# Patient Record
Sex: FEMALE | Race: WHITE | Employment: OTHER | URBAN - METROPOLITAN AREA
[De-identification: names, ages, dates, MRNs, and addresses within clinical notes are randomized per-mention and may not be internally consistent; named-entity substitution may affect disease eponyms.]

---

## 2017-01-05 ENCOUNTER — OFFICE VISIT (OUTPATIENT)
Dept: PULMONOLOGY | Age: 52
End: 2017-01-05

## 2017-01-05 ENCOUNTER — OFFICE VISIT (OUTPATIENT)
Dept: SLEEP MEDICINE | Age: 52
End: 2017-01-05

## 2017-01-05 ENCOUNTER — HOSPITAL ENCOUNTER (OUTPATIENT)
Dept: OTHER | Age: 52
Discharge: HOME | End: 2017-01-05
Attending: INTERNAL MEDICINE | Admitting: INTERNAL MEDICINE

## 2017-01-05 VITALS
DIASTOLIC BLOOD PRESSURE: 78 MMHG | RESPIRATION RATE: 16 BRPM | TEMPERATURE: 98 F | HEIGHT: 62 IN | WEIGHT: 220 LBS | BODY MASS INDEX: 40.48 KG/M2 | SYSTOLIC BLOOD PRESSURE: 134 MMHG | OXYGEN SATURATION: 92 % | HEART RATE: 76 BPM

## 2017-01-05 VITALS
DIASTOLIC BLOOD PRESSURE: 70 MMHG | RESPIRATION RATE: 12 BRPM | BODY MASS INDEX: 40.59 KG/M2 | WEIGHT: 220.6 LBS | OXYGEN SATURATION: 98 % | HEIGHT: 62 IN | SYSTOLIC BLOOD PRESSURE: 126 MMHG | HEART RATE: 67 BPM | TEMPERATURE: 98.2 F

## 2017-01-05 DIAGNOSIS — R09.82 POST-NASAL DRIP: ICD-10-CM

## 2017-01-05 DIAGNOSIS — J45.30 MILD PERSISTENT ASTHMA WITHOUT COMPLICATION: ICD-10-CM

## 2017-01-05 DIAGNOSIS — G47.61 PLMD (PERIODIC LIMB MOVEMENT DISORDER): ICD-10-CM

## 2017-01-05 DIAGNOSIS — G25.81 RESTLESS LEG SYNDROME: ICD-10-CM

## 2017-01-05 DIAGNOSIS — J45.30 MILD PERSISTENT ASTHMA WITHOUT COMPLICATION: Primary | ICD-10-CM

## 2017-01-05 DIAGNOSIS — G47.33 OSA (OBSTRUCTIVE SLEEP APNEA): Primary | ICD-10-CM

## 2017-01-05 LAB
BASOPHILS ABSOLUTE: 0.1 K/UL (ref 0–0.2)
BASOPHILS RELATIVE PERCENT: 1 %
EOSINOPHILS ABSOLUTE: 0.1 K/UL (ref 0–0.6)
EOSINOPHILS RELATIVE PERCENT: 1.1 %
HCT VFR BLD CALC: 43.3 % (ref 36–48)
HEMOGLOBIN: 14 G/DL (ref 12–16)
LYMPHOCYTES ABSOLUTE: 3.9 K/UL (ref 1–5.1)
LYMPHOCYTES RELATIVE PERCENT: 33.9 %
MCH RBC QN AUTO: 28.4 PG (ref 26–34)
MCHC RBC AUTO-ENTMCNC: 32.3 G/DL (ref 31–36)
MCV RBC AUTO: 87.9 FL (ref 80–100)
MONOCYTES ABSOLUTE: 0.6 K/UL (ref 0–1.3)
MONOCYTES RELATIVE PERCENT: 5.6 %
NEUTROPHILS ABSOLUTE: 6.7 K/UL (ref 1.7–7.7)
NEUTROPHILS RELATIVE PERCENT: 58.4 %
PDW BLD-RTO: 14.7 % (ref 12.4–15.4)
PLATELET # BLD: 250 K/UL (ref 135–450)
PMV BLD AUTO: 9.4 FL (ref 5–10.5)
RBC # BLD: 4.93 M/UL (ref 4–5.2)
WBC # BLD: 11.5 K/UL (ref 4–11)

## 2017-01-05 PROCEDURE — 99204 OFFICE O/P NEW MOD 45 MIN: CPT | Performed by: PSYCHIATRY & NEUROLOGY

## 2017-01-05 PROCEDURE — 99205 OFFICE O/P NEW HI 60 MIN: CPT | Performed by: INTERNAL MEDICINE

## 2017-01-05 RX ORDER — FLUTICASONE FUROATE AND VILANTEROL 200; 25 UG/1; UG/1
200 POWDER RESPIRATORY (INHALATION) DAILY
Qty: 1 EACH | Refills: 3 | Status: SHIPPED | OUTPATIENT
Start: 2017-01-05 | End: 2017-01-09 | Stop reason: CLARIF

## 2017-01-05 RX ORDER — FLUTICASONE FUROATE AND VILANTEROL 100; 25 UG/1; UG/1
1 POWDER RESPIRATORY (INHALATION) DAILY
Qty: 1 EACH | Refills: 0 | COMMUNITY
Start: 2017-01-05 | End: 2017-04-10

## 2017-01-05 ASSESSMENT — SLEEP AND FATIGUE QUESTIONNAIRES
HOW LIKELY ARE YOU TO NOD OFF OR FALL ASLEEP WHILE WATCHING TV: 3
HOW LIKELY ARE YOU TO NOD OFF OR FALL ASLEEP WHEN YOU ARE A PASSENGER IN A CAR FOR AN HOUR WITHOUT A BREAK: 0
HOW LIKELY ARE YOU TO NOD OFF OR FALL ASLEEP WHILE SITTING INACTIVE IN A PUBLIC PLACE: 3
NECK CIRCUMFERENCE (INCHES): 16.5
ESS TOTAL SCORE: 18
HOW LIKELY ARE YOU TO NOD OFF OR FALL ASLEEP WHILE SITTING AND TALKING TO SOMEONE: 3
HOW LIKELY ARE YOU TO NOD OFF OR FALL ASLEEP WHILE LYING DOWN TO REST IN THE AFTERNOON WHEN CIRCUMSTANCES PERMIT: 3
HOW LIKELY ARE YOU TO NOD OFF OR FALL ASLEEP WHILE SITTING AND READING: 3
HOW LIKELY ARE YOU TO NOD OFF OR FALL ASLEEP WHILE SITTING QUIETLY AFTER LUNCH WITHOUT ALCOHOL: 3
HOW LIKELY ARE YOU TO NOD OFF OR FALL ASLEEP IN A CAR, WHILE STOPPED FOR A FEW MINUTES IN TRAFFIC: 0

## 2017-01-05 ASSESSMENT — ENCOUNTER SYMPTOMS
CONSTIPATION: 0
NAUSEA: 0
SHORTNESS OF BREATH: 1
SORE THROAT: 1
WHEEZING: 1
CHOKING: 1
COUGH: 1
APNEA: 1

## 2017-01-07 LAB
2000687N OAK TREE IGE: <0.1 KU/L
ALLERGEN ASPERGILLUS ALTERNATA IGE: <0.1 KU/L
ALLERGEN ASPERGILLUS FUMIGATUS IGE: <0.1 KU/L
ALLERGEN BERMUDA GRASS IGE: <0.1 KU/L
ALLERGEN BIRCH IGE: <0.1 KU/L
ALLERGEN CAT DANDER IGE: <0.1 KU/L
ALLERGEN COMMON SHORT RAGWEED IGE: <0.1 KU/L
ALLERGEN COTTONWOOD: <0.1 KU/L
ALLERGEN COW MILK IGE: <0.1 KU/L
ALLERGEN DOG DANDER IGE: <0.1 KU/L
ALLERGEN ELM IGE: <0.1 KU/L
ALLERGEN FUNGI/MOLD M.RACEMOSUS IGE: <0.1 KU/L
ALLERGEN GERMAN COCKROACH IGE: <0.1 KU/L
ALLERGEN HORMODENDRUM HORDEI IGE: <0.1 KU/L
ALLERGEN MAPLE/BOX ELDER IGE: <0.1 KU/L
ALLERGEN MITE DUST FARINAE IGE: <0.1 KU/L
ALLERGEN MITE DUST PTERONYSSINUS IGE: <0.1 KU/L
ALLERGEN MOUNTAIN CEDAR: <0.1 KU/L
ALLERGEN MOUSE EPITHELIA IGE: <0.1 KU/L
ALLERGEN PEANUT (F13) IGE: <0.1 KU/L
ALLERGEN PECAN TREE IGE: <0.1 KU/L
ALLERGEN PENICILLIUM NOTATUM: <0.1 KU/L
ALLERGEN ROUGH PIGWEED (W14) IGE: <0.1 KU/L
ALLERGEN RUSSIAN THISTLE IGE: <0.1 KU/L
ALLERGEN SEE NOTE: NORMAL
ALLERGEN SHEEP SORREL (W18) IGE: <0.1 KU/L
ALLERGEN TIMOTHY GRASS: <0.1 KU/L
ALLERGEN TREE SYCAMORE: <0.1 KU/L
ALLERGEN WALNUT TREE IGE: <0.1 KU/L
ALLERGEN WHITE MULBERRY TREE, IGE: <0.1 KU/L
ALLERGEN, TREE, WHITE ASH IGE: <0.1 KU/L
IGE: 50 KU/L

## 2017-01-09 ENCOUNTER — TELEPHONE (OUTPATIENT)
Dept: PULMONOLOGY | Age: 52
End: 2017-01-09

## 2017-01-09 ENCOUNTER — TELEPHONE (OUTPATIENT)
Dept: ENT CLINIC | Age: 52
End: 2017-01-09

## 2017-01-09 DIAGNOSIS — J45.20 MILD INTERMITTENT ASTHMA WITHOUT COMPLICATION: Primary | ICD-10-CM

## 2017-01-09 RX ORDER — BUDESONIDE AND FORMOTEROL FUMARATE DIHYDRATE 80; 4.5 UG/1; UG/1
2 AEROSOL RESPIRATORY (INHALATION) 2 TIMES DAILY
Qty: 1 INHALER | Refills: 3 | Status: SHIPPED | OUTPATIENT
Start: 2017-01-09 | End: 2017-04-10

## 2017-01-11 ENCOUNTER — TELEPHONE (OUTPATIENT)
Dept: ENDOCRINOLOGY | Age: 52
End: 2017-01-11

## 2017-01-11 DIAGNOSIS — Z79.4 TYPE 2 DIABETES MELLITUS WITHOUT COMPLICATION, WITH LONG-TERM CURRENT USE OF INSULIN (HCC): ICD-10-CM

## 2017-01-11 DIAGNOSIS — E11.9 TYPE 2 DIABETES MELLITUS WITHOUT COMPLICATION, WITH LONG-TERM CURRENT USE OF INSULIN (HCC): ICD-10-CM

## 2017-01-18 ENCOUNTER — TELEPHONE (OUTPATIENT)
Dept: ENDOCRINOLOGY | Age: 52
End: 2017-01-18

## 2017-01-18 RX ORDER — BLOOD-GLUCOSE METER
EACH MISCELLANEOUS
Qty: 1 DEVICE | Refills: 0 | Status: SHIPPED | OUTPATIENT
Start: 2017-01-18 | End: 2017-05-02 | Stop reason: ALTCHOICE

## 2017-03-07 ENCOUNTER — TELEPHONE (OUTPATIENT)
Dept: PULMONOLOGY | Age: 52
End: 2017-03-07

## 2017-03-10 ENCOUNTER — TELEPHONE (OUTPATIENT)
Dept: PULMONOLOGY | Age: 52
End: 2017-03-10

## 2017-03-21 ENCOUNTER — TELEPHONE (OUTPATIENT)
Dept: ENDOCRINOLOGY | Age: 52
End: 2017-03-21

## 2017-03-21 DIAGNOSIS — E11.9 TYPE 2 DIABETES MELLITUS WITHOUT COMPLICATION, WITH LONG-TERM CURRENT USE OF INSULIN (HCC): ICD-10-CM

## 2017-03-21 DIAGNOSIS — Z79.4 TYPE 2 DIABETES MELLITUS WITHOUT COMPLICATION, WITH LONG-TERM CURRENT USE OF INSULIN (HCC): ICD-10-CM

## 2017-03-21 RX ORDER — PEN NEEDLE, DIABETIC 32GX 5/32"
NEEDLE, DISPOSABLE MISCELLANEOUS
Qty: 100 EACH | Refills: 0 | Status: SHIPPED | OUTPATIENT
Start: 2017-03-21 | End: 2017-07-28 | Stop reason: SDUPTHER

## 2017-04-10 ENCOUNTER — TELEPHONE (OUTPATIENT)
Dept: CARDIOLOGY CLINIC | Age: 52
End: 2017-04-10

## 2017-04-11 ENCOUNTER — TELEPHONE (OUTPATIENT)
Dept: ENDOCRINOLOGY | Age: 52
End: 2017-04-11

## 2017-04-11 ENCOUNTER — HOSPITAL ENCOUNTER (OUTPATIENT)
Dept: ENDOSCOPY | Age: 52
Discharge: HOME | End: 2017-04-11
Attending: INTERNAL MEDICINE | Admitting: INTERNAL MEDICINE

## 2017-04-11 VITALS
WEIGHT: 227.5 LBS | TEMPERATURE: 97.6 F | DIASTOLIC BLOOD PRESSURE: 81 MMHG | BODY MASS INDEX: 41.86 KG/M2 | HEART RATE: 84 BPM | OXYGEN SATURATION: 100 % | RESPIRATION RATE: 12 BRPM | SYSTOLIC BLOOD PRESSURE: 146 MMHG | HEIGHT: 62 IN

## 2017-04-11 DIAGNOSIS — Z79.4 TYPE 2 DIABETES MELLITUS WITHOUT COMPLICATION, WITH LONG-TERM CURRENT USE OF INSULIN (HCC): ICD-10-CM

## 2017-04-11 DIAGNOSIS — E11.9 TYPE 2 DIABETES MELLITUS WITHOUT COMPLICATION, WITH LONG-TERM CURRENT USE OF INSULIN (HCC): ICD-10-CM

## 2017-04-11 LAB
GLUCOSE BLD-MCNC: 196 MG/DL (ref 70–99)
GLUCOSE BLD-MCNC: 225 MG/DL (ref 70–99)
PERFORMED ON: ABNORMAL
PERFORMED ON: ABNORMAL

## 2017-04-11 RX ORDER — SODIUM CHLORIDE 0.9 % (FLUSH) 0.9 %
10 SYRINGE (ML) INJECTION EVERY 12 HOURS SCHEDULED
Status: DISCONTINUED | OUTPATIENT
Start: 2017-04-11 | End: 2017-04-12 | Stop reason: HOSPADM

## 2017-04-11 RX ORDER — SODIUM CHLORIDE 0.9 % (FLUSH) 0.9 %
10 SYRINGE (ML) INJECTION PRN
Status: DISCONTINUED | OUTPATIENT
Start: 2017-04-11 | End: 2017-04-12 | Stop reason: HOSPADM

## 2017-04-11 RX ORDER — SODIUM CHLORIDE 9 MG/ML
INJECTION, SOLUTION INTRAVENOUS CONTINUOUS
Status: DISCONTINUED | OUTPATIENT
Start: 2017-04-11 | End: 2017-04-12 | Stop reason: HOSPADM

## 2017-04-11 RX ORDER — SCOLOPAMINE TRANSDERMAL SYSTEM 1 MG/1
1 PATCH, EXTENDED RELEASE TRANSDERMAL
Status: DISCONTINUED | OUTPATIENT
Start: 2017-04-11 | End: 2017-04-12 | Stop reason: HOSPADM

## 2017-04-11 RX ADMIN — SODIUM CHLORIDE: 9 INJECTION, SOLUTION INTRAVENOUS at 08:25

## 2017-04-11 ASSESSMENT — PAIN - FUNCTIONAL ASSESSMENT: PAIN_FUNCTIONAL_ASSESSMENT: 0-10

## 2017-04-11 ASSESSMENT — PAIN DESCRIPTION - DESCRIPTORS: DESCRIPTORS: ACHING;DISCOMFORT;SORE

## 2017-04-18 ENCOUNTER — HOSPITAL ENCOUNTER (OUTPATIENT)
Dept: OTHER | Age: 52
Discharge: HOME | End: 2017-04-20
Attending: PSYCHIATRY & NEUROLOGY | Admitting: PSYCHIATRY & NEUROLOGY

## 2017-04-18 DIAGNOSIS — G47.33 OSA (OBSTRUCTIVE SLEEP APNEA): ICD-10-CM

## 2017-04-20 ENCOUNTER — TELEPHONE (OUTPATIENT)
Dept: PRIMARY CARE CLINIC | Age: 52
End: 2017-04-20

## 2017-04-21 ENCOUNTER — TELEPHONE (OUTPATIENT)
Dept: PULMONOLOGY | Age: 52
End: 2017-04-21

## 2017-05-02 ENCOUNTER — OFFICE VISIT (OUTPATIENT)
Dept: PRIMARY CARE CLINIC | Age: 52
End: 2017-05-02

## 2017-05-02 VITALS
RESPIRATION RATE: 17 BRPM | HEART RATE: 72 BPM | DIASTOLIC BLOOD PRESSURE: 72 MMHG | TEMPERATURE: 97.7 F | OXYGEN SATURATION: 98 % | WEIGHT: 226 LBS | SYSTOLIC BLOOD PRESSURE: 132 MMHG | BODY MASS INDEX: 41.34 KG/M2

## 2017-05-02 DIAGNOSIS — M79.7 FIBROMYALGIA: ICD-10-CM

## 2017-05-02 DIAGNOSIS — Z79.4 TYPE 2 DIABETES MELLITUS WITHOUT COMPLICATION, WITH LONG-TERM CURRENT USE OF INSULIN (HCC): ICD-10-CM

## 2017-05-02 DIAGNOSIS — K21.9 GASTROESOPHAGEAL REFLUX DISEASE WITHOUT ESOPHAGITIS: ICD-10-CM

## 2017-05-02 DIAGNOSIS — Z79.4 TYPE 2 DIABETES MELLITUS WITHOUT COMPLICATION, WITH LONG-TERM CURRENT USE OF INSULIN (HCC): Primary | ICD-10-CM

## 2017-05-02 DIAGNOSIS — N39.46 MIXED INCONTINENCE: ICD-10-CM

## 2017-05-02 DIAGNOSIS — M54.9 CHRONIC BACK PAIN, UNSPECIFIED BACK LOCATION, UNSPECIFIED BACK PAIN LATERALITY: ICD-10-CM

## 2017-05-02 DIAGNOSIS — R42 VERTIGO: ICD-10-CM

## 2017-05-02 DIAGNOSIS — E78.00 PURE HYPERCHOLESTEROLEMIA: ICD-10-CM

## 2017-05-02 DIAGNOSIS — B37.31 VAGINA, CANDIDIASIS: ICD-10-CM

## 2017-05-02 DIAGNOSIS — E55.9 VITAMIN D DEFICIENCY: ICD-10-CM

## 2017-05-02 DIAGNOSIS — J30.2 OTHER SEASONAL ALLERGIC RHINITIS: ICD-10-CM

## 2017-05-02 DIAGNOSIS — K58.9 IRRITABLE BOWEL SYNDROME WITHOUT DIARRHEA: ICD-10-CM

## 2017-05-02 DIAGNOSIS — G47.33 OBSTRUCTIVE SLEEP APNEA SYNDROME: ICD-10-CM

## 2017-05-02 DIAGNOSIS — K31.84 GASTROPARESIS: ICD-10-CM

## 2017-05-02 DIAGNOSIS — J45.20 MILD INTERMITTENT ASTHMA WITHOUT COMPLICATION: ICD-10-CM

## 2017-05-02 DIAGNOSIS — E11.9 TYPE 2 DIABETES MELLITUS WITHOUT COMPLICATION, WITH LONG-TERM CURRENT USE OF INSULIN (HCC): ICD-10-CM

## 2017-05-02 DIAGNOSIS — E11.9 TYPE 2 DIABETES MELLITUS WITHOUT COMPLICATION, WITH LONG-TERM CURRENT USE OF INSULIN (HCC): Primary | ICD-10-CM

## 2017-05-02 DIAGNOSIS — G89.29 CHRONIC BACK PAIN, UNSPECIFIED BACK LOCATION, UNSPECIFIED BACK PAIN LATERALITY: ICD-10-CM

## 2017-05-02 DIAGNOSIS — M06.9 RHEUMATOID ARTHRITIS INVOLVING MULTIPLE SITES, UNSPECIFIED RHEUMATOID FACTOR PRESENCE: ICD-10-CM

## 2017-05-02 LAB
A/G RATIO: 1.6 (ref 1.1–2.2)
ALBUMIN SERPL-MCNC: 4.5 G/DL (ref 3.4–5)
ALP BLD-CCNC: 19 U/L (ref 40–129)
ALT SERPL-CCNC: 36 U/L (ref 10–40)
ANION GAP SERPL CALCULATED.3IONS-SCNC: 15 MMOL/L (ref 3–16)
AST SERPL-CCNC: 28 U/L (ref 15–37)
BILIRUB SERPL-MCNC: 0.5 MG/DL (ref 0–1)
BUN BLDV-MCNC: 12 MG/DL (ref 7–20)
CALCIUM SERPL-MCNC: 10.4 MG/DL (ref 8.3–10.6)
CHLORIDE BLD-SCNC: 96 MMOL/L (ref 99–110)
CHOLESTEROL, TOTAL: 277 MG/DL (ref 0–199)
CO2: 26 MMOL/L (ref 21–32)
CREAT SERPL-MCNC: 0.6 MG/DL (ref 0.6–1.1)
GFR AFRICAN AMERICAN: >60
GFR NON-AFRICAN AMERICAN: >60
GLOBULIN: 2.9 G/DL
GLUCOSE BLD-MCNC: 168 MG/DL (ref 70–99)
HDLC SERPL-MCNC: 37 MG/DL (ref 40–60)
LDL CHOLESTEROL CALCULATED: 199 MG/DL
POTASSIUM SERPL-SCNC: 4.8 MMOL/L (ref 3.5–5.1)
SODIUM BLD-SCNC: 137 MMOL/L (ref 136–145)
TOTAL PROTEIN: 7.4 G/DL (ref 6.4–8.2)
TRIGL SERPL-MCNC: 205 MG/DL (ref 0–150)
VLDLC SERPL CALC-MCNC: 41 MG/DL

## 2017-05-02 PROCEDURE — 99214 OFFICE O/P EST MOD 30 MIN: CPT | Performed by: FAMILY MEDICINE

## 2017-05-02 RX ORDER — NYSTATIN 100000 U/G
CREAM TOPICAL
Qty: 30 G | Refills: 0 | Status: SHIPPED | OUTPATIENT
Start: 2017-05-02 | End: 2017-08-04 | Stop reason: SDUPTHER

## 2017-05-02 RX ORDER — GUAIFENESIN AND PSEUDOEPHEDRINE HCL 1200; 120 MG/1; MG/1
TABLET, EXTENDED RELEASE ORAL
Qty: 28 TABLET | Refills: 2 | Status: SHIPPED | OUTPATIENT
Start: 2017-05-02 | End: 2017-08-04 | Stop reason: SDUPTHER

## 2017-05-02 RX ORDER — IBUPROFEN 800 MG/1
TABLET ORAL
Qty: 120 TABLET | Refills: 1 | Status: SHIPPED | OUTPATIENT
Start: 2017-05-02 | End: 2017-07-18 | Stop reason: SDUPTHER

## 2017-05-02 RX ORDER — LANCETS
EACH MISCELLANEOUS
Qty: 100 EACH | Refills: 3 | Status: SHIPPED | OUTPATIENT
Start: 2017-05-02 | End: 2017-08-04 | Stop reason: SDUPTHER

## 2017-05-02 RX ORDER — CYCLOBENZAPRINE HCL 10 MG
5 TABLET ORAL DAILY PRN
Qty: 30 TABLET | Refills: 2 | Status: SHIPPED | OUTPATIENT
Start: 2017-05-02 | End: 2017-08-04 | Stop reason: SDUPTHER

## 2017-05-02 RX ORDER — SIMETHICONE 125 MG
250 TABLET,CHEWABLE ORAL EVERY 6 HOURS PRN
Qty: 320 TABLET | Refills: 2 | Status: SHIPPED | OUTPATIENT
Start: 2017-05-02 | End: 2017-08-04 | Stop reason: SDUPTHER

## 2017-05-02 RX ORDER — INHALER, ASSIST DEVICES
1 SPACER (EA) MISCELLANEOUS 4 TIMES DAILY PRN
Qty: 1 EACH | Refills: 0 | Status: SHIPPED | OUTPATIENT
Start: 2017-05-02 | End: 2017-08-04 | Stop reason: ALTCHOICE

## 2017-05-02 RX ORDER — RANITIDINE 300 MG/1
300 CAPSULE ORAL EVERY EVENING
Qty: 30 CAPSULE | Refills: 2 | Status: SHIPPED | OUTPATIENT
Start: 2017-05-02 | End: 2017-08-04 | Stop reason: SDUPTHER

## 2017-05-02 RX ORDER — ALBUTEROL SULFATE 90 UG/1
AEROSOL, METERED RESPIRATORY (INHALATION)
Qty: 1 INHALER | Refills: 2 | Status: SHIPPED | OUTPATIENT
Start: 2017-05-02 | End: 2017-08-04 | Stop reason: SDUPTHER

## 2017-05-02 RX ORDER — LORATADINE 10 MG/1
TABLET ORAL
Qty: 30 TABLET | Refills: 2 | Status: SHIPPED | OUTPATIENT
Start: 2017-05-02 | End: 2017-08-04 | Stop reason: SDUPTHER

## 2017-05-02 RX ORDER — ATORVASTATIN CALCIUM 10 MG/1
10 TABLET, FILM COATED ORAL DAILY
Qty: 30 TABLET | Refills: 2 | Status: SHIPPED | OUTPATIENT
Start: 2017-05-02 | End: 2017-08-04 | Stop reason: SDUPTHER

## 2017-05-02 ASSESSMENT — PATIENT HEALTH QUESTIONNAIRE - PHQ9
1. LITTLE INTEREST OR PLEASURE IN DOING THINGS: 0
SUM OF ALL RESPONSES TO PHQ QUESTIONS 1-9: 0
SUM OF ALL RESPONSES TO PHQ9 QUESTIONS 1 & 2: 0
2. FEELING DOWN, DEPRESSED OR HOPELESS: 0

## 2017-05-03 LAB
ESTIMATED AVERAGE GLUCOSE: 248.9 MG/DL
HBA1C MFR BLD: 10.3 %
VITAMIN D 25-HYDROXY: 20.3 NG/ML

## 2017-06-06 ENCOUNTER — TELEPHONE (OUTPATIENT)
Dept: PULMONOLOGY | Age: 52
End: 2017-06-06

## 2017-06-06 DIAGNOSIS — E11.9 TYPE 2 DIABETES MELLITUS WITHOUT COMPLICATION, WITH LONG-TERM CURRENT USE OF INSULIN (HCC): ICD-10-CM

## 2017-06-06 DIAGNOSIS — Z79.4 TYPE 2 DIABETES MELLITUS WITHOUT COMPLICATION, WITH LONG-TERM CURRENT USE OF INSULIN (HCC): ICD-10-CM

## 2017-06-07 RX ORDER — INSULIN GLARGINE 100 [IU]/ML
INJECTION, SOLUTION SUBCUTANEOUS
Qty: 5 PEN | Refills: 0 | Status: SHIPPED | OUTPATIENT
Start: 2017-06-07 | End: 2017-06-13 | Stop reason: SDUPTHER

## 2017-06-09 ENCOUNTER — TELEPHONE (OUTPATIENT)
Dept: PULMONOLOGY | Age: 52
End: 2017-06-09

## 2017-06-13 ENCOUNTER — OFFICE VISIT (OUTPATIENT)
Dept: SLEEP MEDICINE | Age: 52
End: 2017-06-13

## 2017-06-13 ENCOUNTER — TELEPHONE (OUTPATIENT)
Dept: ENDOCRINOLOGY | Age: 52
End: 2017-06-13

## 2017-06-13 VITALS
RESPIRATION RATE: 16 BRPM | TEMPERATURE: 98.7 F | OXYGEN SATURATION: 96 % | DIASTOLIC BLOOD PRESSURE: 78 MMHG | WEIGHT: 227 LBS | SYSTOLIC BLOOD PRESSURE: 128 MMHG | BODY MASS INDEX: 41.77 KG/M2 | HEART RATE: 74 BPM | HEIGHT: 62 IN

## 2017-06-13 DIAGNOSIS — G47.33 OSA ON CPAP: Primary | ICD-10-CM

## 2017-06-13 DIAGNOSIS — E11.9 TYPE 2 DIABETES MELLITUS WITHOUT COMPLICATION, WITH LONG-TERM CURRENT USE OF INSULIN (HCC): ICD-10-CM

## 2017-06-13 DIAGNOSIS — Z99.89 OSA ON CPAP: Primary | ICD-10-CM

## 2017-06-13 DIAGNOSIS — Z79.4 TYPE 2 DIABETES MELLITUS WITHOUT COMPLICATION, WITH LONG-TERM CURRENT USE OF INSULIN (HCC): ICD-10-CM

## 2017-06-13 PROCEDURE — 99213 OFFICE O/P EST LOW 20 MIN: CPT | Performed by: PSYCHIATRY & NEUROLOGY

## 2017-06-13 RX ORDER — AZITHROMYCIN 250 MG/1
250 TABLET, FILM COATED ORAL DAILY
COMMUNITY
End: 2017-07-18 | Stop reason: ALTCHOICE

## 2017-06-13 ASSESSMENT — SLEEP AND FATIGUE QUESTIONNAIRES
HOW LIKELY ARE YOU TO NOD OFF OR FALL ASLEEP WHILE LYING DOWN TO REST IN THE AFTERNOON WHEN CIRCUMSTANCES PERMIT: 2
HOW LIKELY ARE YOU TO NOD OFF OR FALL ASLEEP IN A CAR, WHILE STOPPED FOR A FEW MINUTES IN TRAFFIC: 2
HOW LIKELY ARE YOU TO NOD OFF OR FALL ASLEEP WHEN YOU ARE A PASSENGER IN A CAR FOR AN HOUR WITHOUT A BREAK: 2
HOW LIKELY ARE YOU TO NOD OFF OR FALL ASLEEP WHILE SITTING INACTIVE IN A PUBLIC PLACE: 2
HOW LIKELY ARE YOU TO NOD OFF OR FALL ASLEEP WHILE WATCHING TV: 2
HOW LIKELY ARE YOU TO NOD OFF OR FALL ASLEEP WHILE SITTING AND READING: 2
HOW LIKELY ARE YOU TO NOD OFF OR FALL ASLEEP WHILE SITTING QUIETLY AFTER LUNCH WITHOUT ALCOHOL: 2
ESS TOTAL SCORE: 16
NECK CIRCUMFERENCE (INCHES): 17
HOW LIKELY ARE YOU TO NOD OFF OR FALL ASLEEP WHILE SITTING AND TALKING TO SOMEONE: 2

## 2017-07-03 RX ORDER — GLUCOSAM/CHON-MSM1/C/MANG/BOSW 500-416.6
TABLET ORAL
Qty: 100 EACH | Refills: 3 | Status: SHIPPED | OUTPATIENT
Start: 2017-07-03 | End: 2018-10-01 | Stop reason: SDUPTHER

## 2017-07-11 RX ORDER — LIDOCAINE 50 MG/G
1 PATCH TOPICAL DAILY
Qty: 30 PATCH | Refills: 0 | Status: SHIPPED | OUTPATIENT
Start: 2017-07-11 | End: 2017-07-25 | Stop reason: ALTCHOICE

## 2017-07-17 DIAGNOSIS — M06.9 RHEUMATOID ARTHRITIS INVOLVING MULTIPLE SITES, UNSPECIFIED RHEUMATOID FACTOR PRESENCE: ICD-10-CM

## 2017-07-17 DIAGNOSIS — M79.7 FIBROMYALGIA: ICD-10-CM

## 2017-07-18 ENCOUNTER — OFFICE VISIT (OUTPATIENT)
Dept: ENDOCRINOLOGY | Age: 52
End: 2017-07-18

## 2017-07-18 VITALS
HEIGHT: 62 IN | DIASTOLIC BLOOD PRESSURE: 76 MMHG | BODY MASS INDEX: 40.78 KG/M2 | SYSTOLIC BLOOD PRESSURE: 131 MMHG | OXYGEN SATURATION: 96 % | RESPIRATION RATE: 16 BRPM | WEIGHT: 221.6 LBS | HEART RATE: 83 BPM

## 2017-07-18 DIAGNOSIS — Z79.4 TYPE 2 DIABETES MELLITUS WITHOUT COMPLICATION, WITH LONG-TERM CURRENT USE OF INSULIN (HCC): Primary | ICD-10-CM

## 2017-07-18 DIAGNOSIS — M79.7 FIBROMYALGIA: ICD-10-CM

## 2017-07-18 DIAGNOSIS — E04.2 MULTIPLE THYROID NODULES: ICD-10-CM

## 2017-07-18 DIAGNOSIS — M06.9 RHEUMATOID ARTHRITIS INVOLVING MULTIPLE SITES, UNSPECIFIED RHEUMATOID FACTOR PRESENCE: ICD-10-CM

## 2017-07-18 DIAGNOSIS — E11.9 TYPE 2 DIABETES MELLITUS WITHOUT COMPLICATION, WITH LONG-TERM CURRENT USE OF INSULIN (HCC): Primary | ICD-10-CM

## 2017-07-18 LAB — HBA1C MFR BLD: 8.8 %

## 2017-07-18 PROCEDURE — 99214 OFFICE O/P EST MOD 30 MIN: CPT | Performed by: INTERNAL MEDICINE

## 2017-07-18 PROCEDURE — 83036 HEMOGLOBIN GLYCOSYLATED A1C: CPT | Performed by: INTERNAL MEDICINE

## 2017-07-18 RX ORDER — IBUPROFEN 800 MG/1
TABLET ORAL
Qty: 120 TABLET | Refills: 0 | Status: SHIPPED | OUTPATIENT
Start: 2017-07-18 | End: 2017-08-04 | Stop reason: SDUPTHER

## 2017-07-18 RX ORDER — IBUPROFEN 800 MG/1
TABLET ORAL
Qty: 120 TABLET | Refills: 1 | OUTPATIENT
Start: 2017-07-18

## 2017-07-25 ENCOUNTER — OFFICE VISIT (OUTPATIENT)
Dept: SLEEP MEDICINE | Age: 52
End: 2017-07-25

## 2017-07-25 VITALS
DIASTOLIC BLOOD PRESSURE: 68 MMHG | HEART RATE: 67 BPM | SYSTOLIC BLOOD PRESSURE: 126 MMHG | WEIGHT: 225.4 LBS | BODY MASS INDEX: 41.48 KG/M2 | OXYGEN SATURATION: 96 % | RESPIRATION RATE: 16 BRPM | HEIGHT: 62 IN

## 2017-07-25 DIAGNOSIS — Z99.89 OSA ON CPAP: Primary | ICD-10-CM

## 2017-07-25 DIAGNOSIS — G47.33 OSA ON CPAP: Primary | ICD-10-CM

## 2017-07-25 DIAGNOSIS — G47.33 OSA (OBSTRUCTIVE SLEEP APNEA): ICD-10-CM

## 2017-07-25 LAB
CREATININE URINE: 185.7 MG/DL (ref 28–259)
MICROALBUMIN UR-MCNC: 2.5 MG/DL
MICROALBUMIN/CREAT UR-RTO: 13.5 MG/G (ref 0–30)

## 2017-07-25 PROCEDURE — 99213 OFFICE O/P EST LOW 20 MIN: CPT | Performed by: PSYCHIATRY & NEUROLOGY

## 2017-07-25 ASSESSMENT — SLEEP AND FATIGUE QUESTIONNAIRES
HOW LIKELY ARE YOU TO NOD OFF OR FALL ASLEEP WHILE SITTING QUIETLY AFTER LUNCH WITHOUT ALCOHOL: 2
HOW LIKELY ARE YOU TO NOD OFF OR FALL ASLEEP WHILE LYING DOWN TO REST IN THE AFTERNOON WHEN CIRCUMSTANCES PERMIT: 3
HOW LIKELY ARE YOU TO NOD OFF OR FALL ASLEEP WHILE SITTING INACTIVE IN A PUBLIC PLACE: 3
HOW LIKELY ARE YOU TO NOD OFF OR FALL ASLEEP IN A CAR, WHILE STOPPED FOR A FEW MINUTES IN TRAFFIC: 1
ESS TOTAL SCORE: 19
HOW LIKELY ARE YOU TO NOD OFF OR FALL ASLEEP WHILE SITTING AND TALKING TO SOMEONE: 2
HOW LIKELY ARE YOU TO NOD OFF OR FALL ASLEEP WHILE WATCHING TV: 3
HOW LIKELY ARE YOU TO NOD OFF OR FALL ASLEEP WHILE SITTING AND READING: 3
HOW LIKELY ARE YOU TO NOD OFF OR FALL ASLEEP WHEN YOU ARE A PASSENGER IN A CAR FOR AN HOUR WITHOUT A BREAK: 2

## 2017-07-27 ENCOUNTER — HOSPITAL ENCOUNTER (OUTPATIENT)
Dept: GENERAL RADIOLOGY | Age: 52
Discharge: HOME | End: 2017-07-27
Attending: INTERNAL MEDICINE | Admitting: INTERNAL MEDICINE

## 2017-07-27 DIAGNOSIS — E04.2 MULTIPLE THYROID NODULES: ICD-10-CM

## 2017-07-27 DIAGNOSIS — E04.2 NONTOXIC MULTINODULAR GOITER: ICD-10-CM

## 2017-07-28 DIAGNOSIS — Z79.4 TYPE 2 DIABETES MELLITUS WITHOUT COMPLICATION, WITH LONG-TERM CURRENT USE OF INSULIN (HCC): ICD-10-CM

## 2017-07-28 DIAGNOSIS — E11.9 TYPE 2 DIABETES MELLITUS WITHOUT COMPLICATION, WITH LONG-TERM CURRENT USE OF INSULIN (HCC): ICD-10-CM

## 2017-07-28 RX ORDER — PEN NEEDLE, DIABETIC 32GX 5/32"
NEEDLE, DISPOSABLE MISCELLANEOUS
Qty: 100 EACH | Refills: 3 | Status: SHIPPED | OUTPATIENT
Start: 2017-07-28 | End: 2017-08-01 | Stop reason: SDUPTHER

## 2017-08-01 ENCOUNTER — TELEPHONE (OUTPATIENT)
Dept: ENDOCRINOLOGY | Age: 52
End: 2017-08-01

## 2017-08-01 DIAGNOSIS — E11.9 TYPE 2 DIABETES MELLITUS WITHOUT COMPLICATION, WITH LONG-TERM CURRENT USE OF INSULIN (HCC): ICD-10-CM

## 2017-08-01 DIAGNOSIS — Z79.4 TYPE 2 DIABETES MELLITUS WITHOUT COMPLICATION, WITH LONG-TERM CURRENT USE OF INSULIN (HCC): ICD-10-CM

## 2017-08-03 ENCOUNTER — TELEPHONE (OUTPATIENT)
Dept: ENDOCRINOLOGY | Age: 52
End: 2017-08-03

## 2017-08-04 ENCOUNTER — OFFICE VISIT (OUTPATIENT)
Dept: PRIMARY CARE CLINIC | Age: 52
End: 2017-08-04

## 2017-08-04 VITALS
HEART RATE: 64 BPM | SYSTOLIC BLOOD PRESSURE: 143 MMHG | WEIGHT: 227 LBS | TEMPERATURE: 97.8 F | OXYGEN SATURATION: 98 % | BODY MASS INDEX: 41.52 KG/M2 | DIASTOLIC BLOOD PRESSURE: 79 MMHG

## 2017-08-04 DIAGNOSIS — J45.20 MILD INTERMITTENT ASTHMA WITHOUT COMPLICATION: Primary | ICD-10-CM

## 2017-08-04 DIAGNOSIS — N39.46 MIXED STRESS AND URGE URINARY INCONTINENCE: ICD-10-CM

## 2017-08-04 DIAGNOSIS — E55.9 VITAMIN D DEFICIENCY: ICD-10-CM

## 2017-08-04 DIAGNOSIS — G47.33 OBSTRUCTIVE SLEEP APNEA SYNDROME: ICD-10-CM

## 2017-08-04 DIAGNOSIS — R42 VERTIGO: ICD-10-CM

## 2017-08-04 DIAGNOSIS — E04.1 THYROID CYST: ICD-10-CM

## 2017-08-04 DIAGNOSIS — Z79.4 TYPE 2 DIABETES MELLITUS WITHOUT COMPLICATION, WITH LONG-TERM CURRENT USE OF INSULIN (HCC): ICD-10-CM

## 2017-08-04 DIAGNOSIS — G89.29 CHRONIC BACK PAIN, UNSPECIFIED BACK LOCATION, UNSPECIFIED BACK PAIN LATERALITY: ICD-10-CM

## 2017-08-04 DIAGNOSIS — E78.00 PURE HYPERCHOLESTEROLEMIA: ICD-10-CM

## 2017-08-04 DIAGNOSIS — M50.90 CERVICAL DISC DISEASE: ICD-10-CM

## 2017-08-04 DIAGNOSIS — B37.31 VAGINA, CANDIDIASIS: ICD-10-CM

## 2017-08-04 DIAGNOSIS — M79.7 FIBROMYALGIA: ICD-10-CM

## 2017-08-04 DIAGNOSIS — M06.9 RHEUMATOID ARTHRITIS INVOLVING MULTIPLE SITES, UNSPECIFIED RHEUMATOID FACTOR PRESENCE: ICD-10-CM

## 2017-08-04 DIAGNOSIS — B37.2 INTERTRIGINOUS CANDIDIASIS: ICD-10-CM

## 2017-08-04 DIAGNOSIS — E78.5 OTHER AND UNSPECIFIED HYPERLIPIDEMIA: ICD-10-CM

## 2017-08-04 DIAGNOSIS — E11.9 TYPE 2 DIABETES MELLITUS WITHOUT COMPLICATION, WITH LONG-TERM CURRENT USE OF INSULIN (HCC): ICD-10-CM

## 2017-08-04 DIAGNOSIS — K31.84 GASTROPARESIS: ICD-10-CM

## 2017-08-04 DIAGNOSIS — M54.9 CHRONIC BACK PAIN, UNSPECIFIED BACK LOCATION, UNSPECIFIED BACK PAIN LATERALITY: ICD-10-CM

## 2017-08-04 DIAGNOSIS — J30.89 NON-SEASONAL ALLERGIC RHINITIS, UNSPECIFIED ALLERGIC RHINITIS TRIGGER: ICD-10-CM

## 2017-08-04 DIAGNOSIS — K58.9 IRRITABLE BOWEL SYNDROME WITHOUT DIARRHEA: ICD-10-CM

## 2017-08-04 DIAGNOSIS — K21.9 GASTROESOPHAGEAL REFLUX DISEASE WITHOUT ESOPHAGITIS: ICD-10-CM

## 2017-08-04 LAB
A/G RATIO: 1.7 (ref 1.1–2.2)
ALBUMIN SERPL-MCNC: 4.8 G/DL (ref 3.4–5)
ALP BLD-CCNC: 16 U/L (ref 40–129)
ALT SERPL-CCNC: 33 U/L (ref 10–40)
ANION GAP SERPL CALCULATED.3IONS-SCNC: 14 MMOL/L (ref 3–16)
AST SERPL-CCNC: 25 U/L (ref 15–37)
BILIRUB SERPL-MCNC: 0.4 MG/DL (ref 0–1)
BUN BLDV-MCNC: 13 MG/DL (ref 7–20)
CALCIUM SERPL-MCNC: 10.7 MG/DL (ref 8.3–10.6)
CHLORIDE BLD-SCNC: 97 MMOL/L (ref 99–110)
CHOLESTEROL, TOTAL: 271 MG/DL (ref 0–199)
CO2: 28 MMOL/L (ref 21–32)
CREAT SERPL-MCNC: 0.6 MG/DL (ref 0.6–1.1)
GFR AFRICAN AMERICAN: >60
GFR NON-AFRICAN AMERICAN: >60
GLOBULIN: 2.8 G/DL
GLUCOSE BLD-MCNC: 116 MG/DL (ref 70–99)
HDLC SERPL-MCNC: 42 MG/DL (ref 40–60)
LDL CHOLESTEROL CALCULATED: 195 MG/DL
POTASSIUM SERPL-SCNC: 5.3 MMOL/L (ref 3.5–5.1)
SODIUM BLD-SCNC: 139 MMOL/L (ref 136–145)
TOTAL PROTEIN: 7.6 G/DL (ref 6.4–8.2)
TRIGL SERPL-MCNC: 171 MG/DL (ref 0–150)
TSH REFLEX: 1.62 UIU/ML (ref 0.27–4.2)
VLDLC SERPL CALC-MCNC: 34 MG/DL

## 2017-08-04 PROCEDURE — 99214 OFFICE O/P EST MOD 30 MIN: CPT | Performed by: FAMILY MEDICINE

## 2017-08-04 RX ORDER — IBUPROFEN 800 MG/1
TABLET ORAL
Qty: 120 TABLET | Refills: 0 | Status: CANCELLED | OUTPATIENT
Start: 2017-08-04

## 2017-08-04 RX ORDER — LORATADINE 10 MG/1
TABLET ORAL
Qty: 30 TABLET | Refills: 2 | Status: CANCELLED | OUTPATIENT
Start: 2017-08-04

## 2017-08-04 RX ORDER — FLUCONAZOLE 150 MG/1
150 TABLET ORAL ONCE
Qty: 1 TABLET | Refills: 0 | Status: SHIPPED | OUTPATIENT
Start: 2017-08-04 | End: 2018-01-22 | Stop reason: SDUPTHER

## 2017-08-04 RX ORDER — GUAIFENESIN AND PSEUDOEPHEDRINE HCL 1200; 120 MG/1; MG/1
TABLET, EXTENDED RELEASE ORAL
Qty: 28 TABLET | Refills: 2 | Status: SHIPPED | OUTPATIENT
Start: 2017-08-04 | End: 2018-02-27 | Stop reason: SDUPTHER

## 2017-08-04 RX ORDER — IBUPROFEN 800 MG/1
TABLET ORAL
Qty: 120 TABLET | Refills: 0 | Status: SHIPPED | OUTPATIENT
Start: 2017-08-04 | End: 2017-12-20 | Stop reason: SDUPTHER

## 2017-08-04 RX ORDER — LORATADINE 10 MG/1
TABLET ORAL
Qty: 30 TABLET | Refills: 2 | Status: SHIPPED | OUTPATIENT
Start: 2017-08-04 | End: 2018-02-27 | Stop reason: SDUPTHER

## 2017-08-04 RX ORDER — SIMETHICONE 125 MG
250 TABLET,CHEWABLE ORAL EVERY 6 HOURS PRN
Qty: 320 TABLET | Refills: 2 | Status: SHIPPED | OUTPATIENT
Start: 2017-08-04 | End: 2018-02-27 | Stop reason: SDUPTHER

## 2017-08-04 RX ORDER — CYCLOBENZAPRINE HCL 10 MG
5 TABLET ORAL DAILY PRN
Qty: 30 TABLET | Refills: 2 | Status: SHIPPED | OUTPATIENT
Start: 2017-08-04 | End: 2018-02-27 | Stop reason: SDUPTHER

## 2017-08-04 RX ORDER — ALBUTEROL SULFATE 90 UG/1
AEROSOL, METERED RESPIRATORY (INHALATION)
Qty: 1 INHALER | Refills: 2 | Status: SHIPPED | OUTPATIENT
Start: 2017-08-04 | End: 2018-02-27 | Stop reason: SDUPTHER

## 2017-08-04 RX ORDER — RANITIDINE 300 MG/1
300 CAPSULE ORAL EVERY EVENING
Qty: 30 CAPSULE | Refills: 2 | Status: SHIPPED | OUTPATIENT
Start: 2017-08-04 | End: 2020-05-28

## 2017-08-04 RX ORDER — FLUCONAZOLE 150 MG/1
TABLET ORAL
COMMUNITY
Start: 2017-05-19 | End: 2017-08-04 | Stop reason: SDUPTHER

## 2017-08-04 RX ORDER — NYSTATIN 100000 U/G
CREAM TOPICAL
Qty: 30 G | Refills: 0 | Status: SHIPPED | OUTPATIENT
Start: 2017-08-04 | End: 2017-10-31 | Stop reason: SDUPTHER

## 2017-08-04 RX ORDER — ATORVASTATIN CALCIUM 10 MG/1
10 TABLET, FILM COATED ORAL DAILY
Qty: 30 TABLET | Refills: 2 | Status: CANCELLED | OUTPATIENT
Start: 2017-08-04

## 2017-08-04 RX ORDER — ATORVASTATIN CALCIUM 10 MG/1
10 TABLET, FILM COATED ORAL DAILY
Qty: 30 TABLET | Refills: 2 | Status: SHIPPED | OUTPATIENT
Start: 2017-08-04 | End: 2018-02-27 | Stop reason: SDUPTHER

## 2017-08-04 RX ORDER — GUAIFENESIN AND PSEUDOEPHEDRINE HCL 1200; 120 MG/1; MG/1
TABLET, EXTENDED RELEASE ORAL
Qty: 28 TABLET | Refills: 2 | Status: CANCELLED | OUTPATIENT
Start: 2017-08-04

## 2017-08-04 RX ORDER — NYSTATIN 100000 U/G
CREAM TOPICAL
Qty: 30 G | Refills: 0 | Status: CANCELLED | OUTPATIENT
Start: 2017-08-04

## 2017-08-04 RX ORDER — ALBUTEROL SULFATE 90 UG/1
AEROSOL, METERED RESPIRATORY (INHALATION)
Qty: 1 INHALER | Refills: 2 | Status: CANCELLED | OUTPATIENT
Start: 2017-08-04

## 2017-08-07 ENCOUNTER — TELEPHONE (OUTPATIENT)
Dept: PRIMARY CARE CLINIC | Age: 52
End: 2017-08-07

## 2017-08-09 ENCOUNTER — TELEPHONE (OUTPATIENT)
Dept: PRIMARY CARE CLINIC | Age: 52
End: 2017-08-09

## 2017-08-10 DIAGNOSIS — M54.2 NECK PAIN: Primary | ICD-10-CM

## 2017-08-16 ENCOUNTER — TELEPHONE (OUTPATIENT)
Dept: PRIMARY CARE CLINIC | Age: 52
End: 2017-08-16

## 2017-08-20 ENCOUNTER — HOSPITAL ENCOUNTER (OUTPATIENT)
Dept: OTHER | Age: 52
Discharge: HOME | End: 2017-08-21
Attending: PSYCHIATRY & NEUROLOGY | Admitting: PSYCHIATRY & NEUROLOGY

## 2017-08-20 DIAGNOSIS — G47.33 OSA (OBSTRUCTIVE SLEEP APNEA): ICD-10-CM

## 2017-08-21 ENCOUNTER — TELEPHONE (OUTPATIENT)
Dept: PRIMARY CARE CLINIC | Age: 52
End: 2017-08-21

## 2017-08-22 ENCOUNTER — TELEPHONE (OUTPATIENT)
Dept: PULMONOLOGY | Age: 52
End: 2017-08-22

## 2017-08-24 ENCOUNTER — TELEPHONE (OUTPATIENT)
Dept: PRIMARY CARE CLINIC | Age: 52
End: 2017-08-24

## 2017-08-24 NOTE — TELEPHONE ENCOUNTER
Patient returning call to [facility] patient says referring doctor Dr. Jose Blair will not  schedule appt. Until MRI of the entire spine has been completed. Please advise patient states she also has spinal stenosis. Patient can be reached at the number [phone]. Thanks

## 2017-08-29 ENCOUNTER — TELEPHONE (OUTPATIENT)
Dept: PRIMARY CARE CLINIC | Age: 52
End: 2017-08-29

## 2017-08-29 ENCOUNTER — HOSPITAL ENCOUNTER (OUTPATIENT)
Dept: WOMENS IMAGING | Age: 52
Discharge: HOME | End: 2017-08-29
Attending: OBSTETRICS & GYNECOLOGY | Admitting: OBSTETRICS & GYNECOLOGY

## 2017-08-29 DIAGNOSIS — Z12.31 VISIT FOR SCREENING MAMMOGRAM: ICD-10-CM

## 2017-09-21 ENCOUNTER — TELEPHONE (OUTPATIENT)
Dept: PULMONOLOGY | Age: 52
End: 2017-09-21

## 2017-10-17 ENCOUNTER — TELEPHONE (OUTPATIENT)
Dept: ENDOCRINOLOGY | Age: 52
End: 2017-10-17

## 2017-10-17 NOTE — TELEPHONE ENCOUNTER
Called patient and advised to have [phone] fax us the paper and I will have the doctor sign it and will fax it back ASAP

## 2017-10-20 ENCOUNTER — TELEPHONE (OUTPATIENT)
Dept: ENDOCRINOLOGY | Age: 52
End: 2017-10-20

## 2017-10-20 NOTE — TELEPHONE ENCOUNTER
Called patient back, she said that damion told her they never got the fax, I advised that I sent in on Wednesday but I will send in again

## 2017-10-24 ENCOUNTER — HOSPITAL ENCOUNTER (OUTPATIENT)
Dept: ENDOSCOPY | Age: 52
Discharge: HOME | End: 2017-10-24
Attending: INTERNAL MEDICINE | Admitting: INTERNAL MEDICINE

## 2017-10-24 VITALS
RESPIRATION RATE: 21 BRPM | SYSTOLIC BLOOD PRESSURE: 124 MMHG | HEART RATE: 76 BPM | HEIGHT: 62 IN | WEIGHT: 224 LBS | DIASTOLIC BLOOD PRESSURE: 68 MMHG | TEMPERATURE: 97.5 F | OXYGEN SATURATION: 98 % | BODY MASS INDEX: 41.22 KG/M2

## 2017-10-24 LAB
BACTERIA: ABNORMAL /HPF
BILIRUBIN URINE: NEGATIVE
BLOOD, URINE: NEGATIVE
CLARITY: ABNORMAL
COLOR: YELLOW
COMMENT UA: ABNORMAL
EPITHELIAL CELLS, UA: 0 /HPF (ref 0–5)
GLUCOSE BLD-MCNC: 171 MG/DL (ref 70–99)
GLUCOSE BLD-MCNC: 191 MG/DL (ref 70–99)
GLUCOSE URINE: NEGATIVE MG/DL
HCG(URINE) PREGNANCY TEST: NEGATIVE
HYALINE CASTS: 14 /LPF (ref 0–8)
KETONES, URINE: NEGATIVE MG/DL
LEUKOCYTE ESTERASE, URINE: ABNORMAL
MICROSCOPIC EXAMINATION: YES
NITRITE, URINE: NEGATIVE
PERFORMED ON: ABNORMAL
PERFORMED ON: ABNORMAL
PH UA: 5.5
POTASSIUM SERPL-SCNC: 5.4 MMOL/L (ref 3.5–5.1)
PROTEIN UA: NEGATIVE MG/DL
SPECIFIC GRAVITY UA: 1.02
URINE TYPE: ABNORMAL
UROBILINOGEN, URINE: 0.2 E.U./DL
WBC UA: 97 /HPF (ref 0–5)

## 2017-10-24 RX ORDER — SODIUM CHLORIDE 0.9 % (FLUSH) 0.9 %
10 SYRINGE (ML) INJECTION PRN
Status: DISCONTINUED | OUTPATIENT
Start: 2017-10-24 | End: 2017-10-25 | Stop reason: HOSPADM

## 2017-10-24 RX ORDER — LIDOCAINE HYDROCHLORIDE 10 MG/ML
1 INJECTION, SOLUTION EPIDURAL; INFILTRATION; INTRACAUDAL; PERINEURAL
Status: COMPLETED | OUTPATIENT
Start: 2017-10-24 | End: 2017-10-24

## 2017-10-24 RX ORDER — SODIUM CHLORIDE 9 MG/ML
INJECTION, SOLUTION INTRAVENOUS CONTINUOUS
Status: DISCONTINUED | OUTPATIENT
Start: 2017-10-24 | End: 2017-10-25 | Stop reason: HOSPADM

## 2017-10-24 RX ORDER — NITROFURANTOIN 25; 75 MG/1; MG/1
100 CAPSULE ORAL 2 TIMES DAILY
Qty: 14 CAPSULE | Refills: 0 | Status: SHIPPED | OUTPATIENT
Start: 2017-10-24 | End: 2017-10-31

## 2017-10-24 RX ORDER — ONDANSETRON 2 MG/ML
4 INJECTION INTRAMUSCULAR; INTRAVENOUS PRN
Status: DISCONTINUED | OUTPATIENT
Start: 2017-10-24 | End: 2017-10-25 | Stop reason: HOSPADM

## 2017-10-24 RX ORDER — SODIUM CHLORIDE 0.9 % (FLUSH) 0.9 %
10 SYRINGE (ML) INJECTION EVERY 12 HOURS SCHEDULED
Status: DISCONTINUED | OUTPATIENT
Start: 2017-10-24 | End: 2017-10-25 | Stop reason: HOSPADM

## 2017-10-24 RX ORDER — SCOLOPAMINE TRANSDERMAL SYSTEM 1 MG/1
1 PATCH, EXTENDED RELEASE TRANSDERMAL
Status: DISCONTINUED | OUTPATIENT
Start: 2017-10-24 | End: 2017-10-25 | Stop reason: HOSPADM

## 2017-10-24 RX ORDER — LIDOCAINE HYDROCHLORIDE 10 MG/ML
1 INJECTION, SOLUTION EPIDURAL; INFILTRATION; INTRACAUDAL; PERINEURAL
Status: ACTIVE | OUTPATIENT
Start: 2017-10-24 | End: 2017-10-24

## 2017-10-24 RX ADMIN — LIDOCAINE HYDROCHLORIDE 0.2 ML: 10 INJECTION, SOLUTION EPIDURAL; INFILTRATION; INTRACAUDAL; PERINEURAL at 08:58

## 2017-10-24 RX ADMIN — SODIUM CHLORIDE: 9 INJECTION, SOLUTION INTRAVENOUS at 08:55

## 2017-10-24 NOTE — PROGRESS NOTES
[facility] Progress Note    Urinalysis obtained for dysuria and cloudy urine. UA:  1+ bacteria, 97 WBC's, 0 epi's. Rx for Macrobid 100 mg BID x 7 days given. Recommended followup with her primary MD, Dr. Anneliese Walters for follow up.     Elsa Arenas MD  [address]  10/24/2017

## 2017-10-24 NOTE — PROGRESS NOTES
Awake & alert. Respirations symmetrical. Abdomen soft. Assisted to bathroom to void. Patient discharged per wheelchair to the care of responsible party. No additional questions voiced related to discharge information. Patient discharged with all personal items.

## 2017-10-24 NOTE — PROGRESS NOTES
ALL DISCHARGE INFORMATION EXPLAINED TO PT AND RESPONSIBLE PARTY TO INCLUDE PAIN MANAGEMENT, DIET, ACTIVITY, WOUND CARE ET UNDERSTANDING VOICED. PT HAS CLEAR UNDERSTANDING OF THEIR HOME MEDS. PT HAS BEEN ASSESSED TO BE AT POTENTIAL RISK FOR FALLS RELATED TO RECEIVING ANESTHESIA/MEDICATIONS IN SURGERY. PT AND FAMILY GIVEN INFORMATION ON ASSISTED AMBULATION POST OP.  PAPERS SIGNED AND COPIES GIVEN

## 2017-10-24 NOTE — OP NOTE
total dose of 100 units botulinin toxin. The stomach was then decompressed, and the scope was completely withdrawn. The patient tolerated the procedure well and was taken to the post anesthesia care unit in good condition. Impression:    1) Gastric erosions - cold biopsied     2) Successful Botox injection of the pylorus performed as above. 3) Empiric balloon dilation of upper and lower esophagus as above      Recommendations:  1) Continue present meds. 2) Avoid NSAID's if possible. 3) Follow up in office in 6 months.     Gaviota Vegas MD  [address]  10/24/2017

## 2017-10-24 NOTE — PROGRESS NOTES
Adm to pacu from endo per cart awakening. Respirations symmetrical. Abdomen soft with active bowel sounds. Denies pain.

## 2017-10-24 NOTE — ANESTHESIA PRE-OP
[facility]  Pre-Anesthesia Evaluation/Consultation       Name:  Luciana Torrez                                         Age:  47 y.o.   MRN:    [id number]           Procedure (Scheduled): EGD ESOPHAGOGASTRODUODENOSCOPY DILATATION   Surgeon:     Dr. Martha Morelos MD     Allergies   Allergen Reactions    Penicillins Hives and Shortness Of Breath    Shellfish-Derived Products Swelling     Throat and tongue swells    Aspartame And Phenylalanine      Severe headaches    Bactrim Hives    Biaxin [Clarithromycin] Hives    Cephalexin Other (See Comments)     blisters    Hydrocodone-Acetaminophen Other (See Comments)     HALLUCINATIONS    Lansoprazole Hives    Nexium [Esomeprazole Magnesium Trihydrate] Hives    Other Other (See Comments)     TEGADERM-BLISTERS    Prilosec [Omeprazole] Hives    Monosodium Glutamate Nausea And Vomiting    Zmax [Azithromycin Dihydrate] Nausea And Vomiting     NOT Z PACK its the Powder you had to mix and drink     Patient Active Problem List   Diagnosis    Other and unspecified hyperlipidemia    DM2 (diabetes mellitus, type 2) ([facility])    GERD (gastroesophageal reflux disease)    Fibromyalgia    Urine incontinence    Asthma    Chronic back pain    Rheumatoid arthritis (HCC)    Gastroparesis    Thyroid cyst    Vitamin D deficiency    Chest pain    Multiple thyroid nodules    IBS (irritable bowel syndrome)    Disequilibrium syndrome    Subjective tinnitus of both ears    Bilateral high frequency sensorineural hearing loss    Pure hypercholesterolemia    Obstructive sleep apnea syndrome     Past Medical History:   Diagnosis Date    Ankle fracture, left     Ankle fracture, right     Arthritis     Asthma     Bipolar depression ([facility])     CAN'T AFFORD MEDS    Cervical disc herniation     Diabetes mellitus ([facility])     diet control    Fatty liver disease, non-alcoholic     Fibromyalgia     Gastroparesis     Dr Manasa Vazquez GERD (gastroesophageal injection    UPPER GASTROINTESTINAL ENDOSCOPY  04/11/2017    with dilatation and botox injection     Social History   Substance Use Topics    Smoking status: Never Smoker    Smokeless tobacco: Never Used    Alcohol use No       Prior to Admission medications    Medication Sig Start Date End Date Taking? Authorizing Provider   atorvastatin (LIPITOR) 10 MG tablet Take 1 tablet by mouth daily Does not take every day 8/4/17  Yes Arla Mcardle, MD   albuterol sulfate HFA (PROAIR HFA) 108 (90 Base) MCG/ACT inhaler INHALE 2 PUFFS EVERY 6 HOURS AS NEEDED FOR WHEEZING 8/4/17  Yes Arla Mcardle, MD   simethicone (MYLICON) 725 MG chewable tablet Take 2 tablets by mouth every 6 hours as needed for Flatulence 2 tablets with each meal 8/4/17  Yes Arla Mcardle, MD   Pseudoephedrine-guaiFENesin (MUCINEX D) 120-1200 MG TB12 Take one tablet by mouth every 12 hours as needed for nasal congestion.  8/4/17  Yes Arla Mcardle, MD   ranitidine (ZANTAC) 300 MG capsule Take 1 capsule by mouth every evening 8/4/17  Yes Arla Mcardle, MD   vitamin D (MAXIMUM D3) 62295 units CAPS capsule TAKE 1 CAPSULE BY MOUTH ONCE A WEEK 8/4/17  Yes Arla Mcardle, MD   loratadine (CLARITIN) 10 MG tablet TAKE 1 TABLET BY MOUTH DAILY 8/4/17  Yes Arla Mcardle, MD   ibuprofen (ADVIL;MOTRIN) 800 MG tablet TAKE 1 TABLET BY MOUTH EVERY 6 HOURS AS NEEDED FOR PAIN 8/4/17  Yes Arla Mcardle, MD   insulin glargine (BASAGLAR KWIKPEN) 100 UNIT/ML injection pen 44 units daily  Patient taking differently: Inject 42 Units into the skin daily 42 units daily 7/24/17  Yes Stephanie Browning MD   cyclobenzaprine (FLEXERIL) 10 MG tablet Take 0.5 tablets by mouth daily as needed for Muscle spasms 8/4/17   Arla Mcardle, MD   nystatin (MYCOSTATIN) 037736 UNIT/GM cream APPLY EXTERNALLY TO THE AFFECTED AREA TWICE DAILY 8/4/17   Arla Mcardle, MD   Insulin Pen Needle (UNIFINE PENTIPS) 32G X 4 MM MISC ONE - DAILY 8/1/17   Stephanie Browning strip 1-2 time a day As needed. 100 each 3    Glucose Blood (BLOOD GLUCOSE TEST STRIPS) STRP Test blood sugar once a day 100 strip 5     Current Facility-Administered Medications   Medication Dose Route Frequency Provider Last Rate Last Dose    sodium chloride flush 0.9 % injection 10 mL  10 mL Intravenous 2 times per day Bonnie Schaefer MD        sodium chloride flush 0.9 % injection 10 mL  10 mL Intravenous PRN Bonnie Schaefer MD        lidocaine PF 1 % injection 1 mL  1 mL Intradermal Once PRN Bonnie Schaefer MD        0.9 % sodium chloride infusion   Intravenous Continuous Bonnie Schaefer MD        onabotulinumtoxin A (BOTOX) injection 100 Units  100 Units Intramuscular Once Berlin Chan MD           Vital Signs  (Current) There were no vitals filed for this visit.   (for past 48 hrs)  No Data Recorded  (last three values)   BP Readings from Last 3 Encounters:   08/04/17 (!) 143/79   07/25/17 126/68   07/18/17 131/76       CBC  Lab Results   Component Value Date    WBC 11.5 01/05/2017    RBC 4.93 01/05/2017    HGB 14.0 01/05/2017    HCT 43.3 01/05/2017    MCV 87.9 01/05/2017    RDW 14.7 01/05/2017     01/05/2017       CMP    Lab Results   Component Value Date     08/04/2017    K 5.3 08/04/2017    CL 97 08/04/2017    CO2 28 08/04/2017    BUN 13 08/04/2017    CREATININE 0.6 08/04/2017    GFRAA >60 08/04/2017    GFRAA >60 06/04/2013    AGRATIO 1.7 08/04/2017    LABGLOM >60 08/04/2017    GLUCOSE 116 08/04/2017    PROT 7.6 08/04/2017    PROT 7.7 11/14/2012    CALCIUM 10.7 08/04/2017    BILITOT 0.4 08/04/2017    ALKPHOS 16 08/04/2017    AST 25 08/04/2017    ALT 33 08/04/2017       BMP    Lab Results   Component Value Date     08/04/2017    K 5.3 08/04/2017    CL 97 08/04/2017    CO2 28 08/04/2017    BUN 13 08/04/2017    CREATININE 0.6 08/04/2017    CALCIUM 10.7 08/04/2017    GFRAA >60 08/04/2017    GFRAA >60 06/04/2013    LABGLOM >60 08/04/2017    GLUCOSE 116 to procedure.  )  Induction: intravenous. Anesthetic plan and risks discussed with patient. Plan discussed with CRNA. DOS STAFF ADDENDUM:    Pt seen and examined, chart reviewed (including anesthesia, drug and allergy history). No interval changes to history and physical examination. Anesthetic plan, risks, benefits, alternatives, and personnel involved discussed with patient. Patient verbalized an understanding and agrees to proceed.       Abby Morgan DO  October 24, 2017  8:38 AM

## 2017-10-24 NOTE — H&P
no S3 or S4, and no murmur noted    Lungs:  No increased work of breathing, good air exchange, clear to auscultation bilaterally, no crackles or wheezing    Abdomen:  Normal bowel sounds, soft, non-distended, non-tender, no masses palpated, no hepatosplenomegally      ASA Grade:  ASA 3 - Patient with moderate systemic disease with functional limitations    Mallampati Class:  Class I: Soft palate, uvula, fauces, pillars visible  __________  Class II: Soft palate, uvula, fauces visible  __________   Class III: Soft palate, base of uvula visible  __________  Class IV: Hard palate only visible   ____X_____      ASSESSMENT AND PLAN:    1. Patient is a 47 y.o. female here for EGD with anesthesia  2. Procedure options, risks and benefits reviewed with patient. Patient expresses understanding.      Rao Read MD  [address]  10/24/2017

## 2017-10-25 NOTE — ANESTHESIA POST-OP
Anesthesia Post-op Note    Patient: Leonardo Baptiste  MRN: [id number]  YOB: 1965  Date of evaluation: 10/25/2017  Time:  1:41 PM     Procedure(s) Performed:     Last Vitals: /68   Pulse 76   Temp 97.5 °F (36.4 °C) (Temporal)   Resp 21   Ht 5' 2\" (1.575 m)   Wt 224 lb (101.6 kg)   SpO2 98%   BMI 40.97 kg/m²     Harman Phase I: Harman Score: 10    Harman Phase II: Harman Score: 10    Anesthesia Post Evaluation    Final anesthesia type: MAC  Patient location during evaluation: PACU  Patient participation: complete - patient participated  Level of consciousness: awake and alert  Pain score: 0  Airway patency: patent  Nausea & Vomiting: no nausea and no vomiting  Complications: no  Cardiovascular status: blood pressure returned to baseline  Respiratory status: acceptable  Hydration status: euvolemic        Ambar Kaufman DO  1:41 PM

## 2017-10-31 DIAGNOSIS — B37.2 INTERTRIGINOUS CANDIDIASIS: ICD-10-CM

## 2017-11-03 RX ORDER — NYSTATIN 100000 U/G
CREAM TOPICAL
Qty: 1 TUBE | Refills: 0 | Status: SHIPPED | OUTPATIENT
Start: 2017-11-03 | End: 2018-02-27 | Stop reason: SDUPTHER

## 2017-11-14 ENCOUNTER — OFFICE VISIT (OUTPATIENT)
Dept: ENDOCRINOLOGY | Age: 52
End: 2017-11-14

## 2017-11-14 VITALS
HEART RATE: 78 BPM | OXYGEN SATURATION: 97 % | SYSTOLIC BLOOD PRESSURE: 104 MMHG | WEIGHT: 226.4 LBS | DIASTOLIC BLOOD PRESSURE: 78 MMHG | BODY MASS INDEX: 41.66 KG/M2 | HEIGHT: 62 IN

## 2017-11-14 DIAGNOSIS — E11.69 TYPE 2 DIABETES MELLITUS WITH OTHER SPECIFIED COMPLICATION, UNSPECIFIED LONG TERM INSULIN USE STATUS: Primary | ICD-10-CM

## 2017-11-14 LAB — HBA1C MFR BLD: 9.2 %

## 2017-11-14 PROCEDURE — 1036F TOBACCO NON-USER: CPT | Performed by: INTERNAL MEDICINE

## 2017-11-14 PROCEDURE — 3017F COLORECTAL CA SCREEN DOC REV: CPT | Performed by: INTERNAL MEDICINE

## 2017-11-14 PROCEDURE — 96160 PT-FOCUSED HLTH RISK ASSMT: CPT | Performed by: INTERNAL MEDICINE

## 2017-11-14 PROCEDURE — G8417 CALC BMI ABV UP PARAM F/U: HCPCS | Performed by: INTERNAL MEDICINE

## 2017-11-14 PROCEDURE — 99214 OFFICE O/P EST MOD 30 MIN: CPT | Performed by: INTERNAL MEDICINE

## 2017-11-14 PROCEDURE — G8484 FLU IMMUNIZE NO ADMIN: HCPCS | Performed by: INTERNAL MEDICINE

## 2017-11-14 PROCEDURE — 83036 HEMOGLOBIN GLYCOSYLATED A1C: CPT | Performed by: INTERNAL MEDICINE

## 2017-11-14 PROCEDURE — 3014F SCREEN MAMMO DOC REV: CPT | Performed by: INTERNAL MEDICINE

## 2017-11-14 PROCEDURE — 3046F HEMOGLOBIN A1C LEVEL >9.0%: CPT | Performed by: INTERNAL MEDICINE

## 2017-11-14 PROCEDURE — G8427 DOCREV CUR MEDS BY ELIG CLIN: HCPCS | Performed by: INTERNAL MEDICINE

## 2017-11-14 ASSESSMENT — PATIENT HEALTH QUESTIONNAIRE - PHQ9
1. LITTLE INTEREST OR PLEASURE IN DOING THINGS: 3
7. TROUBLE CONCENTRATING ON THINGS, SUCH AS READING THE NEWSPAPER OR WATCHING TELEVISION: 3
10. IF YOU CHECKED OFF ANY PROBLEMS, HOW DIFFICULT HAVE THESE PROBLEMS MADE IT FOR YOU TO DO YOUR WORK, TAKE CARE OF THINGS AT HOME, OR GET ALONG WITH OTHER PEOPLE: 3
5. POOR APPETITE OR OVEREATING: 3
SUM OF ALL RESPONSES TO PHQ QUESTIONS 1-9: 24
8. MOVING OR SPEAKING SO SLOWLY THAT OTHER PEOPLE COULD HAVE NOTICED. OR THE OPPOSITE, BEING SO FIGETY OR RESTLESS THAT YOU HAVE BEEN MOVING AROUND A LOT MORE THAN USUAL: 3
9. THOUGHTS THAT YOU WOULD BE BETTER OFF DEAD, OR OF HURTING YOURSELF: 0
6. FEELING BAD ABOUT YOURSELF - OR THAT YOU ARE A FAILURE OR HAVE LET YOURSELF OR YOUR FAMILY DOWN: 3
2. FEELING DOWN, DEPRESSED OR HOPELESS: 3
3. TROUBLE FALLING OR STAYING ASLEEP: 3
SUM OF ALL RESPONSES TO PHQ9 QUESTIONS 1 & 2: 6
4. FEELING TIRED OR HAVING LITTLE ENERGY: 3

## 2017-11-14 NOTE — PROGRESS NOTES
Seen as f/u patient for diabetes,    Diagnosed with Type 2 diabetes mellitus   Known diabetic complications: none     Current diabetic medications   Metformin ER 500mg - not taking reports lows with it- off of it   Basaglar  46 units    Intolerant to plain metformin    Last A1c 9.2%<-----8.8%<----- 10.3 on 7/17<------10.8 on 12/16<----- 9.1 on 8/16<-----11.4 on 6/16<-----8.8 on 4.15<---8.9 <---10.1    Prior visit with dietician: Yes   Current diet: on average, 1 meals per day + Snacks   Reports h/o gastroparesis  Current exercise: walking   Current monitoring regimen: home blood tests -1  times daily does in forearms    Report h/o pancreatitis, stent in pancreatic duct  Also h/o yeast infection    Has brought blood glucose log/meter: no  Home blood sugar records:100-200  Any episodes of hypoglycemia? none    No Hx of CAD , PVD, CVA    Hyperlipidemia:  on 3/16   12/16 Vit D 17.4    Ck nl LFT nl  Start lipitor 10mg  Vit D3 10,000 IU weekly-did not tolerate D2  5/17  Vit D 20   Taking lipitor every 2-3 days  8/17     Last eye exam 11/17  Last foot exam: 7/17  Last microalbumin to creatinine ratio:7/17  ACE-I or ARB: None    TSH 1.14 on 4/15  7/14 USG:  IMPRESSION: Multiple small hypoechoic nodules, likely benign. These are nonspecific and measures 6 to 7 mm each. No FH of thyroid cancer or disease    8/16 USG stable    9/11  FINDINGS- The thyroid contains multiple bilateral thyroid   nodules. Three of the thyroid nodules measure 7 mm. The other   nodules are less than 7 mm. Nodules are primarily hypoechoic with   some heterogeneity. Both thyroid lobes show otherwise normal   echogenicity, morphology, and normal color Doppler flow. The right   thyroid lobe is 4.8 x 1.6 x 1.9 cm. The left thyroid lobe is 3.6 x   0.8 x 2.1 cm. The thyroid isthmus is normal at 3 mm in thickness.       IMPRESSION- Multiple bilateral small thyroid nodules, minimally   changed since 2007       Past taking differently: Inject 42 Units into the skin daily 42 units daily) 5 Pen 3    TRUEPLUS LANCETS 28G MISC Testing 3 to 4 times daily. 100 each 3    glucose blood VI test strips (ASCENSIA AUTODISC VI;ONE TOUCH ULTRA TEST VI) strip 1-2 time a day As needed. 100 each 3    Glucose Blood (BLOOD GLUCOSE TEST STRIPS) STRP Test blood sugar once a day 100 strip 5     No current facility-administered medications for this visit. Review of Systems  Scanned       Objective:        /78 (Site: Left Arm, Position: Sitting, Cuff Size: Large Adult)   Pulse 78   Ht 5' 2\" (1.575 m)   Wt 226 lb 6.4 oz (102.7 kg)   SpO2 97%   BMI 41.41 kg/m²   Wt Readings from Last 3 Encounters:   11/14/17 226 lb 6.4 oz (102.7 kg)   10/24/17 224 lb (101.6 kg)   08/04/17 227 lb (103 kg)     Constitutional: Well-developed, alert, appears stated age, cooperative, in no acute distress  H/E/N/M/T:atraumatic, normocephalic, external ears, nose  Eyes: Arcus Senilis is not present, extraocular muscles are intact  Neck: supple, trachea midline, acanthosis nigricance is not present. Skin: Xanthoma/Xanthelasmas are  not present  Psychiatric: Judgement and Insight:  judgement and insight appear normal  Neuro: Normal without focal findings, speech is spontaneous    Foot exam: No ulcer, 9/10 monofilament, DP palpable    Lab Reviewed   No components found for: CHLPL  Lab Results   Component Value Date    TRIG 171 (H) 08/04/2017    TRIG 205 (H) 05/02/2017    TRIG 231 (H) 12/27/2016     Lab Results   Component Value Date    HDL 42 08/04/2017    HDL 37 (L) 05/02/2017    HDL 39 (L) 12/27/2016     Lab Results   Component Value Date    LDLCALC 195 (H) 08/04/2017    LDLCALC 199 (H) 05/02/2017    LDLCALC 195 (H) 12/27/2016     Lab Results   Component Value Date    LABVLDL 34 08/04/2017    LABVLDL 41 05/02/2017    LABVLDL 46 12/27/2016     Lab Results   Component Value Date    LABA1C 8.8 07/18/2017       Assessment:     Robel Durbin is a 47 y.o.

## 2017-12-20 ENCOUNTER — TELEPHONE (OUTPATIENT)
Dept: PRIMARY CARE CLINIC | Age: 52
End: 2017-12-20

## 2017-12-20 DIAGNOSIS — M06.9 RHEUMATOID ARTHRITIS INVOLVING MULTIPLE SITES, UNSPECIFIED RHEUMATOID FACTOR PRESENCE: ICD-10-CM

## 2017-12-20 DIAGNOSIS — M79.7 FIBROMYALGIA: ICD-10-CM

## 2017-12-20 RX ORDER — IBUPROFEN 800 MG/1
TABLET ORAL
Qty: 120 TABLET | Refills: 0 | Status: SHIPPED | OUTPATIENT
Start: 2017-12-20 | End: 2018-07-24 | Stop reason: SDUPTHER

## 2017-12-20 NOTE — TELEPHONE ENCOUNTER
Will prescribe 30 day supply of ibuprofen. The medication has been sent to Ms. Laci Nieto' local pharmacy.

## 2017-12-20 NOTE — TELEPHONE ENCOUNTER
Patient says has made multiple calls requesting refills on the medication ibuprofen 800 mg please advise

## 2018-01-11 RX ORDER — INSULIN GLARGINE 100 [IU]/ML
INJECTION, SOLUTION SUBCUTANEOUS
Qty: 5 PEN | Refills: 3 | Status: SHIPPED | OUTPATIENT
Start: 2018-01-11 | End: 2018-06-26 | Stop reason: SDUPTHER

## 2018-01-22 DIAGNOSIS — B37.31 VAGINA, CANDIDIASIS: ICD-10-CM

## 2018-01-23 RX ORDER — FLUCONAZOLE 150 MG/1
TABLET ORAL
Qty: 1 TABLET | Refills: 0 | Status: SHIPPED | OUTPATIENT
Start: 2018-01-23 | End: 2018-02-27 | Stop reason: SDUPTHER

## 2018-02-27 ENCOUNTER — OFFICE VISIT (OUTPATIENT)
Dept: PRIMARY CARE CLINIC | Age: 53
End: 2018-02-27

## 2018-02-27 ENCOUNTER — TELEPHONE (OUTPATIENT)
Dept: PRIMARY CARE CLINIC | Age: 53
End: 2018-02-27

## 2018-02-27 VITALS
BODY MASS INDEX: 39.75 KG/M2 | HEART RATE: 78 BPM | HEIGHT: 62 IN | OXYGEN SATURATION: 100 % | SYSTOLIC BLOOD PRESSURE: 130 MMHG | DIASTOLIC BLOOD PRESSURE: 80 MMHG | RESPIRATION RATE: 15 BRPM | TEMPERATURE: 97.4 F | WEIGHT: 216 LBS

## 2018-02-27 DIAGNOSIS — K21.9 GASTROESOPHAGEAL REFLUX DISEASE WITHOUT ESOPHAGITIS: ICD-10-CM

## 2018-02-27 DIAGNOSIS — E78.00 PURE HYPERCHOLESTEROLEMIA: ICD-10-CM

## 2018-02-27 DIAGNOSIS — G89.29 CHRONIC BACK PAIN, UNSPECIFIED BACK LOCATION, UNSPECIFIED BACK PAIN LATERALITY: ICD-10-CM

## 2018-02-27 DIAGNOSIS — M54.9 CHRONIC BACK PAIN, UNSPECIFIED BACK LOCATION, UNSPECIFIED BACK PAIN LATERALITY: ICD-10-CM

## 2018-02-27 DIAGNOSIS — K58.9 IRRITABLE BOWEL SYNDROME WITHOUT DIARRHEA: ICD-10-CM

## 2018-02-27 DIAGNOSIS — R30.0 DYSURIA: ICD-10-CM

## 2018-02-27 DIAGNOSIS — M79.7 FIBROMYALGIA: ICD-10-CM

## 2018-02-27 DIAGNOSIS — G47.33 OBSTRUCTIVE SLEEP APNEA SYNDROME: ICD-10-CM

## 2018-02-27 DIAGNOSIS — N39.46 MIXED STRESS AND URGE URINARY INCONTINENCE: ICD-10-CM

## 2018-02-27 DIAGNOSIS — N30.00 ACUTE CYSTITIS WITHOUT HEMATURIA: ICD-10-CM

## 2018-02-27 DIAGNOSIS — B37.2 INTERTRIGINOUS CANDIDIASIS: ICD-10-CM

## 2018-02-27 DIAGNOSIS — E11.9 TYPE 2 DIABETES MELLITUS WITHOUT COMPLICATION, WITH LONG-TERM CURRENT USE OF INSULIN (HCC): Primary | ICD-10-CM

## 2018-02-27 DIAGNOSIS — Z79.4 TYPE 2 DIABETES MELLITUS WITHOUT COMPLICATION, WITH LONG-TERM CURRENT USE OF INSULIN (HCC): Primary | ICD-10-CM

## 2018-02-27 DIAGNOSIS — R42 VERTIGO: ICD-10-CM

## 2018-02-27 DIAGNOSIS — K31.84 GASTROPARESIS: ICD-10-CM

## 2018-02-27 DIAGNOSIS — J45.20 MILD INTERMITTENT ASTHMA WITHOUT COMPLICATION: ICD-10-CM

## 2018-02-27 DIAGNOSIS — B37.31 VAGINA, CANDIDIASIS: ICD-10-CM

## 2018-02-27 DIAGNOSIS — E55.9 VITAMIN D DEFICIENCY: ICD-10-CM

## 2018-02-27 DIAGNOSIS — J30.9 CHRONIC ALLERGIC RHINITIS, UNSPECIFIED SEASONALITY, UNSPECIFIED TRIGGER: ICD-10-CM

## 2018-02-27 DIAGNOSIS — M06.9 RHEUMATOID ARTHRITIS INVOLVING MULTIPLE SITES, UNSPECIFIED RHEUMATOID FACTOR PRESENCE: ICD-10-CM

## 2018-02-27 LAB
BILIRUBIN, POC: NORMAL
BLOOD URINE, POC: NORMAL
CLARITY, POC: NORMAL
COLOR, POC: NORMAL
GLUCOSE URINE, POC: NORMAL
HBA1C MFR BLD: 8.5 %
KETONES, POC: 15
LEUKOCYTE EST, POC: NORMAL
NITRITE, POC: NORMAL
PH, POC: 6
PROTEIN, POC: 100
SPECIFIC GRAVITY, POC: 1.03
UROBILINOGEN, POC: 0.2

## 2018-02-27 PROCEDURE — 3045F PR MOST RECENT HEMOGLOBIN A1C LEVEL 7.0-9.0%: CPT | Performed by: FAMILY MEDICINE

## 2018-02-27 PROCEDURE — G8417 CALC BMI ABV UP PARAM F/U: HCPCS | Performed by: FAMILY MEDICINE

## 2018-02-27 PROCEDURE — 99215 OFFICE O/P EST HI 40 MIN: CPT | Performed by: FAMILY MEDICINE

## 2018-02-27 PROCEDURE — 81002 URINALYSIS NONAUTO W/O SCOPE: CPT | Performed by: FAMILY MEDICINE

## 2018-02-27 PROCEDURE — G8484 FLU IMMUNIZE NO ADMIN: HCPCS | Performed by: FAMILY MEDICINE

## 2018-02-27 PROCEDURE — 3014F SCREEN MAMMO DOC REV: CPT | Performed by: FAMILY MEDICINE

## 2018-02-27 PROCEDURE — 83036 HEMOGLOBIN GLYCOSYLATED A1C: CPT | Performed by: FAMILY MEDICINE

## 2018-02-27 PROCEDURE — 1036F TOBACCO NON-USER: CPT | Performed by: FAMILY MEDICINE

## 2018-02-27 PROCEDURE — 3017F COLORECTAL CA SCREEN DOC REV: CPT | Performed by: FAMILY MEDICINE

## 2018-02-27 PROCEDURE — G8427 DOCREV CUR MEDS BY ELIG CLIN: HCPCS | Performed by: FAMILY MEDICINE

## 2018-02-27 RX ORDER — LORATADINE 10 MG/1
TABLET ORAL
Qty: 30 TABLET | Refills: 2 | Status: SHIPPED | OUTPATIENT
Start: 2018-02-27 | End: 2018-07-25 | Stop reason: SDUPTHER

## 2018-02-27 RX ORDER — GUAIFENESIN AND PSEUDOEPHEDRINE HCL 1200; 120 MG/1; MG/1
TABLET, EXTENDED RELEASE ORAL
Qty: 28 TABLET | Refills: 2 | Status: SHIPPED | OUTPATIENT
Start: 2018-02-27 | End: 2018-11-16 | Stop reason: SDUPTHER

## 2018-02-27 RX ORDER — CIPROFLOXACIN 250 MG/1
250 TABLET, FILM COATED ORAL 2 TIMES DAILY
Qty: 14 TABLET | Refills: 0 | Status: SHIPPED | OUTPATIENT
Start: 2018-02-27 | End: 2018-03-06

## 2018-02-27 RX ORDER — ATORVASTATIN CALCIUM 10 MG/1
10 TABLET, FILM COATED ORAL DAILY
Qty: 30 TABLET | Refills: 2 | Status: SHIPPED | OUTPATIENT
Start: 2018-02-27 | End: 2018-11-16 | Stop reason: SDUPTHER

## 2018-02-27 RX ORDER — CYCLOBENZAPRINE HCL 10 MG
5 TABLET ORAL DAILY PRN
Qty: 30 TABLET | Refills: 2 | Status: SHIPPED | OUTPATIENT
Start: 2018-02-27 | End: 2018-11-16 | Stop reason: SDUPTHER

## 2018-02-27 RX ORDER — ALBUTEROL SULFATE 90 UG/1
AEROSOL, METERED RESPIRATORY (INHALATION)
Qty: 1 INHALER | Refills: 2 | Status: SHIPPED | OUTPATIENT
Start: 2018-02-27 | End: 2018-11-16 | Stop reason: SDUPTHER

## 2018-02-27 RX ORDER — FLUCONAZOLE 150 MG/1
TABLET ORAL
Qty: 1 TABLET | Refills: 0 | Status: SHIPPED | OUTPATIENT
Start: 2018-02-27 | End: 2018-11-16 | Stop reason: ALTCHOICE

## 2018-02-27 RX ORDER — TRAMADOL HYDROCHLORIDE 50 MG/1
50 TABLET ORAL EVERY 6 HOURS PRN
Qty: 60 TABLET | Refills: 5 | Status: SHIPPED | OUTPATIENT
Start: 2018-02-27 | End: 2018-03-12 | Stop reason: SDUPTHER

## 2018-02-27 RX ORDER — NYSTATIN 100000 U/G
CREAM TOPICAL
Qty: 1 TUBE | Refills: 2 | Status: SHIPPED | OUTPATIENT
Start: 2018-02-27 | End: 2018-11-16 | Stop reason: SDUPTHER

## 2018-02-27 RX ORDER — KETOTIFEN FUMARATE 0.35 MG/ML
SOLUTION/ DROPS OPHTHALMIC
COMMUNITY
Start: 2018-02-22 | End: 2018-11-16 | Stop reason: SDUPTHER

## 2018-02-27 RX ORDER — RABEPRAZOLE SODIUM 20 MG/1
TABLET, DELAYED RELEASE ORAL
COMMUNITY
Start: 2018-02-22 | End: 2018-11-16 | Stop reason: SDUPTHER

## 2018-02-27 RX ORDER — SIMETHICONE 125 MG
250 TABLET,CHEWABLE ORAL EVERY 6 HOURS PRN
Qty: 320 TABLET | Refills: 2 | Status: SHIPPED | OUTPATIENT
Start: 2018-02-27 | End: 2018-11-16 | Stop reason: SDUPTHER

## 2018-02-27 NOTE — PATIENT INSTRUCTIONS
from front to back. When should you call for help? Call your doctor now or seek immediate medical care if:  ? · Symptoms such as fever, chills, nausea, or vomiting get worse or appear for the first time. ? · You have new pain in your back just below your rib cage. This is called flank pain. ? · There is new blood or pus in your urine. ? · You have any problems with your antibiotic medicine. ? Watch closely for changes in your health, and be sure to contact your doctor if:  ? · You are not getting better after taking an antibiotic for 2 days. ? · Your symptoms go away but then come back. Where can you learn more? Go to [email or web address] org and sign in to your [employer] account. Enter R951 in the [employer] box to learn more about \"Urinary Tract Infection in Women: Care Instructions. \"     If you do not have an account, please click on the \"Sign Up Now\" link. Current as of: May 12, 2017  Content Version: 11.5  © 1094-5045 Healthwise, Incorporated. Care instructions adapted under license by [facility] [place] ([facility]). If you have questions about a medical condition or this instruction, always ask your healthcare professional. [employer] any warranty or liability for your use of this information.

## 2018-02-27 NOTE — PROGRESS NOTES
Subjective:      Patient ID: Cyril Barriga is a 46 y.o. female. Diabetes     Urinary Tract Infection        Patient is here for follow up of chronic health problems. Feels generally well. Having dysuria, frequency and urgency. Single, does not work, no children. On disability 'a couple years'. Walking more now, but it does hurt. Does some chair exercise. Diet-says she does not get hungry, eats 2-3 meals daily but healthy. This is due to gastroparesis. She has worked with a friend who is a dietcian. She does feel depressed often, but good days or bad days. Past med no help. Feels tired all the time. Has problems with gastroparesis/dumping syndrome. Gets Botox injections every 6-8 months. This causes problems with diabetic meds. Getting EGD and colonoscopy next week as she is having stomach problems. Sees GI Dr Samm Jacinto. Has lost weight. Pt seeing Endocrinologist, Dr Rickey Leon. She is now on Basaglar which is working well, and is slowly titrating. This is going well. Started a low dose of Lipitor and is doing OK with taking this but not every day. In the past she had muscle cramps and elevated liver enzymes with it. Treatment Adherence:   Medication compliance:  compliant all of the time  Diet compliance:  compliant most of the time  Weight trend: stable  Current exercise: some, walks  Barriers: impairment:  physical: multiple problems. Diabetes Mellitus Type 2: Current symptoms/problems include none. Home blood sugar records: no record, says she tests twice or more daily, says am is low to mid 100's. She is titrating Basablar up per Dr Nahed Dawn orders. Any episodes of hypoglycemia? occasionally  Eye exam current (within one year): yes  Tobacco history: She  reports that she has never smoked. She has never used smokeless tobacco.   Daily Aspirin? No: on Ibuprofen    Hyperlipidemia:  Taking Lipitor every other day due to side effects of leg cramps.     Asthma:

## 2018-02-28 ENCOUNTER — TELEPHONE (OUTPATIENT)
Dept: PRIMARY CARE CLINIC | Age: 53
End: 2018-02-28

## 2018-03-01 LAB — URINE CULTURE, ROUTINE: NORMAL

## 2018-03-06 ENCOUNTER — HOSPITAL ENCOUNTER (OUTPATIENT)
Dept: ENDOSCOPY | Age: 53
Discharge: HOME | End: 2018-03-06
Attending: INTERNAL MEDICINE | Admitting: INTERNAL MEDICINE

## 2018-03-06 VITALS
RESPIRATION RATE: 11 BRPM | TEMPERATURE: 98.1 F | HEART RATE: 71 BPM | WEIGHT: 216 LBS | DIASTOLIC BLOOD PRESSURE: 67 MMHG | BODY MASS INDEX: 39.75 KG/M2 | OXYGEN SATURATION: 100 % | SYSTOLIC BLOOD PRESSURE: 124 MMHG | HEIGHT: 62 IN

## 2018-03-06 LAB
GLUCOSE BLD-MCNC: 150 MG/DL (ref 70–99)
GLUCOSE BLD-MCNC: 185 MG/DL (ref 70–99)
HCG(URINE) PREGNANCY TEST: NEGATIVE
PERFORMED ON: ABNORMAL
PERFORMED ON: ABNORMAL
PERFORMED ON: NORMAL
POC POTASSIUM: 4.5 MMOL/L (ref 3.5–5.1)
POC SAMPLE TYPE: NORMAL

## 2018-03-06 RX ORDER — SODIUM CHLORIDE 0.9 % (FLUSH) 0.9 %
10 SYRINGE (ML) INJECTION PRN
Status: DISCONTINUED | OUTPATIENT
Start: 2018-03-06 | End: 2018-03-07 | Stop reason: HOSPADM

## 2018-03-06 RX ORDER — ONDANSETRON 2 MG/ML
4 INJECTION INTRAMUSCULAR; INTRAVENOUS
Status: ACTIVE | OUTPATIENT
Start: 2018-03-06 | End: 2018-03-06

## 2018-03-06 RX ORDER — SODIUM CHLORIDE 0.9 % (FLUSH) 0.9 %
10 SYRINGE (ML) INJECTION EVERY 12 HOURS SCHEDULED
Status: DISCONTINUED | OUTPATIENT
Start: 2018-03-06 | End: 2018-03-07 | Stop reason: HOSPADM

## 2018-03-06 RX ORDER — LIDOCAINE HYDROCHLORIDE 10 MG/ML
1 INJECTION, SOLUTION EPIDURAL; INFILTRATION; INTRACAUDAL; PERINEURAL
Status: ACTIVE | OUTPATIENT
Start: 2018-03-06 | End: 2018-03-06

## 2018-03-06 RX ORDER — SODIUM CHLORIDE 9 MG/ML
INJECTION, SOLUTION INTRAVENOUS CONTINUOUS
Status: DISCONTINUED | OUTPATIENT
Start: 2018-03-06 | End: 2018-03-07 | Stop reason: HOSPADM

## 2018-03-06 RX ORDER — SCOLOPAMINE TRANSDERMAL SYSTEM 1 MG/1
1 PATCH, EXTENDED RELEASE TRANSDERMAL ONCE
Status: DISCONTINUED | OUTPATIENT
Start: 2018-03-06 | End: 2018-03-07 | Stop reason: HOSPADM

## 2018-03-06 RX ORDER — MEPERIDINE HYDROCHLORIDE 25 MG/ML
12.5 INJECTION INTRAMUSCULAR; INTRAVENOUS; SUBCUTANEOUS EVERY 5 MIN PRN
Status: DISCONTINUED | OUTPATIENT
Start: 2018-03-06 | End: 2018-03-07 | Stop reason: HOSPADM

## 2018-03-06 RX ADMIN — SODIUM CHLORIDE: 9 INJECTION, SOLUTION INTRAVENOUS at 07:20

## 2018-03-06 ASSESSMENT — PAIN - FUNCTIONAL ASSESSMENT: PAIN_FUNCTIONAL_ASSESSMENT: 0-10

## 2018-03-06 ASSESSMENT — PAIN DESCRIPTION - DESCRIPTORS: DESCRIPTORS: CONSTANT

## 2018-03-06 NOTE — OP NOTE
[facility]  Endoscopy Note    Patient: Dennis Zavala  : 1965  Acct#: [de-identified]    Procedure: Esophagogastroduodenoscopy with Botulinum toxin injection of the pylorus and esophageal balloon dilation    Date:  3/6/2018    Surgeon:  Mar Luna MD    Referring Physician:   Millie Gregorio    Preoperative Diagnosis:   1) Gastroparesis  2) Dysphagia    Postoperative Diagnosis:   Same    Anesthesia:   TIVA/MAC per anesthesia     Indications: This is a 46y.o. year old female who presents today with nausea, vomiting and LUQ abdominal pain. She has responded to Botox injection of the pylorus in the past.  She also c/o dysphagia. Description of Procedure:  Informed consent was obtained from the patient after explanation of indications, benefits and possible risks and complications of the procedure. The patient was then taken to the endoscopy suite, placed in the left lateral decubitus position and the above IV sedation was administrered. The Olympus videoendoscope was placed in the patient's mouth and under direct visualization passed into the esophagus. Visualization of the esophagus demonstrated normal mucosa. There was no evidence of Christopher's esophagus. The lower esophagus/GE junction and upper esophagus were empirically dilated with a 20 mm balloon dilator. The scope was then advanced into the stomach. Visualization of the gastric body and antrum demonstrated normal mucosa. A retroflexed exam of the gastric cardia and fundus demonstrated normal mucosa. The pylorus was patent and the scope was advanced into the duodenum. Visualization of the duodenal bulb demonstrated normal mucosa. The second portion of the duodenum demonstrated normal mucosa. The scope was then withdrawn back into the stomach. Botulin toxin injection of the pylorus was performed using a standard sclerotherapy needle.   Four intramuscular injections were made around the circumference of the

## 2018-03-07 RX ORDER — SODIUM CHLORIDE 9 MG/ML
INJECTION, SOLUTION INTRAVENOUS CONTINUOUS
Status: ACTIVE | OUTPATIENT
Start: 2018-03-07

## 2018-03-08 ENCOUNTER — TELEPHONE (OUTPATIENT)
Dept: PRIMARY CARE CLINIC | Age: 53
End: 2018-03-08

## 2018-03-12 DIAGNOSIS — M54.50 CHRONIC BILATERAL LOW BACK PAIN WITHOUT SCIATICA: Primary | ICD-10-CM

## 2018-03-12 DIAGNOSIS — G89.29 CHRONIC BILATERAL LOW BACK PAIN WITHOUT SCIATICA: Primary | ICD-10-CM

## 2018-03-12 RX ORDER — TRAMADOL HYDROCHLORIDE 50 MG/1
50 TABLET ORAL EVERY 6 HOURS PRN
Qty: 60 TABLET | Refills: 5 | Status: SHIPPED | OUTPATIENT
Start: 2018-03-12 | End: 2018-06-10

## 2018-03-13 ENCOUNTER — HOSPITAL ENCOUNTER (OUTPATIENT)
Dept: CT IMAGING | Age: 53
Discharge: HOME | End: 2018-03-13
Attending: INTERNAL MEDICINE | Admitting: INTERNAL MEDICINE

## 2018-03-13 DIAGNOSIS — R10.9 ABDOMINAL PAIN, UNSPECIFIED ABDOMINAL LOCATION: ICD-10-CM

## 2018-03-13 DIAGNOSIS — R10.9 ABDOMINAL PAIN: ICD-10-CM

## 2018-03-13 LAB
BUN BLDV-MCNC: 13 MG/DL (ref 7–20)
CREAT SERPL-MCNC: 0.6 MG/DL (ref 0.6–1.1)
GFR AFRICAN AMERICAN: >60
GFR NON-AFRICAN AMERICAN: >60

## 2018-03-16 ENCOUNTER — TELEPHONE (OUTPATIENT)
Dept: PRIMARY CARE CLINIC | Age: 53
End: 2018-03-16

## 2018-03-19 DIAGNOSIS — N20.0 NEPHROLITHIASIS: Primary | ICD-10-CM

## 2018-03-20 ENCOUNTER — TELEPHONE (OUTPATIENT)
Dept: PRIMARY CARE CLINIC | Age: 53
End: 2018-03-20

## 2018-03-21 ENCOUNTER — TELEPHONE (OUTPATIENT)
Dept: PRIMARY CARE CLINIC | Age: 53
End: 2018-03-21

## 2018-03-21 NOTE — TELEPHONE ENCOUNTER
Patient says no one at the urology group will accept her insurance patient was asked to call insurance to ask what urologist will be covered.

## 2018-03-23 ENCOUNTER — TELEPHONE (OUTPATIENT)
Dept: CARDIOLOGY CLINIC | Age: 53
End: 2018-03-23

## 2018-03-23 NOTE — TELEPHONE ENCOUNTER
She had a CT of her abdomen at [facility] orderd by Dr Juan Miguel Vickers. They told her there were changes in her aorta . Would DCE look at the test and let her know what he thinks?

## 2018-03-23 NOTE — TELEPHONE ENCOUNTER
Reviewed results with DCE, he said nothing to do with the results from CT scan with her Aorta. Relayed information to patient and she expressed understanding. Patient had no other questions.

## 2018-03-28 ENCOUNTER — TELEPHONE (OUTPATIENT)
Dept: ENDOCRINOLOGY | Age: 53
End: 2018-03-28

## 2018-06-11 ENCOUNTER — TELEPHONE (OUTPATIENT)
Dept: ENDOCRINOLOGY | Age: 53
End: 2018-06-11

## 2018-06-11 RX ORDER — GLUCOSAMINE HCL/CHONDROITIN SU 500-400 MG
CAPSULE ORAL
Qty: 100 STRIP | Refills: 5 | Status: SHIPPED | OUTPATIENT
Start: 2018-06-11 | End: 2019-02-26 | Stop reason: SDUPTHER

## 2018-06-26 DIAGNOSIS — E11.9 TYPE 2 DIABETES MELLITUS WITHOUT COMPLICATION, WITH LONG-TERM CURRENT USE OF INSULIN (HCC): ICD-10-CM

## 2018-06-26 DIAGNOSIS — Z79.4 TYPE 2 DIABETES MELLITUS WITHOUT COMPLICATION, WITH LONG-TERM CURRENT USE OF INSULIN (HCC): ICD-10-CM

## 2018-06-26 RX ORDER — INSULIN GLARGINE 100 [IU]/ML
INJECTION, SOLUTION SUBCUTANEOUS
Qty: 5 PEN | Refills: 3 | Status: SHIPPED | OUTPATIENT
Start: 2018-06-26 | End: 2018-11-11 | Stop reason: SDUPTHER

## 2018-06-26 RX ORDER — PEN NEEDLE, DIABETIC 30 GX3/16"
NEEDLE, DISPOSABLE MISCELLANEOUS
Qty: 30 EACH | Refills: 3 | Status: SHIPPED | OUTPATIENT
Start: 2018-06-26 | End: 2019-08-04 | Stop reason: SDUPTHER

## 2018-07-24 ENCOUNTER — OFFICE VISIT (OUTPATIENT)
Dept: ENDOCRINOLOGY | Age: 53
End: 2018-07-24

## 2018-07-24 VITALS
HEART RATE: 77 BPM | HEIGHT: 62 IN | DIASTOLIC BLOOD PRESSURE: 62 MMHG | RESPIRATION RATE: 16 BRPM | WEIGHT: 209 LBS | BODY MASS INDEX: 38.46 KG/M2 | SYSTOLIC BLOOD PRESSURE: 130 MMHG | OXYGEN SATURATION: 99 %

## 2018-07-24 DIAGNOSIS — M06.9 RHEUMATOID ARTHRITIS INVOLVING MULTIPLE SITES, UNSPECIFIED RHEUMATOID FACTOR PRESENCE: ICD-10-CM

## 2018-07-24 DIAGNOSIS — E04.2 MULTIPLE THYROID NODULES: ICD-10-CM

## 2018-07-24 DIAGNOSIS — Z79.4 TYPE 2 DIABETES MELLITUS WITHOUT COMPLICATION, WITH LONG-TERM CURRENT USE OF INSULIN (HCC): Primary | ICD-10-CM

## 2018-07-24 DIAGNOSIS — E78.00 PURE HYPERCHOLESTEROLEMIA: ICD-10-CM

## 2018-07-24 DIAGNOSIS — M79.7 FIBROMYALGIA: ICD-10-CM

## 2018-07-24 DIAGNOSIS — E11.9 TYPE 2 DIABETES MELLITUS WITHOUT COMPLICATION, WITH LONG-TERM CURRENT USE OF INSULIN (HCC): Primary | ICD-10-CM

## 2018-07-24 DIAGNOSIS — E55.9 VITAMIN D DEFICIENCY: ICD-10-CM

## 2018-07-24 LAB — HBA1C MFR BLD: 7.8 %

## 2018-07-24 PROCEDURE — G8417 CALC BMI ABV UP PARAM F/U: HCPCS | Performed by: INTERNAL MEDICINE

## 2018-07-24 PROCEDURE — G8427 DOCREV CUR MEDS BY ELIG CLIN: HCPCS | Performed by: INTERNAL MEDICINE

## 2018-07-24 PROCEDURE — 3017F COLORECTAL CA SCREEN DOC REV: CPT | Performed by: INTERNAL MEDICINE

## 2018-07-24 PROCEDURE — 2022F DILAT RTA XM EVC RTNOPTHY: CPT | Performed by: INTERNAL MEDICINE

## 2018-07-24 PROCEDURE — 3045F PR MOST RECENT HEMOGLOBIN A1C LEVEL 7.0-9.0%: CPT | Performed by: INTERNAL MEDICINE

## 2018-07-24 PROCEDURE — 1036F TOBACCO NON-USER: CPT | Performed by: INTERNAL MEDICINE

## 2018-07-24 PROCEDURE — 99214 OFFICE O/P EST MOD 30 MIN: CPT | Performed by: INTERNAL MEDICINE

## 2018-07-24 PROCEDURE — 83036 HEMOGLOBIN GLYCOSYLATED A1C: CPT | Performed by: INTERNAL MEDICINE

## 2018-07-24 RX ORDER — IBUPROFEN 800 MG/1
TABLET ORAL
Qty: 30 TABLET | Refills: 0 | Status: SHIPPED | OUTPATIENT
Start: 2018-07-24 | End: 2018-07-25 | Stop reason: SDUPTHER

## 2018-07-24 NOTE — PROGRESS NOTES
Multiple bilateral small thyroid nodules, minimally   changed since 2007       Past Medical History:   Diagnosis Date    Ankle fracture, left     Ankle fracture, right     Arthritis     Asthma     Bipolar depression (HCC)     CAN'T AFFORD MEDS    Bladder problem     Cervical disc herniation     Diabetes mellitus ([address]     diet control    Fatty liver disease, non-alcoholic     Fibromyalgia     Gastroparesis     Dr Josue Iqbal GERD (gastroesophageal reflux disease)     gastroporesis    Glaucoma     Dr Horacio Garcia Hyperlipidemia     elevated LFT on meds    IBS (irritable bowel syndrome)     Lumbar herniated disc     Nausea & vomiting     Partial Achilles tendon tear     Pneumonia     PONV (postoperative nausea and vomiting)     RA (rheumatoid arthritis) ([address]     Rapid or irregular heartbeat     Sleep apnea     does not use cpap    Spinal stenosis     Stress incontinence     Thyroid disease     growths     Past Surgical History:   Procedure Laterality Date    CHOLECYSTECTOMY      COLONOSCOPY      COLONOSCOPY  03/06/2018    with polypectomies    DILATION AND CURETTAGE OF UTERUS      ENDOMETRIAL ABLATION      ENDOSCOPY, COLON, DIAGNOSTIC      ERCP  5/25/2010    with stent    ERCP  1-31-14    ERCP WITH SPHINTER OF ODDI MANOMETERY    ESOPHAGOSCOPY  10/5/10    botox injection    EYE SURGERY      LASER FOR GLAUCOMA    LAPAROSCOPY      TONSILLECTOMY      UPPER GASTROINTESTINAL ENDOSCOPY      UPPER GASTROINTESTINAL ENDOSCOPY  04/12/2011    DILATATION, 58 FR ARTHUR, 100 UNITS BOTOX    UPPER GASTROINTESTINAL ENDOSCOPY  08/30/2011    58 FR DILATATION, 100 UNITS  BOTOX INJECTION    UPPER GASTROINTESTINAL ENDOSCOPY  3-9-12    with dilatation and submucosal injection: Botox    UPPER GASTROINTESTINAL ENDOSCOPY      UPPER GASTROINTESTINAL ENDOSCOPY  11/20/12     Esophagogastroduodenoscopy with Botulinum toxin injection of the pylorus and Arthur esophageal dilation    UPPER GASTROINTESTINAL ENDOSCOPY  6/4/13    WITH DIILITATION AND BOTOX INJECTION    UPPER GASTROINTESTINAL ENDOSCOPY  5/20/2014    100 unit Botox injection, [address] esophageal dilatation    UPPER GASTROINTESTINAL ENDOSCOPY  12/12/14    with esophageal dilatation, and botox injection    UPPER GASTROINTESTINAL ENDOSCOPY  09/09/2015    With Dilitation and Botox injection    UPPER GASTROINTESTINAL ENDOSCOPY  5/10/2016    distal esophagus biopsy, stomach biopsy, botox injection    UPPER GASTROINTESTINAL ENDOSCOPY  04/11/2017    with dilatation and botox injection    UPPER GASTROINTESTINAL ENDOSCOPY  10/24/2017    DILATATION, BIOPSY, BOTOX    UPPER GASTROINTESTINAL ENDOSCOPY  03/06/2018    WITH BOTOX AND BALLOON DILATATION     Current Outpatient Prescriptions   Medication Sig Dispense Refill    BASAGLAR KWIKPEN 100 UNIT/ML injection pen INJECT UNDER THE SKIN 44 UNITS DAILY (Patient taking differently: INJECT UNDER THE SKIN 49 UNITS DAILY) 5 pen 3    Insulin Pen Needle (PEN NEEDLES) 32G X 4 MM MISC USE TO INJECT INSULIN PEN ONCE DAILY 30 each 3    Glucose Blood (BLOOD GLUCOSE TEST STRIPS) STRP Test blood sugar once a day 100 strip 5    Blood Glucose Monitoring Suppl (TRUE METRIX METER) w/Device KIT As needed 1 kit 0    ketotifen (ZADITOR) 0.025 % ophthalmic solution       RABEprazole (ACIPHEX) 20 MG tablet       loratadine (CLARITIN) 10 MG tablet TAKE 1 TABLET BY MOUTH DAILY 30 tablet 2    vitamin D (MAXIMUM D3) 99060 units CAPS capsule TAKE 1 CAPSULE BY MOUTH ONCE A WEEK 5 capsule 5    Pseudoephedrine-guaiFENesin (MUCINEX D) 120-1200 MG TB12 Take one tablet by mouth every 12 hours as needed for nasal congestion.  28 tablet 2    simethicone (MYLICON) 861 MG chewable tablet Take 2 tablets by mouth every 6 hours as needed for Flatulence 2 tablets with each meal 320 tablet 2    albuterol sulfate HFA (PROAIR HFA) 108 (90 Base) MCG/ACT inhaler INHALE 2 PUFFS EVERY 6 HOURS AS NEEDED FOR WHEEZING 1 Inhaler 2    cyclobenzaprine (FLEXERIL) 10 MG tablet Take 0.5 tablets by mouth daily as needed for Muscle spasms 30 tablet 2    atorvastatin (LIPITOR) 10 MG tablet Take 1 tablet by mouth daily Does not take every day 30 tablet 2    nystatin (MYCOSTATIN) 742117 UNIT/GM cream APPLY EXTERNALLY TO THE AFFECTED AREAS TWO TIMES A DAY 1 Tube 2    ibuprofen (ADVIL;MOTRIN) 800 MG tablet TAKE 1 TABLET BY MOUTH EVERY 6 HOURS AS NEEDED FOR PAIN 120 tablet 0    ranitidine (ZANTAC) 300 MG capsule Take 1 capsule by mouth every evening 30 capsule 2    TRUEPLUS LANCETS 28G MISC Testing 3 to 4 times daily. 100 each 3    glucose blood VI test strips (ASCENSIA AUTODISC VI;ONE TOUCH ULTRA TEST VI) strip 1-2 time a day As needed. 100 each 3    fluconazole (DIFLUCAN) 150 MG tablet TAKE ONE TABLET BY MOUTH IN A SINGLE DOSE 1 tablet 0     No current facility-administered medications for this visit. Facility-Administered Medications Ordered in Other Visits   Medication Dose Route Frequency Provider Last Rate Last Dose    0.9 % sodium chloride infusion   Intravenous Continuous Avi Teran MD             Review of Systems  Scanned, reviewed  7lb loss     Objective:        /62 (Site: Left Arm, Position: Sitting, Cuff Size: Large Adult)   Pulse 77   Resp 16   Ht 5' 2\" (1.575 m)   Wt 209 lb (94.8 kg)   LMP  (LMP Unknown)   SpO2 99%   Breastfeeding?  No   BMI 38.23 kg/m²   Wt Readings from Last 3 Encounters:   07/24/18 209 lb (94.8 kg)   03/06/18 216 lb (98 kg)   02/27/18 216 lb (98 kg)     Constitutional: Well-developed, alert, appears stated age, cooperative, in no acute distress  H/E/N/M/T:atraumatic, normocephalic, external ears, nose  Eyes: extraocular muscles are intact  Neck: supple  Skin: Xanthoma/Xanthelasmas are  not present  Psychiatric: Judgement and Insight:  judgement and insight appear normal  Neuro: Normal without focal findings, speech is spontaneous    Foot exam: No ulcer, 8/10 monofilament, DP palpable    Lab Reviewed   No components found for: CHLPL  Lab Results   Component Value Date    TRIG 171 (H) 08/04/2017    TRIG 205 (H) 05/02/2017    TRIG 231 (H) 12/27/2016     Lab Results   Component Value Date    HDL 42 08/04/2017    HDL 37 (L) 05/02/2017    HDL 39 (L) 12/27/2016     Lab Results   Component Value Date    LDLCALC 195 (H) 08/04/2017    LDLCALC 199 (H) 05/02/2017    LDLCALC 195 (H) 12/27/2016     Lab Results   Component Value Date    LABVLDL 34 08/04/2017    LABVLDL 41 05/02/2017    LABVLDL 46 12/27/2016     Lab Results   Component Value Date    LABA1C 8.5 02/27/2018       Assessment:     Laurie Lugo is a 46 y.o. female with :    1.T2DM: Longstanding, fairly controlled, reports low blood glucose with Metformin, so she stopped. Did not bring log, . Avoid DPP IV and GLP agonist given pancreatitis history. Avoid SGLT-2 Inhibitor given h/o yeast infection. Sulfonylurea may cause hypoglycemia. Needs basal insulin, discussed with her ,  started lower dose. She wanted amaryl , advised not a good option given A1c and lows. A1c high  She has relative hypoglycemia, so will improve  Gradually. Advised prandial, not willing to take. A1c improved, adjust basal  Microalbumin is high but ratio is normal.   2.Elevated BP: BP at goal  3. HLD:Familila hyperlipidemia,  Reports statins cause elevated LFT, per patient hepatologist advised not to take. Replaced Vitamin, so can tolerate lipitor. [employer] will not be covered, check levels  4. Obesity  5. Thyroid Nodule: Sub Centimeter nodule, monitor with USG, stable on 8/16, no increase in size  6. Fatty liver  7. Vitamin D deficiency: On 10,000IU weekly per PCP  8. Needs ibuprofen for joint pain, will give 10 day supply until sees PCP    Plan:      Increase lantus 50 units daily, education given, may make BID if on higher dose   Self titrate, increase by two unit every 3 days if BG > 140   Advise to check blood sugar 2 times a day. She does check in the forearm, advise finger sticks, keep log   Call if post

## 2018-10-01 RX ORDER — GLUCOSAM/CHON-MSM1/C/MANG/BOSW 500-416.6
TABLET ORAL
Qty: 120 EACH | Refills: 5 | Status: SHIPPED | OUTPATIENT
Start: 2018-10-01 | End: 2019-02-26 | Stop reason: SDUPTHER

## 2018-11-01 DIAGNOSIS — M79.7 FIBROMYALGIA: ICD-10-CM

## 2018-11-01 DIAGNOSIS — M06.9 RHEUMATOID ARTHRITIS INVOLVING MULTIPLE SITES, UNSPECIFIED RHEUMATOID FACTOR PRESENCE: ICD-10-CM

## 2018-11-01 RX ORDER — IBUPROFEN 800 MG/1
TABLET ORAL
Qty: 120 TABLET | Refills: 0 | Status: SHIPPED | OUTPATIENT
Start: 2018-11-01 | End: 2018-12-31 | Stop reason: SDUPTHER

## 2018-11-01 NOTE — TELEPHONE ENCOUNTER
Requested Prescriptions     Pending Prescriptions Disp Refills     MG tablet [Pharmacy Med Name: IBUPROFEN 800 MG TABLET] 120 tablet 0     Sig: TAKE ONE TABLET BY MOUTH EVERY 6 HOURS AS NEEDED FOR PAIN   .     LOV     2/27/2018     FOV      None

## 2018-11-12 RX ORDER — INSULIN GLARGINE 100 [IU]/ML
INJECTION, SOLUTION SUBCUTANEOUS
Qty: 5 PEN | Refills: 0 | Status: ON HOLD | OUTPATIENT
Start: 2018-11-12 | End: 2019-03-12 | Stop reason: SDUPTHER

## 2018-11-13 ENCOUNTER — TELEPHONE (OUTPATIENT)
Dept: PRIMARY CARE CLINIC | Age: 53
End: 2018-11-13

## 2018-11-16 ENCOUNTER — OFFICE VISIT (OUTPATIENT)
Dept: PRIMARY CARE CLINIC | Age: 53
End: 2018-11-16
Payer: COMMERCIAL

## 2018-11-16 VITALS
BODY MASS INDEX: 37.76 KG/M2 | HEIGHT: 62 IN | TEMPERATURE: 98.1 F | SYSTOLIC BLOOD PRESSURE: 116 MMHG | WEIGHT: 205.2 LBS | DIASTOLIC BLOOD PRESSURE: 65 MMHG | HEART RATE: 72 BPM | OXYGEN SATURATION: 98 % | RESPIRATION RATE: 14 BRPM

## 2018-11-16 DIAGNOSIS — M79.7 FIBROMYALGIA: ICD-10-CM

## 2018-11-16 DIAGNOSIS — M54.9 CHRONIC BACK PAIN, UNSPECIFIED BACK LOCATION, UNSPECIFIED BACK PAIN LATERALITY: ICD-10-CM

## 2018-11-16 DIAGNOSIS — K58.9 IRRITABLE BOWEL SYNDROME WITHOUT DIARRHEA: ICD-10-CM

## 2018-11-16 DIAGNOSIS — N39.46 MIXED STRESS AND URGE URINARY INCONTINENCE: ICD-10-CM

## 2018-11-16 DIAGNOSIS — K21.9 GASTROESOPHAGEAL REFLUX DISEASE WITHOUT ESOPHAGITIS: ICD-10-CM

## 2018-11-16 DIAGNOSIS — Z13.31 POSITIVE DEPRESSION SCREENING: ICD-10-CM

## 2018-11-16 DIAGNOSIS — F33.1 MODERATE EPISODE OF RECURRENT MAJOR DEPRESSIVE DISORDER (HCC): Primary | ICD-10-CM

## 2018-11-16 DIAGNOSIS — J45.20 MILD INTERMITTENT ASTHMA WITHOUT COMPLICATION: ICD-10-CM

## 2018-11-16 DIAGNOSIS — E11.9 TYPE 2 DIABETES MELLITUS WITHOUT COMPLICATION, WITH LONG-TERM CURRENT USE OF INSULIN (HCC): ICD-10-CM

## 2018-11-16 DIAGNOSIS — Z79.4 TYPE 2 DIABETES MELLITUS WITHOUT COMPLICATION, WITH LONG-TERM CURRENT USE OF INSULIN (HCC): ICD-10-CM

## 2018-11-16 DIAGNOSIS — K31.84 GASTROPARESIS: ICD-10-CM

## 2018-11-16 DIAGNOSIS — E78.00 PURE HYPERCHOLESTEROLEMIA: ICD-10-CM

## 2018-11-16 DIAGNOSIS — G47.33 OBSTRUCTIVE SLEEP APNEA SYNDROME: ICD-10-CM

## 2018-11-16 DIAGNOSIS — R42 VERTIGO: ICD-10-CM

## 2018-11-16 DIAGNOSIS — B37.2 INTERTRIGINOUS CANDIDIASIS: ICD-10-CM

## 2018-11-16 DIAGNOSIS — T78.40XD ALLERGIC DISORDER, SUBSEQUENT ENCOUNTER: ICD-10-CM

## 2018-11-16 DIAGNOSIS — B37.31 VAGINA, CANDIDIASIS: ICD-10-CM

## 2018-11-16 DIAGNOSIS — G89.29 CHRONIC BACK PAIN, UNSPECIFIED BACK LOCATION, UNSPECIFIED BACK PAIN LATERALITY: ICD-10-CM

## 2018-11-16 DIAGNOSIS — E55.9 VITAMIN D DEFICIENCY: ICD-10-CM

## 2018-11-16 PROCEDURE — 1036F TOBACCO NON-USER: CPT | Performed by: FAMILY MEDICINE

## 2018-11-16 PROCEDURE — 2022F DILAT RTA XM EVC RTNOPTHY: CPT | Performed by: FAMILY MEDICINE

## 2018-11-16 PROCEDURE — G8431 POS CLIN DEPRES SCRN F/U DOC: HCPCS | Performed by: FAMILY MEDICINE

## 2018-11-16 PROCEDURE — 99214 OFFICE O/P EST MOD 30 MIN: CPT | Performed by: FAMILY MEDICINE

## 2018-11-16 PROCEDURE — G8417 CALC BMI ABV UP PARAM F/U: HCPCS | Performed by: FAMILY MEDICINE

## 2018-11-16 PROCEDURE — 96160 PT-FOCUSED HLTH RISK ASSMT: CPT | Performed by: FAMILY MEDICINE

## 2018-11-16 PROCEDURE — G8427 DOCREV CUR MEDS BY ELIG CLIN: HCPCS | Performed by: FAMILY MEDICINE

## 2018-11-16 PROCEDURE — 3017F COLORECTAL CA SCREEN DOC REV: CPT | Performed by: FAMILY MEDICINE

## 2018-11-16 PROCEDURE — G8484 FLU IMMUNIZE NO ADMIN: HCPCS | Performed by: FAMILY MEDICINE

## 2018-11-16 RX ORDER — CYCLOBENZAPRINE HCL 10 MG
5 TABLET ORAL DAILY PRN
Qty: 30 TABLET | Refills: 2 | Status: SHIPPED | OUTPATIENT
Start: 2018-11-16 | End: 2020-02-20 | Stop reason: SDUPTHER

## 2018-11-16 RX ORDER — GUAIFENESIN AND PSEUDOEPHEDRINE HCL 1200; 120 MG/1; MG/1
TABLET, EXTENDED RELEASE ORAL
Qty: 28 TABLET | Refills: 2 | Status: SHIPPED | OUTPATIENT
Start: 2018-11-16 | End: 2019-06-06 | Stop reason: SDUPTHER

## 2018-11-16 RX ORDER — NYSTATIN 100000 U/G
CREAM TOPICAL
Qty: 1 TUBE | Refills: 2 | Status: SHIPPED | OUTPATIENT
Start: 2018-11-16 | End: 2019-06-06 | Stop reason: SDUPTHER

## 2018-11-16 RX ORDER — KETOTIFEN FUMARATE 0.35 MG/ML
2 SOLUTION/ DROPS OPHTHALMIC 2 TIMES DAILY PRN
Qty: 1 BOTTLE | Refills: 2 | Status: SHIPPED | OUTPATIENT
Start: 2018-11-16 | End: 2019-07-26 | Stop reason: SDUPTHER

## 2018-11-16 RX ORDER — LORATADINE 10 MG/1
TABLET ORAL
Qty: 30 TABLET | Refills: 5 | Status: SHIPPED | OUTPATIENT
Start: 2018-11-16 | End: 2019-06-06 | Stop reason: SDUPTHER

## 2018-11-16 RX ORDER — ATORVASTATIN CALCIUM 10 MG/1
10 TABLET, FILM COATED ORAL DAILY
Qty: 30 TABLET | Refills: 2 | Status: SHIPPED | OUTPATIENT
Start: 2018-11-16 | End: 2019-01-29 | Stop reason: SDUPTHER

## 2018-11-16 RX ORDER — SIMETHICONE 125 MG
250 TABLET,CHEWABLE ORAL EVERY 6 HOURS PRN
Qty: 320 TABLET | Refills: 5 | Status: SHIPPED | OUTPATIENT
Start: 2018-11-16 | End: 2019-02-26 | Stop reason: SDUPTHER

## 2018-11-16 RX ORDER — ALBUTEROL SULFATE 90 UG/1
AEROSOL, METERED RESPIRATORY (INHALATION)
Qty: 1 INHALER | Refills: 2 | Status: SHIPPED | OUTPATIENT
Start: 2018-11-16 | End: 2019-06-06 | Stop reason: SDUPTHER

## 2018-11-16 RX ORDER — ESCITALOPRAM OXALATE 20 MG/1
20 TABLET ORAL DAILY
Qty: 90 TABLET | Refills: 1 | Status: SHIPPED | OUTPATIENT
Start: 2018-11-16 | End: 2019-02-26 | Stop reason: ALTCHOICE

## 2018-11-16 RX ORDER — FLUCONAZOLE 150 MG/1
TABLET ORAL
Qty: 1 TABLET | Refills: 0 | Status: SHIPPED | OUTPATIENT
Start: 2018-11-16 | End: 2019-06-06 | Stop reason: SDUPTHER

## 2018-11-16 RX ORDER — RABEPRAZOLE SODIUM 20 MG/1
20 TABLET, DELAYED RELEASE ORAL DAILY
Qty: 30 TABLET | Refills: 5 | Status: SHIPPED | OUTPATIENT
Start: 2018-11-16 | End: 2020-07-02

## 2018-11-16 ASSESSMENT — PATIENT HEALTH QUESTIONNAIRE - PHQ9
SUM OF ALL RESPONSES TO PHQ9 QUESTIONS 1 & 2: 6
SUM OF ALL RESPONSES TO PHQ QUESTIONS 1-9: 21
8. MOVING OR SPEAKING SO SLOWLY THAT OTHER PEOPLE COULD HAVE NOTICED. OR THE OPPOSITE, BEING SO FIGETY OR RESTLESS THAT YOU HAVE BEEN MOVING AROUND A LOT MORE THAN USUAL: 1
2. FEELING DOWN, DEPRESSED OR HOPELESS: 3
10. IF YOU CHECKED OFF ANY PROBLEMS, HOW DIFFICULT HAVE THESE PROBLEMS MADE IT FOR YOU TO DO YOUR WORK, TAKE CARE OF THINGS AT HOME, OR GET ALONG WITH OTHER PEOPLE: 3
4. FEELING TIRED OR HAVING LITTLE ENERGY: 3
3. TROUBLE FALLING OR STAYING ASLEEP: 3
9. THOUGHTS THAT YOU WOULD BE BETTER OFF DEAD, OR OF HURTING YOURSELF: 0
5. POOR APPETITE OR OVEREATING: 3
SUM OF ALL RESPONSES TO PHQ QUESTIONS 1-9: 21
6. FEELING BAD ABOUT YOURSELF - OR THAT YOU ARE A FAILURE OR HAVE LET YOURSELF OR YOUR FAMILY DOWN: 3
1. LITTLE INTEREST OR PLEASURE IN DOING THINGS: 3
7. TROUBLE CONCENTRATING ON THINGS, SUCH AS READING THE NEWSPAPER OR WATCHING TELEVISION: 2

## 2018-11-19 ENCOUNTER — TELEPHONE (OUTPATIENT)
Dept: PRIMARY CARE CLINIC | Age: 53
End: 2018-11-19

## 2018-11-20 ENCOUNTER — TELEPHONE (OUTPATIENT)
Dept: FAMILY MEDICINE CLINIC | Age: 53
End: 2018-11-20

## 2018-11-20 NOTE — TELEPHONE ENCOUNTER
Submitted PA for [employer] D Max Strength 120-1200MG OR TB12 Key: [id number] - Rx #: [id number], Via CM.  STATUS: PENDING

## 2018-11-21 NOTE — TELEPHONE ENCOUNTER
Received PA for Pseudoephedrine-guaiFENesin [facility] D) 120-1200 MG TB12 (Key: [id number]) - [id number]. Medication is APPROVED. Please advise patient. Thank you.

## 2018-12-12 DIAGNOSIS — K58.9 IRRITABLE BOWEL SYNDROME WITHOUT DIARRHEA: ICD-10-CM

## 2018-12-13 RX ORDER — SIMETHICONE 125 MG
TABLET,CHEWABLE ORAL
Qty: 192 TABLET | Refills: 1 | Status: SHIPPED | OUTPATIENT
Start: 2018-12-13

## 2018-12-31 DIAGNOSIS — M79.7 FIBROMYALGIA: ICD-10-CM

## 2018-12-31 DIAGNOSIS — M06.9 RHEUMATOID ARTHRITIS INVOLVING MULTIPLE SITES, UNSPECIFIED RHEUMATOID FACTOR PRESENCE: ICD-10-CM

## 2018-12-31 RX ORDER — IBUPROFEN 800 MG/1
TABLET ORAL
Qty: 120 TABLET | Refills: 0 | Status: SHIPPED | OUTPATIENT
Start: 2018-12-31 | End: 2019-03-10 | Stop reason: SDUPTHER

## 2018-12-31 NOTE — TELEPHONE ENCOUNTER
Requested Prescriptions     Pending Prescriptions Disp Refills     MG tablet [Pharmacy Med Name: IBUPROFEN 800 MG TABLET] 120 tablet 0     Sig: TAKE ONE TABLET BY MOUTH EVERY 6 HOURS AS NEEDED FOR PAIN

## 2019-01-23 ENCOUNTER — HOSPITAL ENCOUNTER (OUTPATIENT)
Age: 54
Discharge: HOME | End: 2019-01-23
Payer: COMMERCIAL

## 2019-01-23 ENCOUNTER — OFFICE VISIT (OUTPATIENT)
Dept: CARDIOLOGY CLINIC | Age: 54
End: 2019-01-23
Payer: COMMERCIAL

## 2019-01-23 VITALS
HEART RATE: 73 BPM | DIASTOLIC BLOOD PRESSURE: 80 MMHG | WEIGHT: 205.12 LBS | BODY MASS INDEX: 37.75 KG/M2 | HEIGHT: 62 IN | SYSTOLIC BLOOD PRESSURE: 130 MMHG

## 2019-01-23 DIAGNOSIS — R07.9 CHEST PAIN, UNSPECIFIED TYPE: Primary | ICD-10-CM

## 2019-01-23 DIAGNOSIS — E78.00 HYPERCHOLESTEREMIA: ICD-10-CM

## 2019-01-23 LAB
A/G RATIO: 1.3 (ref 1.1–2.2)
ALBUMIN SERPL-MCNC: 4.5 G/DL (ref 3.4–5)
ALP BLD-CCNC: 17 U/L (ref 40–129)
ALT SERPL-CCNC: 29 U/L (ref 10–40)
ANION GAP SERPL CALCULATED.3IONS-SCNC: 15 MMOL/L (ref 3–16)
AST SERPL-CCNC: 28 U/L (ref 15–37)
BASOPHILS ABSOLUTE: 0.1 K/UL (ref 0–0.2)
BASOPHILS RELATIVE PERCENT: 1.1 %
BILIRUB SERPL-MCNC: 0.7 MG/DL (ref 0–1)
BUN BLDV-MCNC: 19 MG/DL (ref 7–20)
CALCIUM SERPL-MCNC: 10.5 MG/DL (ref 8.3–10.6)
CHLORIDE BLD-SCNC: 99 MMOL/L (ref 99–110)
CHOLESTEROL, TOTAL: 236 MG/DL (ref 0–199)
CO2: 28 MMOL/L (ref 21–32)
CREAT SERPL-MCNC: 0.7 MG/DL (ref 0.6–1.1)
EOSINOPHILS ABSOLUTE: 0.1 K/UL (ref 0–0.6)
EOSINOPHILS RELATIVE PERCENT: 0.6 %
GFR AFRICAN AMERICAN: >60
GFR NON-AFRICAN AMERICAN: >60
GLOBULIN: 3.6 G/DL
GLUCOSE BLD-MCNC: 141 MG/DL (ref 70–99)
HCT VFR BLD CALC: 42.7 % (ref 36–48)
HDLC SERPL-MCNC: 45 MG/DL (ref 40–60)
HEMOGLOBIN: 13.8 G/DL (ref 12–16)
LDL CHOLESTEROL CALCULATED: 173 MG/DL
LYMPHOCYTES ABSOLUTE: 3 K/UL (ref 1–5.1)
LYMPHOCYTES RELATIVE PERCENT: 27.1 %
MAGNESIUM: 2 MG/DL (ref 1.8–2.4)
MCH RBC QN AUTO: 28.7 PG (ref 26–34)
MCHC RBC AUTO-ENTMCNC: 32.4 G/DL (ref 31–36)
MCV RBC AUTO: 88.7 FL (ref 80–100)
MONOCYTES ABSOLUTE: 0.7 K/UL (ref 0–1.3)
MONOCYTES RELATIVE PERCENT: 6.6 %
NEUTROPHILS ABSOLUTE: 7.2 K/UL (ref 1.7–7.7)
NEUTROPHILS RELATIVE PERCENT: 64.6 %
PDW BLD-RTO: 14.8 % (ref 12.4–15.4)
PLATELET # BLD: 267 K/UL (ref 135–450)
PMV BLD AUTO: 9.9 FL (ref 5–10.5)
POTASSIUM SERPL-SCNC: 4.5 MMOL/L (ref 3.5–5.1)
RBC # BLD: 4.81 M/UL (ref 4–5.2)
SODIUM BLD-SCNC: 142 MMOL/L (ref 136–145)
TOTAL PROTEIN: 8.1 G/DL (ref 6.4–8.2)
TRIGL SERPL-MCNC: 92 MG/DL (ref 0–150)
VITAMIN D 25-HYDROXY: 25.9 NG/ML
VLDLC SERPL CALC-MCNC: 18 MG/DL
WBC # BLD: 11.2 K/UL (ref 4–11)

## 2019-01-23 PROCEDURE — 80061 LIPID PANEL: CPT

## 2019-01-23 PROCEDURE — 80053 COMPREHEN METABOLIC PANEL: CPT

## 2019-01-23 PROCEDURE — G8484 FLU IMMUNIZE NO ADMIN: HCPCS | Performed by: INTERNAL MEDICINE

## 2019-01-23 PROCEDURE — 82306 VITAMIN D 25 HYDROXY: CPT

## 2019-01-23 PROCEDURE — G8417 CALC BMI ABV UP PARAM F/U: HCPCS | Performed by: INTERNAL MEDICINE

## 2019-01-23 PROCEDURE — 36415 COLL VENOUS BLD VENIPUNCTURE: CPT

## 2019-01-23 PROCEDURE — 3017F COLORECTAL CA SCREEN DOC REV: CPT | Performed by: INTERNAL MEDICINE

## 2019-01-23 PROCEDURE — 1036F TOBACCO NON-USER: CPT | Performed by: INTERNAL MEDICINE

## 2019-01-23 PROCEDURE — 83735 ASSAY OF MAGNESIUM: CPT

## 2019-01-23 PROCEDURE — 93000 ELECTROCARDIOGRAM COMPLETE: CPT | Performed by: INTERNAL MEDICINE

## 2019-01-23 PROCEDURE — G8427 DOCREV CUR MEDS BY ELIG CLIN: HCPCS | Performed by: INTERNAL MEDICINE

## 2019-01-23 PROCEDURE — 99214 OFFICE O/P EST MOD 30 MIN: CPT | Performed by: INTERNAL MEDICINE

## 2019-01-23 PROCEDURE — 85025 COMPLETE CBC W/AUTO DIFF WBC: CPT

## 2019-01-25 ENCOUNTER — TELEPHONE (OUTPATIENT)
Dept: PRIMARY CARE CLINIC | Age: 54
End: 2019-01-25

## 2019-01-29 DIAGNOSIS — N20.0 NEPHROLITHIASIS: ICD-10-CM

## 2019-01-29 DIAGNOSIS — E55.9 VITAMIN D DEFICIENCY: Primary | ICD-10-CM

## 2019-01-29 DIAGNOSIS — E78.00 PURE HYPERCHOLESTEROLEMIA: ICD-10-CM

## 2019-01-29 RX ORDER — MELATONIN
1000 DAILY
Qty: 90 TABLET | Refills: 1 | Status: SHIPPED | OUTPATIENT
Start: 2019-01-29 | End: 2019-07-11

## 2019-01-29 RX ORDER — ATORVASTATIN CALCIUM 20 MG/1
20 TABLET, FILM COATED ORAL DAILY
Qty: 30 TABLET | Refills: 3 | Status: SHIPPED | OUTPATIENT
Start: 2019-01-29 | End: 2019-03-19 | Stop reason: DRUGHIGH

## 2019-01-30 ENCOUNTER — TELEPHONE (OUTPATIENT)
Dept: PRIMARY CARE CLINIC | Age: 54
End: 2019-01-30

## 2019-02-15 ENCOUNTER — TELEPHONE (OUTPATIENT)
Dept: CARDIOLOGY CLINIC | Age: 54
End: 2019-02-15

## 2019-02-15 DIAGNOSIS — R07.9 CHEST PAIN, UNSPECIFIED TYPE: Primary | ICD-10-CM

## 2019-02-22 ENCOUNTER — TELEPHONE (OUTPATIENT)
Dept: CARDIOLOGY CLINIC | Age: 54
End: 2019-02-22

## 2019-02-26 ENCOUNTER — OFFICE VISIT (OUTPATIENT)
Dept: ENDOCRINOLOGY | Age: 54
End: 2019-02-26
Payer: COMMERCIAL

## 2019-02-26 VITALS
BODY MASS INDEX: 38.02 KG/M2 | OXYGEN SATURATION: 99 % | HEIGHT: 62 IN | WEIGHT: 206.6 LBS | RESPIRATION RATE: 16 BRPM | SYSTOLIC BLOOD PRESSURE: 134 MMHG | HEART RATE: 64 BPM | DIASTOLIC BLOOD PRESSURE: 65 MMHG

## 2019-02-26 DIAGNOSIS — E78.00 HYPERCHOLESTEREMIA: ICD-10-CM

## 2019-02-26 DIAGNOSIS — Z79.4 TYPE 2 DIABETES MELLITUS WITHOUT COMPLICATION, WITH LONG-TERM CURRENT USE OF INSULIN (HCC): Primary | ICD-10-CM

## 2019-02-26 DIAGNOSIS — E11.9 TYPE 2 DIABETES MELLITUS WITHOUT COMPLICATION, WITH LONG-TERM CURRENT USE OF INSULIN (HCC): Primary | ICD-10-CM

## 2019-02-26 LAB — HBA1C MFR BLD: 7.8 %

## 2019-02-26 PROCEDURE — G8417 CALC BMI ABV UP PARAM F/U: HCPCS | Performed by: INTERNAL MEDICINE

## 2019-02-26 PROCEDURE — G8427 DOCREV CUR MEDS BY ELIG CLIN: HCPCS | Performed by: INTERNAL MEDICINE

## 2019-02-26 PROCEDURE — 83036 HEMOGLOBIN GLYCOSYLATED A1C: CPT | Performed by: INTERNAL MEDICINE

## 2019-02-26 PROCEDURE — 3017F COLORECTAL CA SCREEN DOC REV: CPT | Performed by: INTERNAL MEDICINE

## 2019-02-26 PROCEDURE — 99214 OFFICE O/P EST MOD 30 MIN: CPT | Performed by: INTERNAL MEDICINE

## 2019-02-26 PROCEDURE — G8484 FLU IMMUNIZE NO ADMIN: HCPCS | Performed by: INTERNAL MEDICINE

## 2019-02-26 PROCEDURE — 2022F DILAT RTA XM EVC RTNOPTHY: CPT | Performed by: INTERNAL MEDICINE

## 2019-02-26 PROCEDURE — 1036F TOBACCO NON-USER: CPT | Performed by: INTERNAL MEDICINE

## 2019-02-26 PROCEDURE — 3045F PR MOST RECENT HEMOGLOBIN A1C LEVEL 7.0-9.0%: CPT | Performed by: INTERNAL MEDICINE

## 2019-02-26 RX ORDER — GLUCOSAMINE HCL/CHONDROITIN SU 500-400 MG
CAPSULE ORAL
Qty: 100 STRIP | Refills: 5 | Status: SHIPPED | OUTPATIENT
Start: 2019-02-26 | End: 2019-05-28 | Stop reason: SDUPTHER

## 2019-02-26 RX ORDER — GLUCOSAM/CHON-MSM1/C/MANG/BOSW 500-416.6
TABLET ORAL
Qty: 120 EACH | Refills: 5 | Status: SHIPPED | OUTPATIENT
Start: 2019-02-26 | End: 2019-10-01 | Stop reason: SDUPTHER

## 2019-03-04 DIAGNOSIS — N39.46 MIXED STRESS AND URGE URINARY INCONTINENCE: Primary | ICD-10-CM

## 2019-03-08 ENCOUNTER — TELEPHONE (OUTPATIENT)
Dept: PRIMARY CARE CLINIC | Age: 54
End: 2019-03-08

## 2019-03-08 ENCOUNTER — TELEPHONE (OUTPATIENT)
Dept: CARDIOLOGY CLINIC | Age: 54
End: 2019-03-08

## 2019-03-10 DIAGNOSIS — M79.7 FIBROMYALGIA: ICD-10-CM

## 2019-03-10 DIAGNOSIS — M06.9 RHEUMATOID ARTHRITIS INVOLVING MULTIPLE SITES, UNSPECIFIED RHEUMATOID FACTOR PRESENCE: ICD-10-CM

## 2019-03-11 ENCOUNTER — HOSPITAL ENCOUNTER (INPATIENT)
Age: 54
LOS: 1 days | Discharge: HOME | DRG: 191 | End: 2019-03-12
Attending: EMERGENCY MEDICINE | Admitting: INTERNAL MEDICINE
Payer: COMMERCIAL

## 2019-03-11 ENCOUNTER — HOSPITAL ENCOUNTER (OUTPATIENT)
Dept: NON INVASIVE DIAGNOSTICS | Age: 54
Discharge: HOME | DRG: 191 | End: 2019-03-11
Payer: COMMERCIAL

## 2019-03-11 ENCOUNTER — APPOINTMENT (OUTPATIENT)
Dept: GENERAL RADIOLOGY | Age: 54
DRG: 191 | End: 2019-03-11
Payer: COMMERCIAL

## 2019-03-11 DIAGNOSIS — R07.9 CHEST PAIN, UNSPECIFIED TYPE: ICD-10-CM

## 2019-03-11 DIAGNOSIS — I20.0 UNSTABLE ANGINA (HCC): Primary | ICD-10-CM

## 2019-03-11 PROBLEM — R94.31 ABNORMAL EKG: Status: ACTIVE | Noted: 2019-03-11

## 2019-03-11 PROBLEM — E66.9 OBESITY (BMI 30-39.9): Status: ACTIVE | Noted: 2019-03-11

## 2019-03-11 LAB
A/G RATIO: 1.3 (ref 1.1–2.2)
ALBUMIN SERPL-MCNC: 4.2 G/DL (ref 3.4–5)
ALP BLD-CCNC: 19 U/L (ref 40–129)
ALT SERPL-CCNC: 26 U/L (ref 10–40)
ANION GAP SERPL CALCULATED.3IONS-SCNC: 12 MMOL/L (ref 3–16)
AST SERPL-CCNC: 24 U/L (ref 15–37)
BASOPHILS ABSOLUTE: 0.1 K/UL (ref 0–0.2)
BASOPHILS RELATIVE PERCENT: 0.6 %
BILIRUB SERPL-MCNC: 0.3 MG/DL (ref 0–1)
BUN BLDV-MCNC: 17 MG/DL (ref 7–20)
CALCIUM SERPL-MCNC: 9.7 MG/DL (ref 8.3–10.6)
CHLORIDE BLD-SCNC: 102 MMOL/L (ref 99–110)
CO2: 27 MMOL/L (ref 21–32)
CREAT SERPL-MCNC: 0.6 MG/DL (ref 0.6–1.1)
EOSINOPHILS ABSOLUTE: 0.2 K/UL (ref 0–0.6)
EOSINOPHILS RELATIVE PERCENT: 1.7 %
GFR AFRICAN AMERICAN: >60
GFR NON-AFRICAN AMERICAN: >60
GLOBULIN: 3.2 G/DL
GLUCOSE BLD-MCNC: 196 MG/DL (ref 70–99)
GLUCOSE BLD-MCNC: 256 MG/DL (ref 70–99)
HCT VFR BLD CALC: 39.9 % (ref 36–48)
HEMOGLOBIN: 13 G/DL (ref 12–16)
INR BLD: 1.03 (ref 0.86–1.14)
LEFT VENTRICULAR EJECTION FRACTION HIGH VALUE: 55 %
LEFT VENTRICULAR EJECTION FRACTION MODE: NORMAL
LYMPHOCYTES ABSOLUTE: 2.8 K/UL (ref 1–5.1)
LYMPHOCYTES RELATIVE PERCENT: 28.8 %
MAGNESIUM: 2 MG/DL (ref 1.8–2.4)
MCH RBC QN AUTO: 28.6 PG (ref 26–34)
MCHC RBC AUTO-ENTMCNC: 32.5 G/DL (ref 31–36)
MCV RBC AUTO: 87.9 FL (ref 80–100)
MONOCYTES ABSOLUTE: 0.6 K/UL (ref 0–1.3)
MONOCYTES RELATIVE PERCENT: 6.5 %
NEUTROPHILS ABSOLUTE: 6.1 K/UL (ref 1.7–7.7)
NEUTROPHILS RELATIVE PERCENT: 62.4 %
PDW BLD-RTO: 14.6 % (ref 12.4–15.4)
PERFORMED ON: ABNORMAL
PLATELET # BLD: 186 K/UL (ref 135–450)
PMV BLD AUTO: 9.3 FL (ref 5–10.5)
POTASSIUM SERPL-SCNC: 3.8 MMOL/L (ref 3.5–5.1)
PRO-BNP: 196 PG/ML (ref 0–124)
PROTHROMBIN TIME: 11.7 SEC (ref 9.8–13)
RBC # BLD: 4.54 M/UL (ref 4–5.2)
SODIUM BLD-SCNC: 141 MMOL/L (ref 136–145)
TOTAL PROTEIN: 7.4 G/DL (ref 6.4–8.2)
TROPONIN: <0.01 NG/ML
WBC # BLD: 9.8 K/UL (ref 4–11)

## 2019-03-11 PROCEDURE — 2580000003 HC RX 258: Performed by: INTERNAL MEDICINE

## 2019-03-11 PROCEDURE — 71046 X-RAY EXAM CHEST 2 VIEWS: CPT

## 2019-03-11 PROCEDURE — 2500000003 HC RX 250 WO HCPCS

## 2019-03-11 PROCEDURE — 93010 ELECTROCARDIOGRAM REPORT: CPT | Performed by: INTERNAL MEDICINE

## 2019-03-11 PROCEDURE — 99285 EMERGENCY DEPT VISIT HI MDM: CPT

## 2019-03-11 PROCEDURE — 83735 ASSAY OF MAGNESIUM: CPT

## 2019-03-11 PROCEDURE — 84484 ASSAY OF TROPONIN QUANT: CPT

## 2019-03-11 PROCEDURE — 6370000000 HC RX 637 (ALT 250 FOR IP): Performed by: INTERNAL MEDICINE

## 2019-03-11 PROCEDURE — 83036 HEMOGLOBIN GLYCOSYLATED A1C: CPT

## 2019-03-11 PROCEDURE — 85610 PROTHROMBIN TIME: CPT

## 2019-03-11 PROCEDURE — 83880 ASSAY OF NATRIURETIC PEPTIDE: CPT

## 2019-03-11 PROCEDURE — G0378 HOSPITAL OBSERVATION PER HR: HCPCS

## 2019-03-11 PROCEDURE — 6370000000 HC RX 637 (ALT 250 FOR IP): Performed by: EMERGENCY MEDICINE

## 2019-03-11 PROCEDURE — 85025 COMPLETE CBC W/AUTO DIFF WBC: CPT

## 2019-03-11 PROCEDURE — 80053 COMPREHEN METABOLIC PANEL: CPT

## 2019-03-11 PROCEDURE — 93005 ELECTROCARDIOGRAM TRACING: CPT | Performed by: EMERGENCY MEDICINE

## 2019-03-11 PROCEDURE — 99223 1ST HOSP IP/OBS HIGH 75: CPT | Performed by: INTERNAL MEDICINE

## 2019-03-11 PROCEDURE — 6360000002 HC RX W HCPCS

## 2019-03-11 PROCEDURE — 1200000000 HC SEMI PRIVATE

## 2019-03-11 RX ORDER — SODIUM CHLORIDE 9 MG/ML
INJECTION, SOLUTION INTRAVENOUS CONTINUOUS
Status: DISCONTINUED | OUTPATIENT
Start: 2019-03-12 | End: 2019-03-12 | Stop reason: HOSPADM

## 2019-03-11 RX ORDER — DEXTROSE MONOHYDRATE 25 G/50ML
12.5 INJECTION, SOLUTION INTRAVENOUS PRN
Status: DISCONTINUED | OUTPATIENT
Start: 2019-03-11 | End: 2019-03-12 | Stop reason: HOSPADM

## 2019-03-11 RX ORDER — DEXTROSE MONOHYDRATE 50 MG/ML
100 INJECTION, SOLUTION INTRAVENOUS PRN
Status: DISCONTINUED | OUTPATIENT
Start: 2019-03-11 | End: 2019-03-12 | Stop reason: HOSPADM

## 2019-03-11 RX ORDER — CYCLOBENZAPRINE HCL 10 MG
5 TABLET ORAL DAILY PRN
Status: DISCONTINUED | OUTPATIENT
Start: 2019-03-11 | End: 2019-03-12 | Stop reason: HOSPADM

## 2019-03-11 RX ORDER — IBUPROFEN 400 MG/1
800 TABLET ORAL EVERY 8 HOURS PRN
Status: DISCONTINUED | OUTPATIENT
Start: 2019-03-11 | End: 2019-03-12 | Stop reason: HOSPADM

## 2019-03-11 RX ORDER — FAMOTIDINE 20 MG/1
40 TABLET, FILM COATED ORAL EVERY EVENING
Status: DISCONTINUED | OUTPATIENT
Start: 2019-03-11 | End: 2019-03-12 | Stop reason: HOSPADM

## 2019-03-11 RX ORDER — SODIUM CHLORIDE 0.9 % (FLUSH) 0.9 %
10 SYRINGE (ML) INJECTION EVERY 12 HOURS SCHEDULED
Status: DISCONTINUED | OUTPATIENT
Start: 2019-03-11 | End: 2019-03-12 | Stop reason: HOSPADM

## 2019-03-11 RX ORDER — CETIRIZINE HYDROCHLORIDE 10 MG/1
10 TABLET ORAL DAILY
Status: DISCONTINUED | OUTPATIENT
Start: 2019-03-11 | End: 2019-03-12 | Stop reason: HOSPADM

## 2019-03-11 RX ORDER — IBUPROFEN 800 MG/1
TABLET ORAL
Qty: 120 TABLET | Refills: 0 | Status: SHIPPED | OUTPATIENT
Start: 2019-03-11 | End: 2019-06-06 | Stop reason: SDUPTHER

## 2019-03-11 RX ORDER — ATORVASTATIN CALCIUM 20 MG/1
20 TABLET, FILM COATED ORAL DAILY
Status: DISCONTINUED | OUTPATIENT
Start: 2019-03-11 | End: 2019-03-12 | Stop reason: HOSPADM

## 2019-03-11 RX ORDER — ONDANSETRON 2 MG/ML
4 INJECTION INTRAMUSCULAR; INTRAVENOUS EVERY 6 HOURS PRN
Status: DISCONTINUED | OUTPATIENT
Start: 2019-03-11 | End: 2019-03-12 | Stop reason: HOSPADM

## 2019-03-11 RX ORDER — NICOTINE POLACRILEX 4 MG
15 LOZENGE BUCCAL PRN
Status: DISCONTINUED | OUTPATIENT
Start: 2019-03-11 | End: 2019-03-12 | Stop reason: HOSPADM

## 2019-03-11 RX ORDER — SODIUM CHLORIDE 0.9 % (FLUSH) 0.9 %
10 SYRINGE (ML) INJECTION PRN
Status: DISCONTINUED | OUTPATIENT
Start: 2019-03-11 | End: 2019-03-12 | Stop reason: HOSPADM

## 2019-03-11 RX ORDER — ALBUTEROL SULFATE 90 UG/1
2 AEROSOL, METERED RESPIRATORY (INHALATION) EVERY 6 HOURS PRN
Status: DISCONTINUED | OUTPATIENT
Start: 2019-03-11 | End: 2019-03-12 | Stop reason: HOSPADM

## 2019-03-11 RX ORDER — ASPIRIN 81 MG/1
324 TABLET, CHEWABLE ORAL ONCE
Status: COMPLETED | OUTPATIENT
Start: 2019-03-11 | End: 2019-03-11

## 2019-03-11 RX ORDER — ASPIRIN 81 MG/1
81 TABLET, CHEWABLE ORAL DAILY
Status: DISCONTINUED | OUTPATIENT
Start: 2019-03-12 | End: 2019-03-12 | Stop reason: HOSPADM

## 2019-03-11 RX ORDER — ACETAMINOPHEN 325 MG/1
650 TABLET ORAL EVERY 4 HOURS PRN
Status: DISCONTINUED | OUTPATIENT
Start: 2019-03-11 | End: 2019-03-12 | Stop reason: HOSPADM

## 2019-03-11 RX ORDER — SIMETHICONE 80 MG
120 TABLET,CHEWABLE ORAL 4 TIMES DAILY PRN
Status: DISCONTINUED | OUTPATIENT
Start: 2019-03-11 | End: 2019-03-12 | Stop reason: HOSPADM

## 2019-03-11 RX ORDER — CLOPIDOGREL 300 MG/1
600 TABLET, FILM COATED ORAL ONCE
Status: COMPLETED | OUTPATIENT
Start: 2019-03-11 | End: 2019-03-11

## 2019-03-11 RX ADMIN — VITAMIN D, TAB 1000IU (100/BT) 1000 UNITS: 25 TAB at 22:31

## 2019-03-11 RX ADMIN — ASPIRIN 81 MG 324 MG: 81 TABLET ORAL at 17:21

## 2019-03-11 RX ADMIN — Medication 10 ML: at 22:31

## 2019-03-11 RX ADMIN — INSULIN LISPRO 2 UNITS: 100 INJECTION, SOLUTION INTRAVENOUS; SUBCUTANEOUS at 22:26

## 2019-03-11 RX ADMIN — CLOPIDOGREL BISULFATE 600 MG: 300 TABLET, FILM COATED ORAL at 17:21

## 2019-03-11 ASSESSMENT — PAIN SCALES - GENERAL
PAINLEVEL_OUTOF10: 0
PAINLEVEL_OUTOF10: 2

## 2019-03-11 ASSESSMENT — PAIN DESCRIPTION - LOCATION: LOCATION: CHEST

## 2019-03-11 ASSESSMENT — PAIN DESCRIPTION - PAIN TYPE: TYPE: ACUTE PAIN

## 2019-03-11 NOTE — CARE COORDINATION
Discharge Planning Assessment  SW discharge planner met with patient and family to discuss reason for admission, current living situation, and potential needs at the time of discharge. Pt in ED d/t unstable angina    Demographics/Insurance verified Yes    Current type of dwelling:  House    Living arrangements:  Alone    Level of function/Support:  Pt reports is independent with ADLs. Has supportive family    PCP:  Dr. Drew De Santiago    Last Visit to PCP:  11/2018    DME:  Annice Mohs - doesn't use at this time. Active with any community resources/agencies/skilled home care:  No.  No non-skilled needs reported. Medication compliance issues:  No    Financial issues that could impact healthcare:  No    Transportation at the time of discharge:  Pt drives but family can help too. Tentative discharge plan:  Home most likely. No needs identified at this time.     Electronically signed by CLAUDIA Ventura, LSW on 3/11/2019 at 6:49 PM

## 2019-03-11 NOTE — ED PROVIDER NOTES
[facility] Emergency Department    CHIEF COMPLAINT  Chief Complaint   Patient presents with    Chest Pain     ekg changes noted before stress test. sent by cardiology     HISTORY OF PRESENT ILLNESS  Kaylee Muniz is a 53 y.o. female  who presents to the ED complaining of chest tightness and discomfort. She was actually getting an outpatient stress test earlier this morning but prior to the actual procedure itself she had an abnormal EKG different than previous and therefore was sent by cardiology to the ED for admission and testing. She never obtained the stress test for this reason. She does have some mild chest tightness currently. She denies it being a true \"pain\". She feels minimally short of breath but denies any shortness of breath actively when she is at rest.  She also denies any leg swelling abdominal pain vomiting or diarrhea recently. She is on chronic NSAIDs and has a history including multiple cardiac comorbidities and fibromyalgia, irritable bowel, hyperlipidemia obesity reflux and gastroparesis. She thought it was her stomach for a long time. She has no personal cardiac history. Last heart cath a few years ago was essentially normal.    No other complaints, modifying factors or associated symptoms. I have reviewed the following from the nursing documentation.     Past Medical History:   Diagnosis Date    Ankle fracture, left     Ankle fracture, right     Arthritis     Asthma     Bipolar depression (HCC)     CAN'T AFFORD MEDS    Bladder problem     Cervical disc herniation     Diabetes mellitus (HCC)     diet control    Fatty liver disease, non-alcoholic     Fibromyalgia     Gastroparesis     Dr Evon Gonzalez GERD (gastroesophageal reflux disease)     gastroporesis    Glaucoma     Dr Haresh Tompkins Hyperlipidemia     elevated LFT on meds    IBS (irritable bowel syndrome)     Lumbar herniated disc     Nausea & vomiting     Nephrolithiasis 1/29/2019    Partial Achilles tendon tear     Pneumonia     PONV (postoperative nausea and vomiting)     RA (rheumatoid arthritis) (HCC)     Rapid or irregular heartbeat     Sleep apnea     does not use cpap    Spinal stenosis     Stress incontinence     Thyroid disease     growths     Past Surgical History:   Procedure Laterality Date    CHOLECYSTECTOMY      COLONOSCOPY      COLONOSCOPY  03/06/2018    with polypectomies    DILATION AND CURETTAGE OF UTERUS      ENDOMETRIAL ABLATION      ENDOSCOPY, COLON, DIAGNOSTIC      ERCP  5/25/2010    with stent    ERCP  1-31-14    ERCP WITH SPHINTER OF ODDI MANOMETERY    ESOPHAGOSCOPY  10/5/10    botox injection    EYE SURGERY      LASER FOR GLAUCOMA    LAPAROSCOPY      TONSILLECTOMY      UPPER GASTROINTESTINAL ENDOSCOPY      UPPER GASTROINTESTINAL ENDOSCOPY  04/12/2011    DILATATION, 58 FR ARTHUR, 100 UNITS BOTOX    UPPER GASTROINTESTINAL ENDOSCOPY  08/30/2011    58 FR DILATATION, 100 UNITS  BOTOX INJECTION    UPPER GASTROINTESTINAL ENDOSCOPY  3-9-12    with dilatation and submucosal injection: Botox    UPPER GASTROINTESTINAL ENDOSCOPY      UPPER GASTROINTESTINAL ENDOSCOPY  11/20/12     Esophagogastroduodenoscopy with Botulinum toxin injection of the pylorus and Arthur esophageal dilation    UPPER GASTROINTESTINAL ENDOSCOPY  6/4/13    WITH DIILITATION AND BOTOX INJECTION    UPPER GASTROINTESTINAL ENDOSCOPY  5/20/2014    100 unit Botox injection, 56 Fr.  Arthur esophageal dilatation    UPPER GASTROINTESTINAL ENDOSCOPY  12/12/14    with esophageal dilatation, and botox injection    UPPER GASTROINTESTINAL ENDOSCOPY  09/09/2015    With Dilitation and Botox injection    UPPER GASTROINTESTINAL ENDOSCOPY  5/10/2016    distal esophagus biopsy, stomach biopsy, botox injection    UPPER GASTROINTESTINAL ENDOSCOPY  04/11/2017    with dilatation and botox injection    UPPER GASTROINTESTINAL ENDOSCOPY  10/24/2017    DILATATION, BIOPSY, BOTOX    UPPER GASTROINTESTINAL Facility-Administered Medications   Medication Dose Route Frequency Provider Last Rate Last Dose    aspirin chewable tablet 324 mg  324 mg Oral Once Iline Levels, MD        clopidogrel (PLAVIX) tablet 600 mg  600 mg Oral Once Iline Levels, MD         Current Outpatient Medications   Medication Sig Dispense Refill    Incontinence Supply Disposable (TRE CLASSIC BRIEFS/LARGE) MISC 1 each by Does not apply route 3 times daily 5 each 5    Incontinence Supplies MISC 1 Package by Does not apply route 2 times daily 5 each 5    Disposable Gloves (VINYL GLOVES LARGE) MISC 1 Package by Does not apply route 4 times daily 5 each 5    TRUEPLUS LANCETS 28G MISC USE ONE LANCET TO TEST THREE TO FOUR TIMES A  each 5    blood glucose monitor strips Test blood sugar once a day 100 strip 5    atorvastatin (LIPITOR) 20 MG tablet Take 1 tablet by mouth daily Does not take every day 30 tablet 3    Cholecalciferol (VITAMIN D3) 1000 units TABS Take 1 tablet by mouth daily 90 tablet 1     MG tablet TAKE ONE TABLET BY MOUTH EVERY 6 HOURS AS NEEDED FOR PAIN 120 tablet 0    GAS-X EXTRA STRENGTH 125 MG chewable tablet CHEW TWO TABLETS BY MOUTH THREE TIMES A DAY WITH EACH MEAL AS NEEDED FOR FLATULENCE 192 tablet 1    nystatin (MYCOSTATIN) 121995 UNIT/GM cream APPLY EXTERNALLY TO THE AFFECTED AREAS TWO TIMES A DAY 1 Tube 2    cyclobenzaprine (FLEXERIL) 10 MG tablet Take 0.5 tablets by mouth daily as needed for Muscle spasms 30 tablet 2    albuterol sulfate HFA (PROAIR HFA) 108 (90 Base) MCG/ACT inhaler INHALE 2 PUFFS EVERY 6 HOURS AS NEEDED FOR WHEEZING 1 Inhaler 2    Pseudoephedrine-guaiFENesin (MUCINEX D) 120-1200 MG TB12 Take one tablet by mouth every 12 hours as needed for nasal congestion.  28 tablet 2    RABEprazole (ACIPHEX) 20 MG tablet Take 1 tablet by mouth daily 30 tablet 5    ketotifen (ZADITOR) 0.025 % ophthalmic solution Place 2 drops into both eyes 2 times daily as needed (allergy) 1 Bottle 2    loratadine (CLARITIN) 10 MG tablet TAKE ONE TABLET BY MOUTH DAILY 30 tablet 5    fluconazole (DIFLUCAN) 150 MG tablet TAKE ONE TABLET BY MOUTH IN A SINGLE DOSE 1 tablet 0    BASAGLAR KWIKPEN 100 UNIT/ML injection pen INJECT 44 UNITS UNDER THE SKIN DAILY (Patient taking differently: 40 units PRN) 5 pen 0    Insulin Pen Needle (PEN NEEDLES) 32G X 4 MM MISC USE TO INJECT INSULIN PEN ONCE DAILY 30 each 3    Blood Glucose Monitoring Suppl (TRUE METRIX METER) w/Device KIT As needed 1 kit 0    ranitidine (ZANTAC) 300 MG capsule Take 1 capsule by mouth every evening 30 capsule 2    glucose blood VI test strips (ASCENSIA AUTODISC VI;ONE TOUCH ULTRA TEST VI) strip 1-2 time a day As needed. 100 each 3     Facility-Administered Medications Ordered in Other Encounters   Medication Dose Route Frequency Provider Last Rate Last Dose    0.9 % sodium chloride infusion   Intravenous Continuous Eugenia Ledbetter MD         Allergies   Allergen Reactions    Penicillins Hives and Shortness Of Breath    Shellfish-Derived Products Swelling     Throat and tongue swells, allergy to all seafood    Aspartame And Phenylalanine      Severe headaches    Bactrim Hives    Biaxin [Clarithromycin] Hives    Cephalexin Other (See Comments)     blisters    Hydrocodone-Acetaminophen Other (See Comments)     HALLUCINATIONS    Lansoprazole Hives    Nexium [Esomeprazole Magnesium Trihydrate] Hives    Other Other (See Comments)     TEGADERM-BLISTERS    Prilosec [Omeprazole] Hives    Blueberry [Vaccinium Angustifolium] Nausea And Vomiting     Projectile vomiting    Monosodium Glutamate Nausea And Vomiting    Zmax [Azithromycin Dihydrate] Nausea And Vomiting     NOT Z PACK its the Powder you had to mix and drink       REVIEW OF SYSTEMS  10 systems reviewed, pertinent positives per HPI otherwise noted to be negative.     PHYSICAL EXAM  BP (!) 170/92   Pulse 77   Temp 98.8 °F (37.1 °C) (Oral)   Resp 16   Ht 5' 2\" (1.575 m)   Wt CREATININE 0.6 0.6 - 1.1 mg/dL    GFR Non-African American >60 >60    GFR African American >60 >60    Calcium 9.7 8.3 - 10.6 mg/dL    Total Protein 7.4 6.4 - 8.2 g/dL    Alb 4.2 3.4 - 5.0 g/dL    Albumin/Globulin Ratio 1.3 1.1 - 2.2    Total Bilirubin 0.3 0.0 - 1.0 mg/dL    Alkaline Phosphatase 19 (L) 40 - 129 U/L    ALT 26 10 - 40 U/L    AST 24 15 - 37 U/L    Globulin 3.2 g/dL   Protime-INR   Result Value Ref Range    Protime 11.7 9.8 - 13.0 sec    INR 1.03 0.86 - 1.14   Magnesium   Result Value Ref Range    Magnesium 2.00 1.80 - 2.40 mg/dL   Troponin   Result Value Ref Range    Troponin <0.01 <0.01 ng/mL   Brain Natriuretic Peptide   Result Value Ref Range    Pro- (H) 0 - 124 pg/mL   EKG 12 Lead   Result Value Ref Range    Ventricular Rate 74 BPM    Atrial Rate 74 BPM    P-R Interval 150 ms    QRS Duration 90 ms    Q-T Interval 388 ms    QTc Calculation (Bazett) 430 ms    P Axis 44 degrees    R Axis 14 degrees    T Axis -46 degrees    Diagnosis       Normal sinus rhythmST & T wave abnormality, consider inferior ischemiaAbnormal ECG     The 12 lead EKG was interpreted by me as follows:  Rate: normal with a rate of 74  Rhythm: sinus  Axis: normal  Intervals: normal [place], narrow QRS, normal QTc  ST segments: ST depressions inferiorly  T waves: inverted inferiorly  Non-specific T wave changes: present  Prior EKG comparison: EKG dated 1/23/19 is significantly different due to inferior changes      RADIOLOGY    Xr Chest Standard (2 Vw)    Result Date: 3/11/2019  EXAMINATION: TWO VIEWS OF THE CHEST 3/11/2019 3:54 pm COMPARISON: 01/05/2017 HISTORY: ORDERING SYSTEM PROVIDED HISTORY: chest pain TECHNOLOGIST PROVIDED HISTORY: Reason for exam:->chest pain Ordering Physician Provided Reason for Exam: Chest pain. Acuity: Acute Type of Exam: Initial FINDINGS: The lungs are without acute focal process. There is no effusion or pneumothorax. The cardiomediastinal silhouette is stable. The osseous structures are stable.      No treating this patient was at least 40 minutes. This excludes time spent doing separately billable procedures. This includes time at the bedside, data interpretation, medication management, obtaining critical history from collateral sources if the patient is unable to provide it directly, and physician consultation. Specifics of interventions taken and potentially life-threatening diagnostic considerations are listed above in the medical decision making. DISCLAIMER: This chart was created using Dragon dictation software. Efforts were made by me to ensure accuracy, however some errors may be present due to limitations of this technology and occasionally words are not transcribed correctly.         Elder Ibarra MD  03/11/19 0000

## 2019-03-11 NOTE — H&P
history that includes Cholecystectomy; Dilation and curettage of uterus; Tonsillectomy; laparoscopy; Upper gastrointestinal endoscopy; Endometrial ablation; ERCP (5/25/2010); Esophagoscopy (10/5/10); Upper gastrointestinal endoscopy (04/12/2011); eye surgery; Upper gastrointestinal endoscopy (08/30/2011); Upper gastrointestinal endoscopy (3-9-12); Upper gastrointestinal endoscopy; Upper gastrointestinal endoscopy (11/20/12); Upper gastrointestinal endoscopy (6/4/13); ERCP (1-31-14); Upper gastrointestinal endoscopy (5/20/2014); Upper gastrointestinal endoscopy (12/12/14); Upper gastrointestinal endoscopy (09/09/2015); Endoscopy, colon, diagnostic; Upper gastrointestinal endoscopy (5/10/2016); Upper gastrointestinal endoscopy (04/11/2017); Upper gastrointestinal endoscopy (10/24/2017); Upper gastrointestinal endoscopy (03/06/2018); Colonoscopy; and Colonoscopy (03/06/2018).      Medications:  Current Facility-Administered Medications on File Prior to Encounter   Medication Dose Route Frequency Provider Last Rate Last Dose    0.9 % sodium chloride infusion   Intravenous Continuous Ankit Burt MD         Current Outpatient Medications on File Prior to Encounter   Medication Sig Dispense Refill     MG tablet TAKE ONE TABLET BY MOUTH EVERY 6 HOURS AS NEEDED FOR PAIN 120 tablet 0    Incontinence Supply Disposable (TRE CLASSIC BRIEFS/LARGE) MISC 1 each by Does not apply route 3 times daily 5 each 5    Incontinence Supplies MISC 1 Package by Does not apply route 2 times daily 5 each 5    Disposable Gloves (VINYL GLOVES LARGE) MISC 1 Package by Does not apply route 4 times daily 5 each 5    TRUEPLUS LANCETS 28G MISC USE ONE LANCET TO TEST THREE TO FOUR TIMES A  each 5    blood glucose monitor strips Test blood sugar once a day 100 strip 5    atorvastatin (LIPITOR) 20 MG tablet Take 1 tablet by mouth daily Does not take every day 30 tablet 3    Cholecalciferol (VITAMIN D3) 1000 units TABS Take 1 tablet by mouth daily 90 tablet 1    GAS-X EXTRA STRENGTH 125 MG chewable tablet CHEW TWO TABLETS BY MOUTH THREE TIMES A DAY WITH EACH MEAL AS NEEDED FOR FLATULENCE 192 tablet 1    nystatin (MYCOSTATIN) 740203 UNIT/GM cream APPLY EXTERNALLY TO THE AFFECTED AREAS TWO TIMES A DAY 1 Tube 2    cyclobenzaprine (FLEXERIL) 10 MG tablet Take 0.5 tablets by mouth daily as needed for Muscle spasms 30 tablet 2    albuterol sulfate HFA (PROAIR HFA) 108 (90 Base) MCG/ACT inhaler INHALE 2 PUFFS EVERY 6 HOURS AS NEEDED FOR WHEEZING 1 Inhaler 2    Pseudoephedrine-guaiFENesin (MUCINEX D) 120-1200 MG TB12 Take one tablet by mouth every 12 hours as needed for nasal congestion. 28 tablet 2    RABEprazole (ACIPHEX) 20 MG tablet Take 1 tablet by mouth daily 30 tablet 5    ketotifen (ZADITOR) 0.025 % ophthalmic solution Place 2 drops into both eyes 2 times daily as needed (allergy) 1 Bottle 2    loratadine (CLARITIN) 10 MG tablet TAKE ONE TABLET BY MOUTH DAILY 30 tablet 5    fluconazole (DIFLUCAN) 150 MG tablet TAKE ONE TABLET BY MOUTH IN A SINGLE DOSE 1 tablet 0    BASAGLAR KWIKPEN 100 UNIT/ML injection pen INJECT 44 UNITS UNDER THE SKIN DAILY (Patient taking differently: 40 units PRN) 5 pen 0    Insulin Pen Needle (PEN NEEDLES) 32G X 4 MM MISC USE TO INJECT INSULIN PEN ONCE DAILY 30 each 3    Blood Glucose Monitoring Suppl (TRUE METRIX METER) w/Device KIT As needed 1 kit 0    ranitidine (ZANTAC) 300 MG capsule Take 1 capsule by mouth every evening 30 capsule 2    glucose blood VI test strips (ASCENSIA AUTODISC VI;ONE TOUCH ULTRA TEST VI) strip 1-2 time a day As needed. 100 each 3       Allergies:   Allergies   Allergen Reactions    Penicillins Hives and Shortness Of Breath    Shellfish-Derived Products Swelling     Throat and tongue swells, allergy to all seafood    Aspartame And Phenylalanine      Severe headaches    Bactrim Hives    Biaxin [Clarithromycin] Hives    Cephalexin Other (See Comments)     blisters    Hydrocodone-Acetaminophen Other (See Comments)     HALLUCINATIONS    Lansoprazole Hives    Nexium [Esomeprazole Magnesium Trihydrate] Hives    Other Other (See Comments)     TEGADERM-BLISTERS    Prilosec [Omeprazole] Hives    Blueberry [Vaccinium Angustifolium] Nausea And Vomiting     Projectile vomiting    Monosodium Glutamate Nausea And Vomiting    Zmax [Azithromycin Dihydrate] Nausea And Vomiting     NOT Z PACK its the Powder you had to mix and drink        Social History:  Patient Lives at home   reports that she has never smoked. She has never used smokeless tobacco. She reports that she does not drink alcohol or use drugs. Family History:  family history includes Diabetes in her brother and mother; Heart Disease in her brother, father, and mother; High Blood Pressure in her brother; High Cholesterol in her brother and brother; Stroke in her mother. Physical Exam:  BP (!) 122/98   Pulse 83   Temp 98.8 °F (37.1 °C) (Oral)   Resp 12   Ht 5' 2\" (1.575 m)   Wt 205 lb (93 kg)   LMP  (LMP Unknown)   SpO2 99%   BMI 37.49 kg/m²     General appearance:  Appears comfortable. Well nourished, obese, NAD  Eyes: Sclera clear, pupils equal  ENT: Moist mucus membranes, no thrush. Trachea midline. Cardiovascular: Regular rhythm, normal S1, S2. No murmur, gallop, rub. No edema in lower extremities  Respiratory: Clear to auscultation bilaterally, no wheeze, good inspiratory effort  Gastrointestinal: Abdomen soft, obese, non-tender, not distended, normal bowel sounds  Musculoskeletal: No cyanosis in digits, neck supple  Neurology: Grossly intact. Alert and oriented in time, place and person. No speech or motor deficits  Psychiatry: Appropriate affect.  Not agitated  Skin: Warm, dry, normal turgor, no rash  Brisk capillary refill, peripheral pulses palpable   Labs:  CBC:   Lab Results   Component Value Date    WBC 9.8 03/11/2019    RBC 4.54 03/11/2019    HGB 13.0 03/11/2019    HCT 39.9 03/11/2019    MCV 87.9

## 2019-03-11 NOTE — PROGRESS NOTES
Advanced Care Planning Note. Purpose of Encounter: Advanced care planning in light of unstable angina  Parties In Attendance: Patient, brother  Decisional Capacity: Yes  Subjective: Patient with CP  Objective: Cr 0.6  Goals of Care Determination: Patient wants full support (CPR, vent, surgery, HD, trach, PEG)  Plan:  C tomorrow. Cardio consult  Code Status: Full code   Time spent on Advanced care Plannin minutes  Advanced Care Planning Documents: Completed advanced directives on chart, brother is the POA.     Tim Medrano MD  3/11/2019 6:29 PM

## 2019-03-11 NOTE — CONSULTS
[address]  Inpatient Consultation / H+P      REASON FOR CONSULT/CHIEF COMPLAINT/HPI     TYPE Interventional Cardiology / Structural Heart Disease   RFC/CC cp   HPI Walter Westbrook is a 53 y. o.presents with chest pain. CP substernal, pressure, left sided, nonradiating, exertional, occasionally with rest.  Plan for stress today however patient concerned with 6/10 cp and EKG with new inferolateral ST depression and TWI. Admitted from stress lab with unstable angina. HISTORY/ALLERGIES/ROS       MedHx:  has a past medical history of Ankle fracture, left, Ankle fracture, right, Arthritis, Asthma, Bipolar depression ([address] Bladder problem, Cervical disc herniation, Diabetes mellitus ([address] Fatty liver disease, non-alcoholic, Fibromyalgia, Gastroparesis, GERD (gastroesophageal reflux disease), Glaucoma, Hyperlipidemia, IBS (irritable bowel syndrome), Lumbar herniated disc, Nausea & vomiting, Nephrolithiasis, Partial Achilles tendon tear, Pneumonia, PONV (postoperative nausea and vomiting), RA (rheumatoid arthritis) ([address] Rapid or irregular heartbeat, Sleep apnea, Spinal stenosis, Stress incontinence, and Thyroid disease. SurgHx:  has a past surgical history that includes Cholecystectomy; Dilation and curettage of uterus; Tonsillectomy; laparoscopy; Upper gastrointestinal endoscopy; Endometrial ablation; ERCP (5/25/2010); Esophagoscopy (10/5/10); Upper gastrointestinal endoscopy (04/12/2011); eye surgery; Upper gastrointestinal endoscopy (08/30/2011); Upper gastrointestinal endoscopy (3-9-12); Upper gastrointestinal endoscopy; Upper gastrointestinal endoscopy (11/20/12); Upper gastrointestinal endoscopy (6/4/13); ERCP (1-31-14); Upper gastrointestinal endoscopy (5/20/2014); Upper gastrointestinal endoscopy (12/12/14); Upper gastrointestinal endoscopy (09/09/2015); Endoscopy, colon, diagnostic; Upper gastrointestinal endoscopy (5/10/2016); Upper gastrointestinal endoscopy (04/11/2017);  Upper gastrointestinal endoscopy (10/24/2017); Upper gastrointestinal endoscopy (03/06/2018); Colonoscopy; and Colonoscopy (03/06/2018). SocHx:  reports that she has never smoked. She has never used smokeless tobacco. She reports that she does not drink alcohol or use drugs. FamHx: No evidence for sudden cardiac death or premature CAD. Allergies: Penicillins; Shellfish-derived products; Aspartame and phenylalanine; Bactrim; Biaxin [clarithromycin]; Cephalexin; Hydrocodone-acetaminophen; Lansoprazole; Nexium [esomeprazole magnesium trihydrate]; Other; Prilosec [omeprazole]; Blueberry [vaccinium angustifolium]; Monosodium glutamate; and Zmax [azithromycin dihydrate]   ROS:   [x]Full ROS obtained and negative except as mentioned in HPI    MEDICATIONS      Prior to Admission medications    Medication Sig Start Date End Date Taking?  Authorizing Provider   Incontinence Supply Disposable (TRE CLASSIC BRIEFS/LARGE) MISC 1 each by Does not apply route 3 times daily 3/4/19   Jayshree Can MD   Incontinence Supplies MISC 1 Package by Does not apply route 2 times daily 3/4/19   Jayshree Can MD   Disposable Gloves (VINYL GLOVES LARGE) MISC 1 Package by Does not apply route 4 times daily 3/4/19   Jayshree Can MD   TRUEPLUS LANCETS 28G MISC USE ONE LANCET TO TEST THREE TO FOUR TIMES A DAY 2/26/19   Suzen Krabbe, MD   blood glucose monitor strips Test blood sugar once a day 2/26/19   Suzen Krabbe, MD   atorvastatin (LIPITOR) 20 MG tablet Take 1 tablet by mouth daily Does not take every day 1/29/19   Lora Patton MD   Cholecalciferol (VITAMIN D3) 1000 units TABS Take 1 tablet by mouth daily 1/29/19   Lora Patton MD    MG tablet TAKE ONE TABLET BY MOUTH EVERY 6 HOURS AS NEEDED FOR PAIN 12/31/18   Jayshree Can MD   GAS-X EXTRA STRENGTH 125 MG chewable tablet CHEW TWO TABLETS BY MOUTH THREE TIMES A DAY WITH EACH MEAL AS NEEDED FOR FLATULENCE 12/13/18   Jayshree Can MD nystatin (MYCOSTATIN) 689879 UNIT/GM cream APPLY EXTERNALLY TO THE AFFECTED AREAS TWO TIMES A DAY 11/16/18   Cosmo Avilez MD   cyclobenzaprine (FLEXERIL) 10 MG tablet Take 0.5 tablets by mouth daily as needed for Muscle spasms 11/16/18   Cosmo Avilez MD   albuterol sulfate HFA (PROAIR HFA) 108 (90 Base) MCG/ACT inhaler INHALE 2 PUFFS EVERY 6 HOURS AS NEEDED FOR WHEEZING 11/16/18   Cosmo Avilez MD   Pseudoephedrine-guaiFENesin [facility] D) 120-1200 MG TB12 Take one tablet by mouth every 12 hours as needed for nasal congestion. 11/16/18   Cosmo Avilez MD   RABEprazole (ACIPHEX) 20 MG tablet Take 1 tablet by mouth daily 11/16/18   Cosmo Avilez MD   ketotifen (ZADITOR) 0.025 % ophthalmic solution Place 2 drops into both eyes 2 times daily as needed (allergy) 11/16/18   Cosmo Avilez MD   loratadine (CLARITIN) 10 MG tablet TAKE ONE TABLET BY MOUTH DAILY 11/16/18   Cosmo Avilez MD   fluconazole (DIFLUCAN) 150 MG tablet TAKE ONE TABLET BY MOUTH IN A SINGLE DOSE 11/16/18   Cosmo Avilez MD   BASAGLAR KWIKPEN 100 UNIT/ML injection pen INJECT 44 UNITS UNDER THE SKIN DAILY  Patient taking differently: 40 units PRN 11/12/18   Jannette Woodward MD   Insulin Pen Needle (PEN NEEDLES) 32G X 4 MM MISC USE TO INJECT INSULIN PEN ONCE DAILY 6/26/18   Jannette Woodward MD   Blood Glucose Monitoring Suppl (TRUE METRIX METER) w/Device KIT As needed 6/11/18   Jannette Woodward MD   ranitidine (ZANTAC) 300 MG capsule Take 1 capsule by mouth every evening 8/4/17   Cosmo Avilez MD   glucose blood VI test strips (ASCENSIA AUTODISC VI;ONE TOUCH ULTRA TEST VI) strip 1-2 time a day As needed.  5/2/17   Cosmo Avilez MD       PHYSICAL EXAM        Vitals:    03/11/19 1642   BP: (!) 170/92   Pulse: 77   Resp: 16   Temp:    SpO2: 99%    Weight: 205 lb (93 kg)     Gen Alert, coop, no distress Heart  RRR, no MRG, nl apical impulse   Head NC, AT, no abnorm Abd  Soft, NT, +BS, no mass, no OM Eyes PERRLA, conj/corn clear Ext  Ext nl, AT, no C/C/E   Nose Nares nl, no drain, NT Pulse 2+ and symmetric   Throat Lips, mucosa, tongue nl Skin Color/text/turg nl, no rash/lesions   Neck S/S, TM, NT, no bruit/JVD Psych Nl mood and affect   Lung CTA-B, unlabored, no DTP Lymph   No cervical or axillary LA   Ch wall NT, no deform Neuro  Nl gross M/S exam     LABS     CBC:   Lab Results   Component Value Date    WBC 9.8 03/11/2019    RBC 4.54 03/11/2019    HGB 13.0 03/11/2019    HCT 39.9 03/11/2019    MCV 87.9 03/11/2019    RDW 14.6 03/11/2019     03/11/2019     CMP:  Lab Results   Component Value Date     03/11/2019    K 3.8 03/11/2019     03/11/2019    CO2 27 03/11/2019    BUN 17 03/11/2019    CREATININE 0.6 03/11/2019    GFRAA >60 03/11/2019    GFRAA >60 06/04/2013    AGRATIO 1.3 03/11/2019    LABGLOM >60 03/11/2019    GLUCOSE 196 03/11/2019    PROT 7.4 03/11/2019    PROT 7.7 11/14/2012    CALCIUM 9.7 03/11/2019    BILITOT 0.3 03/11/2019    ALKPHOS 19 03/11/2019    AST 24 03/11/2019    ALT 26 03/11/2019     PT/INR:  No results found for: PTINR  Lab Results   Component Value Date    CKTOTAL 129 12/27/2016    TROPONINI <0.01 03/11/2019     ASSESSMENT AND PLAN      ~Chest pain    Date EF Results   Sx     Cp and sob   LHC 5/15 55% Normal cors (Rei)   MPI 4/14 58% Normal perfusion   TTE 3/19 55%    Plan     Unstable angina, new inferolateral EKG changes  [facility] tomorrow, discussed R/B/O   ~Hyperchol  Goal LDL []  <100 [x]  <70       Counseling [x]  Diet [x]  Weight [x]  Exercise   Plan     Continue current doses of meds listed above  8/17 HDL:42, LDL:195

## 2019-03-12 VITALS
HEART RATE: 85 BPM | RESPIRATION RATE: 16 BRPM | WEIGHT: 208 LBS | HEIGHT: 62 IN | BODY MASS INDEX: 38.28 KG/M2 | TEMPERATURE: 98.2 F | SYSTOLIC BLOOD PRESSURE: 112 MMHG | DIASTOLIC BLOOD PRESSURE: 68 MMHG | OXYGEN SATURATION: 96 %

## 2019-03-12 LAB
ANION GAP SERPL CALCULATED.3IONS-SCNC: 10 MMOL/L (ref 3–16)
BASOPHILS ABSOLUTE: 0.1 K/UL (ref 0–0.2)
BASOPHILS RELATIVE PERCENT: 0.8 %
BUN BLDV-MCNC: 12 MG/DL (ref 7–20)
CALCIUM SERPL-MCNC: 9.6 MG/DL (ref 8.3–10.6)
CHLORIDE BLD-SCNC: 102 MMOL/L (ref 99–110)
CO2: 29 MMOL/L (ref 21–32)
CREAT SERPL-MCNC: 0.6 MG/DL (ref 0.6–1.1)
EKG ATRIAL RATE: 74 BPM
EKG DIAGNOSIS: NORMAL
EKG P AXIS: 44 DEGREES
EKG P-R INTERVAL: 150 MS
EKG Q-T INTERVAL: 388 MS
EKG QRS DURATION: 90 MS
EKG QTC CALCULATION (BAZETT): 430 MS
EKG R AXIS: 14 DEGREES
EKG T AXIS: -46 DEGREES
EKG VENTRICULAR RATE: 74 BPM
EOSINOPHILS ABSOLUTE: 0.2 K/UL (ref 0–0.6)
EOSINOPHILS RELATIVE PERCENT: 2.4 %
ESTIMATED AVERAGE GLUCOSE: 188.6 MG/DL
GFR AFRICAN AMERICAN: >60
GFR NON-AFRICAN AMERICAN: >60
GLUCOSE BLD-MCNC: 203 MG/DL (ref 70–99)
GLUCOSE BLD-MCNC: 221 MG/DL (ref 70–99)
GLUCOSE BLD-MCNC: 363 MG/DL (ref 70–99)
GLUCOSE BLD-MCNC: 487 MG/DL (ref 70–99)
HBA1C MFR BLD: 8.2 %
HCT VFR BLD CALC: 38.6 % (ref 36–48)
HEMOGLOBIN: 12.7 G/DL (ref 12–16)
LEFT VENTRICULAR EJECTION FRACTION MODE: NORMAL
LV EF: 55 %
LYMPHOCYTES ABSOLUTE: 2.7 K/UL (ref 1–5.1)
LYMPHOCYTES RELATIVE PERCENT: 37.2 %
MCH RBC QN AUTO: 28.4 PG (ref 26–34)
MCHC RBC AUTO-ENTMCNC: 32.8 G/DL (ref 31–36)
MCV RBC AUTO: 86.4 FL (ref 80–100)
MONOCYTES ABSOLUTE: 0.7 K/UL (ref 0–1.3)
MONOCYTES RELATIVE PERCENT: 9.1 %
NEUTROPHILS ABSOLUTE: 3.7 K/UL (ref 1.7–7.7)
NEUTROPHILS RELATIVE PERCENT: 50.5 %
PDW BLD-RTO: 14.4 % (ref 12.4–15.4)
PERFORMED ON: ABNORMAL
PLATELET # BLD: 190 K/UL (ref 135–450)
PMV BLD AUTO: 9.1 FL (ref 5–10.5)
POTASSIUM REFLEX MAGNESIUM: 4.6 MMOL/L (ref 3.5–5.1)
RBC # BLD: 4.47 M/UL (ref 4–5.2)
SODIUM BLD-SCNC: 141 MMOL/L (ref 136–145)
WBC # BLD: 7.3 K/UL (ref 4–11)

## 2019-03-12 PROCEDURE — B2111ZZ FLUOROSCOPY OF MULTIPLE CORONARY ARTERIES USING LOW OSMOLAR CONTRAST: ICD-10-PCS | Performed by: ANESTHESIOLOGY

## 2019-03-12 PROCEDURE — 6370000000 HC RX 637 (ALT 250 FOR IP): Performed by: INTERNAL MEDICINE

## 2019-03-12 PROCEDURE — 36415 COLL VENOUS BLD VENIPUNCTURE: CPT

## 2019-03-12 PROCEDURE — G0378 HOSPITAL OBSERVATION PER HR: HCPCS

## 2019-03-12 PROCEDURE — 93458 L HRT ARTERY/VENTRICLE ANGIO: CPT

## 2019-03-12 PROCEDURE — 99152 MOD SED SAME PHYS/QHP 5/>YRS: CPT

## 2019-03-12 PROCEDURE — 93458 L HRT ARTERY/VENTRICLE ANGIO: CPT | Performed by: INTERNAL MEDICINE

## 2019-03-12 PROCEDURE — C1894 INTRO/SHEATH, NON-LASER: HCPCS

## 2019-03-12 PROCEDURE — C1769 GUIDE WIRE: HCPCS

## 2019-03-12 PROCEDURE — 99153 MOD SED SAME PHYS/QHP EA: CPT

## 2019-03-12 PROCEDURE — B2151ZZ FLUOROSCOPY OF LEFT HEART USING LOW OSMOLAR CONTRAST: ICD-10-PCS | Performed by: ANESTHESIOLOGY

## 2019-03-12 PROCEDURE — 2709999900 HC NON-CHARGEABLE SUPPLY

## 2019-03-12 PROCEDURE — 6360000004 HC RX CONTRAST MEDICATION: Performed by: INTERNAL MEDICINE

## 2019-03-12 PROCEDURE — 85025 COMPLETE CBC W/AUTO DIFF WBC: CPT

## 2019-03-12 PROCEDURE — 2580000003 HC RX 258: Performed by: INTERNAL MEDICINE

## 2019-03-12 PROCEDURE — 4A023N7 MEASUREMENT OF CARDIAC SAMPLING AND PRESSURE, LEFT HEART, PERCUTANEOUS APPROACH: ICD-10-PCS | Performed by: ANESTHESIOLOGY

## 2019-03-12 PROCEDURE — 99024 POSTOP FOLLOW-UP VISIT: CPT | Performed by: INTERNAL MEDICINE

## 2019-03-12 PROCEDURE — 80048 BASIC METABOLIC PNL TOTAL CA: CPT

## 2019-03-12 RX ORDER — ASPIRIN 81 MG/1
81 TABLET, CHEWABLE ORAL DAILY
Qty: 30 TABLET | Refills: 3 | Status: SHIPPED | OUTPATIENT
Start: 2019-03-12 | End: 2019-09-17 | Stop reason: SDUPTHER

## 2019-03-12 RX ADMIN — IOPAMIDOL 40 ML: 755 INJECTION, SOLUTION INTRAVENOUS at 08:00

## 2019-03-12 RX ADMIN — ACETAMINOPHEN 650 MG: 325 TABLET, FILM COATED ORAL at 06:56

## 2019-03-12 RX ADMIN — ACETAMINOPHEN 650 MG: 325 TABLET, FILM COATED ORAL at 01:10

## 2019-03-12 RX ADMIN — ASPIRIN 81 MG 81 MG: 81 TABLET ORAL at 09:33

## 2019-03-12 RX ADMIN — INSULIN LISPRO 10 UNITS: 100 INJECTION, SOLUTION INTRAVENOUS; SUBCUTANEOUS at 14:52

## 2019-03-12 RX ADMIN — SODIUM CHLORIDE: 9 INJECTION, SOLUTION INTRAVENOUS at 06:21

## 2019-03-12 RX ADMIN — INSULIN LISPRO 2 UNITS: 100 INJECTION, SOLUTION INTRAVENOUS; SUBCUTANEOUS at 09:35

## 2019-03-12 ASSESSMENT — PAIN SCALES - GENERAL
PAINLEVEL_OUTOF10: 5
PAINLEVEL_OUTOF10: 0
PAINLEVEL_OUTOF10: 0
PAINLEVEL_OUTOF10: 8
PAINLEVEL_OUTOF10: 0

## 2019-03-12 ASSESSMENT — PAIN DESCRIPTION - PAIN TYPE: TYPE: CHRONIC PAIN

## 2019-03-12 ASSESSMENT — PAIN DESCRIPTION - LOCATION: LOCATION: BACK

## 2019-03-12 ASSESSMENT — PAIN DESCRIPTION - ONSET: ONSET: ON-GOING

## 2019-03-12 ASSESSMENT — PAIN DESCRIPTION - DESCRIPTORS: DESCRIPTORS: SORE

## 2019-03-12 ASSESSMENT — PAIN DESCRIPTION - FREQUENCY: FREQUENCY: INTERMITTENT

## 2019-03-12 ASSESSMENT — PAIN DESCRIPTION - PROGRESSION: CLINICAL_PROGRESSION: NOT CHANGED

## 2019-03-12 NOTE — PROGRESS NOTES
[address]  Daily Progress Note    Admit Date:  3/11/2019      CC:  CP/unstable angina  Subj:  Today, doing well. No cp. Trop negative.     Obj:   BP (!) 141/57   Pulse 73   Temp 98.1 °F (36.7 °C) (Oral)   Resp 18   Ht 5' 2\" (1.575 m)   Wt 208 lb (94.3 kg)   LMP  (LMP Unknown)   SpO2 98%   BMI 38.04 kg/m²   No intake or output data in the 24 hours ending 03/12/19 0701  Gen Alert, coop, no distress Heart  RRR, no MRG, nl apical impulse   Head NC, AT, no abnorm Abd  Soft, NT, +BS, no mass, no OM   Eyes PERRLA, conj/corn clear Ext  Ext nl, AT, no C/C/E   Nose Nares nl, no drain, NT Pulse 2+ and symmetric   Throat Lips, mucosa, tongue nl Skin Color/text/turg nl, no rash/lesions   Neck S/S, TM, NT, no bruit/JVD Psych Nl mood and affect   Lung CTA-B, unlabored, no DTP Lymph   No cervical or axillary LA   Ch wall NT, no deform Neuro  Nl gross M/S exam     Medications:    atorvastatin  20 mg Oral Daily    vitamin D  1,000 Units Oral Daily    cetirizine  10 mg Oral Daily    famotidine  40 mg Oral QPM    sodium chloride flush  10 mL Intravenous 2 times per day    enoxaparin  40 mg Subcutaneous Daily    insulin lispro  0-6 Units Subcutaneous TID WC    insulin lispro  0-3 Units Subcutaneous Nightly    aspirin  81 mg Oral Daily     Lab Data: Reviewed    Plan:  ~Chest pain    Date EF Results   Sx     Cp and sob   LHC 5/15 55% Normal cors Supriya Ferns)   MPI 4/14 58% Normal perfusion   TTE 3/19 55%     Plan     Unstable angina, new inferolateral EKG changes  LHC   ~Hyperchol  Goal LDL []  <100 [x]  <70       Counseling [x]  Diet [x]  Weight [x]  Exercise   Plan     Continue current doses of meds listed above  8/17 HDL:42, LDL:195

## 2019-03-12 NOTE — DISCHARGE SUMMARY
Units into the skin nightly  Qty: 5 pen, Refills: 0    Comments: . Maximus Woods PATIENT NEEDS TO MAKE FOLLOW UP FOR REFILLS           Current Discharge Medication List      CONTINUE these medications which have NOT CHANGED    Details    MG tablet TAKE ONE TABLET BY MOUTH EVERY 6 HOURS AS NEEDED FOR PAIN  Qty: 120 tablet, Refills: 0    Associated Diagnoses: Fibromyalgia; Rheumatoid arthritis involving multiple sites, unspecified rheumatoid factor presence (HCC)      TRUEPLUS LANCETS 28G MISC USE ONE LANCET TO TEST THREE TO FOUR TIMES A DAY  Qty: 120 each, Refills: 5      !! blood glucose monitor strips Test blood sugar once a day  Qty: 100 strip, Refills: 5      Cholecalciferol (VITAMIN D3) 1000 units TABS Take 1 tablet by mouth daily  Qty: 90 tablet, Refills: 1    Associated Diagnoses: Vitamin D deficiency      GAS-X EXTRA STRENGTH 125 MG chewable tablet CHEW TWO TABLETS BY MOUTH THREE TIMES A DAY WITH EACH MEAL AS NEEDED FOR FLATULENCE  Qty: 192 tablet, Refills: 1    Associated Diagnoses: Irritable bowel syndrome without diarrhea      albuterol sulfate HFA (PROAIR HFA) 108 (90 Base) MCG/ACT inhaler INHALE 2 PUFFS EVERY 6 HOURS AS NEEDED FOR WHEEZING  Qty: 1 Inhaler, Refills: 2    Associated Diagnoses: Mild intermittent asthma without complication      ketotifen (ZADITOR) 0.025 % ophthalmic solution Place 2 drops into both eyes 2 times daily as needed (allergy)  Qty: 1 Bottle, Refills: 2    Associated Diagnoses: Gastroesophageal reflux disease without esophagitis      loratadine (CLARITIN) 10 MG tablet TAKE ONE TABLET BY MOUTH DAILY  Qty: 30 tablet, Refills: 5    Associated Diagnoses:  Allergic disorder, subsequent encounter      Insulin Pen Needle (PEN NEEDLES) 32G X 4 MM MISC USE TO INJECT INSULIN PEN ONCE DAILY  Qty: 30 each, Refills: 3    Associated Diagnoses: Type 2 diabetes mellitus without complication, with long-term current use of insulin ([facility])      Blood Glucose Monitoring Suppl (TRUE METRIX METER) w/Device KIT As needed  Qty: 1 kit, Refills: 0      ranitidine (ZANTAC) 300 MG capsule Take 1 capsule by mouth every evening  Qty: 30 capsule, Refills: 2    Associated Diagnoses: Gastroparesis      !! glucose blood VI test strips (ASCENSIA AUTODISC VI;ONE TOUCH ULTRA TEST VI) strip 1-2 time a day As needed. Qty: 100 each, Refills: 3    Associated Diagnoses: Type 2 diabetes mellitus without complication, with long-term current use of insulin (HCC)      Incontinence Supply Disposable (TRE CLASSIC BRIEFS/LARGE) MISC 1 each by Does not apply route 3 times daily  Qty: 5 each, Refills: 5    Comments: Patient needs a 2x  Associated Diagnoses: Mixed stress and urge urinary incontinence      Incontinence Supplies MISC 1 Package by Does not apply route 2 times daily  Qty: 5 each, Refills: 5    Comments: Patient needs Bed pads (chuxs)  Associated Diagnoses: Mixed stress and urge urinary incontinence      Disposable Gloves (VINYL GLOVES LARGE) MISC 1 Package by Does not apply route 4 times daily  Qty: 5 each, Refills: 5      atorvastatin (LIPITOR) 20 MG tablet Take 1 tablet by mouth daily Does not take every day  Qty: 30 tablet, Refills: 3    Associated Diagnoses: Pure hypercholesterolemia      nystatin (MYCOSTATIN) 705712 UNIT/GM cream APPLY EXTERNALLY TO THE AFFECTED AREAS TWO TIMES A DAY  Qty: 1 Tube, Refills: 2    Associated Diagnoses: Intertriginous candidiasis      cyclobenzaprine (FLEXERIL) 10 MG tablet Take 0.5 tablets by mouth daily as needed for Muscle spasms  Qty: 30 tablet, Refills: 2    Associated Diagnoses: Chronic back pain, unspecified back location, unspecified back pain laterality      Pseudoephedrine-guaiFENesin (MUCINEX D) 120-1200 MG TB12 Take one tablet by mouth every 12 hours as needed for nasal congestion.   Qty: 28 tablet, Refills: 2    Associated Diagnoses: Vertigo      RABEprazole (ACIPHEX) 20 MG tablet Take 1 tablet by mouth daily  Qty: 30 tablet, Refills: 5      fluconazole (DIFLUCAN) 150 MG tablet TAKE ONE TABLET BY MOUTH IN A SINGLE DOSE  Qty: 1 tablet, Refills: 0    Associated Diagnoses: Vagina, candidiasis       ! ! - Potential duplicate medications found. Please discuss with provider. Current Discharge Medication List          Discharge Exam:    /68   Pulse 85   Temp 98.2 °F (36.8 °C) (Temporal)   Resp 16   Ht 5' 2\" (1.575 m)   Wt 208 lb (94.3 kg)   LMP  (LMP Unknown)   SpO2 96%   BMI 38.04 kg/m²   General appearance:  Appears comfortable. Well nourished, obese, NAD, sitting in bed  Eyes: Sclera clear, pupils equal  ENT: Moist mucus membranes, no thrush. Trachea midline. Cardiovascular: Regular rhythm, normal S1, S2. No murmur, gallop, rub. No edema in lower extremities  Respiratory: Clear to auscultation bilaterally, no wheeze, good inspiratory effort  Gastrointestinal: Abdomen soft, obese, non-tender, not distended, normal bowel sounds  Musculoskeletal: No cyanosis in digits, neck supple. R radial pressure dressing on. Neurology: Grossly intact. Alert and oriented in time, place and person. No speech or motor deficits  Psychiatry: Appropriate affect. Not agitated  Skin: Warm, dry, normal turgor, no rash  Brisk capillary refill, peripheral pulses palpable         Labs:  For convenience and continuity at follow-up the following most recent labs are provided:    Lab Results   Component Value Date    WBC 7.3 03/12/2019    HGB 12.7 03/12/2019    HCT 38.6 03/12/2019    MCV 86.4 03/12/2019     03/12/2019     03/12/2019    K 4.6 03/12/2019     03/12/2019    CO2 29 03/12/2019    BUN 12 03/12/2019    CREATININE 0.6 03/12/2019    CALCIUM 9.6 03/12/2019    ALKPHOS 19 03/11/2019    ALT 26 03/11/2019    AST 24 03/11/2019    BILITOT 0.3 03/11/2019    BILIDIR <0.2 12/27/2016    LABALBU 4.2 03/11/2019    LDLCALC 173 01/23/2019    TRIG 92 01/23/2019     Lab Results   Component Value Date    INR 1.03 03/11/2019    INR 0.96 05/17/2010       Radiology:  Xr Chest Standard (2 Vw)    Result Date: 3/11/2019  EXAMINATION: TWO VIEWS OF THE CHEST 3/11/2019 3:54 pm COMPARISON: 01/05/2017 HISTORY: ORDERING SYSTEM PROVIDED HISTORY: chest pain TECHNOLOGIST PROVIDED HISTORY: Reason for exam:->chest pain Ordering Physician Provided Reason for Exam: Chest pain. Acuity: Acute Type of Exam: Initial FINDINGS: The lungs are without acute focal process. There is no effusion or pneumothorax. The cardiomediastinal silhouette is stable. The osseous structures are stable. No acute process. The patient was seen and examined on day of discharge and this discharge summary is in conjunction with any daily progress note from day of discharge. Time Spent on discharge is 45 minutes  in the examination, evaluation, counseling and review of medications and discharge plan. Note that more than 30 minutes was spent in preparing discharge papers, discussing discharge with patient, medication review, etc.       Signed:    Clinton Lester MD   3/12/2019      Thank you Zahcariah Leiva MD for the opportunity to be involved in this patient's care.  If you have any questions or concerns please feel free to contact me at [address] [place] [address]

## 2019-03-12 NOTE — PROGRESS NOTES
Pt awake in chair. Discharge instructions, post cath care,  medications and follow up appointments reviewed with pt. IV removed intact with no complications and dry dressing applied. All pt belongings gathered and put into bags. Denies any other needs. Family at bedside  to drive pt home.

## 2019-03-12 NOTE — CARE COORDINATION
Patient discharging home today  All discharge needs met per case management    Go Iqbal MSW, [address] [place]

## 2019-03-12 NOTE — PRE SEDATION
Brief Pre-Op Note/Sedation Assessment      Adrian Blackwell  1965  [id number]  [id number]  7:43 AM    Planned Procedure: Cardiac Catheterization Procedure    Post Procedure Plan: Return to same level of care    Consent: I have discussed with the patient and/or the patient representative the indication, alternatives, and the possible risks and/or complications of the planned procedure and the anesthesia methods. The patient and/or patient representative appear to understand and agree to proceed. Chief Complaint: Chest Pain/Pressure  Anginal Equivalent  Dyspnea on Exertion      Indications for Cath Procedure:  New Onset Angina <= 2 months, Worsening Angina and Suspected CAD  Anginal Classification within 2 weeks:  CCS III - Symptoms with everyday living activities, i.e., moderate limitation  NYHA Heart Failure Class within 2 weeks: No symptoms    Anti- Anginal Meds within 2 weeks:   Yes: Statin (Any)    Stress or Imaging Studies Performed:  None    Vital Signs:  BP (!) 141/57   Pulse 73   Temp 98.1 °F (36.7 °C) (Oral)   Resp 18   Ht 5' 2\" (1.575 m)   Wt 208 lb (94.3 kg)   LMP  (LMP Unknown)   SpO2 98%   BMI 38.04 kg/m²     Allergies:   Allergies   Allergen Reactions    Penicillins Hives and Shortness Of Breath    Shellfish-Derived Products Swelling     Throat and tongue swells, allergy to all seafood    Aspartame And Phenylalanine      Severe headaches    Bactrim Hives    Biaxin [Clarithromycin] Hives    Cephalexin Other (See Comments)     blisters    Hydrocodone-Acetaminophen Other (See Comments)     HALLUCINATIONS    Lansoprazole Hives    Nexium [Esomeprazole Magnesium Trihydrate] Hives    Other Other (See Comments)     TEGADERM-BLISTERS    Prilosec [Omeprazole] Hives    Blueberry [Vaccinium Angustifolium] Nausea And Vomiting     Projectile vomiting    Monosodium Glutamate Nausea And Vomiting    Zmax [Azithromycin Dihydrate] Nausea And Vomiting     NOT Z PACK its the Powder you had to mix and drink       Past Medical History:  Past Medical History:   Diagnosis Date    Ankle fracture, left     Ankle fracture, right     Arthritis     Asthma     Bipolar depression (HCC)     CAN'T AFFORD MEDS    Bladder problem     Cervical disc herniation     Diabetes mellitus (HCC)     diet control    Fatty liver disease, non-alcoholic     Fibromyalgia     Gastroparesis     Dr Jayme Negrete GERD (gastroesophageal reflux disease)     gastroporesis    Glaucoma     Dr Lina Davis Hyperlipidemia     elevated LFT on meds    IBS (irritable bowel syndrome)     Lumbar herniated disc     Nausea & vomiting     Nephrolithiasis 1/29/2019    Obesity (BMI 30-39.9) 3/11/2019    Partial Achilles tendon tear     Pneumonia     PONV (postoperative nausea and vomiting)     RA (rheumatoid arthritis) ([address]     Rapid or irregular heartbeat     Sleep apnea     does not use cpap    Spinal stenosis     Stress incontinence     Thyroid disease     growths         Surgical History:  Past Surgical History:   Procedure Laterality Date    CHOLECYSTECTOMY      COLONOSCOPY      COLONOSCOPY  03/06/2018    with polypectomies    DILATION AND CURETTAGE OF UTERUS      ENDOMETRIAL ABLATION      ENDOSCOPY, COLON, DIAGNOSTIC      ERCP  5/25/2010    with stent    ERCP  1-31-14    ERCP WITH SPHINTER OF ODDI MANOMETERY    ESOPHAGOSCOPY  10/5/10    botox injection    EYE SURGERY      LASER FOR GLAUCOMA    LAPAROSCOPY      TONSILLECTOMY      UPPER GASTROINTESTINAL ENDOSCOPY      UPPER GASTROINTESTINAL ENDOSCOPY  04/12/2011    DILATATION, 58 FR ARTHUR, 100 UNITS BOTOX    UPPER GASTROINTESTINAL ENDOSCOPY  08/30/2011    58 FR DILATATION, 100 UNITS  BOTOX INJECTION    UPPER GASTROINTESTINAL ENDOSCOPY  3-9-12    with dilatation and submucosal injection: Botox    UPPER GASTROINTESTINAL ENDOSCOPY      UPPER GASTROINTESTINAL ENDOSCOPY  11/20/12     Esophagogastroduodenoscopy with Botulinum toxin injection of the pylorus and Velazquez esophageal dilation    UPPER GASTROINTESTINAL ENDOSCOPY  6/4/13    WITH DIILITATION AND BOTOX INJECTION    UPPER GASTROINTESTINAL ENDOSCOPY  5/20/2014    100 unit Botox injection, 56 Fr.  Velazquez esophageal dilatation    UPPER GASTROINTESTINAL ENDOSCOPY  12/12/14    with esophageal dilatation, and botox injection    UPPER GASTROINTESTINAL ENDOSCOPY  09/09/2015    With Dilitation and Botox injection    UPPER GASTROINTESTINAL ENDOSCOPY  5/10/2016    distal esophagus biopsy, stomach biopsy, botox injection    UPPER GASTROINTESTINAL ENDOSCOPY  04/11/2017    with dilatation and botox injection    UPPER GASTROINTESTINAL ENDOSCOPY  10/24/2017    DILATATION, BIOPSY, BOTOX    UPPER GASTROINTESTINAL ENDOSCOPY  03/06/2018    WITH BOTOX AND BALLOON DILATATION         Medications:  Current Facility-Administered Medications   Medication Dose Route Frequency Provider Last Rate Last Dose    albuterol sulfate  (90 Base) MCG/ACT inhaler 2 puff  2 puff Inhalation Q6H PRN Latosha Lerner MD        atorvastatin (LIPITOR) tablet 20 mg  20 mg Oral Daily Latosha Lerner MD        vitamin D (CHOLECALCIFEROL) tablet 1,000 Units  1,000 Units Oral Daily Latosha Lerner MD   1,000 Units at 03/11/19 2231    cyclobenzaprine (FLEXERIL) tablet 5 mg  5 mg Oral Daily PRN Latosha Lerner MD        simethicone (MYLICON) chewable tablet 120 mg  120 mg Oral 4x Daily PRN Latosha Lerner MD        ibuprofen (ADVIL;MOTRIN) tablet 800 mg  800 mg Oral Q8H PRN Latosha Lerner MD        cetirizine (ZYRTEC) tablet 10 mg  10 mg Oral Daily Latosha Lerner MD        famotidine (PEPCID) tablet 40 mg  40 mg Oral QPM Latosha Lerner MD        sodium chloride flush 0.9 % injection 10 mL  10 mL Intravenous 2 times per day Latosha Lerner MD   10 mL at 03/11/19 2231    sodium chloride flush 0.9 % injection 10 mL  10 mL Intravenous PRN Latosha Lerner MD        magnesium hydroxide (MILK OF MAGNESIA) 400 MG/5ML suspension 30 mL  30 mL Oral Daily PRN Latosha Lerner MD  ondansetron (ZOFRAN) injection 4 mg  4 mg Intravenous Q6H PRN Milana Babinski, MD        enoxaparin (LOVENOX) injection 40 mg  40 mg Subcutaneous Daily Milana Babinski, MD        0.9 % sodium chloride infusion   Intravenous Continuous Milana Babinski, MD 50 mL/hr at 03/12/19 8247      acetaminophen (TYLENOL) tablet 650 mg  650 mg Oral Q4H PRN Milana Babinski, MD   650 mg at 03/12/19 0656    glucose (GLUTOSE) 40 % oral gel 15 g  15 g Oral PRN Milana Babinski, MD        dextrose 50 % solution 12.5 g  12.5 g Intravenous PRN Milana Babinski, MD        glucagon (rDNA) injection 1 mg  1 mg Intramuscular PRN Milana Babinski, MD        dextrose 5 % solution  100 mL/hr Intravenous PRN Milana Babinski, MD        insulin lispro (HUMALOG) injection pen 0-6 Units  0-6 Units Subcutaneous TID WC Milana Babinski, MD        insulin lispro (HUMALOG) injection pen 0-3 Units  0-3 Units Subcutaneous Nightly Milana Babinski, MD   2 Units at 03/11/19 2226    aspirin chewable tablet 81 mg  81 mg Oral Daily Milana Babinski, MD        lip balm petroleum free (PHYTOPLEX) stick   Topical PRN Milana Babinski, MD         Facility-Administered Medications Ordered in Other Encounters   Medication Dose Route Frequency Provider Last Rate Last Dose    0.9 % sodium chloride infusion   Intravenous Continuous Johanna Coughlin MD               Pre-Sedation:    Pre-Sedation Documentation and Exam:  I have assessed the patient and agree with the H&P present on the chart. Prior History of Anesthesia Complications:   none    Modified Mallampati:  II (soft palate, uvula, fauces visible)    ASA Classification:  Class 2 - A normal healthy patient with mild systemic disease and Class 2 -- A normal healthy patient with mild systemic disease    Harman Scale:   Activity:  2 - Able to move 4 extremities voluntarily on command  Respiration:  2 - Able to breathe deeply and cough freely  Circulation:  2 - BP+/- 20mmHg of normal  Consciousness:  2 - Fully awake  Oxygen Saturation (color):  2 - Able to maintain oxygen saturation >92% on room air    Sedation/Anesthesia Plan:  Guard the patient's safety and welfare. Minimize physical discomfort and pain. Minimize negative psychological responses to treatment by providing sedation and analgesia and maximize the potential amnesia. Patient to meet pre-procedure discharge plan.     Medication Planned:  midazolam intravenously and fentanyl intravenously    Patient is an appropriate candidate for plan of sedation: yes      Electronically signed by Bharti Salazar MD on 3/12/2019 at 7:43 AM

## 2019-03-13 ENCOUNTER — TELEPHONE (OUTPATIENT)
Dept: PRIMARY CARE CLINIC | Age: 54
End: 2019-03-13

## 2019-03-14 ENCOUNTER — TELEPHONE (OUTPATIENT)
Dept: PRIMARY CARE CLINIC | Age: 54
End: 2019-03-14

## 2019-03-15 ENCOUNTER — TELEPHONE (OUTPATIENT)
Dept: PRIMARY CARE CLINIC | Age: 54
End: 2019-03-15

## 2019-03-19 ENCOUNTER — OFFICE VISIT (OUTPATIENT)
Dept: CARDIOLOGY CLINIC | Age: 54
End: 2019-03-19
Payer: COMMERCIAL

## 2019-03-19 VITALS
HEART RATE: 72 BPM | BODY MASS INDEX: 38.46 KG/M2 | SYSTOLIC BLOOD PRESSURE: 112 MMHG | DIASTOLIC BLOOD PRESSURE: 62 MMHG | HEIGHT: 62 IN | OXYGEN SATURATION: 99 % | WEIGHT: 209 LBS

## 2019-03-19 DIAGNOSIS — E78.00 HYPERCHOLESTEREMIA: Primary | ICD-10-CM

## 2019-03-19 DIAGNOSIS — I25.83 CORONARY ARTERY DISEASE DUE TO LIPID RICH PLAQUE: ICD-10-CM

## 2019-03-19 DIAGNOSIS — I25.10 CORONARY ARTERY DISEASE DUE TO LIPID RICH PLAQUE: ICD-10-CM

## 2019-03-19 PROCEDURE — 3017F COLORECTAL CA SCREEN DOC REV: CPT | Performed by: NURSE PRACTITIONER

## 2019-03-19 PROCEDURE — 1036F TOBACCO NON-USER: CPT | Performed by: NURSE PRACTITIONER

## 2019-03-19 PROCEDURE — G8417 CALC BMI ABV UP PARAM F/U: HCPCS | Performed by: NURSE PRACTITIONER

## 2019-03-19 PROCEDURE — G8427 DOCREV CUR MEDS BY ELIG CLIN: HCPCS | Performed by: NURSE PRACTITIONER

## 2019-03-19 PROCEDURE — 93000 ELECTROCARDIOGRAM COMPLETE: CPT | Performed by: NURSE PRACTITIONER

## 2019-03-19 PROCEDURE — 99214 OFFICE O/P EST MOD 30 MIN: CPT | Performed by: NURSE PRACTITIONER

## 2019-03-19 PROCEDURE — 1111F DSCHRG MED/CURRENT MED MERGE: CPT | Performed by: NURSE PRACTITIONER

## 2019-03-19 PROCEDURE — G8598 ASA/ANTIPLAT THER USED: HCPCS | Performed by: NURSE PRACTITIONER

## 2019-03-19 PROCEDURE — G8484 FLU IMMUNIZE NO ADMIN: HCPCS | Performed by: NURSE PRACTITIONER

## 2019-03-19 RX ORDER — ATORVASTATIN CALCIUM 10 MG/1
10 TABLET, FILM COATED ORAL DAILY
COMMUNITY
End: 2019-06-06 | Stop reason: SDUPTHER

## 2019-03-19 RX ORDER — VALACYCLOVIR HYDROCHLORIDE 1 G/1
1000 TABLET, FILM COATED ORAL 3 TIMES DAILY
Status: ON HOLD | COMMUNITY
End: 2019-05-07

## 2019-03-22 ENCOUNTER — TELEPHONE (OUTPATIENT)
Dept: PRIMARY CARE CLINIC | Age: 54
End: 2019-03-22

## 2019-03-25 RX ORDER — INSULIN GLARGINE 100 [IU]/ML
INJECTION, SOLUTION SUBCUTANEOUS
Qty: 5 PEN | Refills: 3 | Status: SHIPPED | OUTPATIENT
Start: 2019-03-25 | End: 2019-05-02 | Stop reason: DRUGHIGH

## 2019-04-10 ENCOUNTER — TELEPHONE (OUTPATIENT)
Dept: PRIMARY CARE CLINIC | Age: 54
End: 2019-04-10

## 2019-05-01 NOTE — PROGRESS NOTES
Patient not reached. Preop instructions left on voice mail.  Number____[phone]___________    -Date__5/7/19_____time__1000_____arrival__0830  [facility]__________  -Nothing to eat or drink after midnight  -Responsible adult 25 or older to stay on site while you are here and drive you home and stay with you after  -Follow any instructions your doctors office has given you  -Bring a complete list of all your medications and supplements  -If you normally take the following medications in the morning please do so with a small    sip of water-heart,blood pressure,seizure,breathing or thyroid-avoid water pilll Do not take blood pressure medications ending in \"marcus\" or \"pril\" the AM of surgery or the julia prior  -You may use your inhalers  -Take half of your normal dose of any long acting insulins the night before-do not take    any diabetic medications in the morning  -Follow your doctors instructions regarding blood thinners  -Any questions call your surgeons office  Anesthesia attempts to review all Endo charts prior to surgery and will place any PAT orders,Surgery patients will have orders placed based on history in chart which may not be complete  ENDOSCOPY PATIENTS ONLY-FOLLOW YOUR DOCTORS BOWEL PREP INSTRUCTIONS,THIS MAY INCLUDE TAKING A SECOND PORTION OF YOUR PREP AFTER MIDNIGHT

## 2019-05-02 ENCOUNTER — ANESTHESIA EVENT (OUTPATIENT)
Dept: ENDOSCOPY | Age: 54
End: 2019-05-02
Payer: COMMERCIAL

## 2019-05-07 ENCOUNTER — ANESTHESIA (OUTPATIENT)
Dept: ENDOSCOPY | Age: 54
End: 2019-05-07
Payer: COMMERCIAL

## 2019-05-07 ENCOUNTER — HOSPITAL ENCOUNTER (OUTPATIENT)
Age: 54
Setting detail: OUTPATIENT SURGERY
Discharge: HOME | End: 2019-05-07
Attending: INTERNAL MEDICINE | Admitting: INTERNAL MEDICINE
Payer: COMMERCIAL

## 2019-05-07 VITALS — OXYGEN SATURATION: 99 % | SYSTOLIC BLOOD PRESSURE: 177 MMHG | DIASTOLIC BLOOD PRESSURE: 96 MMHG

## 2019-05-07 VITALS
HEIGHT: 62 IN | DIASTOLIC BLOOD PRESSURE: 70 MMHG | TEMPERATURE: 98.7 F | BODY MASS INDEX: 37.92 KG/M2 | OXYGEN SATURATION: 100 % | RESPIRATION RATE: 16 BRPM | WEIGHT: 206.06 LBS | HEART RATE: 64 BPM | SYSTOLIC BLOOD PRESSURE: 124 MMHG

## 2019-05-07 LAB
ALBUMIN SERPL-MCNC: 4.4 G/DL (ref 3.4–5)
ALP BLD-CCNC: 19 U/L (ref 40–129)
ALT SERPL-CCNC: 33 U/L (ref 10–40)
ANION GAP SERPL CALCULATED.3IONS-SCNC: 14 MMOL/L (ref 3–16)
AST SERPL-CCNC: 28 U/L (ref 15–37)
BILIRUB SERPL-MCNC: 0.3 MG/DL (ref 0–1)
BILIRUBIN DIRECT: <0.2 MG/DL (ref 0–0.3)
BILIRUBIN, INDIRECT: ABNORMAL MG/DL (ref 0–1)
BUN BLDV-MCNC: 13 MG/DL (ref 7–20)
CALCIUM SERPL-MCNC: 10.4 MG/DL (ref 8.3–10.6)
CHLORIDE BLD-SCNC: 99 MMOL/L (ref 99–110)
CO2: 26 MMOL/L (ref 21–32)
CREAT SERPL-MCNC: 0.7 MG/DL (ref 0.6–1.1)
GFR AFRICAN AMERICAN: >60
GFR NON-AFRICAN AMERICAN: >60
GLUCOSE BLD-MCNC: 154 MG/DL (ref 70–99)
GLUCOSE BLD-MCNC: 161 MG/DL (ref 70–99)
GLUCOSE BLD-MCNC: 177 MG/DL (ref 70–99)
HCT VFR BLD CALC: 46.4 % (ref 36–48)
HEMOGLOBIN: 15 G/DL (ref 12–16)
MAGNESIUM: 2.1 MG/DL (ref 1.8–2.4)
MCH RBC QN AUTO: 28.6 PG (ref 26–34)
MCHC RBC AUTO-ENTMCNC: 32.4 G/DL (ref 31–36)
MCV RBC AUTO: 88.3 FL (ref 80–100)
PDW BLD-RTO: 15.4 % (ref 12.4–15.4)
PERFORMED ON: ABNORMAL
PERFORMED ON: ABNORMAL
PLATELET # BLD: 205 K/UL (ref 135–450)
PMV BLD AUTO: 10.4 FL (ref 5–10.5)
POTASSIUM SERPL-SCNC: 4.5 MMOL/L (ref 3.5–5.1)
RBC # BLD: 5.25 M/UL (ref 4–5.2)
SODIUM BLD-SCNC: 139 MMOL/L (ref 136–145)
TOTAL PROTEIN: 7.6 G/DL (ref 6.4–8.2)
VITAMIN D 25-HYDROXY: 21.4 NG/ML
WBC # BLD: 10.3 K/UL (ref 4–11)

## 2019-05-07 PROCEDURE — 2580000003 HC RX 258: Performed by: FAMILY MEDICINE

## 2019-05-07 PROCEDURE — 2709999900 HC NON-CHARGEABLE SUPPLY: Performed by: INTERNAL MEDICINE

## 2019-05-07 PROCEDURE — 80076 HEPATIC FUNCTION PANEL: CPT

## 2019-05-07 PROCEDURE — 85027 COMPLETE CBC AUTOMATED: CPT

## 2019-05-07 PROCEDURE — 7100000010 HC PHASE II RECOVERY - FIRST 15 MIN: Performed by: INTERNAL MEDICINE

## 2019-05-07 PROCEDURE — 6360000002 HC RX W HCPCS: Performed by: NURSE ANESTHETIST, CERTIFIED REGISTERED

## 2019-05-07 PROCEDURE — 80048 BASIC METABOLIC PNL TOTAL CA: CPT

## 2019-05-07 PROCEDURE — 3700000001 HC ADD 15 MINUTES (ANESTHESIA): Performed by: INTERNAL MEDICINE

## 2019-05-07 PROCEDURE — 6360000002 HC RX W HCPCS: Performed by: INTERNAL MEDICINE

## 2019-05-07 PROCEDURE — 82306 VITAMIN D 25 HYDROXY: CPT

## 2019-05-07 PROCEDURE — 7100000000 HC PACU RECOVERY - FIRST 15 MIN: Performed by: INTERNAL MEDICINE

## 2019-05-07 PROCEDURE — 83735 ASSAY OF MAGNESIUM: CPT

## 2019-05-07 PROCEDURE — C1726 CATH, BAL DIL, NON-VASCULAR: HCPCS | Performed by: INTERNAL MEDICINE

## 2019-05-07 PROCEDURE — 2500000003 HC RX 250 WO HCPCS: Performed by: NURSE ANESTHETIST, CERTIFIED REGISTERED

## 2019-05-07 PROCEDURE — 3609012500 HC EGD DILATION BALLOON: Performed by: INTERNAL MEDICINE

## 2019-05-07 PROCEDURE — 3700000000 HC ANESTHESIA ATTENDED CARE: Performed by: INTERNAL MEDICINE

## 2019-05-07 PROCEDURE — 7100000001 HC PACU RECOVERY - ADDTL 15 MIN: Performed by: INTERNAL MEDICINE

## 2019-05-07 PROCEDURE — 3609013800 HC EGD SUBMUCOSAL/BOTOX INJECTION: Performed by: INTERNAL MEDICINE

## 2019-05-07 PROCEDURE — 6370000000 HC RX 637 (ALT 250 FOR IP): Performed by: NURSE ANESTHETIST, CERTIFIED REGISTERED

## 2019-05-07 PROCEDURE — 7100000011 HC PHASE II RECOVERY - ADDTL 15 MIN: Performed by: INTERNAL MEDICINE

## 2019-05-07 PROCEDURE — 36415 COLL VENOUS BLD VENIPUNCTURE: CPT

## 2019-05-07 RX ORDER — LABETALOL HYDROCHLORIDE 5 MG/ML
5 INJECTION, SOLUTION INTRAVENOUS EVERY 10 MIN PRN
Status: DISCONTINUED | OUTPATIENT
Start: 2019-05-07 | End: 2019-05-07 | Stop reason: HOSPADM

## 2019-05-07 RX ORDER — SCOLOPAMINE TRANSDERMAL SYSTEM 1 MG/1
PATCH, EXTENDED RELEASE TRANSDERMAL PRN
Status: DISCONTINUED | OUTPATIENT
Start: 2019-05-07 | End: 2019-05-07 | Stop reason: SDUPTHER

## 2019-05-07 RX ORDER — LIDOCAINE HYDROCHLORIDE 20 MG/ML
INJECTION, SOLUTION INFILTRATION; PERINEURAL PRN
Status: DISCONTINUED | OUTPATIENT
Start: 2019-05-07 | End: 2019-05-07 | Stop reason: SDUPTHER

## 2019-05-07 RX ORDER — SODIUM CHLORIDE 0.9 % (FLUSH) 0.9 %
10 SYRINGE (ML) INJECTION PRN
Status: DISCONTINUED | OUTPATIENT
Start: 2019-05-07 | End: 2019-05-07 | Stop reason: HOSPADM

## 2019-05-07 RX ORDER — PROPOFOL 10 MG/ML
INJECTION, EMULSION INTRAVENOUS PRN
Status: DISCONTINUED | OUTPATIENT
Start: 2019-05-07 | End: 2019-05-07 | Stop reason: SDUPTHER

## 2019-05-07 RX ORDER — SODIUM CHLORIDE 9 MG/ML
INJECTION, SOLUTION INTRAVENOUS CONTINUOUS
Status: DISCONTINUED | OUTPATIENT
Start: 2019-05-07 | End: 2019-05-07 | Stop reason: HOSPADM

## 2019-05-07 RX ORDER — ONDANSETRON 2 MG/ML
4 INJECTION INTRAMUSCULAR; INTRAVENOUS
Status: DISCONTINUED | OUTPATIENT
Start: 2019-05-07 | End: 2019-05-07 | Stop reason: HOSPADM

## 2019-05-07 RX ORDER — SODIUM CHLORIDE 0.9 % (FLUSH) 0.9 %
10 SYRINGE (ML) INJECTION EVERY 12 HOURS SCHEDULED
Status: DISCONTINUED | OUTPATIENT
Start: 2019-05-07 | End: 2019-05-07 | Stop reason: HOSPADM

## 2019-05-07 RX ORDER — ONDANSETRON 2 MG/ML
INJECTION INTRAMUSCULAR; INTRAVENOUS PRN
Status: DISCONTINUED | OUTPATIENT
Start: 2019-05-07 | End: 2019-05-07 | Stop reason: SDUPTHER

## 2019-05-07 RX ORDER — PROMETHAZINE HYDROCHLORIDE 25 MG/ML
6.25 INJECTION, SOLUTION INTRAMUSCULAR; INTRAVENOUS
Status: DISCONTINUED | OUTPATIENT
Start: 2019-05-07 | End: 2019-05-07 | Stop reason: HOSPADM

## 2019-05-07 RX ADMIN — LIDOCAINE HYDROCHLORIDE 100 MG: 20 INJECTION, SOLUTION INFILTRATION; PERINEURAL at 10:06

## 2019-05-07 RX ADMIN — PROPOFOL 50 MG: 10 INJECTION, EMULSION INTRAVENOUS at 10:06

## 2019-05-07 RX ADMIN — ONDANSETRON 4 MG: 2 INJECTION INTRAMUSCULAR; INTRAVENOUS at 10:17

## 2019-05-07 RX ADMIN — PROPOFOL 50 MG: 10 INJECTION, EMULSION INTRAVENOUS at 10:10

## 2019-05-07 RX ADMIN — SCOPALAMINE 1 PATCH: 1 PATCH, EXTENDED RELEASE TRANSDERMAL at 10:01

## 2019-05-07 RX ADMIN — PROPOFOL 50 MG: 10 INJECTION, EMULSION INTRAVENOUS at 10:14

## 2019-05-07 RX ADMIN — SODIUM CHLORIDE: 9 INJECTION, SOLUTION INTRAVENOUS at 09:46

## 2019-05-07 RX ADMIN — SODIUM CHLORIDE: 9 INJECTION, SOLUTION INTRAVENOUS at 10:02

## 2019-05-07 RX ADMIN — PROPOFOL 50 MG: 10 INJECTION, EMULSION INTRAVENOUS at 10:08

## 2019-05-07 ASSESSMENT — PAIN - FUNCTIONAL ASSESSMENT: PAIN_FUNCTIONAL_ASSESSMENT: 0-10

## 2019-05-07 NOTE — PROGRESS NOTES
Teaching/ education completed for home care including pain management,activity,safety precautions and infection control. Patient and friend verbalized understanding.

## 2019-05-07 NOTE — ANESTHESIA PRE PROCEDURE
[facility] Department of Anesthesiology  Pre-Anesthesia Evaluation/Consultation       Name:  Nani Caicedo  : 1965  Age:  53 y. o. MRN:  [id number]  Date: 2019           Surgeon: Surgeon(s):  Tye Hopper MD    Procedure: Procedure(s):  EGD WITH BOTOX     Allergies   Allergen Reactions    Penicillins Hives and Shortness Of Breath    Shellfish-Derived Products Swelling     Throat and tongue swells, allergy to all seafood    Aspartame And Phenylalanine      Severe headaches    Bactrim Hives    Biaxin [Clarithromycin] Hives    Cephalexin Other (See Comments)     blisters    Hydrocodone-Acetaminophen Other (See Comments)     HALLUCINATIONS    Lansoprazole Hives    Nexium [Esomeprazole Magnesium Trihydrate] Hives    Other Other (See Comments)     TEGADERM-BLISTERS    Prilosec [Omeprazole] Hives    Blueberry [Vaccinium Angustifolium] Nausea And Vomiting     Projectile vomiting    Monosodium Glutamate Nausea And Vomiting    Zmax [Azithromycin Dihydrate] Nausea And Vomiting     NOT Z PACK its the Powder you had to mix and drink     Patient Active Problem List   Diagnosis    DM2 (diabetes mellitus, type 2) ([address]    GERD (gastroesophageal reflux disease)    Fibromyalgia    Urine incontinence    Asthma    Chronic back pain    Rheumatoid arthritis (HCC)    Gastroparesis    Thyroid cyst    Vitamin D deficiency    Multiple thyroid nodules    IBS (irritable bowel syndrome)    Disequilibrium syndrome    Subjective tinnitus of both ears    Bilateral high frequency sensorineural hearing loss    Hypercholesteremia    Obstructive sleep apnea syndrome    Moderate episode of recurrent major depressive disorder (HCC)    Chest pain    Nephrolithiasis    Unstable angina (HCC)    Abnormal EKG    Obesity (BMI 30-39. 9)    Coronary artery disease due to lipid rich plaque     Past Medical History:   Diagnosis Date    Ankle fracture, left     Ankle fracture, right     Arthritis     Asthma     Bipolar depression (HCC)     CAN'T AFFORD MEDS    Bladder problem     Cervical disc herniation     Diabetes mellitus ([address]     diet control    Fatty liver disease, non-alcoholic     Fibromyalgia     Gastroparesis     Dr Tammy Doe GERD (gastroesophageal reflux disease)     gastroporesis    Glaucoma     Dr Odalys Badillo Hyperlipidemia     elevated LFT on meds    IBS (irritable bowel syndrome)     Lumbar herniated disc     Nausea & vomiting     Nephrolithiasis 1/29/2019    Obesity (BMI 30-39.9) 3/11/2019    Partial Achilles tendon tear     Pneumonia     PONV (postoperative nausea and vomiting)     RA (rheumatoid arthritis) ([address]     Rapid or irregular heartbeat     Sleep apnea     does not use cpap    Spinal stenosis     Stress incontinence     Thyroid disease     growths     Past Surgical History:   Procedure Laterality Date    CHOLECYSTECTOMY      COLONOSCOPY      COLONOSCOPY  03/06/2018    with polypectomies    DILATION AND CURETTAGE OF UTERUS      ENDOMETRIAL ABLATION      ENDOSCOPY, COLON, DIAGNOSTIC      ERCP  5/25/2010    with stent    ERCP  1-31-14    ERCP WITH SPHINTER OF ODDI MANOMETERY    ESOPHAGOSCOPY  10/5/10    botox injection    EYE SURGERY      LASER FOR GLAUCOMA    LAPAROSCOPY      TONSILLECTOMY      UPPER GASTROINTESTINAL ENDOSCOPY      UPPER GASTROINTESTINAL ENDOSCOPY  04/12/2011    DILATATION, 58 FR VELAZQUEZ, 100 UNITS BOTOX    UPPER GASTROINTESTINAL ENDOSCOPY  08/30/2011    58 FR DILATATION, 100 UNITS  BOTOX INJECTION    UPPER GASTROINTESTINAL ENDOSCOPY  3-9-12    with dilatation and submucosal injection: Botox    UPPER GASTROINTESTINAL ENDOSCOPY      UPPER GASTROINTESTINAL ENDOSCOPY  11/20/12     Esophagogastroduodenoscopy with Botulinum toxin injection of the pylorus and Velazquez esophageal dilation    UPPER GASTROINTESTINAL ENDOSCOPY  6/4/13    WITH DIILITATION AND BOTOX INJECTION    UPPER GASTROINTESTINAL ENDOSCOPY  5/20/2014    100 unit Botox injection, 56 Fr.  Velazquez esophageal dilatation    UPPER GASTROINTESTINAL ENDOSCOPY  12/12/14    with esophageal dilatation, and botox injection    UPPER GASTROINTESTINAL ENDOSCOPY  09/09/2015    With Dilitation and Botox injection    UPPER GASTROINTESTINAL ENDOSCOPY  5/10/2016    distal esophagus biopsy, stomach biopsy, botox injection    UPPER GASTROINTESTINAL ENDOSCOPY  04/11/2017    with dilatation and botox injection    UPPER GASTROINTESTINAL ENDOSCOPY  10/24/2017    DILATATION, BIOPSY, BOTOX    UPPER GASTROINTESTINAL ENDOSCOPY  03/06/2018    WITH BOTOX AND BALLOON DILATATION     Social History     Tobacco Use    Smoking status: Never Smoker    Smokeless tobacco: Never Used   Substance Use Topics    Alcohol use: No     Alcohol/week: 0.0 oz    Drug use: No     Medications  Current Facility-Administered Medications on File Prior to Encounter   Medication Dose Route Frequency Provider Last Rate Last Dose    0.9 % sodium chloride infusion   Intravenous Continuous Estephania Tony MD         Current Outpatient Medications on File Prior to Encounter   Medication Sig Dispense Refill    atorvastatin (LIPITOR) 10 MG tablet Take 10 mg by mouth daily      valACYclovir (VALTREX) 1 g tablet Take 1,000 mg by mouth 3 times daily Indications: 3 days left For 7 days      aspirin 81 MG chewable tablet Take 1 tablet by mouth daily 30 tablet 3    insulin glargine (BASAGLAR KWIKPEN) 100 UNIT/ML injection pen Inject 20 Units into the skin nightly (Patient taking differently: Inject 22 Units into the skin every morning (before breakfast) ) 5 pen 0     MG tablet TAKE ONE TABLET BY MOUTH EVERY 6 HOURS AS NEEDED FOR PAIN 120 tablet 0    Incontinence Supply Disposable (TRE CLASSIC BRIEFS/LARGE) MISC 1 each by Does not apply route 3 times daily 5 each 5    Incontinence Supplies MISC 1 Package by Does not apply route 2 times daily 5 each 5    Disposable Gloves (VINYL GLOVES LARGE) MISC 1 Package by Does not apply route 4 times daily 5 each 5    TRUEPLUS LANCETS 28G MISC USE ONE LANCET TO TEST THREE TO FOUR TIMES A  each 5    blood glucose monitor strips Test blood sugar once a day 100 strip 5    Cholecalciferol (VITAMIN D3) 1000 units TABS Take 1 tablet by mouth daily 90 tablet 1    GAS-X EXTRA STRENGTH 125 MG chewable tablet CHEW TWO TABLETS BY MOUTH THREE TIMES A DAY WITH EACH MEAL AS NEEDED FOR FLATULENCE 192 tablet 1    nystatin (MYCOSTATIN) 811435 UNIT/GM cream APPLY EXTERNALLY TO THE AFFECTED AREAS TWO TIMES A DAY 1 Tube 2    cyclobenzaprine (FLEXERIL) 10 MG tablet Take 0.5 tablets by mouth daily as needed for Muscle spasms 30 tablet 2    albuterol sulfate HFA (PROAIR HFA) 108 (90 Base) MCG/ACT inhaler INHALE 2 PUFFS EVERY 6 HOURS AS NEEDED FOR WHEEZING 1 Inhaler 2    Pseudoephedrine-guaiFENesin (MUCINEX D) 120-1200 MG TB12 Take one tablet by mouth every 12 hours as needed for nasal congestion. 28 tablet 2    RABEprazole (ACIPHEX) 20 MG tablet Take 1 tablet by mouth daily 30 tablet 5    ketotifen (ZADITOR) 0.025 % ophthalmic solution Place 2 drops into both eyes 2 times daily as needed (allergy) 1 Bottle 2    loratadine (CLARITIN) 10 MG tablet TAKE ONE TABLET BY MOUTH DAILY 30 tablet 5    fluconazole (DIFLUCAN) 150 MG tablet TAKE ONE TABLET BY MOUTH IN A SINGLE DOSE 1 tablet 0    Insulin Pen Needle (PEN NEEDLES) 32G X 4 MM MISC USE TO INJECT INSULIN PEN ONCE DAILY 30 each 3    Blood Glucose Monitoring Suppl (TRUE METRIX METER) w/Device KIT As needed 1 kit 0    ranitidine (ZANTAC) 300 MG capsule Take 1 capsule by mouth every evening 30 capsule 2    glucose blood VI test strips (ASCENSIA AUTODISC VI;ONE TOUCH ULTRA TEST VI) strip 1-2 time a day As needed.  100 each 3     Current Facility-Administered Medications   Medication Dose Route Frequency Provider Last Rate Last Dose    0.9 % sodium chloride infusion   Intravenous Continuous Mary Robledo MD Value Date    PROTIME 11.7 03/11/2019    INR 1.03 03/11/2019    APTT 29.0 86/89/6752     HCG (If Applicable)   Lab Results   Component Value Date    PREGTESTUR Negative 03/06/2018      ABGs No results found for: PHART, PO2ART, MGS8ZTA, DBR1ZGD, BEART, O0YLYWSK   Type & Screen (If Applicable)  No results found for: LABABO, LABRH                         BMI: Wt Readings from Last 3 Encounters:       NPO Status:8 hours                          Anesthesia Evaluation  Patient summary reviewed   history of anesthetic complications: PONV. Airway: Mallampati: III  TM distance: >3 FB   Neck ROM: full   Dental:    (+) caps      Pulmonary:normal exam    (+) sleep apnea: on noncompliant,  asthma:                            Cardiovascular:  Exercise tolerance: good (>4 METS),   (+) hypertension:, angina: no interval change, CAD:,         Rhythm: regular  Rate: normal           Beta Blocker:  Not on Beta Blocker         Neuro/Psych:   (+) neuromuscular disease:, psychiatric history:depression/anxiety             GI/Hepatic/Renal:   (+) GERD:, liver disease (SNIDER):,           Endo/Other:    (+) DiabetesType II DM, using insulin, : arthritis: rheumatoid. , .                 Abdominal:   (+) obese,         Vascular: negative vascular ROS. Anesthesia Plan      general     ASA 3       Induction: intravenous. Anesthetic plan and risks discussed with patient. Plan discussed with CRNA. This pre-anesthesia assessment may be used as a history and physical.    DOS STAFF ADDENDUM:    Pt seen and examined, chart reviewed (including anesthesia, drug and allergy history). No interval changes to history and physical examination. Anesthetic plan, risks, benefits, alternatives, and personnel involved discussed with patient. Patient verbalized an understanding and agrees to proceed.       Noa Vogel MD  May 7, 2019  9:13 AM

## 2019-05-07 NOTE — OP NOTE
[address] [place] [address]  Endoscopy Note    Patient: Walter Westbrook  : 1965  CSN: [id number]    Procedure: Esophagogastroduodenoscopy with Botulinum toxin injection of the pylorus    Date:  2019    Surgeon:  Blayne Ayoub MD    Referring Physician:   Chana Cosby    Preoperative Diagnosis:   Gastroparesis    Postoperative Diagnosis:   Gastroparesis    Anesthesia:   TIVA/MAC per anesthesia     Indications: This is a 53y.o. year old female who presents today with Persistent/ severe vomiting, abd pain, and dysphagia. Description of Procedure:  Informed consent was obtained from the patient after explanation of indications, benefits and possible risks and complications of the procedure. The patient was then taken to the endoscopy suite, placed in the left lateral decubitus position and the above IV sedation was administrered. The Olympus videoendoscope was placed in the patient's mouth and under direct visualization passed into the esophagus. Visualization of the esophagus demonstrated normal mucosa. The scope was then advanced into the stomach. Visualization of the gastric body and antrum demonstrated several small erosions in prepyloric antrum. The stomach otherwise demonstrated normal mucosa. A retroflexed exam of the gastric cardia and fundus demonstrated normal mucosa. The pylorus was patent and the scope was advanced into the duodenum. Visualization of the duodenal bulb demonstrated normal mucosa. The second portion of the duodenum demonstrated normal mucosa. The scope was then withdrawn back into the stomach. Botulin toxin injection of the pylorus was performed using a standard sclerotherapy needle. Four intramuscular injections were made around the circumference of the pylorus using 25 unit aliquots for a total dose of 100 units botulinin toxin. The stomach was then decompressed, and the scope was completely withdrawn.      The patient tolerated the procedure well and was taken to the post anesthesia care unit in good condition. Impression:    1) Gastric antral erosions - likely from ibuprofen. 2) Otherwise normal EGD     3) Successful Botox injection of the pylorus performed as above. 4) Empiric esophageal balloon dilation performed as above. Recommendations:  1) Continue present meds. 2) Follow up in office 6 months. 3) Gastroparesis diet.     Mikey Leyden, MD  [address]  5/7/2019

## 2019-05-07 NOTE — H&P
[address] [place] [address]   Pre-operative History and Physical    Patient: Ebenezer Luong  : 1965  CSN:     History Obtained From:  patient, electronic medical record    HISTORY OF PRESENT ILLNESS:    The patient is a 53 y.o. female with significant past medical history of DM and gastroparesis who presents with Persistent/ severe vomiting, dyspepsia c/w her gastroparesis along with solid food dysphagia.     Past Medical History:        Diagnosis Date    Ankle fracture, left     Ankle fracture, right     Arthritis     Asthma     Bipolar depression (HCC)     CAN'T AFFORD MEDS    Bladder problem     Cervical disc herniation     Diabetes mellitus (HCC)     diet control    Fatty liver disease, non-alcoholic     Fibromyalgia     Gastroparesis     Dr Xavier Nathan GERD (gastroesophageal reflux disease)     gastroporesis    Glaucoma     Dr Clarissa Benjamin Hyperlipidemia     elevated LFT on meds    IBS (irritable bowel syndrome)     Lumbar herniated disc     Nausea & vomiting     Nephrolithiasis 2019    Obesity (BMI 30-39.9) 3/11/2019    Partial Achilles tendon tear     Pneumonia     PONV (postoperative nausea and vomiting)     RA (rheumatoid arthritis) ([address]     Rapid or irregular heartbeat     Sleep apnea     does not use cpap    Spinal stenosis     Stress incontinence     Thyroid disease     growths     Past Surgical History:        Procedure Laterality Date    CHOLECYSTECTOMY      COLONOSCOPY      COLONOSCOPY  2018    with polypectomies    DILATION AND CURETTAGE OF UTERUS      ENDOMETRIAL ABLATION      ENDOSCOPY, COLON, DIAGNOSTIC      ERCP  2010    with stent    ERCP  14    ERCP WITH SPHINTER OF ODDI MANOMETERY    ESOPHAGOSCOPY  10/5/10    botox injection    EYE SURGERY      LASER FOR GLAUCOMA    LAPAROSCOPY      TONSILLECTOMY      UPPER GASTROINTESTINAL ENDOSCOPY      UPPER GASTROINTESTINAL ENDOSCOPY  2011    DILATATION, [address], 100 UNITS BOTOX  UPPER GASTROINTESTINAL ENDOSCOPY  08/30/2011    58 FR DILATATION, 100 UNITS  BOTOX INJECTION    UPPER GASTROINTESTINAL ENDOSCOPY  3-9-12    with dilatation and submucosal injection: Botox    UPPER GASTROINTESTINAL ENDOSCOPY      UPPER GASTROINTESTINAL ENDOSCOPY  11/20/12     Esophagogastroduodenoscopy with Botulinum toxin injection of the pylorus and Velazquez esophageal dilation    UPPER GASTROINTESTINAL ENDOSCOPY  6/4/13    WITH DIILITATION AND BOTOX INJECTION    UPPER GASTROINTESTINAL ENDOSCOPY  5/20/2014    100 unit Botox injection, 56 Fr.  Velazquez esophageal dilatation    UPPER GASTROINTESTINAL ENDOSCOPY  12/12/14    with esophageal dilatation, and botox injection    UPPER GASTROINTESTINAL ENDOSCOPY  09/09/2015    With Dilitation and Botox injection    UPPER GASTROINTESTINAL ENDOSCOPY  5/10/2016    distal esophagus biopsy, stomach biopsy, botox injection    UPPER GASTROINTESTINAL ENDOSCOPY  04/11/2017    with dilatation and botox injection    UPPER GASTROINTESTINAL ENDOSCOPY  10/24/2017    DILATATION, BIOPSY, BOTOX    UPPER GASTROINTESTINAL ENDOSCOPY  03/06/2018    WITH BOTOX AND BALLOON DILATATION     Medications Prior to Admission:   Current Facility-Administered Medications on File Prior to Encounter   Medication Dose Route Frequency Provider Last Rate Last Dose    0.9 % sodium chloride infusion   Intravenous Continuous Jaimie Spencer MD         Current Outpatient Medications on File Prior to Encounter   Medication Sig Dispense Refill    atorvastatin (LIPITOR) 10 MG tablet Take 10 mg by mouth daily      valACYclovir (VALTREX) 1 g tablet Take 1,000 mg by mouth 3 times daily Indications: 3 days left For 7 days      aspirin 81 MG chewable tablet Take 1 tablet by mouth daily 30 tablet 3    insulin glargine (BASAGLAR KWIKPEN) 100 UNIT/ML injection pen Inject 20 Units into the skin nightly (Patient taking differently: Inject 22 Units into the skin every morning (before breakfast) ) 5 pen 0     MG tablet TAKE ONE TABLET BY MOUTH EVERY 6 HOURS AS NEEDED FOR PAIN 120 tablet 0    Incontinence Supply Disposable (TRE CLASSIC BRIEFS/LARGE) MISC 1 each by Does not apply route 3 times daily 5 each 5    Incontinence Supplies MISC 1 Package by Does not apply route 2 times daily 5 each 5    Disposable Gloves (VINYL GLOVES LARGE) MISC 1 Package by Does not apply route 4 times daily 5 each 5    TRUEPLUS LANCETS 28G MISC USE ONE LANCET TO TEST THREE TO FOUR TIMES A  each 5    blood glucose monitor strips Test blood sugar once a day 100 strip 5    Cholecalciferol (VITAMIN D3) 1000 units TABS Take 1 tablet by mouth daily 90 tablet 1    GAS-X EXTRA STRENGTH 125 MG chewable tablet CHEW TWO TABLETS BY MOUTH THREE TIMES A DAY WITH EACH MEAL AS NEEDED FOR FLATULENCE 192 tablet 1    nystatin (MYCOSTATIN) 754394 UNIT/GM cream APPLY EXTERNALLY TO THE AFFECTED AREAS TWO TIMES A DAY 1 Tube 2    cyclobenzaprine (FLEXERIL) 10 MG tablet Take 0.5 tablets by mouth daily as needed for Muscle spasms 30 tablet 2    albuterol sulfate HFA (PROAIR HFA) 108 (90 Base) MCG/ACT inhaler INHALE 2 PUFFS EVERY 6 HOURS AS NEEDED FOR WHEEZING 1 Inhaler 2    Pseudoephedrine-guaiFENesin (MUCINEX D) 120-1200 MG TB12 Take one tablet by mouth every 12 hours as needed for nasal congestion.  28 tablet 2    RABEprazole (ACIPHEX) 20 MG tablet Take 1 tablet by mouth daily 30 tablet 5    ketotifen (ZADITOR) 0.025 % ophthalmic solution Place 2 drops into both eyes 2 times daily as needed (allergy) 1 Bottle 2    loratadine (CLARITIN) 10 MG tablet TAKE ONE TABLET BY MOUTH DAILY 30 tablet 5    fluconazole (DIFLUCAN) 150 MG tablet TAKE ONE TABLET BY MOUTH IN A SINGLE DOSE 1 tablet 0    Insulin Pen Needle (PEN NEEDLES) 32G X 4 MM MISC USE TO INJECT INSULIN PEN ONCE DAILY 30 each 3    Blood Glucose Monitoring Suppl (TRUE METRIX METER) w/Device KIT As needed 1 kit 0    ranitidine (ZANTAC) 300 MG capsule Take 1 capsule by mouth every evening 30 capsule 2    glucose blood VI test strips (ASCENSIA AUTODISC VI;ONE TOUCH ULTRA TEST VI) strip 1-2 time a day As needed. 100 each 3     Allergies:  Penicillins; Shellfish-derived products; Aspartame and phenylalanine; Bactrim; Biaxin [clarithromycin]; Cephalexin; Hydrocodone-acetaminophen; Lansoprazole; Nexium [esomeprazole magnesium trihydrate]; Other; Prilosec [omeprazole]; Blueberry [vaccinium angustifolium]; Monosodium glutamate; and Zmax [azithromycin dihydrate]    History of allergic reaction to anesthesia:  No    Social History:   TOBACCO:   reports that she has never smoked. She has never used smokeless tobacco.  ETOH:   reports that she does not drink alcohol. DRUGS:   reports that she does not use drugs. Family History:       Problem Relation Age of Onset    Diabetes Mother     Heart Disease Mother     Stroke Mother     Heart Disease Father     Heart Disease Brother     High Blood Pressure Brother     High Cholesterol Brother     Diabetes Brother     High Cholesterol Brother     Anesth Problems Neg Hx     Malig Hyperten Neg Hx     Hypotension Neg Hx     Malig Hypertherm Neg Hx     Pseudochol.  Deficiency Neg Hx        PHYSICAL EXAM:      /88   Pulse 65   Temp 97.3 °F (36.3 °C) (Temporal)   Resp 16   Ht 5' 2\" (1.575 m)   Wt 206 lb 1 oz (93.5 kg)   LMP  (LMP Unknown)   SpO2 100%   BMI 37.69 kg/m²  I        Heart:  Normal apical impulse, regular rate and rhythm, normal S1 and S2, no S3 or S4, and no murmur noted    Lungs:  No increased work of breathing, good air exchange, clear to auscultation bilaterally, no crackles or wheezing    Abdomen:  Normal bowel sounds, soft, non-distended, non-tender, no masses palpated, no hepatosplenomegally      ASA Grade:  ASA 3 - Patient with moderate systemic disease with functional limitations    Mallampati Class:  Class I: Soft palate, uvula, fauces, pillars visible  __________  Class II: Soft palate, uvula, fauces visible  __________   Class III: Soft palate, base of uvula visible  _____X____  Class IV: Hard palate only visible   __________      ASSESSMENT AND PLAN:    1. Patient is a 53 y.o. female here for EGD with anesthesia  2. Procedure options, risks and benefits reviewed with patient. Patient expresses understanding.      Jose Walters MD  [address]  5/7/2019

## 2019-05-07 NOTE — PROGRESS NOTES
Discharge instructions reviewed with patient/responsible adult. All home medications have been reviewed, questions answered and patient and friend verbalized understanding. Discharge instructions signed and copies given. Patient discharged  Per w/c with belongings.

## 2019-05-07 NOTE — ANESTHESIA POSTPROCEDURE EVALUATION
[facility] Department of Anesthesiology  Post-Anesthesia Note       Name:  Zane Godlsmith                                  Age:  53 y.o. MRN:  [id number]     Last Vitals & Oxygen Saturation: /73   Pulse 70   Temp 96.9 °F (36.1 °C) (Temporal)   Resp 15   Ht 5' 2\" (1.575 m)   Wt 206 lb 1 oz (93.5 kg)   LMP  (LMP Unknown)   SpO2 99%   BMI 37.69 kg/m²   Patient Vitals for the past 4 hrs:   BP Temp Temp src Pulse Resp SpO2 Height Weight   05/07/19 1030 123/73 -- -- 70 15 99 % -- --   05/07/19 1027 135/77 -- -- 74 16 100 % -- --   05/07/19 1022 133/76 96.9 °F (36.1 °C) Temporal 73 15 100 % -- --   05/07/19 0932 138/88 97.3 °F (36.3 °C) Temporal 65 16 100 % -- --   05/07/19 0852 -- -- -- -- -- -- 5' 2\" (1.575 m) 206 lb 1 oz (93.5 kg)       Level of consciousness:  Awake, alert to baseline    Respiratory: Respirations easy, no distress. Stable. Cardiovascular: Hemodynamically stable. Hydration: Adequate. PONV: Adequately managed. Post-op pain: Adequately controlled. Post-op assessment: Tolerated anesthetic well without complication. Complications:  None.     Gerardo Gray MD  May 7, 2019   11:08 AM

## 2019-05-28 ENCOUNTER — TELEPHONE (OUTPATIENT)
Dept: ENDOCRINOLOGY | Age: 54
End: 2019-05-28

## 2019-05-28 RX ORDER — GLUCOSAMINE HCL/CHONDROITIN SU 500-400 MG
CAPSULE ORAL
Qty: 100 STRIP | Refills: 5 | Status: SHIPPED | OUTPATIENT
Start: 2019-05-28 | End: 2019-05-28 | Stop reason: SDUPTHER

## 2019-05-28 RX ORDER — GLUCOSAMINE HCL/CHONDROITIN SU 500-400 MG
CAPSULE ORAL
Qty: 100 STRIP | Refills: 5 | Status: SHIPPED | OUTPATIENT
Start: 2019-05-28 | End: 2019-10-16 | Stop reason: SDUPTHER

## 2019-05-31 ENCOUNTER — TELEPHONE (OUTPATIENT)
Dept: PRIMARY CARE CLINIC | Age: 54
End: 2019-05-31

## 2019-06-03 ENCOUNTER — TELEPHONE (OUTPATIENT)
Dept: ADMINISTRATIVE | Age: 54
End: 2019-06-03

## 2019-06-06 ENCOUNTER — OFFICE VISIT (OUTPATIENT)
Dept: PRIMARY CARE CLINIC | Age: 54
End: 2019-06-06
Payer: COMMERCIAL

## 2019-06-06 VITALS
HEIGHT: 62 IN | RESPIRATION RATE: 12 BRPM | HEART RATE: 85 BPM | OXYGEN SATURATION: 95 % | BODY MASS INDEX: 37.73 KG/M2 | WEIGHT: 205 LBS | TEMPERATURE: 99.2 F | DIASTOLIC BLOOD PRESSURE: 76 MMHG | SYSTOLIC BLOOD PRESSURE: 130 MMHG

## 2019-06-06 DIAGNOSIS — B37.2 INTERTRIGINOUS CANDIDIASIS: ICD-10-CM

## 2019-06-06 DIAGNOSIS — N39.46 MIXED STRESS AND URGE URINARY INCONTINENCE: ICD-10-CM

## 2019-06-06 DIAGNOSIS — J45.20 MILD INTERMITTENT ASTHMA WITHOUT COMPLICATION: Primary | ICD-10-CM

## 2019-06-06 DIAGNOSIS — G47.33 OBSTRUCTIVE SLEEP APNEA SYNDROME: ICD-10-CM

## 2019-06-06 DIAGNOSIS — K31.84 GASTROPARESIS: ICD-10-CM

## 2019-06-06 DIAGNOSIS — E55.9 VITAMIN D DEFICIENCY: ICD-10-CM

## 2019-06-06 DIAGNOSIS — H61.21 IMPACTED CERUMEN OF RIGHT EAR: ICD-10-CM

## 2019-06-06 DIAGNOSIS — K58.9 IRRITABLE BOWEL SYNDROME WITHOUT DIARRHEA: ICD-10-CM

## 2019-06-06 DIAGNOSIS — Z79.4 TYPE 2 DIABETES MELLITUS WITHOUT COMPLICATION, WITH LONG-TERM CURRENT USE OF INSULIN (HCC): ICD-10-CM

## 2019-06-06 DIAGNOSIS — T78.40XD ALLERGIC DISORDER, SUBSEQUENT ENCOUNTER: ICD-10-CM

## 2019-06-06 DIAGNOSIS — B37.31 VAGINA, CANDIDIASIS: ICD-10-CM

## 2019-06-06 DIAGNOSIS — G89.29 CHRONIC BACK PAIN, UNSPECIFIED BACK LOCATION, UNSPECIFIED BACK PAIN LATERALITY: ICD-10-CM

## 2019-06-06 DIAGNOSIS — M54.9 CHRONIC BACK PAIN, UNSPECIFIED BACK LOCATION, UNSPECIFIED BACK PAIN LATERALITY: ICD-10-CM

## 2019-06-06 DIAGNOSIS — K21.9 GASTROESOPHAGEAL REFLUX DISEASE WITHOUT ESOPHAGITIS: ICD-10-CM

## 2019-06-06 DIAGNOSIS — M79.7 FIBROMYALGIA: ICD-10-CM

## 2019-06-06 DIAGNOSIS — R42 VERTIGO: ICD-10-CM

## 2019-06-06 DIAGNOSIS — M06.9 RHEUMATOID ARTHRITIS INVOLVING MULTIPLE SITES, UNSPECIFIED RHEUMATOID FACTOR PRESENCE: ICD-10-CM

## 2019-06-06 DIAGNOSIS — E78.00 HYPERCHOLESTEREMIA: ICD-10-CM

## 2019-06-06 DIAGNOSIS — F33.1 MODERATE EPISODE OF RECURRENT MAJOR DEPRESSIVE DISORDER (HCC): ICD-10-CM

## 2019-06-06 DIAGNOSIS — E11.9 TYPE 2 DIABETES MELLITUS WITHOUT COMPLICATION, WITH LONG-TERM CURRENT USE OF INSULIN (HCC): ICD-10-CM

## 2019-06-06 PROBLEM — R94.31 ABNORMAL EKG: Status: RESOLVED | Noted: 2019-03-11 | Resolved: 2019-06-06

## 2019-06-06 PROBLEM — R07.9 CHEST PAIN: Status: RESOLVED | Noted: 2019-01-23 | Resolved: 2019-06-06

## 2019-06-06 PROCEDURE — 2022F DILAT RTA XM EVC RTNOPTHY: CPT | Performed by: FAMILY MEDICINE

## 2019-06-06 PROCEDURE — G8427 DOCREV CUR MEDS BY ELIG CLIN: HCPCS | Performed by: FAMILY MEDICINE

## 2019-06-06 PROCEDURE — G8417 CALC BMI ABV UP PARAM F/U: HCPCS | Performed by: FAMILY MEDICINE

## 2019-06-06 PROCEDURE — 3045F PR MOST RECENT HEMOGLOBIN A1C LEVEL 7.0-9.0%: CPT | Performed by: FAMILY MEDICINE

## 2019-06-06 PROCEDURE — 1036F TOBACCO NON-USER: CPT | Performed by: FAMILY MEDICINE

## 2019-06-06 PROCEDURE — 69210 REMOVE IMPACTED EAR WAX UNI: CPT | Performed by: FAMILY MEDICINE

## 2019-06-06 PROCEDURE — 99215 OFFICE O/P EST HI 40 MIN: CPT | Performed by: FAMILY MEDICINE

## 2019-06-06 PROCEDURE — 3017F COLORECTAL CA SCREEN DOC REV: CPT | Performed by: FAMILY MEDICINE

## 2019-06-06 PROCEDURE — G8598 ASA/ANTIPLAT THER USED: HCPCS | Performed by: FAMILY MEDICINE

## 2019-06-06 RX ORDER — ALBUTEROL SULFATE 90 UG/1
AEROSOL, METERED RESPIRATORY (INHALATION)
Qty: 1 INHALER | Refills: 2 | Status: SHIPPED | OUTPATIENT
Start: 2019-06-06 | End: 2019-09-28 | Stop reason: SDUPTHER

## 2019-06-06 RX ORDER — IBUPROFEN 800 MG/1
TABLET ORAL
Qty: 120 TABLET | Refills: 2 | Status: SHIPPED | OUTPATIENT
Start: 2019-06-06 | End: 2019-10-23 | Stop reason: SDUPTHER

## 2019-06-06 RX ORDER — FLUCONAZOLE 150 MG/1
TABLET ORAL
Qty: 1 TABLET | Refills: 0 | Status: SHIPPED | OUTPATIENT
Start: 2019-06-06 | End: 2019-09-17 | Stop reason: ALTCHOICE

## 2019-06-06 RX ORDER — ATORVASTATIN CALCIUM 10 MG/1
10 TABLET, FILM COATED ORAL DAILY
Qty: 30 TABLET | Refills: 5 | Status: SHIPPED
Start: 2019-06-06 | End: 2020-02-20 | Stop reason: SINTOL

## 2019-06-06 RX ORDER — NYSTATIN 100000 U/G
CREAM TOPICAL
Qty: 1 TUBE | Refills: 2 | Status: SHIPPED | OUTPATIENT
Start: 2019-06-06 | End: 2021-06-25

## 2019-06-06 RX ORDER — ESCITALOPRAM OXALATE 5 MG/1
5 TABLET ORAL DAILY
Qty: 30 TABLET | Refills: 1 | Status: SHIPPED | OUTPATIENT
Start: 2019-06-06 | End: 2019-09-17 | Stop reason: ALTCHOICE

## 2019-06-06 RX ORDER — GUAIFENESIN AND PSEUDOEPHEDRINE HCL 1200; 120 MG/1; MG/1
TABLET, EXTENDED RELEASE ORAL
Qty: 28 TABLET | Refills: 2 | Status: SHIPPED | OUTPATIENT
Start: 2019-06-06 | End: 2020-02-20

## 2019-06-06 RX ORDER — LORATADINE 10 MG/1
TABLET ORAL
Qty: 30 TABLET | Refills: 5 | Status: SHIPPED | OUTPATIENT
Start: 2019-06-06 | End: 2020-02-20 | Stop reason: SDUPTHER

## 2019-06-06 NOTE — PROGRESS NOTES
Subjective:      Patient ID: Marisa Ardon is a 53 y.o. female. Asthma   Her past medical history is significant for asthma. Diabetes     Urinary Tract Infection        Patient is here for follow up of chronic health problems. Feels generally well. Feeling depressed, seems to be getting worse. Was on Lexapro 'years ago'. Not sleeping well. Single, does not work, no children. On disability 'a couple years'. Stays busy with volunteer work. Walking more now, but it does hurt. Does some chair exercise. Diet-says she does not get hungry, eats 2-3 meals daily but healthy. This is due to gastroparesis. She has worked with a friend who is a dietcian. Has problems with gastroparesis/dumping syndrome. Gets Botox injections every 6-8 months. This causes problems with diabetic meds. Getting EGD and colonoscopy next week as she is having stomach problems. Sees GI Dr Brenda Gunderson. Has lost weight. Pt seeing Endocrinologist, Dr Sohan Ortega. She is now on Basaglar which is working well, and is slowly titrating. This is going well. Started a low dose of Lipitor and is doing OK with taking this but not every day. In the past she had muscle cramps and elevated liver enzymes with it. Treatment Adherence:   Medication compliance:  compliant all of the time  Diet compliance:  compliant most of the time  Weight trend: stable  Current exercise: some, walks  Barriers: impairment:  physical: multiple problems. Diabetes Mellitus Type 2: Current symptoms/problems include none. Home blood sugar records: no record, says she tests twice or more daily, says am is low to mid 100's. She is titrating Basablar up per Dr Mark Navarro orders. Any episodes of hypoglycemia? occasionally  Eye exam current (within one year): yes  Tobacco history: She  reports that she has never smoked. She has never used smokeless tobacco.   Daily Aspirin?  No: on Ibuprofen    Hyperlipidemia:  Taking Lipitor every other day due to side effects of leg cramps. Asthma:  Current treatment includes beta agonist inhalers. Using preventive medication(s) consistently: not applicable. Residual symptoms: dyspnea, non-productive cough and wheezing. Patient denies any other symptoms. She requires her rescue inhaler 2 time(s) per week. Lab Results   Component Value Date    LABA1C 8.2 03/11/2019    LABA1C 7.8 02/26/2019    LABA1C 7.8 07/24/2018     Lab Results   Component Value Date    LABMICR YES 10/24/2017    CREATININE 0.7 05/07/2019     Lab Results   Component Value Date    ALT 33 05/07/2019    AST 28 05/07/2019     Lab Results   Component Value Date    CHOL 236 (H) 01/23/2019    TRIG 92 01/23/2019    HDL 45 01/23/2019    LDLCALC 173 (H) 01/23/2019            Review of Systems   All other systems reviewed and are negative. Objective:   Physical Exam   Constitutional: She is oriented to person, place, and time. She appears well-developed and well-nourished. HENT:   Head: Normocephalic and atraumatic. Mouth/Throat: Oropharynx is clear and moist.   Right ear blocked by wax   Eyes: Pupils are equal, round, and reactive to light. Conjunctivae are normal.   Neck: Normal range of motion. Neck supple. No thyromegaly present. Cardiovascular: Normal rate, regular rhythm, normal heart sounds and intact distal pulses. No murmur heard. Pulmonary/Chest: Effort normal and breath sounds normal. No respiratory distress. Abdominal: Soft. Bowel sounds are normal. She exhibits no distension and no mass. There is no tenderness. Musculoskeletal: Normal range of motion. She exhibits no edema or tenderness. Lymphadenopathy:     She has no cervical adenopathy. Neurological: She is alert and oriented to person, place, and time. Skin: Skin is warm and dry. Psychiatric: She has a normal mood and affect. Her behavior is normal.   Vitals reviewed. Assessment / Plan:          Justin Espinosa was seen today for 3 month follow-up.     Diagnoses and associated orders for this visit:    Type 2 diabetes mellitus without complication (HCC)-CONTROLLED by Endocrine  -  Continue with Endocrinologist, now on Basaglar, improving     - Asthma, mild intermittent, uncomplicated-controlled  - albuterol (PROAIR HFA) 108 (90 BASE) MCG/ACT inhaler; INHALE 2 PUFFS EVERY 6 HOURS AS NEEDED FOR WHEEZING    Allergic rhinitis, unspecified allergic rhinitis type-controlled  - loratadine (CLARITIN) 10 MG tablet; Take 1 tablet by mouth daily    Chronic back pain-controlled  - cyclobenzaprine (FLEXERIL) 10 MG tablet; Take 0.5 tablets by mouth 2 times daily as needed    Vitamin D deficiency-controlled  - vitamin D (MAXIMUM D3) 20485 UNITS CAPS capsule; Take 1 capsule by mouth once a week    Rheumatoid arthritis(714.0) (HCC)-controlled  - ibuprofen (ADVIL;MOTRIN) 800 MG tablet; Take 1 tablet by mouth every 6 hours as     Gastroesophageal reflux disease without esophagitis-controlled on Zantac and aciphex    Other and unspecified hyperlipidemia-controlled on lipitor low dose    Fibromyalgia-meds as above. Mixed incontinence-using Depends    Gastroparesis-per GI Dr Amy Pratt, Botox injections    IBS (irritable bowel syndrome)-controlled  - simethicone (MYLICON) 907 MG chewable tablet; Take 2 tablets by mouth every 6 hours as needed for Flatulence    Encourage healthy diet, weight loss and exercise; discussed-doing better     Gastroparesis-working with GI  -     ranitidine (ZANTAC) 300 MG capsule; Take 1 capsule by mouth every evening    Obstructive sleep apnea syndrome-using Bipap? Vertigo-per ENT Dr Davis Slight  -     Pseudoephedrine-guaiFENesin [facility] D) 120-1200 MG TB12; Take one tablet by mouth every 12 hours as needed for nasal congestion. Depression-start Lexapro 5 mg    Cerumen impaction-flush right ear.

## 2019-06-06 NOTE — PATIENT INSTRUCTIONS
Patient Education        Earwax Blockage: Care Instructions  Your Care Instructions    Earwax is a natural substance that protects the ear canal. Normally, earwax drains from the ears and does not cause problems. Sometimes earwax builds up and hardens. Earwax blockage (also called cerumen impaction) can cause some loss of hearing and pain. When wax is tightly packed, you will need to have your doctor remove it. Follow-up care is a key part of your treatment and safety. Be sure to make and go to all appointments, and call your doctor if you are having problems. It's also a good idea to know your test results and keep a list of the medicines you take. How can you care for yourself at home? · Do not try to remove earwax with cotton swabs, fingers, or other objects. This can make the blockage worse and damage the eardrum. · If your doctor recommends that you try to remove earwax at home:  ? Soften and loosen the earwax with warm mineral oil. You also can try hydrogen peroxide mixed with an equal amount of room temperature water. Place 2 drops of the fluid, warmed to body temperature, in the ear two times a day for up to 5 days. ? Once the wax is loose and soft, all that is usually needed to remove it from the ear canal is a gentle, warm shower. Direct the water into the ear, then tip your head to let the earwax drain out. Dry your ear thoroughly with a hair dryer set on low. Hold the dryer several inches from your ear. ? If the warm mineral oil and shower do not work, use an over-the-counter wax softener. Read and follow all instructions on the label. After using the wax softener, use an ear syringe to gently flush the ear. Make sure the flushing solution is body temperature. Cool or hot fluids in the ear can cause dizziness. When should you call for help?   Call your doctor now or seek immediate medical care if:    · Pus or blood drains from your ear.     · Your ears are ringing or feel full.     · You have a

## 2019-06-07 LAB
CREATININE URINE: 214.5 MG/DL (ref 28–259)
MICROALBUMIN UR-MCNC: 3.5 MG/DL
MICROALBUMIN/CREAT UR-RTO: 16.3 MG/G (ref 0–30)

## 2019-06-12 ENCOUNTER — OFFICE VISIT (OUTPATIENT)
Dept: PRIMARY CARE CLINIC | Age: 54
End: 2019-06-12
Payer: COMMERCIAL

## 2019-06-12 VITALS
OXYGEN SATURATION: 98 % | WEIGHT: 205 LBS | HEART RATE: 72 BPM | SYSTOLIC BLOOD PRESSURE: 109 MMHG | BODY MASS INDEX: 37.73 KG/M2 | TEMPERATURE: 97.5 F | DIASTOLIC BLOOD PRESSURE: 59 MMHG | HEIGHT: 62 IN

## 2019-06-12 DIAGNOSIS — E11.9 TYPE 2 DIABETES MELLITUS WITHOUT COMPLICATION, WITH LONG-TERM CURRENT USE OF INSULIN (HCC): ICD-10-CM

## 2019-06-12 DIAGNOSIS — N30.00 ACUTE CYSTITIS WITHOUT HEMATURIA: Primary | ICD-10-CM

## 2019-06-12 DIAGNOSIS — N30.00 ACUTE CYSTITIS WITHOUT HEMATURIA: ICD-10-CM

## 2019-06-12 DIAGNOSIS — Z79.4 TYPE 2 DIABETES MELLITUS WITHOUT COMPLICATION, WITH LONG-TERM CURRENT USE OF INSULIN (HCC): ICD-10-CM

## 2019-06-12 LAB
BACTERIA: ABNORMAL /HPF
BILIRUBIN URINE: NEGATIVE
BLOOD, URINE: NEGATIVE
CLARITY: ABNORMAL
COLOR: YELLOW
COMMENT UA: ABNORMAL
CREATININE URINE: 163.8 MG/DL (ref 28–259)
CRYSTALS, UA: ABNORMAL /HPF
EPITHELIAL CELLS, UA: 4 /HPF (ref 0–5)
GLUCOSE URINE: 100 MG/DL
HYALINE CASTS: 3 /LPF (ref 0–8)
KETONES, URINE: NEGATIVE MG/DL
LEUKOCYTE ESTERASE, URINE: ABNORMAL
MICROALBUMIN UR-MCNC: 2.5 MG/DL
MICROALBUMIN/CREAT UR-RTO: 15.3 MG/G (ref 0–30)
MICROSCOPIC EXAMINATION: YES
NITRITE, URINE: NEGATIVE
PH UA: 6 (ref 5–8)
PROTEIN UA: ABNORMAL MG/DL
RBC UA: 2 /HPF (ref 0–4)
SPECIFIC GRAVITY UA: 1.02 (ref 1–1.03)
URINE TYPE: ABNORMAL
UROBILINOGEN, URINE: 0.2 E.U./DL
WBC UA: 42 /HPF (ref 0–5)

## 2019-06-12 PROCEDURE — 3045F PR MOST RECENT HEMOGLOBIN A1C LEVEL 7.0-9.0%: CPT | Performed by: INTERNAL MEDICINE

## 2019-06-12 PROCEDURE — G8417 CALC BMI ABV UP PARAM F/U: HCPCS | Performed by: INTERNAL MEDICINE

## 2019-06-12 PROCEDURE — 1036F TOBACCO NON-USER: CPT | Performed by: INTERNAL MEDICINE

## 2019-06-12 PROCEDURE — G8598 ASA/ANTIPLAT THER USED: HCPCS | Performed by: INTERNAL MEDICINE

## 2019-06-12 PROCEDURE — 3017F COLORECTAL CA SCREEN DOC REV: CPT | Performed by: INTERNAL MEDICINE

## 2019-06-12 PROCEDURE — 2022F DILAT RTA XM EVC RTNOPTHY: CPT | Performed by: INTERNAL MEDICINE

## 2019-06-12 PROCEDURE — 99213 OFFICE O/P EST LOW 20 MIN: CPT | Performed by: INTERNAL MEDICINE

## 2019-06-12 PROCEDURE — G8427 DOCREV CUR MEDS BY ELIG CLIN: HCPCS | Performed by: INTERNAL MEDICINE

## 2019-06-12 ASSESSMENT — ENCOUNTER SYMPTOMS
RESPIRATORY NEGATIVE: 1
EYE DISCHARGE: 0
EYES NEGATIVE: 1

## 2019-06-12 NOTE — PATIENT INSTRUCTIONS
Follow up urine microalbumin in a few weeks. Hemoglobin A1c. Follow up with endocrinology as planned next month. Continue your current medications. See Dr. Surya Vasquez in a month.

## 2019-06-12 NOTE — PROGRESS NOTES
86 Neli Prasad  YOB: 1965    Date of Service:  6/12/2019    Chief Complaint   Patient presents with   [address]     Patient is here to discuss an elevated urine microalbumin creatinine ratio. She is followed by Dr. Alen Gary for her diabetes. I do not see any previous microalbumin creatinine ratios for comparison. Her last A1c was 8.2 that is recorded in the chart. Apparently her A1c was over 11 at one point and it did come down significantly. The patient also believes that she has a urinary tract infection. Lab review for urinalysis and urine culture. I have asked her to repeat the microalbumin in a few weeks and we will get an A1c today. Follow-up with the endocrinologist as previously planned in 4 weeks. Diabetes   She presents for her follow-up diabetic visit. She has type 2 diabetes mellitus. No MedicAlert identification noted. Her disease course has been stable. There are no hypoglycemic associated symptoms. Pertinent negatives for hypoglycemia include no confusion or nervousness/anxiousness. There are no diabetic associated symptoms. There are no hypoglycemic complications. Symptoms are stable. There are no diabetic complications. Risk factors for coronary artery disease include obesity, sedentary lifestyle and post-menopausal. She is compliant with treatment all of the time.        Allergies   Allergen Reactions    Penicillins Hives and Shortness Of Breath    Shellfish-Derived Products Swelling     Throat and tongue swells, allergy to all seafood    Aspartame And Phenylalanine      Severe headaches    Bactrim Hives    Biaxin [Clarithromycin] Hives    Cephalexin Other (See Comments)     blisters    Hydrocodone-Acetaminophen Other (See Comments)     HALLUCINATIONS    Lansoprazole Hives    Nexium [Esomeprazole Magnesium Trihydrate] Hives    Other Other (See Comments)     TEGADERM-BLISTERS    Prilosec [Omeprazole] Hives    Blueberry [Vaccinium Angustifolium] Nausea And Vomiting     Projectile vomiting    Monosodium Glutamate Nausea And Vomiting    Zmax [Azithromycin Dihydrate] Nausea And Vomiting     NOT Z PACK its the Powder you had to mix and drink     Outpatient Medications Marked as Taking for the 6/12/19 encounter (Office Visit) with Luis Alberto Esacmilla MD   Medication Sig Dispense Refill    escitalopram (LEXAPRO) 5 MG tablet Take 1 tablet by mouth daily 30 tablet 1    loratadine (CLARITIN) 10 MG tablet TAKE ONE TABLET BY MOUTH DAILY 30 tablet 5    Pseudoephedrine-guaiFENesin (MUCINEX D) 120-1200 MG TB12 Take one tablet by mouth every 12 hours as needed for nasal congestion.  28 tablet 2    albuterol sulfate HFA (PROAIR HFA) 108 (90 Base) MCG/ACT inhaler INHALE 2 PUFFS EVERY 6 HOURS AS NEEDED FOR WHEEZING 1 Inhaler 2    nystatin (MYCOSTATIN) 392094 UNIT/GM cream APPLY EXTERNALLY TO THE AFFECTED AREAS TWO TIMES A DAY 1 Tube 2    ibuprofen (IBU) 800 MG tablet TAKE ONE TABLET BY MOUTH EVERY 6 HOURS AS NEEDED FOR PAIN 120 tablet 2    atorvastatin (LIPITOR) 10 MG tablet Take 1 tablet by mouth daily 30 tablet 5    vitamin D (MAXIMUM D3) 87474 units CAPS capsule Take 1 capsule by mouth daily 30 capsule 0    fluconazole (DIFLUCAN) 150 MG tablet TAKE ONE TABLET BY MOUTH IN A SINGLE DOSE 1 tablet 0    blood glucose monitor strips Test blood sugar 2-3 times  a day 100 strip 5    aspirin 81 MG chewable tablet Take 1 tablet by mouth daily 30 tablet 3    insulin glargine (BASAGLAR KWIKPEN) 100 UNIT/ML injection pen Inject 20 Units into the skin nightly (Patient taking differently: Inject 22 Units into the skin every morning (before breakfast) ) 5 pen 0    Incontinence Supply Disposable (TRE CLASSIC BRIEFS/LARGE) MISC 1 each by Does not apply route 3 times daily 5 each 5    Incontinence Supplies MISC 1 Package by Does not apply route 2 times daily 5 each 5    Disposable Gloves (VINYL GLOVES LARGE) MISC 1 Package by Does not apply route 4 times daily 5 each 5    TRUEPLUS LANCETS 28G MISC USE ONE LANCET TO TEST THREE TO FOUR TIMES A  each 5    Cholecalciferol (VITAMIN D3) 1000 units TABS Take 1 tablet by mouth daily 90 tablet 1    GAS-X EXTRA STRENGTH 125 MG chewable tablet CHEW TWO TABLETS BY MOUTH THREE TIMES A DAY WITH EACH MEAL AS NEEDED FOR FLATULENCE 192 tablet 1    cyclobenzaprine (FLEXERIL) 10 MG tablet Take 0.5 tablets by mouth daily as needed for Muscle spasms 30 tablet 2    RABEprazole (ACIPHEX) 20 MG tablet Take 1 tablet by mouth daily 30 tablet 5    ketotifen (ZADITOR) 0.025 % ophthalmic solution Place 2 drops into both eyes 2 times daily as needed (allergy) 1 Bottle 2    Insulin Pen Needle (PEN NEEDLES) 32G X 4 MM MISC USE TO INJECT INSULIN PEN ONCE DAILY 30 each 3    Blood Glucose Monitoring Suppl (TRUE METRIX METER) w/Device KIT As needed 1 kit 0    ranitidine (ZANTAC) 300 MG capsule Take 1 capsule by mouth every evening 30 capsule 2    glucose blood VI test strips (ASCENSIA AUTODISC VI;ONE TOUCH ULTRA TEST VI) strip 1-2 time a day As needed. 100 each 3       Immunization History   Administered Date(s) Administered    Pneumococcal 13-valent Conjugate ([id number]) 12/27/2016    Pneumococcal Conjugate 7-valent 08/26/2005    Pneumococcal Polysaccharide ([id number]) 12/27/2006    Tdap (Boostrix, Adacel) 06/22/2014       Past Medical History:   Diagnosis Date    Ankle fracture, left     Ankle fracture, right     Arthritis     Asthma     Bipolar depression (HCC)     CAN'T AFFORD MEDS    Bladder problem     Cervical disc herniation     Diabetes mellitus ([facility] [address]     diet control    Fatty liver disease, non-alcoholic     Fibromyalgia     Gastroparesis     Dr Vanda Almanza GERD (gastroesophageal reflux disease)     gastroporesis    Glaucoma     Dr Evita Sin Hyperlipidemia     elevated LFT on meds    IBS (irritable bowel syndrome)     Lumbar herniated disc     Nausea & vomiting     Nephrolithiasis 1/29/2019    Obesity (BMI 30-39. 9) 3/11/2019    Partial Achilles tendon tear     Pneumonia     PONV (postoperative nausea and vomiting)     RA (rheumatoid arthritis) (HCC)     Rapid or irregular heartbeat     Sleep apnea     does not use cpap    Spinal stenosis     Stress incontinence     Thyroid disease     growths     Past Surgical History:   Procedure Laterality Date    CHOLECYSTECTOMY      COLONOSCOPY      COLONOSCOPY  03/06/2018    with polypectomies    DILATION AND CURETTAGE OF UTERUS      ENDOMETRIAL ABLATION      ENDOSCOPY, COLON, DIAGNOSTIC      ERCP  5/25/2010    with stent    ERCP  1-31-14    ERCP WITH SPHINTER OF ODDI MANOMETERY    ESOPHAGOSCOPY  10/5/10    botox injection    EYE SURGERY      LASER FOR GLAUCOMA    LAPAROSCOPY      TONSILLECTOMY      UPPER GASTROINTESTINAL ENDOSCOPY      UPPER GASTROINTESTINAL ENDOSCOPY  04/12/2011    DILATATION, 58 FR ARTHUR, 100 UNITS BOTOX    UPPER GASTROINTESTINAL ENDOSCOPY  08/30/2011    58 FR DILATATION, 100 UNITS  BOTOX INJECTION    UPPER GASTROINTESTINAL ENDOSCOPY  3-9-12    with dilatation and submucosal injection: Botox    UPPER GASTROINTESTINAL ENDOSCOPY      UPPER GASTROINTESTINAL ENDOSCOPY  11/20/12     Esophagogastroduodenoscopy with Botulinum toxin injection of the pylorus and Arthur esophageal dilation    UPPER GASTROINTESTINAL ENDOSCOPY  6/4/13    WITH DIILITATION AND BOTOX INJECTION    UPPER GASTROINTESTINAL ENDOSCOPY  5/20/2014    100 unit Botox injection, 56 Fr.  Arthur esophageal dilatation    UPPER GASTROINTESTINAL ENDOSCOPY  12/12/14    with esophageal dilatation, and botox injection    UPPER GASTROINTESTINAL ENDOSCOPY  09/09/2015    With Dilitation and Botox injection    UPPER GASTROINTESTINAL ENDOSCOPY  5/10/2016    distal esophagus biopsy, stomach biopsy, botox injection    UPPER GASTROINTESTINAL ENDOSCOPY  04/11/2017    with dilatation and botox injection    UPPER GASTROINTESTINAL ENDOSCOPY  10/24/2017    DILATATION, BIOPSY, BOTOX    UPPER round, and reactive to light. Conjunctivae are normal.   Neck: Normal range of motion. Neck supple. Cardiovascular: Normal rate, regular rhythm and normal heart sounds. Pulmonary/Chest: Effort normal and breath sounds normal.   Musculoskeletal: Normal range of motion. Neurological: She is alert and oriented to person, place, and time. She has normal reflexes. Skin: Skin is warm and dry. Psychiatric: She has a normal mood and affect.  Her behavior is normal. Judgment and thought content normal.       Lab Review   Office Visit on 06/06/2019   Component Date Value    Microalbumin, Random Uri* 06/06/2019 3.50*    Creatinine, Ur 06/06/2019 214.5     Microalbumin Creatinine * 06/06/2019 16.3    Admission on 05/07/2019, Discharged on 05/07/2019   Component Date Value    WBC 05/07/2019 10.3     RBC 05/07/2019 5.25*    Hemoglobin 05/07/2019 15.0     Hematocrit 05/07/2019 46.4     MCV 05/07/2019 88.3     MCH 05/07/2019 28.6     MCHC 05/07/2019 32.4     RDW 05/07/2019 15.4     Platelets 22/36/1438 205     MPV 05/07/2019 10.4     Sodium 05/07/2019 139     Potassium 05/07/2019 4.5     Chloride 05/07/2019 99     CO2 05/07/2019 26     Anion Gap 05/07/2019 14     Glucose 05/07/2019 177*    BUN 05/07/2019 13     CREATININE 05/07/2019 0.7     GFR Non- 05/07/2019 >60     GFR  05/07/2019 >60     Calcium 05/07/2019 10.4     POC Glucose 05/07/2019 161*    Performed on 05/07/2019 ACCU-CHEK     Total Protein 05/07/2019 7.6     Alb 05/07/2019 4.4     Alkaline Phosphatase 05/07/2019 19*    ALT 05/07/2019 33     AST 05/07/2019 28     Total Bilirubin 05/07/2019 0.3     Bilirubin, Direct 05/07/2019 <0.2     Bilirubin, Indirect 05/07/2019 see below     Magnesium 05/07/2019 2.10     Vit D, 25-Hydroxy 05/07/2019 21.4*    POC Glucose 05/07/2019 154*    Performed on 05/07/2019 ACCU-CHEK    Admission on 03/11/2019, Discharged on 03/12/2019   Component Date Value    Ventricular Rate 03/11/2019 74     Atrial Rate 03/11/2019 74     P-R Interval 03/11/2019 150     QRS Duration 03/11/2019 90     Q-T Interval 03/11/2019 388     QTc Calculation (Bazett) 03/11/2019 430     P Axis 03/11/2019 44     R Axis 03/11/2019 14     T Axis 03/11/2019 -46     Diagnosis 03/11/2019 Normal sinus rhythmST & T wave abnormality, consider inferior ischemia  or LVHAbnormal ECGSince previous tracing no changeConfirmed by Marquis Miko JONES, GEO ([id number]) on 3/12/2019 8:58:57 AM     WBC 03/11/2019 9.8     RBC 03/11/2019 4.54     Hemoglobin 03/11/2019 13.0     Hematocrit 03/11/2019 39.9     MCV 03/11/2019 87.9     MCH 03/11/2019 28.6     MCHC 03/11/2019 32.5     RDW 03/11/2019 14.6     Platelets 02/06/6808 186     MPV 03/11/2019 9.3     Neutrophils % 03/11/2019 62.4     Lymphocytes % 03/11/2019 28.8     Monocytes % 03/11/2019 6.5     Eosinophils % 03/11/2019 1.7     Basophils % 03/11/2019 0.6     Neutrophils # 03/11/2019 6.1     Lymphocytes # 03/11/2019 2.8     Monocytes # 03/11/2019 0.6     Eosinophils # 03/11/2019 0.2     Basophils # 03/11/2019 0.1     Sodium 03/11/2019 141     Potassium 03/11/2019 3.8     Chloride 03/11/2019 102     CO2 03/11/2019 27     Anion Gap 03/11/2019 12     Glucose 03/11/2019 196*    BUN 03/11/2019 17     CREATININE 03/11/2019 0.6     GFR Non- 03/11/2019 >60     GFR  03/11/2019 >60     Calcium 03/11/2019 9.7     Total Protein 03/11/2019 7.4     Alb 03/11/2019 4.2     Albumin/Globulin Ratio 03/11/2019 1.3     Total Bilirubin 03/11/2019 0.3     Alkaline Phosphatase 03/11/2019 19*    ALT 03/11/2019 26     AST 03/11/2019 24     Globulin 03/11/2019 3.2     Protime 03/11/2019 11.7     INR 03/11/2019 1.03     Magnesium 03/11/2019 2.00     Troponin 03/11/2019 <0.01     Pro-BNP 03/11/2019 196*    Sodium 03/12/2019 141     Potassium reflex Magnesi* 03/12/2019 4.6     Chloride 03/12/2019 102     CO2 03/12/2019 29     Anion Gap 03/12/2019 10     Glucose 03/12/2019 221*    BUN 03/12/2019 12     CREATININE 03/12/2019 0.6     GFR Non- 03/12/2019 >60     GFR  03/12/2019 >60     Calcium 03/12/2019 9.6     WBC 03/12/2019 7.3     RBC 03/12/2019 4.47     Hemoglobin 03/12/2019 12.7     Hematocrit 03/12/2019 38.6     MCV 03/12/2019 86.4     MCH 03/12/2019 28.4     MCHC 03/12/2019 32.8     RDW 03/12/2019 14.4     Platelets 98/63/8727 190     MPV 03/12/2019 9.1     Neutrophils % 03/12/2019 50.5     Lymphocytes % 03/12/2019 37.2     Monocytes % 03/12/2019 9.1     Eosinophils % 03/12/2019 2.4     Basophils % 03/12/2019 0.8     Neutrophils # 03/12/2019 3.7     Lymphocytes # 03/12/2019 2.7     Monocytes # 03/12/2019 0.7     Eosinophils # 03/12/2019 0.2     Basophils # 03/12/2019 0.1     Hemoglobin A1C 03/11/2019 8.2     eAG 03/11/2019 188.6     POC Glucose 03/11/2019 256*    Performed on 03/11/2019 ACCU-CHEK     Left Ventricular Ejectio* 03/12/2019 55     LEFT VENTRICULAR EJECTIO* 03/12/2019 Cardiac Cath     POC Glucose 03/12/2019 487*    Performed on 03/12/2019 ACCU-CHEK     POC Glucose 03/12/2019 203*    Performed on 03/12/2019 ACCU-CHEK     POC Glucose 03/12/2019 363*    Performed on 03/12/2019 [facility] Outpatient Visit on 03/11/2019   Component Date Value    LEFT VENTRICULAR EJECTIO* 03/11/2019 Echo     Left Ventricular Ejectio* 03/11/2019 55    Office Visit on 02/26/2019   Component Date Value    Hemoglobin A1C 02/26/2019 7.8    Hospital Outpatient Visit on 01/23/2019   Component Date Value    Cholesterol, Total 01/23/2019 236*    Triglycerides 01/23/2019 92     HDL 01/23/2019 45     LDL Calculated 01/23/2019 173*    VLDL Cholesterol Calcula* 01/23/2019 18     WBC 01/23/2019 11.2*    RBC 01/23/2019 4.81     Hemoglobin 01/23/2019 13.8     Hematocrit 01/23/2019 42.7     MCV 01/23/2019 88.7     MCH 01/23/2019 28.7     [facility] 01/23/2019 32.4     RDW 01/23/2019 14.8     Platelets 16/74/8039 267     MPV 01/23/2019 9.9     Neutrophils % 01/23/2019 64.6     Lymphocytes % 01/23/2019 27.1     Monocytes % 01/23/2019 6.6     Eosinophils % 01/23/2019 0.6     Basophils % 01/23/2019 1.1     Neutrophils # 01/23/2019 7.2     Lymphocytes # 01/23/2019 3.0     Monocytes # 01/23/2019 0.7     Eosinophils # 01/23/2019 0.1     Basophils # 01/23/2019 0.1     Sodium 01/23/2019 142     Potassium 01/23/2019 4.5     Chloride 01/23/2019 99     CO2 01/23/2019 28     Anion Gap 01/23/2019 15     Glucose 01/23/2019 141*    BUN 01/23/2019 19     CREATININE 01/23/2019 0.7     GFR Non- 01/23/2019 >60     GFR  01/23/2019 >60     Calcium 01/23/2019 10.5     Total Protein 01/23/2019 8.1     Alb 01/23/2019 4.5     Albumin/Globulin Ratio 01/23/2019 1.3     Total Bilirubin 01/23/2019 0.7     Alkaline Phosphatase 01/23/2019 17*    ALT 01/23/2019 29     AST 01/23/2019 28     Globulin 01/23/2019 3.6     Magnesium 01/23/2019 2.00     Vit D, 25-Hydroxy 01/23/2019 18.5*     notapplicable      Health Maintenance   Topic Date Due    Hepatitis B Vaccine (1 of 3 - Risk 3-dose series) 01/11/1983    Shingles Vaccine (1 of 2) 01/11/2014    Breast cancer screen  08/29/2019    Flu vaccine (Season Ended) 09/01/2019    Lipid screen  01/23/2020    Diabetic foot exam  02/26/2020    A1C test (Diabetic or Prediabetic)  03/11/2020    Diabetic microalbuminuria test  06/06/2020    Diabetic retinal exam  08/06/2020    Cervical cancer screen  08/19/2020    DTaP/Tdap/Td vaccine (2 - Td) 06/22/2024    Colon cancer screen colonoscopy  03/06/2028    Pneumococcal 0-64 years Vaccine  Completed    Hepatitis C screen  Completed    HIV screen  Completed          Assessment/Plan:    DM2 (diabetes mellitus, type 2) ([facility] [address]  Stable with an elevated microalbumin. See orders.     Acute cystitis without hematuria  Even though her history

## 2019-06-13 LAB
ESTIMATED AVERAGE GLUCOSE: 211.6 MG/DL
HBA1C MFR BLD: 9 %

## 2019-06-14 ENCOUNTER — TELEPHONE (OUTPATIENT)
Dept: PRIMARY CARE CLINIC | Age: 54
End: 2019-06-14

## 2019-06-14 LAB — URINE CULTURE, ROUTINE: NORMAL

## 2019-06-14 NOTE — TELEPHONE ENCOUNTER
Could you result on her recent labs. Tom Borrego and dary are out of the office. Patient calling for results.    Thank you

## 2019-06-14 NOTE — TELEPHONE ENCOUNTER
Patient asking to have Medication called into pharmacy for her Poss UTI,  Please see allergy list, Reactions to several Antibiotics, Also Stated she can't take Ciprofloxacin No issues that she can think of with Macrobid.     [facility], [address]

## 2019-06-21 LAB
HPV COMMENT: NORMAL
HPV TYPE 16: NOT DETECTED
HPV TYPE 18: NOT DETECTED
HPVOH (OTHER TYPES): NOT DETECTED

## 2019-06-28 ENCOUNTER — TELEPHONE (OUTPATIENT)
Dept: PRIMARY CARE CLINIC | Age: 54
End: 2019-06-28

## 2019-07-02 ENCOUNTER — TELEPHONE (OUTPATIENT)
Dept: ENDOCRINOLOGY | Age: 54
End: 2019-07-02

## 2019-07-11 ENCOUNTER — OFFICE VISIT (OUTPATIENT)
Dept: PRIMARY CARE CLINIC | Age: 54
End: 2019-07-11
Payer: COMMERCIAL

## 2019-07-11 VITALS
SYSTOLIC BLOOD PRESSURE: 106 MMHG | WEIGHT: 206 LBS | BODY MASS INDEX: 37.91 KG/M2 | DIASTOLIC BLOOD PRESSURE: 58 MMHG | RESPIRATION RATE: 12 BRPM | OXYGEN SATURATION: 95 % | HEART RATE: 72 BPM | TEMPERATURE: 98.2 F | HEIGHT: 62 IN

## 2019-07-11 DIAGNOSIS — L98.9 SKIN LESIONS: Primary | ICD-10-CM

## 2019-07-11 PROCEDURE — G8427 DOCREV CUR MEDS BY ELIG CLIN: HCPCS | Performed by: FAMILY MEDICINE

## 2019-07-11 PROCEDURE — 99213 OFFICE O/P EST LOW 20 MIN: CPT | Performed by: FAMILY MEDICINE

## 2019-07-11 PROCEDURE — 3017F COLORECTAL CA SCREEN DOC REV: CPT | Performed by: FAMILY MEDICINE

## 2019-07-11 PROCEDURE — G8598 ASA/ANTIPLAT THER USED: HCPCS | Performed by: FAMILY MEDICINE

## 2019-07-11 PROCEDURE — 1036F TOBACCO NON-USER: CPT | Performed by: FAMILY MEDICINE

## 2019-07-11 PROCEDURE — G8417 CALC BMI ABV UP PARAM F/U: HCPCS | Performed by: FAMILY MEDICINE

## 2019-07-11 NOTE — PROGRESS NOTES
Subjective:      Patient ID: Willene Kanner is a 53 y.o. female. HPI  Pt here with concern about skin lesions. Has some itching on her back. Review of Systems   All other systems reviewed and are negative. Objective:   Physical Exam   Constitutional: She appears well-developed and well-nourished. Skin:   Multiple lesions all over body-most on back-different types. Red, brown round, flat. Vitals reviewed. Assessment / Plan:      Syl Chaves was seen today for lesion(s). Diagnoses and all orders for this visit:    Skin lesions-all appear benign, refer  -     External Referral To Dermatology  Shawn benton of [facility])    Tiny skin tab anterior base of neck removed. Numbed with 1% lidocaine, removed with scalpel, cleaned, PSO and bandaid applied. No problem.

## 2019-07-26 DIAGNOSIS — K21.9 GASTROESOPHAGEAL REFLUX DISEASE WITHOUT ESOPHAGITIS: ICD-10-CM

## 2019-07-26 RX ORDER — KETOTIFEN FUMARATE 0.35 MG/ML
SOLUTION/ DROPS OPHTHALMIC
Qty: 1 BOTTLE | Refills: 0 | Status: SHIPPED | OUTPATIENT
Start: 2019-07-26 | End: 2019-11-08 | Stop reason: SDUPTHER

## 2019-08-04 DIAGNOSIS — Z79.4 TYPE 2 DIABETES MELLITUS WITHOUT COMPLICATION, WITH LONG-TERM CURRENT USE OF INSULIN (HCC): ICD-10-CM

## 2019-08-04 DIAGNOSIS — E11.9 TYPE 2 DIABETES MELLITUS WITHOUT COMPLICATION, WITH LONG-TERM CURRENT USE OF INSULIN (HCC): ICD-10-CM

## 2019-08-05 ENCOUNTER — HOSPITAL ENCOUNTER (OUTPATIENT)
Dept: WOMENS IMAGING | Age: 54
Discharge: HOME | End: 2019-08-05
Payer: COMMERCIAL

## 2019-08-05 ENCOUNTER — HOSPITAL ENCOUNTER (OUTPATIENT)
Dept: GENERAL RADIOLOGY | Age: 54
Discharge: HOME | End: 2019-08-05
Payer: COMMERCIAL

## 2019-08-05 DIAGNOSIS — Z12.31 ENCOUNTER FOR SCREENING MAMMOGRAM FOR BREAST CANCER: ICD-10-CM

## 2019-08-05 DIAGNOSIS — Z87.81 HEALED FRACTURES: ICD-10-CM

## 2019-08-05 PROCEDURE — 77063 BREAST TOMOSYNTHESIS BI: CPT

## 2019-08-05 PROCEDURE — 77080 DXA BONE DENSITY AXIAL: CPT

## 2019-08-05 RX ORDER — PEN NEEDLE, DIABETIC 30 GX3/16"
NEEDLE, DISPOSABLE MISCELLANEOUS
Qty: 100 EACH | Refills: 5 | Status: SHIPPED | OUTPATIENT
Start: 2019-08-05 | End: 2020-11-10

## 2019-08-08 LAB
CATARACTS: POSITIVE
DIABETIC RETINOPATHY: NEGATIVE
GLAUCOMA: NEGATIVE
INTRAOCULAR PRESSURE EYE: NORMAL
VISUAL ACUITY DISTANCE LEFT EYE: NORMAL
VISUAL ACUITY DISTANCE RIGHT EYE: NORMAL

## 2019-08-23 ENCOUNTER — OFFICE VISIT (OUTPATIENT)
Dept: ORTHOPEDIC SURGERY | Age: 54
End: 2019-08-23
Payer: COMMERCIAL

## 2019-08-23 VITALS
RESPIRATION RATE: 18 BRPM | WEIGHT: 206 LBS | SYSTOLIC BLOOD PRESSURE: 136 MMHG | HEART RATE: 61 BPM | HEIGHT: 62 IN | BODY MASS INDEX: 37.91 KG/M2 | DIASTOLIC BLOOD PRESSURE: 62 MMHG

## 2019-08-23 DIAGNOSIS — M65.4 DE QUERVAIN'S DISEASE (RADIAL STYLOID TENOSYNOVITIS): Primary | ICD-10-CM

## 2019-08-23 DIAGNOSIS — M65.4 DE QUERVAIN'S TENOSYNOVITIS: ICD-10-CM

## 2019-08-23 PROCEDURE — 99243 OFF/OP CNSLTJ NEW/EST LOW 30: CPT | Performed by: ORTHOPAEDIC SURGERY

## 2019-08-23 PROCEDURE — G8417 CALC BMI ABV UP PARAM F/U: HCPCS | Performed by: ORTHOPAEDIC SURGERY

## 2019-08-23 PROCEDURE — L3908 WHO COCK-UP NONMOLDE PRE OTS: HCPCS | Performed by: ORTHOPAEDIC SURGERY

## 2019-08-23 PROCEDURE — G8427 DOCREV CUR MEDS BY ELIG CLIN: HCPCS | Performed by: ORTHOPAEDIC SURGERY

## 2019-08-24 NOTE — PROGRESS NOTES
Ms. Mabel Lindsey is a 53 y.o. right handed woman  who is seen today in Hand Surgical Consultation at the request of Rajat Fisher MD.    She presents today regarding left wrist symptoms which have been present for approximately 8 weeks. A history of antecedent trauma or injury is Absent. She reports symptoms to include moderate pain along the  left  Radial wrist and distal forearm. Wrist symptoms are worse with certain twisting or gripping activities. Symptoms are Frequently worse with use. She reports moderate pain with thumb extension or pinch. Symptoms show no change over time. Previous treatment has included conservative measures. She does not claim relation of her symptoms to her required work activities. She has not undergone any form of testing. The patient's , past medical history, medications, allergies,  family history, social history, and review of systems have been updated, reviewed and have been scanned into the chart today. Physical Exam:  Ms. Tk Flowers most recent vitals:  Vitals  BP: 136/62  Pulse: 61  Resp: 18  Height: 5' 2\" (157.5 cm)  Weight: 206 lb (93.4 kg)    She is well nourished, oriented to person, place & time. She demonstrates appropriate mood and affect as well as normal gait and station. Skin: Normal in appearance, Normal Color and Free of Lesions Bilaterally   Digital range of motion is Full bilaterally. Pain accompanies the flexion and extension of the left Thumb. There is not triggering associated with active thumb motion. There is not a palpable mass overlying the site of maximal tenderness left radial wrist.  Wrist range of motion is limited by pain and is accompanied by moderate pain in dorsiflexion greater than palmar flexion. There is no evidence of gross joint instability bilaterally.   Sensation is subjectively tingling along the Radial Sensory Nerve distribution on the left side, normal on the right side but objectivly present in the

## 2019-09-17 ENCOUNTER — OFFICE VISIT (OUTPATIENT)
Dept: CARDIOLOGY CLINIC | Age: 54
End: 2019-09-17
Payer: COMMERCIAL

## 2019-09-17 VITALS
HEART RATE: 67 BPM | BODY MASS INDEX: 37.91 KG/M2 | OXYGEN SATURATION: 97 % | SYSTOLIC BLOOD PRESSURE: 124 MMHG | DIASTOLIC BLOOD PRESSURE: 66 MMHG | HEIGHT: 62 IN | WEIGHT: 206 LBS

## 2019-09-17 DIAGNOSIS — I25.10 CORONARY ARTERY DISEASE DUE TO LIPID RICH PLAQUE: Primary | ICD-10-CM

## 2019-09-17 DIAGNOSIS — I25.83 CORONARY ARTERY DISEASE DUE TO LIPID RICH PLAQUE: Primary | ICD-10-CM

## 2019-09-17 DIAGNOSIS — E78.00 HYPERCHOLESTEREMIA: ICD-10-CM

## 2019-09-17 PROCEDURE — G8427 DOCREV CUR MEDS BY ELIG CLIN: HCPCS | Performed by: NURSE PRACTITIONER

## 2019-09-17 PROCEDURE — G8417 CALC BMI ABV UP PARAM F/U: HCPCS | Performed by: NURSE PRACTITIONER

## 2019-09-17 PROCEDURE — 1036F TOBACCO NON-USER: CPT | Performed by: NURSE PRACTITIONER

## 2019-09-17 PROCEDURE — 3017F COLORECTAL CA SCREEN DOC REV: CPT | Performed by: NURSE PRACTITIONER

## 2019-09-17 PROCEDURE — 99213 OFFICE O/P EST LOW 20 MIN: CPT | Performed by: NURSE PRACTITIONER

## 2019-09-17 PROCEDURE — G8598 ASA/ANTIPLAT THER USED: HCPCS | Performed by: NURSE PRACTITIONER

## 2019-09-17 RX ORDER — ASPIRIN 81 MG/1
81 TABLET, CHEWABLE ORAL DAILY
Qty: 30 TABLET | Refills: 3 | Status: SHIPPED | OUTPATIENT
Start: 2019-09-17 | End: 2020-11-03

## 2019-09-17 NOTE — PROGRESS NOTES
TIMES A DAY (Patient not taking: Reported on 9/17/2019) 1 Tube 2                                       cyclobenzaprine (FLEXERIL) 10 MG tablet Take 0.5 tablets by mouth daily as needed for Muscle spasms (Patient not taking: Reported on 9/17/2019) 30 tablet 2     REVIEW OF SYSTEMS:    CONSTITUTIONAL: No major weight gain or loss, fatigue, weakness, night sweats or fever. HEENT: No new vision difficulties or ringing in the ears. RESPIRATORY: No new SOB, PND, orthopnea or cough. CARDIOVASCULAR: See HPI  GI: No nausea, vomiting, diarrhea, constipation, abdominal pain or changes in bowel habits. : No urinary frequency, urgency, incontinence hematuria or dysuria. SKIN: No cyanosis or skin lesions. MUSCULOSKELETAL: No new muscle or joint pain. NEUROLOGICAL: No syncope or TIA-like symptoms. PSYCHIATRIC: No anxiety, pain, insomnia or depression    Objective:   PHYSICAL EXAM:        VITALS:  /66 (Site: Left Upper Arm, Position: Sitting, Cuff Size: Medium Adult)   Pulse 67   Ht 5' 2\" (1.575 m)   Wt 206 lb (93.4 kg)   LMP  (LMP Unknown)   SpO2 97%   BMI 37.68 kg/m²   CONSTITUTIONAL: Cooperative, no apparent distress, and appears well nourished / over weight  NEUROLOGIC:  Awake and orientated to person, place and time. PSYCH: Calm affect. SKIN: Warm and dry. HEENT: Sclera non-icteric, normocephalic, neck supple, no elevation of JVP, normal carotid pulses with no bruits and thyroid normal size. LUNGS:  No increased work of breathing and clear to auscultation, no crackles or wheezing  CARDIOVASCULAR:  Regular rate 72 and rhythm with no murmurs, gallops, rubs, or abnormal heart sounds, normal PMI. The apical impulses not displaced  JVP less than 8 cm H2O  Heart tones are crisp and normal  Cervical veins are not engorged  The carotid upstroke is normal in amplitude and contour without delay or bruit  JVP is not elevated  ABDOMEN:  Normal bowel sounds, non-distended and non-tender to palpation  EXT: No

## 2019-09-23 ENCOUNTER — OFFICE VISIT (OUTPATIENT)
Dept: ORTHOPEDIC SURGERY | Age: 54
End: 2019-09-23
Payer: COMMERCIAL

## 2019-09-23 VITALS
RESPIRATION RATE: 16 BRPM | BODY MASS INDEX: 37.91 KG/M2 | DIASTOLIC BLOOD PRESSURE: 67 MMHG | HEART RATE: 70 BPM | SYSTOLIC BLOOD PRESSURE: 106 MMHG | WEIGHT: 206 LBS | HEIGHT: 62 IN

## 2019-09-23 DIAGNOSIS — M65.4 DE QUERVAIN'S DISEASE (RADIAL STYLOID TENOSYNOVITIS): Primary | ICD-10-CM

## 2019-09-23 PROCEDURE — G8427 DOCREV CUR MEDS BY ELIG CLIN: HCPCS | Performed by: ORTHOPAEDIC SURGERY

## 2019-09-23 PROCEDURE — 99213 OFFICE O/P EST LOW 20 MIN: CPT | Performed by: ORTHOPAEDIC SURGERY

## 2019-09-23 PROCEDURE — 1036F TOBACCO NON-USER: CPT | Performed by: ORTHOPAEDIC SURGERY

## 2019-09-23 PROCEDURE — G8417 CALC BMI ABV UP PARAM F/U: HCPCS | Performed by: ORTHOPAEDIC SURGERY

## 2019-09-23 PROCEDURE — 20550 NJX 1 TENDON SHEATH/LIGAMENT: CPT | Performed by: ORTHOPAEDIC SURGERY

## 2019-09-23 PROCEDURE — 3017F COLORECTAL CA SCREEN DOC REV: CPT | Performed by: ORTHOPAEDIC SURGERY

## 2019-09-23 PROCEDURE — G8598 ASA/ANTIPLAT THER USED: HCPCS | Performed by: ORTHOPAEDIC SURGERY

## 2019-09-23 NOTE — PROGRESS NOTES
first dorsal compartment. She understood this information well and verbally consented to this treatment. The skin of the symptomatic extremity was prepped with Isopropyl Alcohol and under aseptic conditions the  first dorsal compartment was injected with a combination of 1 ml of Triamcinolone (40 mg/ml) and Bupivacaine 0.25% without Epinephrine. There was good filling of the tendon sheath. A dry, sterile bandage was applied and the she  tolerated the injection without difficulty. I advised her of the expected response, possible reactions and the instructions for care of the hand. I have also discussed with Ms. Marybel Kelley the other treatment options available to her for this condition. We have today selected to proceed with treatment by injection with steroid medication. She and I have agreed that if our current course of Injection treatment does not prove to be effective over the short term future, that she will schedule a follow-up appointment to discuss and select an alternate course of therapy including possibly further conservative treatment or surgical treatment. I have explained to Ms. Marybel Kelley that despite successful treatment (surgical or otherwise) of her current presenting condition, that due to her coexistent conditions (both diagnosed and undiagnosed), that she is likely to have some permanent residual symptoms related to these conditions that do not improve long term. I have also explained that maximal recovery of function & symptom improvement may take a full year or longer to realize. She voiced a clear understanding of this. Ms. Marybel Kelley has been given a full verbal list of instructions and precautions related to her present condition. I have asked her to followup with me in the office at the prescribed time.  She is also specifically requested to call or return to the office sooner if her symptoms change or worsen prior to the next scheduled appointment.

## 2019-09-25 ENCOUNTER — TELEPHONE (OUTPATIENT)
Dept: ENDOCRINOLOGY | Age: 54
End: 2019-09-25

## 2019-09-28 DIAGNOSIS — J45.20 MILD INTERMITTENT ASTHMA WITHOUT COMPLICATION: ICD-10-CM

## 2019-10-01 DIAGNOSIS — E11.9 TYPE 2 DIABETES MELLITUS WITHOUT COMPLICATION, WITH LONG-TERM CURRENT USE OF INSULIN (HCC): ICD-10-CM

## 2019-10-01 DIAGNOSIS — Z79.4 TYPE 2 DIABETES MELLITUS WITHOUT COMPLICATION, WITH LONG-TERM CURRENT USE OF INSULIN (HCC): ICD-10-CM

## 2019-10-01 RX ORDER — GLUCOSAM/CHON-MSM1/C/MANG/BOSW 500-416.6
TABLET ORAL
Qty: 120 EACH | Refills: 5 | Status: SHIPPED | OUTPATIENT
Start: 2019-10-01 | End: 2019-10-16 | Stop reason: SDUPTHER

## 2019-10-01 RX ORDER — GLUCOSAM/CHON-MSM1/C/MANG/BOSW 500-416.6
TABLET ORAL
Qty: 120 EACH | Refills: 5 | Status: SHIPPED | OUTPATIENT
Start: 2019-10-01 | End: 2019-10-01 | Stop reason: SDUPTHER

## 2019-10-02 ENCOUNTER — TELEPHONE (OUTPATIENT)
Dept: ENDOCRINOLOGY | Age: 54
End: 2019-10-02

## 2019-10-04 RX ORDER — ALBUTEROL SULFATE 90 UG/1
AEROSOL, METERED RESPIRATORY (INHALATION)
Qty: 1 INHALER | Refills: 0 | Status: SHIPPED | OUTPATIENT
Start: 2019-10-04 | End: 2020-02-20 | Stop reason: SDUPTHER

## 2019-10-16 ENCOUNTER — OFFICE VISIT (OUTPATIENT)
Dept: ENDOCRINOLOGY | Age: 54
End: 2019-10-16
Payer: COMMERCIAL

## 2019-10-16 VITALS
WEIGHT: 207.4 LBS | HEIGHT: 62 IN | DIASTOLIC BLOOD PRESSURE: 71 MMHG | OXYGEN SATURATION: 100 % | BODY MASS INDEX: 38.16 KG/M2 | HEART RATE: 62 BPM | SYSTOLIC BLOOD PRESSURE: 130 MMHG

## 2019-10-16 DIAGNOSIS — Z79.4 TYPE 2 DIABETES MELLITUS WITHOUT COMPLICATION, WITH LONG-TERM CURRENT USE OF INSULIN (HCC): Primary | ICD-10-CM

## 2019-10-16 DIAGNOSIS — E11.9 TYPE 2 DIABETES MELLITUS WITHOUT COMPLICATION, WITH LONG-TERM CURRENT USE OF INSULIN (HCC): Primary | ICD-10-CM

## 2019-10-16 DIAGNOSIS — E04.1 THYROID NODULE: ICD-10-CM

## 2019-10-16 LAB — HBA1C MFR BLD: 8.2 %

## 2019-10-16 PROCEDURE — 1036F TOBACCO NON-USER: CPT | Performed by: INTERNAL MEDICINE

## 2019-10-16 PROCEDURE — G8427 DOCREV CUR MEDS BY ELIG CLIN: HCPCS | Performed by: INTERNAL MEDICINE

## 2019-10-16 PROCEDURE — 2022F DILAT RTA XM EVC RTNOPTHY: CPT | Performed by: INTERNAL MEDICINE

## 2019-10-16 PROCEDURE — 99214 OFFICE O/P EST MOD 30 MIN: CPT | Performed by: INTERNAL MEDICINE

## 2019-10-16 PROCEDURE — G8484 FLU IMMUNIZE NO ADMIN: HCPCS | Performed by: INTERNAL MEDICINE

## 2019-10-16 PROCEDURE — G8417 CALC BMI ABV UP PARAM F/U: HCPCS | Performed by: INTERNAL MEDICINE

## 2019-10-16 PROCEDURE — 3017F COLORECTAL CA SCREEN DOC REV: CPT | Performed by: INTERNAL MEDICINE

## 2019-10-16 PROCEDURE — 83036 HEMOGLOBIN GLYCOSYLATED A1C: CPT | Performed by: INTERNAL MEDICINE

## 2019-10-16 PROCEDURE — G8598 ASA/ANTIPLAT THER USED: HCPCS | Performed by: INTERNAL MEDICINE

## 2019-10-16 RX ORDER — GLUCOSAMINE HCL/CHONDROITIN SU 500-400 MG
CAPSULE ORAL
Qty: 100 STRIP | Refills: 5 | Status: SHIPPED | OUTPATIENT
Start: 2019-10-16 | End: 2021-06-21

## 2019-10-16 RX ORDER — GLUCOSAM/CHON-MSM1/C/MANG/BOSW 500-416.6
TABLET ORAL
Qty: 120 EACH | Refills: 5 | Status: SHIPPED | OUTPATIENT
Start: 2019-10-16 | End: 2020-11-10

## 2019-10-24 ENCOUNTER — TELEPHONE (OUTPATIENT)
Dept: PRIMARY CARE CLINIC | Age: 54
End: 2019-10-24

## 2019-10-25 ENCOUNTER — HOSPITAL ENCOUNTER (OUTPATIENT)
Dept: ULTRASOUND IMAGING | Age: 54
Discharge: HOME | End: 2019-10-25
Payer: COMMERCIAL

## 2019-10-25 DIAGNOSIS — Z79.4 TYPE 2 DIABETES MELLITUS WITHOUT COMPLICATION, WITH LONG-TERM CURRENT USE OF INSULIN (HCC): ICD-10-CM

## 2019-10-25 DIAGNOSIS — E11.9 TYPE 2 DIABETES MELLITUS WITHOUT COMPLICATION, WITH LONG-TERM CURRENT USE OF INSULIN (HCC): ICD-10-CM

## 2019-10-25 DIAGNOSIS — E04.1 THYROID NODULE: ICD-10-CM

## 2019-10-25 PROCEDURE — 76536 US EXAM OF HEAD AND NECK: CPT

## 2019-10-27 ENCOUNTER — PATIENT MESSAGE (OUTPATIENT)
Dept: ENDOCRINOLOGY | Age: 54
End: 2019-10-27

## 2019-10-27 DIAGNOSIS — E04.1 THYROID NODULE: Primary | ICD-10-CM

## 2019-10-29 ENCOUNTER — TELEPHONE (OUTPATIENT)
Dept: ENDOCRINOLOGY | Age: 54
End: 2019-10-29

## 2019-10-31 ENCOUNTER — TELEPHONE (OUTPATIENT)
Dept: ENDOCRINOLOGY | Age: 54
End: 2019-10-31

## 2019-11-05 ENCOUNTER — HOSPITAL ENCOUNTER (OUTPATIENT)
Dept: ULTRASOUND IMAGING | Age: 54
Discharge: HOME | End: 2019-11-05
Payer: COMMERCIAL

## 2019-11-05 VITALS
HEIGHT: 62 IN | SYSTOLIC BLOOD PRESSURE: 126 MMHG | BODY MASS INDEX: 37.73 KG/M2 | WEIGHT: 205 LBS | RESPIRATION RATE: 16 BRPM | HEART RATE: 75 BPM | DIASTOLIC BLOOD PRESSURE: 64 MMHG | TEMPERATURE: 98 F | OXYGEN SATURATION: 100 %

## 2019-11-05 DIAGNOSIS — E04.1 THYROID NODULE: ICD-10-CM

## 2019-11-05 PROCEDURE — 88305 TISSUE EXAM BY PATHOLOGIST: CPT

## 2019-11-05 PROCEDURE — 60100 BIOPSY OF THYROID: CPT

## 2019-11-05 PROCEDURE — 2500000003 HC RX 250 WO HCPCS: Performed by: INTERNAL MEDICINE

## 2019-11-05 PROCEDURE — 88173 CYTOPATH EVAL FNA REPORT: CPT

## 2019-11-05 PROCEDURE — 6370000000 HC RX 637 (ALT 250 FOR IP): Performed by: INTERNAL MEDICINE

## 2019-11-05 RX ORDER — LIDOCAINE HYDROCHLORIDE 10 MG/ML
5 INJECTION, SOLUTION EPIDURAL; INFILTRATION; INTRACAUDAL; PERINEURAL ONCE
Status: COMPLETED | OUTPATIENT
Start: 2019-11-05 | End: 2019-11-05

## 2019-11-05 RX ADMIN — LIDOCAINE HYDROCHLORIDE 5 ML: 10 INJECTION, SOLUTION EPIDURAL; INFILTRATION; INTRACAUDAL; PERINEURAL at 12:55

## 2019-11-05 RX ADMIN — Medication: at 13:15

## 2019-11-06 ENCOUNTER — TELEPHONE (OUTPATIENT)
Dept: ENDOCRINOLOGY | Age: 54
End: 2019-11-06

## 2019-11-08 DIAGNOSIS — K21.9 GASTROESOPHAGEAL REFLUX DISEASE WITHOUT ESOPHAGITIS: ICD-10-CM

## 2019-11-09 RX ORDER — KETOTIFEN FUMARATE 0.35 MG/ML
SOLUTION/ DROPS OPHTHALMIC
Qty: 1 BOTTLE | Refills: 3 | Status: SHIPPED | OUTPATIENT
Start: 2019-11-09 | End: 2020-12-10

## 2019-11-26 ENCOUNTER — TELEPHONE (OUTPATIENT)
Dept: FAMILY MEDICINE CLINIC | Age: 54
End: 2019-11-26

## 2019-11-26 ENCOUNTER — OFFICE VISIT (OUTPATIENT)
Dept: PRIMARY CARE CLINIC | Age: 54
End: 2019-11-26
Payer: COMMERCIAL

## 2019-11-26 VITALS
SYSTOLIC BLOOD PRESSURE: 112 MMHG | TEMPERATURE: 96.8 F | DIASTOLIC BLOOD PRESSURE: 66 MMHG | HEART RATE: 66 BPM | OXYGEN SATURATION: 96 % | WEIGHT: 205 LBS | HEIGHT: 62 IN | BODY MASS INDEX: 37.73 KG/M2

## 2019-11-26 DIAGNOSIS — E04.2 MULTIPLE THYROID NODULES: Primary | ICD-10-CM

## 2019-11-26 DIAGNOSIS — F33.1 MODERATE EPISODE OF RECURRENT MAJOR DEPRESSIVE DISORDER (HCC): ICD-10-CM

## 2019-11-26 DIAGNOSIS — E04.2 MULTIPLE THYROID NODULES: ICD-10-CM

## 2019-11-26 DIAGNOSIS — E66.9 OBESITY (BMI 30-39.9): ICD-10-CM

## 2019-11-26 DIAGNOSIS — E78.00 HYPERCHOLESTEREMIA: ICD-10-CM

## 2019-11-26 DIAGNOSIS — E55.9 VITAMIN D DEFICIENCY: ICD-10-CM

## 2019-11-26 DIAGNOSIS — M06.9 RHEUMATOID ARTHRITIS INVOLVING MULTIPLE SITES, UNSPECIFIED RHEUMATOID FACTOR PRESENCE: ICD-10-CM

## 2019-11-26 PROBLEM — N30.00 ACUTE CYSTITIS WITHOUT HEMATURIA: Status: RESOLVED | Noted: 2019-06-12 | Resolved: 2019-11-26

## 2019-11-26 PROCEDURE — 3017F COLORECTAL CA SCREEN DOC REV: CPT | Performed by: FAMILY MEDICINE

## 2019-11-26 PROCEDURE — G8427 DOCREV CUR MEDS BY ELIG CLIN: HCPCS | Performed by: FAMILY MEDICINE

## 2019-11-26 PROCEDURE — 99214 OFFICE O/P EST MOD 30 MIN: CPT | Performed by: FAMILY MEDICINE

## 2019-11-26 PROCEDURE — G8484 FLU IMMUNIZE NO ADMIN: HCPCS | Performed by: FAMILY MEDICINE

## 2019-11-26 PROCEDURE — 1036F TOBACCO NON-USER: CPT | Performed by: FAMILY MEDICINE

## 2019-11-26 PROCEDURE — G8598 ASA/ANTIPLAT THER USED: HCPCS | Performed by: FAMILY MEDICINE

## 2019-11-26 PROCEDURE — G8417 CALC BMI ABV UP PARAM F/U: HCPCS | Performed by: FAMILY MEDICINE

## 2019-11-26 RX ORDER — MELATONIN
1000 DAILY
Qty: 90 TABLET | Refills: 1 | Status: SHIPPED | OUTPATIENT
Start: 2019-11-26 | End: 2022-04-15

## 2019-11-27 LAB
CHOLESTEROL, TOTAL: 254 MG/DL (ref 0–199)
HDLC SERPL-MCNC: 47 MG/DL (ref 40–60)
LDL CHOLESTEROL CALCULATED: 178 MG/DL
TRIGL SERPL-MCNC: 145 MG/DL (ref 0–150)
TSH REFLEX: 1.03 UIU/ML (ref 0.27–4.2)
VLDLC SERPL CALC-MCNC: 29 MG/DL

## 2020-01-02 ENCOUNTER — TELEPHONE (OUTPATIENT)
Dept: ENDOCRINOLOGY | Age: 55
End: 2020-01-02

## 2020-01-16 ENCOUNTER — TELEPHONE (OUTPATIENT)
Dept: PRIMARY CARE CLINIC | Age: 55
End: 2020-01-16

## 2020-01-16 NOTE — TELEPHONE ENCOUNTER
Patient calling to let Dr Eve Segal know she had to go to Urgent Care on Monday, diagnosed with type A flu and Bronchitis, She was prescribed Tamilflu, tessalon pearls and an methylprednisone pack.   Patient wants to know if she should be taking an antibiotic and she is uncertain about the Tamiflu, Please give her call back at [phone]

## 2020-01-16 NOTE — TELEPHONE ENCOUNTER
Please follow the directions given at urgent care. I am not going to prescribe an antibiotic for you.

## 2020-01-17 ENCOUNTER — TELEPHONE (OUTPATIENT)
Dept: PRIMARY CARE CLINIC | Age: 55
End: 2020-01-17

## 2020-01-17 NOTE — TELEPHONE ENCOUNTER
Marcin Elam @ [facility] [employer]   Follow up:   Please call to verify the status of the previous request for the following:  ~A for that was faxed over in 1-10-20 requesting incontinence supplies   Please return call to verify the status. Thank you!    Ref# [id number]     [phone]

## 2020-01-30 ENCOUNTER — TELEPHONE (OUTPATIENT)
Dept: ENDOCRINOLOGY | Age: 55
End: 2020-01-30

## 2020-01-30 NOTE — TELEPHONE ENCOUNTER
Patient needs a new prescription for trumetrics meter and strips. Her current meter is no longer works properly.     [facility]

## 2020-02-18 ENCOUNTER — TELEPHONE (OUTPATIENT)
Dept: PSYCHIATRY | Age: 55
End: 2020-02-18

## 2020-02-18 ENCOUNTER — OFFICE VISIT (OUTPATIENT)
Dept: PSYCHIATRY | Age: 55
End: 2020-02-18
Payer: COMMERCIAL

## 2020-02-18 VITALS
BODY MASS INDEX: 37.58 KG/M2 | HEART RATE: 70 BPM | SYSTOLIC BLOOD PRESSURE: 102 MMHG | DIASTOLIC BLOOD PRESSURE: 59 MMHG | HEIGHT: 62 IN | WEIGHT: 204.2 LBS

## 2020-02-18 PROCEDURE — 99204 OFFICE O/P NEW MOD 45 MIN: CPT | Performed by: NURSE PRACTITIONER

## 2020-02-18 ASSESSMENT — ANXIETY QUESTIONNAIRES
1. FEELING NERVOUS, ANXIOUS, OR ON EDGE: 3-NEARLY EVERY DAY
GAD7 TOTAL SCORE: 19
6. BECOMING EASILY ANNOYED OR IRRITABLE: 3-NEARLY EVERY DAY
3. WORRYING TOO MUCH ABOUT DIFFERENT THINGS: 3-NEARLY EVERY DAY
2. NOT BEING ABLE TO STOP OR CONTROL WORRYING: 2-OVER HALF THE DAYS
5. BEING SO RESTLESS THAT IT IS HARD TO SIT STILL: 3-NEARLY EVERY DAY
4. TROUBLE RELAXING: 3-NEARLY EVERY DAY
7. FEELING AFRAID AS IF SOMETHING AWFUL MIGHT HAPPEN: 2-OVER HALF THE DAYS

## 2020-02-18 ASSESSMENT — PATIENT HEALTH QUESTIONNAIRE - PHQ9
SUM OF ALL RESPONSES TO PHQ QUESTIONS 1-9: 19
2. FEELING DOWN, DEPRESSED OR HOPELESS: 3
SUM OF ALL RESPONSES TO PHQ QUESTIONS 1-9: 19
3. TROUBLE FALLING OR STAYING ASLEEP: 3
4. FEELING TIRED OR HAVING LITTLE ENERGY: 3
1. LITTLE INTEREST OR PLEASURE IN DOING THINGS: 2
10. IF YOU CHECKED OFF ANY PROBLEMS, HOW DIFFICULT HAVE THESE PROBLEMS MADE IT FOR YOU TO DO YOUR WORK, TAKE CARE OF THINGS AT HOME, OR GET ALONG WITH OTHER PEOPLE: 0
9. THOUGHTS THAT YOU WOULD BE BETTER OFF DEAD, OR OF HURTING YOURSELF: 0
SUM OF ALL RESPONSES TO PHQ9 QUESTIONS 1 & 2: 5
6. FEELING BAD ABOUT YOURSELF - OR THAT YOU ARE A FAILURE OR HAVE LET YOURSELF OR YOUR FAMILY DOWN: 2
5. POOR APPETITE OR OVEREATING: 3
7. TROUBLE CONCENTRATING ON THINGS, SUCH AS READING THE NEWSPAPER OR WATCHING TELEVISION: 3
8. MOVING OR SPEAKING SO SLOWLY THAT OTHER PEOPLE COULD HAVE NOTICED. OR THE OPPOSITE, BEING SO FIGETY OR RESTLESS THAT YOU HAVE BEEN MOVING AROUND A LOT MORE THAN USUAL: 0

## 2020-02-18 NOTE — TELEPHONE ENCOUNTER
Please use preferred drug list (PDL) medications: at least two of the following for at least 4 weeks each: aripiprazole, ziprasidone, quetiapine, risperidone, or olanzapine) (generics when available).  Plan
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
## 2020-02-19 NOTE — PROGRESS NOTES
Patient not reached. Preop instructions left on voice mail.  Number___[phone]____________    -Date__3/13/2020_____time__1430_____arrival___1300 masc_________  -Nothing to eat or drink after midnight  -Responsible adult 25 or older to stay on site while you are here and drive you home and stay with you after  -Follow any instructions your doctors office has given you  -Bring a complete list of all your medications and supplements  -If you normally take the following medications in the morning please do so with a small    sip of water-heart,blood pressure,seizure,breathing or thyroid-avoid water pilll Do not take blood pressure medications ending in \"marcus\" or \"pril\" the AM of surgery or the ujlia prior  -You may use your inhalers  -Take half of your normal dose of any long acting insulins the night before-do not take    any diabetic medications in the morning  -Follow your doctors instructions regarding blood thinners  -Any questions call your surgeons office  Anesthesia attempts to review all Endo charts prior to surgery and will place any PAT orders,Surgery patients will have orders placed based on history in chart which may not be complete  ENDOSCOPY PATIENTS ONLY-FOLLOW YOUR DOCTORS BOWEL PREP INSTRUCTIONS,THIS MAY INCLUDE TAKING A SECOND PORTION OF YOUR PREP AFTER MIDNIGHT

## 2020-02-20 ENCOUNTER — ANESTHESIA EVENT (OUTPATIENT)
Dept: ENDOSCOPY | Age: 55
End: 2020-02-20
Payer: COMMERCIAL

## 2020-02-20 ENCOUNTER — OFFICE VISIT (OUTPATIENT)
Dept: PRIMARY CARE CLINIC | Age: 55
End: 2020-02-20
Payer: COMMERCIAL

## 2020-02-20 VITALS
HEIGHT: 62 IN | SYSTOLIC BLOOD PRESSURE: 129 MMHG | DIASTOLIC BLOOD PRESSURE: 64 MMHG | OXYGEN SATURATION: 98 % | WEIGHT: 204 LBS | BODY MASS INDEX: 37.54 KG/M2 | HEART RATE: 71 BPM | TEMPERATURE: 97.3 F

## 2020-02-20 PROCEDURE — 99214 OFFICE O/P EST MOD 30 MIN: CPT | Performed by: FAMILY MEDICINE

## 2020-02-20 PROCEDURE — G8484 FLU IMMUNIZE NO ADMIN: HCPCS | Performed by: FAMILY MEDICINE

## 2020-02-20 PROCEDURE — 2022F DILAT RTA XM EVC RTNOPTHY: CPT | Performed by: FAMILY MEDICINE

## 2020-02-20 PROCEDURE — G8417 CALC BMI ABV UP PARAM F/U: HCPCS | Performed by: FAMILY MEDICINE

## 2020-02-20 PROCEDURE — 3046F HEMOGLOBIN A1C LEVEL >9.0%: CPT | Performed by: FAMILY MEDICINE

## 2020-02-20 PROCEDURE — 1036F TOBACCO NON-USER: CPT | Performed by: FAMILY MEDICINE

## 2020-02-20 PROCEDURE — G8427 DOCREV CUR MEDS BY ELIG CLIN: HCPCS | Performed by: FAMILY MEDICINE

## 2020-02-20 PROCEDURE — 3017F COLORECTAL CA SCREEN DOC REV: CPT | Performed by: FAMILY MEDICINE

## 2020-02-20 RX ORDER — CYCLOBENZAPRINE HCL 10 MG
5 TABLET ORAL DAILY PRN
Qty: 30 TABLET | Refills: 5 | Status: SHIPPED | OUTPATIENT
Start: 2020-02-20 | End: 2021-06-25

## 2020-02-20 RX ORDER — METHYLPREDNISOLONE 4 MG/1
TABLET ORAL
COMMUNITY
Start: 2020-01-13 | End: 2020-02-20 | Stop reason: ALTCHOICE

## 2020-02-20 RX ORDER — ALBUTEROL SULFATE 90 UG/1
AEROSOL, METERED RESPIRATORY (INHALATION)
Qty: 1 INHALER | Refills: 2 | Status: SHIPPED | OUTPATIENT
Start: 2020-02-20 | End: 2021-03-08 | Stop reason: SDUPTHER

## 2020-02-20 RX ORDER — IBUPROFEN 800 MG/1
800 TABLET ORAL EVERY 8 HOURS PRN
Qty: 90 TABLET | Refills: 0 | Status: SHIPPED | OUTPATIENT
Start: 2020-02-20 | End: 2020-05-28

## 2020-02-20 RX ORDER — LORATADINE 10 MG/1
TABLET ORAL
Qty: 30 TABLET | Refills: 5 | Status: SHIPPED | OUTPATIENT
Start: 2020-02-20 | End: 2021-05-03 | Stop reason: SDUPTHER

## 2020-02-20 RX ORDER — OSELTAMIVIR PHOSPHATE 75 MG/1
CAPSULE ORAL
COMMUNITY
Start: 2020-01-13 | End: 2020-02-20 | Stop reason: ALTCHOICE

## 2020-02-20 RX ORDER — BENZONATATE 100 MG/1
CAPSULE ORAL PRN
COMMUNITY
Start: 2020-01-13 | End: 2020-02-20 | Stop reason: ALTCHOICE

## 2020-02-20 NOTE — PATIENT INSTRUCTIONS
example, you can exercise 3 times a day for 10 or 20 minutes. Moderate exercise is safe for most people, but it is always a good idea to talk to your doctor before starting an exercise program.  · Keep moving. Hammad Motts the lawn, work in the garden, or [employer]. Take the stairs instead of the elevator at work. · If you smoke, quit. People who smoke have an increased risk for heart attack, stroke, cancer, and other lung illnesses. Quitting is hard, but there are ways to boost your chance of quitting tobacco for good. ? Use nicotine gum, patches, or lozenges. ? Ask your doctor about stop-smoking programs and medicines. ? Keep trying. In addition to reducing your risk of diseases in the future, you will notice some benefits soon after you stop using tobacco. If you have shortness of breath or asthma symptoms, they will likely get better within a few weeks after you quit. · Limit how much alcohol you drink. Moderate amounts of alcohol (up to 2 drinks a day for men, 1 drink a day for women) are okay. But drinking too much can lead to liver problems, high blood pressure, and other health problems. Family health  If you have a family, there are many things you can do together to improve your health. · Eat meals together as a family as often as possible. · Eat healthy foods. This includes fruits, vegetables, lean meats and dairy, and whole grains. · Include your family in your fitness plan. Most people think of activities such as jogging or tennis as the way to fitness, but there are many ways you and your family can be more active. Anything that makes you breathe hard and gets your heart pumping is exercise. Here are some tips:  ? Walk to do errands or to take your child to school or the bus.  ? Go for a family bike ride after dinner instead of watching TV. Where can you learn more? Go to [email or web address] org and sign in to your [employer] account.  Enter Z802 in the [facility]

## 2020-02-24 ENCOUNTER — TELEPHONE (OUTPATIENT)
Dept: PRIMARY CARE CLINIC | Age: 55
End: 2020-02-24

## 2020-02-24 ENCOUNTER — TELEPHONE (OUTPATIENT)
Dept: PSYCHIATRY | Age: 55
End: 2020-02-24

## 2020-02-24 RX ORDER — GUAIFENESIN AND PSEUDOEPHEDRINE HYDROCHLORIDE 1200; 120 MG/1; MG/1
TABLET, EXTENDED RELEASE ORAL
Qty: 28 TABLET | Refills: 0 | Status: SHIPPED | OUTPATIENT
Start: 2020-02-24 | End: 2020-07-09 | Stop reason: SDUPTHER

## 2020-02-25 RX ORDER — QUETIAPINE FUMARATE 25 MG/1
TABLET, FILM COATED ORAL
Qty: 60 TABLET | Refills: 3 | Status: SHIPPED | OUTPATIENT
Start: 2020-02-25 | End: 2020-05-27

## 2020-03-13 ENCOUNTER — HOSPITAL ENCOUNTER (OUTPATIENT)
Age: 55
Setting detail: OUTPATIENT SURGERY
Discharge: HOME | End: 2020-03-13
Attending: INTERNAL MEDICINE | Admitting: INTERNAL MEDICINE
Payer: COMMERCIAL

## 2020-03-13 ENCOUNTER — ANESTHESIA (OUTPATIENT)
Dept: ENDOSCOPY | Age: 55
End: 2020-03-13
Payer: COMMERCIAL

## 2020-03-13 VITALS
OXYGEN SATURATION: 99 % | DIASTOLIC BLOOD PRESSURE: 90 MMHG | RESPIRATION RATE: 12 BRPM | SYSTOLIC BLOOD PRESSURE: 168 MMHG

## 2020-03-13 VITALS
OXYGEN SATURATION: 100 % | WEIGHT: 201 LBS | BODY MASS INDEX: 36.99 KG/M2 | HEIGHT: 62 IN | SYSTOLIC BLOOD PRESSURE: 113 MMHG | HEART RATE: 74 BPM | DIASTOLIC BLOOD PRESSURE: 71 MMHG | RESPIRATION RATE: 16 BRPM | TEMPERATURE: 97.9 F

## 2020-03-13 LAB
GLUCOSE BLD-MCNC: 159 MG/DL (ref 70–99)
GLUCOSE BLD-MCNC: 194 MG/DL (ref 70–99)
PERFORMED ON: ABNORMAL
PERFORMED ON: ABNORMAL

## 2020-03-13 PROCEDURE — 3609017700 HC EGD DILATION GASTRIC/DUODENAL STRICTURE: Performed by: INTERNAL MEDICINE

## 2020-03-13 PROCEDURE — 7100000010 HC PHASE II RECOVERY - FIRST 15 MIN: Performed by: INTERNAL MEDICINE

## 2020-03-13 PROCEDURE — 6360000002 HC RX W HCPCS: Performed by: NURSE ANESTHETIST, CERTIFIED REGISTERED

## 2020-03-13 PROCEDURE — 7100000001 HC PACU RECOVERY - ADDTL 15 MIN: Performed by: INTERNAL MEDICINE

## 2020-03-13 PROCEDURE — 7100000000 HC PACU RECOVERY - FIRST 15 MIN: Performed by: INTERNAL MEDICINE

## 2020-03-13 PROCEDURE — 3609013800 HC EGD SUBMUCOSAL/BOTOX INJECTION: Performed by: INTERNAL MEDICINE

## 2020-03-13 PROCEDURE — 2580000003 HC RX 258: Performed by: NURSE ANESTHETIST, CERTIFIED REGISTERED

## 2020-03-13 PROCEDURE — 2580000003 HC RX 258: Performed by: FAMILY MEDICINE

## 2020-03-13 PROCEDURE — 6360000002 HC RX W HCPCS: Performed by: INTERNAL MEDICINE

## 2020-03-13 PROCEDURE — C1726 CATH, BAL DIL, NON-VASCULAR: HCPCS | Performed by: INTERNAL MEDICINE

## 2020-03-13 PROCEDURE — 2500000003 HC RX 250 WO HCPCS: Performed by: NURSE ANESTHETIST, CERTIFIED REGISTERED

## 2020-03-13 PROCEDURE — 6370000000 HC RX 637 (ALT 250 FOR IP): Performed by: ANESTHESIOLOGY

## 2020-03-13 PROCEDURE — 7100000011 HC PHASE II RECOVERY - ADDTL 15 MIN: Performed by: INTERNAL MEDICINE

## 2020-03-13 PROCEDURE — 2709999900 HC NON-CHARGEABLE SUPPLY: Performed by: INTERNAL MEDICINE

## 2020-03-13 PROCEDURE — 3700000001 HC ADD 15 MINUTES (ANESTHESIA): Performed by: INTERNAL MEDICINE

## 2020-03-13 PROCEDURE — 3700000000 HC ANESTHESIA ATTENDED CARE: Performed by: INTERNAL MEDICINE

## 2020-03-13 RX ORDER — PROPOFOL 10 MG/ML
INJECTION, EMULSION INTRAVENOUS PRN
Status: DISCONTINUED | OUTPATIENT
Start: 2020-03-13 | End: 2020-03-13 | Stop reason: SDUPTHER

## 2020-03-13 RX ORDER — PROPOFOL 10 MG/ML
INJECTION, EMULSION INTRAVENOUS CONTINUOUS PRN
Status: DISCONTINUED | OUTPATIENT
Start: 2020-03-13 | End: 2020-03-13 | Stop reason: SDUPTHER

## 2020-03-13 RX ORDER — SODIUM CHLORIDE 9 MG/ML
INJECTION, SOLUTION INTRAVENOUS CONTINUOUS PRN
Status: DISCONTINUED | OUTPATIENT
Start: 2020-03-13 | End: 2020-03-13 | Stop reason: SDUPTHER

## 2020-03-13 RX ORDER — SODIUM CHLORIDE 0.9 % (FLUSH) 0.9 %
10 SYRINGE (ML) INJECTION PRN
Status: DISCONTINUED | OUTPATIENT
Start: 2020-03-13 | End: 2020-03-13 | Stop reason: HOSPADM

## 2020-03-13 RX ORDER — SODIUM CHLORIDE 0.9 % (FLUSH) 0.9 %
10 SYRINGE (ML) INJECTION EVERY 12 HOURS SCHEDULED
Status: DISCONTINUED | OUTPATIENT
Start: 2020-03-13 | End: 2020-03-13 | Stop reason: HOSPADM

## 2020-03-13 RX ORDER — ONDANSETRON 2 MG/ML
INJECTION INTRAMUSCULAR; INTRAVENOUS PRN
Status: DISCONTINUED | OUTPATIENT
Start: 2020-03-13 | End: 2020-03-13 | Stop reason: SDUPTHER

## 2020-03-13 RX ORDER — LIDOCAINE HYDROCHLORIDE 20 MG/ML
INJECTION, SOLUTION INFILTRATION; PERINEURAL PRN
Status: DISCONTINUED | OUTPATIENT
Start: 2020-03-13 | End: 2020-03-13 | Stop reason: SDUPTHER

## 2020-03-13 RX ORDER — M-VIT,TX,IRON,MINS/CALC/FOLIC 27MG-0.4MG
1 TABLET ORAL DAILY
COMMUNITY

## 2020-03-13 RX ORDER — SCOLOPAMINE TRANSDERMAL SYSTEM 1 MG/1
1 PATCH, EXTENDED RELEASE TRANSDERMAL ONCE
Status: DISCONTINUED | OUTPATIENT
Start: 2020-03-13 | End: 2020-03-13 | Stop reason: HOSPADM

## 2020-03-13 RX ORDER — ONDANSETRON 2 MG/ML
4 INJECTION INTRAMUSCULAR; INTRAVENOUS
Status: DISCONTINUED | OUTPATIENT
Start: 2020-03-13 | End: 2020-03-13 | Stop reason: HOSPADM

## 2020-03-13 RX ORDER — SODIUM CHLORIDE 9 MG/ML
INJECTION, SOLUTION INTRAVENOUS CONTINUOUS
Status: DISCONTINUED | OUTPATIENT
Start: 2020-03-13 | End: 2020-03-13 | Stop reason: HOSPADM

## 2020-03-13 RX ADMIN — SODIUM CHLORIDE: 9 INJECTION, SOLUTION INTRAVENOUS at 14:43

## 2020-03-13 RX ADMIN — ONDANSETRON 4 MG: 2 INJECTION INTRAMUSCULAR; INTRAVENOUS at 14:55

## 2020-03-13 RX ADMIN — LIDOCAINE HYDROCHLORIDE 60 MG: 20 INJECTION, SOLUTION INFILTRATION; PERINEURAL at 14:49

## 2020-03-13 RX ADMIN — PROPOFOL 80 MG: 10 INJECTION, EMULSION INTRAVENOUS at 14:48

## 2020-03-13 RX ADMIN — PROPOFOL 120 MCG/KG/MIN: 10 INJECTION, EMULSION INTRAVENOUS at 14:50

## 2020-03-13 RX ADMIN — SODIUM CHLORIDE: 9 INJECTION, SOLUTION INTRAVENOUS at 13:14

## 2020-03-13 ASSESSMENT — PULMONARY FUNCTION TESTS
PIF_VALUE: 0

## 2020-03-13 ASSESSMENT — PAIN - FUNCTIONAL ASSESSMENT: PAIN_FUNCTIONAL_ASSESSMENT: 0-10

## 2020-03-13 NOTE — PROGRESS NOTES
Discharge instructions reviewed with patient/responsible adult. All home medications have been reviewed, questions answered and patient and mother verbalized understanding. Discharge instructions signed and copies given. Patient discharged  Per  w/amy belongings.

## 2020-03-13 NOTE — ANESTHESIA PRE PROCEDURE
Department of Anesthesiology  Preprocedure Note       Name:  Lucia Watts   Age:  63 y.o.  :  1965                                          MRN:  [id number]         Date:  3/13/2020      Surgeon: Krista Philippe):  Kofi Sullivan MD    Procedure: EGD DILATION AND BOTOX INJECTION PYLORUS (N/A Abdomen)    Medications prior to admission:   Prior to Admission medications    Medication Sig Start Date End Date Taking?  Authorizing Provider   MUCINEX D MAX STRENGTH 120-1200 MG TB12 TAKE ONE TABLET BY MOUTH EVERY 12 HOURS AS NEEDED FOR NASAL CONGESTION 20  Yes Adilene Roman MD   vitamin D (MAXIMUM D3) 250 MCG (62277 UT) CAPS capsule Take 1 capsule by mouth daily 20  Yes Adilene Roman MD   ibuprofen (ADVIL;MOTRIN) 800 MG tablet Take 1 tablet by mouth every 8 hours as needed for Pain TAKE ONE TABLET BY MOUTH EVERY 6 HOURS AS NEEDED FOR PAIN 20  Yes Adilene Roman MD   albuterol sulfate  (90 Base) MCG/ACT inhaler INHALE TWO PUFFS BY MOUTH EVERY 6 HOURS AS NEEDED FOR WHEEZING 20  Yes Adilene Roman MD   loratadine (CLARITIN) 10 MG tablet TAKE ONE TABLET BY MOUTH DAILY 20  Yes Adilene Roman MD   aspirin 81 MG chewable tablet Take 1 tablet by mouth daily (coated tablets) 19  Yes KRISHNA Xiong CNP   nystatin (MYCOSTATIN) 987594 UNIT/GM cream APPLY EXTERNALLY TO THE AFFECTED AREAS TWO TIMES A DAY 19  Yes Adilene Roman MD   GAS-X EXTRA STRENGTH 125 MG chewable tablet CHEW TWO TABLETS BY MOUTH THREE TIMES A DAY WITH EACH MEAL AS NEEDED FOR FLATULENCE 18  Yes Adilene Roman MD   ranitidine (ZANTAC) 300 MG capsule Take 1 capsule by mouth every evening 17  Yes Adilene Roman MD   QUEtiapine (SEROQUEL) 25 MG tablet Start one tab nightly x 7 days then increase to 2 tabs nightly 20   KRISHNA Atkinson CNP   pitavastatin (LIVALO) 1 MG TABS tablet Take 1 tablet by mouth nightly 20   Adilene Roman MD daily  Patient not taking: Reported on 2/18/2020 11/16/18   Adilene Roman MD       Current medications:    Current Facility-Administered Medications   Medication Dose Route Frequency Provider Last Rate Last Dose    0.9 % sodium chloride infusion   Intravenous Continuous Margareth Hair MD        sodium chloride flush 0.9 % injection 10 mL  10 mL Intravenous 2 times per day Margareth Hair MD        sodium chloride flush 0.9 % injection 10 mL  10 mL Intravenous PRN Margareth Hair MD        ondansetron [facility] injection 4 mg  4 mg Intravenous Once PRN Jeffery Montilla MD         Facility-Administered Medications Ordered in Other Encounters   Medication Dose Route Frequency Provider Last Rate Last Dose    0.9 % sodium chloride infusion   Intravenous Continuous Jeffery Montilla MD           Allergies:     Allergies   Allergen Reactions    Penicillins Hives and Shortness Of Breath    Shellfish-Derived Products Swelling     Throat and tongue swells, allergy to all seafood    Aspartame And Phenylalanine      Severe headaches    Bactrim Hives    Biaxin [Clarithromycin] Hives    Cephalexin Other (See Comments)     blisters    Hydrocodone-Acetaminophen Other (See Comments)     HALLUCINATIONS    Lansoprazole Hives    Nexium [Esomeprazole Magnesium Trihydrate] Hives    Other Other (See Comments)     TEGADERM-BLISTERS    Prilosec [Omeprazole] Hives    Blueberry [Vaccinium Angustifolium] Nausea And Vomiting     Projectile vomiting    Monosodium Glutamate Nausea And Vomiting    Zmax [Azithromycin Dihydrate] Nausea And Vomiting     NOT Z PACK its the Powder you had to mix and drink       Problem List:    Patient Active Problem List   Diagnosis Code    DM2 (diabetes mellitus, type 2) ([facility]) E11.9    GERD (gastroesophageal reflux disease) K21.9    Fibromyalgia M79.7    Urine incontinence R32    Asthma J45.909    Chronic back pain M54.9, G89.29    Rheumatoid arthritis ([facility] [address] M06.9    Gastroparesis K31.84    Thyroid cyst E04.1    Vitamin D deficiency E55.9    Multiple thyroid nodules E04.2    IBS (irritable bowel syndrome) K58.9    Disequilibrium syndrome E87.8    Subjective tinnitus of both ears H93.13    Bilateral high frequency sensorineural hearing loss H90.3    Hypercholesteremia E78.00    Obstructive sleep apnea syndrome G47.33    Moderate episode of recurrent major depressive disorder ([address] F33.1    Nephrolithiasis N20.0    Unstable angina (HCC) I20.0    Obesity (BMI 30-39. 9) E66.9    Coronary artery disease due to lipid rich plaque I25.10, I25.83       Past Medical History:        Diagnosis Date    Ankle fracture, left     Ankle fracture, right     Arthritis     Asthma     Bipolar depression (HCC)     CAN'T AFFORD MEDS    Bladder problem     Cervical disc herniation     Diabetes mellitus ([address]     diet control    Fatty liver disease, non-alcoholic     Fibromyalgia     Gastroparesis     Dr Anthony Puentes GERD (gastroesophageal reflux disease)     gastroporesis    Glaucoma     Dr Shagufta Bernardo Hyperlipidemia     elevated LFT on meds    IBS (irritable bowel syndrome)     Lumbar herniated disc     Nausea & vomiting     Nephrolithiasis 1/29/2019    Obesity (BMI 30-39.9) 3/11/2019    Partial Achilles tendon tear     Pneumonia     PONV (postoperative nausea and vomiting)     RA (rheumatoid arthritis) ([address]     Rapid or irregular heartbeat     Sleep apnea     does not use cpap    Spinal stenosis     Stress incontinence     Thyroid disease     growths       Past Surgical History:        Procedure Laterality Date    CHOLECYSTECTOMY      COLONOSCOPY      COLONOSCOPY  03/06/2018    with polypectomies    DILATION AND CURETTAGE OF UTERUS      ENDOMETRIAL ABLATION      ENDOSCOPY, COLON, DIAGNOSTIC      ERCP  5/25/2010    with stent    ERCP  1-31-14    ERCP WITH SPHINTER OF ODDI MANOMETERY    ESOPHAGOSCOPY  10/5/10    botox injection    EYE SURGERY      LASER FOR GLAUCOMA    LAPAROSCOPY      TONSILLECTOMY      UPPER GASTROINTESTINAL ENDOSCOPY      UPPER GASTROINTESTINAL ENDOSCOPY  04/12/2011    DILATATION, 58 FR ARTHUR, 100 UNITS BOTOX    UPPER GASTROINTESTINAL ENDOSCOPY  08/30/2011    58 FR DILATATION, 100 UNITS  BOTOX INJECTION    UPPER GASTROINTESTINAL ENDOSCOPY  3-9-12    with dilatation and submucosal injection: Botox    UPPER GASTROINTESTINAL ENDOSCOPY      UPPER GASTROINTESTINAL ENDOSCOPY  11/20/12     Esophagogastroduodenoscopy with Botulinum toxin injection of the pylorus and Arthur esophageal dilation    UPPER GASTROINTESTINAL ENDOSCOPY  6/4/13    WITH DIILITATION AND BOTOX INJECTION    UPPER GASTROINTESTINAL ENDOSCOPY  5/20/2014    100 unit Botox injection, 56 Fr.  Arthur esophageal dilatation    UPPER GASTROINTESTINAL ENDOSCOPY  12/12/14    with esophageal dilatation, and botox injection    UPPER GASTROINTESTINAL ENDOSCOPY  09/09/2015    With Dilitation and Botox injection    UPPER GASTROINTESTINAL ENDOSCOPY  5/10/2016    distal esophagus biopsy, stomach biopsy, botox injection    UPPER GASTROINTESTINAL ENDOSCOPY  04/11/2017    with dilatation and botox injection    UPPER GASTROINTESTINAL ENDOSCOPY  10/24/2017    DILATATION, BIOPSY, BOTOX    UPPER GASTROINTESTINAL ENDOSCOPY  03/06/2018    WITH BOTOX AND BALLOON DILATATION    UPPER GASTROINTESTINAL ENDOSCOPY N/A 5/7/2019    EGD SUBMUCOSAL/BOTOX INJECTION performed by Marisol Kumar MD at [place] [address] N/A 5/7/2019    EGD DILATION BALLOON performed by Marisol Kumar MD at [address] [place] [address] History:    Social History     Tobacco Use    Smoking status: Never Smoker    Smokeless tobacco: Never Used   Substance Use Topics    Alcohol use: No     Alcohol/week: 0.0 standard drinks                                Counseling given: Not Answered      Vital Signs (Current):   Vitals:    03/13/20 1259   Weight: 201 lb (91.2 kg)   Height: 5' 2\" (1.575 m)

## 2020-03-13 NOTE — PROGRESS NOTES
Reviewed patient's medical and surgical history in electronic record and with patient at the bedside. All questions regarding procedure answered. Scope number and equipment verified using a two person system. Family in waiting room.     Electronically signed by Mervat Montiel RN on 3/13/2020 at 2:43 PM

## 2020-03-13 NOTE — PROGRESS NOTES
Teaching/ education completed for home care including pain management, activity,safety precautions and infection control. Patient and family verbalized understanding.

## 2020-03-13 NOTE — H&P
Reported on 2/18/2020) 30 tablet 5        Allergies:  Penicillins; Shellfish-derived products; Aspartame and phenylalanine; Bactrim; Biaxin [clarithromycin]; Cephalexin; Hydrocodone-acetaminophen; Lansoprazole; Nexium [esomeprazole magnesium trihydrate]; Other; Prilosec [omeprazole]; Blueberry [vaccinium angustifolium]; Monosodium glutamate; and Zmax [azithromycin dihydrate]      Social History:   Social History     Tobacco Use    Smoking status: Never Smoker    Smokeless tobacco: Never Used   Substance Use Topics    Alcohol use: No     Alcohol/week: 0.0 standard drinks     Family History:   Family History   Problem Relation Age of Onset    Diabetes Mother     Heart Disease Mother     Stroke Mother     Heart Disease Father     Heart Disease Brother     High Blood Pressure Brother     High Cholesterol Brother     Diabetes Brother     High Cholesterol Brother     Anesth Problems Neg Hx     Malig Hyperten Neg Hx     Hypotension Neg Hx     Malig Hypertherm Neg Hx     Pseudochol. Deficiency Neg Hx        PHYSICAL EXAM:      BP (!) 142/78   Pulse 74   Temp 97 °F (36.1 °C) (Temporal)   Resp 16   Ht 5' 2\" (1.575 m)   Wt 201 lb (91.2 kg)   LMP  (LMP Unknown)   SpO2 100%   BMI 36.76 kg/m²  I        Heart:  RRR, normal s1s2    Lungs:  CTA and normal effort    Abdomen:   Soft, nt nd. ASSESSMENT AND PLAN:    1. Patient is a 63 y.o. female here for endoscopy with Botox injection of the pylorus under MAC sedation. 2.  Procedure options, risks and benefits reviewed with patient and/or guardian. They express understanding.      Jose Nelson MD  [address]  3/13/2020

## 2020-03-13 NOTE — ANESTHESIA POSTPROCEDURE EVALUATION
Department of Anesthesiology  Postprocedure Note    Patient: Eduardo Christian  MRN: [id number]  YOB: 1965  Date of evaluation: 3/13/2020  Time:  3:18 PM     Procedure Summary     Date:  03/13/20 Room / Location:  [address] [place] [address]    Anesthesia Start:  9293 Anesthesia Stop:  1500    Procedures:       EGD SUBMUCOSAL/BOTOX INJECTION (N/A Abdomen)      EGD DILATION BALLOON (N/A Abdomen) Diagnosis:       (DYSPHAGIA R13.10)      (GASTROPARESIS K31.84)    Surgeon:  Tamara Segundo MD Responsible Provider:  Momo Snow MD    Anesthesia Type:  TIVA ASA Status:  3          Anesthesia Type: TIVA    Harman Phase I: Harman Score: 8    Harman Phase II:      Last vitals: Reviewed and per EMR flowsheets.        Anesthesia Post Evaluation    Patient location during evaluation: PACU  Patient participation: complete - patient participated  Level of consciousness: awake  Airway patency: patent  Nausea & Vomiting: no vomiting  Complications: no  Cardiovascular status: hemodynamically stable  Respiratory status: acceptable  Hydration status: euvolemic

## 2020-03-31 ENCOUNTER — TELEPHONE (OUTPATIENT)
Dept: PRIMARY CARE CLINIC | Age: 55
End: 2020-03-31

## 2020-03-31 NOTE — TELEPHONE ENCOUNTER
Patient called need med. Refill (MUCINEX D MAX STRENGTH 120-1200 MG TB12) as soon as possible.       Pharmacy     [facility] [address], [address] [place] [address] [phone]

## 2020-04-01 NOTE — TELEPHONE ENCOUNTER
Spoke to pt and advised her that she would need to establish care via a virtual visit in order to get her script refilled, pt states that she doesn't have virtual visit capabilities

## 2020-04-21 ENCOUNTER — OFFICE VISIT (OUTPATIENT)
Dept: PSYCHIATRY | Age: 55
End: 2020-04-21
Payer: COMMERCIAL

## 2020-04-21 PROCEDURE — 99443 PR PHYS/QHP TELEPHONE EVALUATION 21-30 MIN: CPT | Performed by: NURSE PRACTITIONER

## 2020-04-21 RX ORDER — DIAZEPAM 2 MG/1
2 TABLET ORAL EVERY 6 HOURS PRN
Qty: 40 TABLET | Refills: 0 | Status: SHIPPED | OUTPATIENT
Start: 2020-04-21 | End: 2020-05-01

## 2020-04-21 NOTE — PROGRESS NOTES
muscle spasms in abdomen. Uncomfortable. Increases anxiety    Mom's birthday was 35th of march, couldn't visit her [facility]. Couldn't celebrate easter with family. Substance:   Alcohol:  denies              Illicits:  Denies               Caffeine:  1 cup tea/day, was 4-5 large cups/day; now drink lots water        Psych ROS:      Depression: rates 5/10 (10 best); crying a lot, then little better 5-10 minutes later. Not sure if not sleeping or just missing people or what; labile, can fluctuate quickly. Yesterday bad day all day. Wasn't hungry. Didn't want to do anything. Just sat in chair, got up when had to. Got up today, mowed the grass. change in appetite (decreased, not hungry most the time. Do make self eat, has lost 10# since initial visit; had previously lost 105# in 6 months d/t low appetite; was dx 2 years ago with DM; had botox shots and esophageal stretching 2/2020),     Don't have much money, on fixed income. Used to walk the store, hold onto cart, look around, but I got out. Haven't been out since 4/1/20 anywhere. Prior to that hadn't been out in 3 weeks. Was at the store but scared. Have asthma and poor immune system. Wasn't fun because avoiding people. Lady from [employer] bringing me stuff. Got delivery from [employer] other day via [employer]. 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 Try to do something constructive. Sometimes when at food pantry I get hyper. hyper times last maybe an hour.   rapid speech, easily distracted or decreased attention, racing thoughts,               DENIES insomnia with around men       Eating disorders:  Denies prior         MARILUZ 7 SCORE 2/18/2020   MARILUZ-7 Total Score 19     Interpretation of MARILUZ-7 score: 5-9 = mild anxiety, 10-14 = moderate anxiety,   15+ = severe anxiety. Recommend referral to behavioral health for scores 10 or greater. No data recorded   PHQ-9 Total Score: 19 (2/18/2020  9:07 AM)  Thoughts that you would be better off dead, or of hurting yourself in some way: 0 (2/18/2020  9:07 AM)     Interpretation of PHQ-9 score:  1-4 = minimal depression, 5-9 = mild depression, 10-14 = moderate depression; 15-19 = moderately severe depression, 20-27 = severe depression        Mood Disorder Questionnaire 2/18/20    Positive Responses:  8/13  (7 or more  Yes responses to question 1, and Yes response to #2 and moderate to serious response to #3 considered positive screen for Bipolar Disorder)     Have several of these events happened during the same period of time? Yes     How much of a problem did any of these cause you? Moderate Problem        History obtained from patient and chart (confirmed by patient today).     Past Psychiatric History:               Prior hospitalizations: denies              Prior diagnoses:  Bipolar by pcp years ago; anxiety d/o              Outpatient Treatment:                           Psychiatrist:  denies                          Therapist: denies              Suicide Attempts: denies              Hx SH:  Denies      Past Psychopharmacologic Trials (including response/reactions):     1. Lexapro:  Did ok, but built up over time. Stopped because didn't feel needed  2. Xanax:  Years and years ago until they mixed with wellbutrin and had major anger issues  3. Prozac: Major anger issues, felt drugged  4.   Librium:  Too strong or something     - no prior mood stabilizers        Past Medical/Surgical History:   Past Medical History:   Diagnosis Date    Ankle fracture, left     Ankle fracture, right     Arthritis     Asthma     Bipolar depression ([address]     CAN'T AFFORD MEDS    Bladder problem     Cervical disc herniation     Diabetes mellitus ([address]     diet control    Fatty liver disease, non-alcoholic     Fibromyalgia     Gastroparesis     Dr Farshad Serrano GERD (gastroesophageal reflux disease)     gastroporesis    Glaucoma     Dr Taran Ledesma Hyperlipidemia     elevated LFT on meds    IBS (irritable bowel syndrome)     Lumbar herniated disc     Nausea & vomiting     Nephrolithiasis 1/29/2019    Obesity (BMI 30-39.9) 3/11/2019    Partial Achilles tendon tear     Pneumonia     PONV (postoperative nausea and vomiting)     RA (rheumatoid arthritis) ([address]     Rapid or irregular heartbeat     Sleep apnea     does not use cpap    Spinal stenosis     Stress incontinence     Thyroid disease     growths     Past Surgical History:   Procedure Laterality Date    CHOLECYSTECTOMY      COLONOSCOPY      COLONOSCOPY  03/06/2018    with polypectomies    DILATION AND CURETTAGE OF UTERUS      ENDOMETRIAL ABLATION      ENDOSCOPY, COLON, DIAGNOSTIC      ERCP  5/25/2010    with stent    ERCP  1-31-14    ERCP WITH SPHINTER OF ODDI MANOMETERY    ESOPHAGOSCOPY  10/5/10    botox injection    EYE SURGERY      LASER FOR GLAUCOMA    LAPAROSCOPY      TONSILLECTOMY      UPPER GASTROINTESTINAL ENDOSCOPY      UPPER GASTROINTESTINAL ENDOSCOPY  04/12/2011    DILATATION, 58 FR VELAZQUEZ, 100 UNITS BOTOX    UPPER GASTROINTESTINAL ENDOSCOPY  08/30/2011    58 FR DILATATION, 100 UNITS  BOTOX INJECTION    UPPER GASTROINTESTINAL ENDOSCOPY  3-9-12    with dilatation and submucosal injection: Botox    UPPER GASTROINTESTINAL ENDOSCOPY      UPPER GASTROINTESTINAL ENDOSCOPY  11/20/12     Esophagogastroduodenoscopy with Botulinum toxin injection of the pylorus and Velazquez esophageal dilation    UPPER GASTROINTESTINAL ENDOSCOPY  6/4/13    WITH DIILITATION AND BOTOX INJECTION    UPPER GASTROINTESTINAL ENDOSCOPY  5/20/2014    100 unit Botox injection, 56 Fr. sodium chloride infusion   Intravenous Continuous Christa Paredes MD           Controlled Substance Monitoring:    Acute and Chronic Pain Monitoring:   RX Monitoring 2/18/2020   Periodic Controlled Substance Monitoring No signs of potential drug abuse or diversion identified. 01/23/2020  1   01/23/2020  Guaifenesin-Codeine Syrup  120.00 6 Je [facility]   [id number]   [place] ([id number])   0  6.00 MME  [place]icaid   [place]   03/15/2019  1   03/15/2019  Hydrocodone-Acetamin 5-325 MG  12.00 2 Ri [place]   [id number]   [place] ([id number])   0  30.00 M[place]   06/14/2018  1   02/27/2018  Tramadol Hcl 50 MG Tablet  28.00 7 Pa Musa   [id number]   [place] ([id number])   2  20.00 MME  [place]   [place]   04/26/2018  1   02/27/2018  Tramadol Hcl 50 MG Tablet  28.00 7 Pa Musa   [id number]   [place] ([id number])   1  20.00 MME  [place]icaid   [place]         OBJECTIVE:  Vitals: Wt Readings from Last 3 Encounters:   03/13/20 201 lb (91.2 kg)   02/20/20 204 lb (92.5 kg)   02/18/20 204 lb 3.2 oz (92.6 kg)       There were no vitals filed for this visit. Unable to assess d/t f/u visit completed via telehealth    ROS: Denies trouble with fever, rash, headache, vision changes, chest pain, shortness of breath, nausea, extremity pain, weakness, dysuria.        Mental Status Exam:      Appearance    unable to assess d/t f/u visit completed via pc  Muscle strength/tone: unable to assess d/t f/u visit completed via pc  Gait/station: unable to assess d/t f/u visit completed via pc    Speech    spontaneous, normal rate and normal volume  Mood    Anxious  Depressed  Affect  Unable to fully assess d/t f/u visit completed via telehealth (pc) though sounds congruent to thought content and mood  Thought Content    hopelessness, helplessness and excessive preoccupations, no delusions voiced  Thought Process   perseverative   Associations    logical connections  Perceptions: denies AH/VH,   [address]  Orientation    oriented to person, place, time, and general

## 2020-05-06 ENCOUNTER — OFFICE VISIT (OUTPATIENT)
Dept: PSYCHIATRY | Age: 55
End: 2020-05-06
Payer: COMMERCIAL

## 2020-05-06 PROCEDURE — 99443 PR PHYS/QHP TELEPHONE EVALUATION 21-30 MIN: CPT | Performed by: NURSE PRACTITIONER

## 2020-05-27 ENCOUNTER — OFFICE VISIT (OUTPATIENT)
Dept: PSYCHIATRY | Age: 55
End: 2020-05-27
Payer: COMMERCIAL

## 2020-05-27 ENCOUNTER — NURSE TRIAGE (OUTPATIENT)
Dept: OTHER | Facility: CLINIC | Age: 55
End: 2020-05-27

## 2020-05-27 PROCEDURE — 99443 PR PHYS/QHP TELEPHONE EVALUATION 21-30 MIN: CPT | Performed by: NURSE PRACTITIONER

## 2020-05-27 RX ORDER — DIAZEPAM 2 MG/1
2 TABLET ORAL 2 TIMES DAILY PRN
Qty: 60 TABLET | Refills: 0 | Status: SHIPPED | OUTPATIENT
Start: 2020-05-27 | End: 2020-06-26
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
## 2020-05-28 ENCOUNTER — VIRTUAL VISIT (OUTPATIENT)
Dept: PRIMARY CARE CLINIC | Age: 55
End: 2020-05-28
Payer: COMMERCIAL

## 2020-05-28 VITALS — WEIGHT: 204 LBS | SYSTOLIC BLOOD PRESSURE: 113 MMHG | DIASTOLIC BLOOD PRESSURE: 71 MMHG | BODY MASS INDEX: 37.31 KG/M2

## 2020-05-28 PROCEDURE — 3046F HEMOGLOBIN A1C LEVEL >9.0%: CPT | Performed by: INTERNAL MEDICINE

## 2020-05-28 PROCEDURE — 2022F DILAT RTA XM EVC RTNOPTHY: CPT | Performed by: INTERNAL MEDICINE

## 2020-05-28 PROCEDURE — 1036F TOBACCO NON-USER: CPT | Performed by: INTERNAL MEDICINE

## 2020-05-28 PROCEDURE — G8427 DOCREV CUR MEDS BY ELIG CLIN: HCPCS | Performed by: INTERNAL MEDICINE

## 2020-05-28 PROCEDURE — 99214 OFFICE O/P EST MOD 30 MIN: CPT | Performed by: INTERNAL MEDICINE

## 2020-05-28 PROCEDURE — G8417 CALC BMI ABV UP PARAM F/U: HCPCS | Performed by: INTERNAL MEDICINE

## 2020-05-28 PROCEDURE — 3017F COLORECTAL CA SCREEN DOC REV: CPT | Performed by: INTERNAL MEDICINE

## 2020-05-28 RX ORDER — FAMOTIDINE 40 MG/1
40 TABLET, FILM COATED ORAL EVERY EVENING
Qty: 30 TABLET | Refills: 3 | Status: ON HOLD | OUTPATIENT
Start: 2020-05-28 | End: 2020-10-06 | Stop reason: SDUPTHER

## 2020-05-28 ASSESSMENT — ENCOUNTER SYMPTOMS
SHORTNESS OF BREATH: 0
EYES NEGATIVE: 1
BACK PAIN: 1
ABDOMINAL PAIN: 1
BLOOD IN STOOL: 0
ABDOMINAL DISTENTION: 0
CONSTIPATION: 1
WHEEZING: 0
COUGH: 0
CHEST TIGHTNESS: 0
DIARRHEA: 0

## 2020-06-01 ENCOUNTER — TELEPHONE (OUTPATIENT)
Dept: PRIMARY CARE CLINIC | Age: 55
End: 2020-06-01

## 2020-06-04 NOTE — TELEPHONE ENCOUNTER
Spoke to pt and advised her that we are currently unable to order the antibody testing per [facility]

## 2020-06-22 ENCOUNTER — HOSPITAL ENCOUNTER (OUTPATIENT)
Age: 55
Discharge: HOME | End: 2020-06-22
Payer: COMMERCIAL

## 2020-06-22 ENCOUNTER — TELEPHONE (OUTPATIENT)
Dept: PRIMARY CARE CLINIC | Age: 55
End: 2020-06-22

## 2020-06-22 ENCOUNTER — HOSPITAL ENCOUNTER (OUTPATIENT)
Dept: GENERAL RADIOLOGY | Age: 55
Discharge: HOME | End: 2020-06-22
Payer: COMMERCIAL

## 2020-06-22 LAB
A/G RATIO: 1.5 (ref 1.1–2.2)
ALBUMIN SERPL-MCNC: 4.4 G/DL (ref 3.4–5)
ALP BLD-CCNC: 19 U/L (ref 40–129)
ALT SERPL-CCNC: 24 U/L (ref 10–40)
ANION GAP SERPL CALCULATED.3IONS-SCNC: 15 MMOL/L (ref 3–16)
AST SERPL-CCNC: 18 U/L (ref 15–37)
BACTERIA: ABNORMAL /HPF
BASOPHILS ABSOLUTE: 0.1 K/UL (ref 0–0.2)
BASOPHILS RELATIVE PERCENT: 0.9 %
BILIRUB SERPL-MCNC: 0.4 MG/DL (ref 0–1)
BILIRUBIN URINE: NEGATIVE
BLOOD, URINE: NEGATIVE
BUN BLDV-MCNC: 17 MG/DL (ref 7–20)
CALCIUM SERPL-MCNC: 10.3 MG/DL (ref 8.3–10.6)
CHLORIDE BLD-SCNC: 99 MMOL/L (ref 99–110)
CHOLESTEROL, TOTAL: 272 MG/DL (ref 0–199)
CLARITY: ABNORMAL
CO2: 26 MMOL/L (ref 21–32)
COLOR: YELLOW
CREAT SERPL-MCNC: 0.7 MG/DL (ref 0.6–1.1)
EOSINOPHILS ABSOLUTE: 0.1 K/UL (ref 0–0.6)
EOSINOPHILS RELATIVE PERCENT: 1.7 %
EPITHELIAL CELLS, UA: ABNORMAL /HPF (ref 0–5)
ESTIMATED AVERAGE GLUCOSE: 228.8 MG/DL
GFR AFRICAN AMERICAN: >60
GFR NON-AFRICAN AMERICAN: >60
GLOBULIN: 2.9 G/DL
GLUCOSE BLD-MCNC: 259 MG/DL (ref 70–99)
GLUCOSE URINE: 100 MG/DL
HBA1C MFR BLD: 9.6 %
HCT VFR BLD CALC: 43.8 % (ref 36–48)
HDLC SERPL-MCNC: 33 MG/DL (ref 40–60)
HEMOGLOBIN: 14.5 G/DL (ref 12–16)
KETONES, URINE: NEGATIVE MG/DL
LDL CHOLESTEROL CALCULATED: 202 MG/DL
LEUKOCYTE ESTERASE, URINE: ABNORMAL
LIPASE: 32 U/L (ref 13–60)
LYMPHOCYTES ABSOLUTE: 2.9 K/UL (ref 1–5.1)
LYMPHOCYTES RELATIVE PERCENT: 37.5 %
MCH RBC QN AUTO: 29.1 PG (ref 26–34)
MCHC RBC AUTO-ENTMCNC: 33.2 G/DL (ref 31–36)
MCV RBC AUTO: 87.7 FL (ref 80–100)
MICROSCOPIC EXAMINATION: YES
MONOCYTES ABSOLUTE: 0.5 K/UL (ref 0–1.3)
MONOCYTES RELATIVE PERCENT: 6.5 %
NEUTROPHILS ABSOLUTE: 4.1 K/UL (ref 1.7–7.7)
NEUTROPHILS RELATIVE PERCENT: 53.4 %
NITRITE, URINE: NEGATIVE
PDW BLD-RTO: 14 % (ref 12.4–15.4)
PH UA: 5.5 (ref 5–8)
PLATELET # BLD: 200 K/UL (ref 135–450)
PMV BLD AUTO: 9.6 FL (ref 5–10.5)
POTASSIUM SERPL-SCNC: 4.9 MMOL/L (ref 3.5–5.1)
PROTEIN UA: 30 MG/DL
RBC # BLD: 4.99 M/UL (ref 4–5.2)
RBC UA: ABNORMAL /HPF (ref 0–4)
SEDIMENTATION RATE, ERYTHROCYTE: 29 MM/HR (ref 0–30)
SODIUM BLD-SCNC: 140 MMOL/L (ref 136–145)
SPECIFIC GRAVITY UA: >=1.03 (ref 1–1.03)
TOTAL PROTEIN: 7.3 G/DL (ref 6.4–8.2)
TRIGL SERPL-MCNC: 185 MG/DL (ref 0–150)
TSH SERPL DL<=0.05 MIU/L-ACNC: 1.4 UIU/ML (ref 0.27–4.2)
URINE TYPE: ABNORMAL
UROBILINOGEN, URINE: 0.2 E.U./DL
VLDLC SERPL CALC-MCNC: 37 MG/DL
WBC # BLD: 7.7 K/UL (ref 4–11)
WBC UA: >100 /HPF (ref 0–5)

## 2020-06-22 PROCEDURE — 84443 ASSAY THYROID STIM HORMONE: CPT

## 2020-06-22 PROCEDURE — 85025 COMPLETE CBC W/AUTO DIFF WBC: CPT

## 2020-06-22 PROCEDURE — 83036 HEMOGLOBIN GLYCOSYLATED A1C: CPT

## 2020-06-22 PROCEDURE — 85652 RBC SED RATE AUTOMATED: CPT

## 2020-06-22 PROCEDURE — 80053 COMPREHEN METABOLIC PANEL: CPT

## 2020-06-22 PROCEDURE — 36415 COLL VENOUS BLD VENIPUNCTURE: CPT

## 2020-06-22 PROCEDURE — 81001 URINALYSIS AUTO W/SCOPE: CPT

## 2020-06-22 PROCEDURE — 83690 ASSAY OF LIPASE: CPT

## 2020-06-22 PROCEDURE — 71046 X-RAY EXAM CHEST 2 VIEWS: CPT

## 2020-06-22 PROCEDURE — 80061 LIPID PANEL: CPT

## 2020-06-22 RX ORDER — CIPROFLOXACIN 500 MG/1
500 TABLET, FILM COATED ORAL 2 TIMES DAILY
Qty: 10 TABLET | Refills: 0 | Status: SHIPPED
Start: 2020-06-22 | End: 2020-06-23 | Stop reason: SINTOL

## 2020-06-23 ENCOUNTER — TELEPHONE (OUTPATIENT)
Dept: PRIMARY CARE CLINIC | Age: 55
End: 2020-06-23

## 2020-06-23 RX ORDER — NITROFURANTOIN 25; 75 MG/1; MG/1
100 CAPSULE ORAL 2 TIMES DAILY
Qty: 10 CAPSULE | Refills: 0 | Status: SHIPPED | OUTPATIENT
Start: 2020-06-23 | End: 2020-06-28

## 2020-06-23 RX ORDER — ATORVASTATIN CALCIUM 20 MG/1
20 TABLET, FILM COATED ORAL DAILY
Qty: 90 TABLET | Refills: 3 | Status: SHIPPED
Start: 2020-06-23 | End: 2020-07-02 | Stop reason: DRUGHIGH

## 2020-06-23 NOTE — TELEPHONE ENCOUNTER
Pt says she is allergic to Cipro so she would like a alternate medications you can call pt at [phone] if you have any other questions

## 2020-06-24 ENCOUNTER — OFFICE VISIT (OUTPATIENT)
Dept: PSYCHIATRY | Age: 55
End: 2020-06-24
Payer: COMMERCIAL

## 2020-06-24 PROCEDURE — 99443 PR PHYS/QHP TELEPHONE EVALUATION 21-30 MIN: CPT | Performed by: NURSE PRACTITIONER
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
## 2020-06-30 ENCOUNTER — TELEPHONE (OUTPATIENT)
Dept: PRIMARY CARE CLINIC | Age: 55
End: 2020-06-30

## 2020-07-02 ENCOUNTER — OFFICE VISIT (OUTPATIENT)
Dept: CARDIOLOGY CLINIC | Age: 55
End: 2020-07-02
Payer: COMMERCIAL

## 2020-07-02 VITALS
DIASTOLIC BLOOD PRESSURE: 62 MMHG | HEIGHT: 62 IN | HEART RATE: 73 BPM | SYSTOLIC BLOOD PRESSURE: 120 MMHG | WEIGHT: 204 LBS | OXYGEN SATURATION: 99 % | BODY MASS INDEX: 37.54 KG/M2

## 2020-07-02 PROCEDURE — 3017F COLORECTAL CA SCREEN DOC REV: CPT | Performed by: NURSE PRACTITIONER

## 2020-07-02 PROCEDURE — 99213 OFFICE O/P EST LOW 20 MIN: CPT | Performed by: NURSE PRACTITIONER

## 2020-07-02 PROCEDURE — 1036F TOBACCO NON-USER: CPT | Performed by: NURSE PRACTITIONER

## 2020-07-02 PROCEDURE — G8427 DOCREV CUR MEDS BY ELIG CLIN: HCPCS | Performed by: NURSE PRACTITIONER

## 2020-07-02 PROCEDURE — G8417 CALC BMI ABV UP PARAM F/U: HCPCS | Performed by: NURSE PRACTITIONER

## 2020-07-02 RX ORDER — ATORVASTATIN CALCIUM 10 MG/1
10 TABLET, FILM COATED ORAL DAILY
COMMUNITY
End: 2020-07-07 | Stop reason: SDUPTHER

## 2020-07-02 RX ORDER — DIAZEPAM 2 MG/1
2 TABLET ORAL NIGHTLY
COMMUNITY
End: 2020-07-29 | Stop reason: SDUPTHER

## 2020-07-02 NOTE — PROGRESS NOTES
[address]     Outpatient Follow Up Note    Viri Rick is 63 y.o. female who presents today with a history of mild, non-obst CAD and hyperlipidemia. CHIEF COMPLAINT / HPI:  Follow Up secondary to mild, non-obst CAD    Subjective:   She's been having SOB which she attributes to allergies / asthma. She uses a MDI prn  She c/o Lt lateral pain under her left breast and sometimes up in her breast. She was having major chest pains from anxiety which has helped taking valium qHS    She denies significant chest pain. She has sleep apnea but cannot tolerate wearing her CPAP. Therefore, she sleeps in her recliner. The patient denies orthopnea/PND. She has no swelling. The patients weight is going down : size 3X to medium over the past year. The patient is not experiencing palpitations or dizziness. These symptoms show no change since the last OV. With regard to medication therapy the patient has been compliant with prescribed regimen. They have tolerated therapy to date.      Past Medical History:   Diagnosis Date    Ankle fracture, left     Ankle fracture, right     Arthritis     Asthma     Bipolar depression (HCC)     CAN'T AFFORD MEDS    Bladder problem     Cervical disc herniation     Diabetes mellitus (HCC)     diet control    Fatty liver disease, non-alcoholic     Fibromyalgia     Gastroparesis     Dr Raimundo Lux GERD (gastroesophageal reflux disease)     gastroporesis    Glaucoma     Dr Patrizia Hagen Hyperlipidemia     elevated LFT on meds    IBS (irritable bowel syndrome)     Lumbar herniated disc     Nausea & vomiting     Nephrolithiasis 1/29/2019    Obesity (BMI 30-39.9) 3/11/2019    Partial Achilles tendon tear     Pneumonia     PONV (postoperative nausea and vomiting)     RA (rheumatoid arthritis) ([address]     Rapid or irregular heartbeat     Sleep apnea     does not use cpap    Spinal stenosis     Stress incontinence     Thyroid disease     growths     Social History: Social History     Tobacco Use   Smoking Status Never Smoker   Smokeless Tobacco Never Used     Current Outpatient Medications   Medication Sig Dispense Refill    diazePAM (VALIUM) 2 MG tablet Take 2 mg by mouth nightly.  atorvastatin (LIPITOR) 10 MG tablet Take 10 mg by mouth daily      famotidine (PEPCID) 40 MG tablet Take 1 tablet by mouth every evening 30 tablet 3    Multiple Vitamins-Minerals (THERAPEUTIC MULTIVITAMIN-MINERALS) tablet Take 1 tablet by mouth daily      MUCINEX D MAX STRENGTH 120-1200 MG TB12 TAKE ONE TABLET BY MOUTH EVERY 12 HOURS AS NEEDED FOR NASAL CONGESTION 28 tablet 0    albuterol sulfate  (90 Base) MCG/ACT inhaler INHALE TWO PUFFS BY MOUTH EVERY 6 HOURS AS NEEDED FOR WHEEZING 1 Inhaler 2    loratadine (CLARITIN) 10 MG tablet TAKE ONE TABLET BY MOUTH DAILY 30 tablet 5    Cholecalciferol (VITAMIN D3) 25 MCG (1000 UT) TABS Take 1 tablet by mouth daily (Patient taking differently: Take 10,000 Units by mouth every 7 days ) 90 tablet 1    ketotifen (ZADITOR) 0.025 % ophthalmic solution INSTILL TWO DROPS IN [facility] EYE TWO TIMES A DAY AS NEEDED FOR ALLERGY 1 Bottle 3    aspirin 81 MG chewable tablet Take 1 tablet by mouth daily (coated tablets) 30 tablet 3    nystatin (MYCOSTATIN) 396691 UNIT/GM cream APPLY EXTERNALLY TO THE AFFECTED AREAS TWO TIMES A DAY 1 Tube 2    GAS-X EXTRA STRENGTH 125 MG chewable tablet CHEW TWO TABLETS BY MOUTH THREE TIMES A DAY WITH EACH MEAL AS NEEDED FOR FLATULENCE 192 tablet 1    cyclobenzaprine (FLEXERIL) 10 MG tablet Take 0.5 tablets by mouth daily as needed for Muscle spasms (Patient not taking: Reported on 7/2/2020) 30 tablet 5    blood glucose test strips (ASCENSIA AUTODISC VI;ONE TOUCH ULTRA TEST VI) strip 2 each by In Vitro route daily As needed.  100 each 5    Blood Glucose Monitoring Suppl (TRUE METRIX METER) w/Device KIT As needed 1 kit 0    insulin glargine (LANTUS SOLOSTAR) 100 UNIT/ML injection pen Inject 35-38 Units into the VITALS:  /62 (Position: Sitting, Cuff Size: Large Adult)   Pulse 73   Ht 5' 2\" (1.575 m)   Wt 204 lb (92.5 kg)   LMP  (LMP Unknown)   SpO2 99%   BMI 37.31 kg/m²   CONSTITUTIONAL: Cooperative, no apparent distress, and appears well nourished / over weight  NEUROLOGIC:  Awake and orientated to person, place and time. PSYCH: Calm affect. SKIN: Warm and dry. HEENT: Sclera non-icteric, normocephalic, neck supple, no elevation of JVP, normal carotid pulses with no bruits and thyroid normal size. LUNGS:  No increased work of breathing and clear to auscultation, no crackles or wheezing  CARDIOVASCULAR:  Regular rate 64 and rhythm with no murmurs, gallops, rubs, or abnormal heart sounds, normal PMI. The apical impulses not displaced  JVP less than 8 cm H2O  Heart tones are crisp and normal  Cervical veins are not engorged  The carotid upstroke is normal in amplitude and contour without delay or bruit  JVP is not elevated  ABDOMEN:  Normal bowel sounds, non-distended and non-tender to palpation  EXT: No edema, no calf tenderness. Pulses are present bilaterally.     DATA:    Lab Results   Component Value Date    ALT 24 06/22/2020    AST 18 06/22/2020    ALKPHOS 19 (L) 06/22/2020    BILITOT 0.4 06/22/2020     Lab Results   Component Value Date    CREATININE 0.7 06/22/2020    BUN 17 06/22/2020     06/22/2020    K 4.9 06/22/2020    CL 99 06/22/2020    CO2 26 06/22/2020       Lab Results   Component Value Date    WBC 7.7 06/22/2020    HGB 14.5 06/22/2020    HCT 43.8 06/22/2020    MCV 87.7 06/22/2020     06/22/2020     No components found for: CHLPL  Lab Results   Component Value Date    TRIG 185 (H) 06/22/2020    TRIG 145 11/26/2019    TRIG 92 01/23/2019     Lab Results   Component Value Date    HDL 33 (L) 06/22/2020    HDL 47 11/26/2019    HDL 45 01/23/2019     Lab Results   Component Value Date    LDLCALC 202 (H) 06/22/2020    LDLCALC 178 (H) 11/26/2019    LDLCALC 173 (H) 01/23/2019     Lab Results   Component Value Date    LABVLDL 37 06/22/2020    LABVLDL 29 11/26/2019    LABVLDL 18 01/23/2019     Radiology Review:  Pertinent images / reports were reviewed as a part of this visit and reveals the following:    Cardiac cath: 3/12/19:  Findings:                      LM       Normal              LAD     20% mid              Cx        Normal              RCA     Normal              LVG     55%              EDP     5 mmHg    Last Echo: March '19:  Summary   -Normal left ventricle size, wall thickness and systolic function with an   estimated ejection fraction of 55%.   - No regional wall motion abnormalities are seen.   -Normal diastolic function.   -Trivial tricuspid regurgitation.     Last Stress Test: Apri '14:  Summary    There is normal isotope uptake at stress and rest. There is no evidence of    myocardial ischemia or scar.    Normal LV size and systolic function    Left ventricular ejection fraction of 58 %. Assessment:      Diagnosis Orders   1. Coronary artery disease due to lipid rich plaque   ~stable : has atypical pain in left side  ~atypical pain anxiety driven controlled with diazepam taking nightly  ~20% mid-LAD lesion by cath March '19  ~ASA / atorvastatin     2. Hypercholesteremia   ~suboptimally controlled  : low dose lipitor  ~intolerant to higher dose of atorvastatin  ~stated change in pant size yet not reflected in weight      I had the opportunity to review the clinical symptoms and presentation of Sandra Wilkerson. Plan:     1. EKG as routine  2. F/U in 9 months ; prefers 6    Overall the patient is stable from CV standpoint    I have addresed the patient's cardiac risk factors and adjusted pharmacologic treatment as needed. In addition, I have reinforced the need for patient directed risk factor modification. Further evaluation will be based upon the patient's clinical course and testing results. All questions and concerns were addressed to the patient.  Alternatives to my

## 2020-07-07 RX ORDER — ATORVASTATIN CALCIUM 10 MG/1
10 TABLET, FILM COATED ORAL DAILY
Qty: 90 TABLET | Refills: 0 | Status: SHIPPED | OUTPATIENT
Start: 2020-07-07 | End: 2020-11-19 | Stop reason: ALTCHOICE

## 2020-07-09 ENCOUNTER — OFFICE VISIT (OUTPATIENT)
Dept: ENT CLINIC | Age: 55
End: 2020-07-09
Payer: COMMERCIAL

## 2020-07-09 VITALS
DIASTOLIC BLOOD PRESSURE: 78 MMHG | WEIGHT: 202 LBS | TEMPERATURE: 97.4 F | BODY MASS INDEX: 37.17 KG/M2 | SYSTOLIC BLOOD PRESSURE: 104 MMHG | HEART RATE: 91 BPM | HEIGHT: 62 IN

## 2020-07-09 PROBLEM — R42 DIZZINESS: Status: ACTIVE | Noted: 2020-07-09

## 2020-07-09 PROBLEM — H91.93 BILATERAL HEARING LOSS: Status: ACTIVE | Noted: 2020-07-09

## 2020-07-09 PROBLEM — H93.8X3 SENSATION OF FULLNESS IN EAR, BILATERAL: Status: ACTIVE | Noted: 2020-07-09

## 2020-07-09 PROCEDURE — G8427 DOCREV CUR MEDS BY ELIG CLIN: HCPCS | Performed by: OTOLARYNGOLOGY

## 2020-07-09 PROCEDURE — 1036F TOBACCO NON-USER: CPT | Performed by: OTOLARYNGOLOGY

## 2020-07-09 PROCEDURE — G8417 CALC BMI ABV UP PARAM F/U: HCPCS | Performed by: OTOLARYNGOLOGY

## 2020-07-09 PROCEDURE — 99215 OFFICE O/P EST HI 40 MIN: CPT | Performed by: OTOLARYNGOLOGY

## 2020-07-09 PROCEDURE — 3017F COLORECTAL CA SCREEN DOC REV: CPT | Performed by: OTOLARYNGOLOGY

## 2020-07-09 RX ORDER — AZITHROMYCIN 250 MG/1
TABLET, FILM COATED ORAL
Qty: 2 PACKET | Refills: 0 | Status: ON HOLD | OUTPATIENT
Start: 2020-07-09 | End: 2020-10-06 | Stop reason: SDUPTHER

## 2020-07-09 RX ORDER — GUAIFENESIN AND PSEUDOEPHEDRINE HCL 1200; 120 MG/1; MG/1
1 TABLET, EXTENDED RELEASE ORAL 2 TIMES DAILY
Qty: 20 TABLET | Refills: 0 | Status: SHIPPED
Start: 2020-07-09 | End: 2020-07-17 | Stop reason: ALTCHOICE

## 2020-07-09 NOTE — PATIENT INSTRUCTIONS
medication.  (promethazine/Phenergan)  · Anti motion sickness medications (Dramamine)     · Aspirin or aspirin substitutes (Tylenol)  · Antidepressant medications;   · Alcohol (any form, beer wine whisky vodka, etc)  · Discontinued all medications, for 48 hours prior to the testing, except \"maintenance\" medications for your heart, blood pressure, diabetes, or seizures, and any medications deemed by your primary physician to be necessary. DO NOT STOP anti-seizure medications. Please consult your primary physician with any questions. MAKE SURE YOU CLEAR IT WITH YOUR PRIMARY CARE PHYSICIAN BEFORE STOPPING ANY MEDICATIONS. ADDITIONAL INSTRUCTIONS:  · Have a light breakfast and/or lunch on the day of testing. Diabetic patients must light breakfast or lunch prior to testing. · Clean face, no makeup. · Remove contact lenses before the examination. (Bring your eyeglasses). · No solid foods for 2-4 hours before testing. · No coffee, tea, or cola after midnight on the day of testing. · No alcohol containing beverages or liquid medication containing alcohol for 48 hours before the testing. · Someone will need to drive you home after the ENG testing. Do not drive an automobile for two hours after the ENG testing. · Wear comfortable loose fitting clothing on the day of testing.      ====================================================================    RHINOSINUSITIS CARE  · Take Probiotic while you are taking antibiotics, to prevent diarrhea, stomach upset, pseudomembranous colitis, and C. difficile diarrhea. This may be obtained at your pharmacy or health food store. Alternatively, you may eat one cup of yogurt with active or live cultures twice daily while taking the antibiotic. Continue for two to three days after completion of the antibiotic. · Use a 12 hour decongestant spray, 0.05% oxymetazoline (e.g. Afrin, Duration, 4-Way).   Spray each nostril twice three times a day for three days, then

## 2020-07-09 NOTE — PROGRESS NOTES
[facility]       \"NEW\"  PATIENT VISIT - ESTABLISHED, who has not been seen in over 3 years      PCP:  Richie Arriaga MD    REFERRED BY:  self      CHIEF COMPLAINT:  Chief Complaint   Patient presents with    Dizziness       HISTORY OF PRESENT ILLNESS:      José Salazar is a 63 y.o. female, here for evaluation and treatment of a  problem located in the ears. The quality is dizziness. The severity is  severe. The timing is intermittent, \"comes and goes. \"  Occurs 4-5 times a day and lasts for about 15 - 20 minutes. \"I usually just sit down and let it go away. \"  The duration of this problem is one year. Modifying factors include sitting still helps. Associated symptoms include \"shakiness, nausea, I feel in my head like I'm gonna pass out, a weird feeling and my hearing goes away like someone put their hands on my ears and hearing decreases. \"  Dizziness was described as a sensation of \"I totally lose my balance. I feel like I'm not gonna be able to stand up any longer and have to sit down. It's very unsettling. \"  It is associaled with hearing loss. \"Every thing goes white or dark sometimes, everything gets super light in my vision. Have to sit down right away. No precipitating factors have been noted. There is decrease in hearing with dizziness. There is no onset of ringing during dizziness. Pressure in cheeks and mild pain and drainage for about one week. REVIEW OF SYSTEMS:      CONSTITUTIONAL:  Denied fever.  (+) 105 pound weight loss, in the past six months, change of diet. EYES:   (+) sharp pain from dry eyes from eye doctor. Denied double vision. EARS, NOSE, THROAT:  (+) otalgia, for two weeks. (+) hearing loss, for two weeks at least,  my ears feel full.  (+) chronic hoarseness for 5 years or more, most of the time. Worse in the morning.   Denied otorrhea, tinnitus, nasal dyspnea, rhinorrhea, and performed by Doyle Franco MD at [facility] National N/A 3/13/2020    EGD SUBMUCOSAL/BOTOX INJECTION performed by Doyle Franco MD at [facility] ENDOSCOPY N/A 3/13/2020    EGD DILATION BALLOON performed by Doyle Franco MD at [address] [place] [address]         Family History   Problem Relation Age of Onset    Diabetes Mother     Heart Disease Mother     Stroke Mother     Heart Disease Father     Heart Disease Brother     High Blood Pressure Brother     High Cholesterol Brother     Diabetes Brother     High Cholesterol Brother     Anesth Problems Neg Hx     Malig Hyperten Neg Hx     Hypotension Neg Hx     Malig Hypertherm Neg Hx     Pseudochol.  Deficiency Neg Hx          Social History     Socioeconomic History    Marital status: Single     Spouse name: Not on file    Number of children: Not on file    Years of education: Not on file    Highest education level: Not on file   Occupational History    Not on file   Social Needs    Financial resource strain: Not on file    Food insecurity     Worry: Not on file     Inability: Not on file    Transportation needs     Medical: Not on file     Non-medical: Not on file   Tobacco Use    Smoking status: Never Smoker    Smokeless tobacco: Never Used   Substance and Sexual Activity    Alcohol use: No     Alcohol/week: 0.0 standard drinks    Drug use: No    Sexual activity: Not Currently   Lifestyle    Physical activity     Days per week: Not on file     Minutes per session: Not on file    Stress: Not on file   Relationships    Social connections     Talks on phone: Not on file     Gets together: Not on file     Attends Orthodox service: Not on file     Active member of club or organization: Not on file     Attends meetings of clubs or organizations: Not on file     Relationship status: Not on file    Intimate partner violence     Fear of current or ex partner: Not on file Emotionally abused: Not on file     Physically abused: Not on file     Forced sexual activity: Not on file   Other Topics Concern    Not on file   Social History Narrative    Caffeine:        SODA -1 a month          TEA - 1 glass a day        COFFEE - 0         EXAMINATION, COMPREHENSIVE:       Constitutional:    Vitals:    07/09/20 1130   BP: 104/78   Pulse: 91   Temp: 97.4 °F (36.3 °C)   Weight: 202 lb (91.6 kg)   Height: 5' 2\" (1.575 m)      · VITALS SIGNS were reviewed. · GENERAL APPEARANCE:  Well developed, well nourished, no apparent distress. · ABILITY TO COMMUNICATE/QUALITY OF VOICE:  Communicated without difficulty. Normal voice. Head and Face:  · INSPECTION:  Normocephalic. No evidence of trauma. No tenderness. Normal overall appearance with no scars, lesions or masses. · (+) SINUSES:  The maxillary sinuses tender bilateral.  The frontal sinuses were nontender, bilaterally. · SALIVARY GLANDS:  Parotid, submandibular and sublingual glands normal.  · FACIAL STRENGTH, MOTION:  Normal and equal for all five branches bilaterally. Eyes:   · EXTRAOCULAR MOTION:  intact, normal primary gaze alignment. No nystagmus at any point of gaze. Ears, nose, mouth, & throat:  · HEARING ASSESSMENT:  Able to hear finger rub bilaterally. Tuning fork tests, using a 512 [employer] tuning fork:  Santiago lat to the left ear. Rinne air > bone bilaterally. · EXTERNAL EAR/NOSE:  Normal pinnae and mastoids. Normal external nose. · EARS,  OTOMICROSCOPY:  The TMs and EACs appeared to be normal bilaterally, including normal TM mobility bilaterally. · NOSE:  The nasal septum was midline. The nasal mucosa, inferior turbinates, and secretions were normal.  No pus or polyps were seen.   · LIPS, TEETH AND GUMS:  Normal.    · OROPHARYNX/ORAL CAVITY:  The palatine tonsils were normal.  Oral mucosa, hard and soft palates, tongue, and pharynx were normal.    · INSPECTION OF PHARYNGEAL WALLS AND PYRIFORM SINUSES:  Normal bilaterally, with no pooling of saliva, masses, asymmetry or lesions. · (+) INDIRECT LARYNGOSCOPY:  Visualization was suboptimal due to a strong gag reflex and guarding. The base of tongue and epiglottis appeared to be normal.  Unable to visualize the vocal cords and hypopharynx, and endolarynx. · (+) MIRROR EXAM OF NASOPHARYNX:  Visualization was suboptimal due to a strong gag reflex and guarding. No masses or polyps were appreciated. But, I was unable to visualize the nasopharynx adequately. NECK:   · No masses or tenderness. No carotid bruits, abnormal appearance, asymmetry or tracheal deviation. Laryngeal cartilages and hyoid bone were normal to palpation, with normal laryngeal crepitus. THYROID:    · No nodules, enlargement, tenderness or masses. RESPIRATORY:  · CHEST, INSPECTION:  Normal symmetrical expansion, and respiratory effort. · LUNGS, AUSCULTATION:  clear, with normal breath sounds and no rales, rhonchi, or rubs. Cardiovascular:  · HEART, AUSCULTATION:  normal S1 and S2. No abnormal sounds or murmurs. No carotid bruits. Lymphatic:   · PALPATION OF LYMPH NODES, CERVICAL, FACIAL AND SUPRACLAVICULAR: Nontender, No lymphadenopathy. Neurological/Psychiatric:    · CRANIAL NERVES:  II - XII intact, with no apparent deficits. · ORIENTATION TO TIME, PLACE, AND PERSON:  Oriented x 3.  · MOOD AND AFFECT:  Normal.  No evidence of depression, anxiety or agitation. CEREBELLAR TESTING:  Finger to nose intact. Heel to shin intact. Rapid alternating motion intact. IMPRESSION / Baker Elver / Neftaly Packernd:       Amy Silvestre was seen today for dizziness.     Diagnoses and all orders for this visit:    Nazario Robert MD, Neurology, [facility]    Bilateral hearing loss, unspecified hearing loss type    Subjective tinnitus of both ears    Sensation of fullness in ear, bilateral    Dysphagia, unspecified type  -     Latanya Alexander MD, Neurology, [facility]    Acute rhinosinusitis  -     azithromycin (ZITHROMAX Z-DESTINEY) 250 MG tablet; Take by mouth: 2 tablets on day 1, then 1 tab on day 2 to 5. Start 2nd destiney on day 10. Take 2 tablets on day 10, then 1 tab on day 11 to 15    Other orders  -     Discontinue: Pseudoephedrine-guaiFENesin (MUCINEX D MAX STRENGTH) 120-1200 MG TB12; Take 1 tablet by mouth 2 times daily As needed for ear, nose, and sinus congestion  -     Discontinue: oxymetazoline (AFRIN) 0.05 % nasal spray; 2 sprays each nostril, 3 times a day for 3 days, then 2 times a day for 2 days, then stop for 2 days and then repeat the cycle once. RECOMMENDATIONS / PLAN:  1. [facility] and ENG. [facility] or at Dr. Tori Brink office in [place]. 2. See Patient Instructions on file for this visit, which were fully discussed with the patient. 3. Return for recheck after audiogram and ENG testing.

## 2020-07-10 RX ORDER — GUAIFENESIN 1200 MG/1
1 TABLET, EXTENDED RELEASE ORAL DAILY
Qty: 60 TABLET | Refills: 0 | COMMUNITY
Start: 2020-07-10 | End: 2020-07-17 | Stop reason: ALTCHOICE

## 2020-07-10 RX ORDER — OXYMETAZOLINE HYDROCHLORIDE 0.05 G/100ML
SPRAY NASAL
Qty: 1 BOTTLE | Refills: 0 | Status: ON HOLD
Start: 2020-07-10 | End: 2021-05-11 | Stop reason: HOSPADM

## 2020-07-10 RX ORDER — OXYMETAZOLINE HYDROCHLORIDE 0.05 G/100ML
SPRAY NASAL
Qty: 1 BOTTLE | Refills: 0 | Status: SHIPPED | OUTPATIENT
Start: 2020-07-10 | End: 2020-07-10 | Stop reason: SDUPTHER

## 2020-07-16 ENCOUNTER — TELEPHONE (OUTPATIENT)
Dept: ENT CLINIC | Age: 55
End: 2020-07-16

## 2020-07-16 RX ORDER — GUAIFENESIN AND PSEUDOEPHEDRINE HCL 1200; 120 MG/1; MG/1
1 TABLET, EXTENDED RELEASE ORAL 2 TIMES DAILY
Qty: 20 TABLET | Refills: 0 | Status: CANCELLED | OUTPATIENT
Start: 2020-07-16

## 2020-07-17 RX ORDER — GUAIFENESIN 1200 MG/1
1 TABLET, EXTENDED RELEASE ORAL DAILY
Qty: 60 TABLET | Refills: 0 | Status: SHIPPED | OUTPATIENT
Start: 2020-07-17 | End: 2021-03-12 | Stop reason: SDUPTHER

## 2020-07-29 ENCOUNTER — OFFICE VISIT (OUTPATIENT)
Dept: PSYCHIATRY | Age: 55
End: 2020-07-29
Payer: COMMERCIAL

## 2020-07-29 PROCEDURE — 99443 PR PHYS/QHP TELEPHONE EVALUATION 21-30 MIN: CPT | Performed by: NURSE PRACTITIONER

## 2020-07-29 RX ORDER — DIAZEPAM 2 MG/1
2 TABLET ORAL 2 TIMES DAILY PRN
Qty: 60 TABLET | Refills: 1 | Status: SHIPPED | OUTPATIENT
Start: 2020-07-29 | End: 2020-08-26 | Stop reason: SDUPTHER
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
## 2020-08-06 ENCOUNTER — TELEPHONE (OUTPATIENT)
Dept: PRIMARY CARE CLINIC | Age: 55
End: 2020-08-06

## 2020-08-06 RX ORDER — FLUCONAZOLE 100 MG/1
100 TABLET ORAL DAILY
Qty: 3 TABLET | Refills: 0 | Status: SHIPPED | OUTPATIENT
Start: 2020-08-06 | End: 2020-08-09

## 2020-08-06 RX ORDER — NYSTATIN 100000 U/G
CREAM TOPICAL
Qty: 4 G | Refills: 3 | Status: ON HOLD
Start: 2020-08-06 | End: 2021-05-11 | Stop reason: HOSPADM

## 2020-08-06 NOTE — TELEPHONE ENCOUNTER
Spoke to pt and advise her that we have not receive any forms for her.     Pt would also like a script for Diflucan and Nystatin sent to her pharmacy

## 2020-08-06 NOTE — TELEPHONE ENCOUNTER
----- Message from Cecille Nicole sent at 8/6/2020 12:55 PM EDT -----  Subject: Message to Provider    QUESTIONS  Information for Provider? Pt would like to know if the request for   diabetic shoes have been received. She also has questions about   medications.   ---------------------------------------------------------------------------  --------------  CALL BACK INFO  What is the best way for the office to contact you? OK to leave message on   voicemail  Preferred Call Back Phone Number? [phone]  ---------------------------------------------------------------------------  --------------  SCRIPT ANSWERS  Relationship to Patient?  Self

## 2020-08-17 ENCOUNTER — TELEPHONE (OUTPATIENT)
Dept: CARDIOLOGY CLINIC | Age: 55
End: 2020-08-17

## 2020-08-17 NOTE — TELEPHONE ENCOUNTER
Pt states her eye doctor, Julia Marroquin ph [phone]. Would like her to have a carotid doppler and told her to contact her cardiologist to get it scheduled.  Please call to advise

## 2020-08-17 NOTE — TELEPHONE ENCOUNTER
Spoke to the pt-stated she has episodes of seeing all white, or it goes all black for a few seconds. Also has dizziness a lot. Can we order the carotid study? Pt states she was told to get the EKG, that was not done in July-the cords were not working.   Last OV 7/2/20 with JOSEPH White

## 2020-08-18 NOTE — TELEPHONE ENCOUNTER
Spoke to the pt-gave instructions per JOSEPH White. Carotid US, and EKG orders have been faxed to [facility].

## 2020-08-19 ENCOUNTER — TELEPHONE (OUTPATIENT)
Dept: PRIMARY CARE CLINIC | Age: 55
End: 2020-08-19

## 2020-08-20 ENCOUNTER — TELEPHONE (OUTPATIENT)
Dept: PRIMARY CARE CLINIC | Age: 55
End: 2020-08-20

## 2020-08-20 RX ORDER — FLUCONAZOLE 100 MG/1
200 TABLET ORAL ONCE
Qty: 2 TABLET | Refills: 0 | Status: SHIPPED | OUTPATIENT
Start: 2020-08-20 | End: 2020-08-20

## 2020-08-20 RX ORDER — NYSTATIN 100000 [USP'U]/G
POWDER TOPICAL
Qty: 5 G | Refills: 5 | Status: ON HOLD
Start: 2020-08-20 | End: 2021-05-11 | Stop reason: HOSPADM

## 2020-08-20 NOTE — TELEPHONE ENCOUNTER
----- Message from Hobbs Maryanne sent at 8/19/2020  5:54 PM EDT -----  Subject: Message to Provider    QUESTIONS  Information for Provider? Patient prefers the powder instead of the cream   for her medication. She stated the cream didn't work for her and she would   like to try the powder. ---------------------------------------------------------------------------  --------------  Supriya BRITO  What is the best way for the office to contact you? OK to leave message on   voicemail  Preferred Call Back Phone Number? [phone]  ---------------------------------------------------------------------------  --------------  SCRIPT ANSWERS  Relationship to Patient?  Self

## 2020-08-21 ENCOUNTER — HOSPITAL ENCOUNTER (OUTPATIENT)
Dept: VASCULAR LAB | Age: 55
Discharge: HOME | End: 2020-08-21
Payer: COMMERCIAL

## 2020-08-21 ENCOUNTER — TELEPHONE (OUTPATIENT)
Dept: CARDIOLOGY CLINIC | Age: 55
End: 2020-08-21

## 2020-08-21 ENCOUNTER — HOSPITAL ENCOUNTER (OUTPATIENT)
Age: 55
Discharge: HOME | End: 2020-08-21
Payer: COMMERCIAL

## 2020-08-21 LAB
EKG ATRIAL RATE: 73 BPM
EKG DIAGNOSIS: NORMAL
EKG P AXIS: 64 DEGREES
EKG P-R INTERVAL: 158 MS
EKG Q-T INTERVAL: 418 MS
EKG QRS DURATION: 134 MS
EKG QTC CALCULATION (BAZETT): 460 MS
EKG R AXIS: 7 DEGREES
EKG T AXIS: -21 DEGREES
EKG VENTRICULAR RATE: 73 BPM

## 2020-08-21 PROCEDURE — 93005 ELECTROCARDIOGRAM TRACING: CPT

## 2020-08-21 PROCEDURE — 93010 ELECTROCARDIOGRAM REPORT: CPT | Performed by: INTERNAL MEDICINE

## 2020-08-21 PROCEDURE — 93880 EXTRACRANIAL BILAT STUDY: CPT

## 2020-08-21 NOTE — TELEPHONE ENCOUNTER
Mariella Vergara   8/21/2020  3:55 PM       Called lmor to cb      KRISHNA Gonzalez - CNP   8/21/2020  1:19 PM       rafael IC < 50%; recheck in one year.  Continue ASA and statin   Forward copy to requesting physician

## 2020-08-26 ENCOUNTER — OFFICE VISIT (OUTPATIENT)
Dept: PSYCHIATRY | Age: 55
End: 2020-08-26
Payer: COMMERCIAL

## 2020-08-26 PROCEDURE — 99443 PR PHYS/QHP TELEPHONE EVALUATION 21-30 MIN: CPT | Performed by: NURSE PRACTITIONER

## 2020-08-26 RX ORDER — LATANOPROST 50 UG/ML
1 SOLUTION/ DROPS OPHTHALMIC NIGHTLY
COMMUNITY

## 2020-08-26 RX ORDER — RISPERIDONE 0.5 MG/1
0.5 TABLET, FILM COATED ORAL DAILY
Qty: 30 TABLET | Refills: 3 | Status: SHIPPED | OUTPATIENT
Start: 2020-08-26 | End: 2020-11-03

## 2020-08-26 RX ORDER — DIAZEPAM 2 MG/1
2 TABLET ORAL 2 TIMES DAILY PRN
Qty: 60 TABLET | Refills: 1 | Status: SHIPPED | OUTPATIENT
Start: 2020-08-28 | End: 2020-09-23 | Stop reason: SDUPTHER

## 2020-08-26 NOTE — PROGRESS NOTES
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 + MDQ at initial eval.  Multiple stressors over past few months with increased depression/mood lability. Reasonable to trial mood stabilizer. Has DM, needs most weight neutral option.      - vraylar not covered by insurance,tried samples, made \"angry\"    - trial risperidone 0.5mg daily for mood/anxiety. TITRATE AS TOLERATED   - consider abilify if se's to risperdal.          - HAD previously recommended pt trial seroquel d/t issues with insomnia in addition to mood/anxiety symptoms. REFUSED TO TRY SEROQUEL. Pt has lots medication anxiety, particularly with sedating medications. Fearful won't wake up if meds too sedating.      - continue valium 2mg prn for anxiety, sleep Prefers low dose d/t fear of oversedation. Reports this dose working very well to help relax her at night so can get some rest  Reminded this is not long term solution and need to have additional strategies to help manage anxiety and mood symptoms.    Lots med anxiety.       -Labs: reviewed in Epic              11/26/19:  Lipids:  (Total chol 254, ldl 178); hgba1c 8.2 tsh wnl              5/7/19:  Vit d 21.4     - ENCOURAGED TO KEEP CONSISTENT SCHEDULE/ROUTINE TO INCLUDE WAKE/SLEEP TIME, MEAL TIMES, SHOWER TIMES, EXERCISE       -Recommend outpt therapy. Has tried to connect in past but limited with insurance options. Unsure if wants to proceed or would be helpful         -OARRS reviewed, c/w history  -R/b/se/a d/w pt who consents.     3. Medical  -established with Panchito Arenas MD     4. Substance   -No active issues.     5.  RTC - 4 weeks      Abhijeet Rush, [facility]  Psychiatric Nurse Practitioner

## 2020-08-31 ENCOUNTER — OFFICE VISIT (OUTPATIENT)
Dept: NEUROLOGY | Age: 55
End: 2020-08-31
Payer: COMMERCIAL

## 2020-08-31 VITALS
HEART RATE: 84 BPM | WEIGHT: 202 LBS | DIASTOLIC BLOOD PRESSURE: 70 MMHG | HEIGHT: 62 IN | BODY MASS INDEX: 37.17 KG/M2 | SYSTOLIC BLOOD PRESSURE: 120 MMHG

## 2020-08-31 PROCEDURE — 2022F DILAT RTA XM EVC RTNOPTHY: CPT | Performed by: PSYCHIATRY & NEUROLOGY

## 2020-08-31 PROCEDURE — G8427 DOCREV CUR MEDS BY ELIG CLIN: HCPCS | Performed by: PSYCHIATRY & NEUROLOGY

## 2020-08-31 PROCEDURE — 99244 OFF/OP CNSLTJ NEW/EST MOD 40: CPT | Performed by: PSYCHIATRY & NEUROLOGY

## 2020-08-31 PROCEDURE — G8417 CALC BMI ABV UP PARAM F/U: HCPCS | Performed by: PSYCHIATRY & NEUROLOGY

## 2020-08-31 NOTE — PROGRESS NOTES
Cholesterol Brother     Anesth Problems Neg Hx     Malig Hyperten Neg Hx     Hypotension Neg Hx     Malig Hypertherm Neg Hx     Pseudochol. Deficiency Neg Hx      Social History     Socioeconomic History    Marital status: Single     Spouse name: None    Number of children: None    Years of education: None    Highest education level: None   Occupational History    None   Social Needs    Financial resource strain: None    Food insecurity     Worry: None     Inability: None    Transportation needs     Medical: None     Non-medical: None   Tobacco Use    Smoking status: Never Smoker    Smokeless tobacco: Never Used   Substance and Sexual Activity    Alcohol use: No     Alcohol/week: 0.0 standard drinks    Drug use: No    Sexual activity: Not Currently   Lifestyle    Physical activity     Days per week: None     Minutes per session: None    Stress: None   Relationships    Social connections     Talks on phone: None     Gets together: None     Attends Muslim service: None     Active member of club or organization: None     Attends meetings of clubs or organizations: None     Relationship status: None    Intimate partner violence     Fear of current or ex partner: None     Emotionally abused: None     Physically abused: None     Forced sexual activity: None   Other Topics Concern    None   Social History Narrative    Caffeine:        SODA -1 a month          TEA - 1 glass a day        COFFEE - 0       PHYSICAL EXAMINATION:  /70   Pulse 84   Ht 5' 2\" (1.575 m)   Wt 202 lb (91.6 kg)   LMP  (LMP Unknown)   BMI 36.95 kg/m²   Appearance: Well appearing, well nourished and in no distress  Mental Status Exam: Patient is alert, oriented to person, place and time.    Recent and remote memory is normal  Fund of Knowledge is normal  Attention/concentration is normal.   Speech : No dysarthria  Language : No aphasia  Funduscopic Exam: sharp disc margins  Cranial Nerves:   II: Visual fields:  Full to confrontation  III: Pupils:  equal, round, reactive to light  III,IV,VI: Extra Ocular Movements are intact. No nystagmus  V: Facial sensation is intact to pin prick and light touch  VII: Facial strength and movements: intact and symmetric smile,cheek puffing and eyebrow elevation  VIII: Hearing:  Intact to finger rub bilaterally  IX: Palate  elevation is symmetric  XI: Shoulder shrug is intact  XII: Tongue movements are normal  Motor:  Muscle tone and bulk are normal.   Strength is symmetrical 4/5 in all four extremities. Sensory: Decreased to light touch in the distal lower extremities  Coordination:  Normal  Finger to Nose and Heel to Shin bilaterally    . Reflexes:  DTR   Plantar response: Flexor bilaterally  Gait: Gait is impaired and slightly ataxic  Romberg: negative  Vascular: No carotid bruit bilaterally        DATA:  LABS:  General Labs:    CBC:   Lab Results   Component Value Date    WBC 7.7 06/22/2020    RBC 4.99 06/22/2020    HGB 14.5 06/22/2020    HCT 43.8 06/22/2020    MCV 87.7 06/22/2020    MCH 29.1 06/22/2020    MCHC 33.2 06/22/2020    RDW 14.0 06/22/2020     06/22/2020    MPV 9.6 06/22/2020     BMP:    Lab Results   Component Value Date     06/22/2020    K 4.9 06/22/2020    K 4.6 03/12/2019    CL 99 06/22/2020    CO2 26 06/22/2020    BUN 17 06/22/2020    LABALBU 4.4 06/22/2020    CREATININE 0.7 06/22/2020    CALCIUM 10.3 06/22/2020    GFRAA >60 06/22/2020    GFRAA >60 06/04/2013    LABGLOM >60 06/22/2020    GLUCOSE 259 06/22/2020       IMPRESSION :  Ataxia  Diabetic peripheral neuropathy  The visual \"white outs\" and probably ocular migraine/migraine variants  Dizziness  History of migraine headaches  Dysphagia due to esophageal stricture    RECOMMENDATIONS :  Discussed with patient  Recommended that she avoid chocolate and nitrite containing foods  Follow-up with balance testing  I will get a CT scan of the head   Continue with strict diabetic control  Reluctant to use topiramate

## 2020-09-08 ENCOUNTER — HOSPITAL ENCOUNTER (OUTPATIENT)
Dept: CT IMAGING | Age: 55
Discharge: HOME | End: 2020-09-08
Payer: COMMERCIAL

## 2020-09-08 PROCEDURE — 70450 CT HEAD/BRAIN W/O DYE: CPT

## 2020-09-11 ENCOUNTER — TELEPHONE (OUTPATIENT)
Dept: ENT CLINIC | Age: 55
End: 2020-09-11

## 2020-09-16 ENCOUNTER — TELEPHONE (OUTPATIENT)
Dept: ENDOCRINOLOGY | Age: 55
End: 2020-09-16

## 2020-09-18 ENCOUNTER — VIRTUAL VISIT (OUTPATIENT)
Dept: ENDOCRINOLOGY | Age: 55
End: 2020-09-18
Payer: COMMERCIAL

## 2020-09-18 PROCEDURE — 99441 PR PHYS/QHP TELEPHONE EVALUATION 5-10 MIN: CPT | Performed by: INTERNAL MEDICINE

## 2020-09-18 NOTE — PROGRESS NOTES
Seen as f/u patient for diabetes,      Pursuant to the emergency declaration under the [facility] waiver authority and the [employer] Act this Telephone Visit was insisted, with patient's consent, to reduce the patient's risk of exposure to COVID-19 and provide continuity of care for an established patient. Services were provided through a synchronous discussion over a telephone to substitute for in-person clinic visit.         Interim:    A1c high  Seen after 10/19  Not following regularly  Taking lantus every other day    H/o thyroid nodule  Difficulty swallowing  No lows    Diagnosed with Type 2 diabetes mellitus   Known diabetic complications: none     Current diabetic medications   Metformin ER 500mg - not taking reports lows with it- off of it   lantus    Intolerant to plain metformin    Last A1c 9.6 %<----8.2%<-----9<-----7.8%<------7.8%<------ 9.2%<-----8.8%<----- 10.3 on 7/17<------10.8 on 12/16<----- 9.1 on 8/16<-----11.4 on 6/16<-----8.8 on 4.15<---8.9 <---10.1    Prior visit with dietician: Yes   Current diet: on average, 1 meals per day + Snacks   Reports h/o gastroparesis  Current exercise: walking   Current monitoring regimen: home blood tests -1  times daily does in forearms    Report h/o pancreatitis, stent in pancreatic duct  Also h/o yeast infection    Has brought blood glucose log/meter: no  Home blood sugar records:  Any episodes of hypoglycemia? none    No Hx of CAD , PVD, CVA    Cath showed 20 % blockage    Hyperlipidemia:  on 3/16   12/16 Vit D 17.4    Ck nl LFT nl  Start lipitor 10mg  Vit D3 10,000 IU weekly-did not tolerate D2  5/17  Vit D 20   Taking lipitor every week  8/17   1/19   Vit D 25.9   on 6/20     Last eye exam 8/19  Last foot exam: 2/19  Last microalbumin to creatinine ratio: 11.18  ACE-I or ARB: None    TSH 1.14 on 4/15  7/14 USG:  IMPRESSION: Multiple small hypoechoic nodules, likely benign. These are nonspecific and measures 6 to 7 mm each. No FH of thyroid cancer or disease    8/16 USG stable    9/11  FINDINGS- The thyroid contains multiple bilateral thyroid   nodules. Three of the thyroid nodules measure 7 mm. The other   nodules are less than 7 mm. Nodules are primarily hypoechoic with   some heterogeneity. Both thyroid lobes show otherwise normal   echogenicity, morphology, and normal color Doppler flow. The right   thyroid lobe is 4.8 x 1.6 x 1.9 cm. The left thyroid lobe is 3.6 x   0.8 x 2.1 cm. The thyroid isthmus is normal at 3 mm in thickness.       IMPRESSION- Multiple bilateral small thyroid nodules, minimally   changed since 2007     10/19 USG    Right 1.1cm  Reviewed images, Given slight change in size, hypoechoic and > 1cm, would recommend FNA    11/19 FNA benign    Past Medical History:   Diagnosis Date    Ankle fracture, left     Ankle fracture, right     Arthritis     Asthma     Bipolar depression ([address]     CAN'T AFFORD MEDS    Bladder problem     Cervical disc herniation     Diabetes mellitus ([address]     diet control    Fatty liver disease, non-alcoholic     Fibromyalgia     Gastroparesis     Dr Ceci Thompson GERD (gastroesophageal reflux disease)     gastroporesis    Glaucoma     Dr Caren Pinon Hyperlipidemia     elevated LFT on meds    IBS (irritable bowel syndrome)     Lumbar herniated disc     Nausea & vomiting     Nephrolithiasis 1/29/2019    Obesity (BMI 30-39.9) 3/11/2019    Partial Achilles tendon tear     Pneumonia     PONV (postoperative nausea and vomiting)     RA (rheumatoid arthritis) ([address]     Rapid or irregular heartbeat     Sleep apnea     does not use cpap    Spinal stenosis     Stress incontinence     Thyroid disease     growths     Past Surgical History:   Procedure Laterality Date    CHOLECYSTECTOMY      COLONOSCOPY      COLONOSCOPY  03/06/2018    with polypectomies    DILATION AND CURETTAGE OF UTERUS      ENDOMETRIAL ABLATION      ENDOSCOPY, COLON, DIAGNOSTIC      ERCP  5/25/2010    with stent    ERCP  1-31-14    ERCP WITH SPHINTER OF ODDI MANOMETERY    ESOPHAGOSCOPY  10/5/10    botox injection    EYE SURGERY      LASER FOR GLAUCOMA    LAPAROSCOPY      TONSILLECTOMY      UPPER GASTROINTESTINAL ENDOSCOPY      UPPER GASTROINTESTINAL ENDOSCOPY  04/12/2011    DILATATION, 58 FR ARTHUR, 100 UNITS BOTOX    UPPER GASTROINTESTINAL ENDOSCOPY  08/30/2011    58 FR DILATATION, 100 UNITS  BOTOX INJECTION    UPPER GASTROINTESTINAL ENDOSCOPY  3-9-12    with dilatation and submucosal injection: Botox    UPPER GASTROINTESTINAL ENDOSCOPY      UPPER GASTROINTESTINAL ENDOSCOPY  11/20/12     Esophagogastroduodenoscopy with Botulinum toxin injection of the pylorus and Arthur esophageal dilation    UPPER GASTROINTESTINAL ENDOSCOPY  6/4/13    WITH DIILITATION AND BOTOX INJECTION    UPPER GASTROINTESTINAL ENDOSCOPY  5/20/2014    100 unit Botox injection, 56 Fr.  Arthur esophageal dilatation    UPPER GASTROINTESTINAL ENDOSCOPY  12/12/14    with esophageal dilatation, and botox injection    UPPER GASTROINTESTINAL ENDOSCOPY  09/09/2015    With Dilitation and Botox injection    UPPER GASTROINTESTINAL ENDOSCOPY  5/10/2016    distal esophagus biopsy, stomach biopsy, botox injection    UPPER GASTROINTESTINAL ENDOSCOPY  04/11/2017    with dilatation and botox injection    UPPER GASTROINTESTINAL ENDOSCOPY  10/24/2017    DILATATION, BIOPSY, BOTOX    UPPER GASTROINTESTINAL ENDOSCOPY  03/06/2018    WITH BOTOX AND BALLOON DILATATION    UPPER GASTROINTESTINAL ENDOSCOPY N/A 5/7/2019    EGD SUBMUCOSAL/BOTOX INJECTION performed by Ezekiel Biswas MD at [facility] 5 N/A 5/7/2019    EGD DILATION BALLOON performed by Ezekiel Biswas MD at [facility] 5 N/A 3/13/2020    EGD SUBMUCOSAL/BOTOX INJECTION performed by Rosaline Gómez MD at [facility] 5 N/A 3/13/2020    EGD DILATION BALLOON performed by Rosaline Gómez MD at [address] [place] [address]     Current Outpatient Medications   Medication Sig Dispense Refill    latanoprost (XALATAN) 0.005 % ophthalmic solution Place 1 drop into both eyes nightly      risperiDONE (RISPERDAL) 0.5 MG tablet Take 1 tablet by mouth daily 30 tablet 3    diazePAM (VALIUM) 2 MG tablet Take 1 tablet by mouth 2 times daily as needed for Anxiety for up to 60 days. (Patient taking differently: Take 2 mg by mouth nightly. ) 60 tablet 1    nystatin (MYCOSTATIN) 628817 UNIT/GM powder Apply 3 times daily. 5 g 5    nystatin (MYCOSTATIN) 389846 UNIT/GM cream Apply topically 2 times daily. 4 g 3    Cholecalciferol (VITAMIN D3) 250 MCG (21586 UT) CAPS TAKE ONE CAPSULE BY MOUTH DAILY 30 capsule 0    guaiFENesin (MUCINEX MAXIMUM STRENGTH) 1200 MG TB12 Take 1 tablet by mouth daily For a PM dose. 60 tablet 0    oxymetazoline (AFRIN) 0.05 % nasal spray 2 sprays each nostril, 3 times a day for 3 days, then 2 times a day for 2 days, then stop for 2 days and then repeat the cycle once. 1 Bottle 0    Cranberry 1000 MG CAPS Take by mouth Indications: OTC, takes two caps daily, does not know strength.  azithromycin (ZITHROMAX Z-MAÍRA) 250 MG tablet Take by mouth: 2 tablets on day 1, then 1 tab on day 2 to 5. Start 2nd maría on day 10.   Take 2 tablets on day 10, then 1 tab on day 11 to 15 2 packet 0    atorvastatin (LIPITOR) 10 MG tablet Take 1 tablet by mouth daily (Patient taking differently: Take 10 mg by mouth daily Indications: only taking 5mg couple times per week as instructed by cardiology d/t liver per pt report ) 90 tablet 0    famotidine (PEPCID) 40 MG tablet Take 1 tablet by mouth every evening 30 tablet 3    Multiple Vitamins-Minerals (THERAPEUTIC MULTIVITAMIN-MINERALS) tablet Take 1 tablet by mouth daily      albuterol sulfate  (90 Base) MCG/ACT Gloves (VINYL GLOVES LARGE) MISC 1 Package by Does not apply route 4 times daily 5 each 5    GAS-X EXTRA STRENGTH 125 MG chewable tablet CHEW TWO TABLETS BY MOUTH THREE TIMES A DAY WITH EACH MEAL AS NEEDED FOR FLATULENCE 192 tablet 1     No current facility-administered medications for this visit. Facility-Administered Medications Ordered in Other Visits   Medication Dose Route Frequency Provider Last Rate Last Dose    0.9 % sodium chloride infusion   Intravenous Continuous Mary Schumacher MD             Review of Systems      2/19    Foot exam: No ulcer, 8/10 monofilament    Lab Reviewed   No components found for: CHLPL  Lab Results   Component Value Date    TRIG 185 (H) 06/22/2020    TRIG 145 11/26/2019    TRIG 92 01/23/2019     Lab Results   Component Value Date    HDL 33 (L) 06/22/2020    HDL 47 11/26/2019    HDL 45 01/23/2019     Lab Results   Component Value Date    LDLCALC 202 (H) 06/22/2020    LDLCALC 178 (H) 11/26/2019    LDLCALC 173 (H) 01/23/2019     Lab Results   Component Value Date    LABVLDL 37 06/22/2020    LABVLDL 29 11/26/2019    LABVLDL 18 01/23/2019     Lab Results   Component Value Date    LABA1C 9.6 06/22/2020       Assessment:     Jaime Blas is a 63 y.o. female with :    1.T2DM: Longstanding, fairly controlled, reports low blood glucose with Metformin, so she stopped. Did not bring log, . Avoid DPP IV and GLP agonist given pancreatitis history. Avoid SGLT-2 Inhibitor given h/o yeast infection. Sulfonylurea may cause hypoglycemia. Needs basal insulin, discussed with her ,  started lower dose. She wanted amaryl , advised not a good option given A1c and lows. A1c high  She has relative hypoglycemia, so will improve  Gradually. Advised prandial, not willing to take.  A1c high, adjust lantus, advised not to skip, take regularly  Microalbumin is high but ratio is normal.   2.Elevated BP:  3.HLD:Familila hyperlipidemia,  Reports statins cause elevated LFT, per patient hepatologist advised not to take. Replaced Vitamin, so can tolerate lipitor. [employer] will not be covered, last LDL improved  Advised Zetia but wants to wait. Advised Praluent, she states want to try lipitor taking more frequently  Discussed risk of CAD, CVA given high LDL  4. Obesity  5. Thyroid Nodule: Sub Centimeter nodule, monitor with USG, stable on 8/16, no increase in size, repeat USG shows right 1.1cm nodule, FNA benign  6. Fatty liver  7. Vitamin D deficiency: On 10,000IU weekly per PCP      Plan:      Change lantus to 34 units daily, advised to take every day   Self titrate, increase by two unit every 3 days if BG > 140   Advise to check blood sugar 2 times a day. She does check in the forearm, advise finger sticks, keep log   Call if post meal >200 , as may need prandial   Patient to send blood sugar log for titration. Advise to exercise regularly but can not due to back pain, and OA. Advise to low simple carbohydrate and protein with each  meal diet. Diabetes Care: recommended yearly eye exam, foot exam and urine microalbumin to   creatinine ratio.      -Hyperlipidemia, LDL goal is <100 mg/dl   -Daily ASA : 81mg  -Smoking status: Non smoker    F/u in 6 months as will see PCP

## 2020-09-21 ENCOUNTER — HOSPITAL ENCOUNTER (OUTPATIENT)
Age: 55
Discharge: HOME | End: 2020-09-21
Payer: COMMERCIAL

## 2020-09-21 LAB
A/G RATIO: 1.5 (ref 1.1–2.2)
ALBUMIN SERPL-MCNC: 4.5 G/DL (ref 3.4–5)
ALP BLD-CCNC: 18 U/L (ref 40–129)
ALT SERPL-CCNC: 26 U/L (ref 10–40)
ANION GAP SERPL CALCULATED.3IONS-SCNC: 14 MMOL/L (ref 3–16)
AST SERPL-CCNC: 20 U/L (ref 15–37)
BILIRUB SERPL-MCNC: 0.5 MG/DL (ref 0–1)
BUN BLDV-MCNC: 14 MG/DL (ref 7–20)
CALCIUM SERPL-MCNC: 10.2 MG/DL (ref 8.3–10.6)
CHLORIDE BLD-SCNC: 100 MMOL/L (ref 99–110)
CHOLESTEROL, TOTAL: 254 MG/DL (ref 0–199)
CO2: 27 MMOL/L (ref 21–32)
CREAT SERPL-MCNC: 0.8 MG/DL (ref 0.6–1.1)
CREATININE URINE: 283.7 MG/DL (ref 28–259)
GFR AFRICAN AMERICAN: >60
GFR NON-AFRICAN AMERICAN: >60
GLOBULIN: 3.1 G/DL
GLUCOSE BLD-MCNC: 207 MG/DL (ref 70–99)
HDLC SERPL-MCNC: 36 MG/DL (ref 40–60)
LDL CHOLESTEROL CALCULATED: 175 MG/DL
MICROALBUMIN UR-MCNC: 13.1 MG/DL
MICROALBUMIN/CREAT UR-RTO: 46.2 MG/G (ref 0–30)
POTASSIUM SERPL-SCNC: 3.8 MMOL/L (ref 3.5–5.1)
SODIUM BLD-SCNC: 141 MMOL/L (ref 136–145)
TOTAL PROTEIN: 7.6 G/DL (ref 6.4–8.2)
TRIGL SERPL-MCNC: 214 MG/DL (ref 0–150)
TSH REFLEX: 1.6 UIU/ML (ref 0.27–4.2)
VITAMIN D 25-HYDROXY: 26.4 NG/ML
VLDLC SERPL CALC-MCNC: 43 MG/DL

## 2020-09-21 PROCEDURE — 80053 COMPREHEN METABOLIC PANEL: CPT

## 2020-09-21 PROCEDURE — 82043 UR ALBUMIN QUANTITATIVE: CPT

## 2020-09-21 PROCEDURE — 80061 LIPID PANEL: CPT

## 2020-09-21 PROCEDURE — 82306 VITAMIN D 25 HYDROXY: CPT

## 2020-09-21 PROCEDURE — 84443 ASSAY THYROID STIM HORMONE: CPT

## 2020-09-21 PROCEDURE — 36415 COLL VENOUS BLD VENIPUNCTURE: CPT

## 2020-09-21 PROCEDURE — 82570 ASSAY OF URINE CREATININE: CPT

## 2020-09-22 ENCOUNTER — HOSPITAL ENCOUNTER (OUTPATIENT)
Dept: WOMENS IMAGING | Age: 55
Discharge: HOME | End: 2020-09-22
Payer: COMMERCIAL

## 2020-09-22 PROCEDURE — 77063 BREAST TOMOSYNTHESIS BI: CPT

## 2020-09-23 ENCOUNTER — OFFICE VISIT (OUTPATIENT)
Dept: PSYCHIATRY | Age: 55
End: 2020-09-23
Payer: COMMERCIAL

## 2020-09-23 PROCEDURE — 99443 PR PHYS/QHP TELEPHONE EVALUATION 21-30 MIN: CPT | Performed by: NURSE PRACTITIONER

## 2020-09-23 RX ORDER — DIAZEPAM 2 MG/1
2 TABLET ORAL 2 TIMES DAILY PRN
Qty: 60 TABLET | Refills: 1 | Status: SHIPPED | OUTPATIENT
Start: 2020-09-26 | End: 2020-11-03 | Stop reason: SDUPTHER

## 2020-09-29 NOTE — PROGRESS NOTES
Patient not reached. Preop instructions left on voice mail. Number___[phone]____________    -Date__10/6/2020_____time_1000______arrival__0830  masc__________  -Nothing to eat or drink after midnight  -Responsible adult 25 or older to stay on site while you are here and drive you home and stay with you after  -Follow any instructions your doctors office has given you  -Bring a complete list of all your medications and supplements  -If you normally take the following medications in the morning please do so with a small    sip of water-heart,blood pressure,seizure,breathing or thyroid-avoid water pilll Do not take blood pressure medications ending in \"marcus\" or \"pril\" the AM of surgery or the julia prior  -You may use your inhalers  -Take half of your normal dose of any long acting insulins the night before-do not take    any diabetic medications in the morning  -Follow your doctors instructions regarding blood thinners  -Any questions call your surgeons office  Anesthesia attempts to review all Endo charts prior to surgery and will place any PAT orders,Surgery patients will have orders placed based on history in chart which may not be complete  ENDOSCOPY PATIENTS ONLY-FOLLOW YOUR DOCTORS BOWEL PREP INSTRUCTIONS,THIS MAY INCLUDE TAKING A SECOND PORTION OF YOUR PREP AFTER MIDNIGHT    Patient instructed to get their COVID-19 test done as directed by their doctor (5-7 days prior to procedure)  VM instructions to test here 9/30/2020. There is a one visitor policy at [facility] for all surgeries and endoscopies. Whether the visitor can stay or will be asked to wait in the car will depend on the current policy and if social distancing can be maintained. The policy is subject to change at any time. Please make sure the visitor has a cell phone that is on,charged and able to accept calls, as this may be the way that the staff communicates with them. Pain management is NO VISITOR policyThe patients ride is expected to remain in the car with a cell phone for communication. If the ride is leaving the hospital grounds please make sure they are back in time for pickup. Have the patient inform the staff on arrival what their rides plans are while the patient is in the facility. At the MAIN there is one visitor allowed. Please note that the visitor policy is subject to change.

## 2020-09-30 ENCOUNTER — OFFICE VISIT (OUTPATIENT)
Dept: PRIMARY CARE CLINIC | Age: 55
End: 2020-09-30
Payer: COMMERCIAL

## 2020-09-30 ENCOUNTER — HOSPITAL ENCOUNTER (OUTPATIENT)
Age: 55
Discharge: HOME | End: 2020-09-30
Payer: COMMERCIAL

## 2020-09-30 ENCOUNTER — TELEPHONE (OUTPATIENT)
Dept: PRIMARY CARE CLINIC | Age: 55
End: 2020-09-30

## 2020-09-30 LAB
ESTIMATED AVERAGE GLUCOSE: 214.5 MG/DL
HBA1C MFR BLD: 9.1 %

## 2020-09-30 PROCEDURE — G8428 CUR MEDS NOT DOCUMENT: HCPCS | Performed by: NURSE PRACTITIONER

## 2020-09-30 PROCEDURE — 83036 HEMOGLOBIN GLYCOSYLATED A1C: CPT

## 2020-09-30 PROCEDURE — 36415 COLL VENOUS BLD VENIPUNCTURE: CPT

## 2020-09-30 PROCEDURE — 99211 OFF/OP EST MAY X REQ PHY/QHP: CPT | Performed by: NURSE PRACTITIONER

## 2020-09-30 PROCEDURE — G8417 CALC BMI ABV UP PARAM F/U: HCPCS | Performed by: NURSE PRACTITIONER

## 2020-09-30 NOTE — TELEPHONE ENCOUNTER
----- Message from Brittney Mejía sent at 9/29/2020  5:24 PM EDT -----  Subject: Message to Provider    QUESTIONS  Information for Provider? Patient called and notice through my Chart that   her test level results that was on the 21 of September are off and would   like a phone call from doctor. Microalbumin urine and Creatinine ur   Microalbumin creatinine ratio are off on levels. .... ---------------------------------------------------------------------------  --------------  Leigh BRITO  What is the best way for the office to contact you? OK to leave message on   voicemail  Preferred Call Back Phone Number? [phone]  ---------------------------------------------------------------------------  --------------  SCRIPT ANSWERS  Relationship to Patient?  Self

## 2020-09-30 NOTE — PATIENT INSTRUCTIONS

## 2020-09-30 NOTE — PROGRESS NOTES
Protien in Urine . Avoid Nsaids . Keep Sugars and BP in control     Lipids very high   Why are you on a low dose Lipitor ?

## 2020-09-30 NOTE — PROGRESS NOTES
Rowdy Tovar received a viral test for COVID-19. They were educated on isolation and quarantine as appropriate. For any symptoms, they were directed to seek care from their PCP, given contact information to establish with a doctor, directed to an urgent care or the emergency room.

## 2020-10-02 LAB — SARS-COV-2, NAA: NOT DETECTED

## 2020-10-06 ENCOUNTER — ANESTHESIA (OUTPATIENT)
Dept: ENDOSCOPY | Age: 55
End: 2020-10-06
Payer: COMMERCIAL

## 2020-10-06 ENCOUNTER — ANESTHESIA EVENT (OUTPATIENT)
Dept: ENDOSCOPY | Age: 55
End: 2020-10-06
Payer: COMMERCIAL

## 2020-10-06 ENCOUNTER — HOSPITAL ENCOUNTER (OUTPATIENT)
Age: 55
Setting detail: OUTPATIENT SURGERY
Discharge: HOME | End: 2020-10-06
Attending: INTERNAL MEDICINE | Admitting: INTERNAL MEDICINE
Payer: COMMERCIAL

## 2020-10-06 VITALS
HEART RATE: 84 BPM | DIASTOLIC BLOOD PRESSURE: 89 MMHG | WEIGHT: 203 LBS | OXYGEN SATURATION: 99 % | BODY MASS INDEX: 37.36 KG/M2 | RESPIRATION RATE: 11 BRPM | SYSTOLIC BLOOD PRESSURE: 164 MMHG | TEMPERATURE: 97.2 F | HEIGHT: 62 IN

## 2020-10-06 VITALS
DIASTOLIC BLOOD PRESSURE: 75 MMHG | RESPIRATION RATE: 1 BRPM | SYSTOLIC BLOOD PRESSURE: 158 MMHG | OXYGEN SATURATION: 100 %

## 2020-10-06 LAB
GLUCOSE BLD-MCNC: 216 MG/DL (ref 70–99)
GLUCOSE BLD-MCNC: 222 MG/DL (ref 70–99)
PERFORMED ON: ABNORMAL
PERFORMED ON: ABNORMAL

## 2020-10-06 PROCEDURE — 2500000003 HC RX 250 WO HCPCS: Performed by: NURSE ANESTHETIST, CERTIFIED REGISTERED

## 2020-10-06 PROCEDURE — 3609013800 HC EGD SUBMUCOSAL/BOTOX INJECTION: Performed by: INTERNAL MEDICINE

## 2020-10-06 PROCEDURE — 2580000003 HC RX 258: Performed by: INTERNAL MEDICINE

## 2020-10-06 PROCEDURE — 7100000010 HC PHASE II RECOVERY - FIRST 15 MIN: Performed by: INTERNAL MEDICINE

## 2020-10-06 PROCEDURE — C1726 CATH, BAL DIL, NON-VASCULAR: HCPCS | Performed by: INTERNAL MEDICINE

## 2020-10-06 PROCEDURE — 7100000001 HC PACU RECOVERY - ADDTL 15 MIN: Performed by: INTERNAL MEDICINE

## 2020-10-06 PROCEDURE — 7100000011 HC PHASE II RECOVERY - ADDTL 15 MIN: Performed by: INTERNAL MEDICINE

## 2020-10-06 PROCEDURE — 6360000002 HC RX W HCPCS: Performed by: INTERNAL MEDICINE

## 2020-10-06 PROCEDURE — 3700000001 HC ADD 15 MINUTES (ANESTHESIA): Performed by: INTERNAL MEDICINE

## 2020-10-06 PROCEDURE — 6360000002 HC RX W HCPCS: Performed by: NURSE ANESTHETIST, CERTIFIED REGISTERED

## 2020-10-06 PROCEDURE — 7100000000 HC PACU RECOVERY - FIRST 15 MIN: Performed by: INTERNAL MEDICINE

## 2020-10-06 PROCEDURE — 2709999900 HC NON-CHARGEABLE SUPPLY: Performed by: INTERNAL MEDICINE

## 2020-10-06 PROCEDURE — 3700000000 HC ANESTHESIA ATTENDED CARE: Performed by: INTERNAL MEDICINE

## 2020-10-06 PROCEDURE — 6370000000 HC RX 637 (ALT 250 FOR IP): Performed by: ANESTHESIOLOGY

## 2020-10-06 PROCEDURE — 3609017700 HC EGD DILATION GASTRIC/DUODENAL STRICTURE: Performed by: INTERNAL MEDICINE

## 2020-10-06 RX ORDER — HYDROMORPHONE HCL 110MG/55ML
0.5 PATIENT CONTROLLED ANALGESIA SYRINGE INTRAVENOUS EVERY 5 MIN PRN
Status: DISCONTINUED | OUTPATIENT
Start: 2020-10-06 | End: 2020-10-06 | Stop reason: HOSPADM

## 2020-10-06 RX ORDER — LABETALOL HYDROCHLORIDE 5 MG/ML
5 INJECTION, SOLUTION INTRAVENOUS EVERY 10 MIN PRN
Status: DISCONTINUED | OUTPATIENT
Start: 2020-10-06 | End: 2020-10-06 | Stop reason: HOSPADM

## 2020-10-06 RX ORDER — GLYCOPYRROLATE 1 MG/5 ML
SYRINGE (ML) INTRAVENOUS PRN
Status: DISCONTINUED | OUTPATIENT
Start: 2020-10-06 | End: 2020-10-06 | Stop reason: SDUPTHER

## 2020-10-06 RX ORDER — LIDOCAINE HYDROCHLORIDE 10 MG/ML
1 INJECTION, SOLUTION EPIDURAL; INFILTRATION; INTRACAUDAL; PERINEURAL
Status: DISCONTINUED | OUTPATIENT
Start: 2020-10-06 | End: 2020-10-06 | Stop reason: HOSPADM

## 2020-10-06 RX ORDER — FAMOTIDINE 40 MG/1
40 TABLET, FILM COATED ORAL 2 TIMES DAILY
Qty: 30 TABLET | Refills: 5 | Status: SHIPPED | OUTPATIENT
Start: 2020-10-06

## 2020-10-06 RX ORDER — LIDOCAINE HYDROCHLORIDE 20 MG/ML
INJECTION, SOLUTION EPIDURAL; INFILTRATION; INTRACAUDAL; PERINEURAL PRN
Status: DISCONTINUED | OUTPATIENT
Start: 2020-10-06 | End: 2020-10-06 | Stop reason: SDUPTHER

## 2020-10-06 RX ORDER — HYDRALAZINE HYDROCHLORIDE 20 MG/ML
5 INJECTION INTRAMUSCULAR; INTRAVENOUS EVERY 10 MIN PRN
Status: DISCONTINUED | OUTPATIENT
Start: 2020-10-06 | End: 2020-10-06 | Stop reason: HOSPADM

## 2020-10-06 RX ORDER — SODIUM CHLORIDE 0.9 % (FLUSH) 0.9 %
10 SYRINGE (ML) INJECTION PRN
Status: DISCONTINUED | OUTPATIENT
Start: 2020-10-06 | End: 2020-10-06 | Stop reason: HOSPADM

## 2020-10-06 RX ORDER — SODIUM CHLORIDE 9 MG/ML
INJECTION, SOLUTION INTRAVENOUS CONTINUOUS
Status: DISCONTINUED | OUTPATIENT
Start: 2020-10-06 | End: 2020-10-06 | Stop reason: HOSPADM

## 2020-10-06 RX ORDER — PROPOFOL 10 MG/ML
INJECTION, EMULSION INTRAVENOUS PRN
Status: DISCONTINUED | OUTPATIENT
Start: 2020-10-06 | End: 2020-10-06 | Stop reason: SDUPTHER

## 2020-10-06 RX ORDER — KETAMINE HYDROCHLORIDE 10 MG/ML
INJECTION, SOLUTION INTRAMUSCULAR; INTRAVENOUS PRN
Status: DISCONTINUED | OUTPATIENT
Start: 2020-10-06 | End: 2020-10-06 | Stop reason: SDUPTHER

## 2020-10-06 RX ORDER — AZITHROMYCIN 250 MG/1
TABLET, FILM COATED ORAL
Qty: 2 PACKET | Refills: 0 | Status: SHIPPED | OUTPATIENT
Start: 2020-10-06 | End: 2020-11-19 | Stop reason: ALTCHOICE

## 2020-10-06 RX ORDER — SODIUM CHLORIDE 0.9 % (FLUSH) 0.9 %
10 SYRINGE (ML) INJECTION EVERY 12 HOURS SCHEDULED
Status: DISCONTINUED | OUTPATIENT
Start: 2020-10-06 | End: 2020-10-06 | Stop reason: HOSPADM

## 2020-10-06 RX ORDER — ONDANSETRON 2 MG/ML
4 INJECTION INTRAMUSCULAR; INTRAVENOUS
Status: DISCONTINUED | OUTPATIENT
Start: 2020-10-06 | End: 2020-10-06 | Stop reason: HOSPADM

## 2020-10-06 RX ORDER — SODIUM CHLORIDE, SODIUM LACTATE, POTASSIUM CHLORIDE, CALCIUM CHLORIDE 600; 310; 30; 20 MG/100ML; MG/100ML; MG/100ML; MG/100ML
INJECTION, SOLUTION INTRAVENOUS CONTINUOUS
Status: DISCONTINUED | OUTPATIENT
Start: 2020-10-06 | End: 2020-10-06 | Stop reason: HOSPADM

## 2020-10-06 RX ORDER — SCOLOPAMINE TRANSDERMAL SYSTEM 1 MG/1
1 PATCH, EXTENDED RELEASE TRANSDERMAL ONCE
Status: DISCONTINUED | OUTPATIENT
Start: 2020-10-06 | End: 2020-10-06 | Stop reason: HOSPADM

## 2020-10-06 RX ADMIN — PROPOFOL 20 MG: 10 INJECTION, EMULSION INTRAVENOUS at 10:02

## 2020-10-06 RX ADMIN — PROPOFOL 20 MG: 10 INJECTION, EMULSION INTRAVENOUS at 09:54

## 2020-10-06 RX ADMIN — PROPOFOL 20 MG: 10 INJECTION, EMULSION INTRAVENOUS at 09:51

## 2020-10-06 RX ADMIN — PROPOFOL 40 MG: 10 INJECTION, EMULSION INTRAVENOUS at 09:50

## 2020-10-06 RX ADMIN — LIDOCAINE HYDROCHLORIDE 100 MG: 20 INJECTION, SOLUTION EPIDURAL; INFILTRATION; INTRACAUDAL; PERINEURAL at 09:48

## 2020-10-06 RX ADMIN — Medication 0.2 MG: at 09:48

## 2020-10-06 RX ADMIN — KETAMINE HYDROCHLORIDE 10 MG: 10 INJECTION, SOLUTION INTRAMUSCULAR; INTRAVENOUS at 09:50

## 2020-10-06 RX ADMIN — PROPOFOL 20 MG: 10 INJECTION, EMULSION INTRAVENOUS at 09:52

## 2020-10-06 RX ADMIN — SODIUM CHLORIDE: 9 INJECTION, SOLUTION INTRAVENOUS at 09:35

## 2020-10-06 RX ADMIN — PROPOFOL 20 MG: 10 INJECTION, EMULSION INTRAVENOUS at 09:56

## 2020-10-06 RX ADMIN — PROPOFOL 50 MG: 10 INJECTION, EMULSION INTRAVENOUS at 09:49

## 2020-10-06 RX ADMIN — SODIUM CHLORIDE: 9 INJECTION, SOLUTION INTRAVENOUS at 09:40

## 2020-10-06 RX ADMIN — PROPOFOL 30 MG: 10 INJECTION, EMULSION INTRAVENOUS at 09:58

## 2020-10-06 RX ADMIN — PROPOFOL 20 MG: 10 INJECTION, EMULSION INTRAVENOUS at 10:00

## 2020-10-06 ASSESSMENT — PULMONARY FUNCTION TESTS
PIF_VALUE: 1
PIF_VALUE: 2
PIF_VALUE: 1
PIF_VALUE: 0
PIF_VALUE: 0
PIF_VALUE: 1
PIF_VALUE: 0
PIF_VALUE: 0
PIF_VALUE: 1
PIF_VALUE: 0
PIF_VALUE: 1
PIF_VALUE: 0
PIF_VALUE: 1
PIF_VALUE: 1
PIF_VALUE: 0
PIF_VALUE: 0
PIF_VALUE: 1

## 2020-10-06 ASSESSMENT — PAIN - FUNCTIONAL ASSESSMENT: PAIN_FUNCTIONAL_ASSESSMENT: 0-10

## 2020-10-06 ASSESSMENT — LIFESTYLE VARIABLES: SMOKING_STATUS: 0

## 2020-10-06 NOTE — ANESTHESIA POSTPROCEDURE EVALUATION
Department of Anesthesiology  Postprocedure Note    Patient: Nazario Varela  MRN: [id number]  YOB: 1965  Date of evaluation: 10/6/2020  Time:  10:27 AM     Procedure Summary     Date:  10/06/20 Room / Location:  [address] [place] [address]    Anesthesia Start:  9154 Anesthesia Stop:  1700    Procedure:  EGD W/BOTOX/DILATION (N/A Abdomen) Diagnosis:       (GASTROPARESIS K31.84)      (DYSPHAGIA R13.10)    Surgeon:  Teodora Lee MD Responsible Provider:  Josefina Marroquin MD    Anesthesia Type:  MAC ASA Status:  3          Anesthesia Type: MAC    Harman Phase I: Harman Score: 8    Harman Phase II:      Last vitals: Reviewed and per EMR flowsheets.        Anesthesia Post Evaluation    Patient location during evaluation: PACU  Patient participation: complete - patient participated  Level of consciousness: awake and alert  Airway patency: patent  Nausea & Vomiting: no vomiting and no nausea  Complications: no  Cardiovascular status: hemodynamically stable  Respiratory status: acceptable  Hydration status: stable

## 2020-10-06 NOTE — BRIEF OP NOTE
Brief Postoperative Note - Full Note in Chart Review/Procedures tab       Patient: Stew Orellana  YOB: 1965  MRN: [id number]    Date of Procedure: 10/6/2020    Pre-Op Diagnosis: GASTROPARESIS K31.84  DYSPHAGIA R13.10    Post-Op Diagnosis: Same       Procedure(s):  EGD W/BOTOX/DILATION    Surgeon(s):  Sofia Guillen MD    Assistant:  * No surgical staff found *    Anesthesia: Monitor Anesthesia Care    Estimated Blood Loss (mL): Minimal    Complications: None    Specimens:   * No specimens in log *    Implants:  * No implants in log *      Drains: * No LDAs found *    Findings:  1) Normal Esophagogastroduodenoscopy     2) Successful Botox injection of pylorus performed    3) Empiric balloon dilation of esophagus performed as above. Rec:  1) Resume diet and meds. 2) Consider trial of Z-pack for motility effects. 3) Increase famotidine to 40 mg po BID. 4) Follow up in office in 6 months.     Electronically signed by Sofia Guillen MD on 10/6/2020 at 10:02 AM

## 2020-10-06 NOTE — ANESTHESIA PRE PROCEDURE
Department of Anesthesiology  Preprocedure Note       Name:  Minerva Credit   Age:  63 y.o.  :  1965                                          MRN:  [id number]         Date:  10/6/2020      Surgeon: [place] Moncada):  Navjot Padilla MD    Procedure: Procedure(s):  EGD W/BOTOX/DILATION    Medications prior to admission:   Prior to Admission medications    Medication Sig Start Date End Date Taking? Authorizing Provider   diazePAM (VALIUM) 2 MG tablet Take 1 tablet by mouth 2 times daily as needed for Anxiety for up to 60 days. 20  KRISHNA Cardenas CNP   latanoprost (XALATAN) 0.005 % ophthalmic solution Place 1 drop into both eyes nightly    Historical Provider, MD   risperiDONE (RISPERDAL) 0.5 MG tablet Take 1 tablet by mouth daily 20   KRISHNA Cardenas CNP   nystatin (MYCOSTATIN) 927789 UNIT/GM powder Apply 3 times daily. 20   Earlene Travis MD   nystatin (MYCOSTATIN) 915249 UNIT/GM cream Apply topically 2 times daily. 20   Earlene Travis MD   Cholecalciferol (VITAMIN D3) 250 MCG (56605 UT) CAPS TAKE ONE CAPSULE BY MOUTH DAILY 20   Earlene Travis MD   guaiFENesin (MUCINEX MAXIMUM STRENGTH) 1200 MG TB12 Take 1 tablet by mouth daily For a PM dose. 20   Laura Beasley MD   oxymetazoline (AFRIN) 0.05 % nasal spray 2 sprays each nostril, 3 times a day for 3 days, then 2 times a day for 2 days, then stop for 2 days and then repeat the cycle once. 7/10/20   Laura Beasley MD   Cranberry 1000 MG CAPS Take by mouth Indications: OTC, takes two caps daily, does not know strength. Historical Provider, MD   azithromycin (ZITHROMAX Z-DESTINEY) 250 MG tablet Take by mouth: 2 tablets on day 1, then 1 tab on day 2 to 5. Start 2nd destiney on day 10.   Take 2 tablets on day 10, then 1 tab on day 11 to 15 20   Laura Beasley MD   atorvastatin (LIPITOR) 10 MG tablet Take 1 tablet by mouth daily  Patient taking differently: Take 10 mg by mouth daily Indications: VI;ONE TOUCH ULTRA TEST VI) strip 1-2 time a day As needed. 10/1/19   Estuardo Palma MD   aspirin 81 MG chewable tablet Take 1 tablet by mouth daily (coated tablets) 9/17/19   KRISHNA Balbuena CNP   Insulin Pen Needle (PEN NEEDLES) 32G X 4 MM MISC USE WITH INSULIN PEN ONCE DAILY 8/5/19   Estuardo Palma MD   nystatin (MYCOSTATIN) 202443 UNIT/GM cream APPLY EXTERNALLY TO THE AFFECTED AREAS TWO TIMES A DAY 6/6/19   Nancy Mcfadden MD   Incontinence Supply Disposable (TRE CLASSIC BRIEFS/LARGE) MISC 1 each by Does not apply route 3 times daily 3/4/19   Nancy Mcfadden MD   Incontinence Supplies MISC 1 Package by Does not apply route 2 times daily 3/4/19   Nancy Mcfadden MD   Disposable Gloves (VINYL GLOVES LARGE) MISC 1 Package by Does not apply route 4 times daily 3/4/19   Nancy Mcfadden MD   GAS-X EXTRA STRENGTH 125 MG chewable tablet CHEW TWO TABLETS BY MOUTH THREE TIMES A DAY WITH EACH MEAL AS NEEDED FOR FLATULENCE 12/13/18   Nancy Mcfadden MD       Current medications:    Current Facility-Administered Medications   Medication Dose Route Frequency Provider Last Rate Last Dose    0.9 % sodium chloride infusion   Intravenous Continuous Sam Alvarado MD        HYDROmorphone (DILAUDID) injection 0.5 mg  0.5 mg Intravenous Q5 Min PRN Any Gilmore MD        ondansetron [facility]) injection 4 mg  4 mg Intravenous Once PRN Any Gilmore MD        labetalol (NORMODYNE;TRANDATE) injection 5 mg  5 mg Intravenous Q10 Min PRN Any Gilmore MD        hydrALAZINE (APRESOLINE) injection 5 mg  5 mg Intravenous Q10 Min PRN Any Gilmore MD         Facility-Administered Medications Ordered in Other Encounters   Medication Dose Route Frequency Provider Last Rate Last Dose    0.9 % sodium chloride infusion   Intravenous Continuous Colby Ruiz MD           Allergies:     Allergies   Allergen Reactions    Macrodantin [Nitrofurantoin Macrocrystal] Shortness Of Breath     Caused an asthma attack    Penicillins Hives and Shortness Of Breath    Shellfish-Derived Products Swelling     Throat and tongue swells, allergy to all seafood    Aspartame And Phenylalanine      Severe headaches    Bactrim Hives    Biaxin [Clarithromycin] Hives    Cephalexin Other (See Comments)     blisters    Ciprofloxacin Other (See Comments)     Causes severe pain and tendon tears per pt    Hydrocodone-Acetaminophen Other (See Comments)     HALLUCINATIONS    Lansoprazole Hives    Nexium [Esomeprazole Magnesium Trihydrate] Hives    Other Other (See Comments)     TEGADERM-BLISTERS    Prilosec [Omeprazole] Hives    Blueberry [Vaccinium Angustifolium] Nausea And Vomiting     Projectile vomiting    Monosodium Glutamate Nausea And Vomiting    Zmax [Azithromycin Dihydrate] Nausea And Vomiting     NOT Z PACK its the Powder you had to mix and drink       Problem List:    Patient Active Problem List   Diagnosis Code    DM2 (diabetes mellitus, type 2) ([facility]) E11.9    GERD (gastroesophageal reflux disease) K21.9    Fibromyalgia M79.7    Urine incontinence R32    Asthma J45.909    Chronic back pain M54.9, G89.29    Rheumatoid arthritis ([facility]) M06.9    Gastroparesis K31.84    Thyroid cyst E04.1    Vitamin D deficiency E55.9    Multiple thyroid nodules E04.2    IBS (irritable bowel syndrome) K58.9    Disequilibrium syndrome E87.8    Subjective tinnitus of both ears H93.13    Bilateral high frequency sensorineural hearing loss H90.3    Hypercholesteremia E78.00    Obstructive sleep apnea syndrome G47.33    Moderate episode of recurrent major depressive disorder ([facility]) F33.1    Nephrolithiasis N20.0    Unstable angina ([facility]) I20.0    Obesity (BMI 30-39. 9) E66.9    Coronary artery disease due to lipid rich plaque I25.10, I25.83    Dizziness R42    Bilateral hearing loss H91.93    Sensation of fullness in ear, bilateral H93.8X3       Past Medical History:        Diagnosis Date INJECTION    UPPER GASTROINTESTINAL ENDOSCOPY  5/20/2014    100 unit Botox injection, [address] esophageal dilatation    UPPER GASTROINTESTINAL ENDOSCOPY  12/12/14    with esophageal dilatation, and botox injection    UPPER GASTROINTESTINAL ENDOSCOPY  09/09/2015    With Dilitation and Botox injection    UPPER GASTROINTESTINAL ENDOSCOPY  5/10/2016    distal esophagus biopsy, stomach biopsy, botox injection    UPPER GASTROINTESTINAL ENDOSCOPY  04/11/2017    with dilatation and botox injection    UPPER GASTROINTESTINAL ENDOSCOPY  10/24/2017    DILATATION, BIOPSY, BOTOX    UPPER GASTROINTESTINAL ENDOSCOPY  03/06/2018    WITH BOTOX AND BALLOON DILATATION    UPPER GASTROINTESTINAL ENDOSCOPY N/A 5/7/2019    EGD SUBMUCOSAL/BOTOX INJECTION performed by Ana Javed MD at [address] N/A 5/7/2019    EGD DILATION BALLOON performed by Ana Javed MD at [address] N/A 3/13/2020    EGD SUBMUCOSAL/BOTOX INJECTION performed by Ana Javed MD at [address] N/A 3/13/2020    EGD DILATION BALLOON performed by Ana Javed MD at [address] History:    Social History     Tobacco Use    Smoking status: Never Smoker    Smokeless tobacco: Never Used   Substance Use Topics    Alcohol use: No     Alcohol/week: 0.0 standard drinks                                Counseling given: Not Answered      Vital Signs (Current): There were no vitals filed for this visit.                                            BP Readings from Last 3 Encounters:   08/31/20 120/70   07/09/20 104/78   07/02/20 120/62       NPO Status:                                                                                 BMI:   Wt Readings from Last 3 Encounters:   08/31/20 202 lb (91.6 kg)   07/09/20 202 lb (91.6 kg)   07/02/20 204 lb (92.5 kg)     There is no height or weight on file to non-obst CAD, cath 2019): non-obstructive, hyperlipidemia    (-) past MI, CABG/stent, dysrhythmias,  angina and  CHF (echo 20109 Ef 54 no RWMA)    ECG reviewed  Rhythm: regular  Rate: normal  Echocardiogram reviewed  Stress test reviewed                Neuro/Psych:   (+) neuromuscular disease (fibromyalgia, chronic back pain):, psychiatric history (bipolar):depression/anxiety    (-) seizures, TIA and CVA           GI/Hepatic/Renal:   (+) GERD (gastroparesis):, liver disease (SNIDER):,      (-) no renal disease       Endo/Other:    (+) Diabetes (a1c 9.1)Type II DM, using insulin, : arthritis (RA, no steroid req, no c-spine issues): rheumatoid. , .    (-) hypothyroidism, hyperthyroidism               Abdominal:   (+) obese,         Vascular:                                        Anesthesia Plan      MAC     ASA 3       Induction: intravenous. Anesthetic plan and risks discussed with patient. Plan discussed with CRNA.                   Suzette Masters MD   10/6/2020

## 2020-10-06 NOTE — PROGRESS NOTES
Pt arrived from endo to PACU bay 4. Reported received from Endo staff. Pt arouses to voice. Pt on 3L NC, NSR, and VSS. Will continue to monitor.

## 2020-10-06 NOTE — H&P
04/12/2011    DILATATION, 58 FR ARTHUR, 100 UNITS BOTOX    UPPER GASTROINTESTINAL ENDOSCOPY  08/30/2011    58 FR DILATATION, 100 UNITS  BOTOX INJECTION    UPPER GASTROINTESTINAL ENDOSCOPY  3-9-12    with dilatation and submucosal injection: Botox    UPPER GASTROINTESTINAL ENDOSCOPY      UPPER GASTROINTESTINAL ENDOSCOPY  11/20/12     Esophagogastroduodenoscopy with Botulinum toxin injection of the pylorus and Arthur esophageal dilation    UPPER GASTROINTESTINAL ENDOSCOPY  6/4/13    WITH DIILITATION AND BOTOX INJECTION    UPPER GASTROINTESTINAL ENDOSCOPY  5/20/2014    100 unit Botox injection, 56 Fr.  Arthur esophageal dilatation    UPPER GASTROINTESTINAL ENDOSCOPY  12/12/14    with esophageal dilatation, and botox injection    UPPER GASTROINTESTINAL ENDOSCOPY  09/09/2015    With Dilitation and Botox injection    UPPER GASTROINTESTINAL ENDOSCOPY  5/10/2016    distal esophagus biopsy, stomach biopsy, botox injection    UPPER GASTROINTESTINAL ENDOSCOPY  04/11/2017    with dilatation and botox injection    UPPER GASTROINTESTINAL ENDOSCOPY  10/24/2017    DILATATION, BIOPSY, BOTOX    UPPER GASTROINTESTINAL ENDOSCOPY  03/06/2018    WITH BOTOX AND BALLOON DILATATION    UPPER GASTROINTESTINAL ENDOSCOPY N/A 5/7/2019    EGD SUBMUCOSAL/BOTOX INJECTION performed by Emma Pineda MD at [facility] 5 N/A 5/7/2019    EGD DILATION BALLOON performed by Emma Pineda MD at [facility] 5 N/A 3/13/2020    EGD SUBMUCOSAL/BOTOX INJECTION performed by Emma Pineda MD at [facility] 5 N/A 3/13/2020    EGD DILATION BALLOON performed by Emma Pineda MD at [address] [place] [address]     Medications Prior to Admission:   Current Facility-Administered Medications on File Prior to Encounter   Medication Dose Route Frequency Provider Last Rate Last Dose    0.9 % sodium chloride infusion Intravenous Continuous Airam Rose MD         Current Outpatient Medications on File Prior to Encounter   Medication Sig Dispense Refill    latanoprost (XALATAN) 0.005 % ophthalmic solution Place 1 drop into both eyes nightly      risperiDONE (RISPERDAL) 0.5 MG tablet Take 1 tablet by mouth daily 30 tablet 3    nystatin (MYCOSTATIN) 427735 UNIT/GM powder Apply 3 times daily. 5 g 5    nystatin (MYCOSTATIN) 988421 UNIT/GM cream Apply topically 2 times daily. 4 g 3    Cholecalciferol (VITAMIN D3) 250 MCG (47962 UT) CAPS TAKE ONE CAPSULE BY MOUTH DAILY 30 capsule 0    guaiFENesin (MUCINEX MAXIMUM STRENGTH) 1200 MG TB12 Take 1 tablet by mouth daily For a PM dose. 60 tablet 0    oxymetazoline (AFRIN) 0.05 % nasal spray 2 sprays each nostril, 3 times a day for 3 days, then 2 times a day for 2 days, then stop for 2 days and then repeat the cycle once. 1 Bottle 0    Cranberry 1000 MG CAPS Take by mouth Indications: OTC, takes two caps daily, does not know strength.  azithromycin (ZITHROMAX Z-DESTINEY) 250 MG tablet Take by mouth: 2 tablets on day 1, then 1 tab on day 2 to 5. Start 2nd destiney on day 10.   Take 2 tablets on day 10, then 1 tab on day 11 to 15 2 packet 0    atorvastatin (LIPITOR) 10 MG tablet Take 1 tablet by mouth daily (Patient taking differently: Take 10 mg by mouth daily Indications: only taking 5mg couple times per week as instructed by cardiology d/t liver per pt report ) 90 tablet 0    famotidine (PEPCID) 40 MG tablet Take 1 tablet by mouth every evening 30 tablet 3    Multiple Vitamins-Minerals (THERAPEUTIC MULTIVITAMIN-MINERALS) tablet Take 1 tablet by mouth daily      albuterol sulfate  (90 Base) MCG/ACT inhaler INHALE TWO PUFFS BY MOUTH EVERY 6 HOURS AS NEEDED FOR WHEEZING 1 Inhaler 2    loratadine (CLARITIN) 10 MG tablet TAKE ONE TABLET BY MOUTH DAILY (Patient taking differently: Indications: alternates wtih mucinex TAKE ONE TABLET BY MOUTH DAILY) 30 tablet 5    cyclobenzaprine (FLEXERIL) 10 MG tablet Take 0.5 tablets by mouth daily as needed for Muscle spasms 30 tablet 5    blood glucose test strips (ASCENSIA AUTODISC VI;ONE TOUCH ULTRA TEST VI) strip 2 each by In Vitro route daily As needed. 100 each 5    Blood Glucose Monitoring Suppl (TRUE METRIX METER) w/Device KIT As needed 1 kit 0    insulin glargine (LANTUS SOLOSTAR) 100 UNIT/ML injection pen Inject 35-38 Units into the skin daily (Patient taking differently: Inject 33-35 Units into the skin daily Indications: taking 30-35 units daily depending upon BS ) 5 pen 3    Cholecalciferol (VITAMIN D3) 25 MCG (1000 UT) TABS Take 1 tablet by mouth daily (Patient taking differently: Take 10,000 Units by mouth every 7 days ) 90 tablet 1    ketotifen (ZADITOR) 0.025 % ophthalmic solution INSTILL TWO DROPS IN [facility] EYE TWO TIMES A DAY AS NEEDED FOR ALLERGY 1 Bottle 3    blood glucose monitor strips Test blood sugar 2-3 times  a day 100 strip 5    TRUEPLUS LANCETS 28G MISC USE ONE LANCET TO TEST THREE TO FOUR TIMES A  each 5    blood glucose test strips (ASCENSIA AUTODISC VI;ONE TOUCH ULTRA TEST VI) strip 1-2 time a day As needed.  100 each 3    aspirin 81 MG chewable tablet Take 1 tablet by mouth daily (coated tablets) 30 tablet 3    Insulin Pen Needle (PEN NEEDLES) 32G X 4 MM MISC USE WITH INSULIN PEN ONCE DAILY 100 each 5    nystatin (MYCOSTATIN) 905792 UNIT/GM cream APPLY EXTERNALLY TO THE AFFECTED AREAS TWO TIMES A DAY 1 Tube 2    Incontinence Supply Disposable (TRE CLASSIC BRIEFS/LARGE) MISC 1 each by Does not apply route 3 times daily 5 each 5    Incontinence Supplies MISC 1 Package by Does not apply route 2 times daily 5 each 5    Disposable Gloves (VINYL GLOVES LARGE) MISC 1 Package by Does not apply route 4 times daily 5 each 5    GAS-X EXTRA STRENGTH 125 MG chewable tablet CHEW TWO TABLETS BY MOUTH THREE TIMES A DAY WITH EACH MEAL AS NEEDED FOR FLATULENCE 192 tablet 1     Allergies:  Macrodantin [nitrofurantoin macrocrystal]; Penicillins; Shellfish-derived products; Aspartame and phenylalanine; Bactrim; Biaxin [clarithromycin]; Cephalexin; Ciprofloxacin; Hydrocodone-acetaminophen; Lansoprazole; Nexium [esomeprazole magnesium trihydrate]; Other; Prilosec [omeprazole]; Blueberry [vaccinium angustifolium]; Monosodium glutamate; and Zmax [azithromycin dihydrate]        Social History:   Social History     Tobacco Use    Smoking status: Never Smoker    Smokeless tobacco: Never Used   Substance Use Topics    Alcohol use: No     Alcohol/week: 0.0 standard drinks     Family History:   Family History   Problem Relation Age of Onset    Diabetes Mother     Heart Disease Mother     Stroke Mother     Heart Disease Father     Heart Disease Brother     High Blood Pressure Brother     High Cholesterol Brother     Diabetes Brother     High Cholesterol Brother     Anesth Problems Neg Hx     Malig Hyperten Neg Hx     Hypotension Neg Hx     Malig Hypertherm Neg Hx     Pseudochol. Deficiency Neg Hx        PHYSICAL EXAM:      LMP  (LMP Unknown)  I        Heart:  RRR,  Normal S1S2    Lungs:  CTA Bilat, normal effort    Abdomen:  ND NT      ASA Grade:  ASA 3 - Patient with moderate systemic disease with functional limitations    Mallampati Class:  Class I: Soft palate, uvula, fauces, pillars visible  __________  Class II: Soft palate, uvula, fauces visible  __________   Class III: Soft palate, base of uvula visible  ____X_____  Class IV: Hard palate only visible   __________      ASSESSMENT AND PLAN:    1. Patient is a 63 y.o. female here for EGD with anesthesia  2. Procedure options, risks and benefits reviewed with patient. Patient expresses understanding.      Lakhwinder Talley MD  [address]  10/6/2020

## 2020-10-06 NOTE — PROGRESS NOTES
Discharge instructions review with patient. All home medications have been reviewed, pt v/u. Discharge instructions signed. Pt discharged via wheelchair. Pt discharged with 2 rx and all other belongings. Friend taking stable pt home.

## 2020-10-27 ENCOUNTER — TELEPHONE (OUTPATIENT)
Dept: PRIMARY CARE CLINIC | Age: 55
End: 2020-10-27

## 2020-11-02 ENCOUNTER — TELEPHONE (OUTPATIENT)
Dept: PRIMARY CARE CLINIC | Age: 55
End: 2020-11-02

## 2020-11-03 ENCOUNTER — OFFICE VISIT (OUTPATIENT)
Dept: PSYCHIATRY | Age: 55
End: 2020-11-03
Payer: COMMERCIAL

## 2020-11-03 PROCEDURE — 99443 PR PHYS/QHP TELEPHONE EVALUATION 21-30 MIN: CPT | Performed by: NURSE PRACTITIONER

## 2020-11-03 RX ORDER — LAMOTRIGINE 25 MG/1
TABLET ORAL
Qty: 60 TABLET | Refills: 3 | Status: SHIPPED | OUTPATIENT
Start: 2020-11-03 | End: 2021-01-06

## 2020-11-03 RX ORDER — DIAZEPAM 2 MG/1
2 TABLET ORAL 2 TIMES DAILY PRN
Qty: 60 TABLET | Refills: 1 | Status: SHIPPED | OUTPATIENT
Start: 2020-11-11 | End: 2021-01-06 | Stop reason: SDUPTHER

## 2020-11-03 RX ORDER — IBUPROFEN 800 MG/1
800 TABLET ORAL EVERY 6 HOURS PRN
COMMUNITY
End: 2020-12-04 | Stop reason: SDUPTHER

## 2020-11-03 NOTE — PROGRESS NOTES
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 06/22/2020 53.4     Lymphocytes % 06/22/2020 37.5     Monocytes % 06/22/2020 6.5     Eosinophils % 06/22/2020 1.7     Basophils % 06/22/2020 0.9     Neutrophils Absolute 06/22/2020 4.1     Lymphocytes Absolute 06/22/2020 2.9     Monocytes Absolute 06/22/2020 0.5     Eosinophils Absolute 06/22/2020 0.1     Basophils Absolute 06/22/2020 0.1     Color, UA 06/22/2020 Yellow     Clarity, UA 06/22/2020 SLCLOUDY     Glucose, Ur 06/22/2020 100*    Bilirubin Urine 06/22/2020 Negative     Ketones, Urine 06/22/2020 Negative     Specific Gravity, UA 06/22/2020 >=1.030     Blood, Urine 06/22/2020 Negative     pH, UA 06/22/2020 5.5     Protein, UA 06/22/2020 30*    Urobilinogen, Urine 06/22/2020 0.2     Nitrite, Urine 06/22/2020 Negative     Leukocyte Esterase, Urine 06/22/2020 MODERATE*    Microscopic Examination 06/22/2020 YES     Urine Type 06/22/2020 Other     WBC, UA 06/22/2020 >100*    RBC,  06/22/2020 None seen     Epithelial Cells,  06/22/2020 2-5     Bacteria,  06/22/2020 2+*       Last Drug screen:  No results found for: LABAMPH, LABBARB, LABBENZ, COCAIMETSCRU, THC, MDMA, LABMETH, OPIATESCREENURINE, OXTCOSU, PHENCYCLIDINESCREENURINE, PROPOXYPHENE, METAMPU      Imaging:   Ct head wo contrast 5/15/2010:     IMPRESSION- No acute intracranial abnormality. Consideration   should be given to MRI followup.              ASSESSMENT AND PLAN     Diagnosis Orders   1. Bipolar depression ([address]     2. Anxiety  diazePAM (VALIUM) 2 MG tablet        1. Safety: NO Imminent risk of danger to/self/others based on the factors considered below. Appropriate for outpatient level of care. Safety plan includes: 911, PES, hotlines, and interventions discussed today.      Risk factors: Age <25 or >55, depressed mood, chronic pain or medical illness, social isolation, no outpatient services in place, medication noncompliance, and no collateral information to support safety.     Protective factors:  female gender, denies suicidal ideation, does not have lethal plan, does not have access to guns or weapons, patient is lynn for safety, no prior suicide attempts, no family h/o suicide, no substance abuse, no active psychosis or cognitive dysfunction, and patient is future oriented.        2. Psychiatric  - pt endorses h/o mood lability + FH BAD. Prior negative response to some antidepressants. 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 + MDQ at initial eval.  Multiple stressors over past few months with increased depression/mood lability. Reasonable to trial mood stabilizer. Has DM, needs most weight neutral option.      - vraylar not covered by insurance,tried samples, made \"angry\",  risperdal made \"angry\" and increased anxiety, multiple panic attacks/day    - HAD previously recommended pt trial seroquel d/t issues with insomnia in addition to mood/anxiety symptoms. REFUSED TO TRY SEROQUEL. Pt has lots medication anxiety, particularly with sedating medications. Fearful won't wake up if meds too sedating.      - trial lamictal for mood. Start 25mg/day x 2 weeks, then 50mg/day x 2 weeks then 100mg/day    - consider abilify, may benefit from activating properties d/t fear of oversedation    - consider cymbalta for anxiety, mood and chronic pain. - continue valium 2mg prn for anxiety, sleep Prefers low dose d/t fear of oversedation. Reports this dose working very well to help relax her at night so can get some rest  Reminded this is not long term solution and need to have additional strategies to help manage anxiety and mood symptoms. Lots med anxiety.       -Labs: reviewed in Epic   9/21/20:  Vit d 26.4; lipids (total chol 254, trig 214, hdl 36, ldl 175)              11/26/19:  Lipids:  (Total chol 254, ldl 178); hgba1c 8.2 tsh wnl              5/7/19:  Vit d 21.4     - ENCOURAGED TO KEEP CONSISTENT SCHEDULE/ROUTINE TO INCLUDE WAKE/SLEEP TIME, MEAL TIMES, SHOWER TIMES, EXERCISE       -Recommend outpt therapy. Has tried to connect in past but limited with insurance options. Unsure if wants to proceed or would be helpful. Also limited by transportation issues         -OARRS reviewed, c/w history  -R/b/se/a d/w pt who consents.     3. Medical  -established with Milana Pacheco MD     4. Substance   -No active issues.     5.  RTC - 2 weeks, already scheduled for 11/18/20      Maggy Ro CNP  Psychiatric Nurse Practitioner

## 2020-11-10 RX ORDER — GLUCOSAM/CHON-MSM1/C/MANG/BOSW 500-416.6
TABLET ORAL
Qty: 100 EACH | Refills: 3 | Status: SHIPPED | OUTPATIENT
Start: 2020-11-10 | End: 2021-12-06

## 2020-11-10 RX ORDER — INSULIN GLARGINE 100 [IU]/ML
34 INJECTION, SOLUTION SUBCUTANEOUS DAILY
Qty: 5 PEN | Refills: 2 | Status: SHIPPED | OUTPATIENT
Start: 2020-11-10 | End: 2021-07-28

## 2020-11-10 RX ORDER — PEN NEEDLE, DIABETIC 30 GX3/16"
NEEDLE, DISPOSABLE MISCELLANEOUS
Qty: 50 EACH | Refills: 3 | Status: SHIPPED | OUTPATIENT
Start: 2020-11-10 | End: 2021-12-06

## 2020-11-10 NOTE — TELEPHONE ENCOUNTER
Medication:   Requested Prescriptions     Pending Prescriptions Disp Refills    Insulin Pen Needle (PEN NEEDLES) 32G X 4 MM MISC [Pharmacy Med Name: Gillian Norton PEN NEEDLES 32G 4MM] 50 each 3     Sig: USE WITH INSULIN PEN ONCE DAILY    TRUEplus Lancets 28G [facility] [Pharmacy Med Name: TRUEPLUS SUPER THIN 28G LANCET] 100 each 3     Sig: USE ONE LANCET TO TEST THREE TO FOUR TIMES A DAY    insulin glargine (LANTUS SOLOSTAR) 100 UNIT/ML injection pen [Pharmacy Med Name: Marla Richey 100 UNIT/ML] 5 pen 2     Sig: Inject 34 Units into the skin daily       Last Filled:      Patient Phone Number: [phone] (home)     Last appt: 9/18/2020   Next appt: Visit date not found    Last Labs DM:   Lab Results   Component Value Date    LABA1C 9.1 09/30/2020

## 2020-11-13 NOTE — PROGRESS NOTES
[facility]    H+P // CONSULT // OUTPATIENT VISIT // Molly Flores     Referring Doctor Deven Alvarado MD   Encounter Type Followup     CHIEF COMPLAINT     Visit Type Chronic   Symptoms None   Problems CP, HTN, CHOL     HISTORY OF PRESENT ILLNESS      GEN - Doing well. Denies cp, sob. Concerned with varicose veins.  CAD - minimal disease. Denies cp, sob, dizziness, syncope, palpitations.  HTN - Ambulatory BP readings in good range. No HA or dizziness.  CHOL - Last cholesterol reviewed and in good range. Tolerating statin without side effects.  MED - Compliant with CV meds listed below without notable side effects. HISTORY/ALLERGIES/ROS     MedHx:   has a past medical history of Ankle fracture, left, Ankle fracture, right, Arthritis, Asthma, Bipolar depression ([address] Bladder problem, Cervical disc herniation, Diabetes mellitus ([address] Fatty liver disease, non-alcoholic, Fibromyalgia, Gastroparesis, GERD (gastroesophageal reflux disease), Glaucoma, Hyperlipidemia, IBS (irritable bowel syndrome), Lumbar herniated disc, Nausea & vomiting, Nephrolithiasis, Obesity (BMI 30-39.9), Partial Achilles tendon tear, Pneumonia, PONV (postoperative nausea and vomiting), RA (rheumatoid arthritis) ([address] Rapid or irregular heartbeat, Sleep apnea, Spinal stenosis, Stress incontinence, and Thyroid disease. SurgHx:  has a past surgical history that includes Cholecystectomy; Dilation and curettage of uterus; Tonsillectomy; laparoscopy; Upper gastrointestinal endoscopy; Endometrial ablation; ERCP (5/25/2010); Esophagoscopy (10/5/10); Upper gastrointestinal endoscopy (04/12/2011); eye surgery; Upper gastrointestinal endoscopy (08/30/2011); Upper gastrointestinal endoscopy (3-9-12); Upper gastrointestinal endoscopy; Upper gastrointestinal endoscopy (11/20/12); Upper gastrointestinal endoscopy (6/4/13); ERCP (1-31-14); Upper gastrointestinal endoscopy (5/20/2014);  Upper gastrointestinal endoscopy (12/12/14); Upper gastrointestinal endoscopy (09/09/2015); Endoscopy, colon, diagnostic; Upper gastrointestinal endoscopy (5/10/2016); Upper gastrointestinal endoscopy (04/11/2017); Upper gastrointestinal endoscopy (10/24/2017); Upper gastrointestinal endoscopy (03/06/2018); Colonoscopy; Colonoscopy (03/06/2018); Upper gastrointestinal endoscopy (N/A, 5/7/2019); Upper gastrointestinal endoscopy (N/A, 5/7/2019); Upper gastrointestinal endoscopy (N/A, 3/13/2020); Upper gastrointestinal endoscopy (N/A, 3/13/2020); Upper gastrointestinal endoscopy (N/A, 10/6/2020); and Upper gastrointestinal endoscopy (N/A, 10/6/2020). SocHx:   reports that she has never smoked. She has never used smokeless tobacco. She reports that she does not drink alcohol or use drugs. FamHx:  family history includes Diabetes in her brother and mother; Heart Disease in her brother, father, and mother; High Blood Pressure in her brother; High Cholesterol in her brother and brother; Stroke in her mother. Allergies: Macrodantin [nitrofurantoin macrocrystal]; Penicillins; Shellfish-derived products; Aspartame and phenylalanine; Bactrim; Biaxin [clarithromycin]; Cephalexin; Ciprofloxacin; Hydrocodone-acetaminophen; Lansoprazole; Nexium [esomeprazole magnesium trihydrate]; Other; Prilosec [omeprazole]; Blueberry [vaccinium angustifolium];  Monosodium glutamate; and Zmax [azithromycin dihydrate]   ROS:  [x]Full ROS obtained and negative except as mentioned in HPI     MEDICATIONS      Current Outpatient Medications   Medication Sig Dispense Refill    Insulin Pen Needle (PEN NEEDLES) 32G X 4 MM MISC USE WITH INSULIN PEN ONCE DAILY 50 each 3    TRUEplus Lancets 28G MISC USE ONE LANCET TO TEST THREE TO FOUR TIMES A  each 3    insulin glargine (LANTUS SOLOSTAR) 100 UNIT/ML injection pen Inject 34 Units into the skin daily 5 pen 2    ibuprofen (ADVIL;MOTRIN) 800 MG tablet Take 800 mg by mouth every 6 hours as needed for Pain Indications: takes prn      lamoTRIgine (LAMICTAL) 25 MG tablet Take 1 tab daily x 2 weeks, then increase to 2 tabs daily x 2 weeks, then increase to 100mg/day if tolerates 60 tablet 3    diazePAM (VALIUM) 2 MG tablet Take 1 tablet by mouth 2 times daily as needed for Anxiety for up to 60 days. 60 tablet 1    ASPIRIN LOW DOSE 81 MG EC tablet TAKE ONE TABLET BY MOUTH DAILY 90 tablet 3    famotidine (PEPCID) 40 MG tablet Take 1 tablet by mouth 2 times daily (Patient taking differently: Take 40 mg by mouth 2 times daily Indications: taking 1-2 times/day ) 30 tablet 5    azithromycin (ZITHROMAX Z-DESTINEY) 250 MG tablet Take by mouth: 2 tablets on day 1, then 1 tab on day 2 to 5. Start 2nd destiney on day 10. Take 2 tablets on day 10, then 1 tab on day 11 to 15 (Patient not taking: Reported on 11/3/2020) 2 packet 0    latanoprost (XALATAN) 0.005 % ophthalmic solution Place 1 drop into both eyes nightly      nystatin (MYCOSTATIN) 082974 UNIT/GM powder Apply 3 times daily. 5 g 5    nystatin (MYCOSTATIN) 362042 UNIT/GM cream Apply topically 2 times daily. 4 g 3    guaiFENesin (MUCINEX MAXIMUM STRENGTH) 1200 MG TB12 Take 1 tablet by mouth daily For a PM dose. (Patient taking differently: Take 1 tablet by mouth daily Indications: takes prn, difficulty swallowing For a PM dose.) 60 tablet 0    oxymetazoline (AFRIN) 0.05 % nasal spray 2 sprays each nostril, 3 times a day for 3 days, then 2 times a day for 2 days, then stop for 2 days and then repeat the cycle once. (Patient taking differently: Indications: infrequently 2 sprays each nostril, 3 times a day for 3 days, then 2 times a day for 2 days, then stop for 2 days and then repeat the cycle once.) 1 Bottle 0    Cranberry 1000 MG CAPS Take by mouth Indications: OTC, takes two caps daily, does not know strength.       atorvastatin (LIPITOR) 10 MG tablet Take 1 tablet by mouth daily (Patient taking differently: Take 10 mg by mouth daily Indications: only taking 5mg couple times per week as instructed by cardiology d/t liver per pt report ) 90 tablet 0    Multiple Vitamins-Minerals (THERAPEUTIC MULTIVITAMIN-MINERALS) tablet Take 1 tablet by mouth daily      albuterol sulfate  (90 Base) MCG/ACT inhaler INHALE TWO PUFFS BY MOUTH EVERY 6 HOURS AS NEEDED FOR WHEEZING 1 Inhaler 2    loratadine (CLARITIN) 10 MG tablet TAKE ONE TABLET BY MOUTH DAILY (Patient taking differently: Indications: alternates wtih mucinex TAKE ONE TABLET BY MOUTH DAILY) 30 tablet 5    cyclobenzaprine (FLEXERIL) 10 MG tablet Take 0.5 tablets by mouth daily as needed for Muscle spasms (Patient not taking: Reported on 11/3/2020) 30 tablet 5    blood glucose test strips (ASCENSIA AUTODISC VI;ONE TOUCH ULTRA TEST VI) strip 2 each by In Vitro route daily As needed. 100 each 5    Blood Glucose Monitoring Suppl (TRUE METRIX METER) w/Device KIT As needed 1 kit 0    Cholecalciferol (VITAMIN D3) 25 MCG (1000 UT) TABS Take 1 tablet by mouth daily (Patient taking differently: Take 10,000 Units by mouth every 7 days ) 90 tablet 1    ketotifen (ZADITOR) 0.025 % ophthalmic solution INSTILL TWO DROPS IN [facility] EYE TWO TIMES A DAY AS NEEDED FOR ALLERGY 1 Bottle 3    blood glucose monitor strips Test blood sugar 2-3 times  a day 100 strip 5    blood glucose test strips (ASCENSIA AUTODISC VI;ONE TOUCH ULTRA TEST VI) strip 1-2 time a day As needed.  100 each 3    nystatin (MYCOSTATIN) 406872 UNIT/GM cream APPLY EXTERNALLY TO THE AFFECTED AREAS TWO TIMES A DAY 1 Tube 2    Incontinence Supply Disposable (TRE CLASSIC BRIEFS/LARGE) MISC 1 each by Does not apply route 3 times daily 5 each 5    Incontinence Supplies MISC 1 Package by Does not apply route 2 times daily 5 each 5    Disposable Gloves (VINYL GLOVES LARGE) MISC 1 Package by Does not apply route 4 times daily 5 each 5    GAS-X EXTRA STRENGTH 125 MG chewable tablet CHEW TWO TABLETS BY MOUTH THREE TIMES A DAY WITH EACH MEAL AS NEEDED FOR FLATULENCE 192 tablet 1     No current facility-administered medications for this visit. Facility-Administered Medications Ordered in Other Visits   Medication Dose Route Frequency Provider Last Rate Last Dose    0.9 % sodium chloride infusion   Intravenous Continuous Mo Taylor MD         Reviewed with patient and will remain unchanged except as mentioned in A/P  PHYSICAL EXAM     Vitals:    11/19/20 1549   BP: 130/70   Pulse: 76   SpO2: 98%      Gen Alert, coop, no distress Heart  Rrr, no mrg   Head NC, AT, no abnorm Abd  Soft, NT, +BS, no mass, no OM   Eyes PER, conj/corn clear Ext  Ext nl, AT, no C/C/E   Nose Nares nl, no drain, NT Pulse 2+ and symmetric   Throat Lips, mucosa, tongue nl Skin Col/text/turg nl, no vis rash/les   Neck S/S, TM, NT, no bruit/JVD Psych Nl mood and affect   Lung CTA-B, unlabored, no DTP Lymph   No cervical or axillary LA   Ch wall NT, no deform Neuro  Nl gross M/S exam     ASSESSMENT AND PLAN     *Chest pain   Date EF Results   Sx   Cp and sob   LHC 5/15  3/19 55%  55% Normal cors (Rei)  20% midLAD Sara Anger)   MPI 4/14 58% Normal perfusion   TTE 3/19 55%    Plan   Continued observation, risk factor management   *CHOL  Status  Uncontrolled with last LDL of 175 (goal <70) and HDL of 36 (9/20)  Plan Counseled on diet/exercise/weight, continue statin, lipid/liver surveillance per PCP  *COMPLIANCE  Status Compliant  Plan Discussed importance of compliance with meds/diet/salt/exercise; avoid tob/alc/drugs; patient verbalized understanding  *FOLLOWUP  12 months    [address] Alan Randhawa, am scribing for and in the presence of Rivera Coker MD.   SignedAlan 11/13/20 3:05 PM   Provider Bell Felder is working as a scribe for and in the presence of me (Rivera Coker MD). Working as a scribe, Alan Gold may have prepopulated components of this note with my historical  intellectual property under my direct supervision.   Any additions to this intellectual property were performed in my presence and at my direction. Furthermore, the content and accuracy of this note have been reviewed by me Cordell Jacob MD).   11/19/2020 3:59 PM

## 2020-11-13 NOTE — PATIENT INSTRUCTIONS
Stop lipitor. Start taking crestor 5 mg once a day. Continue all medications. Dr. Bia Briceño is with [address] [place] [address] and Dr. Lulu Frederick is with [facility] that can help with your veins in your legs.

## 2020-11-18 ENCOUNTER — OFFICE VISIT (OUTPATIENT)
Dept: PSYCHIATRY | Age: 55
End: 2020-11-18
Payer: COMMERCIAL

## 2020-11-18 PROCEDURE — 99443 PR PHYS/QHP TELEPHONE EVALUATION 21-30 MIN: CPT | Performed by: NURSE PRACTITIONER

## 2020-11-18 NOTE — PROGRESS NOTES
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 Examination 06/22/2020 YES     Urine Type 06/22/2020 Other     WBC, UA 06/22/2020 >100*    RBC, UA 06/22/2020 None seen     Epithelial Cells, UA 06/22/2020 2-5     Bacteria, UA 06/22/2020 2+*       Last Drug screen:  No results found for: LABAMPH, LABBARB, LABBENZ, COCAIMETSCRU, THC, MDMA, LABMETH, OPIATESCREENURINE, OXTCOSU, PHENCYCLIDINESCREENURINE, PROPOXYPHENE, METAMPU      Imaging:   Ct head wo contrast 5/15/2010:     IMPRESSION- No acute intracranial abnormality. Consideration   should be given to MRI followup.              ASSESSMENT AND PLAN     Diagnosis Orders   1. Bipolar depression ([address]     2. Anxiety          1. Safety: NO Imminent risk of danger to/self/others based on the factors considered below. Appropriate for outpatient level of care.   Safety plan includes: 911, PES, hotlines, and interventions discussed today.      Risk factors: Age <25 or >55, depressed mood, chronic pain or medical illness, social isolation, no outpatient services in place, medication noncompliance, and no collateral information to support safety.     Protective factors:  female gender, denies suicidal ideation, does not have lethal plan, does not have access to guns or weapons, patient is lynn for safety, no prior suicide attempts, no family h/o suicide, no substance abuse, no active psychosis or cognitive dysfunction, and patient is future oriented.        2. Psychiatric  - pt endorses h/o mood lability + FH BAD. Prior negative response to some antidepressants. 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 + MDQ at initial eval.  Multiple stressors over past few months with increased depression/mood lability. Reasonable to trial mood stabilizer. Has DM, needs most weight neutral option.      - vraylar not covered by insurance,tried samples, made \"angry\",  risperdal made \"angry\" and increased anxiety, multiple panic attacks/day    - HAD previously recommended pt trial seroquel d/t issues with insomnia in addition to mood/anxiety symptoms. REFUSED TO TRY SEROQUEL. Pt has lots medication anxiety, particularly with sedating medications. Fearful won't wake up if meds too sedating.      - continue lamictal for mood. Only taking x 1 week so far.  continue 25mg/day x 1 week, then 50mg/day x 2 weeks then 100mg/day    - consider abilify, may benefit from activating properties d/t fear of oversedation    - consider cymbalta for anxiety, mood and chronic pain. - continue valium 2mg prn for anxiety, sleep Prefers low dose d/t fear of oversedation. Reports this dose working very well to help relax her at night so can get some rest  Reminded this is not long term solution and need to have additional strategies to help manage anxiety and mood symptoms. Lots med anxiety.    -Labs: reviewed in Epic   9/21/20:  Vit d 26.4; lipids (total chol 254, trig 214, hdl 36, ldl 175)              11/26/19:  Lipids:  (Total chol 254, ldl 178); hgba1c 8.2 tsh wnl              5/7/19:  Vit d 21.4     - ENCOURAGED TO KEEP CONSISTENT SCHEDULE/ROUTINE TO INCLUDE WAKE/SLEEP TIME, MEAL TIMES, SHOWER TIMES, EXERCISE       -Recommend outpt therapy. Has tried to connect in past but limited with insurance options AND transportation issues. Unsure if wants to proceed or would be helpful.          -OARRS reviewed, c/w history  -R/b/se/a d/w pt who consents.     3. Medical  -established with Mitchel Hammond MD  - cardiology tomorrow  - eye specialist next week       4. Substance   -No active issues.     5.  RTC - 3-4 weeks    Raghav Hurtado, [facility]  Psychiatric Nurse Practitioner

## 2020-11-19 ENCOUNTER — OFFICE VISIT (OUTPATIENT)
Dept: CARDIOLOGY CLINIC | Age: 55
End: 2020-11-19
Payer: COMMERCIAL

## 2020-11-19 VITALS
HEIGHT: 62 IN | HEART RATE: 76 BPM | DIASTOLIC BLOOD PRESSURE: 70 MMHG | WEIGHT: 201 LBS | OXYGEN SATURATION: 98 % | BODY MASS INDEX: 36.99 KG/M2 | SYSTOLIC BLOOD PRESSURE: 130 MMHG

## 2020-11-19 PROCEDURE — G8484 FLU IMMUNIZE NO ADMIN: HCPCS | Performed by: INTERNAL MEDICINE

## 2020-11-19 PROCEDURE — 99213 OFFICE O/P EST LOW 20 MIN: CPT | Performed by: INTERNAL MEDICINE

## 2020-11-19 PROCEDURE — 1036F TOBACCO NON-USER: CPT | Performed by: INTERNAL MEDICINE

## 2020-11-19 PROCEDURE — 3017F COLORECTAL CA SCREEN DOC REV: CPT | Performed by: INTERNAL MEDICINE

## 2020-11-19 PROCEDURE — G8417 CALC BMI ABV UP PARAM F/U: HCPCS | Performed by: INTERNAL MEDICINE

## 2020-11-19 PROCEDURE — G8427 DOCREV CUR MEDS BY ELIG CLIN: HCPCS | Performed by: INTERNAL MEDICINE

## 2020-11-19 RX ORDER — ROSUVASTATIN CALCIUM 5 MG/1
5 TABLET, COATED ORAL DAILY
Qty: 90 TABLET | Refills: 1 | Status: SHIPPED | OUTPATIENT
Start: 2020-11-19 | End: 2021-06-25

## 2020-11-19 NOTE — LETTER
[address] [address]  Phone: [phone]  Fax: [phone]    Annette Fuller MD        December 2, 2020     Formerly [facility], [place]  [address] [address]    Patient: Perri Chu  MR Number: [id number]  YOB: 1965  Date of Visit: 11/19/2020    Dear  [address] Cotton:    [facility]    H+P // CONSULT // OUTPATIENT VISIT // Edgar Peter     Referring Doctor [facility] Rizwan Cotton MD   Encounter Type Followup     CHIEF COMPLAINT     Visit Type Chronic   Symptoms None   Problems CP, HTN, CHOL     HISTORY OF PRESENT ILLNESS     ? GEN - Doing well. Denies cp, sob. Concerned with varicose veins. ? CAD - minimal disease. Denies cp, sob, dizziness, syncope, palpitations. ? HTN - Ambulatory BP readings in good range. No HA or dizziness. ? CHOL - Last cholesterol reviewed and in good range. Tolerating statin without side effects. ? MED - Compliant with CV meds listed below without notable side effects. HISTORY/ALLERGIES/ROS     MedHx:   has a past medical history of Ankle fracture, left, Ankle fracture, right, Arthritis, Asthma, Bipolar depression ([address] Bladder problem, Cervical disc herniation, Diabetes mellitus ([address] Fatty liver disease, non-alcoholic, Fibromyalgia, Gastroparesis, GERD (gastroesophageal reflux disease), Glaucoma, Hyperlipidemia, IBS (irritable bowel syndrome), Lumbar herniated disc, Nausea & vomiting, Nephrolithiasis, Obesity (BMI 30-39.9), Partial Achilles tendon tear, Pneumonia, PONV (postoperative nausea and vomiting), RA (rheumatoid arthritis) ([address] Rapid or irregular heartbeat, Sleep apnea, Spinal stenosis, Stress incontinence, and Thyroid disease. SurgHx:  has a past surgical history that includes Cholecystectomy; Dilation and curettage of uterus; Tonsillectomy; laparoscopy; Upper gastrointestinal endoscopy;  Endometrial ablation; ERCP (5/25/2010); Esophagoscopy (10/5/10); Upper gastrointestinal endoscopy (04/12/2011); eye surgery; Upper gastrointestinal endoscopy (08/30/2011); Upper gastrointestinal endoscopy (3-9-12); Upper gastrointestinal endoscopy; Upper gastrointestinal endoscopy (11/20/12); Upper gastrointestinal endoscopy (6/4/13); ERCP (1-31-14); Upper gastrointestinal endoscopy (5/20/2014); Upper gastrointestinal endoscopy (12/12/14); Upper gastrointestinal endoscopy (09/09/2015); Endoscopy, colon, diagnostic; Upper gastrointestinal endoscopy (5/10/2016); Upper gastrointestinal endoscopy (04/11/2017); Upper gastrointestinal endoscopy (10/24/2017); Upper gastrointestinal endoscopy (03/06/2018); Colonoscopy; Colonoscopy (03/06/2018); Upper gastrointestinal endoscopy (N/A, 5/7/2019); Upper gastrointestinal endoscopy (N/A, 5/7/2019); Upper gastrointestinal endoscopy (N/A, 3/13/2020); Upper gastrointestinal endoscopy (N/A, 3/13/2020); Upper gastrointestinal endoscopy (N/A, 10/6/2020); and Upper gastrointestinal endoscopy (N/A, 10/6/2020). SocHx:   reports that she has never smoked. She has never used smokeless tobacco. She reports that she does not drink alcohol or use drugs. FamHx:  family history includes Diabetes in her brother and mother; Heart Disease in her brother, father, and mother; High Blood Pressure in her brother; High Cholesterol in her brother and brother; Stroke in her mother. Allergies: Macrodantin [nitrofurantoin macrocrystal]; Penicillins; Shellfish-derived products; Aspartame and phenylalanine; Bactrim; Biaxin [clarithromycin]; Cephalexin; Ciprofloxacin; Hydrocodone-acetaminophen; Lansoprazole; Nexium [esomeprazole magnesium trihydrate]; Other; Prilosec [omeprazole]; Blueberry [vaccinium angustifolium];  Monosodium glutamate; and Zmax [azithromycin dihydrate]   ROS:  [x]Full ROS obtained and negative except as mentioned in HPI     MEDICATIONS      Current Outpatient Medications   Medication Sig Dispense Refill  Insulin Pen Needle (PEN NEEDLES) 32G X 4 MM MISC USE WITH INSULIN PEN ONCE DAILY 50 each 3    TRUEplus Lancets 28G MISC USE ONE LANCET TO TEST THREE TO FOUR TIMES A  each 3    insulin glargine (LANTUS SOLOSTAR) 100 UNIT/ML injection pen Inject 34 Units into the skin daily 5 pen 2    ibuprofen (ADVIL;MOTRIN) 800 MG tablet Take 800 mg by mouth every 6 hours as needed for Pain Indications: takes prn      lamoTRIgine (LAMICTAL) 25 MG tablet Take 1 tab daily x 2 weeks, then increase to 2 tabs daily x 2 weeks, then increase to 100mg/day if tolerates 60 tablet 3    diazePAM (VALIUM) 2 MG tablet Take 1 tablet by mouth 2 times daily as needed for Anxiety for up to 60 days. 60 tablet 1    ASPIRIN LOW DOSE 81 MG EC tablet TAKE ONE TABLET BY MOUTH DAILY 90 tablet 3    famotidine (PEPCID) 40 MG tablet Take 1 tablet by mouth 2 times daily (Patient taking differently: Take 40 mg by mouth 2 times daily Indications: taking 1-2 times/day ) 30 tablet 5    azithromycin (ZITHROMAX Z-DESTINEY) 250 MG tablet Take by mouth: 2 tablets on day 1, then 1 tab on day 2 to 5. Start 2nd destiney on day 10. Take 2 tablets on day 10, then 1 tab on day 11 to 15 (Patient not taking: Reported on 11/3/2020) 2 packet 0    latanoprost (XALATAN) 0.005 % ophthalmic solution Place 1 drop into both eyes nightly      nystatin (MYCOSTATIN) 353053 UNIT/GM powder Apply 3 times daily. 5 g 5    nystatin (MYCOSTATIN) 522470 UNIT/GM cream Apply topically 2 times daily. 4 g 3    guaiFENesin (MUCINEX MAXIMUM STRENGTH) 1200 MG TB12 Take 1 tablet by mouth daily For a PM dose. (Patient taking differently: Take 1 tablet by mouth daily Indications: takes prn, difficulty swallowing For a PM dose.) 60 tablet 0    oxymetazoline (AFRIN) 0.05 % nasal spray 2 sprays each nostril, 3 times a day for 3 days, then 2 times a day for 2 days, then stop for 2 days and then repeat the cycle once.  (Patient taking differently: Indications:  Incontinence Supply Disposable (TRE CLASSIC BRIEFS/LARGE) MISC 1 each by Does not apply route 3 times daily 5 each 5    Incontinence Supplies MISC 1 Package by Does not apply route 2 times daily 5 each 5    Disposable Gloves (VINYL GLOVES LARGE) MISC 1 Package by Does not apply route 4 times daily 5 each 5    GAS-X EXTRA STRENGTH 125 MG chewable tablet CHEW TWO TABLETS BY MOUTH THREE TIMES A DAY WITH EACH MEAL AS NEEDED FOR FLATULENCE 192 tablet 1     No current facility-administered medications for this visit.       Facility-Administered Medications Ordered in Other Visits   Medication Dose Route Frequency Provider Last Rate Last Dose    0.9 % sodium chloride infusion   Intravenous Continuous Olivia Gallagher MD         Reviewed with patient and will remain unchanged except as mentioned in A/P  PHYSICAL EXAM     Vitals:    11/19/20 1549   BP: 130/70   Pulse: 76   SpO2: 98%      Gen Alert, coop, no distress Heart  Rrr, no mrg   Head NC, AT, no abnorm Abd  Soft, NT, +BS, no mass, no OM   Eyes PER, conj/corn clear Ext  Ext nl, AT, no C/C/E   Nose Nares nl, no drain, NT Pulse 2+ and symmetric   Throat Lips, mucosa, tongue nl Skin Col/text/turg nl, no vis rash/les   Neck S/S, TM, NT, no bruit/JVD Psych Nl mood and affect   Lung CTA-B, unlabored, no DTP Lymph   No cervical or axillary LA   Ch wall NT, no deform Neuro  Nl gross M/S exam     ASSESSMENT AND PLAN     *Chest pain   Date EF Results   Sx   Cp and sob   LHC 5/15  3/19 55%  55% Normal cors (Rei)  20% midLAD [place])   MPI 4/14 58% Normal perfusion   TTE 3/19 55%    Plan   Continued observation, risk factor management   *CHOL  Status  Uncontrolled with last LDL of 175 (goal <70) and HDL of 36 (9/20)  Plan Counseled on diet/exercise/weight, continue statin, lipid/liver surveillance per PCP  *COMPLIANCE  Status Compliant  Plan Discussed importance of compliance with meds/diet/salt/exercise; avoid tob/alc/drugs; patient verbalized understanding  *FOLLOWUP 12 months    [address], am scribing for and in the presence of Taya Pelayo MD.   Signed, Jenny Pedraza 11/13/20 3:05 PM   Provider Zuly Riggs is working as a scribe for and in the presence of me Taya Pelayo MD). Working as a scribe, Jenny Pedraza may have prepopulated components of this note with my historical  intellectual property under my direct supervision. Any additions to this intellectual property were performed in my presence and at my direction. Furthermore, the content and accuracy of this note have been reviewed by me Taya Pelayo MD). 11/19/2020 3:59 PM        If you have questions, please do not hesitate to call me. I look forward to following Amanda Cespedes along with you.     Sincerely,        Kinjal Garcia MD

## 2020-12-04 ENCOUNTER — OFFICE VISIT (OUTPATIENT)
Dept: NEUROLOGY | Age: 55
End: 2020-12-04
Payer: COMMERCIAL

## 2020-12-04 VITALS
HEART RATE: 70 BPM | BODY MASS INDEX: 36.82 KG/M2 | RESPIRATION RATE: 14 BRPM | TEMPERATURE: 97.9 F | WEIGHT: 200.1 LBS | DIASTOLIC BLOOD PRESSURE: 76 MMHG | SYSTOLIC BLOOD PRESSURE: 150 MMHG | HEIGHT: 62 IN

## 2020-12-04 PROCEDURE — 99213 OFFICE O/P EST LOW 20 MIN: CPT | Performed by: PSYCHIATRY & NEUROLOGY

## 2020-12-04 PROCEDURE — G8484 FLU IMMUNIZE NO ADMIN: HCPCS | Performed by: PSYCHIATRY & NEUROLOGY

## 2020-12-04 PROCEDURE — 3017F COLORECTAL CA SCREEN DOC REV: CPT | Performed by: PSYCHIATRY & NEUROLOGY

## 2020-12-04 PROCEDURE — 3046F HEMOGLOBIN A1C LEVEL >9.0%: CPT | Performed by: PSYCHIATRY & NEUROLOGY

## 2020-12-04 PROCEDURE — G8417 CALC BMI ABV UP PARAM F/U: HCPCS | Performed by: PSYCHIATRY & NEUROLOGY

## 2020-12-04 PROCEDURE — G8427 DOCREV CUR MEDS BY ELIG CLIN: HCPCS | Performed by: PSYCHIATRY & NEUROLOGY

## 2020-12-04 PROCEDURE — 2022F DILAT RTA XM EVC RTNOPTHY: CPT | Performed by: PSYCHIATRY & NEUROLOGY

## 2020-12-04 PROCEDURE — 1036F TOBACCO NON-USER: CPT | Performed by: PSYCHIATRY & NEUROLOGY

## 2020-12-04 NOTE — TELEPHONE ENCOUNTER
----- Message from Brandee Andrade sent at 12/4/2020  1:13 PM EST -----  Subject: Message to Provider    QUESTIONS  Information for Provider? ptis requesting ibuprofen (ADVIL;MOTRIN) 800 MG   tablet she stated she has been out for 3 weeks and has contacted her local   pharmacy [facility].   ---------------------------------------------------------------------------  --------------  [address]  What is the best way for the office to contact you? OK to leave message on   voicemail  Preferred Call Back Phone Number? [phone]  ---------------------------------------------------------------------------  --------------  SCRIPT ANSWERS  Relationship to Patient?  Self

## 2020-12-04 NOTE — TELEPHONE ENCOUNTER
Medication:   Requested Prescriptions     Pending Prescriptions Disp Refills    ibuprofen (ADVIL;MOTRIN) 800 MG tablet 120 tablet 1     Sig: Take 1 tablet by mouth every 6 hours as needed for Pain Indications: takes prn        Last Filled:      Patient Phone Number: [phone] (home)     Last appt: 5/28/2020   Next appt: Visit date not found    Last OARRS:   RX Monitoring 11/3/2020   Periodic Controlled Substance Monitoring No signs of potential drug abuse or diversion identified.        Med pended

## 2020-12-04 NOTE — PROGRESS NOTES
Jolynn Gonzalez   Neurology followup    Subjective:   CC/HP  History was obtained from the patient. Patient states that the visual migraines on the white outs are much better. She is trying her best with the diet modification regarding the migraines. She still has balance difficulties. She also has dizziness at times. She has rheumatoid arthritis and osteoarthritis and is asking me for prescription for ibuprofen.     REVIEW OF SYSTEMS    Constitutional:  []   Chills   [x]  Fatigue   []  Fevers   []  Malaise   []  Weight loss     [] Denies all of the above    Respiratory:   []  Cough    []  Shortness of breath         [x] Denies all of the above     Cardiovascular:   []  Chest pain    []  Exertional chest pressure/discomfort           [] Palpitations    []  Syncope     [x] Denies all of the above        Past Medical History:   Diagnosis Date    Ankle fracture, left     Ankle fracture, right     Arthritis     Asthma     Bipolar depression ([address]     CAN'T AFFORD MEDS    Bladder problem     Cervical disc herniation     Diabetes mellitus ([address]     diet control    Fatty liver disease, non-alcoholic     Fibromyalgia     Gastroparesis     Dr Bekcy Machuca GERD (gastroesophageal reflux disease)     gastroporesis    Glaucoma     Dr Rupinder Jasso Hyperlipidemia     elevated LFT on meds    IBS (irritable bowel syndrome)     Lumbar herniated disc     Nausea & vomiting     Nephrolithiasis 1/29/2019    Obesity (BMI 30-39.9) 3/11/2019    Partial Achilles tendon tear     Pneumonia     PONV (postoperative nausea and vomiting)     RA (rheumatoid arthritis) ([address]     Rapid or irregular heartbeat     Sleep apnea     does not use cpap    Spinal stenosis     Stress incontinence     Thyroid disease     growths     Family History   Problem Relation Age of Onset    Diabetes Mother     Heart Disease Mother     Stroke Mother     Heart Disease Father     Heart Disease Brother     High Blood Pressure Brother     High Cholesterol Brother     Diabetes Brother     High Cholesterol Brother     Anesth Problems Neg Hx     Malig Hyperten Neg Hx     Hypotension Neg Hx     Malig Hypertherm Neg Hx     Pseudochol. Deficiency Neg Hx      Social History     Socioeconomic History    Marital status: Single     Spouse name: None    Number of children: None    Years of education: None    Highest education level: None   Occupational History    None   Social Needs    Financial resource strain: None    Food insecurity     Worry: None     Inability: None    Transportation needs     Medical: None     Non-medical: None   Tobacco Use    Smoking status: Never Smoker    Smokeless tobacco: Never Used   Substance and Sexual Activity    Alcohol use: No     Alcohol/week: 0.0 standard drinks    Drug use: No    Sexual activity: Not Currently   Lifestyle    Physical activity     Days per week: None     Minutes per session: None    Stress: None   Relationships    Social connections     Talks on phone: None     Gets together: None     Attends Caodaism service: None     Active member of club or organization: None     Attends meetings of clubs or organizations: None     Relationship status: None    Intimate partner violence     Fear of current or ex partner: None     Emotionally abused: None     Physically abused: None     Forced sexual activity: None   Other Topics Concern    None   Social History Narrative    Caffeine:        SODA -1 a month          TEA - 1 glass a day        COFFEE - 0        Objective:  Exam:  BP (!) 150/76   Pulse 70   Temp 97.9 °F (36.6 °C)   Resp 14   Ht 5' 2\" (1.575 m)   Wt 200 lb 1.6 oz (90.8 kg)   LMP  (LMP Unknown)   BMI 36.60 kg/m²   This is a well-nourished patient in no acute distress  Patient is awake, alert and oriented x3.  Speech is normal.  Pupils are equal round reacting to light. Extraocular movements intact. Face symmetrical. Tongue midline. Motor exam shows normal symmetrical strength. Deep tendon reflexes absent in the legs. Plantar reflexes downgoing. Sensory exam shows decreased light touch in the distal lower extremities coordination normal. Gait slightly unsteady. No carotid bruit. No neck stiffness. Data :  LABS:  General Labs:    CBC:   Lab Results   Component Value Date    WBC 7.7 06/22/2020    RBC 4.99 06/22/2020    HGB 14.5 06/22/2020    HCT 43.8 06/22/2020    MCV 87.7 06/22/2020    MCH 29.1 06/22/2020    MCHC 33.2 06/22/2020    RDW 14.0 06/22/2020     06/22/2020    MPV 9.6 06/22/2020     BMP:    Lab Results   Component Value Date     09/21/2020    K 3.8 09/21/2020    K 4.6 03/12/2019     09/21/2020    CO2 27 09/21/2020    BUN 14 09/21/2020    LABALBU 4.5 09/21/2020    CREATININE 0.8 09/21/2020    CALCIUM 10.2 09/21/2020    GFRAA >60 09/21/2020    GFRAA >60 06/04/2013    LABGLOM >60 09/21/2020    GLUCOSE 207 09/21/2020     RADIOLOGY REVIEW:  I have reviewed radiology image(s) and reports(s) of: CT scan of the head    Impression :  Migraine variants improved  Ataxia probably due to peripheral neuropathy  Arthritis    Plan :  Discussed with patient  I have told her to check with her primary care physician regarding ibuprofen prescription. I am reluctant to prescribe it because of history of reflux disease as well as kidney disease. Since she is reasonably stable from a neurological standpoint I will see her on a as needed basis. Again recommended strict control of diabetes. Please note a portion of  this chart was generated using dragon dictation software. Although every effort was made to ensure the accuracy of this automated transcription, some errors in transcription may have occurred.

## 2020-12-07 RX ORDER — IBUPROFEN 800 MG/1
800 TABLET ORAL EVERY 6 HOURS PRN
Qty: 120 TABLET | Refills: 1 | Status: SHIPPED | OUTPATIENT
Start: 2020-12-07 | End: 2021-03-01

## 2020-12-10 RX ORDER — KETOTIFEN FUMARATE 0.35 MG/ML
SOLUTION/ DROPS OPHTHALMIC
Qty: 1 BOTTLE | Refills: 2 | Status: SHIPPED | OUTPATIENT
Start: 2020-12-10

## 2020-12-10 NOTE — TELEPHONE ENCOUNTER
Medication:   Requested Prescriptions     Pending Prescriptions Disp Refills    ketotifen (ZADITOR) 0.025 % ophthalmic solution [Pharmacy Med Name: KETOTIFEN FUM 0.025% EYE DROPS]  2     Sig: INSTILL TWO DROPS IN [facility] EYE TWO TIMES A DAY AS NEEDED FOR ALLERGY        Last Filled:      Patient Phone Number: [phone] (home)     Last appt: 5/28/2020   Next appt: Visit date not found    Last OARRS:   RX Monitoring 11/3/2020   Periodic Controlled Substance Monitoring No signs of potential drug abuse or diversion identified.

## 2020-12-17 ENCOUNTER — TELEPHONE (OUTPATIENT)
Dept: PRIMARY CARE CLINIC | Age: 55
End: 2020-12-17

## 2021-01-06 ENCOUNTER — VIRTUAL VISIT (OUTPATIENT)
Dept: PSYCHIATRY | Age: 56
End: 2021-01-06
Payer: COMMERCIAL

## 2021-01-06 DIAGNOSIS — F41.9 ANXIETY: ICD-10-CM

## 2021-01-06 DIAGNOSIS — F31.9 BIPOLAR DEPRESSION (HCC): Primary | ICD-10-CM

## 2021-01-06 PROCEDURE — 99213 OFFICE O/P EST LOW 20 MIN: CPT | Performed by: NURSE PRACTITIONER

## 2021-01-06 RX ORDER — LAMOTRIGINE 25 MG/1
TABLET ORAL
Qty: 150 TABLET | Refills: 3 | Status: SHIPPED | OUTPATIENT
Start: 2021-01-06 | End: 2021-02-03

## 2021-01-06 RX ORDER — DIAZEPAM 2 MG/1
2 TABLET ORAL 2 TIMES DAILY PRN
Qty: 60 TABLET | Refills: 1 | Status: SHIPPED | OUTPATIENT
Start: 2021-01-06 | End: 2021-02-03

## 2021-01-06 NOTE — PROGRESS NOTES
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 additional strategies to help manage anxiety and mood symptoms. Lots med anxiety.       -Labs: reviewed in Epic   9/21/20:  Vit d 26.4; lipids (total chol 254, trig 214, hdl 36, ldl 175)              11/26/19:  Lipids:  (Total chol 254, ldl 178); hgba1c 8.2 tsh wnl              5/7/19:  Vit d 21.4     - ENCOURAGED TO KEEP CONSISTENT SCHEDULE/ROUTINE TO INCLUDE WAKE/SLEEP TIME, MEAL TIMES, SHOWER TIMES, EXERCISE       -Recommend outpt therapy. Has tried to connect in past but limited with insurance options AND transportation issues. Unsure if wants to proceed or would be helpful.          -OARRS reviewed, c/w history  -R/b/se/a d/w pt who consents.     3. Medical  -established with Wyatt Shetty MD  - cardiology tomorrow  - eye specialist next week       4. Substance   -No active issues.     5.  RTC - 3-4 weeks    Judit Aj, [id number] [facility]  Psychiatric Nurse Practitioner

## 2021-01-15 RX ORDER — MELATONIN
1000 DAILY
Qty: 90 TABLET | Refills: 1 | Status: ON HOLD
Start: 2021-01-15 | End: 2021-05-11 | Stop reason: HOSPADM

## 2021-01-15 NOTE — TELEPHONE ENCOUNTER
Medication:   Requested Prescriptions     Pending Prescriptions Disp Refills    Cholecalciferol (VITAMIN D3) 250 MCG (03633 UT) CAPS [Pharmacy Med Name: VITAMIN D3 (CHOLECAL) 10,000 U SFGL] 30 capsule 0     Sig: TAKE ONE CAPSULE BY MOUTH DAILY        Last Filled:      Patient Phone Number: [phone] (home)     Last appt: 5/28/2020   Next appt: Visit date not found    Last OARRS:   RX Monitoring 1/6/2021   Periodic Controlled Substance Monitoring No signs of potential drug abuse or diversion identified. ;Possible medication side effects, risk of tolerance/dependence & alternative treatments discussed.

## 2021-01-15 NOTE — TELEPHONE ENCOUNTER
Medication:   Requested Prescriptions     Pending Prescriptions Disp Refills    vitamin D3 (CHOLECALCIFEROL) 25 MCG (1000 UT) TABS tablet 90 tablet 1     Sig: Take 1 tablet by mouth daily        Last Filled:      Patient Phone Number: [phone] (home)     Last appt: 5/28/2020   Next appt: Visit date not found    Last OARRS:   RX Monitoring 1/6/2021   Periodic Controlled Substance Monitoring No signs of potential drug abuse or diversion identified. ;Possible medication side effects, risk of tolerance/dependence & alternative treatments discussed.

## 2021-02-03 ENCOUNTER — VIRTUAL VISIT (OUTPATIENT)
Dept: PSYCHIATRY | Age: 56
End: 2021-02-03
Payer: COMMERCIAL

## 2021-02-03 DIAGNOSIS — F31.9 BIPOLAR DEPRESSION (HCC): Primary | ICD-10-CM

## 2021-02-03 DIAGNOSIS — F41.9 ANXIETY: ICD-10-CM

## 2021-02-03 PROCEDURE — 99443 PR PHYS/QHP TELEPHONE EVALUATION 21-30 MIN: CPT | Performed by: NURSE PRACTITIONER

## 2021-02-03 RX ORDER — LAMOTRIGINE 150 MG/1
150 TABLET ORAL DAILY
Qty: 30 TABLET | Refills: 3 | Status: SHIPPED | OUTPATIENT
Start: 2021-02-03 | End: 2021-03-03

## 2021-02-03 RX ORDER — DIAZEPAM 2 MG/1
2 TABLET ORAL 2 TIMES DAILY PRN
Qty: 60 TABLET | Refills: 1 | Status: SHIPPED | OUTPATIENT
Start: 2021-02-05 | End: 2021-03-03 | Stop reason: SDUPTHER

## 2021-02-03 NOTE — PROGRESS NOTES
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 meds, fearful of introducing new med at this time. Has tolerated lamictal well thus far.   Discussed lamictal more for mood than anxiety       - consider abilify vs latuda for mood, may benefit from activating properties of abilify, has fear of oversedation - continue valium 2mg prn for anxiety, sleep Prefers low dose d/t fear of oversedation. Reports this dose working very well to help relax her at night so can get some rest  Reminded this is not long term solution and need to have additional strategies to help manage anxiety and mood symptoms. Lots med anxiety.       -Labs: reviewed in Epic   9/21/20:  Vit d 26.4; lipids (total chol 254, trig 214, hdl 36, ldl 175)              11/26/19:  Lipids:  (Total chol 254, ldl 178); hgba1c 8.2 tsh wnl              5/7/19:  Vit d 21.4     - ENCOURAGED TO KEEP CONSISTENT SCHEDULE/ROUTINE TO INCLUDE WAKE/SLEEP TIME, MEAL TIMES, SHOWER TIMES, EXERCISE       -Recommend outpt therapy. Has tried to connect in past but limited with insurance options AND transportation issues. Unsure if wants to proceed or would be helpful.          -OARRS reviewed, c/w history  -R/b/se/a d/w pt who consents.     3. Medical  -established with Jayshree Banks MD  - cardiology tomorrow  - eye specialist next week       4. Substance   -No active issues.     5.  RTC - 4 weeks    Bolivar Hicks, [address] [facility]  Psychiatric Nurse Practitioner

## 2021-02-23 ENCOUNTER — TELEPHONE (OUTPATIENT)
Dept: PRIMARY CARE CLINIC | Age: 56
End: 2021-02-23

## 2021-02-23 NOTE — TELEPHONE ENCOUNTER
Phone:  [phone]  ext [phone]    They have faxed 3 times for OV notes and still not received. I will go ahead and fax the requested OV notes now.

## 2021-03-01 RX ORDER — IBUPROFEN 800 MG/1
TABLET ORAL
Qty: 120 TABLET | Refills: 0 | Status: ON HOLD
Start: 2021-03-01 | End: 2021-05-11 | Stop reason: HOSPADM

## 2021-03-01 NOTE — TELEPHONE ENCOUNTER
Medication:   Requested Prescriptions     Pending Prescriptions Disp Refills    ibuprofen (ADVIL;MOTRIN) 800 MG tablet [Pharmacy Med Name: IBUPROFEN 800 MG TABLET] 120 tablet 0     Sig: TAKE ONE TABLET BY MOUTH EVERY 6 HOURS AS NEEDED FOR PAIN INDICATIONS        Last Filled:      Patient Phone Number: [phone] (home)     Last appt: 5/28/2020   Next appt: Visit date not found    Last OARRS:   RX Monitoring 1/6/2021   Periodic Controlled Substance Monitoring No signs of potential drug abuse or diversion identified. ;Possible medication side effects, risk of tolerance/dependence & alternative treatments discussed.

## 2021-03-03 ENCOUNTER — VIRTUAL VISIT (OUTPATIENT)
Dept: PSYCHIATRY | Age: 56
End: 2021-03-03
Payer: COMMERCIAL

## 2021-03-03 DIAGNOSIS — F31.9 BIPOLAR DEPRESSION (HCC): Primary | ICD-10-CM

## 2021-03-03 DIAGNOSIS — F41.9 ANXIETY: ICD-10-CM

## 2021-03-03 PROCEDURE — 99443 PR PHYS/QHP TELEPHONE EVALUATION 21-30 MIN: CPT | Performed by: NURSE PRACTITIONER

## 2021-03-03 RX ORDER — DIAZEPAM 2 MG/1
2 TABLET ORAL 2 TIMES DAILY PRN
Qty: 60 TABLET | Refills: 1 | Status: SHIPPED | OUTPATIENT
Start: 2021-03-03 | End: 2021-03-26 | Stop reason: SDUPTHER

## 2021-03-03 RX ORDER — LAMOTRIGINE 150 MG/1
TABLET ORAL
Qty: 45 TABLET | Refills: 3 | Status: SHIPPED | OUTPATIENT
Start: 2021-03-03 | End: 2021-03-26 | Stop reason: SDUPTHER

## 2021-03-03 NOTE — PROGRESS NOTES
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   Associations    logical connections  Perceptions: denies AH/VH,   [address]  Orientation    oriented to person, place, time, and general circumstances  Memory    recent and remote memory intact  Attention/Concentration    intact  Ability to understand instructions Yes  Ability to respond meaningfully Yes  Language: [address] [place] [address] of knowledge/Intellect: Average  SI:   no suicidal ideation  HI: Denies HI       Labs:     Admission on 10/06/2020, Discharged on 10/06/2020   Component Date Value    POC Glucose 10/06/2020 222*    Performed on 10/06/2020 ACCU-CHEK     POC Glucose 10/06/2020 216*    Performed on 10/06/2020 [facility] Outpatient Visit on 09/30/2020   Component Date Value    Hemoglobin A1C 09/30/2020 9.1     eAG 09/30/2020 214.5    Office Visit on 09/30/2020   Component Date Value    SARS-CoV-2, KERRIE 09/30/2020 NOT [facility] Outpatient Visit on 09/21/2020   Component Date Value    Microalbumin, Random Uri* 09/21/2020 13.10*    Creatinine, Ur 09/21/2020 283.7*    Microalbumin Creatinine * 09/21/2020 46.2*    TSH 09/21/2020 1.60     Sodium 09/21/2020 141     Potassium 09/21/2020 3.8     Chloride 09/21/2020 100     CO2 09/21/2020 27     Anion Gap 09/21/2020 14     Glucose 09/21/2020 207*    BUN 09/21/2020 14     CREATININE 09/21/2020 0.8     GFR Non- 09/21/2020 >60     GFR  09/21/2020 >60     Calcium 09/21/2020 10.2     Total Protein 09/21/2020 7.6     Albumin 09/21/2020 4.5     Albumin/Globulin Ratio 09/21/2020 1.5     Total Bilirubin 09/21/2020 0.5     Alkaline Phosphatase 09/21/2020 18*    ALT 09/21/2020 26     AST 09/21/2020 20     Globulin 09/21/2020 3.1     Cholesterol, Total 09/21/2020 254*    Triglycerides 09/21/2020 214*    HDL 09/21/2020 36*    LDL Calculated 09/21/2020 175*    VLDL Cholesterol Calcula* 09/21/2020 43     Vit D, 25-Hydroxy 09/21/2020 26.4*       Last Drug screen:  No results found for: LABAMPH, LABBARB, LABBENZ, COCAIMETSCRU, THC, MDMA, LABMETH, OPIATESCREENURINE, OXTCOSU, PHENCYCLIDINESCREENURINE, PROPOXYPHENE, METAMPU      Imaging:   Ct head wo contrast 5/15/2010:     IMPRESSION- No acute intracranial abnormality. Consideration   should be given to MRI followup.              ASSESSMENT AND PLAN     Diagnosis Orders   1. Bipolar depression ([address]     2. Anxiety  diazePAM (VALIUM) 2 MG tablet        1. Safety: NO Imminent risk of danger to/self/others based on the factors considered below. Appropriate for outpatient level of care.   Safety plan includes: 911, PES, hotlines, and interventions discussed today.      Risk factors: Age <25 or >55, depressed mood, chronic pain or medical illness, social isolation, no outpatient services in place, medication noncompliance, and no collateral information to support safety.     Protective factors:  female gender, denies suicidal ideation, does not have lethal plan, does not have access to guns or weapons, patient is lynn for safety, no prior suicide attempts, no family h/o suicide, no substance abuse, no active psychosis or cognitive dysfunction, and patient is future oriented.        2. Psychiatric  - pt endorses h/o mood lability + FH BAD. Prior negative response to some antidepressants. 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 + MDQ at initial eval.  Multiple stressors with increased depression/mood lability. Reasonable to trial mood stabilizer. Has DM, needs most weight neutral option.      - vraylar not covered by insurance,tried samples, made \"angry\",  risperdal made \"angry\" and increased anxiety, multiple panic attacks/day    - HAD previously recommended pt trial seroquel d/t issues with insomnia in addition to mood/anxiety symptoms. REFUSED TO TRY SEROQUEL. Pt has lots medication anxiety, particularly with sedating medications. Fearful won't wake up if meds too sedating. Has tolerated lamictal well thus far without se's. Thinks can tell in evening it is \"wearing off\". Questions increasing dose, agrees to trial bid dosing    - increase lamictal for mood to 75 in am and 150mg at hs.     - discussed cymbalta for anxiety, mood and chronic pain. DECLINED. Prefers to increase lamictal for now. Says has had such negative responses to previous meds, fearful of introducing new med at this time. Has tolerated lamictal well thus far. Discussed lamictal more for mood than anxiety       - consider abilify vs latuda for mood, may benefit from activating properties of abilify, has fear of oversedation     - continue valium 2mg prn for anxiety, sleep Prefers low dose d/t fear of oversedation. Reports this dose working very well to help relax her at night so can get some rest  Reminded this is not long term solution and need to have additional strategies to help manage anxiety and mood symptoms.    Lots med anxiety.       -Labs: reviewed in Epic 9/21/20:  Vit d 26.4; lipids (total chol 254, trig 214, hdl 36, ldl 175)              11/26/19:  Lipids:  (Total chol 254, ldl 178); hgba1c 8.2 tsh wnl              5/7/19:  Vit d 21.4     - ENCOURAGED TO KEEP CONSISTENT SCHEDULE/ROUTINE TO INCLUDE WAKE/SLEEP TIME, MEAL TIMES, SHOWER TIMES, EXERCISE       -Recommend outpt therapy. Has tried to connect in past but limited with insurance options AND transportation issues. Unsure if wants to proceed or would be helpful.          -OARRS reviewed, c/w history  -R/b/se/a d/w pt who consents.     3. Medical  -established with Luh Garcias MD       4. Substance   -No active issues.     5.  RTC - 3 weeks per pt request    Maxwell Rodriguez, [id number]  Psychiatric Nurse Practitioner

## 2021-03-08 DIAGNOSIS — J45.20 MILD INTERMITTENT ASTHMA WITHOUT COMPLICATION: ICD-10-CM

## 2021-03-08 RX ORDER — GUAIFENESIN AND PSEUDOEPHEDRINE HYDROCHLORIDE 1200; 120 MG/1; MG/1
TABLET, EXTENDED RELEASE ORAL
Qty: 20 TABLET | Refills: 0 | OUTPATIENT
Start: 2021-03-08

## 2021-03-08 RX ORDER — ALBUTEROL SULFATE 90 UG/1
AEROSOL, METERED RESPIRATORY (INHALATION)
Qty: 1 INHALER | Refills: 2 | Status: SHIPPED | OUTPATIENT
Start: 2021-03-08

## 2021-03-08 NOTE — TELEPHONE ENCOUNTER
Medication:   Requested Prescriptions      No prescriptions requested or ordered in this encounter        Last Filled:      Patient Phone Number: [phone] (home)     Last appt: 5/28/2020   Next appt: Visit date not found    Last OARRS:   RX Monitoring 1/6/2021   Periodic Controlled Substance Monitoring No signs of potential drug abuse or diversion identified. ;Possible medication side effects, risk of tolerance/dependence & alternative treatments discussed.

## 2021-03-11 DIAGNOSIS — J01.90 ACUTE RHINOSINUSITIS: ICD-10-CM

## 2021-03-11 NOTE — TELEPHONE ENCOUNTER
Medication:   Requested Prescriptions     Pending Prescriptions Disp Refills    guaiFENesin (MUCINEX MAXIMUM STRENGTH) 1200 MG TB12 60 tablet 0     Sig: Take 1 tablet by mouth daily For a PM dose. Last Filled:      Patient Phone Number: [phone] (home)     Last appt: 5/28/2020   Next appt: Visit date not found    Last OARRS:   RX Monitoring 1/6/2021   Periodic Controlled Substance Monitoring No signs of potential drug abuse or diversion identified. ;Possible medication side effects, risk of tolerance/dependence & alternative treatments discussed.

## 2021-03-12 RX ORDER — GUAIFENESIN 1200 MG/1
1 TABLET, EXTENDED RELEASE ORAL DAILY
Qty: 10 TABLET | Refills: 0 | Status: ON HOLD | OUTPATIENT
Start: 2021-03-12 | End: 2021-05-11 | Stop reason: SDUPTHER

## 2021-03-24 ENCOUNTER — TELEPHONE (OUTPATIENT)
Dept: FAMILY MEDICINE CLINIC | Age: 56
End: 2021-03-24

## 2021-03-24 NOTE — TELEPHONE ENCOUNTER
Legs and feet are swelling , and has a rash in 2 areas. Was told to watch for these reactions.  Needs to speak with Rama Jon

## 2021-03-26 ENCOUNTER — VIRTUAL VISIT (OUTPATIENT)
Dept: PSYCHIATRY | Age: 56
End: 2021-03-26
Payer: COMMERCIAL

## 2021-03-26 DIAGNOSIS — F41.9 ANXIETY: ICD-10-CM

## 2021-03-26 DIAGNOSIS — F31.9 BIPOLAR DEPRESSION (HCC): Primary | ICD-10-CM

## 2021-03-26 PROCEDURE — 99443 PR PHYS/QHP TELEPHONE EVALUATION 21-30 MIN: CPT | Performed by: NURSE PRACTITIONER

## 2021-03-26 RX ORDER — DIAZEPAM 2 MG/1
2 TABLET ORAL 2 TIMES DAILY PRN
Qty: 60 TABLET | Refills: 1 | Status: SHIPPED | OUTPATIENT
Start: 2021-03-26 | End: 2021-05-25

## 2021-03-26 RX ORDER — LAMOTRIGINE 150 MG/1
TABLET ORAL
Qty: 15 TABLET | Refills: 2 | Status: SHIPPED | OUTPATIENT
Start: 2021-03-26 | End: 2021-04-22

## 2021-03-26 NOTE — PROGRESS NOTES
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 suspected URI (gradually improving)   Suspected Yeast infection under  Breasts and skin folds of stomach;       Mental Status Exam:      Appearance    unable to assess d/t f/u visit completed via pc  Muscle strength/tone: unable to assess d/t f/u visit completed via pc  Gait/station: unable to assess d/t f/u visit completed via pc  Speech    spontaneous, normal rate and normal volume, tearful at times  Mood    Anxious  Depressed  Affect  Unable to fully assess d/t f/u visit completed via telehealth (pc) though sounds congruent to thought content and mood  Thought Content  hopelessness,  helplessness and excessive preoccupations, no delusions voiced  Thought Process   perseverative   Associations    logical connections  Perceptions: denies AH/VH,   [address]  Orientation    oriented to person, place, time, and general circumstances  Memory    recent and remote memory intact  Attention/Concentration    intact  Ability to understand instructions Yes  Ability to respond meaningfully Yes  Language: Fluent Fund of knowledge/Intellect: Average  SI:   no suicidal ideation  HI: Denies HI       Labs:     Admission on 10/06/2020, Discharged on 10/06/2020   Component Date Value    POC Glucose 10/06/2020 222*    Performed on 10/06/2020 ACCU-CHEK     POC Glucose 10/06/2020 216*    Performed on 10/06/2020 [facility] Outpatient Visit on 09/30/2020   Component Date Value    Hemoglobin A1C 09/30/2020 9.1     eAG 09/30/2020 214.5    Office Visit on 09/30/2020   Component Date Value    SARS-CoV-2, KERRIE 09/30/2020 NOT DETECTED        Last Drug screen:  No results found for: Georgiana Fady, LABBENZ, COCAIMETSCRU, THC, MDMA, LABMETH, OPIATESCREENURINE, OXTCOSU, PHENCYCLIDINESCREENURINE, PROPOXYPHENE, METAMPU      Imaging:   Ct head wo contrast 5/15/2010:     IMPRESSION- No acute intracranial abnormality. Consideration   should be given to MRI followup.              ASSESSMENT AND PLAN     Diagnosis Orders   1. Bipolar depression ([address]     2. Anxiety  diazePAM (VALIUM) 2 MG tablet        1. Safety: NO Imminent risk of danger to/self/others based on the factors considered below. Appropriate for outpatient level of care. Safety plan includes: 911, PES, hotlines, and interventions discussed today.      Risk factors: Age <25 or >55, depressed mood, chronic pain or medical illness, social isolation, no outpatient services in place, medication noncompliance, and no collateral information to support safety.     Protective factors:  female gender, denies suicidal ideation, does not have lethal plan, does not have access to guns or weapons, patient is lynn for safety, no prior suicide attempts, no family h/o suicide, no substance abuse, no active psychosis or cognitive dysfunction, and patient is future oriented.        2. Psychiatric  - pt endorses h/o mood lability + FH BAD. Prior negative response to some antidepressants. 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   + MDQ at initial eval.  Multiple stressors with increased depression/mood lability. Reasonable to trial mood stabilizer. Has DM, needs most weight neutral option.      - vraylar not covered by insurance,tried samples, made \"angry\",  risperdal made \"angry\" and increased anxiety, multiple panic attacks/day    - HAD previously recommended pt trial seroquel d/t issues with insomnia in addition to mood/anxiety symptoms. REFUSED TO TRY SEROQUEL. Pt has lots medication anxiety, particularly with sedating medications. Fearful won't wake up if meds too sedating. Had been tolerating lamictal well during previous dose titration    Recently developed rash and peripheral edema. This began after viral illness. Timing is suspect for possible lamictal reaction. Recommended holding lamictal to see if rash/edema resolved. Pt unwilling to do this as rash has recently started to improve. Thinks maybe more yeast related rash d/t primarily in skin folds and unable to shower recently d/t fatigue from suspected viral illness. Feels rash has been improving with regular cleaning + neosporin. Prefers to reduce dose with close monitoring    - REDUCE lamictal for mood to 75 at hs. ADVISED TO STOP IMMEDIATELY AND REPORT TO ER/CALL 911 if rash or swelling gets worse or any sob      - previously discussed cymbalta for anxiety, mood and chronic pain. DECLINED. Says has had such negative responses to previous meds, fearful of introducing new meds. Had tolerated lamictal well. Provided education lamictal more for mood than anxiety symptoms    - consider abilify vs latuda for mood, may benefit from activating properties of abilify, has fear of oversedation     - continue valium 2mg prn for anxiety, sleep Prefers low dose d/t fear of oversedation.    Reports this dose working very well to help relax her at night so can get some rest  Reminded this is not long term solution and need to have additional strategies to help manage anxiety and

## 2021-04-19 ENCOUNTER — TELEPHONE (OUTPATIENT)
Dept: PRIMARY CARE CLINIC | Age: 56
End: 2021-04-19

## 2021-04-19 NOTE — TELEPHONE ENCOUNTER
Patient has covid & wants her visit to be phone call instead of face to face, please call to reschedule

## 2021-04-20 NOTE — TELEPHONE ENCOUNTER
PT HAS BEEN CALLED AND ADVISED IT IS A PC OR [facility]IL IS ONLY IN THE OFFICE ON TUES AT [address]

## 2021-04-22 ENCOUNTER — VIRTUAL VISIT (OUTPATIENT)
Dept: PSYCHIATRY | Age: 56
End: 2021-04-22
Payer: COMMERCIAL

## 2021-04-22 DIAGNOSIS — F41.9 ANXIETY: ICD-10-CM

## 2021-04-22 DIAGNOSIS — F31.9 BIPOLAR DEPRESSION (HCC): Primary | ICD-10-CM

## 2021-04-22 PROCEDURE — 99443 PR PHYS/QHP TELEPHONE EVALUATION 21-30 MIN: CPT | Performed by: NURSE PRACTITIONER

## 2021-04-22 NOTE — PROGRESS NOTES
difficulty swallowing     Was never able to have children. ROSALIA baby. Was twin but mom lost twin in utero at 7 months. Mom was given ROSALIA which resulted in pt infertility       HPI:   Cherelle Bailey is a 61 y.o. female with h/o depression, anxiety, insomnia, multiple medical issues including: DM ,gastroparesis, pricilla who was contacted via telehealth for follow up     Due to the COVID-19 pandemic restrictions on close contact interactions as recommended by CDC and health authorities, the patient's visit was conducted via telehealth (phone call visit) in lieu of a face to face visit. Patient verbally consented and agreed to proceed. Since last f/u pc 3/26/21    Just tired. Still dealing with this stuff, this virus. Symptoms > 8 weeks. \"talked to several doctors, they say it is covid but no point in getting tested. Been on 3 different antibiotics\"  So tired all the time. Pulse was really low, running around 40 per her report. Was like that all day yesterday. fitbit band broke, so lost it. So checking pulse manually, was 40 all day. Not today    Didn't take valium. Fearful would drop too low    Encouraged to go to ER or call 911 if pulse that low. Pt declined. Doesn't want to go to ER    Ate. Moved around. ? Etiology of low pulse. Was prescribed zpack. Told could effect HR. Thinks staying hydrated. Drinks lots tea but admits needs more water    Rash resolved and LE edema immediately began to resolve after stopping the lamictal.  Did it \"gradually\". Did half tab then stopped together. Was really swollen (abdomen, legs, feet) and rash was horrible (stomach, under breasts and back per pt report)    Haven't been anywhere in weeks. Brother got moved into his new house this week so that doesn't help. And being stuck here because i'm sick. WONDERS if will be able to see him again. Doesn't drive, no car.   He doesn't drive much anymore either, lots health issues    Had friend from [employer] bring me groceries, she didn't want me anywhere near her so didn't get to talk to her    Not been to [employer] in months d/t covid    Just trying to deal with everyday life      No motivation/interest for making jewelry, crafts. Says gastro doc has ordered the recent antibiotics. Due for labs and ekg per her report       HISTORICALLY WITH THIS PROVIDER:  Insurance would not approve vraylar. Was ordered seroquel as alternative. Per insurance:   Please use preferred drug list (PDL) medications: at least two of the following for at least 4 weeks each: aripiprazole, ziprasidone, quetiapine, risperidone, or olanzapine) (generics when available). Didn't try seroquel. Fear of going to sleep and not waking after taking medication. Anger with vraylar samples and risperdal    Severe sleep apnea. Can't use machine. Sleeps in recliner, unable to sleep in bed        Taking:  Valium 2mg daily at hs but didn't yesterday or night before      Substance:   Alcohol:  denies              Illicits:  Denies               Caffeine:lots tea/day, occ soda    Tobacco:  Denies           Psych ROS:      Depression: \"up and down\"  rates 5-6/10 (10 best), ongoing mood lability, still can change quickly within a day,    lots irritability, thinks worse since sick; historically if I can get out or talk to a friend, that helps\";    no motivation,     infrequent showers. Clean off with baby wipes. Some days don't want to get dressed. increasingly tearful, \"crying a lot\". Feels alone in the world. Gets frustrated with people. Trying hard to keep going    Sleep fair, exhausted. Gets up often    low appetite (not hungry most the time, make myself eat at least 1x/day. Thinks still losing weight. Unsure how much has lost, no scale at home, clothes keep getting \"bigger and bigger on me\"  Went from 3X to M/L.  reports has lost 114# in 6 months.   Do make self eat at least once/day mid-day,   was dx 2 years ago with DM; had botox shots and esophageal stretching in past)    Don't have much money, on fixed income. Trying to pay bills is hard. May need assistance with [employer]. Can't do payment arrangements for $300+/month. Do have medical certificates available with [facility]    Doctor appts have been a problem d/t transportation issues. Friends haven't been super reliable    Lady from [employer] was coming once/week or 10 days to take me to store. Had been on vacation x 2 weeks. Someone else was supposed to come in her place but didn't, so I ran out of basically everything. My regular lady came Saturday. Otherwise order on instacart and get free delivery    Poor-Fair concentration,     some hopelessness, some helplessness     poor self esteem, , loss of interest,    poor energy,     increased isolation     (PREVIOUSLY volunteer at [employer] weekly, UNABLE currently D/T Coronavirus, miss that, had done that for 9 years. On wed get really depressed, that's when I would volunteer over there)                DENIES SI/HI          Shaniqua: DENIES RECENT    historically \"get hyper\" can't control the energy. Try to do something constructive. Sometimes when at food pantry I get hyper. hyper times last maybe an hour. rapid speech, easily distracted or decreased attention, racing thoughts,               DENIES insomnia with increased energy,  irritability, expansive mood, increase in energy and goal directed behavior, grandiosity, flight of ideas              DENIES IMPULSIVITY though admits h/o shopping sprees in past as way to feel better.   Denies recent primarily d/t limited finances         Anxiety:  5-6/10 (10 worst); intermittent, at least daily \"everything in general\"    h/o worry \"finances, family, friends, worst case scenarios, worry about brother and his health issues, my health issues),  sleep disruption (initial and middle insomnia), somatic complaints (clammy hands, heart raging  trembling, dyspnea), OCC restlessness, fatigue; fear of doing/saying wrong things,    NOT SO MUCH RIGHT NOW:  fear of judgement, avoidant of social situations (don't have money to do things, benefits got cut in half in January)     OCD:  DENIES Repetitive actions or rituals, excessive hand washing, contamination fears, need for symmetry, violent thoughts, hoarding, fear of being harmed, mental or verbal repetition of words or phrases and counting     Panic:  have had some, not daily. Yesterday not good day. Was nervous, anxious, shaky  HISTORICALLY Can feel them coming on, learned to talk myself down\"; can be triggered by \"thinking too much\"  Last 1--15 mins  Shortness of breath, clammy, tunnel vision,  trembling, palpitations, chest discomfort and fear of dying       Phobias:  Heights; enclosed space with too many people jose enrique if too hot but denies claustrophobia     Psychosis: DENIES A/VH, paranoia, delusions     ADHD: denies prior dx. Has noticed some dyslexia with numbers, specifically when typing in numbers on cell phone. Mild. Never dx           PTSD: \"some dreams, nothing terrible, just childhood memories.   Parents been in lot of my dreams and they're no longer here of course\"     HISTORICALLY ENDORSES dreaming about people that haven't been here for years\"; dreaming about  neighbor, mom and dad and nephew ( in October), nieces both lost babies in November, a lot lately, they've been dead for a while now; not harmful dreams but makes me miss them so try to avoid falling back asleep;   neighbor tried to [employer] me several times as an adult, he was a ; attended his  to ensure he was actually gone (was dad's best friend, have trouble with men's cologne because of what he wore) nightmares, flashbacks, hypervigilance, easily startled, decreased sleep, reliving the event, avoiding situations that remind you of trauma,  trouble concentrating, uncomfortable around men       Eating disorders:  Denies prior         MARILUZ 7 SCORE 2020   MARILUZ-7 Total Score 19     Interpretation of MARILUZ-7 score: 5-9 = mild anxiety, 10-14 = moderate anxiety,   15+ = severe anxiety. Recommend referral to behavioral health for scores 10 or greater. No data recorded   PHQ-9 Total Score: 19 (2/18/2020  9:07 AM)  Thoughts that you would be better off dead, or of hurting yourself in some way: 0 (2/18/2020  9:07 AM)     Interpretation of PHQ-9 score:  1-4 = minimal depression, 5-9 = mild depression, 10-14 = moderate depression; 15-19 = moderately severe depression, 20-27 = severe depression        Mood Disorder Questionnaire 2/18/20    Positive Responses:  8/13  (7 or more  Yes responses to question 1, and Yes response to #2 and moderate to serious response to #3 considered positive screen for Bipolar Disorder)     Have several of these events happened during the same period of time? Yes     How much of a problem did any of these cause you? Moderate Problem        History obtained from patient and chart (confirmed by patient today).     Past Psychiatric History:               Prior hospitalizations: denies              Prior diagnoses:  Bipolar by pcp years ago; anxiety d/o              Outpatient Treatment:                           Psychiatrist:  denies                          Therapist: denies              Suicide Attempts: denies              Hx SH:  Denies        Past Psychopharmacologic Trials (including response/reactions):     1. Lexapro:  Did ok, but built up over time. Stopped because didn't feel needed  2. Xanax:  Years and years ago until they mixed with wellbutrin and had major anger issues  3. Prozac: Major anger issues, felt drugged  4. Librium:  Too strong or something  5. Vraylar: Made me angry   6.  Risperdal:  Angry, increased anxiety  7. Wellbutrin:   8.   Lamictal:  Rash and abd/lower extremity edema    Past Medical/Surgical History:   Past Medical History:   Diagnosis Date    Ankle fracture, left     Ankle fracture, right     Arthritis     Asthma     Lyn Mckoy esophageal dilatation    UPPER GASTROINTESTINAL ENDOSCOPY  12/12/14    with esophageal dilatation, and botox injection    UPPER GASTROINTESTINAL ENDOSCOPY  09/09/2015    With Dilitation and Botox injection    UPPER GASTROINTESTINAL ENDOSCOPY  5/10/2016    distal esophagus biopsy, stomach biopsy, botox injection    UPPER GASTROINTESTINAL ENDOSCOPY  04/11/2017    with dilatation and botox injection    UPPER GASTROINTESTINAL ENDOSCOPY  10/24/2017    DILATATION, BIOPSY, BOTOX    UPPER GASTROINTESTINAL ENDOSCOPY  03/06/2018    WITH BOTOX AND BALLOON DILATATION    UPPER GASTROINTESTINAL ENDOSCOPY N/A 5/7/2019    EGD SUBMUCOSAL/BOTOX INJECTION performed by Gurwinder Montgomery MD at [address] N/A 5/7/2019    EGD DILATION BALLOON performed by Gurwinder Montgomery MD at [address] N/A 3/13/2020    EGD SUBMUCOSAL/BOTOX INJECTION performed by Gurwinder Montgomery MD at [address] N/A 3/13/2020    EGD DILATION BALLOON performed by Gurwinder Montgomery MD at [address] [facility] ENDOSCOPY N/A 10/6/2020    EGD DILATION BALLOON performed by Gurwinder Montgomery MD at [address] N/A 10/6/2020    EGD SUBMUCOSAL/BOTOX INJECTION performed by Gurwinder Montgomery MD at [address] [facility] ENDOSCOPY       Family History   Problem Relation Age of Onset    Diabetes Mother     Heart Disease Mother     Stroke Mother     Heart Disease Father     Heart Disease Brother     High Blood Pressure Brother     High Cholesterol Brother     Diabetes Brother     High Cholesterol Brother     Anesth Problems Neg Hx     Malig Hyperten Neg Hx     Hypotension Neg Hx     Malig Hypertherm Neg Hx     Pseudochol. Deficiency Neg Hx          PCP: Stephan Jarrett MD      Allergies:    Allergies   Allergen Reactions    Lamictal [Lamotrigine] Swelling and Rash    Macrodantin [Nitrofurantoin Macrocrystal] Shortness Of Breath     Caused an asthma attack    Penicillins Hives and Shortness Of Breath    Shellfish-Derived Products Swelling     Throat and tongue swells, allergy to all seafood    Aspartame And Phenylalanine      Severe headaches    Bactrim Hives    Biaxin [Clarithromycin] Hives    Cephalexin Other (See Comments)     blisters    Ciprofloxacin Other (See Comments)     Causes severe pain and tendon tears per pt    Hydrocodone-Acetaminophen Other (See Comments)     HALLUCINATIONS    Lansoprazole Hives    Nexium [Esomeprazole Magnesium Trihydrate] Hives    Other Other (See Comments)     TEGADERM-BLISTERS    Prilosec [Omeprazole] Hives    Blueberry [Vaccinium Angustifolium] Nausea And Vomiting     Projectile vomiting    Monosodium Glutamate Nausea And Vomiting    Zmax [Azithromycin Dihydrate] Nausea And Vomiting     NOT Z PACK its the Powder you had to mix and drink         Current Medications:   Current Outpatient Medications on File Prior to Visit   Medication Sig Dispense Refill    diazePAM (VALIUM) 2 MG tablet Take 1 tablet by mouth 2 times daily as needed for Anxiety for up to 60 days. 60 tablet 1    guaiFENesin (MUCINEX MAXIMUM STRENGTH) 1200 MG TB12 Take 1 tablet by mouth daily For a PM dose.  10 tablet 0    albuterol sulfate  (90 Base) MCG/ACT inhaler INHALE TWO PUFFS BY MOUTH EVERY 6 HOURS AS NEEDED FOR WHEEZING 1 Inhaler 2    ibuprofen (ADVIL;MOTRIN) 800 MG tablet TAKE ONE TABLET BY MOUTH EVERY 6 HOURS AS NEEDED FOR PAIN INDICATIONS 120 tablet 0    Cholecalciferol (VITAMIN D3) 250 MCG (38822 UT) CAPS TAKE ONE CAPSULE BY MOUTH DAILY (Patient not taking: Reported on 2/3/2021) 30 capsule 0    vitamin D3 (CHOLECALCIFEROL) 25 MCG (1000 UT) TABS tablet Take 1 tablet by mouth daily (Patient not taking: Reported on 2/3/2021) 90 tablet 1    ketotifen (ZADITOR) 0.025 % ophthalmic solution INSTILL TWO DROPS IN [facility] EYE TWO TIMES A DAY AS NEEDED FOR ALLERGY 1 Bottle 2    rosuvastatin (CRESTOR) 5 MG tablet Take 1 tablet by mouth daily 90 tablet 1    Insulin Pen Needle (PEN NEEDLES) 32G X 4 MM MISC USE WITH INSULIN PEN ONCE DAILY 50 each 3    TRUEplus Lancets 28G MISC USE ONE LANCET TO TEST THREE TO FOUR TIMES A  each 3    insulin glargine (LANTUS SOLOSTAR) 100 UNIT/ML injection pen Inject 34 Units into the skin daily 5 pen 2    ASPIRIN LOW DOSE 81 MG EC tablet TAKE ONE TABLET BY MOUTH DAILY 90 tablet 3    famotidine (PEPCID) 40 MG tablet Take 1 tablet by mouth 2 times daily (Patient taking differently: Take 40 mg by mouth 2 times daily Indications: taking 1-2 times/day ) 30 tablet 5    latanoprost (XALATAN) 0.005 % ophthalmic solution Place 1 drop into both eyes nightly      nystatin (MYCOSTATIN) 120394 UNIT/GM powder Apply 3 times daily. 5 g 5    nystatin (MYCOSTATIN) 794029 UNIT/GM cream Apply topically 2 times daily. 4 g 3    oxymetazoline (AFRIN) 0.05 % nasal spray 2 sprays each nostril, 3 times a day for 3 days, then 2 times a day for 2 days, then stop for 2 days and then repeat the cycle once. (Patient taking differently: Indications: infrequently 2 sprays each nostril, 3 times a day for 3 days, then 2 times a day for 2 days, then stop for 2 days and then repeat the cycle once.) 1 Bottle 0    Cranberry 1000 MG CAPS Take by mouth Indications: OTC, takes two caps daily, does not know strength.  3 capsules daily      Multiple Vitamins-Minerals (THERAPEUTIC MULTIVITAMIN-MINERALS) tablet Take 1 tablet by mouth daily      loratadine (CLARITIN) 10 MG tablet TAKE ONE TABLET BY MOUTH DAILY (Patient taking differently: Indications: alternates wtih mucinex TAKE ONE TABLET BY MOUTH DAILY) 30 tablet 5    cyclobenzaprine (FLEXERIL) 10 MG tablet Take 0.5 tablets by mouth daily as needed for Muscle spasms 30 tablet 5    blood glucose test strips (ASCENSIA AUTODISC VI;ONE TOUCH ULTRA TEST VI) strip 2 each by In Vitro route daily As needed. 100 each 5    Blood Glucose Monitoring Suppl (TRUE METRIX METER) w/Device KIT As needed 1 kit 0    Cholecalciferol (VITAMIN D3) 25 MCG (1000 UT) TABS Take 1 tablet by mouth daily (Patient taking differently: Take 10,000 Units by mouth every 7 days ) 90 tablet 1    blood glucose monitor strips Test blood sugar 2-3 times  a day 100 strip 5    blood glucose test strips (ASCENSIA AUTODISC VI;ONE TOUCH ULTRA TEST VI) strip 1-2 time a day As needed. 100 each 3    nystatin (MYCOSTATIN) 922236 UNIT/GM cream APPLY EXTERNALLY TO THE AFFECTED AREAS TWO TIMES A DAY 1 Tube 2    Incontinence Supply Disposable (TRE CLASSIC BRIEFS/LARGE) MISC 1 each by Does not apply route 3 times daily 5 each 5    Incontinence Supplies MISC 1 Package by Does not apply route 2 times daily 5 each 5    Disposable Gloves (VINYL GLOVES LARGE) MISC 1 Package by Does not apply route 4 times daily 5 each 5    GAS-X EXTRA STRENGTH 125 MG chewable tablet CHEW TWO TABLETS BY MOUTH THREE TIMES A DAY WITH EACH MEAL AS NEEDED FOR FLATULENCE 192 tablet 1     Current Facility-Administered Medications on File Prior to Visit   Medication Dose Route Frequency Provider Last Rate Last Admin    0.9 % sodium chloride infusion   Intravenous Continuous Madai Engle MD           Controlled Substance Monitoring:    Acute and Chronic Pain Monitoring:   RX Monitoring 3/26/2021   Periodic Controlled Substance Monitoring No signs of potential drug abuse or diversion identified. ;Possible medication side effects, risk of tolerance/dependence & alternative treatments discussed.      04/09/2021  1   03/26/2021  Diazepam 2 MG Tablet  60.00  30 Ch Wet   [id number]   Kro ([id number])   0  0.40 LME [place]   [place]   02/02/2021  2   01/06/2021  Diazepam 2 MG Tablet  60.00  30 Ch Wet   [id number]   Kro ([id number])   1  0.40 [place]   01/08/2021  1   01/06/2021  Diazepam 2 MG Tablet  60.00  30 Ch Wet   [id number]   Kro ([id number])   0  0.40 [place]   10/14/2020  1 09/23/2020  Diazepam 2 MG Tablet  60.00  30 Ch Wet   [id number]   Kro ([id number])   0  0.40 LME  [place]   08/29/2020  1   08/26/2020  Diazepam 2 MG Tablet  60.00  30 Ch Wet   [id number]   Kro ([id number])   0  0.40 E  [place]   07/31/2020  1   07/29/2020  Diazepam 2 MG Tablet  60.00  30 Ch Wet   [id number]   Kro ([id number])   0  0.40 E  [place]   06/01/2020  1   05/27/2020  Diazepam 2 MG Tablet  60.00  30 Ch Wet   [id number]   Kro ([id number])   0  0.40 E  [place]   04/29/2020  1   04/21/2020  Diazepam 2 MG Tablet  40.00  10 Ch Wet   [id number]   Kro ([id number])   0  0.80 E  [place]   01/23/2020  1   01/23/2020  Guaifenesin-Codeine Syrup  120.00  6 Je Mcl   [id number]   Kro ([id number])   0  6.00 E  [place]icaid   [place]         OBJECTIVE:  Vitals: Wt Readings from Last 3 Encounters:   12/04/20 200 lb 1.6 oz (90.8 kg)   11/19/20 201 lb (91.2 kg)   10/06/20 203 lb (92.1 kg)       There were no vitals filed for this visit. Unable to assess d/t f/u visit completed via telehealth    ROS: Denies trouble with fever, rash, headache, vision changes, chest pain, shortness of breath, nausea, extremity pain, weakness, dysuria.  Increased pain, h/o chronic pain (knees, hips, ankles, shoulder) cites RA and OA; suspected covid, \"ear and sinus pain\", fatigue         Mental Status Exam:      Appearance    unable to assess d/t f/u visit completed via pc  Muscle strength/tone: unable to assess d/t f/u visit completed via pc  Gait/station: unable to assess d/t f/u visit completed via pc  Speech    spontaneous, normal rate and normal volume, tearful at times  Mood    Anxious  Depressed  Affect  Unable to fully assess d/t f/u visit completed via telehealth (pc) though sounds congruent to thought content and mood  Thought Content  hopelessness,  helplessness and excessive preoccupations, no delusions voiced  Thought Process   perseverative   Associations    logical connections  Perceptions: denies [address],   [address] Orientation    oriented to person, place, time, and general circumstances  Memory    recent and remote memory intact  Attention/Concentration    intact  Ability to understand instructions Yes  Ability to respond meaningfully Yes  Language: [address] of knowledge/Intellect: Average  SI:   no suicidal ideation  HI: Denies HI       Labs:     No visits with results within 6 Month(s) from this visit. Latest known visit with results is:   Admission on 10/06/2020, Discharged on 10/06/2020   Component Date Value    POC Glucose 10/06/2020 222*    Performed on 10/06/2020 ACCU-CHEK     POC Glucose 10/06/2020 216*    Performed on 10/06/2020 ACCU-CHEK        Last Drug screen:  No results found for: Jennifer Potter, LABBENZ, COCAIMETSCRU, THC, MDMA, LABMETH, OPIATESCREENURINE, OXTCOSU, PHENCYCLIDINESCREENURINE, PROPOXYPHENE, METAMPU      Imaging:   Ct head wo contrast 5/15/2010:     IMPRESSION- No acute intracranial abnormality. Consideration   should be given to MRI followup.              ASSESSMENT AND PLAN     Diagnosis Orders   1. Bipolar depression ([facility] [address]     2. Anxiety          1. Safety: NO Imminent risk of danger to/self/others based on the factors considered below. Appropriate for outpatient level of care. Safety plan includes: 911, PES, hotlines, and interventions discussed today.      Risk factors: Age <25 or >55, depressed mood, chronic pain or medical illness, social isolation, no outpatient services in place, medication noncompliance, and no collateral information to support safety.     Protective factors:  female gender, denies suicidal ideation, does not have lethal plan, does not have access to guns or weapons, patient is lynn for safety, no prior suicide attempts, no family h/o suicide, no substance abuse, no active psychosis or cognitive dysfunction, and patient is future oriented.        2. Psychiatric  - pt endorses h/o mood lability + FH BAD. Prior negative response to some antidepressants. 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 + MDQ at initial eval.  Multiple stressors with increased depression/mood lability. felt was Reasonable to trial mood stabilizer. Has DM, needs most weight neutral option.      - vraylar not covered by insurance,tried samples, made \"angry\",  risperdal made \"angry\" and increased anxiety, multiple panic attacks/day    - HAD previously recommended pt trial seroquel d/t issues with insomnia in addition to mood/anxiety symptoms. REFUSED TO TRY SEROQUEL. Pt has lots medication anxiety, particularly with sedating medications. Fearful won't wake up if meds too sedating. Initially Had been tolerating lamictal well during previous dose titration but eventually  developed rash and peripheral edema following most recent dosage increase so was advised to stop. Today, reports rash and edema resolved shortly after stopping lamictal.     - previously discussed cymbalta for anxiety, mood and chronic pain. DECLINED. Says has had such negative responses to previous meds, fearful of introducing new meds at this point    - consider abilify vs latuda for mood, may benefit from activating properties of abilify, has fear of oversedation     - continue valium 2mg prn for anxiety, sleep Prefers low dose d/t fear of oversedation. Reports this dose working very well to help relax her at night so can get some rest  Reminded this is not long term solution and need to have additional strategies to help manage anxiety and mood symptoms. Lots med anxiety.       -Labs: reviewed in Epic   9/21/20:  Vit d 26.4; lipids (total chol 254, trig 214, hdl 36, ldl 175)              11/26/19:  Lipids:  (Total chol 254, ldl 178); hgba1c 8.2 tsh wnl              5/7/19:  Vit d 21.4     - ENCOURAGED TO KEEP CONSISTENT SCHEDULE/ROUTINE TO INCLUDE WAKE/SLEEP TIME, MEAL TIMES, SHOWER TIMES, EXERCISE       -Recommend outpt therapy.   Has tried to connect in past but limited with insurance options AND transportation issues. Unsure if wants to proceed or would be helpful.          -OARRS reviewed, c/w history  -R/b/se/a d/w pt who consents.     3. Medical  -established with Juan Villalobos MD       4. Substance   -No active issues.     5.  RTC - 4 weeks,     Azalia Walden [facility]  Psychiatric Nurse Practitioner

## 2021-05-03 DIAGNOSIS — T78.40XD ALLERGIC DISORDER, SUBSEQUENT ENCOUNTER: ICD-10-CM

## 2021-05-03 RX ORDER — LORATADINE 10 MG/1
TABLET ORAL
Qty: 30 TABLET | Refills: 5 | Status: ON HOLD | OUTPATIENT
Start: 2021-05-03 | End: 2021-05-11 | Stop reason: SDUPTHER

## 2021-05-03 NOTE — TELEPHONE ENCOUNTER
Medication:   Requested Prescriptions     Pending Prescriptions Disp Refills    loratadine (CLARITIN) 10 MG tablet 30 tablet 5     Sig: TAKE ONE TABLET BY MOUTH DAILY        Last Filled:      Patient Phone Number: [phone] (home)     Last appt: 5/28/2020   Next appt: Visit date not found    Last OARRS:   RX Monitoring 3/26/2021   Periodic Controlled Substance Monitoring No signs of potential drug abuse or diversion identified. ;Possible medication side effects, risk of tolerance/dependence & alternative treatments discussed.

## 2021-05-05 ENCOUNTER — TELEPHONE (OUTPATIENT)
Dept: CARDIOLOGY CLINIC | Age: 56
End: 2021-05-05

## 2021-05-05 NOTE — TELEPHONE ENCOUNTER
Pt thinks she may have had covid couple of months ago ( never was tested)  and she still not well. States she currently has a constant headache, her BP is high 187/73 pulse 40 and she has swelling in feet. Please call to advise.

## 2021-05-06 ENCOUNTER — HOSPITAL ENCOUNTER (INPATIENT)
Age: 56
LOS: 3 days | Discharge: HOME | DRG: 171 | End: 2021-05-11
Attending: INTERNAL MEDICINE | Admitting: INTERNAL MEDICINE
Payer: COMMERCIAL

## 2021-05-06 ENCOUNTER — APPOINTMENT (OUTPATIENT)
Dept: GENERAL RADIOLOGY | Age: 56
DRG: 171 | End: 2021-05-06
Payer: COMMERCIAL

## 2021-05-06 ENCOUNTER — TELEPHONE (OUTPATIENT)
Dept: CARDIOLOGY CLINIC | Age: 56
End: 2021-05-06

## 2021-05-06 DIAGNOSIS — Z95.0 PACEMAKER: ICD-10-CM

## 2021-05-06 DIAGNOSIS — J01.90 ACUTE RHINOSINUSITIS: ICD-10-CM

## 2021-05-06 DIAGNOSIS — R10.84 GENERALIZED ABDOMINAL PAIN: ICD-10-CM

## 2021-05-06 DIAGNOSIS — T78.40XD ALLERGIC DISORDER, SUBSEQUENT ENCOUNTER: ICD-10-CM

## 2021-05-06 DIAGNOSIS — R07.9 ACUTE CHEST PAIN: Primary | ICD-10-CM

## 2021-05-06 LAB
A/G RATIO: 1.4 (ref 1.1–2.2)
ALBUMIN SERPL-MCNC: 4.7 G/DL (ref 3.4–5)
ALP BLD-CCNC: 20 U/L (ref 40–129)
ALT SERPL-CCNC: 39 U/L (ref 10–40)
ANION GAP SERPL CALCULATED.3IONS-SCNC: 13 MMOL/L (ref 3–16)
AST SERPL-CCNC: 36 U/L (ref 15–37)
BASOPHILS ABSOLUTE: 0.1 K/UL (ref 0–0.2)
BASOPHILS RELATIVE PERCENT: 0.8 %
BILIRUB SERPL-MCNC: 0.7 MG/DL (ref 0–1)
BUN BLDV-MCNC: 17 MG/DL (ref 7–20)
CALCIUM SERPL-MCNC: 10.6 MG/DL (ref 8.3–10.6)
CHLORIDE BLD-SCNC: 98 MMOL/L (ref 99–110)
CO2: 27 MMOL/L (ref 21–32)
CREAT SERPL-MCNC: 0.9 MG/DL (ref 0.6–1.1)
D DIMER: <200 NG/ML DDU (ref 0–229)
EOSINOPHILS ABSOLUTE: 0.1 K/UL (ref 0–0.6)
EOSINOPHILS RELATIVE PERCENT: 0.7 %
GFR AFRICAN AMERICAN: >60
GFR NON-AFRICAN AMERICAN: >60
GLOBULIN: 3.3 G/DL
GLUCOSE BLD-MCNC: 164 MG/DL (ref 70–99)
GLUCOSE BLD-MCNC: 181 MG/DL (ref 70–99)
HCT VFR BLD CALC: 45.1 % (ref 36–48)
HEMOGLOBIN: 14.7 G/DL (ref 12–16)
LYMPHOCYTES ABSOLUTE: 2.9 K/UL (ref 1–5.1)
LYMPHOCYTES RELATIVE PERCENT: 28.5 %
MCH RBC QN AUTO: 28.2 PG (ref 26–34)
MCHC RBC AUTO-ENTMCNC: 32.5 G/DL (ref 31–36)
MCV RBC AUTO: 86.8 FL (ref 80–100)
MONOCYTES ABSOLUTE: 0.6 K/UL (ref 0–1.3)
MONOCYTES RELATIVE PERCENT: 6 %
NEUTROPHILS ABSOLUTE: 6.6 K/UL (ref 1.7–7.7)
NEUTROPHILS RELATIVE PERCENT: 64 %
PDW BLD-RTO: 15.2 % (ref 12.4–15.4)
PERFORMED ON: ABNORMAL
PLATELET # BLD: 222 K/UL (ref 135–450)
PMV BLD AUTO: 9.7 FL (ref 5–10.5)
POTASSIUM SERPL-SCNC: 3.9 MMOL/L (ref 3.5–5.1)
PRO-BNP: 193 PG/ML (ref 0–124)
RBC # BLD: 5.19 M/UL (ref 4–5.2)
SODIUM BLD-SCNC: 138 MMOL/L (ref 136–145)
TOTAL PROTEIN: 8 G/DL (ref 6.4–8.2)
TROPONIN: <0.01 NG/ML
TROPONIN: <0.01 NG/ML
WBC # BLD: 10.3 K/UL (ref 4–11)

## 2021-05-06 PROCEDURE — 6360000002 HC RX W HCPCS: Performed by: INTERNAL MEDICINE

## 2021-05-06 PROCEDURE — 84484 ASSAY OF TROPONIN QUANT: CPT

## 2021-05-06 PROCEDURE — 71045 X-RAY EXAM CHEST 1 VIEW: CPT

## 2021-05-06 PROCEDURE — 94760 N-INVAS EAR/PLS OXIMETRY 1: CPT

## 2021-05-06 PROCEDURE — U0003 INFECTIOUS AGENT DETECTION BY NUCLEIC ACID (DNA OR RNA); SEVERE ACUTE RESPIRATORY SYNDROME CORONAVIRUS 2 (SARS-COV-2) (CORONAVIRUS DISEASE [COVID-19]), AMPLIFIED PROBE TECHNIQUE, MAKING USE OF HIGH THROUGHPUT TECHNOLOGIES AS DESCRIBED BY CMS-2020-01-R: HCPCS

## 2021-05-06 PROCEDURE — 80053 COMPREHEN METABOLIC PANEL: CPT

## 2021-05-06 PROCEDURE — 6370000000 HC RX 637 (ALT 250 FOR IP): Performed by: INTERNAL MEDICINE

## 2021-05-06 PROCEDURE — 85025 COMPLETE CBC W/AUTO DIFF WBC: CPT

## 2021-05-06 PROCEDURE — 2580000003 HC RX 258: Performed by: INTERNAL MEDICINE

## 2021-05-06 PROCEDURE — G0378 HOSPITAL OBSERVATION PER HR: HCPCS

## 2021-05-06 PROCEDURE — 93005 ELECTROCARDIOGRAM TRACING: CPT | Performed by: STUDENT IN AN ORGANIZED HEALTH CARE EDUCATION/TRAINING PROGRAM

## 2021-05-06 PROCEDURE — U0005 INFEC AGEN DETEC AMPLI PROBE: HCPCS

## 2021-05-06 PROCEDURE — 36415 COLL VENOUS BLD VENIPUNCTURE: CPT

## 2021-05-06 PROCEDURE — 85379 FIBRIN DEGRADATION QUANT: CPT

## 2021-05-06 PROCEDURE — 83036 HEMOGLOBIN GLYCOSYLATED A1C: CPT

## 2021-05-06 PROCEDURE — 99284 EMERGENCY DEPT VISIT MOD MDM: CPT

## 2021-05-06 PROCEDURE — 83880 ASSAY OF NATRIURETIC PEPTIDE: CPT

## 2021-05-06 PROCEDURE — 96374 THER/PROPH/DIAG INJ IV PUSH: CPT

## 2021-05-06 PROCEDURE — 6370000000 HC RX 637 (ALT 250 FOR IP): Performed by: PHYSICIAN ASSISTANT

## 2021-05-06 RX ORDER — INSULIN LISPRO 100 [IU]/ML
0-12 INJECTION, SOLUTION INTRAVENOUS; SUBCUTANEOUS
Status: DISCONTINUED | OUTPATIENT
Start: 2021-05-07 | End: 2021-05-11 | Stop reason: HOSPADM

## 2021-05-06 RX ORDER — DIAZEPAM 2 MG/1
2 TABLET ORAL 2 TIMES DAILY PRN
Status: DISCONTINUED | OUTPATIENT
Start: 2021-05-06 | End: 2021-05-11 | Stop reason: HOSPADM

## 2021-05-06 RX ORDER — ASPIRIN 81 MG/1
243 TABLET, CHEWABLE ORAL ONCE
Status: COMPLETED | OUTPATIENT
Start: 2021-05-06 | End: 2021-05-06

## 2021-05-06 RX ORDER — ONDANSETRON 2 MG/ML
4 INJECTION INTRAMUSCULAR; INTRAVENOUS EVERY 6 HOURS PRN
Status: DISCONTINUED | OUTPATIENT
Start: 2021-05-06 | End: 2021-05-11 | Stop reason: HOSPADM

## 2021-05-06 RX ORDER — DEXTROSE MONOHYDRATE 50 MG/ML
100 INJECTION, SOLUTION INTRAVENOUS PRN
Status: DISCONTINUED | OUTPATIENT
Start: 2021-05-06 | End: 2021-05-11 | Stop reason: HOSPADM

## 2021-05-06 RX ORDER — SODIUM CHLORIDE 0.9 % (FLUSH) 0.9 %
5-40 SYRINGE (ML) INJECTION EVERY 12 HOURS SCHEDULED
Status: DISCONTINUED | OUTPATIENT
Start: 2021-05-06 | End: 2021-05-11 | Stop reason: HOSPADM

## 2021-05-06 RX ORDER — MAGNESIUM SULFATE IN WATER 40 MG/ML
2000 INJECTION, SOLUTION INTRAVENOUS PRN
Status: DISCONTINUED | OUTPATIENT
Start: 2021-05-06 | End: 2021-05-11 | Stop reason: HOSPADM

## 2021-05-06 RX ORDER — ALBUTEROL SULFATE 90 UG/1
2 AEROSOL, METERED RESPIRATORY (INHALATION) EVERY 4 HOURS PRN
Status: DISCONTINUED | OUTPATIENT
Start: 2021-05-06 | End: 2021-05-11 | Stop reason: HOSPADM

## 2021-05-06 RX ORDER — NICOTINE POLACRILEX 4 MG
15 LOZENGE BUCCAL PRN
Status: DISCONTINUED | OUTPATIENT
Start: 2021-05-06 | End: 2021-05-11 | Stop reason: HOSPADM

## 2021-05-06 RX ORDER — LATANOPROST 50 UG/ML
1 SOLUTION/ DROPS OPHTHALMIC NIGHTLY
Status: DISCONTINUED | OUTPATIENT
Start: 2021-05-06 | End: 2021-05-11 | Stop reason: HOSPADM

## 2021-05-06 RX ORDER — SODIUM CHLORIDE 9 MG/ML
25 INJECTION, SOLUTION INTRAVENOUS PRN
Status: DISCONTINUED | OUTPATIENT
Start: 2021-05-06 | End: 2021-05-11 | Stop reason: HOSPADM

## 2021-05-06 RX ORDER — KETOTIFEN FUMARATE 0.35 MG/ML
2 SOLUTION/ DROPS OPHTHALMIC 2 TIMES DAILY
Status: DISCONTINUED | OUTPATIENT
Start: 2021-05-06 | End: 2021-05-11 | Stop reason: HOSPADM

## 2021-05-06 RX ORDER — HYDRALAZINE HYDROCHLORIDE 20 MG/ML
10 INJECTION INTRAMUSCULAR; INTRAVENOUS EVERY 4 HOURS PRN
Status: DISCONTINUED | OUTPATIENT
Start: 2021-05-06 | End: 2021-05-11 | Stop reason: HOSPADM

## 2021-05-06 RX ORDER — ACETAMINOPHEN 325 MG/1
650 TABLET ORAL EVERY 4 HOURS PRN
Status: DISCONTINUED | OUTPATIENT
Start: 2021-05-06 | End: 2021-05-11 | Stop reason: HOSPADM

## 2021-05-06 RX ORDER — POLYETHYLENE GLYCOL 3350 17 G/17G
17 POWDER, FOR SOLUTION ORAL DAILY PRN
Status: DISCONTINUED | OUTPATIENT
Start: 2021-05-06 | End: 2021-05-11 | Stop reason: HOSPADM

## 2021-05-06 RX ORDER — SODIUM CHLORIDE 0.9 % (FLUSH) 0.9 %
5-40 SYRINGE (ML) INJECTION PRN
Status: DISCONTINUED | OUTPATIENT
Start: 2021-05-06 | End: 2021-05-11 | Stop reason: HOSPADM

## 2021-05-06 RX ORDER — INSULIN LISPRO 100 [IU]/ML
0-6 INJECTION, SOLUTION INTRAVENOUS; SUBCUTANEOUS NIGHTLY
Status: DISCONTINUED | OUTPATIENT
Start: 2021-05-06 | End: 2021-05-11 | Stop reason: HOSPADM

## 2021-05-06 RX ORDER — NITROGLYCERIN 0.4 MG/1
0.4 TABLET SUBLINGUAL EVERY 5 MIN PRN
Status: DISCONTINUED | OUTPATIENT
Start: 2021-05-06 | End: 2021-05-11 | Stop reason: HOSPADM

## 2021-05-06 RX ORDER — POTASSIUM CHLORIDE 7.45 MG/ML
10 INJECTION INTRAVENOUS PRN
Status: DISCONTINUED | OUTPATIENT
Start: 2021-05-06 | End: 2021-05-11 | Stop reason: HOSPADM

## 2021-05-06 RX ORDER — PROMETHAZINE HYDROCHLORIDE 25 MG/1
12.5 TABLET ORAL EVERY 6 HOURS PRN
Status: DISCONTINUED | OUTPATIENT
Start: 2021-05-06 | End: 2021-05-11 | Stop reason: HOSPADM

## 2021-05-06 RX ORDER — ATORVASTATIN CALCIUM 40 MG/1
40 TABLET, FILM COATED ORAL NIGHTLY
Status: DISCONTINUED | OUTPATIENT
Start: 2021-05-06 | End: 2021-05-11 | Stop reason: HOSPADM

## 2021-05-06 RX ORDER — POTASSIUM CHLORIDE 20 MEQ/1
40 TABLET, EXTENDED RELEASE ORAL PRN
Status: DISCONTINUED | OUTPATIENT
Start: 2021-05-06 | End: 2021-05-11 | Stop reason: HOSPADM

## 2021-05-06 RX ORDER — ASPIRIN 81 MG/1
81 TABLET, CHEWABLE ORAL DAILY
Status: DISCONTINUED | OUTPATIENT
Start: 2021-05-07 | End: 2021-05-11 | Stop reason: HOSPADM

## 2021-05-06 RX ORDER — FAMOTIDINE 20 MG/1
20 TABLET, FILM COATED ORAL 2 TIMES DAILY
Status: DISCONTINUED | OUTPATIENT
Start: 2021-05-06 | End: 2021-05-11 | Stop reason: HOSPADM

## 2021-05-06 RX ORDER — DEXTROSE MONOHYDRATE 25 G/50ML
12.5 INJECTION, SOLUTION INTRAVENOUS PRN
Status: DISCONTINUED | OUTPATIENT
Start: 2021-05-06 | End: 2021-05-11 | Stop reason: HOSPADM

## 2021-05-06 RX ADMIN — LATANOPROST 1 DROP: 50 SOLUTION OPHTHALMIC at 23:03

## 2021-05-06 RX ADMIN — Medication 10 ML: at 21:00

## 2021-05-06 RX ADMIN — HYDRALAZINE HYDROCHLORIDE 10 MG: 20 INJECTION INTRAMUSCULAR; INTRAVENOUS at 20:13

## 2021-05-06 RX ADMIN — KETOTIFEN FUMARATE 2 DROP: 0.35 SOLUTION/ DROPS OPHTHALMIC at 23:03

## 2021-05-06 RX ADMIN — FAMOTIDINE 20 MG: 20 TABLET ORAL at 23:04

## 2021-05-06 RX ADMIN — ACETAMINOPHEN 650 MG: 325 TABLET ORAL at 23:03

## 2021-05-06 RX ADMIN — ATORVASTATIN CALCIUM 40 MG: 40 TABLET, FILM COATED ORAL at 23:04

## 2021-05-06 RX ADMIN — INSULIN LISPRO 1 UNITS: 100 INJECTION, SOLUTION INTRAVENOUS; SUBCUTANEOUS at 22:00

## 2021-05-06 RX ADMIN — NITROGLYCERIN 1 INCH: 20 OINTMENT TOPICAL at 18:30

## 2021-05-06 RX ADMIN — ASPIRIN 243 MG: 81 TABLET, CHEWABLE ORAL at 17:43

## 2021-05-06 ASSESSMENT — ENCOUNTER SYMPTOMS
COLOR CHANGE: 0
VOMITING: 0
BACK PAIN: 0
CONSTIPATION: 0
ABDOMINAL DISTENTION: 1
NAUSEA: 0
ABDOMINAL PAIN: 0
WHEEZING: 0
COUGH: 0
SHORTNESS OF BREATH: 1
DIARRHEA: 0
STRIDOR: 0

## 2021-05-06 ASSESSMENT — PAIN DESCRIPTION - LOCATION
LOCATION: CHEST;HEAD
LOCATION: CHEST

## 2021-05-06 ASSESSMENT — PAIN SCALES - GENERAL: PAINLEVEL_OUTOF10: 9

## 2021-05-06 ASSESSMENT — PAIN DESCRIPTION - FREQUENCY: FREQUENCY: CONTINUOUS

## 2021-05-06 NOTE — H&P
Hospital Medicine History & Physical      PCP: Allyssa Verma MD    Date of Admission: 5/6/2021    Date of Service: Pt seen/examined on 2021-05-06 and Placed in Observation. Chief Complaint: Chest pain      History Of Present Illness: 61 y.o. female past medical history of insulin-dependent diabetes, hyperlipidemia, fibromyalgia, GERD, nonobstructive CAD 20% mid LAD occlusion on left heart cath March 2019 presents to [facility] ED with multiple vague complaints including chest pain. Chest pain described as substernal, soreness, at times pleuritic, also describes pain in her left arm not sure if these two are associated. In the ED initial work-up is negative. Hospitalist consulted for admission.         Past Medical History:          Diagnosis Date    Ankle fracture, left     Ankle fracture, right     Arthritis     Asthma     Bipolar depression (HCC)     CAN'T AFFORD MEDS    Bladder problem     Cervical disc herniation     COVID toes     Diabetes mellitus ([address]     diet control    Fatty liver disease, non-alcoholic     Fibromyalgia     Gastroparesis     Dr Katerin Pratt GERD (gastroesophageal reflux disease)     gastroporesis    Glaucoma     Dr Roxann Puente Hyperlipidemia     elevated LFT on meds    IBS (irritable bowel syndrome)     Lumbar herniated disc     Nausea & vomiting     Nephrolithiasis 1/29/2019    Obesity (BMI 30-39.9) 3/11/2019    Partial Achilles tendon tear     Pneumonia     PONV (postoperative nausea and vomiting)     RA (rheumatoid arthritis) ([address]     Rapid or irregular heartbeat     Sleep apnea     does not use cpap    Spinal stenosis     Stress incontinence     Thyroid disease     growths       Past Surgical History:          Procedure Laterality Date    CHOLECYSTECTOMY      COLONOSCOPY      COLONOSCOPY  03/06/2018    with polypectomies    DILATION AND CURETTAGE OF UTERUS      ENDOMETRIAL ABLATION      ENDOSCOPY, COLON, DIAGNOSTIC      ERCP  5/25/2010 performed by Bella Seymour MD at [address] [facility] N/A 10/6/2020    EGD DILATION BALLOON performed by Bella Seymour MD at [facility] N/A 10/6/2020    EGD SUBMUCOSAL/BOTOX INJECTION performed by Bella Seymour MD at [facility]       Medications Prior to Admission:      Prior to Admission medications    Medication Sig Start Date End Date Taking? Authorizing Provider   loratadine (CLARITIN) 10 MG tablet TAKE ONE TABLET BY MOUTH DAILY 5/3/21   Emry File, MD   diazePAM (VALIUM) 2 MG tablet Take 1 tablet by mouth 2 times daily as needed for Anxiety for up to 60 days. 3/26/21 5/25/21  Pamla Jimenez, APRN - CNP   guaiFENesin [facility] MAXIMUM STRENGTH) 1200 MG TB12 Take 1 tablet by mouth daily For a PM dose.  3/12/21   Emry File, MD   albuterol sulfate  (90 Base) MCG/ACT inhaler INHALE TWO PUFFS BY MOUTH EVERY 6 HOURS AS NEEDED FOR WHEEZING 3/8/21   Emry File, MD   ibuprofen (ADVIL;MOTRIN) 800 MG tablet TAKE ONE TABLET BY MOUTH EVERY 6 HOURS AS NEEDED FOR PAIN INDICATIONS 3/1/21   Emry File, MD   Cholecalciferol (VITAMIN D3) 250 MCG (15793 UT) CAPS TAKE ONE CAPSULE BY MOUTH DAILY  Patient not taking: Reported on 2/3/2021 1/15/21   Emry File, MD   vitamin D3 (CHOLECALCIFEROL) 25 MCG (1000 UT) TABS tablet Take 1 tablet by mouth daily  Patient not taking: Reported on 2/3/2021 1/15/21   Emry File, MD   ketotifen (ZADITOR) 0.025 % ophthalmic solution INSTILL TWO DROPS IN [facility] EYE TWO TIMES A DAY AS NEEDED FOR ALLERGY 12/10/20   Emry File, MD   rosuvastatin (CRESTOR) 5 MG tablet Take 1 tablet by mouth daily 11/19/20   Abhay Dumont MD   Insulin Pen Needle (PEN NEEDLES) 32G X 4 MM MISC USE WITH INSULIN PEN ONCE DAILY 11/10/20   Alma Delia Carney MD   TRUEplus Lancets 28G MISC USE ONE LANCET TO TEST THREE TO FOUR TIMES A DAY 11/10/20   Alma Delia Carney MD insulin glargine (LANTUS SOLOSTAR) 100 UNIT/ML injection pen Inject 34 Units into the skin daily 11/10/20   Francheska Rainey MD   ASPIRIN LOW DOSE 81 MG EC tablet TAKE ONE TABLET BY MOUTH DAILY 11/3/20   KRISHNA Mike - CNP   famotidine (PEPCID) 40 MG tablet Take 1 tablet by mouth 2 times daily  Patient taking differently: Take 40 mg by mouth 2 times daily Indications: taking 1-2 times/day  10/6/20   Tamie Guillermo MD   latanoprost (XALATAN) 0.005 % ophthalmic solution Place 1 drop into both eyes nightly    Historical Provider, MD   nystatin (MYCOSTATIN) 355428 UNIT/GM powder Apply 3 times daily. 8/20/20   Jack Jeter MD   nystatin (MYCOSTATIN) 507991 UNIT/GM cream Apply topically 2 times daily. 8/6/20   Jack Jeter MD   oxymetazoline (AFRIN) 0.05 % nasal spray 2 sprays each nostril, 3 times a day for 3 days, then 2 times a day for 2 days, then stop for 2 days and then repeat the cycle once. Patient taking differently: Indications: infrequently 2 sprays each nostril, 3 times a day for 3 days, then 2 times a day for 2 days, then stop for 2 days and then repeat the cycle once. 7/10/20   Annia Martinez MD   Cranberry 1000 MG CAPS Take by mouth Indications: OTC, takes two caps daily, does not know strength. 3 capsules daily    Historical Provider, MD   Multiple Vitamins-Minerals (THERAPEUTIC MULTIVITAMIN-MINERALS) tablet Take 1 tablet by mouth daily    Historical Provider, MD   cyclobenzaprine (FLEXERIL) 10 MG tablet Take 0.5 tablets by mouth daily as needed for Muscle spasms 2/20/20   Karina Martinez MD   blood glucose test strips (ASCENSIA AUTODISC VI;ONE TOUCH ULTRA TEST VI) strip 2 each by In Vitro route daily As needed.  1/31/20   Francheska Rainey MD   Blood Glucose Monitoring Suppl (TRUE METRIX METER) w/Device KIT As needed 1/31/20   Francheska Rainey MD   Cholecalciferol (VITAMIN D3) 25 MCG (1000 UT) TABS Take 1 tablet by mouth daily  Patient taking differently: Take 10,000 Units by mouth every 7 days  11/26/19   Radha Pedraza MD   blood glucose monitor strips Test blood sugar 2-3 times  a day 10/16/19   Liberty Marin MD   blood glucose test strips (ASCENSIA AUTODISC VI;ONE TOUCH ULTRA TEST VI) strip 1-2 time a day As needed. 10/1/19   Liberty Marin MD   nystatin (MYCOSTATIN) 433236 UNIT/GM cream APPLY EXTERNALLY TO THE AFFECTED AREAS TWO TIMES A DAY 6/6/19   Radha Pedraza MD   Incontinence Supply Disposable (TRE CLASSIC BRIEFS/LARGE) MISC 1 each by Does not apply route 3 times daily 3/4/19   Radha Pedraza MD   Incontinence Supplies MISC 1 Package by Does not apply route 2 times daily 3/4/19   Radha Pedraza MD   Disposable Gloves (VINYL GLOVES LARGE) MISC 1 Package by Does not apply route 4 times daily 3/4/19   Radha Pedraza MD   GAS-X EXTRA STRENGTH 125 MG chewable tablet CHEW TWO TABLETS BY MOUTH THREE TIMES A DAY WITH EACH MEAL AS NEEDED FOR FLATULENCE 12/13/18   Radha Pedraza MD       Allergies:  Lamictal [lamotrigine], Macrodantin [nitrofurantoin macrocrystal], Penicillins, Shellfish-derived products, Aspartame and phenylalanine, Bactrim, Biaxin [clarithromycin], Cephalexin, Ciprofloxacin, Hydrocodone-acetaminophen, Lansoprazole, Nexium [esomeprazole magnesium trihydrate], Other, Prilosec [omeprazole], Blueberry [vaccinium angustifolium], Monosodium glutamate, and Zmax [azithromycin dihydrate]    Social History:      The patient currently lives home    TOBACCO:   reports that she has never smoked. She has never used smokeless tobacco.  ETOH:   reports no history of alcohol use. E-Cigarettes/Vaping Use     Questions Responses    E-Cigarette/Vaping Use Never User    Start Date     Passive Exposure     Quit Date     Counseling Given     Comments             Family History:       Reviewed in detail and negative for DM, CAD, Cancer, CVA.  Positive as follows:        Problem Relation Age of Onset    Diabetes Mother     Heart Disease Mother    Uriel Moore Stroke Mother     Heart Disease Father     Heart Disease Brother     High Blood Pressure Brother     High Cholesterol Brother     Diabetes Brother     High Cholesterol Brother     Anesth Problems Neg Hx     Malig Hyperten Neg Hx     Hypotension Neg Hx     Malig Hypertherm Neg Hx     Pseudochol. Deficiency Neg Hx        REVIEW OF SYSTEMS:   Pertinent positives as noted in the HPI. All other systems reviewed and negative. PHYSICAL EXAM PERFORMED:    BP (!) 214/74   Pulse (!) 42   Temp 97.2 °F (36.2 °C) (Temporal)   Resp 14   Ht 5' 2\" (1.575 m)   Wt 194 lb 6.4 oz (88.2 kg)   LMP  (LMP Unknown)   SpO2 100%   BMI 35.56 kg/m²     General appearance:  No apparent distress, appears stated age and cooperative. HEENT:  Normal cephalic, atraumatic without obvious deformity. Pupils equal, round, and reactive to light. Extra ocular muscles intact. Conjunctivae/corneas clear. Neck: Supple, with full range of motion. No jugular venous distention. Trachea midline. Respiratory:  Normal respiratory effort. Clear to auscultation, bilaterally without Rales/Wheezes/Rhonchi. Cardiovascular:  Regular rate and rhythm with normal S1/S2 without murmurs, rubs or gallops. Abdomen: Soft, non-tender, non-distended with normal bowel sounds. Musculoskeletal:  No clubbing, cyanosis or edema bilaterally. Full range of motion without deformity. Skin: Skin color, texture, turgor normal.  No rashes or lesions. Neurologic:  Neurovascularly intact without any focal sensory/motor deficits.  Cranial nerves: II-XII intact, grossly non-focal.  Psychiatric:  Alert and oriented, thought content appropriate, normal insight  Capillary Refill: Brisk,< 3 seconds   Peripheral Pulses: +2 palpable, equal bilaterally       Labs:     Recent Labs     05/06/21  1715   WBC 10.3   HGB 14.7   HCT 45.1        Recent Labs     05/06/21  1715      K 3.9   CL 98*   CO2 27   BUN 17   CREATININE 0.9   CALCIUM 10.6     Recent Labs 05/06/21  1715   AST 36   ALT 39   BILITOT 0.7   ALKPHOS 20*     No results for input(s): INR in the last 72 hours. Recent Labs     05/06/21  1715   TROPONINI <0.01       Urinalysis:      Lab Results   Component Value Date    NITRU Negative 06/22/2020    WBCUA >100 06/22/2020    BACTERIA 2+ 06/22/2020    RBCUA None seen 06/22/2020    BLOODU Negative 06/22/2020    SPECGRAV >=1.030 06/22/2020    GLUCOSEU 100 06/22/2020       Radiology:     CXR: I have reviewed the CXR with the following interpretation: No acute cardiopulmonary process  EKG:  I have reviewed the EKG with the following interpretation: Sinus bradycardia, right bundle branch block, compared to EKG 2020-08-21 sinus bradycardia is new    XR CHEST PORTABLE   Final Result   No acute cardiopulmonary process.              ASSESSMENT:    Active Hospital Problems    Diagnosis Date Noted    Chest pain [R07.9] 01/23/2019         PLAN:    Chest pain  Concerning for ACS  Admit under observation  Telemetry  Serial troponins  Aspirin statin  Cardiology consultation    Bradycardia  Hemodynamically stable  Not currently on any kay blocking medications  Cardiology consulted     Hypertension  Accelerated  We'll order hydralazine IV as needed    Hyperlipidemia  States she is currently not on a statin since had issues with liver enzyme elevation in the past  We'll start Lipitor 40 mg at this point as there is concern for cardiac ischemia    Diabetes mellitus type 2  Only on basal Lantus nightly 34 units  We'll continue Lantus  We'll order medium sliding scale  Hypoglycemia protocol  POCT AC at bedtime    DVT Prophylaxis: Lovenox  Diet: No diet orders on file  Code Status: Prior    Electronically signed by Tisha Leach MD on 5/6/2021 at 6:37 PM

## 2021-05-06 NOTE — ED PROVIDER NOTES
Negative for palpitations. Gastrointestinal: Positive for abdominal distention. Negative for abdominal pain, constipation, diarrhea, nausea and vomiting. Endocrine: Negative. Genitourinary: Negative. Musculoskeletal: Negative for back pain, neck pain and neck stiffness. Skin: Negative for color change, pallor, rash and wound. Neurological: Positive for headaches. Negative for dizziness, tremors, seizures, syncope, facial asymmetry, speech difficulty, weakness, light-headedness and numbness. Psychiatric/Behavioral: Negative for confusion. All other systems reviewed and are negative. Positives and Pertinent negatives as per HPI. Except as noted above in the ROS, all other systems were reviewed and negative.        PAST MEDICAL HISTORY     Past Medical History:   Diagnosis Date    Ankle fracture, left     Ankle fracture, right     Arthritis     Asthma     Bipolar depression ([address]     CAN'T AFFORD MEDS    Bladder problem     Cervical disc herniation     COVID toes     Diabetes mellitus ([address]     diet control    Fatty liver disease, non-alcoholic     Fibromyalgia     Gastroparesis     Dr Tim Hawthorne GERD (gastroesophageal reflux disease)     gastroporesis    Glaucoma     Dr Luis Antonio Patel Hyperlipidemia     elevated LFT on meds    IBS (irritable bowel syndrome)     Lumbar herniated disc     Nausea & vomiting     Nephrolithiasis 1/29/2019    Obesity (BMI 30-39.9) 3/11/2019    Partial Achilles tendon tear     Pneumonia     PONV (postoperative nausea and vomiting)     RA (rheumatoid arthritis) ([address]     Rapid or irregular heartbeat     Sleep apnea     does not use cpap    Spinal stenosis     Stress incontinence     Thyroid disease     growths         SURGICAL HISTORY     Past Surgical History:   Procedure Laterality Date    CHOLECYSTECTOMY      COLONOSCOPY      COLONOSCOPY  03/06/2018    with polypectomies    DILATION AND CURETTAGE OF UTERUS      ENDOMETRIAL ABLATION      ENDOSCOPY, COLON, DIAGNOSTIC      ERCP  5/25/2010    with stent    ERCP  1-31-14    ERCP WITH SPHINTER OF ODDI MANOMETERY    ESOPHAGOSCOPY  10/5/10    botox injection    EYE SURGERY      LASER FOR GLAUCOMA    LAPAROSCOPY      TONSILLECTOMY      UPPER GASTROINTESTINAL ENDOSCOPY      UPPER GASTROINTESTINAL ENDOSCOPY  04/12/2011    DILATATION, 58 FR ARTHUR, 100 UNITS BOTOX    UPPER GASTROINTESTINAL ENDOSCOPY  08/30/2011    58 FR DILATATION, 100 UNITS  BOTOX INJECTION    UPPER GASTROINTESTINAL ENDOSCOPY  3-9-12    with dilatation and submucosal injection: Botox    UPPER GASTROINTESTINAL ENDOSCOPY      UPPER GASTROINTESTINAL ENDOSCOPY  11/20/12     Esophagogastroduodenoscopy with Botulinum toxin injection of the pylorus and Arthur esophageal dilation    UPPER GASTROINTESTINAL ENDOSCOPY  6/4/13    WITH DIILITATION AND BOTOX INJECTION    UPPER GASTROINTESTINAL ENDOSCOPY  5/20/2014    100 unit Botox injection, 56 Fr.  Arthur esophageal dilatation    UPPER GASTROINTESTINAL ENDOSCOPY  12/12/14    with esophageal dilatation, and botox injection    UPPER GASTROINTESTINAL ENDOSCOPY  09/09/2015    With Dilitation and Botox injection    UPPER GASTROINTESTINAL ENDOSCOPY  5/10/2016    distal esophagus biopsy, stomach biopsy, botox injection    UPPER GASTROINTESTINAL ENDOSCOPY  04/11/2017    with dilatation and botox injection    UPPER GASTROINTESTINAL ENDOSCOPY  10/24/2017    DILATATION, BIOPSY, BOTOX    UPPER GASTROINTESTINAL ENDOSCOPY  03/06/2018    WITH BOTOX AND BALLOON DILATATION    UPPER GASTROINTESTINAL ENDOSCOPY N/A 5/7/2019    EGD SUBMUCOSAL/BOTOX INJECTION performed by Poppy Morales MD at [facility] N/A 5/7/2019    EGD DILATION BALLOON performed by Poppy Morales MD at [facility] N/A 3/13/2020    EGD SUBMUCOSAL/BOTOX INJECTION performed by Poppy Morales MD at [facility] [employer] GASTROINTESTINAL ENDOSCOPY N/A 3/13/2020    EGD DILATION BALLOON performed by Nusrat Tovar MD at [address] 10/6/2020    EGD DILATION BALLOON performed by Nusrat Tovar MD at [address] 10/6/2020    EGD SUBMUCOSAL/BOTOX INJECTION performed by Nusrat Tovar MD at [phone]       Previous Medications    ALBUTEROL SULFATE  (90 BASE) MCG/ACT INHALER    INHALE TWO PUFFS BY MOUTH EVERY 6 HOURS AS NEEDED FOR WHEEZING    ASPIRIN LOW DOSE 81 MG EC TABLET    TAKE ONE TABLET BY MOUTH DAILY    BLOOD GLUCOSE MONITOR STRIPS    Test blood sugar 2-3 times  a day    BLOOD GLUCOSE MONITORING SUPPL (TRUE METRIX METER) W/DEVICE KIT    As needed    BLOOD GLUCOSE TEST STRIPS (ASCENSIA AUTODISC VI;ONE TOUCH ULTRA TEST VI) STRIP    1-2 time a day As needed. BLOOD GLUCOSE TEST STRIPS (ASCENSIA AUTODISC VI;ONE TOUCH ULTRA TEST VI) STRIP    2 each by In Vitro route daily As needed. CHOLECALCIFEROL (VITAMIN D3) 25 MCG (1000 UT) TABS    Take 1 tablet by mouth daily    CHOLECALCIFEROL (VITAMIN D3) 250 MCG (56478 UT) CAPS    TAKE ONE CAPSULE BY MOUTH DAILY    CRANBERRY 1000 MG CAPS    Take by mouth Indications: OTC, takes two caps daily, does not know strength. 3 capsules daily    CYCLOBENZAPRINE (FLEXERIL) 10 MG TABLET    Take 0.5 tablets by mouth daily as needed for Muscle spasms    DIAZEPAM (VALIUM) 2 MG TABLET    Take 1 tablet by mouth 2 times daily as needed for Anxiety for up to 60 days.     DISPOSABLE GLOVES (VINYL GLOVES LARGE) MISC    1 Package by Does not apply route 4 times daily    FAMOTIDINE (PEPCID) 40 MG TABLET    Take 1 tablet by mouth 2 times daily    GAS-X EXTRA STRENGTH 125 MG CHEWABLE TABLET    CHEW TWO TABLETS BY MOUTH THREE TIMES A DAY WITH EACH MEAL AS NEEDED FOR FLATULENCE    GUAIFENESIN (MUCINEX MAXIMUM STRENGTH) 1200 MG TB12    Take 1 tablet by mouth daily For a PM dose.    IBUPROFEN (ADVIL;MOTRIN) 800 MG TABLET    TAKE ONE TABLET BY MOUTH EVERY 6 HOURS AS NEEDED FOR PAIN INDICATIONS    INCONTINENCE SUPPLIES MISC    1 Package by Does not apply route 2 times daily    INCONTINENCE SUPPLY DISPOSABLE (TRE CLASSIC BRIEFS/LARGE) MISC    1 each by Does not apply route 3 times daily    INSULIN GLARGINE (LANTUS SOLOSTAR) 100 UNIT/ML INJECTION PEN    Inject 34 Units into the skin daily    INSULIN PEN NEEDLE (PEN NEEDLES) 32G X 4 MM MISC    USE WITH INSULIN PEN ONCE DAILY    KETOTIFEN (ZADITOR) 0.025 % OPHTHALMIC SOLUTION    INSTILL TWO DROPS IN [facility] EYE TWO TIMES A DAY AS NEEDED FOR ALLERGY    LATANOPROST (XALATAN) 0.005 % OPHTHALMIC SOLUTION    Place 1 drop into both eyes nightly    LORATADINE (CLARITIN) 10 MG TABLET    TAKE ONE TABLET BY MOUTH DAILY    MULTIPLE VITAMINS-MINERALS (THERAPEUTIC MULTIVITAMIN-MINERALS) TABLET    Take 1 tablet by mouth daily    NYSTATIN (MYCOSTATIN) 153391 UNIT/GM CREAM    APPLY EXTERNALLY TO THE AFFECTED AREAS TWO TIMES A DAY    NYSTATIN (MYCOSTATIN) 583924 UNIT/GM CREAM    Apply topically 2 times daily. NYSTATIN (MYCOSTATIN) 596698 UNIT/GM POWDER    Apply 3 times daily. OXYMETAZOLINE (AFRIN) 0.05 % NASAL SPRAY    2 sprays each nostril, 3 times a day for 3 days, then 2 times a day for 2 days, then stop for 2 days and then repeat the cycle once.     ROSUVASTATIN (CRESTOR) 5 MG TABLET    Take 1 tablet by mouth daily    TRUEPLUS LANCETS 28G MISC    USE ONE LANCET TO TEST THREE TO FOUR TIMES A DAY    VITAMIN D3 (CHOLECALCIFEROL) 25 MCG (1000 UT) TABS TABLET    Take 1 tablet by mouth daily         ALLERGIES     Lamictal [lamotrigine], Macrodantin [nitrofurantoin macrocrystal], Penicillins, Shellfish-derived products, Aspartame and phenylalanine, Bactrim, Biaxin [clarithromycin], Cephalexin, Ciprofloxacin, Hydrocodone-acetaminophen, Lansoprazole, Nexium [esomeprazole magnesium trihydrate], Other, Prilosec [omeprazole], Blueberry [vaccinium angustifolium], Monosodium glutamate, and Zmax [azithromycin dihydrate]    FAMILYHISTORY       Family History   Problem Relation Age of Onset    Diabetes Mother     Heart Disease Mother     Stroke Mother     Heart Disease Father     Heart Disease Brother     High Blood Pressure Brother     High Cholesterol Brother     Diabetes Brother     High Cholesterol Brother     Anesth Problems Neg Hx     Malig Hyperten Neg Hx     Hypotension Neg Hx     Malig Hypertherm Neg Hx     Pseudochol. Deficiency Neg Hx           SOCIAL HISTORY       Social History     Tobacco Use    Smoking status: Never Smoker    Smokeless tobacco: Never Used   Substance Use Topics    Alcohol use: No     Alcohol/week: 0.0 standard drinks    Drug use: No       SCREENINGS             PHYSICAL EXAM    (up to 7 for level 4, 8 or more for level 5)     ED Triage Vitals   BP Temp Temp src Pulse Resp SpO2 Height Weight   -- -- -- -- -- -- -- --       Physical Exam  Vitals signs and nursing note reviewed. Constitutional:       Appearance: Normal appearance. She is well-developed. She is not toxic-appearing or diaphoretic. HENT:      Head: Normocephalic and atraumatic. Right Ear: External ear normal.      Left Ear: External ear normal.      Nose: Nose normal.      Mouth/Throat:      Mouth: Mucous membranes are moist.      Pharynx: Oropharynx is clear. Eyes:      General: No scleral icterus. Right eye: No discharge. Left eye: No discharge. Extraocular Movements: Extraocular movements intact. Conjunctiva/sclera: Conjunctivae normal.      Pupils: Pupils are equal, round, and reactive to light. Neck:      Musculoskeletal: Normal range of motion. Cardiovascular:      Rate and Rhythm: Bradycardia present. Pulmonary:      Effort: Pulmonary effort is normal.      Breath sounds: Normal breath sounds. Abdominal:      General: Bowel sounds are normal.      Palpations: Abdomen is soft. Tenderness:  There is no abdominal tenderness. There is no right CVA tenderness or left CVA tenderness. Comments: Large lower abdominal pannus without acute skin changes, fluctuance, crepitus, induration. Musculoskeletal: Normal range of motion. Comments: Trace symmetrical pretibial edema. Skin:     General: Skin is warm and dry. Capillary Refill: Capillary refill takes less than 2 seconds. Coloration: Skin is not jaundiced or pale. Findings: No bruising, erythema or lesion. Neurological:      General: No focal deficit present. Mental Status: She is alert and oriented to person, place, and time.    Psychiatric:         Mood and Affect: Mood normal.         Behavior: Behavior normal.         DIAGNOSTIC RESULTS   LABS:    Labs Reviewed   COMPREHENSIVE METABOLIC PANEL - Abnormal; Notable for the following components:       Result Value    Chloride 98 (*)     Glucose 181 (*)     Alkaline Phosphatase 20 (*)     All other components within normal limits    Narrative:     Performed at:  [facility] [address],  [place]   Phone [phone]   BRAIN NATRIURETIC PEPTIDE - Abnormal; Notable for the following components:    Pro- (*)     All other components within normal limits    Narrative:     Performed at:  [facility] [address], [place], [place] [employer]   Phone [phone]   CBC WITH AUTO DIFFERENTIAL    Narrative:     Performed at:  [facility] [address],  [place], [place] [employer]   Phone [phone]   TROPONIN    Narrative:     Performed at:  [facility] [address],  [place]   Phone [phone]   D-DIMER, QUANTITATIVE    Narrative:     Performed at:  [facility] [address],  [place]   Phone [phone]       All other labs were within normal range or not returned as of this dictation. EKG: All EKG's are interpreted by the Emergency Department Physician, Dr Clarence Schofield in the absence of a cardiologist.  Please see their note for interpretation of EKG. RADIOLOGY:   Non-plain film images such as CT, Ultrasound and MRI are read by the radiologist. Plain radiographic images are visualized and preliminarily interpreted by the ED Provider with the below findings:        Interpretation per the Radiologist below, if available at the time of this note:    XR CHEST PORTABLE   Preliminary Result   No acute cardiopulmonary process. PROCEDURES   Unless otherwise noted below, none     Procedures    CRITICAL CARE TIME   N/A    CONSULTS:  IP CONSULT TO HOSPITALIST      EMERGENCY DEPARTMENT COURSE and DIFFERENTIAL DIAGNOSIS/MDM:   Vitals:    Vitals:    05/06/21 1659 05/06/21 1702   BP:  (!) 197/49   Pulse: (!) 44    Resp: 16    Temp: 97.2 °F (36.2 °C)    TempSrc: Temporal    SpO2: 98%    Weight: 194 lb 6.4 oz (88.2 kg)    Height: 5' 2\" (1.575 m)        Patient was given the following medications:  Medications   nitroglycerin (NITRO-BID) 2 % ointment 1 inch (has no administration in time range)   nitroGLYCERIN (NITROSTAT) SL tablet 0.4 mg (has no administration in time range)   aspirin chewable tablet 243 mg (243 mg Oral Given 5/6/21 1743)           This patient presents to the emergency department complaining of chest discomfort and shortness of breath. Aspirin and nitroglycerin ordered. EKG morphology appears similar to prior EKG. Does have extensive coronary disease history and therefore I do feel admission is warranted for further testing and evaluation. PCR Covid test is pending. D-dimer is negative. Troponin is negative. Patient understands and agrees with plan for admission.     My suspicion is low for STEMI, PE, myocarditis, pericarditis, endocarditis, acute pulmonary edema, pleural effusion, pericardial effusion, cardiac tamponade, CHF exacerbation, thoracic aortic dissection, esophageal rupture, other life-threatening arrhythmia, hypertensive urgency or emergency, hemothorax, pulmonary contusion, subcutaneous emphysema, flail chest, pneumo mediastinum, rib fracture, pneumonia, pneumothorax or other concerning pathology. FINAL IMPRESSION      1. Acute chest pain          DISPOSITION/PLAN   DISPOSITION Decision To Admit 05/06/2021 06:05:25 PM      PATIENT REFERREDTO:  No follow-up provider specified.     DISCHARGE MEDICATIONS:  New Prescriptions    No medications on file       DISCONTINUED MEDICATIONS:  Discontinued Medications    No medications on file              (Please note that portions of this note were completed with a voice recognition program.  Efforts were made to edit the dictations but occasionally words are mis-transcribed.)    Marlena Oswald PA-C (electronically signed)            Marlena Oswald PA-C  05/06/21 4051

## 2021-05-06 NOTE — ED NOTES
Bed: 28  Expected date:   Expected time:   Means of arrival:   Comments:  Anamika Orellana RN  05/06/21 4472

## 2021-05-06 NOTE — ED PROVIDER NOTES
The Ekg interpreted by me in the absence of a cardiologist shows. sinus bradycardia, rate=42  Axis is   Normal  QTc is  normal  Intervals and Durations are unremarkable. There are lateral and inferior T wave inversions noted. This is not significantly changed from prior 8-. I only performed EKG interpretation and was not otherwise involved in the care of this patient.            Lashon Mcclure MD  05/06/21 5652

## 2021-05-07 LAB
ANION GAP SERPL CALCULATED.3IONS-SCNC: 11 MMOL/L (ref 3–16)
BUN BLDV-MCNC: 17 MG/DL (ref 7–20)
CALCIUM SERPL-MCNC: 10.1 MG/DL (ref 8.3–10.6)
CHLORIDE BLD-SCNC: 104 MMOL/L (ref 99–110)
CHOLESTEROL, TOTAL: 174 MG/DL (ref 0–199)
CO2: 27 MMOL/L (ref 21–32)
CREAT SERPL-MCNC: 0.8 MG/DL (ref 0.6–1.1)
EKG ATRIAL RATE: 42 BPM
EKG ATRIAL RATE: 78 BPM
EKG DIAGNOSIS: NORMAL
EKG DIAGNOSIS: NORMAL
EKG P AXIS: 55 DEGREES
EKG P AXIS: 57 DEGREES
EKG P-R INTERVAL: 182 MS
EKG P-R INTERVAL: 186 MS
EKG Q-T INTERVAL: 498 MS
EKG Q-T INTERVAL: 550 MS
EKG QRS DURATION: 138 MS
EKG QRS DURATION: 140 MS
EKG QTC CALCULATION (BAZETT): 415 MS
EKG QTC CALCULATION (BAZETT): 442 MS
EKG R AXIS: -7 DEGREES
EKG R AXIS: 65 DEGREES
EKG T AXIS: -43 DEGREES
EKG T AXIS: -68 DEGREES
EKG VENTRICULAR RATE: 39 BPM
EKG VENTRICULAR RATE: 42 BPM
ESTIMATED AVERAGE GLUCOSE: 159.9 MG/DL
GFR AFRICAN AMERICAN: >60
GFR NON-AFRICAN AMERICAN: >60
GLUCOSE BLD-MCNC: 100 MG/DL (ref 70–99)
GLUCOSE BLD-MCNC: 103 MG/DL (ref 70–99)
GLUCOSE BLD-MCNC: 119 MG/DL (ref 70–99)
GLUCOSE BLD-MCNC: 125 MG/DL (ref 70–99)
GLUCOSE BLD-MCNC: 126 MG/DL (ref 70–99)
GLUCOSE BLD-MCNC: 188 MG/DL (ref 70–99)
HBA1C MFR BLD: 7.2 %
HCT VFR BLD CALC: 40 % (ref 36–48)
HDLC SERPL-MCNC: 33 MG/DL (ref 40–60)
HEMOGLOBIN: 13.1 G/DL (ref 12–16)
LDL CHOLESTEROL CALCULATED: 119 MG/DL
MAGNESIUM: 2 MG/DL (ref 1.8–2.4)
MCH RBC QN AUTO: 28.2 PG (ref 26–34)
MCHC RBC AUTO-ENTMCNC: 32.8 G/DL (ref 31–36)
MCV RBC AUTO: 85.9 FL (ref 80–100)
NEUTROPHILS RELATIVE PERCENT: 51.6 %
PDW BLD-RTO: 14.9 % (ref 12.4–15.4)
PERFORMED ON: ABNORMAL
PHOSPHORUS: 5.1 MG/DL (ref 2.5–4.9)
PLATELET # BLD: 206 K/UL (ref 135–450)
PMV BLD AUTO: 9.5 FL (ref 5–10.5)
POTASSIUM SERPL-SCNC: 4.1 MMOL/L (ref 3.5–5.1)
RBC # BLD: 4.66 M/UL (ref 4–5.2)
SARS-COV-2, PCR: NOT DETECTED
SODIUM BLD-SCNC: 142 MMOL/L (ref 136–145)
TRIGL SERPL-MCNC: 112 MG/DL (ref 0–150)
TROPONIN: <0.01 NG/ML
VLDLC SERPL CALC-MCNC: 22 MG/DL
WBC # BLD: 9.6 K/UL (ref 4–11)

## 2021-05-07 PROCEDURE — 84484 ASSAY OF TROPONIN QUANT: CPT

## 2021-05-07 PROCEDURE — G0378 HOSPITAL OBSERVATION PER HR: HCPCS

## 2021-05-07 PROCEDURE — 80061 LIPID PANEL: CPT

## 2021-05-07 PROCEDURE — 99245 OFF/OP CONSLTJ NEW/EST HI 55: CPT | Performed by: INTERNAL MEDICINE

## 2021-05-07 PROCEDURE — 93010 ELECTROCARDIOGRAM REPORT: CPT | Performed by: INTERNAL MEDICINE

## 2021-05-07 PROCEDURE — 6370000000 HC RX 637 (ALT 250 FOR IP): Performed by: INTERNAL MEDICINE

## 2021-05-07 PROCEDURE — 85025 COMPLETE CBC W/AUTO DIFF WBC: CPT

## 2021-05-07 PROCEDURE — 2580000003 HC RX 258: Performed by: INTERNAL MEDICINE

## 2021-05-07 PROCEDURE — 6370000000 HC RX 637 (ALT 250 FOR IP): Performed by: PHYSICIAN ASSISTANT

## 2021-05-07 PROCEDURE — 93005 ELECTROCARDIOGRAM TRACING: CPT | Performed by: INTERNAL MEDICINE

## 2021-05-07 PROCEDURE — 36415 COLL VENOUS BLD VENIPUNCTURE: CPT

## 2021-05-07 PROCEDURE — 83735 ASSAY OF MAGNESIUM: CPT

## 2021-05-07 PROCEDURE — 80048 BASIC METABOLIC PNL TOTAL CA: CPT

## 2021-05-07 PROCEDURE — 84100 ASSAY OF PHOSPHORUS: CPT

## 2021-05-07 RX ORDER — AMLODIPINE BESYLATE 5 MG/1
5 TABLET ORAL DAILY
Status: DISCONTINUED | OUTPATIENT
Start: 2021-05-07 | End: 2021-05-09

## 2021-05-07 RX ORDER — HYDRALAZINE HYDROCHLORIDE 25 MG/1
50 TABLET, FILM COATED ORAL EVERY 8 HOURS SCHEDULED
Status: DISCONTINUED | OUTPATIENT
Start: 2021-05-07 | End: 2021-05-11 | Stop reason: HOSPADM

## 2021-05-07 RX ORDER — HYDRALAZINE HYDROCHLORIDE 25 MG/1
25 TABLET, FILM COATED ORAL EVERY 8 HOURS SCHEDULED
Status: DISCONTINUED | OUTPATIENT
Start: 2021-05-07 | End: 2021-05-07

## 2021-05-07 RX ORDER — SIMETHICONE 80 MG
80 TABLET,CHEWABLE ORAL EVERY 6 HOURS PRN
Status: DISCONTINUED | OUTPATIENT
Start: 2021-05-07 | End: 2021-05-11 | Stop reason: HOSPADM

## 2021-05-07 RX ADMIN — SIMETHICONE 80 MG: 80 TABLET, CHEWABLE ORAL at 23:07

## 2021-05-07 RX ADMIN — Medication 10 ML: at 09:30

## 2021-05-07 RX ADMIN — Medication 10 ML: at 21:38

## 2021-05-07 RX ADMIN — KETOTIFEN FUMARATE 2 DROP: 0.35 SOLUTION/ DROPS OPHTHALMIC at 21:39

## 2021-05-07 RX ADMIN — FAMOTIDINE 20 MG: 20 TABLET ORAL at 14:10

## 2021-05-07 RX ADMIN — HYDRALAZINE HYDROCHLORIDE 25 MG: 25 TABLET, FILM COATED ORAL at 21:37

## 2021-05-07 RX ADMIN — ACETAMINOPHEN 650 MG: 325 TABLET ORAL at 13:40

## 2021-05-07 RX ADMIN — FAMOTIDINE 20 MG: 20 TABLET ORAL at 21:38

## 2021-05-07 RX ADMIN — KETOTIFEN FUMARATE 2 DROP: 0.35 SOLUTION/ DROPS OPHTHALMIC at 09:30

## 2021-05-07 RX ADMIN — INSULIN GLARGINE 30 UNITS: 100 INJECTION, SOLUTION SUBCUTANEOUS at 21:48

## 2021-05-07 RX ADMIN — ASPIRIN 81 MG: 81 TABLET, CHEWABLE ORAL at 14:10

## 2021-05-07 RX ADMIN — LATANOPROST 1 DROP: 50 SOLUTION OPHTHALMIC at 21:39

## 2021-05-07 ASSESSMENT — PAIN DESCRIPTION - LOCATION: LOCATION: NECK;HEAD

## 2021-05-07 ASSESSMENT — PAIN DESCRIPTION - PAIN TYPE: TYPE: CHRONIC PAIN

## 2021-05-07 ASSESSMENT — PAIN DESCRIPTION - DESCRIPTORS: DESCRIPTORS: HEADACHE

## 2021-05-07 ASSESSMENT — PAIN SCALES - GENERAL
PAINLEVEL_OUTOF10: 7
PAINLEVEL_OUTOF10: 5
PAINLEVEL_OUTOF10: 7
PAINLEVEL_OUTOF10: 7

## 2021-05-07 NOTE — CONSULTS
[facility]   Electrophysiology Consultation   Date: 5/7/2021  Reason for Consultation: Rubacardia   Consult Requesting Physician: Fernandez Hernandez MD   Chief Complaint   Patient presents with    Chest Pain     pt c/o complications form covid for past 2 months, today she is coming in with chest pain that is getting worse. radiating to left arm and between shoulder blades, elevated bp and heart rate       CC: Chest pain, lightheadedness and dizziness  HPI: Gogo Nettles is a 61 y.o. female who has presented to the hospital complaining of chest pressure, lightheadedness and dizziness. She also noted that her heart rate is a slow. She states that for the last week or so she noted fatigue. She was working at home and felt short of breath and chest pressure. She also noted her heart rates was a slow at 40 bpm.  Came to the emergency room and found to have bradycardia with 2-1 AV block. She has been admitted and her COVID-19 test is pending. She states that about 2-month ago she started having symptoms with chest pressure or shortness of breath and thought that she had Covid. She did not seek medical care. Patient has past medical history significant for diabetes, obesity, obstructive sleep apnea, and hyperlipidemia. Reports no history of smoking. She states that her family member had heart disease with congestive heart failure. She has had cardiac catheterization in March 2019 with mild nonobstructive coronary artery disease. Assessment and plan:     -Symptomatic bradycardia      Presented with chest pressure, dizziness, lightheadedness, shortness of breath and found to have right bundle branch block and 2-1 AV block. Electrolytes are normal   Troponin is negative   proBNP is 193     She has history of mild nonobstructive coronary artery disease in the past.   She is not on beta-blocker or calcium channel blockers.      Treatment options including implantation of pacemaker discussed with the patient. The risks, benefits and alternatives of the procedure were discussed with the patient. The risks including, but not limited to, the risks of bleeding, infection, pain, device malfunction, lead dislodgement, radiation exposure, injury to cardiac and surrounding structures (including pneumothorax), stroke, cardiac perforation, tamponade, need for emergent heart surgery, myocardial infarction and death were discussed in detail. Dual vs single chamber device, including risks and benefits were discussed with patient. Opted to proceed with the device implantation. She is now has stable blood pressure and reports no symptoms. She is admitted for ruling out COVID-19 infection. Will plan for pacemaker implantation after she has been ruled out for Covid infection.     - Mild non-obstructive CAD:    On statin. Under the care of Dr. Alexia Lara. - HLD on statin.     - SHIRLEY: Compliance with CPAP discussed. Discussed with nursing staff. Active Hospital Problems    Diagnosis Date Noted    Chest pain [R07.9] 2019       Diagnostic studies:     Sinus rhythm, right bundle branch block, 221 AV block  Chest x-ray: Normal  Troponin negative  I independently reviewed the cardiac diagnostic studies, ECG and relevant imaging studies. Lab Results   Component Value Date    LVEF 55 2019    LVEFMODE Cardiac Cath 2019     Lab Results   Component Value Date    TSH 1.40 2020       Physical Examination:  Vitals:    21 1230   BP: (!) 176/45   Pulse: (!) 39   Resp: 13   Temp: 97.1 °F (36.2 °C)   SpO2: 100%      No intake/output data recorded.    Wt Readings from Last 3 Encounters:   21 194 lb 12.8 oz (88.4 kg)   20 200 lb 1.6 oz (90.8 kg)   20 201 lb (91.2 kg)     Temp  Av.3 °F (36.3 °C)  Min: 96.9 °F (36.1 °C)  Max: 98.1 °F (36.7 °C)  Pulse  Av.9  Min: 39  Max: 80  BP  Min: 133/34  Max: 214/74  SpO2  Av.4 %  Min: 96 %  Max: 100 %  No intake or output data in the 24 hours ending 05/07/21 1259      I independently reviewed all cardiac tracing from cardiac telemetry. · Constitutional: Oriented. No distress. · Head: Normocephalic and atraumatic. · Mouth/Throat: Oropharynx is clear and moist.   · Eyes: Conjunctivae normal. EOM are normal.   · Neck: Neck supple. No JVD present. · Cardiovascular: Bradycardic rate, regular rhythm, S1&S2. · Pulmonary/Chest: Bilateral respiratory sounds. No rhonchi. · Abdominal: Soft. No tenderness. · Musculoskeletal: No tenderness. No edema    · Lymphadenopathy: Has no cervical adenopathy. · Neurological: Alert and oriented. Follows command, No Gross deficit   · Skin: Skin is warm, No rash noted. · Psychiatric: Has a normal behavior       Scheduled Meds:   hydrALAZINE  25 mg Oral 3 times per day    famotidine  20 mg Oral BID    insulin glargine  34 Units Subcutaneous Daily    insulin lispro  0-12 Units Subcutaneous TID WC    insulin lispro  0-6 Units Subcutaneous Nightly    ketotifen  2 drop Both Eyes BID    latanoprost  1 drop Both Eyes Nightly    sodium chloride flush  5-40 mL Intravenous 2 times per day    aspirin  81 mg Oral Daily    atorvastatin  40 mg Oral Nightly    enoxaparin  40 mg Subcutaneous Daily     Continuous Infusions:   dextrose      sodium chloride       PRN Meds:.nitroGLYCERIN, albuterol sulfate HFA, diazePAM, glucose, dextrose, glucagon (rDNA), dextrose, sodium chloride flush, sodium chloride, promethazine **OR** ondansetron, polyethylene glycol, perflutren lipid microspheres, potassium chloride **OR** potassium alternative oral replacement **OR** potassium chloride, magnesium sulfate, hydrALAZINE, acetaminophen     Review of System:  [x] Full ROS obtained and negative except as mentioned in HPI    Prior to Admission medications    Medication Sig Start Date End Date Taking?  Authorizing Provider   loratadine (CLARITIN) 10 MG tablet TAKE ONE TABLET BY MOUTH DAILY 5/3/21  Yes Jessica Tee nostril, 3 times a day for 3 days, then 2 times a day for 2 days, then stop for 2 days and then repeat the cycle once. Patient taking differently: Indications: infrequently 2 sprays each nostril, 3 times a day for 3 days, then 2 times a day for 2 days, then stop for 2 days and then repeat the cycle once. 7/10/20  Yes Briana Bobby MD   Cranberry 1000 MG CAPS Take by mouth Indications: OTC, takes two caps daily, does not know strength. 3 capsules daily   Yes Historical Provider, MD   Multiple Vitamins-Minerals (THERAPEUTIC MULTIVITAMIN-MINERALS) tablet Take 1 tablet by mouth daily   Yes Historical Provider, MD   blood glucose test strips (ASCENSIA AUTODISC VI;ONE TOUCH ULTRA TEST VI) strip 2 each by In Vitro route daily As needed. 1/31/20  Yes Antonio Pemberton MD   Blood Glucose Monitoring Suppl (TRUE METRIX METER) w/Device KIT As needed 1/31/20  Yes Antonio Pemberton MD   blood glucose monitor strips Test blood sugar 2-3 times  a day 10/16/19  Yes Antonio Pemberton MD   blood glucose test strips (ASCENSIA AUTODISC VI;ONE TOUCH ULTRA TEST VI) strip 1-2 time a day As needed.  10/1/19  Yes Antonio Pemberton MD   nystatin (MYCOSTATIN) 303598 UNIT/GM cream APPLY EXTERNALLY TO THE AFFECTED AREAS TWO TIMES A DAY 6/6/19  Yes Renay Damian MD   Disposable Gloves (VINYL GLOVES LARGE) MISC 1 Package by Does not apply route 4 times daily 3/4/19  Yes Renay Damian MD   vitamin D3 (CHOLECALCIFEROL) 25 MCG (1000 UT) TABS tablet Take 1 tablet by mouth daily  Patient not taking: Reported on 2/3/2021 1/15/21   Germán Lira MD   rosuvastatin (CRESTOR) 5 MG tablet Take 1 tablet by mouth daily 11/19/20   Eloisa Billingsley MD   cyclobenzaprine (FLEXERIL) 10 MG tablet Take 0.5 tablets by mouth daily as needed for Muscle spasms 2/20/20   Renay Damian MD   Cholecalciferol (VITAMIN D3) 25 MCG (1000 UT) TABS Take 1 tablet by mouth daily  Patient taking differently: Take 10,000 Units by mouth every 7 days 11/26/19   Kasie Miller MD   Incontinence Supply Disposable (TRE CLASSIC BRIEFS/LARGE) MISC 1 each by Does not apply route 3 times daily 3/4/19   Kasie Miller MD   Incontinence Supplies MISC 1 Package by Does not apply route 2 times daily 3/4/19   Kasie Miller MD   GAS-X EXTRA STRENGTH 125 MG chewable tablet CHEW TWO TABLETS BY MOUTH THREE TIMES A DAY WITH EACH MEAL AS NEEDED FOR FLATULENCE 12/13/18   Kasie Miller MD       Past Medical History:   Diagnosis Date    Ankle fracture, left     Ankle fracture, right     Arthritis     Asthma     Bipolar depression ([address]     CAN'T AFFORD MEDS    Bladder problem     Cervical disc herniation     COVID toes     Diabetes mellitus ([address]     diet control    Fatty liver disease, non-alcoholic     Fibromyalgia     Gastroparesis     Dr Rj Feliz GERD (gastroesophageal reflux disease)     gastroporesis    Glaucoma     Dr Griffin Menezes Hyperlipidemia     elevated LFT on meds    IBS (irritable bowel syndrome)     Lumbar herniated disc     Nausea & vomiting     Nephrolithiasis 1/29/2019    Obesity (BMI 30-39.9) 3/11/2019    Partial Achilles tendon tear     Pneumonia     PONV (postoperative nausea and vomiting)     RA (rheumatoid arthritis) ([address]     Rapid or irregular heartbeat     Sleep apnea     does not use cpap    Spinal stenosis     Stress incontinence     Thyroid disease     growths        Past Surgical History:   Procedure Laterality Date    CHOLECYSTECTOMY      COLONOSCOPY      COLONOSCOPY  03/06/2018    with polypectomies    DILATION AND CURETTAGE OF UTERUS      ENDOMETRIAL ABLATION      ENDOSCOPY, COLON, DIAGNOSTIC      ERCP  5/25/2010    with stent    ERCP  1-31-14    ERCP WITH SPHINTER OF ODDI MANOMETERY    ESOPHAGOSCOPY  10/5/10    botox injection    EYE SURGERY      LASER FOR GLAUCOMA    LAPAROSCOPY      TONSILLECTOMY      UPPER GASTROINTESTINAL ENDOSCOPY      UPPER GASTROINTESTINAL ENDOSCOPY  04/12/2011 DILATATION, 58 FR ARTHUR, 100 UNITS BOTOX    UPPER GASTROINTESTINAL ENDOSCOPY  08/30/2011    58 FR DILATATION, 100 UNITS  BOTOX INJECTION    UPPER GASTROINTESTINAL ENDOSCOPY  3-9-12    with dilatation and submucosal injection: Botox    UPPER GASTROINTESTINAL ENDOSCOPY      UPPER GASTROINTESTINAL ENDOSCOPY  11/20/12     Esophagogastroduodenoscopy with Botulinum toxin injection of the pylorus and Arthur esophageal dilation    UPPER GASTROINTESTINAL ENDOSCOPY  6/4/13    WITH DIILITATION AND BOTOX INJECTION    UPPER GASTROINTESTINAL ENDOSCOPY  5/20/2014    100 unit Botox injection, 56 Fr.  Arthur esophageal dilatation    UPPER GASTROINTESTINAL ENDOSCOPY  12/12/14    with esophageal dilatation, and botox injection    UPPER GASTROINTESTINAL ENDOSCOPY  09/09/2015    With Dilitation and Botox injection    UPPER GASTROINTESTINAL ENDOSCOPY  5/10/2016    distal esophagus biopsy, stomach biopsy, botox injection    UPPER GASTROINTESTINAL ENDOSCOPY  04/11/2017    with dilatation and botox injection    UPPER GASTROINTESTINAL ENDOSCOPY  10/24/2017    DILATATION, BIOPSY, BOTOX    UPPER GASTROINTESTINAL ENDOSCOPY  03/06/2018    WITH BOTOX AND BALLOON DILATATION    UPPER GASTROINTESTINAL ENDOSCOPY N/A 5/7/2019    EGD SUBMUCOSAL/BOTOX INJECTION performed by Gautam Lozano MD at [address] N/A 5/7/2019    EGD DILATION BALLOON performed by Gautam Lozano MD at [address] N/A 3/13/2020    EGD SUBMUCOSAL/BOTOX INJECTION performed by Gautam Lozano MD at [address] N/A 3/13/2020    EGD DILATION BALLOON performed by Gautam Lozano MD at [address] N/A 10/6/2020    EGD DILATION BALLOON performed by Gautam Lozano MD at [address] N/A 10/6/2020    EGD SUBMUCOSAL/BOTOX INJECTION performed by Pradeep Franco MD at [facility]       Allergies   Allergen Reactions    Lamictal [Lamotrigine] Swelling and Rash    Macrodantin [Nitrofurantoin Macrocrystal] Shortness Of Breath     Caused an asthma attack    Penicillins Hives and Shortness Of Breath    Shellfish-Derived Products Swelling     Throat and tongue swells, allergy to all seafood    Aspartame And Phenylalanine      Severe headaches    Bactrim Hives    Biaxin [Clarithromycin] Hives    Cephalexin Other (See Comments)     blisters    Ciprofloxacin Other (See Comments)     Causes severe pain and tendon tears per pt    Hydrocodone-Acetaminophen Other (See Comments)     HALLUCINATIONS    Lansoprazole Hives    Nexium [Esomeprazole Magnesium Trihydrate] Hives    Other Other (See Comments)     TEGADERM-BLISTERS    Prilosec [Omeprazole] Hives    Blueberry [Vaccinium Angustifolium] Nausea And Vomiting     Projectile vomiting    Monosodium Glutamate Nausea And Vomiting    Zmax [Azithromycin Dihydrate] Nausea And Vomiting     NOT Z PACK its the Powder you had to mix and drink       Social History:  Reviewed. reports that she has never smoked. She has never used smokeless tobacco. She reports that she does not drink alcohol or use drugs. Family History:  Reviewed. Reviewed. No family history of SCD. Relevant and available labs, and cardiovascular diagnostics reviewed. Reviewed. Recent Labs     05/06/21  1715 05/07/21  0508    142   K 3.9 4.1   CL 98* 104   CO2 27 27   PHOS  --  5.1*   BUN 17 17   CREATININE 0.9 0.8     Recent Labs     05/06/21  1715 05/07/21  0508   WBC 10.3 9.6   HGB 14.7 13.1   HCT 45.1 40.0   MCV 86.8 85.9    206     Estimated Creatinine Clearance: 80 mL/min (based on SCr of 0.8 mg/dL). No results found for: BNP    I independently reviewed all cardiac tracing from cardiac telemetry.     I independently reviewed relevant and available cardiac diagnostic tests ECG, CXR, Echo, Stress test, Device interrogation, Holter, CT scan. All questions and concerns were addressed to the patient/family. Alternatives to my treatment were discussed. I have discussed the above stated plan and the patient verbalized understanding and agreed with the plan. NOTE: This report was transcribed using voice recognition software. Every effort was made to ensure accuracy, however, inadvertent computerized transcription errors may be present.      Elijah Watts MD, MPH  [facility]    Office: [phone]  Fax: [phone]

## 2021-05-07 NOTE — ED NOTES
PT transported to floor with Lifepac and telemetry attached in stable condition via bed by this RN at this time.      Sonia Celeste RN  05/06/21 [id number]

## 2021-05-07 NOTE — PROGRESS NOTES
[address]   Electrophysiology Progress Note     Date: 5/8/2021  Admit Date: 5/6/2021     Reason for consultation: Bradycardia    Chief Complaint:   Chief Complaint   Patient presents with    Chest Pain     pt c/o complications form covid for past 2 months, today she is coming in with chest pain that is getting worse. radiating to left arm and between shoulder blades, elevated bp and heart rate       History of Present Illness: History obtained from patient and medical record. Gogo Nettles is a 61 y.o. female with a past medical history of DM, HTN, HLD, obesity, non obstructive CAD, and SHIRLEY. Pt presented to the hospital complaining of chest pressure, lightheadedness and dizziness. Pt noted that her heart rate was slow. She states that for the last week she felt fatigue. She was working at home and felt short of breath and chest pressure. She also noted her heart rates was a slow at 40 bpm. When she came to emergency room and found to have bradycardia with 2-1 AV block. She has been admitted and her COVID-19 test is pending. She states that about 2-month ago she started having symptoms with chest pressure or shortness of breath and thought that she had Covid. She did not seek medical care.     Interval Hx: Today, she is being seen for follow up. She remains in [place] [facility] HB, asymptomatic. Pt is hemodynamically stable. Discussed need for PPM implant on Monday. She wants to go home to feed her cats. Patient seen and examined. Clinical notes reviewed. Telemetry reviewed. No new complaints today. No major events overnight. Denies having chest pain, palpitations, shortness of breath, orthopnea/PND, cough, or dizziness at the time of this visit. Allergies:   Allergies   Allergen Reactions    Lamictal [Lamotrigine] Swelling and Rash    Macrodantin [Nitrofurantoin Macrocrystal] Shortness Of Breath     Caused an asthma attack    Penicillins Hives and Shortness Of Breath    Shellfish-Derived Products Swelling     Throat and tongue swells, allergy to all seafood    Aspartame And Phenylalanine      Severe headaches    Bactrim Hives    Biaxin [Clarithromycin] Hives    Cephalexin Other (See Comments)     blisters    Ciprofloxacin Other (See Comments)     Causes severe pain and tendon tears per pt    Hydrocodone-Acetaminophen Other (See Comments)     HALLUCINATIONS    Lansoprazole Hives    Nexium [Esomeprazole Magnesium Trihydrate] Hives    Other Other (See Comments)     TEGADERM-BLISTERS    Prilosec [Omeprazole] Hives    Blueberry [Vaccinium Angustifolium] Nausea And Vomiting     Projectile vomiting    Monosodium Glutamate Nausea And Vomiting    Zmax [Azithromycin Dihydrate] Nausea And Vomiting     NOT Z PACK its the Powder you had to mix and drink     Home Meds:  Prior to Visit Medications    Medication Sig Taking? Authorizing Provider   loratadine (CLARITIN) 10 MG tablet TAKE ONE TABLET BY MOUTH DAILY Yes Marisol Orourke MD   diazePAM (VALIUM) 2 MG tablet Take 1 tablet by mouth 2 times daily as needed for Anxiety for up to 60 days. Yes [id number] Borrego, APRN - CNP   guaiFENesin (MUCINEX MAXIMUM STRENGTH) 1200 MG TB12 Take 1 tablet by mouth daily For a PM dose.  Yes Marisol Orourke MD   albuterol sulfate  (90 Base) MCG/ACT inhaler INHALE TWO PUFFS BY MOUTH EVERY 6 HOURS AS NEEDED FOR WHEEZING Yes Marisol Orourke MD   ibuprofen (ADVIL;MOTRIN) 800 MG tablet TAKE ONE TABLET BY MOUTH EVERY 6 HOURS AS NEEDED FOR PAIN INDICATIONS Yes Marisol Orourke MD   Cholecalciferol (VITAMIN D3) 250 MCG (43305 UT) CAPS TAKE ONE CAPSULE BY MOUTH DAILY Yes Marisol Orourke MD   ketotifen (ZADITOR) 0.025 % ophthalmic solution INSTILL TWO DROPS IN [facility] EYE TWO TIMES A DAY AS NEEDED FOR ALLERGY Yes Marisol Orourke MD   Insulin Pen Needle (PEN NEEDLES) 32G X 4 MM MISC USE WITH INSULIN PEN ONCE DAILY Yes Ame Yuan MD   TRUEplus Lancets 28G MISC USE ONE LANCET TO TEST THREE TO FOUR TIMES A DAY Yes Abdullahi Anthony MD   insulin glargine (LANTUS SOLOSTAR) 100 UNIT/ML injection pen Inject 34 Units into the skin daily Yes Abdullahi Anthony MD   ASPIRIN LOW DOSE 81 MG EC tablet TAKE ONE TABLET BY MOUTH DAILY Yes KRISHNA Salvador CNP   famotidine (PEPCID) 40 MG tablet Take 1 tablet by mouth 2 times daily  Patient taking differently: Take 40 mg by mouth 2 times daily Indications: taking 1-2 times/day  Yes Jeane Cordero MD   latanoprost (XALATAN) 0.005 % ophthalmic solution Place 1 drop into both eyes nightly Yes Historical Provider, MD   nystatin (MYCOSTATIN) 311363 UNIT/GM powder Apply 3 times daily. Yes Lucero Noguera MD   nystatin (MYCOSTATIN) 292295 UNIT/GM cream Apply topically 2 times daily. Yes Lucero Noguera MD   oxymetazoline (AFRIN) 0.05 % nasal spray 2 sprays each nostril, 3 times a day for 3 days, then 2 times a day for 2 days, then stop for 2 days and then repeat the cycle once. Patient taking differently: Indications: infrequently 2 sprays each nostril, 3 times a day for 3 days, then 2 times a day for 2 days, then stop for 2 days and then repeat the cycle once. Yes Aravind Starkey MD   Cranberry 1000 MG CAPS Take by mouth Indications: OTC, takes two caps daily, does not know strength. 3 capsules daily Yes Historical Provider, MD   Multiple Vitamins-Minerals (THERAPEUTIC MULTIVITAMIN-MINERALS) tablet Take 1 tablet by mouth daily Yes Historical Provider, MD   blood glucose test strips (ASCENSIA AUTODISC VI;ONE TOUCH ULTRA TEST VI) strip 2 each by In Vitro route daily As needed. Yes Abdullahi Anthony MD   Blood Glucose Monitoring Suppl (TRUE METRIX METER) w/Device KIT As needed Yes Abdullahi Anthony MD   blood glucose monitor strips Test blood sugar 2-3 times  a day Yes Abdullahi Anthony MD   blood glucose test strips (ASCENSIA AUTODISC VI;ONE TOUCH ULTRA TEST VI) strip 1-2 time a day As needed.  Yes Abdullahi Anthony MD   nystatin (MYCOSTATIN) 475699 UNIT/GM cream APPLY EXTERNALLY TO THE AFFECTED AREAS TWO TIMES A DAY Yes Mik Aguilar MD   Disposable Gloves (VINYL GLOVES LARGE) MISC 1 Package by Does not apply route 4 times daily Yes Mik Aguilar MD   vitamin D3 (CHOLECALCIFEROL) 25 MCG (1000 UT) TABS tablet Take 1 tablet by mouth daily  Patient not taking: Reported on 2/3/2021  Danielle Leslie MD   rosuvastatin (CRESTOR) 5 MG tablet Take 1 tablet by mouth daily  Mildred Marshall MD   cyclobenzaprine (FLEXERIL) 10 MG tablet Take 0.5 tablets by mouth daily as needed for Muscle spasms  Mik Aguilar MD   Cholecalciferol (VITAMIN D3) 25 MCG (1000 UT) TABS Take 1 tablet by mouth daily  Patient taking differently: Take 10,000 Units by mouth every 7 days   Mik Aguilar MD   Incontinence Supply Disposable (TRE CLASSIC BRIEFS/LARGE) MISC 1 each by Does not apply route 3 times daily  Mik Aguilar MD   Incontinence Supplies MISC 1 Package by Does not apply route 2 times daily  Mik Aguilar MD   GAS-X EXTRA STRENGTH 125 MG chewable tablet CHEW TWO TABLETS BY MOUTH THREE TIMES A DAY WITH EACH MEAL AS NEEDED FOR FLATULENCE  Mik Aguilar MD      Scheduled Meds:   hydrALAZINE  50 mg Oral 3 times per day    amLODIPine  5 mg Oral Daily    insulin glargine  34 Units Subcutaneous Nightly    famotidine  20 mg Oral BID    insulin lispro  0-12 Units Subcutaneous TID WC    insulin lispro  0-6 Units Subcutaneous Nightly    ketotifen  2 drop Both Eyes BID    latanoprost  1 drop Both Eyes Nightly    sodium chloride flush  5-40 mL Intravenous 2 times per day    aspirin  81 mg Oral Daily    atorvastatin  40 mg Oral Nightly    enoxaparin  40 mg Subcutaneous Daily     Continuous Infusions:   dextrose      sodium chloride       PRN Meds:lip balm petroleum free, simethicone, nitroGLYCERIN, albuterol sulfate HFA, diazePAM, glucose, dextrose, glucagon (rDNA), dextrose, sodium chloride flush, sodium chloride, promethazine **OR** ondansetron, polyethylene gastrointestinal endoscopy (10/24/2017); Upper gastrointestinal endoscopy (03/06/2018); Colonoscopy; Colonoscopy (03/06/2018); Upper gastrointestinal endoscopy (N/A, 5/7/2019); Upper gastrointestinal endoscopy (N/A, 5/7/2019); Upper gastrointestinal endoscopy (N/A, 3/13/2020); Upper gastrointestinal endoscopy (N/A, 3/13/2020); Upper gastrointestinal endoscopy (N/A, 10/6/2020); and Upper gastrointestinal endoscopy (N/A, 10/6/2020). Social History:  Reviewed. reports that she has never smoked. She has never used smokeless tobacco. She reports that she does not drink alcohol or use drugs. Family History:  Reviewed. family history includes Diabetes in her brother and mother; Heart Disease in her brother, father, and mother; High Blood Pressure in her brother; High Cholesterol in her brother and brother; Stroke in her mother. Review of Systems:  · Constitutional: Negative for fever, night sweats, chills, weight changes, or weakness  · Skin: Negative for rash, dry skin, pruritus, bruising, bleeding, blood clots, or changes in skin pigment  · HEENT: Negative for vision changes, ringing in the ears, sore throat, dysphagia, or swollen lymph nodes  · Respiratory: Reviewed in HPI  · Cardiovascular: Reviewed in HPI  · Gastrointestinal: Negative for abdominal pain, N/V/D, constipation, or black/tarry stools  · Genito-Urinary: Negative for dysuria, incontinence, urgency, or hematuria  · Musculoskeletal: Negative for joint swelling, muscle pain, or injuries  · Neurological/Psych: Negative for confusion, seizures, headaches, balance issues or TIA-like symptoms.  No anxiety, depression, or insomnia    Physical Examination:  Vitals:    05/08/21 0758   BP: (!) 142/49   Pulse: (!) 40   Resp: 16   Temp: 97.4 °F (36.3 °C)   SpO2: 96%      In: 480 [P.O.:480]  Out: -    Wt Readings from Last 3 Encounters:   05/08/21 194 lb 6.4 oz (88.2 kg)   12/04/20 200 lb 1.6 oz (90.8 kg)   11/19/20 201 lb (91.2 kg)       Intake/Output Summary (Last 24 hours) at 5/8/2021 1002  Last data filed at 5/7/2021 1858  Gross per 24 hour   Intake 480 ml   Output --   Net 480 ml       Telemetry: Personally Reviewed  - Sinus bradycardia; 2:1 AV block  · Constitutional: Cooperative and in no apparent distress, and appears well nourished  · Skin: Warm and pink; no pallor, cyanosis, bruising, or clubbing  · HEENT: Symmetric and normocephalic. PERRL, EOM intact. Conjunctiva pink with clear sclera. Mucus membranes pink and moist. Teeth intact. Thyroid smooth without nodules or goiter. · Cardiovascular: Bradycardic rate and regular rhythm. S1/S2 present without murmurs, rubs, or gallops. Peripheral pulses 2+, capillary refill < 3 seconds. No elevation of JVP. No peripheral edema  · Respiratory: Respirations symmetric and unlabored. Lungs clear to auscultation bilaterally, no wheezing, crackles, or rhonchi  · Gastrointestinal: Abdomen soft and round. Bowel sounds normoactive in all quadrants without tenderness or masses. · Musculoskeletal: Bilateral upper and lower extremity strength 5/5 with full ROM  · Neurologic/Psych: Awake and orientated to person, place and time. Calm affect, appropriate mood    Pertinent labs, diagnostic, device, and imaging results reviewed as a part of this visit    Labs:    BMP:   Recent Labs     05/06/21 1715 05/07/21  0508 05/08/21  0544    142 142   K 3.9 4.1 4.4   CL 98* 104 105   CO2 27 27 29   PHOS  --  5.1* 5.0*   BUN 17 17 14   CREATININE 0.9 0.8 0.7   MG  --  2.00 2.00     Estimated Creatinine Clearance: 91 mL/min (based on SCr of 0.7 mg/dL).    CBC:   Recent Labs     05/06/21 1715 05/07/21  0508 05/08/21  0544   WBC 10.3 9.6 8.9   HGB 14.7 13.1 13.0   HCT 45.1 40.0 39.7   MCV 86.8 85.9 86.2    206 212     Thyroid:   Lab Results   Component Value Date    TSH 1.40 06/22/2020     Lipids:   Lab Results   Component Value Date    CHOL 174 05/07/2021    HDL 33 05/07/2021    HDL 36 06/18/2010    TRIG 112 05/07/2021 IBS (irritable bowel syndrome) 08/25/2015    Multiple thyroid nodules 06/18/2015    Thyroid cyst 07/22/2014    Vitamin D deficiency 07/22/2014    Rheumatoid arthritis ([facility] 11/11/2013    Gastroparesis 11/11/2013    Urine incontinence 04/15/2013    Asthma 04/15/2013    Chronic back pain 04/15/2013    DM2 (diabetes mellitus, type 2) ([facility] 01/05/2012    GERD (gastroesophageal reflux disease) 01/05/2012    Fibromyalgia 01/05/2012        Assessment and Plan:     1. Symptomatic Bradycardia    - Stable, remains in 2:1 AV block with hemodynamic stability   - May start isupril gtt if develops worsening AV block or hemodynamic instability     - The risks, benefits and alternatives of the procedure were discussed with the patient. The risks including, but not limited to, the risks of bleeding, infection, pain, device malfunction, lead dislodgement, radiation exposure, injury to cardiac and surrounding structures (including pneumothorax), stroke, cardiac perforation, tamponade, need for emergent heart surgery, myocardial infarction and death were discussed in detail. Dual vs single chamber device, including risks and benefits were discussed with patient.      ----- > The patient opted to proceed with the device implantation. Will plan for PPM placement on Monday with Dr. Rosangela Sims (Keep NPO after Sunday night)     2. CAD  - Hx of non obstructive CAD  - Stable  - Continue ASA, and statin    3. HTN  - Slightly uncontrolled: Goal <130/80  - Continue current medications (Started yesterday)    4. Hyperlipidemia  - Controlled. Goal: LDL <100   ~  (5/21)  - On statin (resume rosuvastatin 5 mg QD at d/c)  - Discussed diet, weight loss, and exercise needs  - Followed per PCP    5. SHIRLEY  - Stable: Uses CPAP  - Non-compliant with CPAP therapy. Encourage to use CPAP to prevent long term effects of untreated SHIRLEY    Multiple medical conditions with risk of decompensation.      All pertinent information and plan of care discussed with the EP physician. All questions and concerns were addressed to the patient. Alternatives to my treatment were discussed. I have discussed the above stated plan with patient and the nurse. The patient verbalized understanding and agreed with the plan. Thank you for allowing to us to participate in the care of Rick Benavidez     The patient was seen for >35 minutes. I reviewed interval history, physical exam, review of data including labs, imaging, development and implementation of treatment plan and coordination of complex care.     Rivas Sinclair, KRISHNA-CNP  [facility]   Office: [phone]

## 2021-05-07 NOTE — CONSULTS
Patient has a TCM/hospital discharge appointment on 5/28/19 at 12:00pm with Dr. Cheng.    [facility]   Electrophysiology Consultation   Date: 5/7/2021  Reason for Consultation: Brdaycardia   Consult Requesting Physician: Bibiana Montanez MD   Chief Complaint   Patient presents with    Chest Pain     pt c/o complications form covid for past 2 months, today she is coming in with chest pain that is getting worse. radiating to left arm and between shoulder blades, elevated bp and heart rate       CC: Chest pain, lightheadedness and dizziness  HPI: Cherelle Bailey is a 61 y.o. female who has presented to the hospital complaining of chest pressure, lightheadedness and dizziness. She also noted that her heart rate is a slow. She states that for the last week or so she noted fatigue. She was working at home and felt short of breath and chest pressure. She also noted her heart rates was a slow at 40 bpm.  Came to the emergency room and found to have bradycardia with 2-1 AV block. She has been admitted and her COVID-19 test is pending. She states that about 2-month ago she started having symptoms with chest pressure or shortness of breath and thought that she had Covid. She did not seek medical care. Patient has past medical history significant for diabetes, obesity, obstructive sleep apnea, and hyperlipidemia. Reports no history of smoking. She states that her family member had heart disease with congestive heart failure. She has had cardiac catheterization in March 2019 with mild nonobstructive coronary artery disease. Assessment and plan:     -Symptomatic bradycardia, AV block      Presented with chest pressure, dizziness, lightheadedness, shortness of breath and found to have right bundle branch block and 2-1 AV block. Electrolytes are normal   Troponin is negative   proBNP is 193     She has history of mild nonobstructive coronary artery disease in the past.   She is not on beta-blocker or calcium channel blockers.      Treatment options including implantation of pacemaker discussed with the patient. The risks, benefits and alternatives of the procedure were discussed with the patient. The risks including, but not limited to, the risks of bleeding, infection, pain, device malfunction, lead dislodgement, radiation exposure, injury to cardiac and surrounding structures (including pneumothorax), stroke, cardiac perforation, tamponade, need for emergent heart surgery, myocardial infarction and death were discussed in detail. Dual vs single chamber device, including risks and benefits were discussed with patient. Opted to proceed with the device implantation. She is now has stable blood pressure and reports no symptoms. She is admitted for ruling out COVID-19 infection. Will plan for pacemaker implantation after she has been ruled out for Covid infection.     - Mild non-obstructive CAD:    On statin. Under the care of Dr. Nazario Meade. - HLD on statin.     - SHIRLEY: Compliance with CPAP discussed. Discussed with nursing staff. Active Hospital Problems    Diagnosis Date Noted    Chest pain [R07.9] 2019       Diagnostic studies:     Sinus rhythm, right bundle branch block, 221 AV block  Chest x-ray: Normal  Troponin negative  I independently reviewed the cardiac diagnostic studies, ECG and relevant imaging studies. Lab Results   Component Value Date    LVEF 55 2019    LVEFMODE Cardiac Cath 2019     Lab Results   Component Value Date    TSH 1.40 2020       Physical Examination:  Vitals:    21 1330   BP: (!) 165/60   Pulse: (!) 40   Resp: 16   Temp: 97.2 °F (36.2 °C)   SpO2: 100%      No intake/output data recorded.    Wt Readings from Last 3 Encounters:   21 194 lb 12.8 oz (88.4 kg)   20 200 lb 1.6 oz (90.8 kg)   20 201 lb (91.2 kg)     Temp  Av.3 °F (36.3 °C)  Min: 96.9 °F (36.1 °C)  Max: 98.1 °F (36.7 °C)  Pulse  Av.3  Min: 39  Max: 80  BP  Min: 133/34  Max: 214/74  SpO2  Av.5 %  Min: 96 %  Max: 100 %  No intake or output data in the 24 hours ending 05/07/21 1412      I independently reviewed all cardiac tracing from cardiac telemetry. · Constitutional: Oriented. No distress. · Head: Normocephalic and atraumatic. · Mouth/Throat: Oropharynx is clear and moist.   · Eyes: Conjunctivae normal. EOM are normal.   · Neck: Neck supple. No JVD present. · Cardiovascular: Bradycardic rate, regular rhythm, S1&S2. · Pulmonary/Chest: Bilateral respiratory sounds. No rhonchi. · Abdominal: Soft. No tenderness. · Musculoskeletal: No tenderness. No edema    · Lymphadenopathy: Has no cervical adenopathy. · Neurological: Alert and oriented. Follows command, No Gross deficit   · Skin: Skin is warm, No rash noted. · Psychiatric: Has a normal behavior       Scheduled Meds:   hydrALAZINE  25 mg Oral 3 times per day    famotidine  20 mg Oral BID    insulin glargine  34 Units Subcutaneous Daily    insulin lispro  0-12 Units Subcutaneous TID WC    insulin lispro  0-6 Units Subcutaneous Nightly    ketotifen  2 drop Both Eyes BID    latanoprost  1 drop Both Eyes Nightly    sodium chloride flush  5-40 mL Intravenous 2 times per day    aspirin  81 mg Oral Daily    atorvastatin  40 mg Oral Nightly    enoxaparin  40 mg Subcutaneous Daily     Continuous Infusions:   dextrose      sodium chloride       PRN Meds:.nitroGLYCERIN, albuterol sulfate HFA, diazePAM, glucose, dextrose, glucagon (rDNA), dextrose, sodium chloride flush, sodium chloride, promethazine **OR** ondansetron, polyethylene glycol, perflutren lipid microspheres, potassium chloride **OR** potassium alternative oral replacement **OR** potassium chloride, magnesium sulfate, hydrALAZINE, acetaminophen     Review of System:  [x] Full ROS obtained and negative except as mentioned in HPI    Prior to Admission medications    Medication Sig Start Date End Date Taking?  Authorizing Provider   loratadine (CLARITIN) 10 MG tablet TAKE ONE TABLET BY MOUTH DAILY 5/3/21 Yes Pavan Mendez MD   diazePAM (VALIUM) 2 MG tablet Take 1 tablet by mouth 2 times daily as needed for Anxiety for up to 60 days. 3/26/21 5/25/21 Yes KRISHNA Smith CNP   guaiFENesin [facility] MAXIMUM STRENGTH) 1200 MG TB12 Take 1 tablet by mouth daily For a PM dose. 3/12/21  Yes Pavan Mendez MD   albuterol sulfate  (90 Base) MCG/ACT inhaler INHALE TWO PUFFS BY MOUTH EVERY 6 HOURS AS NEEDED FOR WHEEZING 3/8/21  Yes Pavan Mendez MD   ibuprofen (ADVIL;MOTRIN) 800 MG tablet TAKE ONE TABLET BY MOUTH EVERY 6 HOURS AS NEEDED FOR PAIN INDICATIONS 3/1/21  Yes Pavan Mendez MD   Cholecalciferol (VITAMIN D3) 250 MCG (15901 UT) CAPS TAKE ONE CAPSULE BY MOUTH DAILY 1/15/21  Yes Pavan Mendez MD   ketotifen (ZADITOR) 0.025 % ophthalmic solution INSTILL TWO DROPS IN [facility] EYE TWO TIMES A DAY AS NEEDED FOR ALLERGY 12/10/20  Yes Pavan Mendez MD   Insulin Pen Needle (PEN NEEDLES) 32G X 4 MM MISC USE WITH INSULIN PEN ONCE DAILY 11/10/20  Yes Garfield Franco MD   TRUEplus Lancets 28G MISC USE ONE LANCET TO TEST THREE TO FOUR TIMES A DAY 11/10/20  Yes Garfield Franco MD   insulin glargine (LANTUS SOLOSTAR) 100 UNIT/ML injection pen Inject 34 Units into the skin daily 11/10/20  Yes Garfield Franco MD   ASPIRIN LOW DOSE 81 MG EC tablet TAKE ONE TABLET BY MOUTH DAILY 11/3/20  Yes KRISHNA Guardado CNP   famotidine (PEPCID) 40 MG tablet Take 1 tablet by mouth 2 times daily  Patient taking differently: Take 40 mg by mouth 2 times daily Indications: taking 1-2 times/day  10/6/20  Yes Estrella Jett MD   latanoprost (XALATAN) 0.005 % ophthalmic solution Place 1 drop into both eyes nightly   Yes Radha Newell MD   nystatin (MYCOSTATIN) 026660 UNIT/GM powder Apply 3 times daily. 8/20/20  Yes Pavan Mendez MD   nystatin (MYCOSTATIN) 019173 UNIT/GM cream Apply topically 2 times daily.  8/6/20  Yes Pavan Mendez MD   oxymetazoline (AFRIN) 0.05 % nasal spray 2 sprays each nostril, 3 times a day for 3 days, then 2 times a day for 2 days, then stop for 2 days and then repeat the cycle once. Patient taking differently: Indications: infrequently 2 sprays each nostril, 3 times a day for 3 days, then 2 times a day for 2 days, then stop for 2 days and then repeat the cycle once. 7/10/20  Yes Yessenia Darby MD   Cranberry 1000 MG CAPS Take by mouth Indications: OTC, takes two caps daily, does not know strength. 3 capsules daily   Yes Historical Provider, MD   Multiple Vitamins-Minerals (THERAPEUTIC MULTIVITAMIN-MINERALS) tablet Take 1 tablet by mouth daily   Yes Historical Provider, MD   blood glucose test strips (ASCENSIA AUTODISC VI;ONE TOUCH ULTRA TEST VI) strip 2 each by In Vitro route daily As needed. 1/31/20  Yes Beckie Saldana MD   Blood Glucose Monitoring Suppl (TRUE METRIX METER) w/Device KIT As needed 1/31/20  Yes Beckie Saldana MD   blood glucose monitor strips Test blood sugar 2-3 times  a day 10/16/19  Yes Beckie Saldana MD   blood glucose test strips (ASCENSIA AUTODISC VI;ONE TOUCH ULTRA TEST VI) strip 1-2 time a day As needed.  10/1/19  Yes Beckie Saldaan MD   nystatin (MYCOSTATIN) 270555 UNIT/GM cream APPLY EXTERNALLY TO THE AFFECTED AREAS TWO TIMES A DAY 6/6/19  Yes Merlin Few, MD   Disposable Gloves (VINYL GLOVES LARGE) MISC 1 Package by Does not apply route 4 times daily 3/4/19  Yes Merlin Few, MD   vitamin D3 (CHOLECALCIFEROL) 25 MCG (1000 UT) TABS tablet Take 1 tablet by mouth daily  Patient not taking: Reported on 2/3/2021 1/15/21   Rosalie Lee MD   rosuvastatin (CRESTOR) 5 MG tablet Take 1 tablet by mouth daily 11/19/20   Kathi Sutherland MD   cyclobenzaprine (FLEXERIL) 10 MG tablet Take 0.5 tablets by mouth daily as needed for Muscle spasms 2/20/20   Merlin Few, MD   Cholecalciferol (VITAMIN D3) 25 MCG (1000 UT) TABS Take 1 tablet by mouth daily  Patient taking differently: Take 10,000 Units by mouth every 7 days DILATATION, 58 FR ARTHUR, 100 UNITS BOTOX    UPPER GASTROINTESTINAL ENDOSCOPY  08/30/2011    58 FR DILATATION, 100 UNITS  BOTOX INJECTION    UPPER GASTROINTESTINAL ENDOSCOPY  3-9-12    with dilatation and submucosal injection: Botox    UPPER GASTROINTESTINAL ENDOSCOPY      UPPER GASTROINTESTINAL ENDOSCOPY  11/20/12     Esophagogastroduodenoscopy with Botulinum toxin injection of the pylorus and Arthur esophageal dilation    UPPER GASTROINTESTINAL ENDOSCOPY  6/4/13    WITH DIILITATION AND BOTOX INJECTION    UPPER GASTROINTESTINAL ENDOSCOPY  5/20/2014    100 unit Botox injection, 56 Fr.  Arthur esophageal dilatation    UPPER GASTROINTESTINAL ENDOSCOPY  12/12/14    with esophageal dilatation, and botox injection    UPPER GASTROINTESTINAL ENDOSCOPY  09/09/2015    With Dilitation and Botox injection    UPPER GASTROINTESTINAL ENDOSCOPY  5/10/2016    distal esophagus biopsy, stomach biopsy, botox injection    UPPER GASTROINTESTINAL ENDOSCOPY  04/11/2017    with dilatation and botox injection    UPPER GASTROINTESTINAL ENDOSCOPY  10/24/2017    DILATATION, BIOPSY, BOTOX    UPPER GASTROINTESTINAL ENDOSCOPY  03/06/2018    WITH BOTOX AND BALLOON DILATATION    UPPER GASTROINTESTINAL ENDOSCOPY N/A 5/7/2019    EGD SUBMUCOSAL/BOTOX INJECTION performed by Justin Richard MD at [address] N/A 5/7/2019    EGD DILATION BALLOON performed by Justin Richard MD at [address] N/A 3/13/2020    EGD SUBMUCOSAL/BOTOX INJECTION performed by Justin Richard MD at [address] N/A 3/13/2020    EGD DILATION BALLOON performed by Justin Richard MD at [address] N/A 10/6/2020    EGD DILATION BALLOON performed by Justin Richard MD at [address] N/A 10/6/2020    EGD SUBMUCOSAL/BOTOX INJECTION performed by interrogation, Holter, CT scan. All questions and concerns were addressed to the patient/family. Alternatives to my treatment were discussed. I have discussed the above stated plan and the patient verbalized understanding and agreed with the plan. NOTE: This report was transcribed using voice recognition software. Every effort was made to ensure accuracy, however, inadvertent computerized transcription errors may be present.      Dena Murillo MD, MPH  [facility]    Office: [phone]  Fax: [phone]

## 2021-05-07 NOTE — PROGRESS NOTES
Hospitalist Progress Note      PCP: Lennox Hey, MD    Date of Admission: 5/6/2021    Chief Complaint: Chest pain    Hospital Course: 61 y.o. female past medical history of insulin-dependent diabetes, hyperlipidemia, fibromyalgia, GERD, nonobstructive CAD 20% mid LAD occlusion on left heart cath March 2019 presents to [facility] ED with multiple vague complaints including chest pain. Chest pain described as substernal, soreness, at times pleuritic, also describes pain in her left arm not sure if these two are associated. In the ED initial work-up is negative. Subjective: Patient seen and examined at bedside. States she is feeling better.       Medications:  Reviewed    Infusion Medications    dextrose      sodium chloride       Scheduled Medications    hydrALAZINE  25 mg Oral 3 times per day    famotidine  20 mg Oral BID    insulin glargine  34 Units Subcutaneous Daily    insulin lispro  0-12 Units Subcutaneous TID WC    insulin lispro  0-6 Units Subcutaneous Nightly    ketotifen  2 drop Both Eyes BID    latanoprost  1 drop Both Eyes Nightly    sodium chloride flush  5-40 mL Intravenous 2 times per day    aspirin  81 mg Oral Daily    atorvastatin  40 mg Oral Nightly    enoxaparin  40 mg Subcutaneous Daily     PRN Meds: nitroGLYCERIN, albuterol sulfate HFA, diazePAM, glucose, dextrose, glucagon (rDNA), dextrose, sodium chloride flush, sodium chloride, promethazine **OR** ondansetron, polyethylene glycol, perflutren lipid microspheres, potassium chloride **OR** potassium alternative oral replacement **OR** potassium chloride, magnesium sulfate, hydrALAZINE, acetaminophen    No intake or output data in the 24 hours ending 05/07/21 7647    Physical Exam Performed:    BP (!) 185/32   Pulse (!) 42   Temp 97.2 °F (36.2 °C) (Temporal)   Resp 10   Ht 5' 2\" (1.575 m)   Wt 194 lb 12.8 oz (88.4 kg)   LMP  (LMP Unknown)   SpO2 97%   BMI 35.63 kg/m²     General appearance: No apparent distress, appears stated age and cooperative. HEENT: Pupils equal, round, and reactive to light. Conjunctivae/corneas clear. Neck: Supple, with full range of motion. No jugular venous distention. Trachea midline. Respiratory:  Normal respiratory effort. Clear to auscultation, bilaterally without Rales/Wheezes/Rhonchi. Cardiovascular: Regular rate and rhythm with normal S1/S2 without murmurs, rubs or gallops. Abdomen: Soft, non-tender, non-distended with normal bowel sounds. Musculoskeletal: No clubbing, cyanosis or edema bilaterally. Full range of motion without deformity. Skin: Skin color, texture, turgor normal.  No rashes or lesions. Neurologic:  Neurovascularly intact without any focal sensory/motor deficits. Cranial nerves: II-XII intact, grossly non-focal.  Psychiatric: Alert and oriented, thought content appropriate, normal insight  Capillary Refill: Brisk,< 3 seconds   Peripheral Pulses: +2 palpable, equal bilaterally       Labs:   Recent Labs     05/06/21  1715 05/07/21  0508   WBC 10.3 9.6   HGB 14.7 13.1   HCT 45.1 40.0    206     Recent Labs     05/06/21  1715 05/07/21  0508    142   K 3.9 4.1   CL 98* 104   CO2 27 27   BUN 17 17   CREATININE 0.9 0.8   CALCIUM 10.6 10.1   PHOS  --  5.1*     Recent Labs     05/06/21  1715   AST 36   ALT 39   BILITOT 0.7   ALKPHOS 20*     No results for input(s): INR in the last 72 hours. Recent Labs     05/06/21  1715 05/06/21  2156 05/07/21  0026   TROPONINI <0.01 <0.01 <0.01       Urinalysis:      Lab Results   Component Value Date    NITRU Negative 06/22/2020    WBCUA >100 06/22/2020    BACTERIA 2+ 06/22/2020    RBCUA None seen 06/22/2020    BLOODU Negative 06/22/2020    SPECGRAV >=1.030 06/22/2020    GLUCOSEU 100 06/22/2020       Radiology:  XR CHEST PORTABLE   Final Result   No acute cardiopulmonary process.                  Assessment/Plan:    Active Hospital Problems    Diagnosis    Chest pain [R07.9]     Chest pain  Concerning for ACS  Admit under observation  Telemetry  Serial troponins  Aspirin statin  Cardiology-no concern for ischemia now     Bradycardia  Hemodynamically stable  Not currently on any kay blocking medications  EP evaluated patient-plan for pacemaker implantation     Hypertension  Accelerated  We'll order hydralazine IV as needed     Hyperlipidemia  States she is currently not on a statin since had issues with liver enzyme elevation in the past  We'll start Lipitor 40 mg at this point as there is concern for cardiac ischemia     Diabetes mellitus type 2  Only on basal Lantus nightly 34 units  We'll continue Lantus  We'll order medium sliding scale  Hypoglycemia protocol  POCT AC at bedtime    DVT Prophylaxis: Lovenox  Diet: DIET CARDIAC; No Caffeine  Code Status: Full Code        Electronically signed by Tisha Leach MD on 5/7/2021 at 6:27 PM

## 2021-05-07 NOTE — PLAN OF CARE
Nutrition Problem #1: Inadequate oral intake  Intervention: Food and/or Nutrient Delivery: Start Oral Diet(As appropriate per MD)  Nutritional Goals: Pt will tolerate diet advancement and consume at least 50% of meals and supplements

## 2021-05-07 NOTE — PROGRESS NOTES
4 Eyes Skin Assessment     NAME:  Tameka Blanton  YOB: 1965  MEDICAL RECORD NUMBER:  [id number]    The patient is being assess for  Admission    I agree that 2 RN's have performed a thorough Head to Toe Skin Assessment on the patient. ALL assessment sites listed below have been assessed. Areas assessed by both nurses:    Head, Face, Ears, Shoulders, Back, Chest, Arms, Elbows, Hands, Sacrum. Buttock, Coccyx, Ischium and Legs. Feet and Heels        Does the Patient have a Wound?  No noted wound(s)       Leonel Prevention initiated:  No   Wound Care Orders initiated:  No    Pressure Injury (Stage 3,4, Unstageable, DTI, NWPT, and Complex wounds) if present place consult order under [de-identified] No    New and Established Ostomies if present place consult order under : No      Nurse 1 eSignature: Electronically signed by Rayna Jauregui RN on 5/7/21 at 4:59 AM EDT    **SHARE this note so that the co-signing nurse is able to place an eSignature**    Nurse 2 eSignature: Electronically signed by Emil Lal RN on 5/7/21 at 6:12 AM EDT

## 2021-05-07 NOTE — PLAN OF CARE
Problem: Pain:  Goal: Pain level will decrease  Description: Pain level will decrease  Outcome: Ongoing  Note: Patient denies any pain at this time. Will continue to monitor.

## 2021-05-07 NOTE — PROGRESS NOTES
Perfect serve to Dr Cheryl Babinski as follows:  Norvasc does not affect HR, but BP is better, so hold.

## 2021-05-07 NOTE — ED NOTES
PT ambulated to bathroom at this time with standby assist, PT states she feels a bit lightheaded. Tolerated well otherwise.      Ramses Blevins RN  05/06/21 2001

## 2021-05-07 NOTE — PROGRESS NOTES
Pt wanting to leave AMA to go home and feed pets instead of stay for pacemaker implant on Monday. Explained the severity of her condition and why leaving is not advised until pacemaker is placed. Pt showed understanding and agreed to stay and discuss the matter tomorrow in order to find friend/family to assist. May need reinforcement.

## 2021-05-07 NOTE — PROGRESS NOTES
Call to cards to see if they are planning to take patient for pacemaker placement today. Spoke with Manuela Das, reviewed with Dr Geetha Mcdonald, will proceed with pacemaker Monday. Pt wanted to leave AMA, due to concerns for her animals at home, this rn reviewed with patients the plan of care, agrees to stay until Monday. 1900 report called to [facility] RN on 3A, transport requested at this time.

## 2021-05-07 NOTE — PROGRESS NOTES
Intake Outcomes:  Diet Advancement/Tolerance, Food and Nutrient Intake, Supplement Intake  Physical Signs/Symptoms Outcomes:  None Identified     Discharge Planning:     Too soon to determine     Electronically signed by Chao Anders RD, LD on 5/7/21 at 10:44 AM EDT    Contact: [phone]

## 2021-05-08 PROBLEM — I44.30 HEART BLOCK ATRIOVENTRICULAR: Status: ACTIVE | Noted: 2021-05-08

## 2021-05-08 LAB
ANION GAP SERPL CALCULATED.3IONS-SCNC: 8 MMOL/L (ref 3–16)
BASOPHILS ABSOLUTE: 0.1 K/UL (ref 0–0.2)
BASOPHILS RELATIVE PERCENT: 0.8 %
BUN BLDV-MCNC: 14 MG/DL (ref 7–20)
CALCIUM SERPL-MCNC: 10 MG/DL (ref 8.3–10.6)
CHLORIDE BLD-SCNC: 105 MMOL/L (ref 99–110)
CO2: 29 MMOL/L (ref 21–32)
CREAT SERPL-MCNC: 0.7 MG/DL (ref 0.6–1.1)
EOSINOPHILS ABSOLUTE: 0.2 K/UL (ref 0–0.6)
EOSINOPHILS RELATIVE PERCENT: 2.2 %
GFR AFRICAN AMERICAN: >60
GFR NON-AFRICAN AMERICAN: >60
GLUCOSE BLD-MCNC: 129 MG/DL (ref 70–99)
GLUCOSE BLD-MCNC: 133 MG/DL (ref 70–99)
GLUCOSE BLD-MCNC: 144 MG/DL (ref 70–99)
GLUCOSE BLD-MCNC: 182 MG/DL (ref 70–99)
GLUCOSE BLD-MCNC: 195 MG/DL (ref 70–99)
HCT VFR BLD CALC: 39.7 % (ref 36–48)
HEMOGLOBIN: 13 G/DL (ref 12–16)
LYMPHOCYTES ABSOLUTE: 3 K/UL (ref 1–5.1)
LYMPHOCYTES RELATIVE PERCENT: 34.3 %
MAGNESIUM: 2 MG/DL (ref 1.8–2.4)
MCH RBC QN AUTO: 28.2 PG (ref 26–34)
MCHC RBC AUTO-ENTMCNC: 32.7 G/DL (ref 31–36)
MCV RBC AUTO: 86.2 FL (ref 80–100)
MONOCYTES ABSOLUTE: 0.8 K/UL (ref 0–1.3)
MONOCYTES RELATIVE PERCENT: 9.1 %
NEUTROPHILS ABSOLUTE: 4.8 K/UL (ref 1.7–7.7)
NEUTROPHILS RELATIVE PERCENT: 53.6 %
PDW BLD-RTO: 15.2 % (ref 12.4–15.4)
PERFORMED ON: ABNORMAL
PHOSPHORUS: 5 MG/DL (ref 2.5–4.9)
PLATELET # BLD: 212 K/UL (ref 135–450)
PMV BLD AUTO: 8.9 FL (ref 5–10.5)
POTASSIUM SERPL-SCNC: 4.4 MMOL/L (ref 3.5–5.1)
RBC # BLD: 4.61 M/UL (ref 4–5.2)
SODIUM BLD-SCNC: 142 MMOL/L (ref 136–145)
WBC # BLD: 8.9 K/UL (ref 4–11)

## 2021-05-08 PROCEDURE — 6370000000 HC RX 637 (ALT 250 FOR IP): Performed by: PHYSICIAN ASSISTANT

## 2021-05-08 PROCEDURE — 85025 COMPLETE CBC W/AUTO DIFF WBC: CPT

## 2021-05-08 PROCEDURE — 2580000003 HC RX 258: Performed by: INTERNAL MEDICINE

## 2021-05-08 PROCEDURE — 84100 ASSAY OF PHOSPHORUS: CPT

## 2021-05-08 PROCEDURE — 99215 OFFICE O/P EST HI 40 MIN: CPT | Performed by: NURSE PRACTITIONER

## 2021-05-08 PROCEDURE — G0378 HOSPITAL OBSERVATION PER HR: HCPCS

## 2021-05-08 PROCEDURE — 36415 COLL VENOUS BLD VENIPUNCTURE: CPT

## 2021-05-08 PROCEDURE — 6370000000 HC RX 637 (ALT 250 FOR IP): Performed by: INTERNAL MEDICINE

## 2021-05-08 PROCEDURE — 6360000002 HC RX W HCPCS: Performed by: INTERNAL MEDICINE

## 2021-05-08 PROCEDURE — 80048 BASIC METABOLIC PNL TOTAL CA: CPT

## 2021-05-08 PROCEDURE — 1200000000 HC SEMI PRIVATE

## 2021-05-08 PROCEDURE — 83735 ASSAY OF MAGNESIUM: CPT

## 2021-05-08 RX ADMIN — LATANOPROST 1 DROP: 50 SOLUTION OPHTHALMIC at 20:27

## 2021-05-08 RX ADMIN — FAMOTIDINE 20 MG: 20 TABLET ORAL at 08:48

## 2021-05-08 RX ADMIN — ATORVASTATIN CALCIUM 40 MG: 40 TABLET, FILM COATED ORAL at 20:23

## 2021-05-08 RX ADMIN — FAMOTIDINE 20 MG: 20 TABLET ORAL at 20:25

## 2021-05-08 RX ADMIN — ACETAMINOPHEN 650 MG: 325 TABLET ORAL at 08:48

## 2021-05-08 RX ADMIN — ASPIRIN 81 MG: 81 TABLET, CHEWABLE ORAL at 08:48

## 2021-05-08 RX ADMIN — KETOTIFEN FUMARATE 2 DROP: 0.35 SOLUTION/ DROPS OPHTHALMIC at 20:27

## 2021-05-08 RX ADMIN — HYDRALAZINE HYDROCHLORIDE 25 MG: 25 TABLET, FILM COATED ORAL at 20:23

## 2021-05-08 RX ADMIN — SIMETHICONE 80 MG: 80 TABLET, CHEWABLE ORAL at 10:23

## 2021-05-08 RX ADMIN — KETOTIFEN FUMARATE 2 DROP: 0.35 SOLUTION/ DROPS OPHTHALMIC at 08:49

## 2021-05-08 RX ADMIN — Medication 10 ML: at 20:24

## 2021-05-08 RX ADMIN — INSULIN LISPRO 2 UNITS: 100 INJECTION, SOLUTION INTRAVENOUS; SUBCUTANEOUS at 11:53

## 2021-05-08 RX ADMIN — AMLODIPINE BESYLATE 5 MG: 5 TABLET ORAL at 11:53

## 2021-05-08 RX ADMIN — SIMETHICONE 80 MG: 80 TABLET, CHEWABLE ORAL at 18:50

## 2021-05-08 RX ADMIN — Medication 5 ML: at 08:50

## 2021-05-08 RX ADMIN — HYDRALAZINE HYDROCHLORIDE 25 MG: 25 TABLET, FILM COATED ORAL at 05:54

## 2021-05-08 RX ADMIN — HYDRALAZINE HYDROCHLORIDE 25 MG: 25 TABLET, FILM COATED ORAL at 23:10

## 2021-05-08 RX ADMIN — INSULIN GLARGINE 34 UNITS: 100 INJECTION, SOLUTION SUBCUTANEOUS at 20:30

## 2021-05-08 RX ADMIN — ENOXAPARIN SODIUM 40 MG: 40 INJECTION SUBCUTANEOUS at 08:49

## 2021-05-08 ASSESSMENT — PAIN DESCRIPTION - LOCATION: LOCATION: GENERALIZED

## 2021-05-08 ASSESSMENT — PAIN DESCRIPTION - PAIN TYPE: TYPE: CHRONIC PAIN

## 2021-05-08 ASSESSMENT — PAIN SCALES - GENERAL: PAINLEVEL_OUTOF10: 3

## 2021-05-08 NOTE — PROGRESS NOTES
(ZADITOR) 0.025 % ophthalmic solution 2 drop, BID  latanoprost (XALATAN) 0.005 % ophthalmic solution 1 drop, Nightly  sodium chloride flush 0.9 % injection 5-40 mL, 2 times per day  sodium chloride flush 0.9 % injection 5-40 mL, PRN  0.9 % sodium chloride infusion, PRN  promethazine (PHENERGAN) tablet 12.5 mg, Q6H PRN    Or  ondansetron (ZOFRAN) injection 4 mg, Q6H PRN  polyethylene glycol (GLYCOLAX) packet 17 g, Daily PRN  aspirin chewable tablet 81 mg, Daily  atorvastatin (LIPITOR) tablet 40 mg, Nightly  perflutren lipid microspheres (DEFINITY) injection 1.65 mg, ONCE PRN  potassium chloride (KLOR-CON M) extended release tablet 40 mEq, PRN    Or  potassium bicarb-citric acid (EFFER-K) effervescent tablet 40 mEq, PRN    Or  potassium chloride 10 mEq/100 mL IVPB (Peripheral Line), PRN  magnesium sulfate 2000 mg in 50 mL IVPB premix, PRN  enoxaparin (LOVENOX) injection 40 mg, Daily  hydrALAZINE (APRESOLINE) injection 10 mg, Q4H PRN  acetaminophen (TYLENOL) tablet 650 mg, Q4H PRN    0.9 % sodium chloride infusion, Continuous        Objective:  BP (!) 180/71   Pulse (!) 41   Temp 98.3 °F (36.8 °C) (Temporal)   Resp 17   Ht 5' 2\" (1.575 m)   Wt 194 lb 6.4 oz (88.2 kg)   LMP  (LMP Unknown)   SpO2 96%   BMI 35.56 kg/m²     Intake/Output Summary (Last 24 hours) at 5/8/2021 1226  Last data filed at 5/7/2021 1858  Gross per 24 hour   Intake 480 ml   Output --   Net 480 ml      Wt Readings from Last 3 Encounters:   05/08/21 194 lb 6.4 oz (88.2 kg)   12/04/20 200 lb 1.6 oz (90.8 kg)   11/19/20 201 lb (91.2 kg)       General appearance:  Appears comfortable  Eyes: Sclera clear. Pupils equal.  ENT: Moist oral mucosa. Trachea midline, no adenopathy. Cardiovascular: Regular rhythm, normal S1, S2. No murmur. No edema in lower extremities  Respiratory: Not using accessory muscles. Good inspiratory effort. Clear to auscultation bilaterally, no wheeze or crackles.    GI: Abdomen soft, no tenderness, not distended, normal bowel sounds  Musculoskeletal: No cyanosis in digits, neck supple  Neurology: CN 2-12 grossly intact. No speech or motor deficits  Psych: Normal affect. Alert and oriented in time, place and person  Skin: Warm, dry, normal turgor    Labs and Tests:  CBC:   Recent Labs     05/06/21 1715 05/07/21  0508 05/08/21  0544   WBC 10.3 9.6 8.9   HGB 14.7 13.1 13.0    206 212     BMP:    Recent Labs     05/06/21 1715 05/07/21  0508 05/08/21  0544    142 142   K 3.9 4.1 4.4   CL 98* 104 105   CO2 27 27 29   BUN 17 17 14   CREATININE 0.9 0.8 0.7   GLUCOSE 181* 126* 133*     Hepatic:   Recent Labs     05/06/21 1715   AST 36   ALT 39   BILITOT 0.7   ALKPHOS 20*     Results for Dolly PATEL (MRN [id number]) as of 5/8/2021 17:09   Ref. Range 5/7/2021 18:09 5/7/2021 21:42 5/8/2021 07:27 5/8/2021 11:26 5/8/2021 16:21   POC Glucose Latest Ref Range: 70 - 99 mg/dl 125 (H) 188 (H) 144 (H) 195 (H) 129 (H)       CXR 5/6/2021:  No acute cardiopulmonary process. Problem List  Active Problems:    Chest pain  Resolved Problems:    * No resolved hospital problems. *       Assessment & Plan:   1. Chest pain. Known non obstructive CAD by [address] [place] [address] 3/2019. Troponin negative. Evaluated by cardiology, no concern for ACS. Continue asa and statin. 2. Bradycardia. Remains in 2:1 AV block. Presently hemodynamically stable. EP planning for pacemaker placement Monday. At risk for hemodynamic instability, unable to be performed as out patient. Continue to monitor. EP on board. 3. HTN. Elevated. Amlodipine resumed today. Continue hydralazine. Continue to follow. 4. DM2. A1c 7.2. Blood glucose values controlled. Continue Lantus 34 units and medium dose correction. 5. HLD. Continue statin.        Diet: Diet NPO, After Midnight  DIET CARDIAC;  Code:Full Code  DVT PPX: enoxaparin      Kathy Eric, APRN - CNP   5/8/2021 12:26 PM

## 2021-05-08 NOTE — PROGRESS NOTES
Assessment and med pass complete. Meds passed whole with water. Prn med passed per request. Was hesitant to take PO hydralazine but was willing to take 25mg out of the 50mg that was scheduled, to see how it would make her feel. She says the IV hydralazine makes her feel dizzy. Up independently in room. Got water, tissues, and turkey sandwich per request. Denies other needs, call light in reach.

## 2021-05-09 LAB
ANION GAP SERPL CALCULATED.3IONS-SCNC: 10 MMOL/L (ref 3–16)
BASOPHILS ABSOLUTE: 0.1 K/UL (ref 0–0.2)
BASOPHILS RELATIVE PERCENT: 1.1 %
BUN BLDV-MCNC: 18 MG/DL (ref 7–20)
CALCIUM SERPL-MCNC: 10 MG/DL (ref 8.3–10.6)
CHLORIDE BLD-SCNC: 104 MMOL/L (ref 99–110)
CO2: 25 MMOL/L (ref 21–32)
CREAT SERPL-MCNC: 0.7 MG/DL (ref 0.6–1.1)
EOSINOPHILS ABSOLUTE: 0.2 K/UL (ref 0–0.6)
EOSINOPHILS RELATIVE PERCENT: 2.4 %
GFR AFRICAN AMERICAN: >60
GFR NON-AFRICAN AMERICAN: >60
GLUCOSE BLD-MCNC: 127 MG/DL (ref 70–99)
GLUCOSE BLD-MCNC: 128 MG/DL (ref 70–99)
GLUCOSE BLD-MCNC: 138 MG/DL (ref 70–99)
GLUCOSE BLD-MCNC: 165 MG/DL (ref 70–99)
GLUCOSE BLD-MCNC: 219 MG/DL (ref 70–99)
HCT VFR BLD CALC: 40 % (ref 36–48)
HEMOGLOBIN: 12.9 G/DL (ref 12–16)
LYMPHOCYTES ABSOLUTE: 3.4 K/UL (ref 1–5.1)
LYMPHOCYTES RELATIVE PERCENT: 34.5 %
MAGNESIUM: 2 MG/DL (ref 1.8–2.4)
MCH RBC QN AUTO: 27.7 PG (ref 26–34)
MCHC RBC AUTO-ENTMCNC: 32.3 G/DL (ref 31–36)
MCV RBC AUTO: 85.7 FL (ref 80–100)
MONOCYTES ABSOLUTE: 0.8 K/UL (ref 0–1.3)
MONOCYTES RELATIVE PERCENT: 7.9 %
NEUTROPHILS ABSOLUTE: 5.3 K/UL (ref 1.7–7.7)
NEUTROPHILS RELATIVE PERCENT: 54.1 %
PDW BLD-RTO: 15.3 % (ref 12.4–15.4)
PERFORMED ON: ABNORMAL
PHOSPHORUS: 4.8 MG/DL (ref 2.5–4.9)
PLATELET # BLD: 208 K/UL (ref 135–450)
PMV BLD AUTO: 9.5 FL (ref 5–10.5)
POTASSIUM SERPL-SCNC: 4.1 MMOL/L (ref 3.5–5.1)
RBC # BLD: 4.66 M/UL (ref 4–5.2)
SODIUM BLD-SCNC: 139 MMOL/L (ref 136–145)
WBC # BLD: 9.8 K/UL (ref 4–11)

## 2021-05-09 PROCEDURE — 6370000000 HC RX 637 (ALT 250 FOR IP): Performed by: INTERNAL MEDICINE

## 2021-05-09 PROCEDURE — 83735 ASSAY OF MAGNESIUM: CPT

## 2021-05-09 PROCEDURE — 1200000000 HC SEMI PRIVATE

## 2021-05-09 PROCEDURE — 6360000002 HC RX W HCPCS: Performed by: INTERNAL MEDICINE

## 2021-05-09 PROCEDURE — 85025 COMPLETE CBC W/AUTO DIFF WBC: CPT

## 2021-05-09 PROCEDURE — 36415 COLL VENOUS BLD VENIPUNCTURE: CPT

## 2021-05-09 PROCEDURE — 80048 BASIC METABOLIC PNL TOTAL CA: CPT

## 2021-05-09 PROCEDURE — 99215 OFFICE O/P EST HI 40 MIN: CPT | Performed by: NURSE PRACTITIONER

## 2021-05-09 PROCEDURE — 84100 ASSAY OF PHOSPHORUS: CPT

## 2021-05-09 PROCEDURE — 6370000000 HC RX 637 (ALT 250 FOR IP): Performed by: PHYSICIAN ASSISTANT

## 2021-05-09 PROCEDURE — 2580000003 HC RX 258: Performed by: INTERNAL MEDICINE

## 2021-05-09 RX ORDER — AMLODIPINE BESYLATE 5 MG/1
10 TABLET ORAL DAILY
Status: DISCONTINUED | OUTPATIENT
Start: 2021-05-10 | End: 2021-05-11 | Stop reason: HOSPADM

## 2021-05-09 RX ADMIN — ACETAMINOPHEN 650 MG: 325 TABLET ORAL at 20:58

## 2021-05-09 RX ADMIN — ATORVASTATIN CALCIUM 40 MG: 40 TABLET, FILM COATED ORAL at 20:58

## 2021-05-09 RX ADMIN — INSULIN LISPRO 1 UNITS: 100 INJECTION, SOLUTION INTRAVENOUS; SUBCUTANEOUS at 21:01

## 2021-05-09 RX ADMIN — FAMOTIDINE 20 MG: 20 TABLET ORAL at 20:58

## 2021-05-09 RX ADMIN — KETOTIFEN FUMARATE 2 DROP: 0.35 SOLUTION/ DROPS OPHTHALMIC at 20:58

## 2021-05-09 RX ADMIN — KETOTIFEN FUMARATE 2 DROP: 0.35 SOLUTION/ DROPS OPHTHALMIC at 08:36

## 2021-05-09 RX ADMIN — ENOXAPARIN SODIUM 40 MG: 40 INJECTION SUBCUTANEOUS at 08:35

## 2021-05-09 RX ADMIN — INSULIN GLARGINE 34 UNITS: 100 INJECTION, SOLUTION SUBCUTANEOUS at 21:01

## 2021-05-09 RX ADMIN — LATANOPROST 1 DROP: 50 SOLUTION OPHTHALMIC at 20:58

## 2021-05-09 RX ADMIN — Medication 10 ML: at 08:35

## 2021-05-09 RX ADMIN — ACETAMINOPHEN 650 MG: 325 TABLET ORAL at 01:41

## 2021-05-09 RX ADMIN — HYDRALAZINE HYDROCHLORIDE 50 MG: 25 TABLET, FILM COATED ORAL at 22:56

## 2021-05-09 RX ADMIN — INSULIN LISPRO 4 UNITS: 100 INJECTION, SOLUTION INTRAVENOUS; SUBCUTANEOUS at 14:10

## 2021-05-09 RX ADMIN — SIMETHICONE 80 MG: 80 TABLET, CHEWABLE ORAL at 08:35

## 2021-05-09 RX ADMIN — HYDRALAZINE HYDROCHLORIDE 50 MG: 25 TABLET, FILM COATED ORAL at 04:22

## 2021-05-09 RX ADMIN — ASPIRIN 81 MG: 81 TABLET, CHEWABLE ORAL at 08:35

## 2021-05-09 RX ADMIN — Medication 10 ML: at 20:58

## 2021-05-09 RX ADMIN — FAMOTIDINE 20 MG: 20 TABLET ORAL at 08:35

## 2021-05-09 RX ADMIN — AMLODIPINE BESYLATE 5 MG: 5 TABLET ORAL at 08:35

## 2021-05-09 RX ADMIN — HYDRALAZINE HYDROCHLORIDE 50 MG: 25 TABLET, FILM COATED ORAL at 14:17

## 2021-05-09 ASSESSMENT — PAIN DESCRIPTION - LOCATION
LOCATION: HEAD
LOCATION: ABDOMEN

## 2021-05-09 ASSESSMENT — PAIN SCALES - GENERAL
PAINLEVEL_OUTOF10: 7
PAINLEVEL_OUTOF10: 0
PAINLEVEL_OUTOF10: 0

## 2021-05-09 ASSESSMENT — PAIN DESCRIPTION - PAIN TYPE
TYPE: ACUTE PAIN
TYPE: ACUTE PAIN

## 2021-05-09 ASSESSMENT — PAIN DESCRIPTION - DESCRIPTORS: DESCRIPTORS: CRAMPING

## 2021-05-09 NOTE — PLAN OF CARE
Problem: Cardiovascular  Goal: Hemodynamic stability  Description: Pt has been stable on tele. Plan for pacemaker placement tomorrow. Note: Pt has been stable on tele. Plan for pacemaker placement tomorrow.

## 2021-05-09 NOTE — PROGRESS NOTES
[facility] PROGRESS NOTE    5/9/2021 8:55 AM        Name: Tameka Blanton . Admitted: 5/6/2021  Primary Care Provider: Kylee Stein MD (Tel: [phone])      Chief Complaint   Patient presents with    Chest Pain     pt c/o complications form covid for past 2 months, today she is coming in with chest pain that is getting worse. radiating to left arm and between shoulder blades, elevated bp and heart rate     Brief History: Patient is a 62 yo female with history DM2, HTN, HLD, non obstructive CAD, SHIRLEY, fibromyalgia, GERD. She presented to hospital with c/o chest pain, lightheadedness. Found to be bradycardic with HR 30s-40s and 2:1 AV block. Pacemaker is planned for Monday. Subjective:  Presently up in bedside chair. Experienced some dizziness overnight, BP elevated at time. Remains in 2:1 heart block, rate 40s, intermittent SR with rate 60s. Denies chest pain, shortness of breath. Pacer implant planned for tomorrow.        Reviewed interval ancillary notes    Current Medications  lip balm petroleum free (PHYTOPLEX) stick, PRN  hydrALAZINE (APRESOLINE) tablet 50 mg, 3 times per day  amLODIPine (NORVASC) tablet 5 mg, Daily  insulin glargine (LANTUS;BASAGLAR) injection pen 34 Units, Nightly  simethicone (MYLICON) chewable tablet 80 mg, Q6H PRN  nitroGLYCERIN (NITROSTAT) SL tablet 0.4 mg, Q5 Min PRN  albuterol sulfate  (90 Base) MCG/ACT inhaler 2 puff, Q4H PRN  diazePAM (VALIUM) tablet 2 mg, BID PRN  famotidine (PEPCID) tablet 20 mg, BID  insulin lispro (1 Unit Dial) 0-12 Units, TID WC  insulin lispro (1 Unit Dial) 0-6 Units, Nightly  glucose (GLUTOSE) 40 % oral gel 15 g, PRN  dextrose 50 % IV solution, PRN  glucagon (rDNA) injection 1 mg, PRN  dextrose 5 % solution, PRN  ketotifen (ZADITOR) 0.025 % ophthalmic solution 2 drop, BID  latanoprost (XALATAN) 0.005 % ophthalmic solution 1 drop, Nightly  sodium chloride flush 0.9 % injection 5-40 mL, 2 times per day  sodium chloride flush 0.9 % injection 5-40 mL, PRN  0.9 % sodium chloride infusion, PRN  promethazine (PHENERGAN) tablet 12.5 mg, Q6H PRN    Or  ondansetron (ZOFRAN) injection 4 mg, Q6H PRN  polyethylene glycol (GLYCOLAX) packet 17 g, Daily PRN  aspirin chewable tablet 81 mg, Daily  atorvastatin (LIPITOR) tablet 40 mg, Nightly  perflutren lipid microspheres (DEFINITY) injection 1.65 mg, ONCE PRN  potassium chloride (KLOR-CON M) extended release tablet 40 mEq, PRN    Or  potassium bicarb-citric acid (EFFER-K) effervescent tablet 40 mEq, PRN    Or  potassium chloride 10 mEq/100 mL IVPB (Peripheral Line), PRN  magnesium sulfate 2000 mg in 50 mL IVPB premix, PRN  enoxaparin (LOVENOX) injection 40 mg, Daily  hydrALAZINE (APRESOLINE) injection 10 mg, Q4H PRN  acetaminophen (TYLENOL) tablet 650 mg, Q4H PRN    0.9 % sodium chloride infusion, Continuous        Objective:  BP (!) 167/59   Pulse (!) 41   Temp 97.9 °F (36.6 °C) (Temporal)   Resp 16   Ht 5' 2\" (1.575 m)   Wt 196 lb 11.2 oz (89.2 kg)   LMP  (LMP Unknown)   SpO2 97%   BMI 35.98 kg/m²     Intake/Output Summary (Last 24 hours) at 5/9/2021 0855  Last data filed at 5/8/2021 1900  Gross per 24 hour   Intake 960 ml   Output --   Net 960 ml      Wt Readings from Last 3 Encounters:   05/09/21 196 lb 11.2 oz (89.2 kg)   12/04/20 200 lb 1.6 oz (90.8 kg)   11/19/20 201 lb (91.2 kg)     General:  Awake, alert, oriented in NAD  Skin:  Warm and dry. No unusual bruising or rash  Neck:  Supple. No JVD appreciated  Chest:  Normal effort.   Clear to auscultation, no wheezes/rhonchi/rales  Cardiovascular:  RRR, bradycardic, normal S1/S2, no murmur/gallop/rub  Abdomen:  Soft, nontender, +bowel sounds  Extremities:  No edema  Neurological: No focal deficits  Psychological: Normal mood and affect      Labs and Tests:  CBC:   Recent Labs     05/07/21  0508 05/08/21  0544 05/09/21  0647   WBC 9.6 8.9 9.8   HGB 13.1 13.0 12.9    212 208     BMP:    Recent Labs     05/07/21  0508 05/08/21  0544 05/09/21  0647    142 139   K 4.1 4.4 4.1    105 104   CO2 27 29 25   BUN 17 14 18   CREATININE 0.8 0.7 0.7   GLUCOSE 126* 133* 138*     Hepatic:   Recent Labs     05/06/21  1715   AST 36   ALT 39   BILITOT 0.7   ALKPHOS 20*       CXR 5/6/2021:  No acute cardiopulmonary process. Problem List  Active Problems:    Chest pain    Heart block atrioventricular  Resolved Problems:    * No resolved hospital problems. *       Assessment & Plan:   1. Chest pain. Known non obstructive CAD by [facility] 3/2019. Troponin negative. Evaluated by cardiology, no concern for ACS. Continue asa and statin. 2. Bradycardia. Remains in 2:1 AV block. Presently hemodynamically stable. EP planning for pacemaker placement Monday. At risk for hemodynamic instability, unable to be performed as out patient. Continue to monitor. EP on board. 3. HTN. Fluctuating. Continue amlodipine and hydralazine. Continue to follow. 4. DM2. A1c 7.2. Blood glucose values controlled. Continue Lantus 34 units and medium dose correction. 5. HLD. Continue statin. Disposition: Anticipate home no needs post pacer implant.      Diet: Diet NPO, After Midnight  DIET CARDIAC;  Code:Full Code  DVT PPX: enoxaparin      KRISHNA Hastings - CNP   5/9/2021 8:55 AM

## 2021-05-09 NOTE — PROGRESS NOTES
[facility]   Electrophysiology Progress Note     Date: 5/9/2021  Admit Date: 5/6/2021     Reason for consultation: Bradycardia    Chief Complaint:   Chief Complaint   Patient presents with    Chest Pain     pt c/o complications form covid for past 2 months, today she is coming in with chest pain that is getting worse. radiating to left arm and between shoulder blades, elevated bp and heart rate       History of Present Illness: History obtained from patient and medical record. Montez Leong is a 61 y.o. female with a past medical history of DM, HTN, HLD, obesity, non obstructive CAD, and SHIRLEY. Pt presented to the hospital complaining of chest pressure, lightheadedness and dizziness. Pt noted that her heart rate was slow. She states that for the last week she felt fatigue. She was working at home and felt short of breath and chest pressure. She also noted her heart rates was a slow at 40 bpm. When she came to emergency room and found to have bradycardia with 2-1 AV block. She has been admitted and her COVID-19 test is pending. She states that about 2-month ago she started having symptoms with chest pressure or shortness of breath and thought that she had Covid. She did not seek medical care.     Interval Hx: Today, she is being seen for follow up. Pt is currently in 2:1 AV block that is asymptomatic. She did have some dizziness overnight with elevated BP. We discussed need for pacemaker in the morning, R/B/A discussed, and she is ready to proceed. Patient seen and examined. Clinical notes reviewed. Telemetry reviewed. No new complaints today. No major events overnight. Denies having chest pain, palpitations, shortness of breath, orthopnea/PND, cough, or dizziness at the time of this visit. Allergies:   Allergies   Allergen Reactions    Lamictal [Lamotrigine] Swelling and Rash    Macrodantin [Nitrofurantoin Macrocrystal] Shortness Of Breath     Caused an asthma attack    Penicillins Hives and Shortness Of Breath    Shellfish-Derived Products Swelling     Throat and tongue swells, allergy to all seafood    Aspartame And Phenylalanine      Severe headaches    Bactrim Hives    Biaxin [Clarithromycin] Hives    Cephalexin Other (See Comments)     blisters    Ciprofloxacin Other (See Comments)     Causes severe pain and tendon tears per pt    Hydrocodone-Acetaminophen Other (See Comments)     HALLUCINATIONS    Lansoprazole Hives    Nexium [Esomeprazole Magnesium Trihydrate] Hives    Other Other (See Comments)     TEGADERM-BLISTERS    Prilosec [Omeprazole] Hives    Blueberry [Vaccinium Angustifolium] Nausea And Vomiting     Projectile vomiting    Monosodium Glutamate Nausea And Vomiting    Zmax [Azithromycin Dihydrate] Nausea And Vomiting     NOT Z PACK its the Powder you had to mix and drink     Home Meds:  Prior to Visit Medications    Medication Sig Taking? Authorizing Provider   loratadine (CLARITIN) 10 MG tablet TAKE ONE TABLET BY MOUTH DAILY Yes Ruchi Menendez MD   diazePAM (VALIUM) 2 MG tablet Take 1 tablet by mouth 2 times daily as needed for Anxiety for up to 60 days. Yes KRISHNA Causey - CNP   guaiFENesin (MUCINEX MAXIMUM STRENGTH) 1200 MG TB12 Take 1 tablet by mouth daily For a PM dose.  Yes Ruchi Menendez MD   albuterol sulfate  (90 Base) MCG/ACT inhaler INHALE TWO PUFFS BY MOUTH EVERY 6 HOURS AS NEEDED FOR WHEEZING Yes Ruchi Menendez MD   ibuprofen (ADVIL;MOTRIN) 800 MG tablet TAKE ONE TABLET BY MOUTH EVERY 6 HOURS AS NEEDED FOR PAIN INDICATIONS Yes Ruchi Menendez MD   Cholecalciferol (VITAMIN D3) 250 MCG (38480 UT) CAPS TAKE ONE CAPSULE BY MOUTH DAILY Yes Ruchi Menendez MD   ketotifen (ZADITOR) 0.025 % ophthalmic solution INSTILL TWO DROPS IN [facility] EYE TWO TIMES A DAY AS NEEDED FOR ALLERGY Yes Ruchi Menendez MD   Insulin Pen Needle (PEN NEEDLES) 32G X 4 MM MISC USE WITH INSULIN PEN ONCE DAILY Yes Lety Roque MD   TRUEplus Lancets 28G MISC USE ONE LANCET TO TEST THREE TO FOUR TIMES A DAY Yes Jerome Minor MD   insulin glargine (LANTUS SOLOSTAR) 100 UNIT/ML injection pen Inject 34 Units into the skin daily Yes Jerome Minor MD   ASPIRIN LOW DOSE 81 MG EC tablet TAKE ONE TABLET BY MOUTH DAILY Yes KRISHNA Fall CNP   famotidine (PEPCID) 40 MG tablet Take 1 tablet by mouth 2 times daily  Patient taking differently: Take 40 mg by mouth 2 times daily Indications: taking 1-2 times/day  Yes Anne Rock MD   latanoprost (XALATAN) 0.005 % ophthalmic solution Place 1 drop into both eyes nightly Yes Historical Provider, MD   nystatin (MYCOSTATIN) 328808 UNIT/GM powder Apply 3 times daily. Yes Darya Carpio MD   nystatin (MYCOSTATIN) 281768 UNIT/GM cream Apply topically 2 times daily. Yes Darya Carpio MD   oxymetazoline (AFRIN) 0.05 % nasal spray 2 sprays each nostril, 3 times a day for 3 days, then 2 times a day for 2 days, then stop for 2 days and then repeat the cycle once. Patient taking differently: Indications: infrequently 2 sprays each nostril, 3 times a day for 3 days, then 2 times a day for 2 days, then stop for 2 days and then repeat the cycle once. Yes Antonino Segundo MD   Cranberry 1000 MG CAPS Take by mouth Indications: OTC, takes two caps daily, does not know strength. 3 capsules daily Yes Historical Provider, MD   Multiple Vitamins-Minerals (THERAPEUTIC MULTIVITAMIN-MINERALS) tablet Take 1 tablet by mouth daily Yes Historical Provider, MD   blood glucose test strips (ASCENSIA AUTODISC VI;ONE TOUCH ULTRA TEST VI) strip 2 each by In Vitro route daily As needed. Yes Jerome Minor MD   Blood Glucose Monitoring Suppl (TRUE METRIX METER) w/Device KIT As needed Yes Jerome Minor MD   blood glucose monitor strips Test blood sugar 2-3 times  a day Yes Jerome Minor MD   blood glucose test strips (ASCENSIA AUTODISC VI;ONE TOUCH ULTRA TEST VI) strip 1-2 time a day As needed.  Yes Jerome Minor MD   nystatin (MYCOSTATIN) 216090 UNIT/GM cream APPLY EXTERNALLY TO THE AFFECTED AREAS TWO TIMES A DAY Yes Ashley Mullen MD   Disposable Gloves (VINYL GLOVES LARGE) MISC 1 Package by Does not apply route 4 times daily Yes Ashley Mullen MD   vitamin D3 (CHOLECALCIFEROL) 25 MCG (1000 UT) TABS tablet Take 1 tablet by mouth daily  Patient not taking: Reported on 2/3/2021  Pb Stevens MD   rosuvastatin (CRESTOR) 5 MG tablet Take 1 tablet by mouth daily  Delvin Gonzalez MD   cyclobenzaprine (FLEXERIL) 10 MG tablet Take 0.5 tablets by mouth daily as needed for Muscle spasms  Ashley Mullen MD   Cholecalciferol (VITAMIN D3) 25 MCG (1000 UT) TABS Take 1 tablet by mouth daily  Patient taking differently: Take 10,000 Units by mouth every 7 days   Ashley Mullen MD   Incontinence Supply Disposable (TRE CLASSIC BRIEFS/LARGE) MISC 1 each by Does not apply route 3 times daily  Ashley Mullen MD   Incontinence Supplies MISC 1 Package by Does not apply route 2 times daily  Ashley Mullen MD   GAS-X EXTRA STRENGTH 125 MG chewable tablet CHEW TWO TABLETS BY MOUTH THREE TIMES A DAY WITH EACH MEAL AS NEEDED FOR FLATULENCE  Ashley Mullen MD      Scheduled Meds:   hydrALAZINE  50 mg Oral 3 times per day    amLODIPine  5 mg Oral Daily    insulin glargine  34 Units Subcutaneous Nightly    famotidine  20 mg Oral BID    insulin lispro  0-12 Units Subcutaneous TID WC    insulin lispro  0-6 Units Subcutaneous Nightly    ketotifen  2 drop Both Eyes BID    latanoprost  1 drop Both Eyes Nightly    sodium chloride flush  5-40 mL Intravenous 2 times per day    aspirin  81 mg Oral Daily    atorvastatin  40 mg Oral Nightly    enoxaparin  40 mg Subcutaneous Daily     Continuous Infusions:   dextrose      sodium chloride       PRN Meds:lip balm petroleum free, simethicone, nitroGLYCERIN, albuterol sulfate HFA, diazePAM, glucose, dextrose, glucagon (rDNA), dextrose, sodium chloride flush, sodium chloride, promethazine **OR** ondansetron, polyethylene glycol, perflutren lipid microspheres, potassium chloride **OR** potassium alternative oral replacement **OR** potassium chloride, magnesium sulfate, hydrALAZINE, acetaminophen     Past Medical History:  Past Medical History:   Diagnosis Date    Ankle fracture, left     Ankle fracture, right     Arthritis     Asthma     Bipolar depression (HCC)     CAN'T AFFORD MEDS    Bladder problem     Cervical disc herniation     COVID toes     Diabetes mellitus ([facility] [address]     diet control    Fatty liver disease, non-alcoholic     Fibromyalgia     Gastroparesis     Dr John Casillas GERD (gastroesophageal reflux disease)     gastroporesis    Glaucoma     Dr Kanu Farfan Hyperlipidemia     elevated LFT on meds    IBS (irritable bowel syndrome)     Lumbar herniated disc     Nausea & vomiting     Nephrolithiasis 1/29/2019    Obesity (BMI 30-39.9) 3/11/2019    Partial Achilles tendon tear     Pneumonia     PONV (postoperative nausea and vomiting)     RA (rheumatoid arthritis) ([facility] [address]     Rapid or irregular heartbeat     Sleep apnea     does not use cpap    Spinal stenosis     Stress incontinence     Thyroid disease     growths        Past Surgical History:    has a past surgical history that includes Cholecystectomy; Dilation and curettage of uterus; Tonsillectomy; laparoscopy; Upper gastrointestinal endoscopy; Endometrial ablation; ERCP (5/25/2010); Esophagoscopy (10/5/10); Upper gastrointestinal endoscopy (04/12/2011); eye surgery; Upper gastrointestinal endoscopy (08/30/2011); Upper gastrointestinal endoscopy (3-9-12); Upper gastrointestinal endoscopy; Upper gastrointestinal endoscopy (11/20/12); Upper gastrointestinal endoscopy (6/4/13); ERCP (1-31-14); Upper gastrointestinal endoscopy (5/20/2014); Upper gastrointestinal endoscopy (12/12/14); Upper gastrointestinal endoscopy (09/09/2015);  Endoscopy, colon, diagnostic; Upper gastrointestinal endoscopy (5/10/2016); Upper gastrointestinal endoscopy (04/11/2017); Upper gastrointestinal endoscopy (10/24/2017); Upper gastrointestinal endoscopy (03/06/2018); Colonoscopy; Colonoscopy (03/06/2018); Upper gastrointestinal endoscopy (N/A, 5/7/2019); Upper gastrointestinal endoscopy (N/A, 5/7/2019); Upper gastrointestinal endoscopy (N/A, 3/13/2020); Upper gastrointestinal endoscopy (N/A, 3/13/2020); Upper gastrointestinal endoscopy (N/A, 10/6/2020); and Upper gastrointestinal endoscopy (N/A, 10/6/2020). Social History:  Reviewed. reports that she has never smoked. She has never used smokeless tobacco. She reports that she does not drink alcohol or use drugs. Family History:  Reviewed. family history includes Diabetes in her brother and mother; Heart Disease in her brother, father, and mother; High Blood Pressure in her brother; High Cholesterol in her brother and brother; Stroke in her mother. Review of Systems:  · Constitutional: Negative for fever, night sweats, chills, weight changes, or weakness  · Skin: Negative for rash, dry skin, pruritus, bruising, bleeding, blood clots, or changes in skin pigment  · HEENT: Negative for vision changes, ringing in the ears, sore throat, dysphagia, or swollen lymph nodes  · Respiratory: Reviewed in HPI  · Cardiovascular: Reviewed in HPI  · Gastrointestinal: Negative for abdominal pain, N/V/D, constipation, or black/tarry stools  · Genito-Urinary: Negative for dysuria, incontinence, urgency, or hematuria  · Musculoskeletal: Negative for joint swelling, muscle pain, or injuries  · Neurological/Psych: Negative for confusion, seizures, headaches, balance issues or TIA-like symptoms.  No anxiety, depression, or insomnia    Physical Examination:  Vitals:    05/09/21 0831   BP: (!) 167/59   Pulse: (!) 41   Resp: 16   Temp: 97.9 °F (36.6 °C)   SpO2: 97%      In: 720 [P.O.:720]  Out: -    Wt Readings from Last 3 Encounters:   05/09/21 196 lb 11.2 oz (89.2 kg)   12/04/20 200 lb 1.6 2021    HDL 33 2021    HDL 36 2010    TRIG 112 2021     LFTS:   Lab Results   Component Value Date    ALT 39 2021    AST 36 2021    ALKPHOS 20 2021    PROT 8.0 2021    PROT 7.7 2012    AGRATIO 1.4 2021    BILITOT 0.7 2021     Cardiac Enzymes:   Lab Results   Component Value Date    CKTOTAL 129 2016    TROPONINI <0.01 2021    TROPONINI <0.01 2021    TROPONINI <0.01 2021     Coags:   Lab Results   Component Value Date    PROTIME 11.7 2019    INR 1.03 2019     EK21  NSR. 2:1 AV block     ECHO: 3/19   -Normal left ventricle size, wall thickness and systolic function with an   estimated ejection fraction of 55%.   - No regional wall motion abnormalities are seen. Normal diastolic function.  Trivial tricuspid regurgitation.     Stress Test:   Lunette Kem is normal isotope uptake at stress and rest. There is no evidence of myocardial ischemia or scar.   Normal LV size and systolic function. Left ventricular ejection fraction of 58 %.     Cath: 3/19  Findings:                      LM       Normal              LAD     20% mid              Cx        Normal              RCA     Normal              LVG     55%              EDP     5 mmHg  Severe Ca++:None  Post Cath Dx:Nonobstructive CAD  Intervention:  None     Problem List:   Patient Active Problem List    Diagnosis Date Noted    Heart block atrioventricular 2021    Dizziness 2020    Bilateral hearing loss 2020    Sensation of fullness in ear, bilateral 2020    Coronary artery disease due to lipid rich plaque 2019    Unstable angina ([address] 2019    Obesity (BMI 30-39.9) 2019    Nephrolithiasis 2019    Chest pain 2019    Moderate episode of recurrent major depressive disorder ([address] 2018    Hypercholesteremia 2017    Obstructive sleep apnea syndrome 2017    Disequilibrium syndrome 2016  Subjective tinnitus of both ears 09/26/2016    Bilateral high frequency sensorineural hearing loss 09/26/2016    IBS (irritable bowel syndrome) 08/25/2015    Multiple thyroid nodules 06/18/2015    Thyroid cyst 07/22/2014    Vitamin D deficiency 07/22/2014    Rheumatoid arthritis ([facility] 11/11/2013    Gastroparesis 11/11/2013    Urine incontinence 04/15/2013    Asthma 04/15/2013    Chronic back pain 04/15/2013    DM2 (diabetes mellitus, type 2) ([facility] 01/05/2012    GERD (gastroesophageal reflux disease) 01/05/2012    Fibromyalgia 01/05/2012        Assessment and Plan:     1. Symptomatic Bradycardia    - Stable, remains in 2:1 AV block with hemodynamic stability   - May start isupril gtt if develops worsening AV block or hemodynamic instability     - The risks, benefits and alternatives of the procedure were discussed with the patient. The risks including, but not limited to, the risks of bleeding, infection, pain, device malfunction, lead dislodgement, radiation exposure, injury to cardiac and surrounding structures (including pneumothorax), stroke, cardiac perforation, tamponade, need for emergent heart surgery, myocardial infarction and death were discussed in detail. Dual vs single chamber device, including risks and benefits were discussed with patient.      ----- > The patient opted to proceed with the device implantation. Will plan for PPM placement tomorrow with Dr. Singh Chance (Keep NPO after midnight)     2. CAD  - Hx of non obstructive CAD  - Stable  - Continue ASA, and statin    3. HTN  - Slightly uncontrolled: Goal <130/80  - Continue current medications   ~ Increase amlodipine to 10 mg daily. May consider adding BB/CCB following PPM placement    4. Hyperlipidemia  - Controlled. Goal: LDL <100   ~  (5/21)  - On statin (resume rosuvastatin 5 mg QD at d/c)  - Discussed diet, weight loss, and exercise needs  - Followed per PCP    5. SHIRLEY  - Stable: Uses CPAP  - Non-compliant with CPAP therapy.

## 2021-05-10 ENCOUNTER — NURSE ONLY (OUTPATIENT)
Dept: CARDIOLOGY CLINIC | Age: 56
End: 2021-05-10

## 2021-05-10 DIAGNOSIS — Z95.0 PACEMAKER: Primary | ICD-10-CM

## 2021-05-10 DIAGNOSIS — I44.30 HEART BLOCK ATRIOVENTRICULAR: ICD-10-CM

## 2021-05-10 LAB
ANION GAP SERPL CALCULATED.3IONS-SCNC: 10 MMOL/L (ref 3–16)
BASOPHILS ABSOLUTE: 0.1 K/UL (ref 0–0.2)
BASOPHILS RELATIVE PERCENT: 0.7 %
BUN BLDV-MCNC: 17 MG/DL (ref 7–20)
CALCIUM SERPL-MCNC: 9.7 MG/DL (ref 8.3–10.6)
CHLORIDE BLD-SCNC: 104 MMOL/L (ref 99–110)
CO2: 25 MMOL/L (ref 21–32)
CREAT SERPL-MCNC: 0.7 MG/DL (ref 0.6–1.1)
EOSINOPHILS ABSOLUTE: 0.2 K/UL (ref 0–0.6)
EOSINOPHILS RELATIVE PERCENT: 1.7 %
GFR AFRICAN AMERICAN: >60
GFR NON-AFRICAN AMERICAN: >60
GLUCOSE BLD-MCNC: 100 MG/DL (ref 70–99)
GLUCOSE BLD-MCNC: 144 MG/DL (ref 70–99)
GLUCOSE BLD-MCNC: 283 MG/DL (ref 70–99)
GLUCOSE BLD-MCNC: 91 MG/DL (ref 70–99)
HCT VFR BLD CALC: 40 % (ref 36–48)
HEMOGLOBIN: 13.1 G/DL (ref 12–16)
LYMPHOCYTES ABSOLUTE: 3.4 K/UL (ref 1–5.1)
LYMPHOCYTES RELATIVE PERCENT: 31.8 %
MAGNESIUM: 2 MG/DL (ref 1.8–2.4)
MCH RBC QN AUTO: 28.1 PG (ref 26–34)
MCHC RBC AUTO-ENTMCNC: 32.8 G/DL (ref 31–36)
MCV RBC AUTO: 85.9 FL (ref 80–100)
MONOCYTES ABSOLUTE: 0.9 K/UL (ref 0–1.3)
MONOCYTES RELATIVE PERCENT: 8.6 %
NEUTROPHILS ABSOLUTE: 6.1 K/UL (ref 1.7–7.7)
NEUTROPHILS RELATIVE PERCENT: 57.2 %
PDW BLD-RTO: 15.1 % (ref 12.4–15.4)
PERFORMED ON: ABNORMAL
PERFORMED ON: ABNORMAL
PERFORMED ON: NORMAL
PHOSPHORUS: 4.9 MG/DL (ref 2.5–4.9)
PLATELET # BLD: 211 K/UL (ref 135–450)
PMV BLD AUTO: 9.2 FL (ref 5–10.5)
POTASSIUM SERPL-SCNC: 4 MMOL/L (ref 3.5–5.1)
RBC # BLD: 4.66 M/UL (ref 4–5.2)
SODIUM BLD-SCNC: 139 MMOL/L (ref 136–145)
WBC # BLD: 10.7 K/UL (ref 4–11)

## 2021-05-10 PROCEDURE — 6360000002 HC RX W HCPCS

## 2021-05-10 PROCEDURE — C1894 INTRO/SHEATH, NON-LASER: HCPCS

## 2021-05-10 PROCEDURE — 80048 BASIC METABOLIC PNL TOTAL CA: CPT

## 2021-05-10 PROCEDURE — C1785 PMKR, DUAL, RATE-RESP: HCPCS

## 2021-05-10 PROCEDURE — 99152 MOD SED SAME PHYS/QHP 5/>YRS: CPT

## 2021-05-10 PROCEDURE — 6360000002 HC RX W HCPCS: Performed by: INTERNAL MEDICINE

## 2021-05-10 PROCEDURE — 6370000000 HC RX 637 (ALT 250 FOR IP): Performed by: INTERNAL MEDICINE

## 2021-05-10 PROCEDURE — 84100 ASSAY OF PHOSPHORUS: CPT

## 2021-05-10 PROCEDURE — 36415 COLL VENOUS BLD VENIPUNCTURE: CPT

## 2021-05-10 PROCEDURE — 6370000000 HC RX 637 (ALT 250 FOR IP): Performed by: NURSE PRACTITIONER

## 2021-05-10 PROCEDURE — C1898 LEAD, PMKR, OTHER THAN TRANS: HCPCS

## 2021-05-10 PROCEDURE — C1769 GUIDE WIRE: HCPCS

## 2021-05-10 PROCEDURE — 99152 MOD SED SAME PHYS/QHP 5/>YRS: CPT | Performed by: INTERNAL MEDICINE

## 2021-05-10 PROCEDURE — 33208 INSRT HEART PM ATRIAL & VENT: CPT

## 2021-05-10 PROCEDURE — 6370000000 HC RX 637 (ALT 250 FOR IP): Performed by: PHYSICIAN ASSISTANT

## 2021-05-10 PROCEDURE — 02HK3JZ INSERTION OF PACEMAKER LEAD INTO RIGHT VENTRICLE, PERCUTANEOUS APPROACH: ICD-10-PCS | Performed by: INTERNAL MEDICINE

## 2021-05-10 PROCEDURE — 99153 MOD SED SAME PHYS/QHP EA: CPT

## 2021-05-10 PROCEDURE — 02H63JZ INSERTION OF PACEMAKER LEAD INTO RIGHT ATRIUM, PERCUTANEOUS APPROACH: ICD-10-PCS | Performed by: INTERNAL MEDICINE

## 2021-05-10 PROCEDURE — 2580000003 HC RX 258: Performed by: INTERNAL MEDICINE

## 2021-05-10 PROCEDURE — 83735 ASSAY OF MAGNESIUM: CPT

## 2021-05-10 PROCEDURE — 2580000003 HC RX 258: Performed by: NURSE PRACTITIONER

## 2021-05-10 PROCEDURE — 2500000003 HC RX 250 WO HCPCS

## 2021-05-10 PROCEDURE — 1200000000 HC SEMI PRIVATE

## 2021-05-10 PROCEDURE — 33208 INSRT HEART PM ATRIAL & VENT: CPT | Performed by: INTERNAL MEDICINE

## 2021-05-10 PROCEDURE — 2580000003 HC RX 258

## 2021-05-10 PROCEDURE — 0JH606Z INSERTION OF PACEMAKER, DUAL CHAMBER INTO CHEST SUBCUTANEOUS TISSUE AND FASCIA, OPEN APPROACH: ICD-10-PCS | Performed by: INTERNAL MEDICINE

## 2021-05-10 PROCEDURE — 94760 N-INVAS EAR/PLS OXIMETRY 1: CPT

## 2021-05-10 PROCEDURE — C1887 CATHETER, GUIDING: HCPCS

## 2021-05-10 PROCEDURE — 85025 COMPLETE CBC W/AUTO DIFF WBC: CPT

## 2021-05-10 RX ORDER — SODIUM CHLORIDE 9 MG/ML
25 INJECTION, SOLUTION INTRAVENOUS PRN
Status: DISCONTINUED | OUTPATIENT
Start: 2021-05-10 | End: 2021-05-11 | Stop reason: HOSPADM

## 2021-05-10 RX ORDER — SODIUM CHLORIDE 0.9 % (FLUSH) 0.9 %
5-40 SYRINGE (ML) INJECTION PRN
Status: DISCONTINUED | OUTPATIENT
Start: 2021-05-10 | End: 2021-05-11 | Stop reason: HOSPADM

## 2021-05-10 RX ORDER — SODIUM CHLORIDE 0.9 % (FLUSH) 0.9 %
5-40 SYRINGE (ML) INJECTION EVERY 12 HOURS SCHEDULED
Status: DISCONTINUED | OUTPATIENT
Start: 2021-05-10 | End: 2021-05-11 | Stop reason: HOSPADM

## 2021-05-10 RX ADMIN — Medication 10 ML: at 08:58

## 2021-05-10 RX ADMIN — HYDRALAZINE HYDROCHLORIDE 50 MG: 25 TABLET, FILM COATED ORAL at 06:02

## 2021-05-10 RX ADMIN — AMLODIPINE BESYLATE 10 MG: 5 TABLET ORAL at 08:57

## 2021-05-10 RX ADMIN — INSULIN LISPRO 3 UNITS: 100 INJECTION, SOLUTION INTRAVENOUS; SUBCUTANEOUS at 20:57

## 2021-05-10 RX ADMIN — INSULIN GLARGINE 34 UNITS: 100 INJECTION, SOLUTION SUBCUTANEOUS at 20:56

## 2021-05-10 RX ADMIN — FAMOTIDINE 20 MG: 20 TABLET ORAL at 08:57

## 2021-05-10 RX ADMIN — ACETAMINOPHEN 650 MG: 325 TABLET ORAL at 16:26

## 2021-05-10 RX ADMIN — KETOTIFEN FUMARATE 2 DROP: 0.35 SOLUTION/ DROPS OPHTHALMIC at 08:58

## 2021-05-10 RX ADMIN — FAMOTIDINE 20 MG: 20 TABLET ORAL at 20:54

## 2021-05-10 RX ADMIN — KETOTIFEN FUMARATE 2 DROP: 0.35 SOLUTION/ DROPS OPHTHALMIC at 20:55

## 2021-05-10 RX ADMIN — HYDRALAZINE HYDROCHLORIDE 50 MG: 25 TABLET, FILM COATED ORAL at 20:54

## 2021-05-10 RX ADMIN — HYDRALAZINE HYDROCHLORIDE 50 MG: 25 TABLET, FILM COATED ORAL at 16:26

## 2021-05-10 RX ADMIN — SIMETHICONE 80 MG: 80 TABLET, CHEWABLE ORAL at 20:54

## 2021-05-10 RX ADMIN — LATANOPROST 1 DROP: 50 SOLUTION OPHTHALMIC at 20:55

## 2021-05-10 RX ADMIN — ACETAMINOPHEN 650 MG: 325 TABLET ORAL at 22:53

## 2021-05-10 RX ADMIN — ASPIRIN 81 MG: 81 TABLET, CHEWABLE ORAL at 08:57

## 2021-05-10 RX ADMIN — Medication 10 ML: at 20:56

## 2021-05-10 RX ADMIN — ATORVASTATIN CALCIUM 40 MG: 40 TABLET, FILM COATED ORAL at 20:54

## 2021-05-10 ASSESSMENT — PAIN SCALES - GENERAL
PAINLEVEL_OUTOF10: 7
PAINLEVEL_OUTOF10: 0

## 2021-05-10 NOTE — PROGRESS NOTES
Pt back from pacemaker insertion. VSS and sister at bedside. Site clean , dry and intact. Denies any pain or any needs. Call light in reach and bed alarm engaged.

## 2021-05-10 NOTE — DISCHARGE SUMMARY
how you take these medications    blood glucose test strips  Test blood sugar 2-3 times  a day  What changed: Another medication with the same name was removed. Continue taking this medication, and follow the directions you see here. nystatin 897300 UNIT/GM cream  Commonly known as: MYCOSTATIN  APPLY EXTERNALLY TO THE AFFECTED AREAS TWO TIMES A DAY  What changed: Another medication with the same name was removed. Continue taking this medication, and follow the directions you see here. Notes to patient: For yeast/fungal infections  Side effects: burning      vitamin D3 25 MCG (1000 UT) Tabs tablet  Commonly known as: CHOLECALCIFEROL  Take 1 tablet by mouth daily  What changed:   · how much to take  · when to take this  · Another medication with the same name was removed. Continue taking this medication, and follow the directions you see here. Notes to patient: Use: Dietary supplement, helps absorb calcium better. Side effects: upset stomach, nausea        CONTINUE taking these medications    albuterol sulfate  (90 Base) MCG/ACT inhaler  INHALE TWO PUFFS BY MOUTH EVERY 6 HOURS AS NEEDED FOR WHEEZING  Notes to patient: Use: Bronchodilator to open your air way in the even of emergencies, shortness of breath. Side effects: Jitteriness, increased heart rate, thrush (coating on tongue). Aspirin Low Dose 81 MG EC tablet  Generic drug: aspirin  TAKE ONE TABLET BY MOUTH DAILY  Notes to patient: Thins blood to prevent clotting, ie heart attack or stroke  Side effects: may cause extra bruising or bleeding      Cranberry 1000 MG Caps     cyclobenzaprine 10 MG tablet  Commonly known as: FLEXERIL  Take 0.5 tablets by mouth daily as needed for Muscle spasms     diazePAM 2 MG tablet  Commonly known as: Valium  Take 1 tablet by mouth 2 times daily as needed for Anxiety for up to 60 days.      famotidine 40 MG tablet  Commonly known as: PEPCID  Take 1 tablet by mouth 2 times daily     Gas-X Extra Strength 125 MG chewable tablet  Generic drug: simethicone  CHEW TWO TABLETS BY MOUTH THREE TIMES A DAY WITH EACH MEAL AS NEEDED FOR FLATULENCE     guaiFENesin 1200 MG Tb12  Commonly known as: Mucinex Maximum Strength  Take 1 tablet by mouth daily For a PM dose. Incontinence Supplies Misc  1 Package by Does not apply route 2 times daily     ketotifen 0.025 % ophthalmic solution  Commonly known as: ZADITOR  INSTILL TWO DROPS IN [facility] EYE TWO TIMES A DAY AS NEEDED FOR ALLERGY     Lantus SoloStar 100 UNIT/ML injection pen  Generic drug: insulin glargine  Inject 34 Units into the skin daily     latanoprost 0.005 % ophthalmic solution  Commonly known as: XALATAN     loratadine 10 MG tablet  Commonly known as: CLARITIN  TAKE ONE TABLET BY MOUTH DAILY     Pen Needles 32G X 4 MM Misc  USE WITH INSULIN PEN ONCE DAILY     rosuvastatin 5 MG tablet  Commonly known as: CRESTOR  Take 1 tablet by mouth daily     Kirsten Classic Briefs/Large Misc  1 each by Does not apply route 3 times daily     therapeutic multivitamin-minerals tablet     True Metrix Meter w/Device Kit  As needed     TRUEplus Lancets 28G Misc  USE ONE LANCET TO TEST THREE TO FOUR TIMES A DAY     Vinyl Gloves Large Misc  1 Package by Does not apply route 4 times daily        STOP taking these medications    ibuprofen 800 MG tablet  Commonly known as: ADVIL;MOTRIN     oxymetazoline 0.05 % nasal spray  Commonly known as: AFRIN           Where to Get Your Medications      These medications were sent to [address], [address] [place] [address] Road    Phone: [phone]   · Blood Pressure Kit  · guaiFENesin 1200 MG Tb12  · loratadine 10 MG tablet  · sodium chloride 0.65 % nasal spray     You can get these medications from any pharmacy    Bring a paper prescription for each of these medications  · traMADol 50 MG tablet         Discharge recommendations given to patient. Follow Up.  PCP in 1 week Disposition. home  Activity. activity as tolerated, left arm restrictions for 2 months  Diet: No diet orders on file      Spent greater than 30 minutes in discharge process. Signed:   KRISHNA Badillo CNP     5/13/2021 7:33 PM

## 2021-05-10 NOTE — PROGRESS NOTES
H&P Update    I have reviewed the history and physical and examined the patient and updated with relevant changes. Consent: I have discussed with the patient and/or the patient representative the indication, alternatives, and the possible risks and/or complications of the planned procedure and the anesthesia methods. The patient and/or patient representative appear to understand and agree to proceed. Symptomatic bradycardia with 2;1 AV block. Vitals:    05/10/21 0851   BP: (!) 166/58   Pulse: 71   Resp: 16   Temp: 98.3 °F (36.8 °C)   SpO2: 97%     Prior to Admission medications    Medication Sig Start Date End Date Taking? Authorizing Provider   loratadine (CLARITIN) 10 MG tablet TAKE ONE TABLET BY MOUTH DAILY 5/3/21  Yes Konrad Hinkle MD   diazePAM (VALIUM) 2 MG tablet Take 1 tablet by mouth 2 times daily as needed for Anxiety for up to 60 days. 3/26/21 5/25/21 Yes KRISHNA Ventura - CNP   guaiFENesin [facility] MAXIMUM STRENGTH) 1200 MG TB12 Take 1 tablet by mouth daily For a PM dose.  3/12/21  Yes Konrad Hinkle MD   albuterol sulfate  (90 Base) MCG/ACT inhaler INHALE TWO PUFFS BY MOUTH EVERY 6 HOURS AS NEEDED FOR WHEEZING 3/8/21  Yes Konrad Hinkle MD   ibuprofen (ADVIL;MOTRIN) 800 MG tablet TAKE ONE TABLET BY MOUTH EVERY 6 HOURS AS NEEDED FOR PAIN INDICATIONS 3/1/21  Yes Konrad Hinkle MD   Cholecalciferol (VITAMIN D3) 250 MCG (20672 UT) CAPS TAKE ONE CAPSULE BY MOUTH DAILY 1/15/21  Yes Konrad Hinkle MD   ketotifen (ZADITOR) 0.025 % ophthalmic solution INSTILL TWO DROPS IN [facility] EYE TWO TIMES A DAY AS NEEDED FOR ALLERGY 12/10/20  Yes Konrad Hinkle MD   Insulin Pen Needle (PEN NEEDLES) 32G X 4 MM MISC USE WITH INSULIN PEN ONCE DAILY 11/10/20  Yes Delores Patel MD   TRUEplus Lancets 28G MISC USE ONE LANCET TO TEST THREE TO FOUR TIMES A DAY 11/10/20  Yes Delores Patel MD   insulin glargine (LANTUS SOLOSTAR) 100 UNIT/ML injection pen Inject 34 Units into the skin daily 11/10/20  Yes Lissa Jett MD   ASPIRIN LOW DOSE 81 MG EC tablet TAKE ONE TABLET BY MOUTH DAILY 11/3/20  Yes KRISHNA Barajas CNP   famotidine (PEPCID) 40 MG tablet Take 1 tablet by mouth 2 times daily  Patient taking differently: Take 40 mg by mouth 2 times daily Indications: taking 1-2 times/day  10/6/20  Yes Nohemy Fernández MD   latanoprost (XALATAN) 0.005 % ophthalmic solution Place 1 drop into both eyes nightly   Yes Historical Provider, MD   nystatin (MYCOSTATIN) 459996 UNIT/GM powder Apply 3 times daily. 8/20/20  Yes Pb Stevens MD   nystatin (MYCOSTATIN) 198065 UNIT/GM cream Apply topically 2 times daily. 8/6/20  Yes Pb Stevens MD   oxymetazoline (AFRIN) 0.05 % nasal spray 2 sprays each nostril, 3 times a day for 3 days, then 2 times a day for 2 days, then stop for 2 days and then repeat the cycle once. Patient taking differently: Indications: infrequently 2 sprays each nostril, 3 times a day for 3 days, then 2 times a day for 2 days, then stop for 2 days and then repeat the cycle once. 7/10/20  Yes Gorge Marcano MD   Cranberry 1000 MG CAPS Take by mouth Indications: OTC, takes two caps daily, does not know strength. 3 capsules daily   Yes Historical Provider, MD   Multiple Vitamins-Minerals (THERAPEUTIC MULTIVITAMIN-MINERALS) tablet Take 1 tablet by mouth daily   Yes Historical Provider, MD   blood glucose test strips (ASCENSIA AUTODISC VI;ONE TOUCH ULTRA TEST VI) strip 2 each by In Vitro route daily As needed. 1/31/20  Yes Lissa Jett MD   Blood Glucose Monitoring Suppl (TRUE METRIX METER) w/Device KIT As needed 1/31/20  Yes Lissa Jett MD   blood glucose monitor strips Test blood sugar 2-3 times  a day 10/16/19  Yes Lissa Jett MD   blood glucose test strips (ASCENSIA AUTODISC VI;ONE TOUCH ULTRA TEST VI) strip 1-2 time a day As needed.  10/1/19  Yes Lissa Jett MD   nystatin (MYCOSTATIN) 402583 UNIT/GM cream APPLY EXTERNALLY TO THE AFFECTED AREAS TWO TIMES A DAY 6/6/19  Yes Gilles Jaquez MD   Disposable Gloves (VINYL GLOVES LARGE) MISC 1 Package by Does not apply route 4 times daily 3/4/19  Yes Gilles Jaquez MD   vitamin D3 (CHOLECALCIFEROL) 25 MCG (1000 UT) TABS tablet Take 1 tablet by mouth daily  Patient not taking: Reported on 2/3/2021 1/15/21   Avery Cabrales MD   rosuvastatin (CRESTOR) 5 MG tablet Take 1 tablet by mouth daily 11/19/20   Jordy Kaplan MD   cyclobenzaprine (FLEXERIL) 10 MG tablet Take 0.5 tablets by mouth daily as needed for Muscle spasms 2/20/20   Gilles Jaquez MD   Cholecalciferol (VITAMIN D3) 25 MCG (1000 UT) TABS Take 1 tablet by mouth daily  Patient taking differently: Take 10,000 Units by mouth every 7 days  11/26/19   Gilles Jaquez MD   Incontinence Supply Disposable (TRE CLASSIC BRIEFS/LARGE) MISC 1 each by Does not apply route 3 times daily 3/4/19   Gilles Jaquez MD   Incontinence Supplies MISC 1 Package by Does not apply route 2 times daily 3/4/19   Gilles Jaquez MD   GAS-X EXTRA STRENGTH 125 MG chewable tablet CHEW TWO TABLETS BY MOUTH THREE TIMES A DAY WITH EACH MEAL AS NEEDED FOR FLATULENCE 12/13/18   Gilles Jaquez MD     Past Medical History:   Diagnosis Date    Ankle fracture, left     Ankle fracture, right     Arthritis     Asthma     Bipolar depression ([facility] [address]     CAN'T AFFORD MEDS    Bladder problem     Cervical disc herniation     COVID toes     Diabetes mellitus ([facility] [address]     diet control    Fatty liver disease, non-alcoholic     Fibromyalgia     Gastroparesis     Dr Elidia Ag GERD (gastroesophageal reflux disease)     gastroporesis    Glaucoma     Dr Violeta Zambrano Hyperlipidemia     elevated LFT on meds    IBS (irritable bowel syndrome)     Lumbar herniated disc     Nausea & vomiting     Nephrolithiasis 1/29/2019    Obesity (BMI 30-39.9) 3/11/2019    Partial Achilles tendon tear     Pneumonia     PONV (postoperative nausea and vomiting)     RA N/A 5/7/2019    EGD SUBMUCOSAL/BOTOX INJECTION performed by Jabier Dupree MD at [place] [address] N/A 5/7/2019    EGD DILATION BALLOON performed by Jabier Dupree MD at [place] [address] N/A 3/13/2020    EGD SUBMUCOSAL/BOTOX INJECTION performed by Jabier Dupree MD at [place] [address] N/A 3/13/2020    EGD DILATION BALLOON performed by Jabier Dupree MD at [address] ENDOSCOPY N/A 10/6/2020    EGD DILATION BALLOON performed by Jabier Dupree MD at [place] [address] N/A 10/6/2020    EGD SUBMUCOSAL/BOTOX INJECTION performed by Jabier Dupree MD at [address] [facility] ENDOSCOPY     Allergies   Allergen Reactions    Lamictal [Lamotrigine] Swelling and Rash    Macrodantin [Nitrofurantoin Macrocrystal] Shortness Of Breath     Caused an asthma attack    Penicillins Hives and Shortness Of Breath    Shellfish-Derived Products Swelling     Throat and tongue swells, allergy to all seafood    Aspartame And Phenylalanine      Severe headaches    Bactrim Hives    Biaxin [Clarithromycin] Hives    Cephalexin Other (See Comments)     blisters    Ciprofloxacin Other (See Comments)     Causes severe pain and tendon tears per pt    Hydrocodone-Acetaminophen Other (See Comments)     HALLUCINATIONS    Lansoprazole Hives    Nexium [Esomeprazole Magnesium Trihydrate] Hives    Other Other (See Comments)     TEGADERM-BLISTERS    Prilosec [Omeprazole] Hives    Blueberry [Vaccinium Angustifolium] Nausea And Vomiting     Projectile vomiting    Monosodium Glutamate Nausea And Vomiting    Zmax [Azithromycin Dihydrate] Nausea And Vomiting     NOT Z PACK its the Powder you had to mix and drink       Pre-Sedation Documentation and Exam:   I have personally completed a history, physical exam & review of systems for this patient (see notes).     Mallampati Airway Assessment:  Class II     Prior History of Anesthesia Complications:   None    ASA Classification:  Class 2 - A normal healthy patient with mild systemic disease    Sedation/ Anesthesia Plan:   Intravenous sedation    Medications Planned:   Midazolam (Versed) and Fentanyl intravenously    Patient is an appropriate candidate for plan of sedation:   Yes    Electronically signed by Brennan Washburn MD on 5/10/2021 at 11:04 AM

## 2021-05-10 NOTE — PROGRESS NOTES
[facility] PROGRESS NOTE    5/10/2021 1:56 PM        Name: Priscila Siddiqui . Admitted: 5/6/2021  Primary Care Provider: Shasta Prado MD (Tel: [phone])      Chief Complaint   Patient presents with    Chest Pain     pt c/o complications form covid for past 2 months, today she is coming in with chest pain that is getting worse. radiating to left arm and between shoulder blades, elevated bp and heart rate     Brief History: Patient is a 60 yo female with history DM2, HTN, HLD, non obstructive CAD, SHIRLEY, fibromyalgia, GERD. She presented to hospital with c/o chest pain, lightheadedness. Found to be bradycardic with HR 30s-40s and 2:1 AV block. Pacemaker is planned for Monday. Subjective:  Presently resting in bed. Just returned from pacer implant, sister at bedside. Patient is drowsy. Offers no complaints at present. Left arm in swath, left subclavian dressing C/D/I. AS/ rhythm on monitor.      Reviewed interval ancillary notes    Current Medications  amLODIPine (NORVASC) tablet 10 mg, Daily  lip balm petroleum free (PHYTOPLEX) stick, PRN  hydrALAZINE (APRESOLINE) tablet 50 mg, 3 times per day  insulin glargine (LANTUS;BASAGLAR) injection pen 34 Units, Nightly  simethicone (MYLICON) chewable tablet 80 mg, Q6H PRN  nitroGLYCERIN (NITROSTAT) SL tablet 0.4 mg, Q5 Min PRN  albuterol sulfate  (90 Base) MCG/ACT inhaler 2 puff, Q4H PRN  diazePAM (VALIUM) tablet 2 mg, BID PRN  famotidine (PEPCID) tablet 20 mg, BID  insulin lispro (1 Unit Dial) 0-12 Units, TID WC  insulin lispro (1 Unit Dial) 0-6 Units, Nightly  glucose (GLUTOSE) 40 % oral gel 15 g, PRN  dextrose 50 % IV solution, PRN  glucagon (rDNA) injection 1 mg, PRN  dextrose 5 % solution, PRN  ketotifen (ZADITOR) 0.025 % ophthalmic solution 2 drop, BID  latanoprost (XALATAN) 0.005 % ophthalmic solution 1 drop, Nightly  sodium chloride flush 0.9 % injection 5-40 mL, 2 times per day  sodium chloride flush 0.9 % injection 5-40 mL, PRN  0.9 % sodium chloride infusion, PRN  promethazine (PHENERGAN) tablet 12.5 mg, Q6H PRN    Or  ondansetron (ZOFRAN) injection 4 mg, Q6H PRN  polyethylene glycol (GLYCOLAX) packet 17 g, Daily PRN  aspirin chewable tablet 81 mg, Daily  atorvastatin (LIPITOR) tablet 40 mg, Nightly  perflutren lipid microspheres (DEFINITY) injection 1.65 mg, ONCE PRN  potassium chloride (KLOR-CON M) extended release tablet 40 mEq, PRN    Or  potassium bicarb-citric acid (EFFER-K) effervescent tablet 40 mEq, PRN    Or  potassium chloride 10 mEq/100 mL IVPB (Peripheral Line), PRN  magnesium sulfate 2000 mg in 50 mL IVPB premix, PRN  enoxaparin (LOVENOX) injection 40 mg, Daily  hydrALAZINE (APRESOLINE) injection 10 mg, Q4H PRN  acetaminophen (TYLENOL) tablet 650 mg, Q4H PRN    0.9 % sodium chloride infusion, Continuous      Objective:  BP (!) 162/91   Pulse 76   Temp 98 °F (36.7 °C) (Temporal)   Resp 16   Ht 5' 2\" (1.575 m)   Wt 195 lb 8 oz (88.7 kg)   LMP  (LMP Unknown)   SpO2 96%   BMI 35.76 kg/m²     Intake/Output Summary (Last 24 hours) at 5/10/2021 1356  Last data filed at 5/9/2021 2256  Gross per 24 hour   Intake 720 ml   Output --   Net 720 ml      Wt Readings from Last 3 Encounters:   05/10/21 195 lb 8 oz (88.7 kg)   12/04/20 200 lb 1.6 oz (90.8 kg)   11/19/20 201 lb (91.2 kg)     General:  Drowsy post procedure   Skin:  Warm and dry. Neck:  Supple. No JVD appreciated  Chest:  Normal effort.   Clear to auscultation, no wheezes/rhonchi/rales, left subclavian dressing C/D/I, no hematoma  Cardiovascular:  RRR, normal S1/S2, no murmur/gallop/rub  Abdomen:  Soft, nontender, +bowel sounds  Extremities:  No edema, left arm in swath  Neurological: No focal deficits  Psychological: Normal mood and affect    Labs and Tests:  CBC:   Recent Labs     05/08/21  0544 05/09/21  0647 05/10/21  0510   WBC 8.9 9.8 10.7   HGB 13.0 12.9 13.1    208

## 2021-05-10 NOTE — PLAN OF CARE
Problem: Pain:  Goal: Pain level will decrease  Description: Pain level will decrease  Outcome: Ongoing   Pt can rate pain on a 0-10 scale. Pt pain will remain at a level that is tolerable to pt. PRN pain medication as needed. Problem: Cardiovascular  Goal: Hemodynamic stability  Description: Pt has been stable on tele. Plan for pacemaker placement tomorrow.    Outcome: Ongoing   VSS and monitored q4 hrs

## 2021-05-10 NOTE — PROGRESS NOTES
Pt awake in bed eating. VSS and sisier at bedside. Denies pain or any other needs. Call lightn n reach.

## 2021-05-10 NOTE — PROCEDURES
[address]     Electrophysiology Procedure Note       Date of Procedure: 5/10/2021  Patient's Name: Gogo Nettles  YOB: 1965   Medical Record Number: [id number]  Referring Physician: Iraida Kwon MD  Procedure Performed by: Annetta Tomlin MD    Procedures performed:     · Insertion of MRI compatible right ventricular pacing lead under fluoroscopy. · Insertion of MRI compatible right atrial lead under fluoroscopy  · Insertion of a MRI compatible dual chamber Pacemaker  · IV sedation. · Sedation start time: 11:28  · Sedation stop time: 13:23   · Fentanyl: 200 Mcg  · Versed: 4 Mg  · An independent trained nursing staff gave the medication under my supervision. · Programming and analysis of dual chamber pacemaker. Indication of the procedure: Non-reversible symptomatic bradycardia, 2:1 AV block and Mobitz type II   Gogo Nettles is a 61 y.o. female presented to the hospital with lightheadedness and dizziness and chest pressure. Found to have marked bradycardia with 2-1 AV block and Mobitz type II AV block. No reversible cause was found. She is here for implantation of dual-chamber pacemaker. Details of procedure: The patient was brought to the electrophysiology laboratory in stable condition. The patient was in a fasting and non-sedated state. The risks, benefits and alternatives of the procedure were discussed with the patient. The risks including, but not limited to, the risks of vascular injury, bleeding, infection, device malfunction, lead dislodgement, radiation exposure, injury to cardiac and surrounding structures (including pneumothorax), stroke, myocardial infarction and death were discussed in detail. Patient opted to proceed with the device implantation. Written informed consent was signed and placed in the chart. Prophylactic antibiotic was given. The patient was prepped and draped in a sterile fashion.  A timeout protocol was completed to identify the patient and the procedure being performed. IV sedation was provided with IV Versed, Fentanyl. An incision was made in the left pectoral area after administration of lidocaine. Using electrocautery and blunt dissection, a pocket was created. Central venous access into the left axillary vein was obtained using the modified Seldinger technique under live US guidance. After central venous access was obtained, a sheath was placed in the axillary vein. Medtronic C315 sheath in addition to Glide wire was used and the sheath was advanced into the RVOT. Then Medtronic lead 3830 was advanced into the sheath. The sheath and lead were used for pace mapping. Using continuous pace mapping , we mapped locations that met non-selective His bundle pacing criteria with shortest QRS duration. We could not obtain complete non-selective His bundle pacing since the sheath could not reach high enough and kept positioning it in lower part of RVOT. Final global QRS duration was ~130  ms. The lead was fixated on the location. The the sheath was cut and removed. A new sheath was advanced over a second previously placed wire into the vein. The atrial lead was advanced to the right atrial appendage under fluoroscopic guidance. The lead was actively fixated. After confirming appropriate function, the sheath was split and removed. The lead was secured to the underlying tissue using suture. The pocket was irrigated with an antibiotic solution. The leads were then connected to the new pulse generator, dual chamber pacemaker, which was then placed into the cleaned pocket. A dual chamber pacemaker was implanted for non-reversible symptomatic bradycardia due to Mobitz type II AV block, 2:1 AV block. to provide physiological pacing. The pulse generator was sutured inside the pocket. The pocket was then closed in three separate subcutaneous layers using 2-0 & 3-0 vicryl and subcuticular layer using 4-0 vicryl. The skin was covered with pressure dressing. All sponge and needle counts were reported as correct at the end of the procedure. The patient tolerated the procedure well and there were no complications. Post-sedation evaluation was completed. Patient was transported to the holding area in stable condition. EBL less than 20 ml. Lead and device information:       Plan:   The patient will be observed  and have usual post-implant care, including chest x-ray, and antibiotic therapy and interrogation of the device.

## 2021-05-10 NOTE — PROGRESS NOTES
Pt awake in chair. VSS, assessment complete and medications given. Denies any needs. Call light in reach.

## 2021-05-11 ENCOUNTER — NURSE ONLY (OUTPATIENT)
Dept: CARDIOLOGY CLINIC | Age: 56
End: 2021-05-11
Payer: COMMERCIAL

## 2021-05-11 ENCOUNTER — APPOINTMENT (OUTPATIENT)
Dept: GENERAL RADIOLOGY | Age: 56
DRG: 171 | End: 2021-05-11
Payer: COMMERCIAL

## 2021-05-11 VITALS
HEART RATE: 74 BPM | DIASTOLIC BLOOD PRESSURE: 70 MMHG | WEIGHT: 195.5 LBS | OXYGEN SATURATION: 97 % | BODY MASS INDEX: 35.98 KG/M2 | TEMPERATURE: 98.6 F | RESPIRATION RATE: 16 BRPM | SYSTOLIC BLOOD PRESSURE: 135 MMHG | HEIGHT: 62 IN

## 2021-05-11 DIAGNOSIS — Z95.0 PACEMAKER: ICD-10-CM

## 2021-05-11 DIAGNOSIS — R00.1 SYMPTOMATIC BRADYCARDIA: ICD-10-CM

## 2021-05-11 DIAGNOSIS — I44.1 MOBITZ TYPE II ATRIOVENTRICULAR BLOCK: ICD-10-CM

## 2021-05-11 DIAGNOSIS — I44.30 HEART BLOCK ATRIOVENTRICULAR: ICD-10-CM

## 2021-05-11 LAB
ANION GAP SERPL CALCULATED.3IONS-SCNC: 7 MMOL/L (ref 3–16)
BASOPHILS ABSOLUTE: 0.1 K/UL (ref 0–0.2)
BASOPHILS RELATIVE PERCENT: 1.3 %
BUN BLDV-MCNC: 20 MG/DL (ref 7–20)
CALCIUM SERPL-MCNC: 9.6 MG/DL (ref 8.3–10.6)
CHLORIDE BLD-SCNC: 103 MMOL/L (ref 99–110)
CO2: 29 MMOL/L (ref 21–32)
CREAT SERPL-MCNC: 0.9 MG/DL (ref 0.6–1.1)
EOSINOPHILS ABSOLUTE: 0.2 K/UL (ref 0–0.6)
EOSINOPHILS RELATIVE PERCENT: 2 %
GFR AFRICAN AMERICAN: >60
GFR NON-AFRICAN AMERICAN: >60
GLUCOSE BLD-MCNC: 115 MG/DL (ref 70–99)
GLUCOSE BLD-MCNC: 138 MG/DL (ref 70–99)
GLUCOSE BLD-MCNC: 231 MG/DL (ref 70–99)
HCT VFR BLD CALC: 38 % (ref 36–48)
HEMOGLOBIN: 12.4 G/DL (ref 12–16)
LYMPHOCYTES ABSOLUTE: 2.8 K/UL (ref 1–5.1)
LYMPHOCYTES RELATIVE PERCENT: 27.9 %
MAGNESIUM: 2 MG/DL (ref 1.8–2.4)
MCH RBC QN AUTO: 28.2 PG (ref 26–34)
MCHC RBC AUTO-ENTMCNC: 32.7 G/DL (ref 31–36)
MCV RBC AUTO: 86.2 FL (ref 80–100)
MONOCYTES ABSOLUTE: 0.9 K/UL (ref 0–1.3)
MONOCYTES RELATIVE PERCENT: 8.8 %
NEUTROPHILS ABSOLUTE: 6 K/UL (ref 1.7–7.7)
NEUTROPHILS RELATIVE PERCENT: 60 %
PDW BLD-RTO: 15.1 % (ref 12.4–15.4)
PERFORMED ON: ABNORMAL
PERFORMED ON: ABNORMAL
PHOSPHORUS: 5.5 MG/DL (ref 2.5–4.9)
PLATELET # BLD: 197 K/UL (ref 135–450)
PMV BLD AUTO: 8.6 FL (ref 5–10.5)
POTASSIUM SERPL-SCNC: 4.6 MMOL/L (ref 3.5–5.1)
RBC # BLD: 4.4 M/UL (ref 4–5.2)
SODIUM BLD-SCNC: 139 MMOL/L (ref 136–145)
WBC # BLD: 10 K/UL (ref 4–11)

## 2021-05-11 PROCEDURE — 6370000000 HC RX 637 (ALT 250 FOR IP): Performed by: INTERNAL MEDICINE

## 2021-05-11 PROCEDURE — 71045 X-RAY EXAM CHEST 1 VIEW: CPT

## 2021-05-11 PROCEDURE — 80048 BASIC METABOLIC PNL TOTAL CA: CPT

## 2021-05-11 PROCEDURE — 84100 ASSAY OF PHOSPHORUS: CPT

## 2021-05-11 PROCEDURE — 99232 SBSQ HOSP IP/OBS MODERATE 35: CPT | Performed by: NURSE PRACTITIONER

## 2021-05-11 PROCEDURE — 6370000000 HC RX 637 (ALT 250 FOR IP): Performed by: NURSE PRACTITIONER

## 2021-05-11 PROCEDURE — 6360000002 HC RX W HCPCS: Performed by: INTERNAL MEDICINE

## 2021-05-11 PROCEDURE — 6370000000 HC RX 637 (ALT 250 FOR IP): Performed by: PHYSICIAN ASSISTANT

## 2021-05-11 PROCEDURE — 85025 COMPLETE CBC W/AUTO DIFF WBC: CPT

## 2021-05-11 PROCEDURE — 83735 ASSAY OF MAGNESIUM: CPT

## 2021-05-11 PROCEDURE — 36415 COLL VENOUS BLD VENIPUNCTURE: CPT

## 2021-05-11 PROCEDURE — 2580000003 HC RX 258: Performed by: NURSE PRACTITIONER

## 2021-05-11 RX ORDER — HYDRALAZINE HYDROCHLORIDE 50 MG/1
50 TABLET, FILM COATED ORAL EVERY 8 HOURS SCHEDULED
Qty: 30 TABLET | Refills: 0 | Status: SHIPPED | OUTPATIENT
Start: 2021-05-11 | End: 2021-05-13 | Stop reason: SDUPTHER

## 2021-05-11 RX ORDER — AMLODIPINE BESYLATE 10 MG/1
10 TABLET ORAL DAILY
Qty: 30 TABLET | Refills: 0 | Status: SHIPPED | OUTPATIENT
Start: 2021-05-11 | End: 2021-05-13 | Stop reason: SDUPTHER

## 2021-05-11 RX ORDER — ECHINACEA PURPUREA EXTRACT 125 MG
1 TABLET ORAL PRN
Qty: 1 BOTTLE | Refills: 0 | Status: SHIPPED | OUTPATIENT
Start: 2021-05-11 | End: 2021-06-25

## 2021-05-11 RX ORDER — LORATADINE 10 MG/1
TABLET ORAL
Qty: 30 TABLET | Refills: 0 | Status: SHIPPED | OUTPATIENT
Start: 2021-05-11 | End: 2021-05-18 | Stop reason: SDUPTHER

## 2021-05-11 RX ORDER — GUAIFENESIN 1200 MG/1
1 TABLET, EXTENDED RELEASE ORAL DAILY
Qty: 10 TABLET | Refills: 0 | Status: SHIPPED | OUTPATIENT
Start: 2021-05-11

## 2021-05-11 RX ORDER — BLOOD PRESSURE TEST KIT
1 KIT MISCELLANEOUS DAILY
Qty: 1 KIT | Refills: 0 | Status: SHIPPED | OUTPATIENT
Start: 2021-05-11

## 2021-05-11 RX ORDER — TRAMADOL HYDROCHLORIDE 50 MG/1
50 TABLET ORAL EVERY 8 HOURS PRN
Qty: 9 TABLET | Refills: 0 | Status: SHIPPED | OUTPATIENT
Start: 2021-05-11 | End: 2021-05-14

## 2021-05-11 RX ADMIN — ASPIRIN 81 MG: 81 TABLET, CHEWABLE ORAL at 09:59

## 2021-05-11 RX ADMIN — HYDRALAZINE HYDROCHLORIDE 50 MG: 25 TABLET, FILM COATED ORAL at 13:58

## 2021-05-11 RX ADMIN — HYDRALAZINE HYDROCHLORIDE 50 MG: 25 TABLET, FILM COATED ORAL at 05:27

## 2021-05-11 RX ADMIN — ACETAMINOPHEN 650 MG: 325 TABLET ORAL at 09:59

## 2021-05-11 RX ADMIN — ENOXAPARIN SODIUM 40 MG: 40 INJECTION SUBCUTANEOUS at 10:00

## 2021-05-11 RX ADMIN — ACETAMINOPHEN 650 MG: 325 TABLET ORAL at 13:59

## 2021-05-11 RX ADMIN — FAMOTIDINE 20 MG: 20 TABLET ORAL at 09:59

## 2021-05-11 RX ADMIN — KETOTIFEN FUMARATE 2 DROP: 0.35 SOLUTION/ DROPS OPHTHALMIC at 10:00

## 2021-05-11 RX ADMIN — AMLODIPINE BESYLATE 10 MG: 5 TABLET ORAL at 09:59

## 2021-05-11 RX ADMIN — Medication 10 ML: at 10:00

## 2021-05-11 RX ADMIN — SIMETHICONE 80 MG: 80 TABLET, CHEWABLE ORAL at 10:05

## 2021-05-11 RX ADMIN — INSULIN LISPRO 4 UNITS: 100 INJECTION, SOLUTION INTRAVENOUS; SUBCUTANEOUS at 14:00

## 2021-05-11 ASSESSMENT — PAIN SCALES - GENERAL
PAINLEVEL_OUTOF10: 5
PAINLEVEL_OUTOF10: 6

## 2021-05-11 ASSESSMENT — PAIN DESCRIPTION - PAIN TYPE: TYPE: SURGICAL PAIN

## 2021-05-11 NOTE — PROGRESS NOTES
CLINICAL PHARMACY NOTE: MEDS TO [address] Select Patient?: Yes  Total # of Prescriptions Filled: 4   The following medications were delivered to the patient:  · Mucus relief 600mg  · Hydralazine 50mg  · Saline nasal spray  · Amlodipine 10mg  Total # of Interventions Completed: 0  Time Spent (min): 30    Additional Documentation:  Claritin last filled at [facility] 5/3/21. Blood pressure cuff not covered on insurance. $49.99 to sell OTC.   Medications delivered- patient signed  Patricia Maria

## 2021-05-11 NOTE — PROGRESS NOTES
[facility]   Electrophysiology Progress Note   Date: 5/11/2021  Admit Date: 5/6/2021     Reason for follow up: AVB, HTN    Chief Complaint:   Chief Complaint   Patient presents with    Chest Pain     pt c/o complications form covid for past 2 months, today she is coming in with chest pain that is getting worse. radiating to left arm and between shoulder blades, elevated bp and heart rate     History of Present Illness: History obtained from patient and medical record. Brittany Hernandez is a 61 y.o. female with a past medical history of HTN, HLD, DM, obesity, SHIRLEY, and nonobstructive CAD who presented with chest pressure and lightheadedness x 2 months. Found to be in 2:1 AVB on ECG with no reversible causes. Interval Hx: Today, she is s/p dual chamber PPM 5/10/2021 (Dr. Marshall Mckeon). Feeling much better. Denies lightheadedness, more energy today. Denies CP. Admits to some tenderness at the PM site, no swelling. No new complaints today. No major events overnight. Denies having chest pain, palpitations, shortness of breath, or dizziness. Patient seen and examined. Clinical notes reviewed. Telemetry reviewed.     Problem List:   Patient Active Problem List    Diagnosis Date Noted    Pacemaker 05/10/2021    Mobitz type II atrioventricular block     Symptomatic bradycardia     Heart block atrioventricular 05/08/2021    Dizziness 07/09/2020    Bilateral hearing loss 07/09/2020    Sensation of fullness in ear, bilateral 07/09/2020    Coronary artery disease due to lipid rich plaque 03/19/2019    Unstable angina ([address] 03/11/2019    Obesity (BMI 30-39.9) 03/11/2019    Nephrolithiasis 01/29/2019    Chest pain 01/23/2019    Moderate episode of recurrent major depressive disorder ([address] 11/16/2018    Hypercholesteremia 05/02/2017    Obstructive sleep apnea syndrome 05/02/2017    Disequilibrium syndrome 09/26/2016    Subjective tinnitus of both ears 09/26/2016    Bilateral high frequency sensorineural hearing loss 09/26/2016    IBS (irritable bowel syndrome) 08/25/2015    Multiple thyroid nodules 06/18/2015    Thyroid cyst 07/22/2014    Vitamin D deficiency 07/22/2014    Rheumat[facility] arthritis [address] [address] 11/11/2013    Gastroparesis 11/11/2013    Urine incontinence 04/15/2013    Asthma 04/15/2013    Chronic back pain 04/15/2013    DM2 (diabetes mellitus, type 2) ([facility] [address] 01/05/2012    GERD (gastroesophageal reflux disease) 01/05/2012    Fibromyalgia 01/05/2012      Assessment and Plan:  Bradycardia/2:1 AVB   -  on monitor   - S/p dual chamber PPM 5/10/2021    ~ left upper chest CDI, no hematoma or oozinf    ~ Device interrogation wnl    ~ CXR normal from device standpoint  CAD   - Stable   - Hx of nonobstructive   - Continue ASA/statin  HTN   - Uncontrolled. Started on Norvasc and hydralazine   - Recommend ACE/ARB if needed for further BP control  HLD   - Continue statin   - LFT OK    - OK for discharge from CV standpoint  - Short-term Tramadol for pain, she states she tolerates in the past  - Ice q4 hours for 2 days     Reviewed Post-Device Implant Discharge instructions  1. Wound Care  -Bathe daily but keep incision dry and clean until your 7-10 day follow up  -Do not shower until you are told to do so by your physician.  -Do not remove the outer dressing unless it is soiled  -Allow the thin pieces of tape (Steri-Strips) to fall off naturally. Do not pick or pull at these unless instructed to do so by your physician. 2. Contact the cardiologists office for these concerns:   -Increased swelling and/or tenderness in incision and/or extending down the arm on the same side as implant site   -Feeling increased palpitations or irregular heart beat   -Redness of incision or drainage from incision.   -Bleeding that does not stop or increases.  -Skin pimples along incision.  -If a suture works its way through the incision.  -Fever greater than 100.5    3. Do not rub or twist the device site    4. Avoid lifting objects heavier than 10 pounds for one month. Do not raise the arm on the side where the device was implanted over your head for one month. These rules help to heal the implant site and stabilize the heart lead wires. 5. Avoid tight clothing over the incision. 6. No driving for one week or until initial visit after your procedure. 7. Carry your temporary Device  I.D. Card with you until your permanent card arrives in four to six weeks. An I. D. Card should be with you always. 8. If you have a defibrillator (AICD) and receive a shock or hear a tone from the device call the doctor's office    9. An implanted device does not replace any medications, diet or activity restrictions that you had before the implant. Check with your cardiologist regarding questions    10. Reasons to seek medical attention immediately: Call 911   -Sudden onset of chest pain, shortness of breath, dizziness, or loss of balance or coordination   -Sudden Change in vision   -Sudden confusion or trouble speaking and/or understanding    -Sudden onset of numbness or weakness of face/arm/leg, especially on one side of the body   -Sudden or severe headache without known cause   -Nausea with uncontrolled vomiting   -Severe bleeding    Activity: Activity as tolerated, no lifting over 10 lbs for 1 week  Diet: cardiac diet  Wound Care: Keep CDI, no lotions/ointments/powders- No baths x 1 week    F/U:   1. Follow-up with EP 3 months  2. Follow up with device clinic 1 week  -Call Gaby at [phone] with any questions    All pertinent information and plan of care discussed with the EP physician. Multiple medical conditions with risk of decompensation. Allergies:   Allergies   Allergen Reactions    Lamictal [Lamotrigine] Swelling and Rash    Macrodantin [Nitrofurantoin Macrocrystal] Shortness Of Breath     Caused an asthma attack    Penicillins Hives and Shortness Of Breath    Shellfish-Derived Products Swelling     Throat and tongue swells, allergy to all seafood    Aspartame And Phenylalanine      Severe headaches    Bactrim Hives    Biaxin [Clarithromycin] Hives    Cephalexin Other (See Comments)     blisters    Ciprofloxacin Other (See Comments)     Causes severe pain and tendon tears per pt    Hydrocodone-Acetaminophen Other (See Comments)     HALLUCINATIONS    Lansoprazole Hives    Nexium [Esomeprazole Magnesium Trihydrate] Hives    Other Other (See Comments)     TEGADERM-BLISTERS    Prilosec [Omeprazole] Hives    Blueberry [Vaccinium Angustifolium] Nausea And Vomiting     Projectile vomiting    Monosodium Glutamate Nausea And Vomiting    Zmax [Azithromycin Dihydrate] Nausea And Vomiting     NOT Z PACK its the Powder you had to mix and drink       Home Meds:  Prior to Visit Medications    Medication Sig Taking? Authorizing Provider   loratadine (CLARITIN) 10 MG tablet TAKE ONE TABLET BY MOUTH DAILY Yes Riaz Johnson MD   diazePAM (VALIUM) 2 MG tablet Take 1 tablet by mouth 2 times daily as needed for Anxiety for up to 60 days. Yes KRISHNA Lee - CNP   guaiFENesin (MUCINEX MAXIMUM STRENGTH) 1200 MG TB12 Take 1 tablet by mouth daily For a PM dose.  Yes Riaz Johnson MD   albuterol sulfate  (90 Base) MCG/ACT inhaler INHALE TWO PUFFS BY MOUTH EVERY 6 HOURS AS NEEDED FOR WHEEZING Yes Riaz Johnson MD   ibuprofen (ADVIL;MOTRIN) 800 MG tablet TAKE ONE TABLET BY MOUTH EVERY 6 HOURS AS NEEDED FOR PAIN INDICATIONS Yes Riaz Johnson MD   Cholecalciferol (VITAMIN D3) 250 MCG (52729 UT) CAPS TAKE ONE CAPSULE BY MOUTH DAILY Yes Riaz Johnson MD   ketotifen (ZADITOR) 0.025 % ophthalmic solution INSTILL TWO DROPS IN [facility] EYE TWO TIMES A DAY AS NEEDED FOR ALLERGY Yes Riaz Johnson MD   Insulin Pen Needle (PEN NEEDLES) 32G X 4 MM MISC USE WITH INSULIN PEN ONCE DAILY Yes MD RIAZ Hughesplus Lancets 28G MISC USE ONE LANCET TO TEST THREE TO FOUR TIMES A DAY Yes Deonna Neumann MD   insulin glargine (LANTUS SOLOSTAR) 100 UNIT/ML injection pen Inject 34 Units into the skin daily Yes Deonna Neumann MD   ASPIRIN LOW DOSE 81 MG EC tablet TAKE ONE TABLET BY MOUTH DAILY Yes KRISHNA Loredo CNP   famotidine (PEPCID) 40 MG tablet Take 1 tablet by mouth 2 times daily  Patient taking differently: Take 40 mg by mouth 2 times daily Indications: taking 1-2 times/day  Yes Joanie Freitas MD   latanoprost (XALATAN) 0.005 % ophthalmic solution Place 1 drop into both eyes nightly Yes Historical Provider, MD   nystatin (MYCOSTATIN) 816755 UNIT/GM powder Apply 3 times daily. Yes Juvenal Taylor MD   nystatin (MYCOSTATIN) 492027 UNIT/GM cream Apply topically 2 times daily. Yes Juvenal Taylor MD   oxymetazoline (AFRIN) 0.05 % nasal spray 2 sprays each nostril, 3 times a day for 3 days, then 2 times a day for 2 days, then stop for 2 days and then repeat the cycle once. Patient taking differently: Indications: infrequently 2 sprays each nostril, 3 times a day for 3 days, then 2 times a day for 2 days, then stop for 2 days and then repeat the cycle once. Yes Bruce Sykes MD   Cranberry 1000 MG CAPS Take by mouth Indications: OTC, takes two caps daily, does not know strength. 3 capsules daily Yes Historical Provider, MD   Multiple Vitamins-Minerals (THERAPEUTIC MULTIVITAMIN-MINERALS) tablet Take 1 tablet by mouth daily Yes Historical Provider, MD   blood glucose test strips (ASCENSIA AUTODISC VI;ONE TOUCH ULTRA TEST VI) strip 2 each by In Vitro route daily As needed. Yes Deonna Neumann MD   Blood Glucose Monitoring Suppl (TRUE METRIX METER) w/Device KIT As needed Yes Deonna Neumann MD   blood glucose monitor strips Test blood sugar 2-3 times  a day Yes Deonna Neumann MD   blood glucose test strips (ASCENSIA AUTODISC VI;ONE TOUCH ULTRA TEST VI) strip 1-2 time a day As needed.  Yes Deonna Neumann MD   nystatin (MYCOSTATIN) 994429 UNIT/GM cream APPLY EXTERNALLY TO THE AFFECTED AREAS TWO TIMES A DAY Yes Vassie Koyanagi, MD   Disposable Gloves (VINYL GLOVES LARGE) MISC 1 Package by Does not apply route 4 times daily Yes Vassie Koyanagi, MD   vitamin D3 (CHOLECALCIFEROL) 25 MCG (1000 UT) TABS tablet Take 1 tablet by mouth daily  Patient not taking: Reported on 2/3/2021  Natalie Nicholson MD   rosuvastatin (CRESTOR) 5 MG tablet Take 1 tablet by mouth daily  Carrillo Huffman MD   cyclobenzaprine (FLEXERIL) 10 MG tablet Take 0.5 tablets by mouth daily as needed for Muscle spasms  Vassie Koyanagi, MD   Cholecalciferol (VITAMIN D3) 25 MCG (1000 UT) TABS Take 1 tablet by mouth daily  Patient taking differently: Take 10,000 Units by mouth every 7 days   Vassie Koyanagi, MD   Incontinence Supply Disposable (TRE CLASSIC BRIEFS/LARGE) MISC 1 each by Does not apply route 3 times daily  Vassie Koyanagi, MD   Incontinence Supplies MISC 1 Package by Does not apply route 2 times daily  Vassie Koyanagi, MD   GAS-X EXTRA STRENGTH 125 MG chewable tablet CHEW TWO TABLETS BY MOUTH THREE TIMES A DAY WITH EACH MEAL AS NEEDED FOR FLATULENCE  Vassie Koyanagi, MD      Scheduled Meds:   sodium chloride flush  5-40 mL Intravenous 2 times per day    amLODIPine  10 mg Oral Daily    hydrALAZINE  50 mg Oral 3 times per day    insulin glargine  34 Units Subcutaneous Nightly    famotidine  20 mg Oral BID    insulin lispro  0-12 Units Subcutaneous TID WC    insulin lispro  0-6 Units Subcutaneous Nightly    ketotifen  2 drop Both Eyes BID    latanoprost  1 drop Both Eyes Nightly    sodium chloride flush  5-40 mL Intravenous 2 times per day    aspirin  81 mg Oral Daily    atorvastatin  40 mg Oral Nightly    enoxaparin  40 mg Subcutaneous Daily     Continuous Infusions:   sodium chloride      dextrose      sodium chloride       PRN Meds:sodium chloride flush, sodium chloride, lip balm petroleum free, simethicone, nitroGLYCERIN, albuterol sulfate HFA, diazePAM, glucose, dextrose, glucagon (rDNA), dextrose, sodium chloride flush, sodium chloride, promethazine **OR** ondansetron, polyethylene glycol, perflutren lipid microspheres, potassium chloride **OR** potassium alternative oral replacement **OR** potassium chloride, magnesium sulfate, hydrALAZINE, acetaminophen   Past Medical History:  Past Medical History:   Diagnosis Date    Ankle fracture, left     Ankle fracture, right     Arthritis     Asthma     Bipolar depression (HCC)     CAN'T AFFORD MEDS    Bladder problem     Cervical disc herniation     COVID toes     Diabetes mellitus ([facility] [address]     diet control    Fatty liver disease, non-alcoholic     Fibromyalgia     Gastroparesis     Dr Sendy Justin GERD (gastroesophageal reflux disease)     gastroporesis    Glaucoma     Dr Adrien Rowe Hyperlipidemia     elevated LFT on meds    IBS (irritable bowel syndrome)     Lumbar herniated disc     Nausea & vomiting     Nephrolithiasis 1/29/2019    Obesity (BMI 30-39.9) 3/11/2019    Partial Achilles tendon tear     Pneumonia     PONV (postoperative nausea and vomiting)     RA (rheumatoid arthritis) ([facility] [address]     Rapid or irregular heartbeat     Sleep apnea     does not use cpap    Spinal stenosis     Stress incontinence     Thyroid disease     growths      Past Surgical History:    has a past surgical history that includes Cholecystectomy; Dilation and curettage of uterus; Tonsillectomy; laparoscopy; Upper gastrointestinal endoscopy; Endometrial ablation; ERCP (5/25/2010); Esophagoscopy (10/5/10); Upper gastrointestinal endoscopy (04/12/2011); eye surgery; Upper gastrointestinal endoscopy (08/30/2011); Upper gastrointestinal endoscopy (3-9-12); Upper gastrointestinal endoscopy; Upper gastrointestinal endoscopy (11/20/12); Upper gastrointestinal endoscopy (6/4/13); ERCP (1-31-14); Upper gastrointestinal endoscopy (5/20/2014); Upper gastrointestinal endoscopy (12/12/14);  Upper gastrointestinal endoscopy (09/09/2015); Endoscopy, colon, diagnostic; Upper gastrointestinal endoscopy (5/10/2016); Upper gastrointestinal endoscopy (04/11/2017); Upper gastrointestinal endoscopy (10/24/2017); Upper gastrointestinal endoscopy (03/06/2018); Colonoscopy; Colonoscopy (03/06/2018); Upper gastrointestinal endoscopy (N/A, 5/7/2019); Upper gastrointestinal endoscopy (N/A, 5/7/2019); Upper gastrointestinal endoscopy (N/A, 3/13/2020); Upper gastrointestinal endoscopy (N/A, 3/13/2020); Upper gastrointestinal endoscopy (N/A, 10/6/2020); and Upper gastrointestinal endoscopy (N/A, 10/6/2020). Social History:  Reviewed. reports that she has never smoked. She has never used smokeless tobacco. She reports that she does not drink alcohol or use drugs. Family History:  Reviewed. family history includes Diabetes in her brother and mother; Heart Disease in her brother, father, and mother; High Blood Pressure in her brother; High Cholesterol in her brother and brother; Stroke in her mother. Denies family history of sudden cardiac death, arrhythmia, premature CAD    Review of Systems:  · Constitutional: Negative for fever, weight changes, or weakness  · Skin: Negative for bruising, bleeding, blood clots, or changes in skin pigment  · HEENT: Negative for vision changes or dysphagia  · Respiratory: Reviewed in HPI  · Cardiovascular: Reviewed in HPI  · Gastrointestinal: Negative for abdominal pain, N/V/D, constipation, or black/tarry stools  · Genito-Urinary: Negative for hematuria  · Musculoskeletal: No focal weakness  · Neurological/Psych: Negative for confusion or TIA-like symptoms.  No anxiety, depression, or insomnia    Physical Examination:  Vitals:    05/11/21 0449   BP: (!) 147/68   Pulse: 73   Resp: 16   Temp: 98.4 °F (36.9 °C)   SpO2: 98%      In: 240 [P.O.:240]  Out: -    Wt Readings from Last 3 Encounters:   05/10/21 195 lb 8 oz (88.7 kg)   12/04/20 200 lb 1.6 oz (90.8 kg)   11/19/20 201 lb (91.2 kg)       Intake/Output Summary (Last 24 hours) at 5/11/2021 0809  Last data filed at 5/10/2021 1600  Gross per 24 hour   Intake 240 ml   Output --   Net 240 ml     Telemetry: Personally Reviewed   · Constitutional: Cooperative and in no apparent distress, and appears well nourished  · Skin: Warm and pink; no cyanosis, bruising, or clubbing + left upper chest CDI no hematoma or oozing  · HEENT: Symmetric and normocephalic. Conjunctiva pink with clear sclera. Mucus membranes pink and moist.   · Cardiovascular: regular and rhythm. S1 & S2, negative for murmurs. Peripheral pulses 2+, capillary refill < 3 seconds. negative elevation of JVP. · Respiratory: Respirations symmetric and unlabored. Lungs clear to auscultation bilaterally, no wheezing, crackles, or rhonchi  · Gastrointestinal: Abdomen soft and round. Bowel sounds normoactive in all quadrants. · Musculoskeletal: No focal weakness. · Neurologic/Psych: Awake and orientated to person, place and time. Calm affect, appropriate mood    Pertinent labs, diagnostic, device, and imaging results reviewed as a part of this visit    Labs:    BMP:   Recent Labs     05/09/21  0647 05/10/21  0510 05/11/21  0519    139 139   K 4.1 4.0 4.6    104 103   CO2 25 25 29   PHOS 4.8 4.9 5.5*   BUN 18 17 20   CREATININE 0.7 0.7 0.9   MG 2.00 2.00 2.00     Estimated Creatinine Clearance: 71 mL/min (based on SCr of 0.9 mg/dL).    CBC:   Recent Labs     05/09/21  0647 05/10/21  0510 05/11/21  0519   WBC 9.8 10.7 10.0   HGB 12.9 13.1 12.4   HCT 40.0 40.0 38.0   MCV 85.7 85.9 86.2    211 197     Thyroid:   Lab Results   Component Value Date    TSH 1.40 06/22/2020     Lipids:   Lab Results   Component Value Date    CHOL 174 05/07/2021    HDL 33 05/07/2021    HDL 36 06/18/2010    TRIG 112 05/07/2021     LFTS:   Lab Results   Component Value Date    ALT 39 05/06/2021    AST 36 05/06/2021    ALKPHOS 20 05/06/2021    PROT 8.0 05/06/2021    PROT 7.7 11/14/2012    AGRATIO 1.4 05/06/2021    BILITOT 0.7 05/06/2021 Cardiac Enzymes:   Lab Results   Component Value Date    CKTOTAL 129 2016    TROPONINI <0.01 2021    TROPONINI <0.01 2021    TROPONINI <0.01 2021     Coags:   Lab Results   Component Value Date    PROTIME 11.7 2019    INR 1.03 2019     EC2021: Bradycardia with 2:1 AVB    ECHO:  3/11/2019  Summary   -Normal left ventricle size, wall thickness and systolic function with an   estimated ejection fraction of 55%. - No regional wall motion abnormalities are seen.   -Normal diastolic function.   -Trivial tricuspid regurgitation. Stress Test:   Terrea Huger is normal isotope uptake at stress and rest. There is no evidence of    myocardial ischemia or scar.    Normal LV size and systolic function    Left ventricular ejection fraction of 58 %. Cath: none    All questions and concerns were addressed to the patient. Alternatives to my treatment were discussed. I have discussed the above stated plan with patient and the nurse. The patient verbalized understanding and agreed with the plan. Thank you for allowing to us to participate in the care of Priscila Siddiqui.     Lyndone ALEKSANDAR Rdemond  [facility]   Office: [phone]

## 2021-05-11 NOTE — PROGRESS NOTES
Pt awake in chair. VSS, assessment complete and mediations given. Pt denies any needs and sister at bedside. Call light in reach.

## 2021-05-11 NOTE — PROGRESS NOTES
[facility] Post Op Implant/PDE Device Check  Rep Checked Device   Device shows normal function  No parameters have been changed during the current session.

## 2021-05-12 PROCEDURE — 93280 PM DEVICE PROGR EVAL DUAL: CPT | Performed by: INTERNAL MEDICINE

## 2021-05-13 ENCOUNTER — TELEPHONE (OUTPATIENT)
Dept: PRIMARY CARE CLINIC | Age: 56
End: 2021-05-13

## 2021-05-13 ENCOUNTER — TELEPHONE (OUTPATIENT)
Dept: CARDIOLOGY CLINIC | Age: 56
End: 2021-05-13

## 2021-05-13 RX ORDER — AMLODIPINE BESYLATE 10 MG/1
10 TABLET ORAL DAILY
Qty: 90 TABLET | Refills: 1 | Status: SHIPPED | OUTPATIENT
Start: 2021-05-13 | End: 2021-05-18 | Stop reason: ALTCHOICE

## 2021-05-13 RX ORDER — HYDRALAZINE HYDROCHLORIDE 50 MG/1
50 TABLET, FILM COATED ORAL EVERY 8 HOURS SCHEDULED
Qty: 270 TABLET | Refills: 1 | Status: SHIPPED
Start: 2021-05-13 | End: 2021-05-18 | Stop reason: SINTOL

## 2021-05-13 NOTE — TELEPHONE ENCOUNTER
----- Message from [id number] sent at 5/13/2021 11:39 AM EDT -----  Subject: Message to Provider    QUESTIONS  Information for Provider? Patient is wanting to let Dr. Timmy Mckeon know that she   had a Pacemaker put in on 5/10/21 and has a cardiology appt. on 5/18/21. She was admitted into the hospital on 5/6 - 5/11 for /210 and pulse   was 38-40. The ER asked her to let Dr. Timmy Mckeon know. Would like to know if she   needs to make a f/u appt.   ---------------------------------------------------------------------------  --------------  CALL BACK INFO  What is the best way for the office to contact you? OK to leave message on   voicemail  Preferred Call Back Phone Number? [phone]  ---------------------------------------------------------------------------  --------------  SCRIPT ANSWERS  Relationship to Patient?  Self

## 2021-05-13 NOTE — TELEPHONE ENCOUNTER
Medication Refill    Medication needing refilled: amlodipine , hydralazine     Dosage of the medication:    How are you taking this medication (QD, BID, TID, QID, PRN):    30 or 90 day supply called in:    When will you run out of your medication:    Which Pharmacy are we sending the medication to?:  [facility] [address]

## 2021-05-18 ENCOUNTER — NURSE ONLY (OUTPATIENT)
Dept: CARDIOLOGY CLINIC | Age: 56
End: 2021-05-18
Payer: COMMERCIAL

## 2021-05-18 ENCOUNTER — TELEPHONE (OUTPATIENT)
Dept: CARDIOLOGY CLINIC | Age: 56
End: 2021-05-18

## 2021-05-18 ENCOUNTER — OFFICE VISIT (OUTPATIENT)
Dept: ENT CLINIC | Age: 56
End: 2021-05-18
Payer: COMMERCIAL

## 2021-05-18 VITALS — HEART RATE: 79 BPM | DIASTOLIC BLOOD PRESSURE: 70 MMHG | SYSTOLIC BLOOD PRESSURE: 121 MMHG

## 2021-05-18 DIAGNOSIS — H69.83 DYSFUNCTION OF BOTH EUSTACHIAN TUBES: Primary | ICD-10-CM

## 2021-05-18 DIAGNOSIS — R00.1 SYMPTOMATIC BRADYCARDIA: ICD-10-CM

## 2021-05-18 DIAGNOSIS — T78.40XD ALLERGIC DISORDER, SUBSEQUENT ENCOUNTER: ICD-10-CM

## 2021-05-18 DIAGNOSIS — H61.22 IMPACTED CERUMEN OF LEFT EAR: ICD-10-CM

## 2021-05-18 DIAGNOSIS — I44.30 HEART BLOCK ATRIOVENTRICULAR: ICD-10-CM

## 2021-05-18 DIAGNOSIS — I44.1 MOBITZ TYPE II ATRIOVENTRICULAR BLOCK: ICD-10-CM

## 2021-05-18 DIAGNOSIS — R42 DIZZINESS: ICD-10-CM

## 2021-05-18 DIAGNOSIS — H91.90 PERCEIVED HEARING LOSS: ICD-10-CM

## 2021-05-18 DIAGNOSIS — Z95.0 PACEMAKER: ICD-10-CM

## 2021-05-18 PROCEDURE — 93280 PM DEVICE PROGR EVAL DUAL: CPT | Performed by: INTERNAL MEDICINE

## 2021-05-18 PROCEDURE — G8417 CALC BMI ABV UP PARAM F/U: HCPCS | Performed by: STUDENT IN AN ORGANIZED HEALTH CARE EDUCATION/TRAINING PROGRAM

## 2021-05-18 PROCEDURE — G8427 DOCREV CUR MEDS BY ELIG CLIN: HCPCS | Performed by: STUDENT IN AN ORGANIZED HEALTH CARE EDUCATION/TRAINING PROGRAM

## 2021-05-18 PROCEDURE — 1111F DSCHRG MED/CURRENT MED MERGE: CPT | Performed by: STUDENT IN AN ORGANIZED HEALTH CARE EDUCATION/TRAINING PROGRAM

## 2021-05-18 PROCEDURE — 99214 OFFICE O/P EST MOD 30 MIN: CPT | Performed by: STUDENT IN AN ORGANIZED HEALTH CARE EDUCATION/TRAINING PROGRAM

## 2021-05-18 PROCEDURE — 3017F COLORECTAL CA SCREEN DOC REV: CPT | Performed by: STUDENT IN AN ORGANIZED HEALTH CARE EDUCATION/TRAINING PROGRAM

## 2021-05-18 PROCEDURE — 69210 REMOVE IMPACTED EAR WAX UNI: CPT | Performed by: STUDENT IN AN ORGANIZED HEALTH CARE EDUCATION/TRAINING PROGRAM

## 2021-05-18 PROCEDURE — 1036F TOBACCO NON-USER: CPT | Performed by: STUDENT IN AN ORGANIZED HEALTH CARE EDUCATION/TRAINING PROGRAM

## 2021-05-18 RX ORDER — LORATADINE 10 MG/1
TABLET ORAL
Qty: 30 TABLET | Refills: 5 | Status: SHIPPED | OUTPATIENT
Start: 2021-05-18 | End: 2022-04-29 | Stop reason: SDUPTHER

## 2021-05-18 RX ORDER — LOSARTAN POTASSIUM 25 MG/1
25 TABLET ORAL DAILY
Qty: 30 TABLET | Refills: 3 | Status: SHIPPED | OUTPATIENT
Start: 2021-05-18 | End: 2021-05-26

## 2021-05-18 NOTE — TELEPHONE ENCOUNTER
Patient was in the office today for her 1 week device/wound check. See note for more details. Patient states she has stopped taking her Norvasc because it was giving her abdominal/liver pains. She states she is taking the hydralazine 3 times daily. Today in office her BP was 124/72. Patient states it runs in the 120s-160s/70-90s throughout the day. Please call Patient and advise there on there BP medication. I did ask he to start a log on her BPs at home. She also states she has no energy and is dizzy.

## 2021-05-18 NOTE — PROGRESS NOTES
Patient comes in for programming evaluation for her pacemaker. All sensing and pacing parameters are within normal range. S/p mdtdcppm on 5/10/2021  Battery life 11.4 years  AP 8.4%.  99.5%. No episodes noted. Patients incision is healing nicely. Patient to call the office immediately with any signs on infection. No changes need to be made at this time. Please see interrogation for more detail. Patient will  follow up in 3 months in office or remotely.

## 2021-05-18 NOTE — PATIENT INSTRUCTIONS
-Start Rhinocort daily (1 spray each nostril). -Instill nasal sprays as we discussed in the office. Use your right hand to instill spray into your left nostril pointed towards the left eye as to avoid spraying the nasal septum and vice versa. After spray sniff in GENTLY!  -Continue anti-histamine (I.e. Claritin) daily  -Auto insufflate ears as instructed (pinch nose closed, keep mouth closed, and blow like you are blowing your nose and blowing the air into your ears, or like you are \"blowing smoke out of your ears\". And then do not try to clear your ears by swallowing, sniffing, or other measures, just allow the body to clear the ear pressure itself\"). Perform 3X/day and as needed for ear congestion.

## 2021-05-18 NOTE — TELEPHONE ENCOUNTER
I have stopped hydralazine and amlodipine. She has a lot of allergies. She has diabetes so will give her low-dose ARB to see if she tolerates. I would like to discuss with her prior to starting.   I have called her and [facility]    KRISHNA Fatima-CNP

## 2021-05-18 NOTE — PROGRESS NOTES
[address] VISIT      Patient Name: Mica Oconnor Record Number:  [id number]  Primary Care Physician:  Irving Rascon MD    ChiefComplaint     Chief Complaint   Patient presents with    Dizziness    Ear Problem     decreased hearing, pressure, crackling,        History of Present Illness     Clarence Navarrete is an 61 y.o. female previously seen for dizziness and acute sinus infection by Dr. Kelly Trejo. Was referred for audiogram and ENG testing as well as neurology referral.    Interval History:   Bilateral pressure in the ear and ear fullness for the past 3 weeks. Feels like decreased hearing in both ears, gradual loss over the past three weeks. Has a pacemaker placed last week. Hx of COVID diagnosed by symptoms March 2021, towards the end of the two symptomatic months she developed her ear issues. Lose 30 lbs in 2 months.     + environmental allergies. Takes Claritin daily, has not taken for some time. No current nasal sprays. Was on Flonase in past but gave her nosebleeds. Took a water based nasal steroid spray in the past which did not seem to give her nosebleeds. Imbalanced for the past 1 week, often feels like she needs to sit down and grab on to things for stability while walking. No room spinning. No associated nausea or vomiting.      Past Medical History     Past Medical History:   Diagnosis Date    Ankle fracture, left     Ankle fracture, right     Arthritis     Asthma     Bipolar depression (HCC)     CAN'T AFFORD MEDS    Bladder problem     Cervical disc herniation     COVID toes     Diabetes mellitus ([facility] [address]     diet control    Fatty liver disease, non-alcoholic     Fibromyalgia     Gastroparesis     Dr Namrata Linton GERD (gastroesophageal reflux disease)     gastroporesis    Glaucoma     Dr Kasandra Vicente Hyperlipidemia     elevated LFT on meds    IBS (irritable bowel syndrome)     Lumbar herniated disc     Nausea & vomiting     Nephrolithiasis 1/29/2019    Obesity (BMI 30-39.9) 3/11/2019    Partial Achilles tendon tear     Pneumonia     PONV (postoperative nausea and vomiting)     RA (rheumatoid arthritis) (HCC)     Rapid or irregular heartbeat     Sleep apnea     does not use cpap    Spinal stenosis     Stress incontinence     Thyroid disease     growths       Past Surgical History     Past Surgical History:   Procedure Laterality Date    CHOLECYSTECTOMY      COLONOSCOPY      COLONOSCOPY  03/06/2018    with polypectomies    DILATION AND CURETTAGE OF UTERUS      ENDOMETRIAL ABLATION      ENDOSCOPY, COLON, DIAGNOSTIC      ERCP  5/25/2010    with stent    ERCP  1-31-14    ERCP WITH SPHINTER OF ODDI MANOMETERY    ESOPHAGOSCOPY  10/5/10    botox injection    EYE SURGERY      LASER FOR GLAUCOMA    LAPAROSCOPY      TONSILLECTOMY      UPPER GASTROINTESTINAL ENDOSCOPY      UPPER GASTROINTESTINAL ENDOSCOPY  04/12/2011    DILATATION, 58 FR ARTHUR, 100 UNITS BOTOX    UPPER GASTROINTESTINAL ENDOSCOPY  08/30/2011    58 FR DILATATION, 100 UNITS  BOTOX INJECTION    UPPER GASTROINTESTINAL ENDOSCOPY  3-9-12    with dilatation and submucosal injection: Botox    UPPER GASTROINTESTINAL ENDOSCOPY      UPPER GASTROINTESTINAL ENDOSCOPY  11/20/12     Esophagogastroduodenoscopy with Botulinum toxin injection of the pylorus and Arthur esophageal dilation    UPPER GASTROINTESTINAL ENDOSCOPY  6/4/13    WITH DIILITATION AND BOTOX INJECTION    UPPER GASTROINTESTINAL ENDOSCOPY  5/20/2014    100 unit Botox injection, 56 Fr.  Arthur esophageal dilatation    UPPER GASTROINTESTINAL ENDOSCOPY  12/12/14    with esophageal dilatation, and botox injection    UPPER GASTROINTESTINAL ENDOSCOPY  09/09/2015    With Dilitation and Botox injection    UPPER GASTROINTESTINAL ENDOSCOPY  5/10/2016    distal esophagus biopsy, stomach biopsy, botox injection    UPPER GASTROINTESTINAL ENDOSCOPY  04/11/2017    with dilatation and botox injection    UPPER GASTROINTESTINAL ENDOSCOPY  10/24/2017    DILATATION, BIOPSY, BOTOX    UPPER GASTROINTESTINAL ENDOSCOPY  03/06/2018    WITH BOTOX AND BALLOON DILATATION    UPPER GASTROINTESTINAL ENDOSCOPY N/A 5/7/2019    EGD SUBMUCOSAL/BOTOX INJECTION performed by Gurwinder Montgomery MD at [address] [facility] [address] N/A 5/7/2019    EGD DILATION BALLOON performed by Gurwinder Montgomery MD at [address] [place] [address] St N/A 3/13/2020    EGD SUBMUCOSAL/BOTOX INJECTION performed by Gurwinder Montgomery MD at [address] [place] [address] St N/A 3/13/2020    EGD DILATION BALLOON performed by Gurwinder Montgomery MD at [address] [facility] ENDOSCOPY N/A 10/6/2020    EGD DILATION BALLOON performed by Gurwinder Montgomery MD at [address] [place] [address] St N/A 10/6/2020    EGD SUBMUCOSAL/BOTOX INJECTION performed by Gurwinder Montgomery MD at [facility] [address] History     Family History   Problem Relation Age of Onset    Diabetes Mother     Heart Disease Mother     Stroke Mother     Heart Disease Father     Heart Disease Brother     High Blood Pressure Brother     High Cholesterol Brother     Diabetes Brother     High Cholesterol Brother     Anesth Problems Neg Hx     Malig Hyperten Neg Hx     Hypotension Neg Hx     Malig Hypertherm Neg Hx     Pseudochol.  Deficiency Neg Hx        Social History     Social History     Tobacco Use    Smoking status: Never Smoker    Smokeless tobacco: Never Used   Vaping Use    Vaping Use: Never used   Substance Use Topics    Alcohol use: No     Alcohol/week: 0.0 standard drinks    Drug use: No        Allergies     Allergies   Allergen Reactions    Lamictal [Lamotrigine] Swelling and Rash    Macrodantin [Nitrofurantoin Macrocrystal] Shortness Of Breath     Caused an asthma attack    Penicillins Hives and Shortness Of Breath    Shellfish-Derived Products Swelling     Throat and tongue swells, allergy to all seafood    Aspartame And Phenylalanine      Severe headaches    Bactrim Hives    Biaxin [Clarithromycin] Hives    Cephalexin Other (See Comments)     blisters    Ciprofloxacin Other (See Comments)     Causes severe pain and tendon tears per pt    Hydrocodone-Acetaminophen Other (See Comments)     HALLUCINATIONS    Lansoprazole Hives    Nexium [Esomeprazole Magnesium Trihydrate] Hives    Other Other (See Comments)     TEGADERM-BLISTERS    Prilosec [Omeprazole] Hives    Blueberry [Vaccinium Angustifolium] Nausea And Vomiting     Projectile vomiting    Monosodium Glutamate Nausea And Vomiting    Zmax [Azithromycin Dihydrate] Nausea And Vomiting     NOT Z PACK its the Powder you had to mix and drink       Medications     Current Outpatient Medications   Medication Sig Dispense Refill    loratadine (CLARITIN) 10 MG tablet TAKE ONE TABLET BY MOUTH DAILY 30 tablet 5    budesonide (RHINOCORT AQUA) 32 MCG/ACT nasal spray 1 spray by Each Nostril route daily 1 Bottle 3    losartan (COZAAR) 25 MG tablet Take 1 tablet by mouth daily 30 tablet 3    guaiFENesin (MUCINEX MAXIMUM STRENGTH) 1200 MG TB12 Take 1 tablet by mouth daily For a PM dose. 10 tablet 0    Blood Pressure KIT 1 kit by Does not apply route daily 1 kit 0    sodium chloride (ALTAMIST SPRAY) 0.65 % nasal spray 1 spray by Nasal route as needed for Congestion 1 Bottle 0    diazePAM (VALIUM) 2 MG tablet Take 1 tablet by mouth 2 times daily as needed for Anxiety for up to 60 days.  60 tablet 1    albuterol sulfate  (90 Base) MCG/ACT inhaler INHALE TWO PUFFS BY MOUTH EVERY 6 HOURS AS NEEDED FOR WHEEZING 1 Inhaler 2    ketotifen (ZADITOR) 0.025 % ophthalmic solution INSTILL TWO DROPS IN [facility] EYE TWO TIMES A DAY AS NEEDED FOR ALLERGY 1 Bottle 2    rosuvastatin (CRESTOR) 5 MG tablet Take 1 tablet by mouth daily 90 tablet 1    Insulin Pen Needle (PEN NEEDLES) 32G X 4 MM MISC USE WITH INSULIN PEN ONCE DAILY 50 each 3    TRUEplus Lancets 28G MISC USE ONE LANCET TO TEST THREE TO FOUR TIMES A  each 3    insulin glargine (LANTUS SOLOSTAR) 100 UNIT/ML injection pen Inject 34 Units into the skin daily 5 pen 2    ASPIRIN LOW DOSE 81 MG EC tablet TAKE ONE TABLET BY MOUTH DAILY 90 tablet 3    famotidine (PEPCID) 40 MG tablet Take 1 tablet by mouth 2 times daily (Patient taking differently: Take 40 mg by mouth 2 times daily Indications: taking 1-2 times/day ) 30 tablet 5    latanoprost (XALATAN) 0.005 % ophthalmic solution Place 1 drop into both eyes nightly      Cranberry 1000 MG CAPS Take by mouth Indications: OTC, takes two caps daily, does not know strength. 3 capsules daily      Multiple Vitamins-Minerals (THERAPEUTIC MULTIVITAMIN-MINERALS) tablet Take 1 tablet by mouth daily      cyclobenzaprine (FLEXERIL) 10 MG tablet Take 0.5 tablets by mouth daily as needed for Muscle spasms 30 tablet 5    Blood Glucose Monitoring Suppl (TRUE METRIX METER) w/Device KIT As needed 1 kit 0    Cholecalciferol (VITAMIN D3) 25 MCG (1000 UT) TABS Take 1 tablet by mouth daily (Patient taking differently: Take 10,000 Units by mouth every 7 days ) 90 tablet 1    blood glucose monitor strips Test blood sugar 2-3 times  a day 100 strip 5    nystatin (MYCOSTATIN) 590671 UNIT/GM cream APPLY EXTERNALLY TO THE AFFECTED AREAS TWO TIMES A DAY 1 Tube 2    Incontinence Supply Disposable (TRE CLASSIC BRIEFS/LARGE) MISC 1 each by Does not apply route 3 times daily 5 each 5    Incontinence Supplies MISC 1 Package by Does not apply route 2 times daily 5 each 5    Disposable Gloves (VINYL GLOVES LARGE) MISC 1 Package by Does not apply route 4 times daily 5 each 5    GAS-X EXTRA STRENGTH 125 MG chewable tablet CHEW TWO TABLETS BY MOUTH THREE TIMES A DAY WITH EACH MEAL AS NEEDED FOR FLATULENCE 192 tablet 1     No current facility-administered medications for this visit.      Facility-Administered Medications Ordered in Other Visits   Medication Dose Route Frequency Provider Last Rate Last Admin    0.9 % sodium chloride infusion   Intravenous Continuous Augustine Cardenas MD           Review of Systems     REVIEW OF SYSTEMS  The following systems were reviewed and revealed the following in addition to any already discussed in the HPI:    CONSTITUTIONAL: no weight loss, no fever, no night sweats, no chills  EYES: no vision changes, no blurry vision  EARS: +changes in hearing, no otalgia  NOSE: no epistaxis, no rhinorrhea  THROAT: No voice changes, no sore throat, no dysphagia    PhysicalExam     Vitals:    05/18/21 1437   BP: 121/70   Site: Right Lower Arm   Position: Sitting   Cuff Size: Medium Adult   Pulse: 79       PHYSICAL EXAM  /70 (Site: Right Lower Arm, Position: Sitting, Cuff Size: Medium Adult)   Pulse 79   LMP  (LMP Unknown)     GENERAL: No acute distress, alert and oriented, no hoarseness  EYES: EOMI, Anti-icteric  NOSE: On anterior rhinoscopy there is no epistaxis, nasal mucosa moist and normal appearing, no purulent drainage. EARS: Normal external appearance; on portable otomicroscopy:     -Ad: External auditory canal without stenosis, tympanic membrane clear, no middle ear effusions or retractions. TM mobile on pneumatic otoscopy. -As: External auditory canal with cerumen impaction, see procedure note below.    FACE: HB 1/6 bilaterally, symmetric appearing, sensation equal bilaterally  ORAL CAVITY: No masses or lesions palpated, uvula is midline, moist mucous membranes, absent tonsils  NECK: Normal range of motion, no thyromegaly, trachea is midline, no palpable lymphadenopathy or neck masses, no crepitus  CHEST: Normal respiratory effort, breathing comfortably, no retractions  SKIN: No rashes, normal appearing skin, no evidence of skin lesions/tumors  NEURO: Cranial Nerves 2, 3, 4, 5, 6, 7, 11, 12 intact bilaterally     I have performed a head and neck physical exam personally or Surgery  5/18/21    Medical Decision Making: The following items were considered in medical decision making:  Independent review of images  Review / order clinical lab tests  Review / order radiology tests  Decision to obtain old records    Portions of this note were dictated using Dragon.  There may be linguistic errors secondary to the use of this program.

## 2021-05-20 ENCOUNTER — VIRTUAL VISIT (OUTPATIENT)
Dept: PSYCHIATRY | Age: 56
End: 2021-05-20
Payer: COMMERCIAL

## 2021-05-20 DIAGNOSIS — F31.9 BIPOLAR DEPRESSION (HCC): Primary | ICD-10-CM

## 2021-05-20 DIAGNOSIS — F41.9 ANXIETY: ICD-10-CM

## 2021-05-20 PROCEDURE — 99443 PR PHYS/QHP TELEPHONE EVALUATION 21-30 MIN: CPT | Performed by: NURSE PRACTITIONER

## 2021-05-20 NOTE — PROGRESS NOTES
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 support safety.     Protective factors:  female gender, denies suicidal ideation, does not have lethal plan, does not have access to guns or weapons, patient is lynn for safety, no prior suicide attempts, no family h/o suicide, no substance abuse, no active psychosis or cognitive dysfunction, and patient is future oriented.          2. Psychiatric  - pt endorses h/o mood lability + FH BAD. Prior negative response to some antidepressants. 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 + MDQ at initial eval.  Multiple stressors with increased depression/mood lability. felt was Reasonable to trial mood stabilizer.  Has DM, needs most weight neutral option.      - vraylar not covered by insurance,tried samples, made \"angry\",  risperdal made \"angry\" and increased anxiety, multiple panic attacks/day    - HAD previously recommended pt trial seroquel d/t issues with insomnia in addition to mood/anxiety symptoms. REFUSED TO TRY SEROQUEL. Pt has lots medication anxiety, particularly with sedating medications. Fearful won't wake up if meds too sedating.      - opted to try lamictal.  Initially Had been tolerating lamictal well during previous dose titration but eventually  developed rash and peripheral edema following most recent dosage increase so was advised to stop. At fu, reported rash and edema resolved shortly after stopping lamictal.     - previously discussed cymbalta for anxiety, mood and chronic pain. DECLINED. Says has had such negative responses to previous meds, fearful of introducing new meds at this point    - consider abilify vs latuda for mood, may benefit from activating properties of abilify, has fear of oversedation     - continue valium 2mg prn for anxiety, sleep Prefers low dose d/t fear of oversedation. Reports this dose working very well to help relax her at night so can get some rest.  Hasn't taken in several weeks d/t previous bradycardia (recent pacemaker placement 1 week ago)  Reminded this is not long term solution and need to have additional strategies to help manage anxiety and mood symptoms. Lots med anxiety.       -Labs: reviewed in Epic   9/21/20:  Vit d 26.4; lipids (total chol 254, trig 214, hdl 36, ldl 175)              11/26/19:  Lipids:  (Total chol 254, ldl 178); hgba1c 8.2 tsh wnl              5/7/19:  Vit d 21.4     - ENCOURAGED TO KEEP CONSISTENT SCHEDULE/ROUTINE TO INCLUDE WAKE/SLEEP TIME, MEAL TIMES, SHOWER TIMES, EXERCISE       -Recommend outpt therapy. Has tried to connect in past but limited with insurance options AND transportation issues. Unsure if wants to proceed or would be helpful.          -OARRS reviewed, c/w history  -R/b/se/a d/w pt who consents.     3. Medical  -established with Shasta Prado MD       4. Substance   -No active issues.     5.  RTC - 4 weeks, first available 6/23 @ [address] [facility]  Psychiatric Nurse Practitioner

## 2021-05-24 ENCOUNTER — TELEPHONE (OUTPATIENT)
Dept: ENT CLINIC | Age: 56
End: 2021-05-24

## 2021-05-25 NOTE — TELEPHONE ENCOUNTER
She can get this medications over-the-counter as they are often not covered by insurance since they are readily available over-the-counter.

## 2021-05-26 ENCOUNTER — HOSPITAL ENCOUNTER (EMERGENCY)
Age: 56
Discharge: HOME | End: 2021-05-26
Attending: EMERGENCY MEDICINE
Payer: COMMERCIAL

## 2021-05-26 ENCOUNTER — NURSE ONLY (OUTPATIENT)
Dept: CARDIOLOGY CLINIC | Age: 56
End: 2021-05-26
Payer: COMMERCIAL

## 2021-05-26 ENCOUNTER — APPOINTMENT (OUTPATIENT)
Dept: GENERAL RADIOLOGY | Age: 56
End: 2021-05-26
Payer: COMMERCIAL

## 2021-05-26 ENCOUNTER — HOSPITAL ENCOUNTER (OUTPATIENT)
Dept: NON INVASIVE DIAGNOSTICS | Age: 56
Discharge: HOME | End: 2021-05-26
Payer: COMMERCIAL

## 2021-05-26 ENCOUNTER — NURSE ONLY (OUTPATIENT)
Dept: CARDIOLOGY CLINIC | Age: 56
End: 2021-05-26

## 2021-05-26 VITALS
RESPIRATION RATE: 16 BRPM | HEIGHT: 62 IN | HEART RATE: 66 BPM | SYSTOLIC BLOOD PRESSURE: 151 MMHG | BODY MASS INDEX: 34.96 KG/M2 | DIASTOLIC BLOOD PRESSURE: 72 MMHG | TEMPERATURE: 96.7 F | OXYGEN SATURATION: 98 % | WEIGHT: 190 LBS

## 2021-05-26 VITALS — SYSTOLIC BLOOD PRESSURE: 96 MMHG | DIASTOLIC BLOOD PRESSURE: 52 MMHG

## 2021-05-26 DIAGNOSIS — I44.30 HEART BLOCK ATRIOVENTRICULAR: ICD-10-CM

## 2021-05-26 DIAGNOSIS — R42 LIGHTHEADEDNESS: ICD-10-CM

## 2021-05-26 DIAGNOSIS — I44.1 MOBITZ TYPE II ATRIOVENTRICULAR BLOCK: ICD-10-CM

## 2021-05-26 DIAGNOSIS — N39.0 URINARY TRACT INFECTION WITHOUT HEMATURIA, SITE UNSPECIFIED: Primary | ICD-10-CM

## 2021-05-26 DIAGNOSIS — I95.9 HYPOTENSION, UNSPECIFIED HYPOTENSION TYPE: ICD-10-CM

## 2021-05-26 DIAGNOSIS — R00.1 SYMPTOMATIC BRADYCARDIA: ICD-10-CM

## 2021-05-26 DIAGNOSIS — I95.9 HYPOTENSION, UNSPECIFIED HYPOTENSION TYPE: Primary | ICD-10-CM

## 2021-05-26 DIAGNOSIS — Z95.0 PACEMAKER: ICD-10-CM

## 2021-05-26 LAB
A/G RATIO: 1.2 (ref 1.1–2.2)
ALBUMIN SERPL-MCNC: 4.2 G/DL (ref 3.4–5)
ALP BLD-CCNC: 34 U/L (ref 40–129)
ALT SERPL-CCNC: 161 U/L (ref 10–40)
ANION GAP SERPL CALCULATED.3IONS-SCNC: 14 MMOL/L (ref 3–16)
AST SERPL-CCNC: 62 U/L (ref 15–37)
BACTERIA: ABNORMAL /HPF
BASOPHILS ABSOLUTE: 0.1 K/UL (ref 0–0.2)
BASOPHILS RELATIVE PERCENT: 0.8 %
BILIRUB SERPL-MCNC: 0.4 MG/DL (ref 0–1)
BILIRUBIN URINE: NEGATIVE
BLOOD, URINE: NEGATIVE
BUN BLDV-MCNC: 28 MG/DL (ref 7–20)
CALCIUM SERPL-MCNC: 10.6 MG/DL (ref 8.3–10.6)
CHLORIDE BLD-SCNC: 97 MMOL/L (ref 99–110)
CLARITY: ABNORMAL
CO2: 25 MMOL/L (ref 21–32)
COLOR: YELLOW
CREAT SERPL-MCNC: 1 MG/DL (ref 0.6–1.1)
EOSINOPHILS ABSOLUTE: 0.2 K/UL (ref 0–0.6)
EOSINOPHILS RELATIVE PERCENT: 1.7 %
EPITHELIAL CELLS, UA: 8 /HPF (ref 0–5)
GFR AFRICAN AMERICAN: >60
GFR NON-AFRICAN AMERICAN: 57
GLOBULIN: 3.4 G/DL
GLUCOSE BLD-MCNC: 186 MG/DL (ref 70–99)
GLUCOSE URINE: NEGATIVE MG/DL
HCT VFR BLD CALC: 41.5 % (ref 36–48)
HEMOGLOBIN: 13.4 G/DL (ref 12–16)
HYALINE CASTS: 5 /LPF (ref 0–8)
KETONES, URINE: ABNORMAL MG/DL
LEUKOCYTE ESTERASE, URINE: ABNORMAL
LYMPHOCYTES ABSOLUTE: 2.8 K/UL (ref 1–5.1)
LYMPHOCYTES RELATIVE PERCENT: 21.3 %
MCH RBC QN AUTO: 27.7 PG (ref 26–34)
MCHC RBC AUTO-ENTMCNC: 32.3 G/DL (ref 31–36)
MCV RBC AUTO: 85.8 FL (ref 80–100)
MICROSCOPIC EXAMINATION: YES
MONOCYTES ABSOLUTE: 0.8 K/UL (ref 0–1.3)
MONOCYTES RELATIVE PERCENT: 6.4 %
NEUTROPHILS ABSOLUTE: 9.3 K/UL (ref 1.7–7.7)
NEUTROPHILS RELATIVE PERCENT: 69.8 %
NITRITE, URINE: NEGATIVE
PDW BLD-RTO: 15.4 % (ref 12.4–15.4)
PH UA: 5.5 (ref 5–8)
PLATELET # BLD: 256 K/UL (ref 135–450)
PMV BLD AUTO: 9.2 FL (ref 5–10.5)
POTASSIUM SERPL-SCNC: 4.8 MMOL/L (ref 3.5–5.1)
PROTEIN UA: 30 MG/DL
RBC # BLD: 4.84 M/UL (ref 4–5.2)
RBC UA: ABNORMAL /HPF (ref 0–4)
SODIUM BLD-SCNC: 136 MMOL/L (ref 136–145)
SPECIFIC GRAVITY UA: 1.02 (ref 1–1.03)
TOTAL PROTEIN: 7.6 G/DL (ref 6.4–8.2)
TROPONIN: <0.01 NG/ML
URINE REFLEX TO CULTURE: YES
URINE TYPE: ABNORMAL
UROBILINOGEN, URINE: 0.2 E.U./DL
WBC # BLD: 13.3 K/UL (ref 4–11)
WBC UA: 239 /HPF (ref 0–5)

## 2021-05-26 PROCEDURE — 84484 ASSAY OF TROPONIN QUANT: CPT

## 2021-05-26 PROCEDURE — 99283 EMERGENCY DEPT VISIT LOW MDM: CPT

## 2021-05-26 PROCEDURE — 36415 COLL VENOUS BLD VENIPUNCTURE: CPT

## 2021-05-26 PROCEDURE — 80053 COMPREHEN METABOLIC PANEL: CPT

## 2021-05-26 PROCEDURE — 81001 URINALYSIS AUTO W/SCOPE: CPT

## 2021-05-26 PROCEDURE — 93005 ELECTROCARDIOGRAM TRACING: CPT | Performed by: PHYSICIAN ASSISTANT

## 2021-05-26 PROCEDURE — 71046 X-RAY EXAM CHEST 2 VIEWS: CPT

## 2021-05-26 PROCEDURE — 85025 COMPLETE CBC W/AUTO DIFF WBC: CPT

## 2021-05-26 PROCEDURE — 87086 URINE CULTURE/COLONY COUNT: CPT

## 2021-05-26 PROCEDURE — 93308 TTE F-UP OR LMTD: CPT

## 2021-05-26 PROCEDURE — 93280 PM DEVICE PROGR EVAL DUAL: CPT | Performed by: INTERNAL MEDICINE

## 2021-05-26 PROCEDURE — 2580000003 HC RX 258: Performed by: PHYSICIAN ASSISTANT

## 2021-05-26 PROCEDURE — 6370000000 HC RX 637 (ALT 250 FOR IP): Performed by: PHYSICIAN ASSISTANT

## 2021-05-26 RX ORDER — GRANULES FOR ORAL 3 G/1
3 POWDER ORAL ONCE
Status: COMPLETED | OUTPATIENT
Start: 2021-05-26 | End: 2021-05-26

## 2021-05-26 RX ORDER — 0.9 % SODIUM CHLORIDE 0.9 %
1000 INTRAVENOUS SOLUTION INTRAVENOUS ONCE
Status: COMPLETED | OUTPATIENT
Start: 2021-05-26 | End: 2021-05-26

## 2021-05-26 RX ADMIN — GRANULES FOR ORAL SOLUTION 1 PACKET: 3 POWDER ORAL at 19:58

## 2021-05-26 RX ADMIN — SODIUM CHLORIDE 1000 ML: 9 INJECTION, SOLUTION INTRAVENOUS at 18:26

## 2021-05-26 ASSESSMENT — ENCOUNTER SYMPTOMS
SHORTNESS OF BREATH: 0
NAUSEA: 0
WHEEZING: 0
VOMITING: 0
DIARRHEA: 0
ABDOMINAL PAIN: 0
RHINORRHEA: 0
COUGH: 0

## 2021-05-26 NOTE — ED PROVIDER NOTES
[facility]        Pt Name: Kade Chaidez  MRN: [id number]  Armstrongfpadma 1965  Date of evaluation: 5/26/2021  Provider: Celestina Anaya PA-C  PCP: Estrellita Agudelo MD  Note Started: 5:30 PM EDT        I have seen and evaluated this patient with my supervising physician Tianna Peña MD.    [address] [place] [address]       Chief Complaint   Patient presents with    Hypotension     pt went to get pacemaker check, dr Tayler Smith sent pt over 80/60, dizzy when stood up. HISTORY OF PRESENT ILLNESS   (Location, Timing/Onset, Context/Setting, Quality, Duration, Modifying Factors, Severity, Associated Signs and Symptoms)  Note limiting factors. Kade Chaidez is a 61 y.o. female who presents with a Chief Complaint of hypotension and concern for dehydration. Patient was admitted to the hospital with significant hypertension and bradycardia 2 weeks ago and had a pacemaker placed by Dr. Rick Niño. States that she has since felt dizzy, worse with standing. She had an appointment with Dr. Rick Niño to have her blood pressure checked today and was found to be hypotensive, 80s over 60s. States that they did interrogate her pacemaker and it is working appropriately. She also reports normal echo. She was sent here for further evaluation management of symptoms, concern for dehydration or possible urinary tract infection. She denies chest pain or shortness of breath. No weakness visual disturbances or syncope. She denies any abdominal pain nausea vomiting or diarrhea. No other complaints or concerns at this time. Nursing Notes were all reviewed and agreed with or any disagreements were addressed in the HPI. REVIEW OF SYSTEMS    (2-9 systems for level 4, 10 or more for level 5)     Review of Systems   Constitutional: Negative for appetite change, chills and fever. HENT: Negative for congestion and rhinorrhea.     Respiratory: Negative for cough, shortness of breath and wheezing. Cardiovascular: Negative for chest pain. Gastrointestinal: Negative for abdominal pain, diarrhea, nausea and vomiting. Genitourinary: Negative for difficulty urinating, dysuria and hematuria. Musculoskeletal: Negative for neck pain and neck stiffness. Skin: Negative for rash. Neurological: Positive for dizziness. Negative for syncope, weakness, light-headedness and headaches. Positives and Pertinent negatives as per HPI. Except as noted above in the ROS, all other systems were reviewed and negative.        PAST MEDICAL HISTORY     Past Medical History:   Diagnosis Date    Ankle fracture, left     Ankle fracture, right     Arthritis     Asthma     Bipolar depression ([address]     CAN'T AFFORD MEDS    Bladder problem     Cervical disc herniation     COVID toes     Diabetes mellitus ([address]     diet control    Fatty liver disease, non-alcoholic     Fibromyalgia     Gastroparesis     Dr Tammy Clemons GERD (gastroesophageal reflux disease)     gastroporesis    Glaucoma     Dr Chandu Witt Hyperlipidemia     elevated LFT on meds    IBS (irritable bowel syndrome)     Lumbar herniated disc     Nausea & vomiting     Nephrolithiasis 1/29/2019    Obesity (BMI 30-39.9) 3/11/2019    Partial Achilles tendon tear     Pneumonia     PONV (postoperative nausea and vomiting)     RA (rheumatoid arthritis) ([address]     Rapid or irregular heartbeat     Sleep apnea     does not use cpap    Spinal stenosis     Stress incontinence     Thyroid disease     growths         SURGICAL HISTORY     Past Surgical History:   Procedure Laterality Date    CARDIAC SURGERY      CHOLECYSTECTOMY      COLONOSCOPY      COLONOSCOPY  03/06/2018    with polypectomies    DILATION AND CURETTAGE OF UTERUS      ENDOMETRIAL ABLATION      ENDOSCOPY, COLON, DIAGNOSTIC      ERCP  5/25/2010    with stent    ERCP  1-31-14    ERCP WITH SPHINTER OF ODDI MANOMETERY    ESOPHAGOSCOPY  10/5/10    botox injection    EYE SURGERY      LASER FOR GLAUCOMA    LAPAROSCOPY      TONSILLECTOMY      UPPER GASTROINTESTINAL ENDOSCOPY      UPPER GASTROINTESTINAL ENDOSCOPY  04/12/2011    DILATATION, 58 FR ARTHUR, 100 UNITS BOTOX    UPPER GASTROINTESTINAL ENDOSCOPY  08/30/2011    58 FR DILATATION, 100 UNITS  BOTOX INJECTION    UPPER GASTROINTESTINAL ENDOSCOPY  3-9-12    with dilatation and submucosal injection: Botox    UPPER GASTROINTESTINAL ENDOSCOPY      UPPER GASTROINTESTINAL ENDOSCOPY  11/20/12     Esophagogastroduodenoscopy with Botulinum toxin injection of the pylorus and Arthur esophageal dilation    UPPER GASTROINTESTINAL ENDOSCOPY  6/4/13    WITH DIILITATION AND BOTOX INJECTION    UPPER GASTROINTESTINAL ENDOSCOPY  5/20/2014    100 unit Botox injection, 56 Fr.  Arthur esophageal dilatation    UPPER GASTROINTESTINAL ENDOSCOPY  12/12/14    with esophageal dilatation, and botox injection    UPPER GASTROINTESTINAL ENDOSCOPY  09/09/2015    With Dilitation and Botox injection    UPPER GASTROINTESTINAL ENDOSCOPY  5/10/2016    distal esophagus biopsy, stomach biopsy, botox injection    UPPER GASTROINTESTINAL ENDOSCOPY  04/11/2017    with dilatation and botox injection    UPPER GASTROINTESTINAL ENDOSCOPY  10/24/2017    DILATATION, BIOPSY, BOTOX    UPPER GASTROINTESTINAL ENDOSCOPY  03/06/2018    WITH BOTOX AND BALLOON DILATATION    UPPER GASTROINTESTINAL ENDOSCOPY N/A 5/7/2019    EGD SUBMUCOSAL/BOTOX INJECTION performed by Kelle Quintero MD at [address] N/A 5/7/2019    EGD DILATION BALLOON performed by Kelle Quintero MD at [address] N/A 3/13/2020    EGD SUBMUCOSAL/BOTOX INJECTION performed by Kelle Quintero MD at [address] N/A 3/13/2020    EGD DILATION BALLOON performed by Kelle Quintero MD at [address] N/A 10/6/2020 EGD DILATION BALLOON performed by Janet Kaufman MD at [address] 10/6/2020    EGD SUBMUCOSAL/BOTOX INJECTION performed by Janet Kaufman MD at [phone]       Previous Medications    ALBUTEROL SULFATE  (90 BASE) MCG/ACT INHALER    INHALE TWO PUFFS BY MOUTH EVERY 6 HOURS AS NEEDED FOR WHEEZING    ASPIRIN LOW DOSE 81 MG EC TABLET    TAKE ONE TABLET BY MOUTH DAILY    BLOOD GLUCOSE MONITOR STRIPS    Test blood sugar 2-3 times  a day    BLOOD GLUCOSE MONITORING SUPPL (TRUE METRIX METER) W/DEVICE KIT    As needed    BLOOD PRESSURE KIT    1 kit by Does not apply route daily    BUDESONIDE (RHINOCORT AQUA) 32 MCG/ACT NASAL SPRAY    1 spray by Each Nostril route daily    CHOLECALCIFEROL (VITAMIN D3) 25 MCG (1000 UT) TABS    Take 1 tablet by mouth daily    CRANBERRY 1000 MG CAPS    Take by mouth Indications: OTC, takes two caps daily, does not know strength. 3 capsules daily    CYCLOBENZAPRINE (FLEXERIL) 10 MG TABLET    Take 0.5 tablets by mouth daily as needed for Muscle spasms    DISPOSABLE GLOVES (VINYL GLOVES LARGE) MISC    1 Package by Does not apply route 4 times daily    FAMOTIDINE (PEPCID) 40 MG TABLET    Take 1 tablet by mouth 2 times daily    GAS-X EXTRA STRENGTH 125 MG CHEWABLE TABLET    CHEW TWO TABLETS BY MOUTH THREE TIMES A DAY WITH EACH MEAL AS NEEDED FOR FLATULENCE    GUAIFENESIN (MUCINEX MAXIMUM STRENGTH) 1200 MG TB12    Take 1 tablet by mouth daily For a PM dose.     INCONTINENCE SUPPLIES MISC    1 Package by Does not apply route 2 times daily    INCONTINENCE SUPPLY DISPOSABLE (TRE CLASSIC BRIEFS/LARGE) MISC    1 each by Does not apply route 3 times daily    INSULIN GLARGINE (LANTUS SOLOSTAR) 100 UNIT/ML INJECTION PEN    Inject 34 Units into the skin daily    INSULIN PEN NEEDLE (PEN NEEDLES) 32G X 4 MM MISC    USE WITH INSULIN PEN ONCE DAILY    KETOTIFEN (ZADITOR) 0.025 % OPHTHALMIC SOLUTION    INSTILL TWO DROPS IN [facility] EYE TWO TIMES A DAY AS NEEDED FOR ALLERGY    LATANOPROST (XALATAN) 0.005 % OPHTHALMIC SOLUTION    Place 1 drop into both eyes nightly    LORATADINE (CLARITIN) 10 MG TABLET    TAKE ONE TABLET BY MOUTH DAILY    MULTIPLE VITAMINS-MINERALS (THERAPEUTIC MULTIVITAMIN-MINERALS) TABLET    Take 1 tablet by mouth daily    NYSTATIN (MYCOSTATIN) 628109 UNIT/GM CREAM    APPLY EXTERNALLY TO THE AFFECTED AREAS TWO TIMES A DAY    ROSUVASTATIN (CRESTOR) 5 MG TABLET    Take 1 tablet by mouth daily    SODIUM CHLORIDE (ALTAMIST SPRAY) 0.65 % NASAL SPRAY    1 spray by Nasal route as needed for Congestion    TRUEPLUS LANCETS 28G MISC    USE ONE LANCET TO TEST THREE TO FOUR TIMES A DAY         ALLERGIES     Lamictal [lamotrigine], Macrodantin [nitrofurantoin macrocrystal], Penicillins, Shellfish-derived products, Aspartame and phenylalanine, Bactrim, Biaxin [clarithromycin], Cephalexin, Ciprofloxacin, Hydrocodone-acetaminophen, Lansoprazole, Nexium [esomeprazole magnesium trihydrate], Other, Prilosec [omeprazole], Blueberry [vaccinium angustifolium], Monosodium glutamate, and Zmax [azithromycin dihydrate]    FAMILYHISTORY       Family History   Problem Relation Age of Onset    Diabetes Mother     Heart Disease Mother     Stroke Mother     Heart Disease Father     Heart Disease Brother     High Blood Pressure Brother     High Cholesterol Brother     Diabetes Brother     High Cholesterol Brother     Anesth Problems Neg Hx     Malig Hyperten Neg Hx     Hypotension Neg Hx     Malig Hypertherm Neg Hx     Pseudochol.  Deficiency Neg Hx           SOCIAL HISTORY       Social History     Tobacco Use    Smoking status: Never Smoker    Smokeless tobacco: Never Used   Vaping Use    Vaping Use: Never used   Substance Use Topics    Alcohol use: No     Alcohol/week: 0.0 standard drinks    Drug use: No       SCREENINGS             PHYSICAL EXAM    (up to 7 for level 4, 8 or more for level 5)     ED Triage Vitals [05/26/21 1712]   BP Temp Temp Source Pulse Resp SpO2 Height Weight   116/60 96.7 °F (35.9 °C) Oral 74 18 98 % 5' 2\" (1.575 m) 190 lb (86.2 kg)       Physical Exam  Vitals and nursing note reviewed. Constitutional:       General: She is not in acute distress. Appearance: She is well-developed. She is not ill-appearing, toxic-appearing or diaphoretic. HENT:      Head: Normocephalic and atraumatic. Right Ear: External ear normal.      Left Ear: External ear normal.      Nose: Nose normal.   Eyes:      General:         Right eye: No discharge. Left eye: No discharge. Cardiovascular:      Rate and Rhythm: Normal rate and regular rhythm. Heart sounds: Normal heart sounds. Pulmonary:      Effort: Pulmonary effort is normal. No respiratory distress. Breath sounds: Normal breath sounds. Chest:      Chest wall: No tenderness. Abdominal:      General: There is no distension. Palpations: Abdomen is soft. Tenderness: There is no abdominal tenderness. Musculoskeletal:         General: Normal range of motion. Cervical back: Normal range of motion and neck supple. Skin:     General: Skin is warm and dry. Neurological:      Mental Status: She is alert and oriented to person, place, and time. Mental status is at baseline. GCS: GCS eye subscore is 4. GCS verbal subscore is 5. GCS motor subscore is 6. Cranial Nerves: Cranial nerves are intact. Sensory: Sensation is intact. Motor: Motor function is intact. Coordination: Coordination is intact. Gait: Gait is intact.    Psychiatric:         Behavior: Behavior normal.         DIAGNOSTIC RESULTS   LABS:    Labs Reviewed   CBC WITH AUTO DIFFERENTIAL - Abnormal; Notable for the following components:       Result Value    WBC 13.3 (*)     Neutrophils Absolute 9.3 (*)     All other components within normal limits    Narrative:     Performed at:  [facility]  [address] [place] [address] [employer] [facility] [employer]   Phone [phone]   COMPREHENSIVE METABOLIC PANEL - Abnormal; Notable for the following components:    Chloride 97 (*)     Glucose 186 (*)     BUN 28 (*)     GFR Non-African American 57 (*)     Alkaline Phosphatase 34 (*)      (*)     AST 62 (*)     All other components within normal limits    Narrative:     Performed at:  [facility] [address] [facility] [address]  [place], [place] [employer]   Phone [phone]   URINE RT REFLEX TO CULTURE - Abnormal; Notable for the following components:    Clarity, UA TURBID (*)     Ketones, Urine TRACE (*)     Protein, UA 30 (*)     Leukocyte Esterase, Urine LARGE (*)     All other components within normal limits    Narrative:     Performed at:  [facility] [address] [facility] [address],  [employer] [facility] [employer]   Phone [phone]   MICROSCOPIC URINALYSIS - Abnormal; Notable for the following components:    Bacteria, UA 1+ (*)     WBC,  (*)     Epithelial Cells, UA 8 (*)     All other components within normal limits    Narrative:     Performed at:  [facility] [address] [facility] [address]  [place], [facility] [employer]   Phone [phone]   CULTURE, URINE   TROPONIN    Narrative:     Performed at:  [facility] [address] [employer] [facility]  [employer] [facility] [employer]   Phone [phone]       All other labs were within normal range or not returned as of this dictation. EKG: All EKG's are interpreted by the Emergency Department Physician in the absence of a cardiologist.  Please see their note for interpretation of EKG.     RADIOLOGY:   Non-plain film images such as CT, Ultrasound and MRI are read by the radiologist. Plain radiographic images are visualized and preliminarily interpreted by the ED Provider with the below findings:        Interpretation per the Radiologist below, if available at the time of this note:    XR CHEST (2 VW)   Final Result   No radiographic evidence of acute pulmonary disease. Echo limited    Result Date: 5/26/2021  Transthoracic Echocardiography Report (TTE)  Demographics   Patient Name       JORGE Mancuso   Date of Study      05/26/2021         Gender              Female   Patient Number     [id number]         Date of Birth       1965   Visit Number       [id number]          Age                 61 year(s)   Accession Number   [id number]         Room Number         OP   Corporate ID       [id number]            Sonographer         Ingrid Wilson                                                            RDCS, RVT, BS   Ordering Physician Geraldo King MD,                     APRN-CNP           Physician           [facility]  Procedure Type of Study   TTE procedure:ECHOCARDIOGRAM LIMITED. Procedure Date Date: 05/26/2021 Start: 04:28 PM Study Location: [facility] - Echo Lab Technical Quality: Adequate visualization Indications:Pericardial effusion. Patient Status: Routine Height: 62 inches Weight: 195 pounds BSA: 1.89 m2 BMI: 35.67 kg/m2 HR: 72 bpm BP: 95/42 mmHg  Conclusions   Summary  Normal left ventricle size, wall thickness, and systolic function with an  estimated ejection fraction of 55-60%. No regional wall motion abnormalities  are seen. No pericardial effusion noted. No pleural effusion. Dr. Rivas Murray at bedside. Signature   ------------------------------------------------------------------  Electronically signed by Farhan Dickerson MD, [facility] (Interpreting  physician) on 05/26/2021 at 04:50 PM  ------------------------------------------------------------------   Findings   Left Ventricle  Normal left ventricle size, wall thickness, and systolic function with an  estimated ejection fraction of 55-60%. No regional wall motion abnormalities  are seen. Pericardial Effusion  No pericardial effusion noted. Pleural Effusion  No pleural effusion.             PROCEDURES   Unless otherwise noted below, none     Procedures    CRITICAL CARE TIME   N/A    CONSULTS:  None      EMERGENCY DEPARTMENT COURSE and DIFFERENTIAL DIAGNOSIS/MDM:   Vitals:    Vitals:    05/26/21 1712 05/26/21 1830 05/26/21 1845 05/26/21 1900   BP: 116/60 (!) 151/66 (!) 148/66 (!) 151/72   Pulse: 74 70 70 66   Resp: 18 16 15 16   Temp: 96.7 °F (35.9 °C)      TempSrc: Oral      SpO2: 98% 98% 98% 98%   Weight: 190 lb (86.2 kg)      Height: 5' 2\" (1.575 m)          Patient was given the following medications:  Medications   0.9 % sodium chloride bolus (1,000 mLs Intravenous New Bag 5/26/21 1826)   fosfomycin tromethamine (MONUROL) 3 g PACK 1 packet (1 packet Oral Given 5/26/21 1958)           Patient presents for evaluation of hypotension and dizziness. On exam, she is sitting in a wheelchair in no acute distress nontoxic. Vitals are stable she is afebrile. She is alert and oriented x3. GCS 15. Cranial nerves II through XII are intact. Lungs are clear to auscultation bilaterally, chest is nontender and abdomen is benign. Orthostatic vitals performed by nursing staff, negative. Please see attending note for EKG interpretation. She was given fluid resuscitation and will be reevaluated. CBC and CMP are relatively unremarkable. She has mild transaminitis but there is hemolysis present. Encouraged follow-up with PCP to have this rechecked. Urinalysis is positive for large leukocytes, 1+ bacteria and 239 white blood cells. Chest x-ray shows no evidence of acute cardiopulmonary disease. Patient remained normotensive and well-appearing throughout her stay in the ED. She was treated with single dose of fosfomycin in the ED to do several medication allergies for pharmacy recommendation. Encouraged follow-up with her electrophysiologist and PCP.     At this time there is no indication of head injury, encephalopathy, multiple sclerosis, TIA/CVA, seizures, dehydration, hypoglycemia, mass lesions, metabolic cause, cardiac arrhythmia, PE, GI bleeding or orthostatic hypotension. Patient is stable throughout ED course and safe for outpatient follow-up. Patient made aware of treatment plan and verbally understood and agreed. Patient stable for outpatient follow-up with their family doctor in 24-48 hours. She is agreeable to this plan and stable for discharge at this time. FINAL IMPRESSION      1. Urinary tract infection without hematuria, site unspecified    2.  Lightheadedness          DISPOSITION/PLAN   DISPOSITION        PATIENT REFERRED TO:  Oksana Cruz, [address] [place] [address]  [phone]    Call   For a re-check in  2-3  days    [facility] Emergency Department  [address]  Go to   If symptoms worsen      DISCHARGE MEDICATIONS:  New Prescriptions    No medications on file       DISCONTINUED MEDICATIONS:  Discontinued Medications    No medications on file              (Please note that portions of this note were completed with a voice recognition program.  Efforts were made to edit the dictations but occasionally words are mis-transcribed.)    Suzy Carrero PA-C (electronically signed)           Alexis Fraser PA-C  05/26/21 2008

## 2021-05-26 NOTE — PROGRESS NOTES
Patient comes to the office complains of lightheadedness and dizziness. She states that her mouth is dry and she feels dehydrated. 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 Her BP in office was 86/40 mmHg. Pacemaker checked and functions normally. Also a limited echo was done today and showed no significant pericardial effusion. While she was in hospital two weeks ago, she was hypertensive. She was started and discharged on Norvasc and also Hydralazine. These were discontinued later due to low BP. Cause of hypotension is not clear. Echo today with normal function and no effusion and also pacemaker check today is normal.     Referred for further evaluation and may need BMP and IV fluid.      Lydia Davis MD, MPH  [facility]   Office: [phone]  Fax: [phone]

## 2021-05-26 NOTE — PROGRESS NOTES
Patient comes in for programming evaluation for her pacemaker. All sensing and pacing parameters are within normal range. Battery life 11.6 years  AP 6.4%.  98.3%. No episodes noted. No changes need to be made at this time. Please see interrogation for more detail. BP after sitting for about 20 minutes was 124/74 in the left arm. After standing up her BP dropped to 92/58 in same arm. Patient got dizzy and lightheaded.

## 2021-05-26 NOTE — ED NOTES
Bed: 08  Expected date: 5/26/21  Expected time:   Means of arrival:   Comments:  Papa Son RN  05/26/21 7935

## 2021-05-26 NOTE — PROGRESS NOTES
Chart reviewed. Pt underwent dual chamber PPM implant on 5/10/21 with Dr. Shaun Hamilton. She was started on multiple anti-hypertensive medications due to HTN during the hospital stay. Since the procedure, she began developing fatigue with extreme dizziness/lightheadedness. Her BP meds were stopped on 5/18 and she came in today for a BP check. Her BPs were noted to be loop. Discussed with Dr. Shaun Hamilton. Plan for STAT limited echo to assess for pericardial effusion. If unremarkable, may need to refer to ER for evaluation of hypotension.      Sahil Vicente, APRN-CNP

## 2021-05-27 ENCOUNTER — CARE COORDINATION (OUTPATIENT)
Dept: CARE COORDINATION | Age: 56
End: 2021-05-27

## 2021-05-27 LAB — URINE CULTURE, ROUTINE: NORMAL

## 2021-05-27 NOTE — ED PROVIDER NOTES
Epithelial Cells, UA 8 (*)     All other components within normal limits    Narrative:     Performed at:  [facility]  [address], [address]   Phone [phone]   CULTURE, URINE   TROPONIN    Narrative:     Performed at:  [facility]  [address], [address]   Phone [phone]           EKG:    Paced rhythm at a rate of 69 beats a minute. No acute ST elevations or depressions or pathologic Q waves. Exam:    Well-nourished female no acute distress. She was neurologic intact with no focal motor or sensory deficits throughout. Orthostatics were obtained and are unremarkable normal and the patient is not hypotensive. Medical decision makin-year-old female who presents with an episode of some hypotension. Her work-up today is consistent with acute cystitis. She was treated with a one-time antibiotic for her UTI and given fluids. She was discharged in stable condition with appropriate follow-up and instructions to return if worse. FINAL IMPRESSION:    1. Urinary tract infection without hematuria, site unspecified    2.  Cheli Levi MD  21

## 2021-05-27 NOTE — CARE COORDINATION
crisis. : Independent  Ability to ambulate to restroom: Independent  Ability handle personal hygeine needs (bathing/dressing/grooming): Independent  Ability to manage Medications: Independent  Ability to prepare Food Preparation: Independent  Ability to maintain home (clean home, laundry): Independent  Ability to drive and/or has transportation: Independent  Ability to do shopping: Independent  Ability to manage finances: Independent  Is patient able to live independently?: Yes     Current Housing: Private Residence                 Suggested Interventions and Community Resources   Zone Management Tools: Not Started                  Prior to Admission medications    Medication Sig Start Date End Date Taking?  Authorizing Provider   loratadine (CLARITIN) 10 MG tablet TAKE ONE TABLET BY MOUTH DAILY 5/18/21  Yes Pinky Penn DO   Blood Pressure KIT 1 kit by Does not apply route daily 5/11/21  Yes KRISHNA Nunez CNP   albuterol sulfate  (90 Base) MCG/ACT inhaler INHALE TWO PUFFS BY MOUTH EVERY 6 HOURS AS NEEDED FOR WHEEZING 3/8/21  Yes Travis Gonzalez MD   Insulin Pen Needle (PEN NEEDLES) 32G X 4 MM MISC USE WITH INSULIN PEN ONCE DAILY 11/10/20  Yes Taran Hogan MD   TRUEplus Lancets 28G MISC USE ONE LANCET TO TEST THREE TO FOUR TIMES A DAY 11/10/20  Yes Taran Hogan MD   insulin glargine (LANTUS SOLOSTAR) 100 UNIT/ML injection pen Inject 34 Units into the skin daily 11/10/20  Yes Taran Hogan MD   ASPIRIN LOW DOSE 81 MG EC tablet TAKE ONE TABLET BY MOUTH DAILY 11/3/20  Yes KRISHNA Cano CNP   famotidine (PEPCID) 40 MG tablet Take 1 tablet by mouth 2 times daily  Patient taking differently: Take 40 mg by mouth 2 times daily Indications: taking 1-2 times/day  10/6/20  Yes Risa Stout MD   latanoprost (XALATAN) 0.005 % ophthalmic solution Place 1 drop into both eyes nightly   Yes Historical Provider, MD   Cranberry 1000 MG CAPS Take by mouth Indications: OTC, takes two caps daily, does not know strength. 3 capsules daily   Yes Radha Newell MD   Blood Glucose Monitoring Suppl (TRUE METRIX METER) w/Device KIT As needed 1/31/20  Yes Garfield Franco MD   blood glucose monitor strips Test blood sugar 2-3 times  a day 10/16/19  Yes Garfield Franco MD   nystatin (MYCOSTATIN) 428527 UNIT/GM cream APPLY EXTERNALLY TO THE AFFECTED AREAS TWO TIMES A DAY 6/6/19  Yes Dorcas Lopez MD   Incontinence Supply Disposable (TRE CLASSIC BRIEFS/LARGE) MISC 1 each by Does not apply route 3 times daily 3/4/19  Yes Dorcas Lopez MD   Incontinence Supplies MISC 1 Package by Does not apply route 2 times daily 3/4/19  Yes Dorcas Lopez MD   Disposable Gloves (VINYL GLOVES LARGE) MISC 1 Package by Does not apply route 4 times daily 3/4/19  Yes Dorcas Lopez MD   GAS-X EXTRA STRENGTH 125 MG chewable tablet CHEW TWO TABLETS BY MOUTH THREE TIMES A DAY WITH EACH MEAL AS NEEDED FOR FLATULENCE 12/13/18  Yes Dorcas Lopez MD   budesonide (RHINOCORT AQUA) 32 MCG/ACT nasal spray 1 spray by Each Nostril route daily  Patient not taking: Reported on 5/20/2021 5/18/21   Cuate Hernandez DO   guaiFENesin (MUCINEX MAXIMUM STRENGTH) 1200 MG TB12 Take 1 tablet by mouth daily For a PM dose.   Patient not taking: Reported on 5/20/2021 5/11/21   KRISHNA Lancaster CNP   sodium chloride (ALTAMIST SPRAY) 0.65 % nasal spray 1 spray by Nasal route as needed for Congestion  Patient not taking: Reported on 5/20/2021 5/11/21   KRISHNA Lancaster CNP   ketotifen (ZADITOR) 0.025 % ophthalmic solution INSTILL TWO DROPS IN [facility] EYE TWO TIMES A DAY AS NEEDED FOR ALLERGY  Patient not taking: Reported on 5/27/2021 12/10/20   Pavan Mendez MD   rosuvastatin (CRESTOR) 5 MG tablet Take 1 tablet by mouth daily  Patient not taking: Reported on 5/20/2021 11/19/20   Feliciano Stone MD   Multiple Vitamins-Minerals (THERAPEUTIC MULTIVITAMIN-MINERALS) tablet Take 1 tablet by mouth daily  Patient not taking: Reported on 5/20/2021    Historical Provider, MD   cyclobenzaprine (FLEXERIL) 10 MG tablet Take 0.5 tablets by mouth daily as needed for Muscle spasms  Patient not taking: Reported on 5/20/2021 2/20/20   Merlin Few, MD   Cholecalciferol (VITAMIN D3) 25 MCG (1000 UT) TABS Take 1 tablet by mouth daily  Patient not taking: Reported on 5/20/2021 11/26/19   Merlin Few, MD       Future Appointments   Date Time Provider Cr [place]   6/21/2021  1:00 PM Blanco Nicolas AUDIO [address]   6/21/2021  1:30 PM Pratik Ratliff,  FF ENT [facility]   6/23/2021 11:00 AM KRISHNA Schulte CNP [facility]   9/7/2021  3:00 PM SCHEDULE, [place] DEVICE CHECK  Cardio [facility]   9/7/2021  3:00 PM Kia Madrigal MD  Cardio [facility]   11/15/2021  7:00 AM SCHEDULE, [place] REMOTE TRANSMISSION  Cardio [facility]   12/15/2021 10:15 AM Kathi Sutherland MD  Cardio [facility]     ,   Diabetes Assessment    How often do you test your blood sugar?: Daily   Do you have barriers with adherence to non-pharmacologic self-management interventions?  (Nutrition/Exercise/Self-Monitoring): No   Have you ever had to go to the ED for symptoms of low blood sugar?: No       No patient-reported symptoms   Do you have hyperglycemia symptoms?: No   Do you have hypoglycemia symptoms?: No   Blood Sugar Monitoring Regimen: Once a Day   Blood Sugar Trends: No Change       and   General Assessment    Do you have any symptoms that are causing concern?: Yes  Progression since Onset: Unchanged  Reported Symptoms:  (Comment: lightheadness, low b/p)

## 2021-05-28 LAB
EKG ATRIAL RATE: 69 BPM
EKG DIAGNOSIS: NORMAL
EKG P AXIS: 60 DEGREES
EKG P-R INTERVAL: 176 MS
EKG Q-T INTERVAL: 438 MS
EKG QRS DURATION: 156 MS
EKG QTC CALCULATION (BAZETT): 469 MS
EKG R AXIS: 12 DEGREES
EKG T AXIS: 181 DEGREES
EKG VENTRICULAR RATE: 69 BPM

## 2021-05-28 PROCEDURE — 93010 ELECTROCARDIOGRAM REPORT: CPT | Performed by: INTERNAL MEDICINE

## 2021-06-04 ENCOUNTER — TELEPHONE (OUTPATIENT)
Dept: ENT CLINIC | Age: 56
End: 2021-06-04

## 2021-06-09 ENCOUNTER — CARE COORDINATION (OUTPATIENT)
Dept: CARE COORDINATION | Age: 56
End: 2021-06-09

## 2021-06-16 ENCOUNTER — CARE COORDINATION (OUTPATIENT)
Dept: CARE COORDINATION | Age: 56
End: 2021-06-16

## 2021-06-16 NOTE — CARE COORDINATION
Ambulatory Care Coordination Note  6/16/2021  CM Risk Score: 6  [place] 10 Year Mortality Risk Score: 47%     ACC: Shonna Blunt RN    Summary Note:   acm out reach call placed . Pt reports doing well still some soreness at pacemaker site, blood sugar and blood pressure running wnl. Reviewed dietary modifications related disease processes  and introduced zone tools. No reported resouces needed at this time. Will follow up again at later date  Plan:  Continue ACM support for health coaching, care coordination and community resource support        Care Coordination Interventions    Program Enrollment: Complex Care  Referral from Primary Care Provider: No  Suggested Interventions and Community Resources  Diabetes Education: In Process  Zone Management Tools: In Process         Goals Addressed    None         Prior to Admission medications    Medication Sig Start Date End Date Taking? Authorizing Provider   loratadine (CLARITIN) 10 MG tablet TAKE ONE TABLET BY MOUTH DAILY 5/18/21   Amanda Arana DO   budesonide (RHINOCORT AQUA) 32 MCG/ACT nasal spray 1 spray by Each Nostril route daily  Patient not taking: Reported on 5/20/2021 5/18/21   Amanda Arana DO   guaiFENesin [facility] MAXIMUM STRENGTH) 1200 MG TB12 Take 1 tablet by mouth daily For a PM dose.   Patient not taking: Reported on 5/20/2021 5/11/21   KRISHNA Hussein CNP   Blood Pressure KIT 1 kit by Does not apply route daily 5/11/21   KRISHNA Santana CNP   sodium chloride (ALTAMIST SPRAY) 0.65 % nasal spray 1 spray by Nasal route as needed for Congestion  Patient not taking: Reported on 5/20/2021 5/11/21   KRISHNA Santana CNP   albuterol sulfate  (90 Base) MCG/ACT inhaler INHALE TWO PUFFS BY MOUTH EVERY 6 HOURS AS NEEDED FOR WHEEZING 3/8/21   Germán Lira MD   ketotifen (ZADITOR) 0.025 % ophthalmic solution INSTILL TWO DROPS IN [facility] EYE TWO TIMES A DAY AS NEEDED FOR ALLERGY  Patient not taking: Reported on MD Tom   Incontinence Supply Disposable (TRE CLASSIC BRIEFS/LARGE) MISC 1 each by Does not apply route 3 times daily 3/4/19   Brian Rush MD   Incontinence Supplies MISC 1 Package by Does not apply route 2 times daily 3/4/19   Brian Rush MD   Disposable Gloves (VINYL GLOVES LARGE) MISC 1 Package by Does not apply route 4 times daily 3/4/19   Brian Rush MD   GAS-X EXTRA STRENGTH 125 MG chewable tablet CHEW TWO TABLETS BY MOUTH THREE TIMES A DAY WITH EACH MEAL AS NEEDED FOR FLATULENCE 12/13/18   Brian Rush MD       Future Appointments   Date Time Provider Cr [place]   6/21/2021  1:00 PM Blanco De León AUDIO [facility]   6/21/2021  1:30 PM Mandeep Leslie DO FF [facility]   6/23/2021 11:00 AM KRISHNA Hernandez CNP [facility]   8/24/2021 10:00 AM SCHEDULE, [place] REMOTE TRANSMISSION FF Card[facility]   9/7/2021  3:00 PM SCHEDULE, [place] DEVICE CHECK FF [facility]   9/7/2021  3:00 PM Neelima Allen MD FF [facility]   11/15/2021  7:00 AM SCHEDULE, [place] REMOTE TRANSMISSION FF [facility]   11/23/2021 10:00 AM SCHEDULE, [place] REMOTE TRANSMISSION FF [facility]   12/15/2021 10:15 AM Ahmet Hart MD FF [facility]   2/22/2022 10:00 AM SCHEDULE, [place] REMOTE TRANSMISSION FF [facility]   5/24/2022 10:00 AM SCHEDULE, [place] REMOTE TRANSMISSION FF [facility]   8/23/2022 10:00 AM SCHEDULE, [place] REMOTE TRANSMISSION FF [facility]   11/22/2022 10:00 AM SCHEDULE, [place] REMOTE TRANSMISSION FF Card[facility]      and   Diabetes Assessment    Medic Alert ID: No  How often do you test your blood sugar?: Daily   Do you have barriers with adherence to non-pharmacologic self-management interventions?  (Nutrition/Exercise/Self-Monitoring): No   Have you ever had to go to the ED for symptoms of low blood sugar?: No       No patient-reported symptoms   Do you have hyperglycemia symptoms?: No   Do you have hypoglycemia symptoms?: No   Blood Sugar Monitoring Regimen: Once a Day   Blood Sugar Trends: No Change

## 2021-06-20 DIAGNOSIS — Z79.4 TYPE 2 DIABETES MELLITUS WITHOUT COMPLICATION, WITH LONG-TERM CURRENT USE OF INSULIN (HCC): Primary | ICD-10-CM

## 2021-06-20 DIAGNOSIS — E11.9 TYPE 2 DIABETES MELLITUS WITHOUT COMPLICATION, WITH LONG-TERM CURRENT USE OF INSULIN (HCC): Primary | ICD-10-CM

## 2021-06-21 ENCOUNTER — TELEPHONE (OUTPATIENT)
Dept: CARDIOLOGY CLINIC | Age: 56
End: 2021-06-21

## 2021-06-21 RX ORDER — CALCIUM CITRATE/VITAMIN D3 200MG-6.25
TABLET ORAL
Qty: 50 STRIP | Refills: 2 | Status: SHIPPED | OUTPATIENT
Start: 2021-06-21 | End: 2022-09-02 | Stop reason: SDUPTHER

## 2021-06-21 NOTE — TELEPHONE ENCOUNTER
Calling to see if the form they sent for a blood pressure monitor has been filled out by DCE. Advised DCE oot last week and this week . BM MA giving form to other provider to sign .

## 2021-06-22 ENCOUNTER — TELEPHONE (OUTPATIENT)
Dept: CARDIOLOGY CLINIC | Age: 56
End: 2021-06-22

## 2021-06-22 PROBLEM — R07.9 CHEST PAIN: Status: RESOLVED | Noted: 2019-01-23 | Resolved: 2021-06-22

## 2021-06-22 NOTE — TELEPHONE ENCOUNTER
Pt calling asking to speak with [address] [place] [address] [employer] regarding transportation, she can not come tomorrow, possibly Friday? Needs definite time to give transport.  pls call to advise thank you

## 2021-06-22 NOTE — TELEPHONE ENCOUNTER
Device incision site is red and there is some seeping from the incision believes it is bloody. Has transportation issues and cannot come in without 3 days notice. She is not running a fever. It is tender to the touch. She is going to send a photo of the incision for further evaluation. I explained that she needs to keep the incision clean and dry and we need to see her asap.  Calling her transportation company to let them know this is an urgent visit

## 2021-06-23 ENCOUNTER — VIRTUAL VISIT (OUTPATIENT)
Dept: PSYCHIATRY | Age: 56
End: 2021-06-23
Payer: COMMERCIAL

## 2021-06-23 DIAGNOSIS — F41.9 ANXIETY: ICD-10-CM

## 2021-06-23 DIAGNOSIS — F31.9 BIPOLAR DEPRESSION (HCC): Primary | ICD-10-CM

## 2021-06-23 PROCEDURE — 99443 PR PHYS/QHP TELEPHONE EVALUATION 21-30 MIN: CPT | Performed by: NURSE PRACTITIONER

## 2021-06-23 NOTE — PROGRESS NOTES
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 particularly with sedating medications. Fearful won't wake up if meds too sedating.      - opted to try lamictal.  Initially Had been tolerating lamictal well during previous dose titration but eventually  developed rash and peripheral edema following most recent dosage increase so was advised to stop. At fu, reported rash and edema resolved shortly after stopping lamictal.     - previously discussed cymbalta for anxiety, mood and chronic pain. DECLINED. Says has had such negative responses to previous meds, fearful of introducing new meds at this point    - consider abilify vs latuda for mood, may benefit from activating properties of abilify, has fear of oversedation. DECLINED AT PRESENT     - continue to hold valium 2mg prn for anxiety, sleep d/t ongoing dizziness with recent hypotension. Historically prefers low dose d/t fear of oversedation.    Reports this dose was working very well to help relax her at night so can get some rest.  Hasn't taken in > 1 month d/t previous bradycardia (recent pacemaker placement 5/2021)  Reminded this is not long term solution and need to have additional strategies to help manage anxiety and mood symptoms. Lots med anxiety.       -Labs: reviewed in Epic   9/21/20:  Vit d 26.4; lipids (total chol 254, trig 214, hdl 36, ldl 175)              11/26/19:  Lipids:  (Total chol 254, ldl 178); hgba1c 8.2 tsh wnl              5/7/19:  Vit d 21.4     - ENCOURAGED TO KEEP CONSISTENT SCHEDULE/ROUTINE TO INCLUDE WAKE/SLEEP TIME, MEAL TIMES, SHOWER TIMES, EXERCISE       -Recommend outpt therapy. Has tried to connect in past but limited with insurance options AND transportation issues. Unsure if wants to proceed or would be helpful.      -OARRS reviewed, c/w history  -R/b/se/a d/w pt who consents.     3. Medical  -established with Jasmin Mosquera MD       4. Substance   -No active issues.     5.  RTC - 4 weeks, prefers am appts; first available 7/23 @ [address], [facility]  Psychiatric Nurse Practitioner

## 2021-06-24 NOTE — PROGRESS NOTES
[address]   Electrophysiology  KRISHNA Ram-CNP  Attending EP: Dr. Littlejohn Comment   Date: 6/25/2021  I had the privilege of visiting Gogo Nettles in the office. Chief Complaint:   Chief Complaint   Patient presents with    Follow-up     check incision and device check - pacer inserted May 10th    Wound Infection     History of Present Illness: History obtained from patient and medical record. Gogo Nettles is 61 y.o. female with a past medical history of HTN, HLD, DM, obesity, SHIRLEY, and nonobstructive CAD who presented with chest pressure and lightheadedness x 2 months. Found to be in 2:1 AVB on ECG with no reversible causes. S/p dual chamber PPM 5/10/2021 (Dr. Deb Jones). Developed fatigue and lightheadedness and seen in the office, found to have hypotension. Stat limited echo showed no pericardial effusion. Interval history: Today, Gogo Nettles is being seen for acute visit for pacemaker site oozing. Reports that several times this week she has has scant bloody drainage from the lateral corner of her PM sit infection. Admits to mild discomfort at the site and stinging or pulling with mild bleeding. Drainage was scant, clear and bloody. Denies any creamy/cloudy/brown drainage. Denies fever or chills. Monitors BP at home and it has been 473-709'L systolic. Reports continues episodes of dizziness despite PPM implantation. Episodes occur several times daily and improves with hydration and eating. Worsens with position changes. She has checked her BP with it and has been 482'B systolic, which is slightly lower than her usual BP. Reports dizziness as spinning sensation, sometimes worsens with changes in head positions. Episodes sometimes to the point of pre-syncope. Denies falls or syncope. Denies having chest pain, palpitations, shortness of breath, orthopnea/PND, cough, or dizziness at the time of this visit.     With regard to medication therapy the patient has been TIMES A DAY AS NEEDED FOR ALLERGY  Marguerite De Los Santos MD   rosuvastatin (CRESTOR) 5 MG tablet Take 1 tablet by mouth daily  Patient not taking: Reported on 5/20/2021  Hector Harris MD   Insulin Pen Needle (PEN NEEDLES) 32G X 4 MM MISC USE WITH INSULIN PEN ONCE DAILY  Marvin Moncada MD   TRUEplus Lancets 28G MISC USE ONE LANCET TO TEST THREE TO FOUR TIMES A DAY  Marvin Moncada MD   insulin glargine (LANTUS SOLOSTAR) 100 UNIT/ML injection pen Inject 34 Units into the skin daily  Marvin Moncada MD   ASPIRIN LOW DOSE 81 MG EC tablet TAKE ONE TABLET BY MOUTH DAILY  Tracy Valiente APRN - CNP   famotidine (PEPCID) 40 MG tablet Take 1 tablet by mouth 2 times daily  Patient taking differently: Take 40 mg by mouth 2 times daily Indications: taking 1-2 times/day   Gifty Baez MD   latanoprost (XALATAN) 0.005 % ophthalmic solution Place 1 drop into both eyes nightly  Historical Provider, MD   Cranberry 1000 MG CAPS Take by mouth Indications: OTC, takes two caps daily, does not know strength.  3 capsules daily  Patient not taking: Reported on 6/23/2021  Historical Provider, MD   Multiple Vitamins-Minerals (THERAPEUTIC MULTIVITAMIN-MINERALS) tablet Take 1 tablet by mouth daily   Historical Provider, MD   cyclobenzaprine (FLEXERIL) 10 MG tablet Take 0.5 tablets by mouth daily as needed for Muscle spasms  Patient not taking: Reported on 5/20/2021  Jess Navarro MD   Blood Glucose Monitoring Suppl (TRUE METRIX METER) w/Device KIT As needed  Marvin Moncada MD   Cholecalciferol (VITAMIN D3) 25 MCG (1000 UT) TABS Take 1 tablet by mouth daily  Patient taking differently: Take 10,000 Units by mouth once a week   Jess Navarro MD   nystatin (MYCOSTATIN) 180879 UNIT/GM cream APPLY EXTERNALLY TO THE AFFECTED AREAS TWO TIMES A DAY  Jess Navarro MD   Incontinence Supply Disposable (TRE CLASSIC BRIEFS/LARGE) MISC 1 each by Does not apply route 3 times daily  Jess Navarro MD   Incontinence Supplies MISC 1 Package by Does not apply route 2 times daily  Mik Aguilar MD   Disposable Gloves (VINYL GLOVES LARGE) MISC 1 Package by Does not apply route 4 times daily  Mik Aguilar MD   GAS-X EXTRA STRENGTH 125 MG chewable tablet CHEW TWO TABLETS BY MOUTH THREE TIMES A DAY WITH EACH MEAL AS NEEDED FOR FLATULENCE  Mik Aguilar MD      Past Medical History:  Past Medical History:   Diagnosis Date    Ankle fracture, left     Ankle fracture, right     Arthritis     Asthma     Bipolar depression ([facility]     CAN'T AFFORD MEDS    Bladder problem     Cervical disc herniation     COVID toes     Diabetes mellitus ([facility]     diet control    Fatty liver disease, non-alcoholic     Fibromyalgia     Gastroparesis     Dr Mikey Luu GERD (gastroesophageal reflux disease)     gastroporesis    Glaucoma     Dr Uri Cisneros Hyperlipidemia     elevated LFT on meds    IBS (irritable bowel syndrome)     Lumbar herniated disc     Nausea & vomiting     Nephrolithiasis 1/29/2019    Obesity (BMI 30-39.9) 3/11/2019    Partial Achilles tendon tear     Pneumonia     PONV (postoperative nausea and vomiting)     RA (rheumatoid arthritis) ([facility]     Rapid or irregular heartbeat     Sleep apnea     does not use cpap    Spinal stenosis     Stress incontinence     Thyroid disease     growths     Past Surgical History:    has a past surgical history that includes Cholecystectomy; Dilation and curettage of uterus; Tonsillectomy; laparoscopy; Upper gastrointestinal endoscopy; Endometrial ablation; ERCP (5/25/2010); Esophagoscopy (10/5/10); Upper gastrointestinal endoscopy (04/12/2011); eye surgery; Upper gastrointestinal endoscopy (08/30/2011); Upper gastrointestinal endoscopy (3-9-12); Upper gastrointestinal endoscopy; Upper gastrointestinal endoscopy (11/20/12); Upper gastrointestinal endoscopy (6/4/13); ERCP (1-31-14); Upper gastrointestinal endoscopy (5/20/2014);  Upper gastrointestinal endoscopy (12/12/14); and in no apparent distress, and appears well nourished   Skin: Warm and pink; no cyanosis or bruising   HEENT: Symmetric and normocephalic. Conjunctiva pink with clear sclera. Mucus membranes pink and moist. No visible masses/goiter   Respiratory: Respirations symmetric and unlabored. Lungs clear to auscultation bilaterally, no wheezing, rhonchi, or crackles.  Cardiovascular:  regular rate and rhythm. S1 & S2 present, negative for murmur. negative elevation of JVP. No peripheral edema.  Musculoskeletal:  No focal weakness.  Neurological/Psych: Awake and orientated to person, place and time. Calm affect, appropriate mood. Pertinent labs, diagnostic, device, and imaging results reviewed as a part of this visit    LABS    CBC:   Lab Results   Component Value Date    WBC 13.3 (H) 2021    HGB 13.4 2021    HCT 41.5 2021    MCV 85.8 2021     2021     BMP:   Lab Results   Component Value Date    CREATININE 1.0 2021    BUN 28 (H) 2021     2021    K 4.8 2021    CL 97 (L) 2021    CO2 25 2021     CrCl cannot be calculated (Unknown ideal weight.). No results found for: BNP    Thyroid:   Lab Results   Component Value Date    TSH 1.40 2020     Lipid Panel:   Lab Results   Component Value Date    CHOL 174 2021    HDL 33 2021    HDL 36 2010    TRIG 112 2021     LFTs:  Lab Results   Component Value Date     (H) 2021    AST 62 (H) 2021    ALKPHOS 34 (L) 2021    BILITOT 0.4 2021     Coags:   Lab Results   Component Value Date    PROTIME 11.7 2019    INR 1.03 2019    APTT 29.0 2010     EC2021 SR HR 69, , , QTc 419    ECHO:  3/11/2019  Summary   -Normal left ventricle size, wall thickness and systolic function with an   estimated ejection fraction of 55%. - No regional wall motion abnormalities are seen.   -Normal diastolic function. -Trivial tricuspid regurgitation. Stress Test: 4/14/214  Karyn Breen is normal isotope uptake at stress and rest. There is no evidence of    myocardial ischemia or scar.    Normal LV size and systolic function    Left ventricular ejection fraction of 58 %. Cath: none    Diet & Exercise:   The patient is counseled to follow a low salt diet to assure blood pressure remains controlled for cardiovascular risk factor modification   The patient is counseled to avoid excess caffeine, and energy drinks as this may exacerbated ectopy and arrhythmia   The patient is counseled to lose weight to control cardiovascular risk factors   Exercise program discussed: To improve overall cardiovascular health, the patient is instructed to increase cardiovascular related activities with a goal of 150 min/week of moderate level activity or 10,000 steps per day. Encouraged to perform as much activity as tolerated     I have addressed the patient's cardiac risk factors and adjusted pharmacologic treatment as needed. In addition, I have reinforced the need for patient directed risk factor modification. I independently reviewed the device check interrogation and ECG    All questions and concerns were addressed with the patient. Alternatives to treatment were discussed. Thank you for allowing to us to participate in the care of Rick Pramod.     Feliciano Chau, APRN-JOSEPH  [facility]   Office: [phone]     \

## 2021-06-25 ENCOUNTER — NURSE ONLY (OUTPATIENT)
Dept: CARDIOLOGY CLINIC | Age: 56
End: 2021-06-25
Payer: COMMERCIAL

## 2021-06-25 ENCOUNTER — OFFICE VISIT (OUTPATIENT)
Dept: CARDIOLOGY CLINIC | Age: 56
End: 2021-06-25
Payer: COMMERCIAL

## 2021-06-25 VITALS
DIASTOLIC BLOOD PRESSURE: 83 MMHG | SYSTOLIC BLOOD PRESSURE: 144 MMHG | HEART RATE: 73 BPM | WEIGHT: 190 LBS | BODY MASS INDEX: 34.96 KG/M2 | HEIGHT: 62 IN

## 2021-06-25 DIAGNOSIS — I10 ESSENTIAL HYPERTENSION: ICD-10-CM

## 2021-06-25 DIAGNOSIS — T82.9XXA PACEMAKER COMPLICATIONS, INITIAL ENCOUNTER: Primary | ICD-10-CM

## 2021-06-25 DIAGNOSIS — R42 DIZZINESS: ICD-10-CM

## 2021-06-25 DIAGNOSIS — Z95.0 PACEMAKER: ICD-10-CM

## 2021-06-25 DIAGNOSIS — E66.9 OBESITY (BMI 30-39.9): ICD-10-CM

## 2021-06-25 DIAGNOSIS — I44.30 HEART BLOCK ATRIOVENTRICULAR: ICD-10-CM

## 2021-06-25 DIAGNOSIS — I25.10 CORONARY ARTERY DISEASE INVOLVING NATIVE CORONARY ARTERY OF NATIVE HEART WITHOUT ANGINA PECTORIS: ICD-10-CM

## 2021-06-25 DIAGNOSIS — E78.00 HYPERCHOLESTEREMIA: ICD-10-CM

## 2021-06-25 DIAGNOSIS — I44.1 MOBITZ TYPE II ATRIOVENTRICULAR BLOCK: ICD-10-CM

## 2021-06-25 PROCEDURE — 3017F COLORECTAL CA SCREEN DOC REV: CPT | Performed by: NURSE PRACTITIONER

## 2021-06-25 PROCEDURE — G8427 DOCREV CUR MEDS BY ELIG CLIN: HCPCS | Performed by: NURSE PRACTITIONER

## 2021-06-25 PROCEDURE — G8417 CALC BMI ABV UP PARAM F/U: HCPCS | Performed by: NURSE PRACTITIONER

## 2021-06-25 PROCEDURE — 99214 OFFICE O/P EST MOD 30 MIN: CPT | Performed by: NURSE PRACTITIONER

## 2021-06-25 PROCEDURE — 93280 PM DEVICE PROGR EVAL DUAL: CPT | Performed by: INTERNAL MEDICINE

## 2021-06-25 PROCEDURE — 1036F TOBACCO NON-USER: CPT | Performed by: NURSE PRACTITIONER

## 2021-06-25 RX ORDER — GENTAMICIN SULFATE 1 MG/G
OINTMENT TOPICAL
Qty: 1 TUBE | Refills: 0 | Status: SHIPPED | OUTPATIENT
Start: 2021-06-25 | End: 2021-07-02

## 2021-06-25 NOTE — PROGRESS NOTES
Patient comes in for programming evaluation for her pacemaker. All sensing and pacing parameters are within normal range. Battery life 11y7m  AP 7.41%.  97.07%. No episodes noted. No changes need to be made at this time. Please see interrogation for more detail. Pt complains of dizziness.

## 2021-06-25 NOTE — PATIENT INSTRUCTIONS
- Apply antibiotic ointment 2 times daily and cover with Band-Aid on effected area  - Call office if drainage continues past 1 week or if it changes color or become creamy or increases in amount of drainage  - Call office if you develop fever or chills  - Call office if swelling at site or if pain increases or doesn't gradually improve  - Follow up with PCP regarding spinning sensation  - Check your blood pressure at home, if your top number blood pressure is >150/90 or <90/50 please call the office

## 2021-07-08 ENCOUNTER — TELEPHONE (OUTPATIENT)
Dept: CARDIOLOGY CLINIC | Age: 56
End: 2021-07-08

## 2021-07-15 ENCOUNTER — CARE COORDINATION (OUTPATIENT)
Dept: CARE COORDINATION | Age: 56
End: 2021-07-15

## 2021-07-15 NOTE — CARE COORDINATION
any blood sugar readings less than 70 more than 2 times a month. Patient Reported Blood Pressure     Barriers: overwhelmed by complexity of regimen and lack of education  Plan for overcoming my barriers: care coordination  Confidence: 8/10  Anticipated Goal Completion Date: 10/2021              Prior to Admission medications    Medication Sig Start Date End Date Taking? Authorizing Provider   blood glucose test strips (TRUE METRIX BLOOD GLUCOSE TEST) strip USE ONE STRIP TO TEST TWICE A DAY AS NEEDED 6/21/21   Artemio Bustos MD   loratadine (CLARITIN) 10 MG tablet TAKE ONE TABLET BY MOUTH DAILY 5/18/21   Lina Aviles DO   budesonide (RHINOCORT AQUA) 32 MCG/ACT nasal spray 1 spray by Each Nostril route daily 5/18/21   Lina Aviles DO   guaiFENesin (MUCINEX MAXIMUM STRENGTH) 1200 MG TB12 Take 1 tablet by mouth daily For a PM dose.  5/11/21   KRISHNA Benites CNP   Blood Pressure KIT 1 kit by Does not apply route daily 5/11/21   KRISHNA Benites CNP   albuterol sulfate  (90 Base) MCG/ACT inhaler INHALE TWO PUFFS BY MOUTH EVERY 6 HOURS AS NEEDED FOR WHEEZING 3/8/21   Mary Beth Chowdhury MD   ketotifen (ZADITOR) 0.025 % ophthalmic solution INSTILL TWO DROPS IN [facility] EYE TWO TIMES A DAY AS NEEDED FOR ALLERGY 12/10/20   Pavel Ochoa MD   Insulin Pen Needle (PEN NEEDLES) 32G X 4 MM MISC USE WITH INSULIN PEN ONCE DAILY 11/10/20   Artemio Bustos MD   TRUEplus Lancets 28G MISC USE ONE LANCET TO TEST THREE TO FOUR TIMES A DAY 11/10/20   Artemio Bustos MD   insulin glargine (LANTUS SOLOSTAR) 100 UNIT/ML injection pen Inject 34 Units into the skin daily 11/10/20   Artemio Bustos MD   ASPIRIN LOW DOSE 81 MG EC tablet TAKE ONE TABLET BY MOUTH DAILY 11/3/20   KRISHNA Grossman CNP   famotidine (PEPCID) 40 MG tablet Take 1 tablet by mouth 2 times daily  Patient taking differently: Take 40 mg by mouth 2 times daily Indications: taking 1-2 times/day  10/6/20   Alexandria Pelayo Dilma Sparks MD   latanoprost (XALATAN) 0.005 % ophthalmic solution Place 1 drop into both eyes nightly    Historical Provider, MD   Cranberry 1000 MG CAPS Take by mouth Indications: OTC, takes two caps daily, does not know strength.  3 capsules daily    Historical Provider, MD   Multiple Vitamins-Minerals (THERAPEUTIC MULTIVITAMIN-MINERALS) tablet Take 1 tablet by mouth daily     Historical Provider, MD   Blood Glucose Monitoring Suppl (TRUE METRIX METER) w/Device KIT As needed 1/31/20   Britt House MD   Cholecalciferol (VITAMIN D3) 25 MCG (1000 UT) TABS Take 1 tablet by mouth daily  Patient taking differently: Take 10,000 Units by mouth once a week  11/26/19   Debbie Matt MD   Incontinence Supply Disposable (TRE CLASSIC BRIEFS/LARGE) MISC 1 each by Does not apply route 3 times daily 3/4/19   Debbie Matt MD   Incontinence Supplies MISC 1 Package by Does not apply route 2 times daily 3/4/19   Debbie Matt MD   Disposable Gloves (VINYL GLOVES LARGE) MISC 1 Package by Does not apply route 4 times daily 3/4/19   Debbie Matt MD   GAS-X EXTRA STRENGTH 125 MG chewable tablet CHEW TWO TABLETS BY MOUTH THREE TIMES A DAY WITH EACH MEAL AS NEEDED FOR FLATULENCE 12/13/18   Debbie Matt MD       Future Appointments   Date Time Provider Cr [place]bo[place]   7/23/2021 10:00 AM Willie Doe, APRN - CNP [facility]   7/27/2021  1:45 PM Blanco Alejandre AUDIO [facility]   7/27/2021  2:30 PM Simona Aguirre DO [facility]   8/24/2021 10:00 AM SCHEDULE, [place] REMOTE TRANSMISSION [facility] Card[facility]   9/7/2021  3:00 PM SCHEDULE, [place] DEVICE CHECK [facility]   9/7/2021  3:00 PM Nuha Yeung MD [facility]   11/15/2021  7:00 AM SCHEDULE, [place] REMOTE TRANSMISSION [facility] Card[facility]   11/23/2021 10:00 AM SCHEDULE, [place] REMOTE TRANSMISSION [facility]   12/15/2021 10:15 AM Viki Barrera MD [facility]   2/22/2022 10:00 AM SCHEDULE, [place] REMOTE TRANSMISSION [facility] Card[facility]   5/24/2022 10:00 AM SCHEDULE, [place] REMOTE TRANSMISSION FF Cardio [facility]   8/23/2022 10:00 AM SCHEDULE, [place] REMOTE TRANSMISSION [facility] Card[facility]   11/22/2022 10:00 AM SCHEDULE, [place] REMOTE TRANSMISSION FF Card[facility]      and   Diabetes Assessment    Medic Alert ID: No  Meal Planning: Avoidance of concentrated sweets   How often do you test your blood sugar?: Daily   Do you have barriers with adherence to non-pharmacologic self-management interventions?  (Nutrition/Exercise/Self-Monitoring): No   Have you ever had to go to the ED for symptoms of low blood sugar?: No       No patient-reported symptoms   Do you have hyperglycemia symptoms?: No   Do you have hypoglycemia symptoms?: No   Blood Sugar Monitoring Regimen: Once a Day   Blood Sugar Trends: No Change

## 2021-07-15 NOTE — CARE COORDINATION
Ambulatory Care Coordination Note  7/15/2021  CM Risk Score: 6  [place] 10 Year Mortality Risk Score: 47%     ACC: Shonna Blunt RN    Summary Note:  Spoke with Eli Blunt , who reports overall she is doing well today blood sugars have been with desired limit sand blood pressure has been in normal limits states she was running \"a little low yesterday at 85/65 states she was concerned that she was becoming dehydrated so she increased her fluid consumption and states today was back in normal range  Praised pt for making necessary changes to improve health reviewed last several Hga1c and pt now 7.2 (down from 9.2 and 11.1 respectively ) has worked with dietician and was instructed to try supplementing with Glucerna on low appetite days , but is unable to afford  Completed Diabetes education pt reports her air conditioner went out and she has not had money to replace and has been trying to stay cool the best way she can discussed contacting [facility] [employer] for assistance, pt states she has already contacted them and they could not assist discussed us researching other organizations pt agreeable but has no transporation to  if one could be located pt states she has a fan and will spritz self with water and sent near fan on really hot days. Plan:  Assist with locating  resources for air conditioner  Contact office to inquire if Glucerna samples available/ contact dm educator for company resources available  Continue acm support for health coaching and resource assistance            Care Coordination Interventions    Program Enrollment: Complex Care  Referral from Primary Care Provider: No  Suggested Interventions and Community Resources  Diabetes Education: Completed  Zone Management Tools: In Process         Goals Addressed                 This Visit's Progress     Conditions and Symptoms   On track     I will schedule office visits, as directed by my provider.   I will keep my appointment or reschedule if I have to cancel. I will notify my provider of any barriers to my plan of care. I will follow my Zone Management tool to seek urgent or emergent care. I will notify my provider of any symptoms that indicate a worsening of my condition. Barriers: overwhelmed by complexity of regimen and lack of education  Plan for overcoming my barriers:care coordination  Confidence: 8/10  Anticipated Goal Completion Date: 10/2021         Medication Management   On track     I will take my medication as directed. I will notify my provider of any problems with medications, like adverse effects or side effects. I will notify my provider/Care Coordinator if I am unable to afford my medications. I will notify my provider for advice before I stop taking any of my medication. Barriers: overwhelmed by complexity of regimen and lack of education  Plan for overcoming my barriers: care coordination  Confidence: 8/10  Anticipated Goal Completion Date: 10/2021       COMPLETED: Self Monitoring        Self-Monitored Blood Glucose - I will check my blood sugar Fasting blood sugar  I will notify my provider of any trends of increasing or decreasing blood sugars over a 1 month period. I will notify my provider if I have any blood sugar readings less than 70 more than 2 times a month. Patient Reported Blood Pressure     Barriers: overwhelmed by complexity of regimen and lack of education  Plan for overcoming my barriers: care coordination  Confidence: 8/10  Anticipated Goal Completion Date: 10/2021              Prior to Admission medications    Medication Sig Start Date End Date Taking?  Authorizing Provider   blood glucose test strips (TRUE METRIX BLOOD GLUCOSE TEST) strip USE ONE STRIP TO TEST TWICE A DAY AS NEEDED 6/21/21   Montrell Swan MD   loratadine (CLARITIN) 10 MG tablet TAKE ONE TABLET BY MOUTH DAILY 5/18/21   Mandeep Franks DO   budesonide (RHINOCORT AQUA) 32 MCG/ACT nasal spray 1 spray by Each Nostril route daily CLASSIC BRIEFS/LARGE) MISC 1 each by Does not apply route 3 times daily 3/4/19   Sammy Retana MD   Incontinence Supplies MISC 1 Package by Does not apply route 2 times daily 3/4/19   Sammy Retana MD   Disposable Gloves (VINYL GLOVES LARGE) MISC 1 Package by Does not apply route 4 times daily 3/4/19   Sammy Retana MD   GAS-X EXTRA STRENGTH 125 MG chewable tablet CHEW TWO TABLETS BY MOUTH THREE TIMES A DAY WITH EACH MEAL AS NEEDED FOR FLATULENCE 12/13/18   Sammy Retana MD       Future Appointments   Date Time Provider Cr Barboza   7/23/2021 10:00 AM RefKRISHNA Erwin - CNP [facility]   7/27/2021  1:45 PM Blanco Grey AUDIO [facility]   7/27/2021  2:30 PM Jm Zendejas DO [facility]   8/24/2021 10:00 AM SCHEDULE, [place] REMOTE TRANSMISSION FF Cardio [facility]   9/7/2021  3:00 PM SCHEDULE, [place] DEVICE CHECK [facility]   9/7/2021  3:00 PM Tiffany Marinelli MD [facility]   11/15/2021  7:00 AM SCHEDULE, [place] REMOTE TRANSMISSION [facility]   11/23/2021 10:00 AM SCHEDULE, [place] REMOTE TRANSMISSION [facility]   12/15/2021 10:15 AM Rajan Magdaleno MD [facility]   2/22/2022 10:00 AM SCHEDULE, [place] REMOTE TRANSMISSION [facility]   5/24/2022 10:00 AM SCHEDULE, [place] REMOTE TRANSMISSION [facility]   8/23/2022 10:00 AM SCHEDULE, [place] REMOTE TRANSMISSION [facility]   11/22/2022 10:00 AM SCHEDULE, [place] REMOTE TRANSMISSION [facility]     ,   Diabetes Assessment    Medic Alert ID: No  Meal Planning: Avoidance of concentrated sweets   How often do you test your blood sugar?: Daily   Do you have barriers with adherence to non-pharmacologic self-management interventions?  (Nutrition/Exercise/Self-Monitoring): No   Have you ever had to go to the ED for symptoms of low blood sugar?: No       No patient-reported symptoms   Do you have hyperglycemia symptoms?: No   Do you have hypoglycemia symptoms?: No   Blood Sugar Monitoring Regimen: Once a Day   Blood Sugar Trends: No Change       and   General Assessment    Do you have any symptoms that are causing concern?: No

## 2021-07-20 ENCOUNTER — TELEPHONE (OUTPATIENT)
Dept: ENDOCRINOLOGY | Age: 56
End: 2021-07-20

## 2021-07-20 RX ORDER — CALCIUM CITRATE/VITAMIN D3 200MG-6.25
1 TABLET ORAL DAILY
Qty: 100 EACH | Refills: 3 | Status: SHIPPED | OUTPATIENT
Start: 2021-07-20 | End: 2022-09-02 | Stop reason: SDUPTHER

## 2021-07-20 NOTE — TELEPHONE ENCOUNTER
Patient called and said the insurance is asking for her to get a dm urine check, a1c, kidney function          She also needs a new dm meter with strips hers broke.         [facility] [address], [address] [place] [address] [phone]

## 2021-07-23 ENCOUNTER — VIRTUAL VISIT (OUTPATIENT)
Dept: PSYCHIATRY | Age: 56
End: 2021-07-23
Payer: COMMERCIAL

## 2021-07-23 DIAGNOSIS — F41.9 ANXIETY: ICD-10-CM

## 2021-07-23 DIAGNOSIS — F31.9 BIPOLAR DEPRESSION (HCC): Primary | ICD-10-CM

## 2021-07-23 PROCEDURE — 99214 OFFICE O/P EST MOD 30 MIN: CPT | Performed by: NURSE PRACTITIONER

## 2021-07-23 NOTE — PROGRESS NOTES
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     Monocytes Absolute 05/26/2021 0.8     Eosinophils Absolute 05/26/2021 0.2     Basophils Absolute 05/26/2021 0.1     Sodium 05/26/2021 136     Potassium 05/26/2021 4.8     Chloride 05/26/2021 97*    CO2 05/26/2021 25     Anion Gap 05/26/2021 14     Glucose 05/26/2021 186*    BUN 05/26/2021 28*    CREATININE 05/26/2021 1.0     GFR Non- 05/26/2021 57*    GFR  05/26/2021 >60     Calcium 05/26/2021 10.6     Total Protein 05/26/2021 7.6     Albumin 05/26/2021 4.2     Albumin/Globulin Ratio 05/26/2021 1.2     Total Bilirubin 05/26/2021 0.4     Alkaline Phosphatase 05/26/2021 34*    ALT 05/26/2021 161*    AST 05/26/2021 62*    Globulin 05/26/2021 3.4     Troponin 05/26/2021 <0.01     Color, UA 05/26/2021 YELLOW     Clarity, UA 05/26/2021 TURBID*    Glucose, Ur 05/26/2021 Negative     Bilirubin Urine 05/26/2021 Negative     Ketones, Urine 05/26/2021 TRACE*    Specific [place], UA 05/26/2021 1.021     Blood, Urine 05/26/2021 Negative     pH, UA 05/26/2021 5.5     Protein, UA 05/26/2021 30*    Urobilinogen, Urine 05/26/2021 0.2     Nitrite, Urine 05/26/2021 Negative     Leukocyte Esterase, Urine 05/26/2021 LARGE*    Microscopic Examination 05/26/2021 YES     Urine Type 05/26/2021 Voided     Urine Reflex to Culture 05/26/2021 Yes     Ventricular Rate 05/26/2021 69     Atrial Rate 05/26/2021 69     P-R Interval 05/26/2021 176     QRS Duration 05/26/2021 156     Q-T Interval 05/26/2021 438     QTc Calculation (Bazett) 05/26/2021 469     P Axis 05/26/2021 60     R Axis 05/26/2021 12     T Axis 05/26/2021 181     Diagnosis 05/26/2021 Electronic ventricular pacemakerConfirmed by Santiago Luna MD, Deisi Hill ([id number]) on 5/28/2021 9:43:02 AM     RBC, UA 05/26/2021 3-4     Bacteria, UA 05/26/2021 1+*    Hyaline Casts, UA 05/26/2021 5     WBC, UA 05/26/2021 239*    Epithelial Cells, UA 05/26/2021 8*    Urine Culture, Routine 05/26/2021 <10,000 CFU/ml mixed skin/urogenital ramo. No further workup    No results displayed because visit has over 200 results. Last Drug screen:  No results found for: [place], LABBENZ, COCAIMETSCRU, THC, MDMA, LABMETH, OPIATESCREENURINE, OXTCOSU, PHENCYCLIDINESCREENURINE, PROPOXYPHENE, METAMPU      Imaging:   Ct head wo contrast 5/15/2010:     IMPRESSION- No acute intracranial abnormality. Consideration   should be given to MRI followup.              ASSESSMENT AND PLAN     Diagnosis Orders   1. Bipolar depression ([address]     2. Anxiety          1. Safety: NO Imminent risk of danger to/self/others based on the factors considered below. Appropriate for outpatient level of care.   Safety plan includes: 911, PES, hotlines, and interventions discussed today.      Risk factors: Age <25 or >55, depressed mood, chronic pain or medical illness, social isolation, no outpatient services in place, medication noncompliance, and no collateral information to support safety.     Protective factors: female gender, denies suicidal ideation, does not have lethal plan, does not have access to guns or weapons, patient is lynn for safety, no prior suicide attempts, no family h/o suicide, no substance abuse, no active psychosis or cognitive dysfunction, and patient is future oriented.          2. Psychiatric  - pt endorses h/o mood lability + FH BAD. Prior negative response to some antidepressants. 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 + MDQ at initial eval.  Multiple stressors with increased depression/mood lability. felt was Reasonable to trial mood stabilizer. Has DM, needs most weight neutral option.      - vraylar not covered by insurance,tried samples, made \"angry\",  risperdal made \"angry\" and increased anxiety, multiple panic attacks/day    - HAD previously recommended pt trial seroquel d/t issues with insomnia in addition to mood/anxiety symptoms. REFUSED TO TRY SEROQUEL. Pt has lots medication anxiety, particularly with sedating medications. Fearful won't wake up if meds too sedating.      - opted to try lamictal.  Initially Had been tolerating lamictal well during previous dose titration but eventually  developed rash and peripheral edema following most recent dosage increase so was advised to stop. At fu, reported rash and edema resolved shortly after stopping lamictal.     - previously discussed cymbalta for anxiety, mood and chronic pain. DECLINED. Says has had such negative responses to previous meds, fearful of introducing new meds at this point    - consider abilify vs latuda for mood, may benefit from activating properties of abilify, has fear of oversedation. DECLINED AT PRESENT     - continue to hold valium 2mg prn for anxiety, sleep d/t ongoing dizziness with recent hypotension. Historically prefers low dose d/t fear of oversedation.    Reports this dose was working very well to help relax her at night so can get some rest.  Hasn't taken in > 1 month d/t previous bradycardia (recent pacemaker placement 5/2021)  Reminded this is not long term solution and need to have additional strategies to help manage anxiety and mood symptoms. Lots med anxiety.       -Labs: reviewed in Epic   9/21/20:  Vit d 26.4; lipids (total chol 254, trig 214, hdl 36, ldl 175)              11/26/19:  Lipids:  (Total chol 254, ldl 178); hgba1c 8.2 tsh wnl              5/7/19:  Vit d 21.4     - ENCOURAGED TO KEEP CONSISTENT SCHEDULE/ROUTINE TO INCLUDE WAKE/SLEEP TIME, MEAL TIMES, SHOWER TIMES, EXERCISE       -Recommend outpt therapy. Has tried to connect in past but limited with insurance options AND transportation issues. Unsure if wants to proceed or would be helpful.      -OARRS reviewed, c/w history  -R/b/se/a d/w pt who consents.     3. Medical  -established with Dion Garner MD       4. Substance   -No active issues.     5.  RTC - 4 weeks per pt preference; historically prefers am appts; first available not until 8/30 so accepts offered appt on 8/27 @ 1230pm via pc    Ambar Liao, [id number] [facility]  Psychiatric Nurse Practitioner

## 2021-07-27 ENCOUNTER — OFFICE VISIT (OUTPATIENT)
Dept: ENT CLINIC | Age: 56
End: 2021-07-27
Payer: COMMERCIAL

## 2021-07-27 ENCOUNTER — PROCEDURE VISIT (OUTPATIENT)
Dept: AUDIOLOGY | Age: 56
End: 2021-07-27
Payer: COMMERCIAL

## 2021-07-27 VITALS — DIASTOLIC BLOOD PRESSURE: 75 MMHG | SYSTOLIC BLOOD PRESSURE: 115 MMHG | TEMPERATURE: 97.7 F | HEART RATE: 71 BPM

## 2021-07-27 DIAGNOSIS — H90.3 SENSORINEURAL HEARING LOSS (SNHL) OF BOTH EARS: Primary | ICD-10-CM

## 2021-07-27 DIAGNOSIS — R42 VERTIGO: Primary | ICD-10-CM

## 2021-07-27 DIAGNOSIS — J30.9 ALLERGIC RHINITIS, UNSPECIFIED SEASONALITY, UNSPECIFIED TRIGGER: ICD-10-CM

## 2021-07-27 DIAGNOSIS — H90.3 SENSORINEURAL HEARING LOSS (SNHL) OF BOTH EARS: ICD-10-CM

## 2021-07-27 PROCEDURE — 1036F TOBACCO NON-USER: CPT | Performed by: STUDENT IN AN ORGANIZED HEALTH CARE EDUCATION/TRAINING PROGRAM

## 2021-07-27 PROCEDURE — 3017F COLORECTAL CA SCREEN DOC REV: CPT | Performed by: STUDENT IN AN ORGANIZED HEALTH CARE EDUCATION/TRAINING PROGRAM

## 2021-07-27 PROCEDURE — 92557 COMPREHENSIVE HEARING TEST: CPT | Performed by: AUDIOLOGIST

## 2021-07-27 PROCEDURE — 92567 TYMPANOMETRY: CPT | Performed by: AUDIOLOGIST

## 2021-07-27 PROCEDURE — 99214 OFFICE O/P EST MOD 30 MIN: CPT | Performed by: STUDENT IN AN ORGANIZED HEALTH CARE EDUCATION/TRAINING PROGRAM

## 2021-07-27 PROCEDURE — G8417 CALC BMI ABV UP PARAM F/U: HCPCS | Performed by: STUDENT IN AN ORGANIZED HEALTH CARE EDUCATION/TRAINING PROGRAM

## 2021-07-27 PROCEDURE — G8427 DOCREV CUR MEDS BY ELIG CLIN: HCPCS | Performed by: STUDENT IN AN ORGANIZED HEALTH CARE EDUCATION/TRAINING PROGRAM

## 2021-07-27 RX ORDER — MECLIZINE HYDROCHLORIDE 25 MG/1
25 TABLET ORAL 3 TIMES DAILY PRN
Qty: 15 TABLET | Refills: 0 | Status: SHIPPED | OUTPATIENT
Start: 2021-07-27 | End: 2021-08-27

## 2021-07-27 NOTE — PROGRESS NOTES
[facility]  Division of Audiology/Otolaryngology    7/27/2021     PCP: Yessi Quinn MD   MRN: [id number]     AUDIOLOGIC AND OTHER PERTINENT MEDICAL HISTORY:        Katie Stark was seen for hearing and middle ear evaluation. Otolaryngology (Joselyn Fontenot) is referral source of today's appointment. Patient presents with perceived hearing loss which has progressed over time. Presents with significant health history. 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 Reports having covid on 2 occasions. AUDIOGRAM/IMMITTANCE (MIDDLE EAR FUNCTION) TEST RESULTS:        Audiogram demonstrates mild sensory loss bilaterally. Loss is symmetrical. Speech discrimination was excellent in quiet, at elevated levels. Immittance- consistent with normal middle ear function (Type A curve ) of left. Shallow compliance (Type A-s) of right tympanic membrane. Ipsilateral acoustic reflexes were, elevated or absent at isolated frequencies, bilterally. Chart cc'd to: Donovan Kelsey DO    COUNSELING:          Audiogram was reviewed with patient.  Patient was provided with a copy of audiogram.           RECOMMENDATIONS:        Return on annual basis to monitor hearing levels    ENT to medically advise    Electronically signed by Blanco Arnett on 7/27/21 at 3:14 PM.

## 2021-07-27 NOTE — PROGRESS NOTES
with esophageal dilatation, and botox injection    UPPER GASTROINTESTINAL ENDOSCOPY  09/09/2015    With Dilitation and Botox injection    UPPER GASTROINTESTINAL ENDOSCOPY  5/10/2016    distal esophagus biopsy, stomach biopsy, botox injection    UPPER GASTROINTESTINAL ENDOSCOPY  04/11/2017    with dilatation and botox injection    UPPER GASTROINTESTINAL ENDOSCOPY  10/24/2017    DILATATION, BIOPSY, BOTOX    UPPER GASTROINTESTINAL ENDOSCOPY  03/06/2018    WITH BOTOX AND BALLOON DILATATION    UPPER GASTROINTESTINAL ENDOSCOPY N/A 5/7/2019    EGD SUBMUCOSAL/BOTOX INJECTION performed by Emma Pineda MD at [place] [id number] N/A 5/7/2019    EGD DILATION BALLOON performed by Emma Pineda MD at [place] [address] N/A 3/13/2020    EGD SUBMUCOSAL/BOTOX INJECTION performed by Emma Pineda MD at [place] [address] N/A 3/13/2020    EGD DILATION BALLOON performed by Emma Pineda MD at [address] [place] [address] ENDOSCOPY N/A 10/6/2020    EGD DILATION BALLOON performed by Emma Pineda MD at [place] [address] N/A 10/6/2020    EGD SUBMUCOSAL/BOTOX INJECTION performed by Emma Pineda MD at [address] [place] [address] Drive History     Family History   Problem Relation Age of Onset    Diabetes Mother     Heart Disease Mother     Stroke Mother     Heart Disease Father     Heart Disease Brother     High Blood Pressure Brother     High Cholesterol Brother     Diabetes Brother     High Cholesterol Brother     Anesth Problems Neg Hx     Malig Hyperten Neg Hx     Hypotension Neg Hx     Malig Hypertherm Neg Hx     Pseudochol.  Deficiency Neg Hx        Social History     Social History     Tobacco Use    Smoking status: Never Smoker    Smokeless tobacco: Never Used   Vaping Use    Vaping Use: Never used   Substance Use Topics    Alcohol use: No     Alcohol/week: 0.0 standard drinks    Drug use: No        Allergies     Allergies   Allergen Reactions    Lamictal [Lamotrigine] Swelling and Rash    Macrodantin [Nitrofurantoin Macrocrystal] Shortness Of Breath     Caused an asthma attack    Penicillins Hives and Shortness Of Breath    Shellfish-Derived Products Swelling     Throat and tongue swells, allergy to all seafood    Aspartame And Phenylalanine      Severe headaches    Bactrim Hives    Biaxin [Clarithromycin] Hives    Cephalexin Other (See Comments)     blisters    Ciprofloxacin Other (See Comments)     Causes severe pain and tendon tears per pt    Hydrocodone-Acetaminophen Other (See Comments)     HALLUCINATIONS    Lansoprazole Hives    Nexium [Esomeprazole Magnesium Trihydrate] Hives    Other Other (See Comments)     TEGADERM-BLISTERS    Prilosec [Omeprazole] Hives    Blueberry [Vaccinium Angustifolium] Nausea And Vomiting     Projectile vomiting    Monosodium Glutamate Nausea And Vomiting    Zmax [Azithromycin Dihydrate] Nausea And Vomiting     NOT Z PACK its the Powder you had to mix and drink       Medications     Current Outpatient Medications   Medication Sig Dispense Refill    meclizine (ANTIVERT) 25 MG tablet Take 1 tablet by mouth 3 times daily as needed for Dizziness or Nausea 15 tablet 0    Blood Glucose Monitoring Suppl (TRUE METRIX METER) w/Device KIT As needed 1 kit 0    blood glucose test strips (TRUE METRIX BLOOD GLUCOSE TEST) strip 1 each by In Vitro route daily As needed.  100 each 3    blood glucose test strips (TRUE METRIX BLOOD GLUCOSE TEST) strip USE ONE STRIP TO TEST TWICE A DAY AS NEEDED 50 strip 2    loratadine (CLARITIN) 10 MG tablet TAKE ONE TABLET BY MOUTH DAILY 30 tablet 5    Blood Pressure KIT 1 kit by Does not apply route daily 1 kit 0    albuterol sulfate  (90 Base) MCG/ACT inhaler INHALE TWO PUFFS BY MOUTH EVERY 6 HOURS AS NEEDED FOR WHEEZING 1 Inhaler 2    ketotifen does not know strength. 3 capsules daily (Patient not taking: Reported on 7/23/2021)       No current facility-administered medications for this visit. Facility-Administered Medications Ordered in Other Visits   Medication Dose Route Frequency Provider Last Rate Last Admin    0.9 % sodium chloride infusion   Intravenous Continuous Jovita Mathis MD           Review of Systems     REVIEW OF SYSTEMS  The following systems were reviewed and revealed the following in addition to any already discussed in the HPI:    CONSTITUTIONAL: no weight loss, no fever, no night sweats, no chills  EYES: no vision changes, no blurry vision  EARS: +changes in hearing, no otalgia  NOSE: no epistaxis, no rhinorrhea  THROAT: No voice changes, no sore throat, no dysphagia    PhysicalExam     Vitals:    07/27/21 1450   BP: 115/75   Site: Right Upper Arm   Position: Sitting   Cuff Size: Medium Adult   Pulse: 71   Temp: 97.7 °F (36.5 °C)   TempSrc: Temporal       PHYSICAL EXAM  /75 (Site: Right Upper Arm, Position: Sitting, Cuff Size: Medium Adult)   Pulse 71   Temp 97.7 °F (36.5 °C) (Temporal)   LMP  (LMP Unknown)     GENERAL: No acute distress, alert and oriented, no hoarseness  EYES: EOMI, Anti-icteric  NOSE: On anterior rhinoscopy there is no epistaxis, nasal mucosa moist and normal appearing, no purulent drainage.    EARS: Normal external appearance; on portable otomicroscopy:     -Ad: External auditory canal without stenosis, tympanic membrane clear, no middle ear effusions or retractions     -As: External auditory canal without stenosis, tympanic membrane clear, no middle ear effusions or retractions  FACE: HB 1/6 bilaterally, symmetric appearing, sensation equal bilaterally  ORAL CAVITY: No masses or lesions visualized or palpated, uvula is midline, moist mucous membranes, absent tonsils, dentition without evidence of major decay  NECK: Normal range of motion, no thyromegaly, trachea is midline, no palpable lymphadenopathy or neck masses, no crepitus  CHEST: Normal respiratory effort, breathing comfortably, no retractions  SKIN: No rashes, normal appearing skin, no evidence of skin lesions/tumors  NEURO: Cranial Nerves 2, 3, 4, 5, 6, 7, 11, 12 intact bilaterally     I have performed a head and neck physical exam personally or was physically present during the key or critical portions of the service. Data/Imaging Review     Comprehensive audiological evaluation performed in the office today by Addy PERALTA was independently reviewed by myself which demonstrates: Au: Mild Sensorineural hearing loss    % right, % left. Type As tympanogram right. Type A tympanogram left. Component Value Ref Range & Units Status Collected Lab   Sodium 136  136 - 145 mmol/L Final 05/26/2021  6:15 PM [address] Lab   Potassium 4.8  3.5 - 5.1 mmol/L Final 05/26/2021  6:15 PM [address] Lab   Specimen hemolysis has exceeded the interference as defined by Roche. Value may be falsely increased. Suggest recollection if clinically   indicated. Chloride 97Low   99 - 110 mmol/L Final 05/26/2021  6:15 PM [address] Lab   CO2 25  21 - 32 mmol/L Final 05/26/2021  6:15 PM [address] Lab   Anion Gap 14  3 - 16 Final 05/26/2021  6:15 PM [address] Lab   Glucose 186High   70 - 99 mg/dL Final 05/26/2021  6:15 PM - [facility] Lab   BUN 28High   7 - 20 mg/dL Final 05/26/2021  6:15 PM [facility] Lab   CREATININE 1.0  0.6 - 1.1 mg/dL Final 05/26/2021  6:15 PM [address] Lab   GFR Non- 57Abnormal   >60 Final 05/26/2021  6:15 PM [facility]   >60 mL/min/1.73m2 EGFR, calc. for ages 25 and older using the   MDRD formula (not corrected for weight), is valid for stable   renal function.     GFR  >60  >60 Final 05/26/2021  6:15 PM [address] Lab   Chronic Kidney Disease: less than 60 ml/min/1.73 sq.m.           Kidney Failure: less than 15 ml/min/1.73 sq.m. Results valid for patients 18 years and older. Calcium 10.6  8.3 - 10.6 mg/dL Final 05/26/2021  6:15 PM [address] Lab   Total Protein 7.6  6.4 - 8.2 g/dL Final 05/26/2021  6:15 PM [address] Lab   Albumin 4.2  3.4 - 5.0 g/dL Final 05/26/2021  6:15 PM [address] Lab   Albumin/Globulin Ratio 1.2  1.1 - 2.2 Final 05/26/2021  6:15 PM [address] Lab   Total Bilirubin 0.4  0.0 - 1.0 mg/dL Final 05/26/2021  6:15 PM [address] Lab   Alkaline Phosphatase 34Low   40 - 129 U/L Final 05/26/2021  6:15 PM [address] Lab   ALT 161High   10 - 40 U/L Final 05/26/2021  6:15 PM [address] Lab   Specimen hemolysis has exceeded the interference as defined by Roche. Result may be affected. Suggest recollection if clinically indicated. AST 62High   15 - 37 U/L Final 05/26/2021  6:15 PM [facility] Lab   Specimen hemolysis has exceeded the interference as defined by Roche. Value may be falsely increased. Suggest recollection if clinically   indicated. Globulin 3.4  g/dL Final 05/26/2021  6:15 PM [address]        Assessment and Plan     1. Vertigo  Suspect possible Ménière's. Unfortunately she has low blood pressure and has been taken off of diuretics in the past by her cardiologist secondary to becoming dehydrated. We will hold off on trialing any diuretic therapy at this time. In the meantime I have instructed her to watch her salt intake and diligently look at labels, attempt to decrease salt intake to less than 1500 g of salt a day. Will obtain vestibular testing.  - Electronystagmography; Future  - Amb External Referral To Audiology    2.  Sensorineural hearing loss (SNHL) of both ears  Mild sensorineural hearing loss, worse in low-frequency, consistent with possible Ménière's pathology  Recommend hearing protection in loud noise environments  Repeat audiogram in 1 to 2 years or if hearing fluctuates    3. Allergic rhinitis, unspecified seasonality, unspecified trigger  Continue Rhinocort and Claritin      Follow-Up     Return for After VNG. Fortino Pisano   Department of Otolaryngology/Head and Neck Surgery  7/27/21    Medical Decision Making: The following items were considered in medical decision making:  Independent review of images  Review / order clinical lab tests  Review / order radiology tests  Decision to obtain old records    Portions of this note were dictated using Dragon.  There may be linguistic errors secondary to the use of this program.

## 2021-07-28 RX ORDER — INSULIN GLARGINE 100 [IU]/ML
30 INJECTION, SOLUTION SUBCUTANEOUS DAILY
Qty: 4 PEN | Refills: 3 | Status: SHIPPED | OUTPATIENT
Start: 2021-07-28 | End: 2022-10-03

## 2021-08-10 ENCOUNTER — ANESTHESIA (OUTPATIENT)
Dept: ENDOSCOPY | Age: 56
End: 2021-08-10
Payer: COMMERCIAL

## 2021-08-10 ENCOUNTER — ANESTHESIA EVENT (OUTPATIENT)
Dept: ENDOSCOPY | Age: 56
End: 2021-08-10
Payer: COMMERCIAL

## 2021-08-10 ENCOUNTER — HOSPITAL ENCOUNTER (OUTPATIENT)
Age: 56
Setting detail: OUTPATIENT SURGERY
Discharge: HOME | End: 2021-08-10
Attending: INTERNAL MEDICINE | Admitting: INTERNAL MEDICINE
Payer: COMMERCIAL

## 2021-08-10 VITALS
HEIGHT: 62 IN | OXYGEN SATURATION: 100 % | RESPIRATION RATE: 18 BRPM | SYSTOLIC BLOOD PRESSURE: 132 MMHG | WEIGHT: 188 LBS | DIASTOLIC BLOOD PRESSURE: 72 MMHG | BODY MASS INDEX: 34.6 KG/M2 | TEMPERATURE: 97.1 F | HEART RATE: 67 BPM

## 2021-08-10 VITALS — SYSTOLIC BLOOD PRESSURE: 128 MMHG | DIASTOLIC BLOOD PRESSURE: 61 MMHG | OXYGEN SATURATION: 97 %

## 2021-08-10 LAB
GLUCOSE BLD-MCNC: 154 MG/DL (ref 70–99)
GLUCOSE BLD-MCNC: 171 MG/DL (ref 70–99)
PERFORMED ON: ABNORMAL
PERFORMED ON: ABNORMAL

## 2021-08-10 PROCEDURE — 6360000002 HC RX W HCPCS: Performed by: INTERNAL MEDICINE

## 2021-08-10 PROCEDURE — 3609013800 HC EGD SUBMUCOSAL/BOTOX INJECTION: Performed by: INTERNAL MEDICINE

## 2021-08-10 PROCEDURE — 7100000011 HC PHASE II RECOVERY - ADDTL 15 MIN: Performed by: INTERNAL MEDICINE

## 2021-08-10 PROCEDURE — 2500000003 HC RX 250 WO HCPCS: Performed by: NURSE ANESTHETIST, CERTIFIED REGISTERED

## 2021-08-10 PROCEDURE — 6360000002 HC RX W HCPCS: Performed by: NURSE ANESTHETIST, CERTIFIED REGISTERED

## 2021-08-10 PROCEDURE — 3700000000 HC ANESTHESIA ATTENDED CARE: Performed by: INTERNAL MEDICINE

## 2021-08-10 PROCEDURE — 7100000010 HC PHASE II RECOVERY - FIRST 15 MIN: Performed by: INTERNAL MEDICINE

## 2021-08-10 PROCEDURE — 6370000000 HC RX 637 (ALT 250 FOR IP): Performed by: ANESTHESIOLOGY

## 2021-08-10 PROCEDURE — 7100000000 HC PACU RECOVERY - FIRST 15 MIN: Performed by: INTERNAL MEDICINE

## 2021-08-10 PROCEDURE — 3609015300 HC ESOPHAGEAL DILATION MALONEY: Performed by: INTERNAL MEDICINE

## 2021-08-10 PROCEDURE — 2580000003 HC RX 258: Performed by: INTERNAL MEDICINE

## 2021-08-10 PROCEDURE — 3700000001 HC ADD 15 MINUTES (ANESTHESIA): Performed by: INTERNAL MEDICINE

## 2021-08-10 PROCEDURE — 3609012400 HC EGD TRANSORAL BIOPSY SINGLE/MULTIPLE: Performed by: INTERNAL MEDICINE

## 2021-08-10 PROCEDURE — 2709999900 HC NON-CHARGEABLE SUPPLY: Performed by: INTERNAL MEDICINE

## 2021-08-10 PROCEDURE — 7100000001 HC PACU RECOVERY - ADDTL 15 MIN: Performed by: INTERNAL MEDICINE

## 2021-08-10 PROCEDURE — 88305 TISSUE EXAM BY PATHOLOGIST: CPT

## 2021-08-10 RX ORDER — PROPOFOL 10 MG/ML
INJECTION, EMULSION INTRAVENOUS PRN
Status: DISCONTINUED | OUTPATIENT
Start: 2021-08-10 | End: 2021-08-10 | Stop reason: SDUPTHER

## 2021-08-10 RX ORDER — ACETAMINOPHEN 500 MG
1000 TABLET ORAL ONCE
Status: COMPLETED | OUTPATIENT
Start: 2021-08-10 | End: 2021-08-10

## 2021-08-10 RX ORDER — KETAMINE HYDROCHLORIDE 10 MG/ML
INJECTION, SOLUTION INTRAMUSCULAR; INTRAVENOUS PRN
Status: DISCONTINUED | OUTPATIENT
Start: 2021-08-10 | End: 2021-08-10 | Stop reason: SDUPTHER

## 2021-08-10 RX ORDER — SCOLOPAMINE TRANSDERMAL SYSTEM 1 MG/1
1 PATCH, EXTENDED RELEASE TRANSDERMAL
Status: DISCONTINUED | OUTPATIENT
Start: 2021-08-10 | End: 2021-08-10 | Stop reason: HOSPADM

## 2021-08-10 RX ORDER — LIDOCAINE HYDROCHLORIDE 20 MG/ML
INJECTION, SOLUTION INFILTRATION; PERINEURAL PRN
Status: DISCONTINUED | OUTPATIENT
Start: 2021-08-10 | End: 2021-08-10 | Stop reason: SDUPTHER

## 2021-08-10 RX ORDER — ONDANSETRON 2 MG/ML
INJECTION INTRAMUSCULAR; INTRAVENOUS PRN
Status: DISCONTINUED | OUTPATIENT
Start: 2021-08-10 | End: 2021-08-10 | Stop reason: SDUPTHER

## 2021-08-10 RX ORDER — DEXAMETHASONE SODIUM PHOSPHATE 4 MG/ML
INJECTION, SOLUTION INTRA-ARTICULAR; INTRALESIONAL; INTRAMUSCULAR; INTRAVENOUS; SOFT TISSUE PRN
Status: DISCONTINUED | OUTPATIENT
Start: 2021-08-10 | End: 2021-08-10 | Stop reason: SDUPTHER

## 2021-08-10 RX ORDER — IPRATROPIUM BROMIDE AND ALBUTEROL SULFATE 2.5; .5 MG/3ML; MG/3ML
1 SOLUTION RESPIRATORY (INHALATION)
Status: DISCONTINUED | OUTPATIENT
Start: 2021-08-10 | End: 2021-08-10 | Stop reason: HOSPADM

## 2021-08-10 RX ORDER — SODIUM CHLORIDE 9 MG/ML
INJECTION, SOLUTION INTRAVENOUS CONTINUOUS
Status: DISCONTINUED | OUTPATIENT
Start: 2021-08-10 | End: 2021-08-10 | Stop reason: HOSPADM

## 2021-08-10 RX ADMIN — ONDANSETRON 4 MG: 2 INJECTION INTRAMUSCULAR; INTRAVENOUS at 10:09

## 2021-08-10 RX ADMIN — KETAMINE HYDROCHLORIDE 10 MG: 10 INJECTION, SOLUTION INTRAMUSCULAR; INTRAVENOUS at 10:14

## 2021-08-10 RX ADMIN — PROPOFOL 70 MG: 10 INJECTION, EMULSION INTRAVENOUS at 10:10

## 2021-08-10 RX ADMIN — SODIUM CHLORIDE: 9 INJECTION, SOLUTION INTRAVENOUS at 09:48

## 2021-08-10 RX ADMIN — PROPOFOL 30 MG: 10 INJECTION, EMULSION INTRAVENOUS at 10:11

## 2021-08-10 RX ADMIN — KETAMINE HYDROCHLORIDE 10 MG: 10 INJECTION, SOLUTION INTRAMUSCULAR; INTRAVENOUS at 10:17

## 2021-08-10 RX ADMIN — IPRATROPIUM BROMIDE AND ALBUTEROL SULFATE 1 AMPULE: .5; 3 SOLUTION RESPIRATORY (INHALATION) at 09:48

## 2021-08-10 RX ADMIN — PROPOFOL 50 MG: 10 INJECTION, EMULSION INTRAVENOUS at 10:16

## 2021-08-10 RX ADMIN — DEXAMETHASONE SODIUM PHOSPHATE 4 MG: 4 INJECTION, SOLUTION INTRAMUSCULAR; INTRAVENOUS at 10:09

## 2021-08-10 RX ADMIN — ACETAMINOPHEN 1000 MG: 500 TABLET ORAL at 11:03

## 2021-08-10 RX ADMIN — KETAMINE HYDROCHLORIDE 10 MG: 10 INJECTION, SOLUTION INTRAMUSCULAR; INTRAVENOUS at 10:10

## 2021-08-10 RX ADMIN — LIDOCAINE HYDROCHLORIDE 100 MG: 20 INJECTION, SOLUTION INFILTRATION; PERINEURAL at 10:09

## 2021-08-10 ASSESSMENT — PULMONARY FUNCTION TESTS
PIF_VALUE: 1
PIF_VALUE: 2
PIF_VALUE: 1

## 2021-08-10 ASSESSMENT — COPD QUESTIONNAIRES: CAT_SEVERITY: MODERATE

## 2021-08-10 ASSESSMENT — PAIN DESCRIPTION - PAIN TYPE: TYPE: CHRONIC PAIN

## 2021-08-10 ASSESSMENT — PAIN DESCRIPTION - DESCRIPTORS: DESCRIPTORS: ACHING;CONSTANT

## 2021-08-10 ASSESSMENT — PAIN DESCRIPTION - FREQUENCY: FREQUENCY: CONTINUOUS

## 2021-08-10 ASSESSMENT — PAIN - FUNCTIONAL ASSESSMENT: PAIN_FUNCTIONAL_ASSESSMENT: 0-10

## 2021-08-10 ASSESSMENT — LIFESTYLE VARIABLES: SMOKING_STATUS: 0

## 2021-08-10 ASSESSMENT — PAIN SCALES - GENERAL: PAINLEVEL_OUTOF10: 6

## 2021-08-10 NOTE — ANESTHESIA PRE PROCEDURE
Department of Anesthesiology  Preprocedure Note       Name:  Lynnette Argueta   Age:  61 y.o.  :  1965                                          MRN:  [id number]         Date:  8/10/2021      Surgeon: Zay Correa):  Sheeba Bloom MD    Procedure: Procedure(s):  EGD WITH DILATION AND BOTOX INJECTION OF PYLORUS    Medications prior to admission:   Prior to Admission medications    Medication Sig Start Date End Date Taking? Authorizing Provider   insulin glargine (LANTUS SOLOSTAR) 100 UNIT/ML injection pen Inject 30 Units into the skin daily 21   Becki Cohen MD   Blood Glucose Monitoring Suppl (TRUE METRIX METER) w/Device KIT As needed 21   Becki Cohen MD   blood glucose test strips (TRUE METRIX BLOOD GLUCOSE TEST) strip 1 each by In Vitro route daily As needed. 21   Becki Cohen MD   blood glucose test strips (TRUE METRIX BLOOD GLUCOSE TEST) strip USE ONE STRIP TO TEST TWICE A DAY AS NEEDED 21   Becki Cohen MD   loratadine (CLARITIN) 10 MG tablet TAKE ONE TABLET BY MOUTH DAILY 21   Gato Barrios DO   budesonide (RHINOCORT AQUA) 32 MCG/ACT nasal spray 1 spray by Each Nostril route daily  Patient not taking: Reported on 2021   Gato Barrios DO   guaiFENesin [facility] MAXIMUM STRENGTH) 1200 MG TB12 Take 1 tablet by mouth daily For a PM dose.   Patient not taking: Reported on 2021   KRISHNA Chavez CNP   Blood Pressure KIT 1 kit by Does not apply route daily 21   KRISHNA Chavez CNP   albuterol sulfate  (90 Base) MCG/ACT inhaler INHALE TWO PUFFS BY MOUTH EVERY 6 HOURS AS NEEDED FOR WHEEZING 3/8/21   Antione Morelos MD   ketotifen (ZADITOR) 0.025 % ophthalmic solution INSTILL TWO DROPS IN [facility] EYE TWO TIMES A DAY AS NEEDED FOR ALLERGY 12/10/20   Antione Morelos MD   Insulin Pen Needle (PEN NEEDLES) 32G X 4 MM MISC USE WITH INSULIN PEN ONCE DAILY 11/10/20   Becki Cohen MD   TRUEplus Lancets 28G MISC USE ONE LANCET TO TEST THREE TO FOUR TIMES A DAY 11/10/20   Osmani Coffman MD   ASPIRIN LOW DOSE 81 MG EC tablet TAKE ONE TABLET BY MOUTH DAILY 11/3/20   Sharla Holstein, APRN - CNP   famotidine (PEPCID) 40 MG tablet Take 1 tablet by mouth 2 times daily  Patient taking differently: Take 40 mg by mouth 2 times daily Indications: taking 1-2 times/day  10/6/20   Caryl Woods MD   latanoprost (XALATAN) 0.005 % ophthalmic solution Place 1 drop into both eyes nightly    Historical Provider, MD   Cranberry 1000 MG CAPS Take by mouth Indications: OTC, takes two caps daily, does not know strength.  3 capsules daily  Patient not taking: Reported on 7/23/2021    Historical Provider, MD   Multiple Vitamins-Minerals (THERAPEUTIC MULTIVITAMIN-MINERALS) tablet Take 1 tablet by mouth daily     Historical Provider, MD   Cholecalciferol (VITAMIN D3) 25 MCG (1000 UT) TABS Take 1 tablet by mouth daily  Patient taking differently: Take 10,000 Units by mouth once a week  11/26/19   Nasim Myers MD   Incontinence Supply Disposable (TRE CLASSIC BRIEFS/LARGE) MISC 1 each by Does not apply route 3 times daily 3/4/19   Nasim Myers MD   Incontinence Supplies MISC 1 Package by Does not apply route 2 times daily 3/4/19   Nasim Myers MD   Disposable Gloves (VINYL GLOVES LARGE) MISC 1 Package by Does not apply route 4 times daily 3/4/19   Nasim Myers MD   GAS-X EXTRA STRENGTH 125 MG chewable tablet CHEW TWO TABLETS BY MOUTH THREE TIMES A DAY WITH EACH MEAL AS NEEDED FOR FLATULENCE 12/13/18   Nasim Myers MD       Current medications:    Current Outpatient Medications   Medication Sig Dispense Refill    insulin glargine (LANTUS SOLOSTAR) 100 UNIT/ML injection pen Inject 30 Units into the skin daily 4 pen 3    Blood Glucose Monitoring Suppl (TRUE METRIX METER) w/Device KIT As needed 1 kit 0    blood glucose test strips (TRUE METRIX BLOOD GLUCOSE TEST) strip 1 each by In Vitro route daily As Fibromyalgia M79.7    Urine incontinence R32    Asthma J45.909    Chronic back pain M54.9, G89.29    Rheumatoid arthritis (HCC) M06.9    Gastroparesis K31.84    Thyroid cyst E04.1    Vitamin D deficiency E55.9    Multiple thyroid nodules E04.2    IBS (irritable bowel syndrome) K58.9    Disequilibrium syndrome E87.8    Subjective tinnitus of both ears H93.13    Bilateral high frequency sensorineural hearing loss H90.3    Hypercholesteremia E78.00    Obstructive sleep apnea syndrome G47.33    Moderate episode of recurrent major depressive disorder ([facility]) F33.1    Nephrolithiasis N20.0    Unstable angina ([facility]) I20.0    Obesity (BMI 30-39. 9) E66.9    Coronary artery disease involving native coronary artery of native heart without angina pectoris I25.10    Dizziness R42    Bilateral hearing loss H91.93    Sensation of fullness in ear, bilateral H93.8X3    Heart block atrioventricular I44.30    Pacemaker Z95.0    Mobitz type II atrioventricular block I44.1    Symptomatic bradycardia R00.1       Past Medical History:        Diagnosis Date    Ankle fracture, left     Ankle fracture, right     Arthritis     Asthma     Bipolar depression ([facility])     CAN'T AFFORD MEDS    Bladder problem     Cervical disc herniation     COVID toes     Diabetes mellitus ([address]     diet control    Fatty liver disease, non-alcoholic     Fibromyalgia     Gastroparesis     Dr Makayla Masters GERD (gastroesophageal reflux disease)     gastroporesis    Glaucoma     Dr Rayna Hope Hyperlipidemia     elevated LFT on meds    IBS (irritable bowel syndrome)     Lumbar herniated disc     Nausea & vomiting     Nephrolithiasis 1/29/2019    Obesity (BMI 30-39.9) 3/11/2019    Partial Achilles tendon tear     Pneumonia     PONV (postoperative nausea and vomiting)     RA (rheumatoid arthritis) ([facility])     Rapid or irregular heartbeat     Sleep apnea     does not use cpap    Spinal stenosis     Stress incontinence     [address] ENDOSCOPY    UPPER GASTROINTESTINAL ENDOSCOPY N/A 5/7/2019    EGD DILATION BALLOON performed by Brandee Clinton MD at [address] N/A 3/13/2020    EGD SUBMUCOSAL/BOTOX INJECTION performed by Brandee Clinton MD at [address] N/A 3/13/2020    EGD DILATION BALLOON performed by Brandee Clinton MD at [address] N/A 10/6/2020    EGD DILATION BALLOON performed by Brandee Clinton MD at [address] N/A 10/6/2020    EGD SUBMUCOSAL/BOTOX INJECTION performed by Brandee Clinton MD at [facility] [address] History:    Social History     Tobacco Use    Smoking status: Never Smoker    Smokeless tobacco: Never Used   Substance Use Topics    Alcohol use: No     Alcohol/week: 0.0 standard drinks                                Counseling given: Not Answered      Vital Signs (Current): There were no vitals filed for this visit.                                            BP Readings from Last 3 Encounters:   07/27/21 115/75   06/25/21 (!) 144/83   05/26/21 (!) 151/72       NPO Status:                                                                                 BMI:   Wt Readings from Last 3 Encounters:   06/25/21 190 lb (86.2 kg)   05/26/21 190 lb (86.2 kg)   05/10/21 195 lb 8 oz (88.7 kg)     There is no height or weight on file to calculate BMI.    CBC:   Lab Results   Component Value Date    WBC 13.3 05/26/2021    RBC 4.84 05/26/2021    HGB 13.4 05/26/2021    HCT 41.5 05/26/2021    MCV 85.8 05/26/2021    RDW 15.4 05/26/2021     05/26/2021       CMP:   Lab Results   Component Value Date     05/26/2021    K 4.8 05/26/2021    K 4.6 03/12/2019    CL 97 05/26/2021    CO2 25 05/26/2021    BUN 28 05/26/2021    CREATININE 1.0 05/26/2021    GFRAA >60 05/26/2021    GFRAA >60 06/04/2013    AGRATIO 1.2 05/26/2021    LABGLOM 57 05/26/2021    GLUCOSE 186 05/26/2021    PROT 7.6 05/26/2021    PROT 7.7 11/14/2012    CALCIUM 10.6 05/26/2021    BILITOT 0.4 05/26/2021    ALKPHOS 34 05/26/2021    AST 62 05/26/2021     05/26/2021       POC Tests: No results for input(s): POCGLU, POCNA, POCK, POCCL, POCBUN, POCHEMO, POCHCT in the last 72 hours. Coags:   Lab Results   Component Value Date    PROTIME 11.7 03/11/2019    INR 1.03 03/11/2019    APTT 29.0 05/17/2010       HCG (If Applicable):   Lab Results   Component Value Date    PREGTESTUR Negative 03/06/2018        ABGs: No results found for: PHART, PO2ART, VJI6VYX, XHG7SXH, BEART, M8IAVXQM     Type & Screen (If Applicable):  No results found for: LABABO, LABRH    Drug/Infectious Status (If Applicable):  Lab Results   Component Value Date    HEPCAB Non-Reactive (Negative) 05/23/2012       COVID-19 Screening (If Applicable):   Lab Results   Component Value Date    COVID19 Not Detected 05/06/2021         Anesthesia Evaluation  Patient summary reviewed and Nursing notes reviewed   history of anesthetic complications: PONV.   Airway: Mallampati: III  TM distance: >3 FB   Neck ROM: full  Mouth opening: > = 3 FB Dental: normal exam         Pulmonary:normal exam  breath sounds clear to auscultation  (+) COPD: moderate,  sleep apnea: on noncompliant,  asthma (prn inhaler use, no recent exacerbations):     (-) not a current smoker                           Cardiovascular:  Exercise tolerance: poor (<4 METS),   (+) hypertension:, pacemaker: pacemaker, CAD (history of mild, non-obst CAD, cath 2019): non-obstructive, hyperlipidemia    (-) past MI, CABG/stent, dysrhythmias,  angina and  CHF (echo 20109 Ef 55 no RWMA)    ECG reviewed  Rhythm: regular  Rate: normal  Echocardiogram reviewed  Stress test reviewed                Neuro/Psych:   (+) neuromuscular disease (fibromyalgia, chronic back pain):, psychiatric history (bipolar):depression/anxiety    (-) seizures, TIA and CVA            ROS comment: FIBROMYALGIA GI/Hepatic/Renal:   (+) GERD (gastroparesis):, liver disease (SNIDER):,      (-) no renal disease       Endo/Other:    (+) Diabetes (a1c 9.1)Type II DM, using insulin, : arthritis (RA, no steroid req, no c-spine issues): rheumatoid. , .    (-) hypothyroidism, hyperthyroidism               Abdominal:   (+) obese,           Vascular: Other Findings:             Anesthesia Plan      MAC     ASA 3       Induction: intravenous. MIPS: Prophylactic antiemetics administered. Anesthetic plan and risks discussed with patient. Plan discussed with CRNA.                   Priscila Ma MD   8/10/2021

## 2021-08-10 NOTE — PROGRESS NOTES
Spoke with anesthesia and obtained an order for tylenol for patients complaints of joint pain. Medicated as ordered.

## 2021-08-10 NOTE — BRIEF OP NOTE
Brief Postoperative Note - Full Note in Chart Review/Procedures tab       Patient: Nazario Varela  YOB: 1965  MRN: [id number]    Date of Procedure: 8/10/2021    Pre-Op Diagnosis: DYSPHAGIA R13.10  GASTROPARESIS K31.84    Post-Op Diagnosis: Same       Procedure(s):  EGD WITH DILATION AND BOTOX INJECTION OF PYLORUS  EGD BIOPSY    Surgeon(s):  Teodora Lee MD    Assistant:  * No surgical staff found *    Anesthesia: Monitor Anesthesia Care    Estimated Blood Loss (mL): Minimal    Complications: None    Specimens:   ID Type Source Tests Collected by Time Destination   A : bx gastric antrum r/o h pylori Tissue Stomach SURGICAL PATHOLOGY Esther Pettit RN 8/10/2021 1017        Implants:  * No implants in log *      Drains: * No LDAs found *    Findings:  1) Erosive antritis and erosive duodenopathy    2) Successful Botox injection of pylorus    3) [address] dilation of esophagus performed    Rec:  General diet  Discontinue all NSAIDs  Continue rest of present medications  Await biopsies - patient instructed to call office in 5 days for results.   Follow up in office in 3 months    Electronically signed by Teodora Lee MD on 8/10/2021 at 10:34 AM

## 2021-08-10 NOTE — PROGRESS NOTES
Patient arrived from ENDO to PACU spot 4. Attached to monitoring system. Report received per CRNA and ENDO nurse. No problems reported during procedure. VSS. BS upon arrival is 154. Status stable.

## 2021-08-10 NOTE — PROGRESS NOTES
Dr. Rai Coad in at bedside to speak to patient in regards to his findings. All questions and concerns addressed.

## 2021-08-10 NOTE — ANESTHESIA POSTPROCEDURE EVALUATION
Department of Anesthesiology  Postprocedure Note    Patient: Emma Lua  MRN: [id number]  YOB: 1965  Date of evaluation: 8/10/2021  Time:  10:29 AM     Procedure Summary     Date: 08/10/21 Room / Location: [address] [place] [address]    Anesthesia Start: 1006 Anesthesia Stop: 1026    Procedures:       EGD WITH DILATION AND BOTOX INJECTION OF PYLORUS (N/A Abdomen)      EGD BIOPSY (N/A Abdomen) Diagnosis:       (DYSPHAGIA R13.10)      (GASTROPARESIS K31.84)    Surgeons: Caryl Woods MD Responsible Provider: Brendan Pace MD    Anesthesia Type: MAC ASA Status: 3          Anesthesia Type: MAC    Harman Phase I: Harman Score: 8    Harman Phase II:      Last vitals: Reviewed and per EMR flowsheets.        Anesthesia Post Evaluation    Patient location during evaluation: PACU  Patient participation: complete - patient participated  Level of consciousness: awake and awake and alert  Pain score: 2  Airway patency: patent  Nausea & Vomiting: no vomiting  Complications: no  Cardiovascular status: blood pressure returned to baseline  Respiratory status: acceptable  Hydration status: euvolemic

## 2021-08-10 NOTE — H&P
[facility] [place]   Pre-operative History and Physical    Patient: Jessi Goldberg  : 1965  CSN:     History Obtained From: patient and/or guardian. HISTORY OF PRESENT ILLNESS:    The patient is a 61 y.o. female with gastroparesis, N/V and GERD symptoms that respond to Botox inj of pylorus in the past, here for EGD.    Past Medical History:        Diagnosis Date    Ankle fracture, left     Ankle fracture, right     Arthritis     Asthma     Bipolar depression (HCC)     CAN'T AFFORD MEDS    Bladder problem     Cervical disc herniation     COVID toes     Diabetes mellitus ([address]     diet control    Fatty liver disease, non-alcoholic     Fibromyalgia     Gastroparesis     Dr Stef Leong GERD (gastroesophageal reflux disease)     gastroporesis    Glaucoma     Dr Trinh Links Hyperlipidemia     elevated LFT on meds    IBS (irritable bowel syndrome)     Lumbar herniated disc     Nausea & vomiting     Nephrolithiasis 2019    Obesity (BMI 30-39.9) 3/11/2019    Partial Achilles tendon tear     Pneumonia     PONV (postoperative nausea and vomiting)     RA (rheumatoid arthritis) ([address]     Rapid or irregular heartbeat     Sleep apnea     does not use cpap    Spinal stenosis     Stress incontinence     Thyroid disease     growths     Past Surgical History:        Procedure Laterality Date    CARDIAC SURGERY      CHOLECYSTECTOMY      COLONOSCOPY      COLONOSCOPY  2018    with polypectomies    DILATION AND CURETTAGE OF UTERUS      ENDOMETRIAL ABLATION      ENDOSCOPY, COLON, DIAGNOSTIC      ERCP  2010    with stent    ERCP  14    ERCP WITH SPHINTER OF ODDI MANOMETERY    ESOPHAGOSCOPY  10/5/10    botox injection    EYE SURGERY      LASER FOR GLAUCOMA    LAPAROSCOPY      PACEMAKER INSERTION  05/10/2021    TONSILLECTOMY      UPPER GASTROINTESTINAL ENDOSCOPY      UPPER GASTROINTESTINAL ENDOSCOPY  2011    DILATATION, [address], 100 UNITS BOTOX    UPPER GASTROINTESTINAL ENDOSCOPY  08/30/2011    58 FR DILATATION, 100 UNITS  BOTOX INJECTION    UPPER GASTROINTESTINAL ENDOSCOPY  3-9-12    with dilatation and submucosal injection: Botox    UPPER GASTROINTESTINAL ENDOSCOPY      UPPER GASTROINTESTINAL ENDOSCOPY  11/20/12     Esophagogastroduodenoscopy with Botulinum toxin injection of the pylorus and Velazquez esophageal dilation    UPPER GASTROINTESTINAL ENDOSCOPY  6/4/13    WITH DIILITATION AND BOTOX INJECTION    UPPER GASTROINTESTINAL ENDOSCOPY  5/20/2014    100 unit Botox injection, [address] Fr[address]  Velazquez esophageal dilatation    UPPER GASTROINTESTINAL ENDOSCOPY  12/12/14    with esophageal dilatation, and botox injection    UPPER GASTROINTESTINAL ENDOSCOPY  09/09/2015    With Dilitation and Botox injection    UPPER GASTROINTESTINAL ENDOSCOPY  5/10/2016    distal esophagus biopsy, stomach biopsy, botox injection    UPPER GASTROINTESTINAL ENDOSCOPY  04/11/2017    with dilatation and botox injection    UPPER GASTROINTESTINAL ENDOSCOPY  10/24/2017    DILATATION, BIOPSY, BOTOX    UPPER GASTROINTESTINAL ENDOSCOPY  03/06/2018    WITH BOTOX AND BALLOON DILATATION    UPPER GASTROINTESTINAL ENDOSCOPY N/A 5/7/2019    EGD SUBMUCOSAL/BOTOX INJECTION performed by America Salazar MD at [facility] 5 N/A 5/7/2019    EGD DILATION BALLOON performed by America Salazar MD at [facility] 5 N/A 3/13/2020    EGD SUBMUCOSAL/BOTOX INJECTION performed by America Salazar MD at [facility] 5 N/A 3/13/2020    EGD DILATION BALLOON performed by America Salazar MD at [facility] 5 N/A 10/6/2020    EGD DILATION BALLOON performed by America Salazar MD at [facility] 5 N/A 10/6/2020    EGD SUBMUCOSAL/BOTOX INJECTION performed by America Salazar MD at [address] Medications Prior to Admission:   Current Facility-Administered Medications on File Prior to Encounter   Medication Dose Route Frequency Provider Last Rate Last Admin    0.9 % sodium chloride infusion   Intravenous Continuous Nohemy Willis MD         Current Outpatient Medications on File Prior to Encounter   Medication Sig Dispense Refill    albuterol sulfate  (90 Base) MCG/ACT inhaler INHALE TWO PUFFS BY MOUTH EVERY 6 HOURS AS NEEDED FOR WHEEZING 1 Inhaler 2    ketotifen (ZADITOR) 0.025 % ophthalmic solution INSTILL TWO DROPS IN [facility] EYE TWO TIMES A DAY AS NEEDED FOR ALLERGY 1 Bottle 2    latanoprost (XALATAN) 0.005 % ophthalmic solution Place 1 drop into both eyes nightly      Multiple Vitamins-Minerals (THERAPEUTIC MULTIVITAMIN-MINERALS) tablet Take 1 tablet by mouth daily       Cholecalciferol (VITAMIN D3) 25 MCG (1000 UT) TABS Take 1 tablet by mouth daily (Patient taking differently: Take 10,000 Units by mouth once a week ) 90 tablet 1    GAS-X EXTRA STRENGTH 125 MG chewable tablet CHEW TWO TABLETS BY MOUTH THREE TIMES A DAY WITH EACH MEAL AS NEEDED FOR FLATULENCE 192 tablet 1    insulin glargine (LANTUS SOLOSTAR) 100 UNIT/ML injection pen Inject 30 Units into the skin daily 4 pen 3    Blood Glucose Monitoring Suppl (TRUE METRIX METER) w/Device KIT As needed 1 kit 0    blood glucose test strips (TRUE METRIX BLOOD GLUCOSE TEST) strip 1 each by In Vitro route daily As needed. 100 each 3    blood glucose test strips (TRUE METRIX BLOOD GLUCOSE TEST) strip USE ONE STRIP TO TEST TWICE A DAY AS NEEDED 50 strip 2    loratadine (CLARITIN) 10 MG tablet TAKE ONE TABLET BY MOUTH DAILY 30 tablet 5    budesonide (RHINOCORT AQUA) 32 MCG/ACT nasal spray 1 spray by Each Nostril route daily (Patient not taking: Reported on 7/23/2021) 1 Bottle 3    guaiFENesin (MUCINEX MAXIMUM STRENGTH) 1200 MG TB12 Take 1 tablet by mouth daily For a PM dose.  (Patient not taking: Reported on 7/23/2021) 10 tablet 0  Blood Pressure KIT 1 kit by Does not apply route daily 1 kit 0    Insulin Pen Needle (PEN NEEDLES) 32G X 4 MM MISC USE WITH INSULIN PEN ONCE DAILY 50 each 3    TRUEplus Lancets 28G MISC USE ONE LANCET TO TEST THREE TO FOUR TIMES A  each 3    ASPIRIN LOW DOSE 81 MG EC tablet TAKE ONE TABLET BY MOUTH DAILY 90 tablet 3    famotidine (PEPCID) 40 MG tablet Take 1 tablet by mouth 2 times daily (Patient taking differently: Take 40 mg by mouth 2 times daily Indications: taking 1-2 times/day ) 30 tablet 5    Cranberry 1000 MG CAPS Take by mouth Indications: OTC, takes two caps daily, does not know strength.  3 capsules daily (Patient not taking: Reported on 7/23/2021)      Incontinence Supply Disposable (TRE CLASSIC BRIEFS/LARGE) MISC 1 each by Does not apply route 3 times daily 5 each 5    Incontinence Supplies MISC 1 Package by Does not apply route 2 times daily 5 each 5    Disposable Gloves (VINYL GLOVES LARGE) MISC 1 Package by Does not apply route 4 times daily 5 each 5     Allergies:  Lamictal [lamotrigine], Macrodantin [nitrofurantoin macrocrystal], Penicillins, Shellfish-derived products, Aspartame and phenylalanine, Bactrim, Biaxin [clarithromycin], Cephalexin, Ciprofloxacin, Hydrocodone-acetaminophen, Lansoprazole, Nexium [esomeprazole magnesium trihydrate], Other, Prilosec [omeprazole], Blueberry [vaccinium angustifolium], Monosodium glutamate, and Zmax [azithromycin dihydrate]        Social History:   Social History     Tobacco Use    Smoking status: Never Smoker    Smokeless tobacco: Never Used   Substance Use Topics    Alcohol use: No     Alcohol/week: 0.0 standard drinks     Family History:   Family History   Problem Relation Age of Onset    Diabetes Mother     Heart Disease Mother     Stroke Mother     Heart Disease Father     Heart Disease Brother     High Blood Pressure Brother     High Cholesterol Brother     Diabetes Brother     High Cholesterol Brother     Anesth Problems Neg Hx     Malig Hyperten Neg Hx     Hypotension Neg Hx     Malig Hypertherm Neg Hx     Pseudochol. Deficiency Neg Hx        PHYSICAL EXAM:      /75   Pulse 70   Temp 97.8 °F (36.6 °C) (Temporal)   Resp 16   Ht 5' 2\" (1.575 m)   Wt 188 lb (85.3 kg)   LMP  (LMP Unknown)   SpO2 100%   BMI 34.39 kg/m²  I        Heart:  RRR,  Normal S1S2    Lungs:  CTA Bilat, normal effort    Abdomen:  ND NT      ASA Grade:  ASA 3 - Patient with moderate systemic disease with functional limitations    Mallampati Class:  Class I: Soft palate, uvula, fauces, pillars visible  __________  Class II: Soft palate, uvula, fauces visible  ____X_____   Class III: Soft palate, base of uvula visible  __________  Class IV: Hard palate only visible   __________      ASSESSMENT AND PLAN:    1. Patient is a 61 y.o. female here for EGD with Botox inj of pylorus under anesthesia. 2.  Procedure options, risks and benefits reviewed with patient. Patient expresses understanding.     Renato Cooley, [address] [place] [address]  8/10/2021

## 2021-08-10 NOTE — PROGRESS NOTES
Patient given all discharge instructions and all questions and concerns addressed. IV out and patient discharged to car via wheelchair. Status stable.

## 2021-08-24 ENCOUNTER — NURSE ONLY (OUTPATIENT)
Dept: CARDIOLOGY CLINIC | Age: 56
End: 2021-08-24
Payer: COMMERCIAL

## 2021-08-24 DIAGNOSIS — I44.30 HEART BLOCK ATRIOVENTRICULAR: ICD-10-CM

## 2021-08-24 DIAGNOSIS — Z95.0 PACEMAKER: ICD-10-CM

## 2021-08-24 PROCEDURE — 93294 REM INTERROG EVL PM/LDLS PM: CPT | Performed by: INTERNAL MEDICINE

## 2021-08-24 PROCEDURE — 93296 REM INTERROG EVL PM/IDS: CPT | Performed by: INTERNAL MEDICINE

## 2021-08-24 NOTE — PROGRESS NOTES
We received remote transmission from patient's monitor at home. Transmission shows normal sensing and pacing function. EP physician will review. See interrogation under cardiology tab in the [address] [facility] [address] field for more details.

## 2021-08-26 NOTE — TELEPHONE ENCOUNTER
Patient of Dr. Chilango Chu: Last seen Dr. Chilango Chu 7/27/21    Refill request for Antivert. Please sent to [facility] [place] [facility].     Please advise

## 2021-08-27 ENCOUNTER — VIRTUAL VISIT (OUTPATIENT)
Dept: PSYCHIATRY | Age: 56
End: 2021-08-27
Payer: COMMERCIAL

## 2021-08-27 DIAGNOSIS — F41.9 ANXIETY: ICD-10-CM

## 2021-08-27 DIAGNOSIS — F31.9 BIPOLAR DEPRESSION (HCC): Primary | ICD-10-CM

## 2021-08-27 PROCEDURE — 99214 OFFICE O/P EST MOD 30 MIN: CPT | Performed by: NURSE PRACTITIONER

## 2021-08-27 RX ORDER — MECLIZINE HYDROCHLORIDE 25 MG/1
TABLET ORAL
Qty: 15 TABLET | Refills: 0 | Status: SHIPPED | OUTPATIENT
Start: 2021-08-27 | End: 2022-04-15

## 2021-08-27 RX ORDER — LANOLIN ALCOHOL/MO/W.PET/CERES
3 CREAM (GRAM) TOPICAL DAILY
Qty: 30 TABLET | Refills: 2 | Status: SHIPPED | OUTPATIENT
Start: 2021-08-27 | End: 2022-04-15

## 2021-08-27 NOTE — PROGRESS NOTES
at 6 months. Mom was given ROSALIA which resulted in pt infertility       HPI:   Jd Garcia is a 61 y.o. female with h/o depression, anxiety, insomnia, multiple medical issues including: DM, gastroparesis, pricilla, non obstructive CAD, pacemaker (5/2021) HLD who was contacted via telehealth for follow up     Due to the COVID-19 pandemic restrictions on close contact interactions as recommended by CDC and health authorities, the patient's visit was conducted via telehealth (phone call visit) in lieu of a face to face visit. Patient verbally consented and agreed to proceed. Since last f/u pc 7/23/21    Per chart review:  EGD with dilation + botox injection 8/10/21    Saw ENT 7/27/21 for dizziness. Suspect Meniere's. Unable to do diuretic therapy d/t hypotension. Planned to obtain vestibular testing     s/p dual chamber PPM 5/10/2021 (Dr. Coni Galicia). Today, reports is \"tired\"    Going through stressful things. Not sleeping at all. Had to go to eye specialist yesterday. Something going on with my eyes. Seeing \"bubbles\"   Transportation issues, was 30 mins late, almost couldn't be seen. Hot, vomited. Cried, had breakdown. Got overwhelming. Was physically/mentally exhausted. Did file grievance today about the transportation with my insurance company. My appt was 330p. They didn't pick me up until 345p. Because of transportation almost lost my appt. Wasn't my fault. Didn't get home until almost 8pm.      All day thing I dread for appts. May need another brain scan/refer to neuro    Flu again, just getting over it    Just kind of frustrating. Life in general.  Trying to deal with it all    Got to see brother and JOSE recently after EGD. They live 2 hours away. Was fun to spend time with them. Feels like every time do something fun, something bad happens. Was told lots erosion in esophagus    Still has some of the cough but is starting to feel better.   Ate virtually nothing entire time had flu. ? If had covid again. Mind races. Just afraid to take any kind of medication. Just doing the best I can do    Issue with bank account. Hacked. All these charges keep accumulating. Now in the negative, way in the negative. Big vicious [place]    But i'm trying    Takes forever to get ready to do anything. Takes 3 hours to get around to get it done. Tired. Still no regular [employer] attendance. No groups. With covid flaring again may not be able to attend group in sept. Just want to see friends and family. Just feel so isolated. Afraid to go out when you do go out. Usha me. i've had covid twice. Afraid people gonna get me sick. So try to avoid people. I was only one of 2 people with mask on at .  Can't take covid shot, lot of health issues. Can't even take the flu shot. Friend  3 weeks ago. Couldn't get anyone to take me. Had to take Lyft to her .  The people I was there for in the past aren't there for me. Liberty had to go to the .  Wasn't overly depressing. Were saying nice things about her. She was 79, would send me little notes or bookmarks, or things. Meant the world to me. HISTORICALLY WITH THIS PROVIDER:  Insurance would not approve vraylar. Was ordered seroquel as alternative. Per insurance:   Please use preferred drug list (PDL) medications: at least two of the following for at least 4 weeks each: aripiprazole, ziprasidone, quetiapine, risperidone, or olanzapine) (generics when available). Didn't try seroquel. Fear of going to sleep and not waking after taking medication. Anger with vraylar samples and risperdal    Severe sleep apnea. Can't use machine.     Sleeps in recliner, unable to sleep in bed        Taking 21:  - Had to quit taking everything because bp/pulse kept getting lower    - haven't been taking Valium 2mg 2 months since pacemaker implanted 5/10/21 (today says had actually stopped couple weeks before getting pacemaker)    At this point, would rather not take anything for mood/anxiety. Still trying to heal from sx (pacemaker)    · Albuterol prn  · Asa 81mg/day, 1-2/day, \"thinks makes feel better when take 2, opens things up more\"  · pepcid 40mg daily  · Vit d 10K units weekly  · Gas x prn  · lantus 30 units/daily  · zaditor eye gtts daily  · xalatan eye gtts daily  · claritin 10mg daily  · mvi daily (women's one a day)  · Tylenol prn        Substance:   Alcohol:  denies              Illicits:     Denies               Caffeine: mostly water, maybe 1 caffeinated beverages    Tobacco:  Denies           Psych ROS:      Depression:   rates mood 6-8/10 (10 best) depends on the day. Had energy yesterday, finished mowing. Felt good to get that done. H/o mood lability, can change quickly within a day,      Trouble keeping hydrated. Have to remind self to drink    Do get dizzy a lot. Couple days bp pretty low. 85/60, hr in 60's. Started drinking fluids and it tends to go up. Yesterday 116/71, pulse 71    some irritability, but not around irritability so doesn't matter to them. Dealing with insurance company, they irritate me.  kind of have reason to be irritable. historically \"if I can get out or talk to a friend, that helps\"    Has groceries delivered. On notes says where to put them. They never read the note. So I have to walk a lot farther than where I should. Then I get irritable    Trying to walk. Doesn't leave house often. Legs weak. Have to take frequent breaks. Muscles weak    infrequent showers. mostly sponge/baby wipe baths. Is washing hair every 3-4 days. Bad rash under bilat breasts. Tearful yesterday. Not daily. Sleep remains poor, smart watch shows getting \"35 minutes restless sleep is what says\", and \"light sleep, maybe an hour. Up and down all night long. Incontinence more prevalent sice drinking more, is mostly at night. Frustrating.   exhausted. low appetite.  \"hardly anything at all right now. Get hot and don't eat. \"   Lost 160# in < 1 year. Down to 178# now. Look old with the saggy skin. No glucerna in a while, no $$.  was dx 2 years ago with DM; had botox shots and esophageal stretching recently)    Don't have much money, on fixed income. Poor concentration, \"don't read, can't concentrate. Always been that way\"  Do better when someone reads to me    some hopelessness, some helplessness     poor self esteem, , loss of interest,    low energy,      Variable motivation    + isolation,      (PREVIOUSLY volunteer at [employer] weekly, UNABLE currently D/T Coronavirus, miss that, had done that for 9 years. On wed get really depressed, that's when I would volunteer over there)                DENIES SI/SHHI          Shaniqua: \"other day had too much energy, did more than should. Had to sit down\"   historically \"get hyper\" can't control the energy. Try to do something constructive. Sometimes when at food pantry I get hyper. hyper times last maybe an hour. rapid speech, easily distracted or decreased attention, racing thoughts,               DENIES insomnia with increased energy,  irritability, expansive mood, increase in energy and goal directed behavior, grandiosity, flight of ideas              DENIES IMPULSIVITY outside of verbal impulsivity with insurance company 2/2 frustration. H/o shopping sprees in past as way to feel better. Denies recent primarily d/t limited finances     Anxiety:  \"nerves raw; if hear noise I jump\"  ? If d/t not sleeping, being uncomfortable. Bee flew past my head, I screamed. Struggles to rate  On 0/10 (10 worst); start thinking about everything that happened; got really upset Mother's Day, couldn't visit mom's grave; mother's day never good day for me anyway. But was at hospital and not home alone so that helped alitte.  from [employer] came to visit, stayed an hour. Helped so much. Hadn't seen in months.       intermittent, at least daily \"everything in general\"    h/o worry \"finances, family, friends, worst case scenarios, worry about brother and his health issues, my health issues),  sleep disruption (initial and middle insomnia), somatic complaints (clammy hands, heart raging  trembling, dyspnea), OCC restlessness, fatigue; fear of doing/saying wrong things,    NOT SO MUCH RIGHT NOW:  fear of judgement, avoidant of social situations (don't have money to do things, benefits got cut in half in January)     OCD:  DENIES Repetitive actions or rituals, excessive hand washing, contamination fears, need for symmetry, violent thoughts, hoarding, fear of being harmed, mental or verbal repetition of words or phrases and counting     Panic:  have had a couple  not daily. Most recently 2 days ago. Able to talk self down. HISTORICALLY Can feel them coming on, learned to talk myself down\"; can be triggered by \"thinking too much\"  Last 1--15 mins  Shortness of breath, clammy, tunnel vision,  trembling, palpitations, chest discomfort and fear of dying       Phobias:  Heights; enclosed space with too many people jose enrique if too hot but denies claustrophobia     Psychosis: DENIES A/VH, paranoia, delusions     ADHD: denies prior dx. Always struggled with poor concentration. Has noticed some dyslexia with numbers, specifically when typing in numbers on cell phone. Mild. Never dx           PTSD: \"some dreams, nothing terrible, just childhood memories.   Parents been in lot of my dreams and they're no longer here of course\"     HISTORICALLY ENDORSES dreaming about people that haven't been here for years\"; dreaming about  neighbor, mom and dad and nephew ( in October), nieces both lost babies in November, a lot lately, they've been dead for a while now; not harmful dreams but makes me miss them so try to avoid falling back asleep;   neighbor tried to [employer] me several times as an adult, he was a ; attended his  to ensure he was actually gone (was dad's best friend, have trouble with men's cologne because of what he wore) nightmares, flashbacks, hypervigilance, easily startled, decreased sleep, reliving the event, avoiding situations that remind you of trauma,  trouble concentrating, uncomfortable around men       Eating disorders:  Denies prior         MARILUZ 7 SCORE 2/18/2020   MARILUZ-7 Total Score 19     Interpretation of MARILUZ-7 score: 5-9 = mild anxiety, 10-14 = moderate anxiety,   15+ = severe anxiety. Recommend referral to behavioral health for scores 10 or greater. No data recorded   PHQ-9 Total Score: 19 (2/18/2020  9:07 AM)  Thoughts that you would be better off dead, or of hurting yourself in some way: 0 (2/18/2020  9:07 AM)     Interpretation of PHQ-9 score:  1-4 = minimal depression, 5-9 = mild depression, 10-14 = moderate depression; 15-19 = moderately severe depression, 20-27 = severe depression        Mood Disorder Questionnaire 2/18/20    Positive Responses:  8/13  (7 or more  Yes responses to question 1, and Yes response to #2 and moderate to serious response to #3 considered positive screen for Bipolar Disorder)     Have several of these events happened during the same period of time? Yes     How much of a problem did any of these cause you? Moderate Problem        History obtained from patient and chart (confirmed by patient today).     Past Psychiatric History:               Prior hospitalizations: denies              Prior diagnoses:  Bipolar by pcp years ago; anxiety d/o              Outpatient Treatment:                           Psychiatrist:  denies                          Therapist: denies              Suicide Attempts: denies              Hx SH:  Denies        Past Psychopharmacologic Trials (including response/reactions):     1. Lexapro:  Did ok, but built up over time. Stopped because didn't feel needed  2. Xanax:  Years and years ago until they mixed with wellbutrin and had major anger issues  3.   Prozac: Major anger issues, felt drugged  4. Librium:  Too strong or something  5. Vraylar: Made me angry   6.  Risperdal:  Angry, increased anxiety  7. Wellbutrin:   8.   Lamictal:  Rash and abd/lower extremity edema    Past Medical/Surgical History:   Past Medical History:   Diagnosis Date    Ankle fracture, left     Ankle fracture, right     Arthritis     Asthma     Bipolar depression (HCC)     CAN'T AFFORD MEDS    Bladder problem     Cervical disc herniation     COVID toes     Diabetes mellitus ([address]     diet control    Fatty liver disease, non-alcoholic     Fibromyalgia     Gastroparesis     Dr Cassia Echevarria GERD (gastroesophageal reflux disease)     gastroporesis    Glaucoma     Dr Dafne Cohen Hyperlipidemia     elevated LFT on meds    IBS (irritable bowel syndrome)     Lumbar herniated disc     Nausea & vomiting     Nephrolithiasis 1/29/2019    Obesity (BMI 30-39.9) 3/11/2019    Partial Achilles tendon tear     Pneumonia     PONV (postoperative nausea and vomiting)     RA (rheumatoid arthritis) ([address]     Rapid or irregular heartbeat     Sleep apnea     does not use cpap    Spinal stenosis     Stress incontinence     Thyroid disease     growths     Past Surgical History:   Procedure Laterality Date    CARDIAC SURGERY      CHOLECYSTECTOMY      COLONOSCOPY      COLONOSCOPY  03/06/2018    with polypectomies    DILATION AND CURETTAGE OF UTERUS      ENDOMETRIAL ABLATION      ENDOSCOPY, COLON, DIAGNOSTIC      ERCP  5/25/2010    with stent    ERCP  1-31-14    ERCP WITH SPHINTER OF ODDI MANOMETERY    ESOPHAGEAL DILATATION  8/10/2021    ESOPHAGEAL DILATION JERSON performed by Neelima Gray MD at [place] [id number]  10/5/10    botox injection    EYE SURGERY      LASER FOR GLAUCOMA    LAPAROSCOPY      PACEMAKER INSERTION  05/10/2021    TONSILLECTOMY      UPPER GASTROINTESTINAL ENDOSCOPY      UPPER GASTROINTESTINAL ENDOSCOPY  04/12/2011    DILATATION, [address], 100 UNITS BOTOX    UPPER GASTROINTESTINAL ENDOSCOPY  08/30/2011    [address] DILATATION, 100 UNITS  BOTOX INJECTION    UPPER GASTROINTESTINAL ENDOSCOPY  3-9-12    with dilatation and submucosal injection: Botox    UPPER GASTROINTESTINAL ENDOSCOPY      UPPER GASTROINTESTINAL ENDOSCOPY  11/20/12     Esophagogastroduodenoscopy with Botulinum toxin injection of the pylorus and Velazquez esophageal dilation    UPPER GASTROINTESTINAL ENDOSCOPY  6/4/13    WITH DIILITATION AND BOTOX INJECTION    UPPER GASTROINTESTINAL ENDOSCOPY  5/20/2014    100 unit Botox injection, 56 [address]  Velazquez esophageal dilatation    UPPER GASTROINTESTINAL ENDOSCOPY  12/12/14    with esophageal dilatation, and botox injection    UPPER GASTROINTESTINAL ENDOSCOPY  09/09/2015    With Dilitation and Botox injection    UPPER GASTROINTESTINAL ENDOSCOPY  5/10/2016    distal esophagus biopsy, stomach biopsy, botox injection    UPPER GASTROINTESTINAL ENDOSCOPY  04/11/2017    with dilatation and botox injection    UPPER GASTROINTESTINAL ENDOSCOPY  10/24/2017    DILATATION, BIOPSY, BOTOX    UPPER GASTROINTESTINAL ENDOSCOPY  03/06/2018    WITH BOTOX AND BALLOON DILATATION    UPPER GASTROINTESTINAL ENDOSCOPY N/A 5/7/2019    EGD SUBMUCOSAL/BOTOX INJECTION performed by Clementina Sosa MD at [address] N/A 5/7/2019    EGD DILATION BALLOON performed by Clementina Sosa MD at [address] N/A 3/13/2020    EGD SUBMUCOSAL/BOTOX INJECTION performed by Clementina Sosa MD at [address] N/A 3/13/2020    EGD DILATION BALLOON performed by Clementina Sosa MD at [address] N/A 10/6/2020    EGD DILATION BALLOON performed by Clementina Sosa MD at [address] N/A 10/6/2020    EGD SUBMUCOSAL/BOTOX INJECTION performed by Clementina Sosa MD at [facility] ENDOSCOPY    UPPER GASTROINTESTINAL ENDOSCOPY N/A 8/10/2021    EGD SUBMUCOSAL/BOTOX INJECTION performed by Jose Flynn MD at [facility] N/A 8/10/2021    EGD BIOPSY performed by Jose Flynn MD at [address] History   Problem Relation Age of Onset    Diabetes Mother     Heart Disease Mother     Stroke Mother     Heart Disease Father     Heart Disease Brother     High Blood Pressure Brother     High Cholesterol Brother     Diabetes Brother     High Cholesterol Brother     Anesth Problems Neg Hx     Malig Hyperten Neg Hx     Hypotension Neg Hx     Malig Hypertherm Neg Hx     Pseudochol. Deficiency Neg Hx          PCP: Charlette Garcia MD      Allergies:    Allergies   Allergen Reactions    Lamictal [Lamotrigine] Swelling and Rash    Macrodantin [Nitrofurantoin Macrocrystal] Shortness Of Breath     Caused an asthma attack    Penicillins Hives and Shortness Of Breath    Shellfish-Derived Products Swelling     Throat and tongue swells, allergy to all seafood    Aspartame And Phenylalanine      Severe headaches    Bactrim Hives    Biaxin [Clarithromycin] Hives    Cephalexin Other (See Comments)     blisters    Ciprofloxacin Other (See Comments)     Causes severe pain and tendon tears per pt    Hydrocodone-Acetaminophen Other (See Comments)     HALLUCINATIONS    Lansoprazole Hives    Nexium [Esomeprazole Magnesium Trihydrate] Hives    Other Other (See Comments)     TEGADERM-BLISTERS    Prilosec [Omeprazole] Hives    Blueberry [Vaccinium Angustifolium] Nausea And Vomiting     Projectile vomiting    Monosodium Glutamate Nausea And Vomiting    Zmax [Azithromycin Dihydrate] Nausea And Vomiting     NOT Z PACK its the Powder you had to mix and drink         Current Medications:   Current Outpatient Medications on File Prior to Visit   Medication Sig Dispense Refill    insulin glargine (LANTUS SOLOSTAR) 100 UNIT/ML injection pen Inject 30 Units into the skin daily (Patient taking differently: Inject 30 Units into the skin daily Taking 15-30u/day since BS low lately) 4 pen 3    loratadine (CLARITIN) 10 MG tablet TAKE ONE TABLET BY MOUTH DAILY 30 tablet 5    guaiFENesin (MUCINEX MAXIMUM STRENGTH) 1200 MG TB12 Take 1 tablet by mouth daily For a PM dose. 10 tablet 0    albuterol sulfate  (90 Base) MCG/ACT inhaler INHALE TWO PUFFS BY MOUTH EVERY 6 HOURS AS NEEDED FOR WHEEZING 1 Inhaler 2    ketotifen (ZADITOR) 0.025 % ophthalmic solution INSTILL TWO DROPS IN [facility] EYE TWO TIMES A DAY AS NEEDED FOR ALLERGY 1 Bottle 2    ASPIRIN LOW DOSE 81 MG EC tablet TAKE ONE TABLET BY MOUTH DAILY 90 tablet 3    famotidine (PEPCID) 40 MG tablet Take 1 tablet by mouth 2 times daily (Patient taking differently: Take 40 mg by mouth 2 times daily Indications: taking 1-2 times/day ) 30 tablet 5    latanoprost (XALATAN) 0.005 % ophthalmic solution Place 1 drop into both eyes nightly      Multiple Vitamins-Minerals (THERAPEUTIC MULTIVITAMIN-MINERALS) tablet Take 1 tablet by mouth daily       Cholecalciferol (VITAMIN D3) 25 MCG (1000 UT) TABS Take 1 tablet by mouth daily (Patient taking differently: Take 10,000 Units by mouth once a week ) 90 tablet 1    GAS-X EXTRA STRENGTH 125 MG chewable tablet CHEW TWO TABLETS BY MOUTH THREE TIMES A DAY WITH EACH MEAL AS NEEDED FOR FLATULENCE 192 tablet 1    meclizine (ANTIVERT) 25 MG tablet TAKE ONE TABLET BY MOUTH THREE TIMES A DAY AS NEEDED FOR DIZZINESS OR NAUSEA 15 tablet 0    Blood Glucose Monitoring Suppl (TRUE METRIX METER) w/Device KIT As needed 1 kit 0    blood glucose test strips (TRUE METRIX BLOOD GLUCOSE TEST) strip 1 each by In Vitro route daily As needed.  100 each 3    blood glucose test strips (TRUE METRIX BLOOD GLUCOSE TEST) strip USE ONE STRIP TO TEST TWICE A DAY AS NEEDED 50 strip 2    budesonide (RHINOCORT AQUA) 32 MCG/ACT nasal spray 1 spray by Each Nostril route daily 1 Bottle 3    Blood Pressure KIT 1 kit by Does not apply route daily 1 kit 0    Insulin Pen Needle (PEN NEEDLES) 32G X 4 MM MISC USE WITH INSULIN PEN ONCE DAILY 50 each 3    TRUEplus Lancets 28G MISC USE ONE LANCET TO TEST THREE TO FOUR TIMES A  each 3    Incontinence Supply Disposable (TRE CLASSIC BRIEFS/LARGE) MISC 1 each by Does not apply route 3 times daily 5 each 5    Incontinence Supplies MISC 1 Package by Does not apply route 2 times daily 5 each 5    Disposable Gloves (VINYL GLOVES LARGE) MISC 1 Package by Does not apply route 4 times daily 5 each 5     Current Facility-Administered Medications on File Prior to Visit   Medication Dose Route Frequency Provider Last Rate Last Admin    0.9 % sodium chloride infusion   IntraVENous Continuous Joao Brandt MD           Controlled Substance Monitoring:    Acute and Chronic Pain Monitoring:   RX Monitoring 8/27/2021   Periodic Controlled Substance Monitoring No signs of potential drug abuse or diversion identified.      04/09/2021  1   03/26/2021  Diazepam 2 MG Tablet  60.00  30 Ch Wet   [id number]   Kro ([id number])   0  0.40 LME [place]   02/02/2021  2   01/06/2021  Diazepam 2 MG Tablet  60.00  30 Ch Wet   [id number]   Kro ([id number])   1  0.40 [place]ME [place]   01/08/2021  1   01/06/2021  Diazepam 2 MG Tablet  60.00  30 Ch Wet   [id number]   Kro ([id number])   0  0.40 [place]   10/14/2020  1   09/23/2020  Diazepam 2 MG Tablet  60.00  30 Ch Wet   [id number]   Kro ([id number])   0  0.40 [place]   08/29/2020  1   08/26/2020  Diazepam 2 MG Tablet  60.00  30 Ch Wet   [id number]   Kro ([id number])   0  0.40 [place]   07/31/2020  1   07/29/2020  Diazepam 2 MG Tablet  60.00  30 Ch Wet   [id number]   Kro ([id number])   0  0.40 [place]   06/01/2020  1   05/27/2020  Diazepam 2 MG Tablet  60.00  30 Ch Wet   [id number]   Kro ([id number])   0  0.40 [place]   04/29/2020  1   04/21/2020  Diazepam 2 MG Tablet  40.00  10 Ch Wet   [id number]   Kro ([id number])   0  0.80 LME  [place]icaid   [place]   01/23/2020  1   01/23/2020  Guaifenesin-Codeine Syrup  120.00  6 Je [facility]   [id number]   Kro ([id number])   0  6.00 MME  [place]icaid   [place]             OBJECTIVE:  Vitals: Wt Readings from Last 3 Encounters:   09/10/21 183 lb 6.4 oz (83.2 kg)   08/10/21 188 lb (85.3 kg)   06/25/21 190 lb (86.2 kg)       There were no vitals filed for this visit. Unable to assess d/t f/u visit completed via telehealth    ROS: Denies trouble with fever, rash, headache, vision changes, chest pain, shortness of breath, nausea, extremity pain, weakness, dysuria.  Dizziness, Increased pain, h/o chronic pain (knees, hips, ankles, shoulder) cites RA and OA; suspected covid, \"ear and sinus pain\", fatigue; \"bad rash\" under bilat breasts         Mental Status Exam:      Appearance    unable to assess d/t f/u visit completed via pc  Muscle strength/tone: unable to assess d/t f/u visit completed via pc  Gait/station: unable to assess d/t f/u visit completed via pc  Speech    spontaneous, normal rate and normal volume, sounds brighter than previous interaction  Mood    dysphoric  Affect  Unable to fully assess d/t f/u visit completed via telehealth (pc) though sounds congruent to thought content and mood but brighter than prior conversations  Thought Content  some hopelessness,  helplessness and excessive preoccupations, no delusions voiced  Thought Process   perseverative   Associations    logical connections  Perceptions: denies [address],   [address]  Orientation    oriented to person, place, time, and general circumstances  Memory    recent and remote memory intact  Attention/Concentration    intact  Ability to understand instructions Yes  Ability to respond meaningfully Yes  Language: [address] [place] [address] of knowledge/Intellect: Average  SI:   no suicidal ideation  HI: Denies HI       Labs:     Admission on 08/10/2021, Discharged on 08/10/2021   Component Date Value    POC Glucose 08/10/2021 171*    Performed on 08/10/2021 ACCU-CHEK     POC Glucose 08/10/2021 154*    Performed on 08/10/2021 ACCU-CHEK    Admission on 05/26/2021, Discharged on 05/26/2021   Component Date Value    WBC 05/26/2021 13.3*    RBC 05/26/2021 4.84     Hemoglobin 05/26/2021 13.4     Hematocrit 05/26/2021 41.5     MCV 05/26/2021 85.8     MCH 05/26/2021 27.7     MCHC 05/26/2021 32.3     RDW 05/26/2021 15.4     Platelets 14/88/7033 256     MPV 05/26/2021 9.2     Neutrophils % 05/26/2021 69.8     Lymphocytes % 05/26/2021 21.3     Monocytes % 05/26/2021 6.4     Eosinophils % 05/26/2021 1.7     Basophils % 05/26/2021 0.8     Neutrophils Absolute 05/26/2021 9.3*    Lymphocytes Absolute 05/26/2021 2.8     Monocytes Absolute 05/26/2021 0.8     Eosinophils Absolute 05/26/2021 0.2     Basophils Absolute 05/26/2021 0.1     Sodium 05/26/2021 136     Potassium 05/26/2021 4.8     Chloride 05/26/2021 97*    CO2 05/26/2021 25     Anion Gap 05/26/2021 14     Glucose 05/26/2021 186*    BUN 05/26/2021 28*    CREATININE 05/26/2021 1.0     GFR Non- 05/26/2021 57*    GFR  05/26/2021 >60     Calcium 05/26/2021 10.6     Total Protein 05/26/2021 7.6     Albumin 05/26/2021 4.2     Albumin/Globulin Ratio 05/26/2021 1.2     Total Bilirubin 05/26/2021 0.4     Alkaline Phosphatase 05/26/2021 34*    ALT 05/26/2021 161*    AST 05/26/2021 62*    Globulin 05/26/2021 3.4     Troponin 05/26/2021 <0.01     Color, UA 05/26/2021 YELLOW     Clarity, UA 05/26/2021 TURBID*    Glucose, Ur 05/26/2021 Negative     Bilirubin Urine 05/26/2021 Negative     Ketones, Urine 05/26/2021 TRACE*    Specific [place], UA 05/26/2021 1.021     Blood, Urine 05/26/2021 Negative     pH, UA 05/26/2021 5.5     Protein, UA 05/26/2021 30*    Urobilinogen, Urine 05/26/2021 0.2     Nitrite, Urine 05/26/2021 Negative     Leukocyte Esterase, Urine 05/26/2021 LARGE*    Microscopic Examination 05/26/2021 YES     Urine Type 05/26/2021 Voided     Urine Reflex to Culture 05/26/2021 Yes     Ventricular Rate 05/26/2021 69     Atrial Rate 05/26/2021 69     P-R Interval 05/26/2021 176     QRS Duration 05/26/2021 156     Q-T Interval 05/26/2021 438     QTc Calculation (Bazett) 05/26/2021 469     P Axis 05/26/2021 60     R Axis 05/26/2021 12     T Axis 05/26/2021 181     Diagnosis 05/26/2021 Electronic ventricular pacemakerConfirmed by Bing Alberto MD, YellowSchedule ([id number]) on 5/28/2021 9:43:02 AM     RBC, UA 05/26/2021 3-4     Bacteria, UA 05/26/2021 1+*    Hyaline Casts, UA 05/26/2021 5     WBC, UA 05/26/2021 239*    Epithelial Cells, UA 05/26/2021 8*    Urine Culture, Routine 05/26/2021 <10,000 CFU/ml mixed skin/urogenital ramo. No further workup    No results displayed because visit has over 200 results. Last Drug screen:  No results found for: Lennox Reach, LABBENZ, COCAIMETSCRU, THC, MDMA, LABMETH, OPIATESCREENURINE, OXTCOSU, PHENCYCLIDINESCREENURINE, PROPOXYPHENE, METAMPU      Imaging:   Ct head wo contrast 5/15/2010:     IMPRESSION- No acute intracranial abnormality. Consideration   should be given to MRI followup.              ASSESSMENT AND PLAN     Diagnosis Orders   1. Bipolar depression ([facility] [address]     2. Anxiety          1. Safety: NO Imminent risk of danger to/self/others based on the factors considered below. Appropriate for outpatient level of care.   Safety plan includes: 911, PES, hotlines, and interventions discussed today.      Risk factors: Age <25 or >55, depressed mood, chronic pain or medical illness, social isolation, no outpatient services in place, medication noncompliance, and no collateral information to support safety.     Protective factors:  female gender, denies suicidal ideation, does not have lethal plan, does not have access to guns or weapons, patient is lynn for safety, no prior suicide attempts, no family h/o suicide, no substance abuse, no active psychosis or cognitive dysfunction, and patient is future oriented.          2. Psychiatric  - pt endorses h/o mood lability + FH BAD. Prior negative response to some antidepressants. 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 + MDQ at initial eval.  Multiple stressors with increased depression/mood lability. felt was Reasonable to trial mood stabilizer. Has DM, needs most weight neutral option.      - vraylar not covered by insurance,tried samples, made \"angry\",  risperdal made \"angry\" and increased anxiety, multiple panic attacks/day    - HAD previously recommended pt trial seroquel d/t issues with insomnia in addition to mood/anxiety symptoms. REFUSED TO TRY SEROQUEL. Pt has lots medication anxiety, particularly with sedating medications. Fearful won't wake up if meds too sedating.      - opted to try lamictal.  Initially Had been tolerating lamictal well during previous dose titration but eventually  developed rash and peripheral edema following most recent dosage increase so was advised to stop. At fu, reported rash and edema resolved shortly after stopping lamictal.     - previously discussed cymbalta for anxiety, mood and chronic pain. DECLINED. Says has had such negative responses to previous meds, fearful of introducing new meds at this point    - consider abilify vs latuda for mood, may benefit from activating properties of abilify, has fear of oversedation. DECLINED AT PRESENT     - continue to hold valium 2mg prn for anxiety, sleep d/t ongoing dizziness with recent hypotension. Historically prefers low dose d/t fear of oversedation. Reports this dose was working very well to help relax her at night so can get some rest.  Hasn't taken in > 1 month d/t previous bradycardia (recent pacemaker placement 5/2021)  Reminded this is not long term solution and need to have additional strategies to help manage anxiety and mood symptoms. Lots med anxiety. - trial melatonin 3mg evening for sleep.   Brother takes this. Wonders if could help her too since doesn't want to take valium or other rx's     -Labs: reviewed in Epic   9/21/20:  Vit d 26.4; lipids (total chol 254, trig 214, hdl 36, ldl 175)              11/26/19:  Lipids:  (Total chol 254, ldl 178); hgba1c 8.2 tsh wnl              5/7/19:  Vit d 21.4     - ENCOURAGED TO KEEP CONSISTENT SCHEDULE/ROUTINE TO INCLUDE WAKE/SLEEP TIME, MEAL TIMES, SHOWER TIMES, EXERCISE       -Recommend outpt therapy. Has tried to connect in past but limited with insurance options AND transportation issues. Unsure if wants to proceed or would be helpful. Likes talking to this provider. \"took a lot for me to come to you\"         -OARRS reviewed, c/w history  -R/b/se/a d/w pt who consents.     3. Medical  -established with Eloisa Perez MD       4. Substance   -No active issues.     5. RTC - 4 weeks per pt preference; historically prefers am appts;        Adam Tavera CNP  Psychiatric Nurse Practitioner

## 2021-09-03 ENCOUNTER — TELEPHONE (OUTPATIENT)
Dept: CARDIOLOGY CLINIC | Age: 56
End: 2021-09-03

## 2021-09-03 NOTE — TELEPHONE ENCOUNTER
LM for pt to call to dm her hosp fu with RMM on 09/10 hold spot, can keep Device ch on 09/07 if she wants or can be done on 09/10.

## 2021-09-07 NOTE — TELEPHONE ENCOUNTER
Pt called back and dm her with RMM on 09/10 with Device (REP NEEDS TO BE CALLED) we cancelled the 09/07 device ch also

## 2021-09-09 NOTE — PROGRESS NOTES
[facility]   Electrophysiology Follow up   Date: 9/10/2021  I had the privilege of visiting Burgess Ardon in the office. CC: Bradycardia  HPI: Burgess Ardon is a 61 y.o. female with a PMH of HTN, HLD, DM, Obesity, SHIRLEY and non obstructive CAD    Found to be in 2:1 AVB on ECG with no reversible causes. S/p dual chamber PPM 5/10/2021. Developed fatigue and lightheadedness and seen in the office, found to have hypotension. Stat limited echo showed no pericardial effusion. 6/25/2021 presented to the office with NP Jhonny Kendall. She had complaints of bloody drainage at her incision site. Was noted to be scant bloody drainage from the lateral corner of her PPM site. Mild discomfort and mild bleeding at site. Wojciechtanner Lee presents to the office in follow up. She is doing well from a cardiac standpoint. She does attest to getting fatigued easily but is getting close to her baseline. Occasional lightheadedness with positional changes, but does at times get issues while sitting. This she does correlate with her blood glucose fluctuations. Has some tenderness at her device site, it remains free from s/s of infection. Site is clean dry and well healed. Discussed that the tenderness should improve as time goes on. The tenderness is not overly bothersome to her. Assessment and plan:   Bradycardia/ 2:1 AVB   -s/p Dual chamber PPM 5/10/2021   -The CIED was interrogated and programmed and I supervised and reviewed all the data. All findings and changes are in device interrogation sheet and reflect my personal interpretation and changes and is scanned to Epic.    -10 years remaining, AP 19.4% ,  98.3%     Patient is in sinus rhythm today. Incision healed completely. -MVP turned on today   -No episodes of arrhythmia   -Device and leads are MRI compatible and may proceed if she needs one in the future     CAD   -Non obstructive CAD   Continue risk factor modification.         HTN  -Controlled: 120/72  -Not on medication, remains well controlled, continue to monitor   -BP goal <130/80  -Home BP monitoring encouraged, printed information provided on how to accurately measure BP at home.  -Counseled to follow a low salt diet to assure blood pressure remains controlled for cardiovascular risk factor modification.   -The patient is counseled to get regular exercise 3-5 times per week and maintain a healthy weight reduce cardiovascular risk factors. Obesity  Body mass index is 33.54 kg/m². -She has lost over 160 pounds in the last year  -Excessive weight is complicating assessment and treatment.  It is placing patient at risk for multiple co-morbidities as well as early death and contributing to the patient's presentation.  -Discussed weight management with diet and exercise        SHIRLEY  -Severe, unable to tolerate machine   -Encourage to use machine to prevent long term effects of untreated SHIRLEY     1 year follow up EPNP  Continue remote device monitoring     Patient Active Problem List    Diagnosis Date Noted    Pacemaker 05/10/2021    Mobitz type II atrioventricular block     Symptomatic bradycardia     Heart block atrioventricular 05/08/2021    Dizziness 07/09/2020    Bilateral hearing loss 07/09/2020    Sensation of fullness in ear, bilateral 07/09/2020    Coronary artery disease involving native coronary artery of native heart without angina pectoris 03/19/2019    Unstable angina ([facility] [address] 03/11/2019    Obesity (BMI 30-39.9) 03/11/2019    Nephrolithiasis 01/29/2019    Moderate episode of recurrent major depressive disorder ([facility] [address] 11/16/2018    Hypercholesteremia 05/02/2017    Obstructive sleep apnea syndrome 05/02/2017    Disequilibrium syndrome 09/26/2016    Subjective tinnitus of both ears 09/26/2016    Bilateral high frequency sensorineural hearing loss 09/26/2016    IBS (irritable bowel syndrome) 08/25/2015    Multiple thyroid nodules 06/18/2015    Thyroid cyst 07/22/2014    Vitamin D deficiency 07/22/2014    Rheumatoid arthritis ([facility] 11/11/2013    Gastroparesis 11/11/2013    Urine incontinence 04/15/2013    Asthma 04/15/2013    Chronic back pain 04/15/2013    DM2 (diabetes mellitus, type 2) ([facility] 01/05/2012    GERD (gastroesophageal reflux disease) 01/05/2012    Fibromyalgia 01/05/2012     Diagnostic studies:   ECG 9/10/21  Sinus, vpaced    Limited Echo 5/26/2021  Summary  Normal left ventricle size, wall thickness, and systolic function with an estimated ejection fraction of 55-60%. No regional wall motion  abnormalities are seen. No pericardial effusion noted. No pleural effusion. Dr. Nuha Yeung at bedside. ECHO:  3/11/2019  Summary   -Normal left ventricle size, wall thickness and systolic function with an   estimated ejection fraction of 55%.   - No regional wall motion abnormalities are seen.   -Normal diastolic function.   -Trivial tricuspid regurgitation.     Stress Test: 4/14/2014  Dulcy Neighbor is normal isotope uptake at stress and rest. There is no evidence of    myocardial ischemia or scar.    Normal LV size and systolic function    Left ventricular ejection fraction of 58 %. I independently reviewed the cardiac diagnostic studies, ECG and relevant imaging studies. Lab Results   Component Value Date    LVEF 55 03/12/2019    LVEFMODE Cardiac Cath 03/12/2019     Lab Results   Component Value Date    TSH 1.40 06/22/2020         Physical Examination:  Vitals:    09/10/21 1104   BP: 120/72   Pulse: 71   SpO2: 100%      Wt Readings from Last 3 Encounters:   09/10/21 183 lb 6.4 oz (83.2 kg)   08/10/21 188 lb (85.3 kg)   06/25/21 190 lb (86.2 kg)       · Constitutional: Oriented. No distress. · Head: Normocephalic and atraumatic. · Mouth/Throat: Oropharynx is clear and moist.   · Eyes: Conjunctivae normal. EOM are normal.   · Neck: Neck supple. No JVD present. · Cardiovascular: Normal rate, regular rhythm, S1&S2.     · Pulmonary/Chest: Bilateral respiratory sounds. No rhonchi. · Abdominal: Soft. No tenderness. · Musculoskeletal: No tenderness. No edema    · Lymphadenopathy: Has no cervical adenopathy. · Neurological: Alert and oriented. Follows command, No Gross deficit   · Skin: Skin is warm, No rash noted. · Psychiatric: Has a normal behavior       Review of System:  [x] Full ROS obtained and negative except as mentioned in HPI    Prior to Admission medications    Medication Sig Start Date End Date Taking? Authorizing Provider   acetaminophen (TYLENOL) 500 MG tablet Take 500 mg by mouth every 6 hours as needed for Pain   Yes Historical Provider, MD   meclizine (ANTIVERT) 25 MG tablet TAKE ONE TABLET BY MOUTH THREE TIMES A DAY AS NEEDED FOR DIZZINESS OR NAUSEA 8/27/21  Yes Henny Call MD   melatonin 3 MG TABS tablet Take 1 tablet by mouth daily 8/27/21  Yes KRISHNA Espinal CNP   insulin glargine (LANTUS SOLOSTAR) 100 UNIT/ML injection pen Inject 30 Units into the skin daily  Patient taking differently: Inject 30 Units into the skin daily Taking 15-30u/day since BS low lately 7/28/21  Yes Becki Cohen MD   Blood Glucose Monitoring Suppl (TRUE METRIX METER) w/Device KIT As needed 7/20/21  Yes Becki Cohen MD   blood glucose test strips (TRUE METRIX BLOOD GLUCOSE TEST) strip 1 each by In Vitro route daily As needed. 7/20/21  Yes Becki Cohne MD   blood glucose test strips (TRUE METRIX BLOOD GLUCOSE TEST) strip USE ONE STRIP TO TEST TWICE A DAY AS NEEDED 6/21/21  Yes Becki Cohen MD   loratadine (CLARITIN) 10 MG tablet TAKE ONE TABLET BY MOUTH DAILY 5/18/21  Yes Gato Barrios DO   budesonide (RHINOCORT AQUA) 32 MCG/ACT nasal spray 1 spray by Each Nostril route daily 5/18/21  Yes Gato Barrios DO   guaiFENesin (MUCINEX MAXIMUM STRENGTH) 1200 MG TB12 Take 1 tablet by mouth daily For a PM dose.  5/11/21  Yes KRISHNA Nunez CNP   Blood Pressure KIT 1 kit by Does not apply route daily 5/11/21  Yes KRISHNA Nunez CNP   albuterol sulfate  (90 Base) MCG/ACT inhaler INHALE TWO PUFFS BY MOUTH EVERY 6 HOURS AS NEEDED FOR WHEEZING 3/8/21  Yes Dwight Shearerώργιmilan Castanon MD   ketotifen (ZADITOR) 0.025 % ophthalmic solution INSTILL TWO DROPS IN [facility] EYE TWO TIMES A DAY AS NEEDED FOR ALLERGY 12/10/20  Yes Dwight VERMAεώργιmilan Castanon MD   Insulin Pen Needle (PEN NEEDLES) 32G X 4 MM MISC USE WITH INSULIN PEN ONCE DAILY 11/10/20  Yes Evaristo Sweeney MD   TRUEplus Lancets 28G MISC USE ONE LANCET TO TEST THREE TO FOUR TIMES A DAY 11/10/20  Yes Evaristo Sweeney MD   ASPIRIN LOW DOSE 81 MG EC tablet TAKE ONE TABLET BY MOUTH DAILY 11/3/20  Yes KRISHNA Mulligan CNP   famotidine (PEPCID) 40 MG tablet Take 1 tablet by mouth 2 times daily  Patient taking differently: Take 40 mg by mouth 2 times daily Indications: taking 1-2 times/day  10/6/20  Yes Michelle Olmos MD   latanoprost (XALATAN) 0.005 % ophthalmic solution Place 1 drop into both eyes nightly   Yes Historical Provider, MD   Multiple Vitamins-Minerals (THERAPEUTIC MULTIVITAMIN-MINERALS) tablet Take 1 tablet by mouth daily    Yes Historical Provider, MD   Cholecalciferol (VITAMIN D3) 25 MCG (1000 UT) TABS Take 1 tablet by mouth daily  Patient taking differently: Take 10,000 Units by mouth once a week  11/26/19  Yes Sammy Retana MD   Incontinence Supply Disposable (TRE CLASSIC BRIEFS/LARGE) MISC 1 each by Does not apply route 3 times daily 3/4/19  Yes Sammy Retana MD   Incontinence Supplies MISC 1 Package by Does not apply route 2 times daily 3/4/19  Yes Sammy Retana MD   Disposable Gloves (VINYL GLOVES LARGE) MISC 1 Package by Does not apply route 4 times daily 3/4/19  Yes Sammy Retana MD   GAS-X EXTRA STRENGTH 125 MG chewable tablet CHEW TWO TABLETS BY MOUTH THREE TIMES A DAY WITH EACH MEAL AS NEEDED FOR FLATULENCE 12/13/18  Yes Sammy Retana MD   Cranberry 1000 MG CAPS Take by mouth Indications: OTC, takes two caps daily, does not know strength. 3 capsules daily  Patient not taking: Reported on 7/23/2021    Historical Provider, MD       Allergies   Allergen Reactions    Lamictal [Lamotrigine] Swelling and Rash    Macrodantin [Nitrofurantoin Macrocrystal] Shortness Of Breath     Caused an asthma attack    Penicillins Hives and Shortness Of Breath    Shellfish-Derived Products Swelling     Throat and tongue swells, allergy to all seafood    Aspartame And Phenylalanine      Severe headaches    Bactrim Hives    Biaxin [Clarithromycin] Hives    Cephalexin Other (See Comments)     blisters    Ciprofloxacin Other (See Comments)     Causes severe pain and tendon tears per pt    Hydrocodone-Acetaminophen Other (See Comments)     HALLUCINATIONS    Lansoprazole Hives    Nexium [Esomeprazole Magnesium Trihydrate] Hives    Other Other (See Comments)     TEGADERM-BLISTERS    Prilosec [Omeprazole] Hives    Blueberry [Vaccinium Angustifolium] Nausea And Vomiting     Projectile vomiting    Monosodium Glutamate Nausea And Vomiting    Zmax [Azithromycin Dihydrate] Nausea And Vomiting     NOT Z PACK its the Powder you had to mix and drink       Social History:  Reviewed. reports that she has never smoked. She has never used smokeless tobacco. She reports that she does not drink alcohol and does not use drugs. Family History:  Reviewed. Reviewed. No family history of SCD. Relevant and available labs, and cardiovascular diagnostics reviewed. Reviewed. I independently reviewed relevant and available cardiac diagnostic tests ECG, CXR, Echo, Stress test, Device interrogation, Holter, CT scan. Outside medical records via Care everywhere reviewed and summarized in H&P above.    Complex medical condition with multiple medical problems affecting prognosis and outcome of EP interventions    - The patient is counseled to follow a low salt diet to assure blood pressure remains controlled for cardiovascular risk factor modification.   - The patient is counseled to avoid excess caffeine, and energy drinks as this may exacerbated ectopy and arrhythmia. - The patient is counseled to get regular exercise 3-5 times per week to control cardiovascular risk factors. - The patient is counseled to lose weigt to control cardiovascular risk factors. - The patient is counseled to avoid tobacco use. All questions and concerns were addressed to the patient/family. Alternatives to my treatment were discussed. I have discussed the above stated plan and the patient verbalized understanding and agreed with the plan. Scribe attestation: This note was scribed in the presence of Grant Paniagua MD by Urbano Trammell, RN    Physician Attestation: I, Dr. Grant Paniagua, confirm that the scribe's documentation has been prepared under my direction and personally reviewed by me in its entirety. I also confirm that the note above accurately reflects all work, treatment, procedures, and medical decision making performed by me. NOTE: This report was transcribed using voice recognition software. Every effort was made to ensure accuracy, however, inadvertent computerized transcription errors may be present.      Grant Paniagua MD, MPH  [facility]   Office: [phone]  Fax: [phone]

## 2021-09-10 ENCOUNTER — NURSE ONLY (OUTPATIENT)
Dept: CARDIOLOGY CLINIC | Age: 56
End: 2021-09-10
Payer: COMMERCIAL

## 2021-09-10 ENCOUNTER — OFFICE VISIT (OUTPATIENT)
Dept: CARDIOLOGY CLINIC | Age: 56
End: 2021-09-10
Payer: COMMERCIAL

## 2021-09-10 VITALS
DIASTOLIC BLOOD PRESSURE: 72 MMHG | WEIGHT: 183.4 LBS | HEART RATE: 71 BPM | HEIGHT: 62 IN | BODY MASS INDEX: 33.75 KG/M2 | SYSTOLIC BLOOD PRESSURE: 120 MMHG | OXYGEN SATURATION: 100 %

## 2021-09-10 DIAGNOSIS — E66.9 OBESITY (BMI 30-39.9): ICD-10-CM

## 2021-09-10 DIAGNOSIS — Z95.0 PACEMAKER: ICD-10-CM

## 2021-09-10 DIAGNOSIS — I25.10 CORONARY ARTERY DISEASE INVOLVING NATIVE CORONARY ARTERY OF NATIVE HEART WITHOUT ANGINA PECTORIS: ICD-10-CM

## 2021-09-10 DIAGNOSIS — R00.1 SYMPTOMATIC BRADYCARDIA: ICD-10-CM

## 2021-09-10 DIAGNOSIS — I44.30 HEART BLOCK ATRIOVENTRICULAR: ICD-10-CM

## 2021-09-10 DIAGNOSIS — I44.1 MOBITZ TYPE II ATRIOVENTRICULAR BLOCK: Primary | ICD-10-CM

## 2021-09-10 DIAGNOSIS — I44.1 MOBITZ TYPE II ATRIOVENTRICULAR BLOCK: ICD-10-CM

## 2021-09-10 PROCEDURE — 93000 ELECTROCARDIOGRAM COMPLETE: CPT | Performed by: INTERNAL MEDICINE

## 2021-09-10 PROCEDURE — G8427 DOCREV CUR MEDS BY ELIG CLIN: HCPCS | Performed by: INTERNAL MEDICINE

## 2021-09-10 PROCEDURE — 1036F TOBACCO NON-USER: CPT | Performed by: INTERNAL MEDICINE

## 2021-09-10 PROCEDURE — 99214 OFFICE O/P EST MOD 30 MIN: CPT | Performed by: INTERNAL MEDICINE

## 2021-09-10 PROCEDURE — G8417 CALC BMI ABV UP PARAM F/U: HCPCS | Performed by: INTERNAL MEDICINE

## 2021-09-10 PROCEDURE — 3017F COLORECTAL CA SCREEN DOC REV: CPT | Performed by: INTERNAL MEDICINE

## 2021-09-10 RX ORDER — ACETAMINOPHEN 500 MG
500 TABLET ORAL EVERY 6 HOURS PRN
COMMUNITY

## 2021-09-13 NOTE — PROGRESS NOTES
Patient comes in for programming evaluation for her pacemaker. All sensing and pacing parameters are within normal range. vanessa-Rosie  Rep Checked device   Battery life 9.9 years  AP 19.4%.  98.3%. No episodes noted. Changes This Session                                              Session Start  Current Value  Mode                                           DDD             AAI<=>DDD  Maximum AV Interval Limit                                      Off  Please see interrogation for more detail.

## 2021-09-17 PROCEDURE — 93280 PM DEVICE PROGR EVAL DUAL: CPT | Performed by: INTERNAL MEDICINE

## 2021-09-29 ENCOUNTER — CARE COORDINATION (OUTPATIENT)
Dept: CARE COORDINATION | Age: 56
End: 2021-09-29

## 2021-10-01 ENCOUNTER — VIRTUAL VISIT (OUTPATIENT)
Dept: ENDOCRINOLOGY | Age: 56
End: 2021-10-01
Payer: COMMERCIAL

## 2021-10-01 DIAGNOSIS — E11.65 UNCONTROLLED TYPE 2 DIABETES MELLITUS WITH HYPERGLYCEMIA (HCC): Primary | ICD-10-CM

## 2021-10-01 PROCEDURE — 99442 PR PHYS/QHP TELEPHONE EVALUATION 11-20 MIN: CPT | Performed by: INTERNAL MEDICINE

## 2021-10-01 NOTE — PROGRESS NOTES
4/15  7/14 USG:  IMPRESSION: Multiple small hypoechoic nodules, likely benign. These are nonspecific and measures 6 to 7 mm each. No FH of thyroid cancer or disease    8/16 USG stable    9/11  FINDINGS- The thyroid contains multiple bilateral thyroid   nodules. Three of the thyroid nodules measure 7 mm. The other   nodules are less than 7 mm. Nodules are primarily hypoechoic with   some heterogeneity. Both thyroid lobes show otherwise normal   echogenicity, morphology, and normal color Doppler flow. The right   thyroid lobe is 4.8 x 1.6 x 1.9 cm. The left thyroid lobe is 3.6 x   0.8 x 2.1 cm. The thyroid isthmus is normal at 3 mm in thickness.       IMPRESSION- Multiple bilateral small thyroid nodules, minimally   changed since 2007     10/19 USG    Right 1.1cm  Reviewed images, Given slight change in size, hypoechoic and > 1cm, would recommend FNA    11/19 FNA benign    Past Medical History:   Diagnosis Date    Ankle fracture, left     Ankle fracture, right     Arthritis     Asthma     Bipolar depression (HCC)     CAN'T AFFORD MEDS    Bladder problem     Cervical disc herniation     COVID toes     Diabetes mellitus ([address]     diet control    Fatty liver disease, non-alcoholic     Fibromyalgia     Gastroparesis     Dr Dajuan Matos GERD (gastroesophageal reflux disease)     gastroporesis    Glaucoma     Dr Messer April Hyperlipidemia     elevated LFT on meds    IBS (irritable bowel syndrome)     Lumbar herniated disc     Nausea & vomiting     Nephrolithiasis 1/29/2019    Obesity (BMI 30-39.9) 3/11/2019    Partial Achilles tendon tear     Pneumonia     PONV (postoperative nausea and vomiting)     RA (rheumatoid arthritis) ([address]     Rapid or irregular heartbeat     Sleep apnea     does not use cpap    Spinal stenosis     Stress incontinence     Thyroid disease     growths     Past Surgical History:   Procedure Laterality Date    CARDIAC SURGERY      CHOLECYSTECTOMY      COLONOSCOPY      GASTROINTESTINAL ENDOSCOPY N/A 5/7/2019    EGD DILATION BALLOON performed by Melisa Benavidez MD at [address] N/A 3/13/2020    EGD SUBMUCOSAL/BOTOX INJECTION performed by Melisa Benavidez MD at [address] 3/13/2020    EGD DILATION BALLOON performed by Melisa Benavidez MD at [address] N/A 10/6/2020    EGD DILATION BALLOON performed by Melisa Benavidez MD at [address] [facility] N/A 10/6/2020    EGD SUBMUCOSAL/BOTOX [facility] 8/10/2021    EGD SUBMUCOSAL/BOTOX INJECTION performed by Melisa Benavidez MD at [address] [facility] N/A 8/10/2021    EGD BIOPSY performed by Melisa Benavidez MD at [address]     Current Outpatient Medications   Medication Sig Dispense Refill    acetaminophen (TYLENOL) 500 MG tablet Take 500 mg by mouth every 6 hours as needed for Pain      meclizine (ANTIVERT) 25 MG tablet TAKE ONE TABLET BY MOUTH THREE TIMES A DAY AS NEEDED FOR DIZZINESS OR NAUSEA 15 tablet 0    melatonin 3 MG TABS tablet Take 1 tablet by mouth daily 30 tablet 2    insulin glargine (LANTUS SOLOSTAR) 100 UNIT/ML injection pen Inject 30 Units into the skin daily (Patient taking differently: Inject 30 Units into the skin daily Taking 15-30u/day since BS low lately) 4 pen 3    Blood Glucose Monitoring Suppl (TRUE METRIX METER) w/Device KIT As needed 1 kit 0    blood glucose test strips (TRUE METRIX BLOOD GLUCOSE TEST) strip 1 each by In Vitro route daily As needed.  100 each 3    blood glucose test strips (TRUE METRIX BLOOD GLUCOSE TEST) strip USE ONE STRIP TO TEST TWICE A DAY AS NEEDED 50 strip 2    loratadine (CLARITIN) 10 MG tablet TAKE ONE TABLET BY MOUTH DAILY 30 tablet 5    budesonide (RHINOCORT AQUA) 32 MCG/ACT nasal spray 1 spray by Each Nostril route daily 1 Bottle 3    guaiFENesin (MUCINEX MAXIMUM STRENGTH) 1200 MG TB12 Take 1 tablet by mouth daily For a PM dose. 10 tablet 0    Blood Pressure KIT 1 kit by Does not apply route daily 1 kit 0    albuterol sulfate  (90 Base) MCG/ACT inhaler INHALE TWO PUFFS BY MOUTH EVERY 6 HOURS AS NEEDED FOR WHEEZING 1 Inhaler 2    ketotifen (ZADITOR) 0.025 % ophthalmic solution INSTILL TWO DROPS IN [facility] EYE TWO TIMES A DAY AS NEEDED FOR ALLERGY 1 Bottle 2    Insulin Pen Needle (PEN NEEDLES) 32G X 4 MM MISC USE WITH INSULIN PEN ONCE DAILY 50 each 3    TRUEplus Lancets 28G MISC USE ONE LANCET TO TEST THREE TO FOUR TIMES A  each 3    ASPIRIN LOW DOSE 81 MG EC tablet TAKE ONE TABLET BY MOUTH DAILY 90 tablet 3    famotidine (PEPCID) 40 MG tablet Take 1 tablet by mouth 2 times daily (Patient taking differently: Take 40 mg by mouth 2 times daily Indications: taking 1-2 times/day ) 30 tablet 5    latanoprost (XALATAN) 0.005 % ophthalmic solution Place 1 drop into both eyes nightly      Multiple Vitamins-Minerals (THERAPEUTIC MULTIVITAMIN-MINERALS) tablet Take 1 tablet by mouth daily       Cholecalciferol (VITAMIN D3) 25 MCG (1000 UT) TABS Take 1 tablet by mouth daily (Patient taking differently: Take 10,000 Units by mouth once a week ) 90 tablet 1    Incontinence Supply Disposable (TRE CLASSIC BRIEFS/LARGE) MISC 1 each by Does not apply route 3 times daily 5 each 5    Incontinence Supplies MISC 1 Package by Does not apply route 2 times daily 5 each 5    Disposable Gloves (VINYL GLOVES LARGE) MISC 1 Package by Does not apply route 4 times daily 5 each 5    GAS-X EXTRA STRENGTH 125 MG chewable tablet CHEW TWO TABLETS BY MOUTH THREE TIMES A DAY WITH EACH MEAL AS NEEDED FOR FLATULENCE 192 tablet 1     No current facility-administered medications for this visit.      Facility-Administered Medications Ordered in Other Visits   Medication Dose Route Frequency Provider Last Rate Last Admin    0.9 % sodium chloride infusion   IntraVENous Continuous Julio Cesar De La Rosa MD             Review of Systems  Scanned, reviewed    2/19    Foot exam: No ulcer, 8/10 monofilament    Lab Reviewed   No components found for: CHLPL  Lab Results   Component Value Date    TRIG 112 05/07/2021    TRIG 214 (H) 09/21/2020    TRIG 185 (H) 06/22/2020     Lab Results   Component Value Date    HDL 33 (L) 05/07/2021    HDL 36 (L) 09/21/2020    HDL 33 (L) 06/22/2020     Lab Results   Component Value Date    LDLCALC 119 (H) 05/07/2021    LDLCALC 175 (H) 09/21/2020    LDLCALC 202 (H) 06/22/2020     Lab Results   Component Value Date    LABVLDL 22 05/07/2021    LABVLDL 43 09/21/2020    LABVLDL 37 06/22/2020     Lab Results   Component Value Date    LABA1C 7.2 05/06/2021       Assessment:     Sendy Hooker is a 61 y.o. female with :    1.T2DM: Longstanding, fairly controlled, reports low blood glucose with Metformin, so she stopped. Did not bring log, . Avoid DPP IV and GLP agonist given pancreatitis history. Avoid SGLT-2 Inhibitor given h/o yeast infection. Sulfonylurea may cause hypoglycemia. Needs basal insulin, discussed with her ,  started lower dose. She wanted amaryl , advised not a good option given A1c and lows. A1c better  She has relative hypoglycemia, so will improve  Gradually. Advised prandial, not willing to take. Microalbumin is high but ratio is normal.   Check A1c  2. Elevated BP:  3.HLD:Familila hyperlipidemia,  Reports statins cause elevated LFT, per patient hepatologist advised not to take. Replaced Vitamin, so can tolerate lipitor. [employer] will not be covered, last LDL improved  Advised Zetia but she wants to wait. Advised Praluent, she states want to try lipitor taking more frequently  Discussed risk of CAD, CVA given high LDL  4. Obesity  5. Thyroid Nodule: Sub Centimeter nodule, monitor with USG, stable on 8/16, no increase in size, repeat USG shows right 1.1cm nodule, FNA benign  6. Fatty liver  7. Vitamin D deficiency: On 10,000IU weekly per PCP      Plan:       lantus  30 units daily, advise to take every day   Self titrate, increase by two unit every 3 days if BG > 140   Advise to check blood sugar 2 times a day. She does check in the forearm, advise finger sticks, keep log   Call if post meal >200 , as may need prandial   Patient to send blood sugar log for titration. Advise to exercise regularly but can not due to back pain, and OA. Advise to low simple carbohydrate and protein with each  meal diet. Diabetes Care: recommended yearly eye exam, foot exam and urine microalbumin to   creatinine ratio. -Hyperlipidemia, LDL goal is <100 mg/dl   -Daily ASA : 81mg  -Smoking status: Non smoker    Total 11 minutes spent during the encounter for discussing medical care.     F/u in 6 months as will see PCP

## 2021-10-02 ENCOUNTER — TELEPHONE (OUTPATIENT)
Dept: PRIMARY CARE CLINIC | Age: 56
End: 2021-10-02

## 2021-10-02 NOTE — TELEPHONE ENCOUNTER
----- Message from [employer] Le[facility] sent at 9/30/2021 10:31 AM EDT -----  Subject: Message to Provider    QUESTIONS  Information for Provider? patient is requesting a call back, to find out   when she can get rescheduled with Tracy Serum.  ---------------------------------------------------------------------------  --------------  [address]  What is the best way for the office to contact you? OK to leave message on   voicemail  Preferred Call Back Phone Number? [phone]  ---------------------------------------------------------------------------  --------------  SCRIPT ANSWERS  Relationship to Patient?  Self

## 2021-10-04 ENCOUNTER — CARE COORDINATION (OUTPATIENT)
Dept: CARE COORDINATION | Age: 56
End: 2021-10-04

## 2021-10-04 NOTE — CARE COORDINATION
Attempted to reach pt to assess status and prepare for care coordination dc line was buy will attempt again at later date

## 2021-10-05 ENCOUNTER — TELEPHONE (OUTPATIENT)
Dept: PRIMARY CARE CLINIC | Age: 56
End: 2021-10-05

## 2021-10-05 NOTE — TELEPHONE ENCOUNTER
----- Message from [facility] sent at 10/4/2021  5:56 PM EDT -----  Subject: Message to Provider    QUESTIONS  Information for Provider? Storm Ruiz called to follow up on the reschedule of   her appointment with Mine Breen. Please call her to reschedule this   appointment.   ---------------------------------------------------------------------------  --------------  Dorcas BRITO  What is the best way for the office to contact you? OK to leave message on   voicemail  Preferred Call Back Phone Number? [phone]  ---------------------------------------------------------------------------  --------------  SCRIPT ANSWERS  Relationship to Patient?  Self

## 2021-10-06 ENCOUNTER — CARE COORDINATION (OUTPATIENT)
Dept: CARE COORDINATION | Age: 56
End: 2021-10-06

## 2021-10-06 NOTE — CARE COORDINATION
Final attempt to reach pt.  For continued care coordination needs, no response back no further attempts to be made will remove self from care team and resolve episode

## 2021-10-18 ENCOUNTER — TELEPHONE (OUTPATIENT)
Dept: PRIMARY CARE CLINIC | Age: 56
End: 2021-10-18

## 2021-10-18 NOTE — TELEPHONE ENCOUNTER
----- Message from [employer] sent at 10/18/2021 12:34 PM EDT -----  Subject: Message to Provider    QUESTIONS  Information for Provider? Pt is having issues getting ahold of Dr. Pola Finch   and wanted to see if you could help with this.  ---------------------------------------------------------------------------  --------------  CALL BACK INFO  What is the best way for the office to contact you? OK to leave message on   voicemail  Preferred Call Back Phone Number? [phone]  ---------------------------------------------------------------------------  --------------  SCRIPT ANSWERS  Relationship to Patient?  Self

## 2021-10-20 ENCOUNTER — TELEPHONE (OUTPATIENT)
Dept: PSYCHIATRY | Age: 56
End: 2021-10-20

## 2021-10-20 NOTE — TELEPHONE ENCOUNTER
Patient called and was offered an appointment for 10/21/2021. She states she has another appointment and asked what was the next appointment. I told her the next appointment I saw was 12/02 and she said no I see Yana Blackwood monthly and my appointment for September was canceled. She would like a phone call to see if you would see her maybe next week.   Please advise        Patient number [phone]

## 2021-10-21 ENCOUNTER — VIRTUAL VISIT (OUTPATIENT)
Dept: PSYCHIATRY | Age: 56
End: 2021-10-21
Payer: COMMERCIAL

## 2021-10-21 DIAGNOSIS — F31.9 BIPOLAR DEPRESSION (HCC): ICD-10-CM

## 2021-10-21 DIAGNOSIS — F41.9 ANXIETY: Primary | ICD-10-CM

## 2021-10-21 PROCEDURE — 99214 OFFICE O/P EST MOD 30 MIN: CPT | Performed by: NURSE PRACTITIONER

## 2021-10-21 NOTE — TELEPHONE ENCOUNTER
I have nothing available next week. Please add to waiting list should someone cancel in the near future. Thanks!

## 2021-10-21 NOTE — PROGRESS NOTES
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     Potassium 05/26/2021 4.8     Chloride 05/26/2021 97*    CO2 05/26/2021 25     Anion Gap 05/26/2021 14     Glucose 05/26/2021 186*    BUN 05/26/2021 28*    CREATININE 05/26/2021 1.0     GFR Non- 05/26/2021 57*    GFR  05/26/2021 >60     Calcium 05/26/2021 10.6     Total Protein 05/26/2021 7.6     Albumin 05/26/2021 4.2     Albumin/Globulin Ratio 05/26/2021 1.2     Total Bilirubin 05/26/2021 0.4     Alkaline Phosphatase 05/26/2021 34*    ALT 05/26/2021 161*    AST 05/26/2021 62*    Globulin 05/26/2021 3.4     Troponin 05/26/2021 <0.01     Color, UA 05/26/2021 YELLOW     Clarity, UA 05/26/2021 TURBID*    Glucose, Ur 05/26/2021 Negative  Bilirubin Urine 05/26/2021 Negative     Ketones, Urine 05/26/2021 TRACE*    Specific [place], UA 05/26/2021 1.021     Blood, Urine 05/26/2021 Negative     pH, UA 05/26/2021 5.5     Protein, UA 05/26/2021 30*    Urobilinogen, Urine 05/26/2021 0.2     Nitrite, Urine 05/26/2021 Negative     Leukocyte Esterase, Urine 05/26/2021 LARGE*    Microscopic Examination 05/26/2021 YES     Urine Type 05/26/2021 Voided     Urine Reflex to Culture 05/26/2021 Yes     Ventricular Rate 05/26/2021 69     Atrial Rate 05/26/2021 69     P-R Interval 05/26/2021 176     QRS Duration 05/26/2021 156     Q-T Interval 05/26/2021 438     QTc Calculation (Bazett) 05/26/2021 469     P Axis 05/26/2021 60     R Axis 05/26/2021 12     T Axis 05/26/2021 181     Diagnosis 05/26/2021 Electronic ventricular pacemakerConfirmed by Denise Willard MD, [facility] ([id number]) on 5/28/2021 9:43:02 AM     RBC, UA 05/26/2021 3-4     Bacteria, UA 05/26/2021 1+*    Hyaline Casts, UA 05/26/2021 5     WBC, UA 05/26/2021 239*    Epithelial Cells, UA 05/26/2021 8*    Urine Culture, Routine 05/26/2021 <10,000 CFU/ml mixed skin/urogenital ramo. No further workup    No results displayed because visit has over 200 results. Last Drug screen:  No results found for: Tilman Blonder, LABBENZ, COCAIMETSCRU, THC, MDMA, LABMETH, OPIATESCREENURINE, OXTCOSU, PHENCYCLIDINESCREENURINE, PROPOXYPHENE, METAMPU      Imaging:   Ct head wo contrast 5/15/2010:     IMPRESSION- No acute intracranial abnormality. Consideration   should be given to MRI followup.              ASSESSMENT AND PLAN     Diagnosis Orders   1. Anxiety  diazePAM (VALIUM) 1 MG/ML solution   2. Bipolar depression ([address]          1. Safety: NO Imminent risk of danger to/self/others based on the factors considered below. Appropriate for outpatient level of care.   Safety plan includes: 911, PES, hotlines, and interventions discussed today.      Risk factors: Age <25 or >55, depressed mood, chronic pain or medical illness, social isolation, no outpatient services in place, medication noncompliance, and no collateral information to support safety.     Protective factors:  female gender, denies suicidal ideation, does not have lethal plan, does not have access to guns or weapons, patient is lynn for safety, no prior suicide attempts, no family h/o suicide, no substance abuse, no active psychosis or cognitive dysfunction, and patient is future oriented.          2. Psychiatric  - pt endorses h/o mood lability + FH BAD. Prior negative response to some antidepressants. 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 + MDQ at initial eval.  Multiple stressors with increased depression/mood lability. felt was Reasonable to trial mood stabilizer. Has DM, needs most weight neutral option.      - vraylar not covered by insurance,tried samples, made \"angry\",  risperdal made \"angry\" and increased anxiety, multiple panic attacks/day    - HAD previously recommended pt trial seroquel d/t issues with insomnia in addition to mood/anxiety symptoms. REFUSED TO TRY SEROQUEL. Pt has lots medication anxiety, particularly with sedating medications. Fearful won't wake up if meds too sedating.      - opted to try lamictal.  Initially Had been tolerating lamictal well during previous dose titration but eventually  developed rash and peripheral edema following most recent dosage increase so was advised to stop. At fu, reported rash and edema resolved shortly after stopping lamictal.     - previously discussed cymbalta for anxiety, mood and chronic pain. DECLINED. Says has had such negative responses to previous meds, fearful of introducing new meds at this point    - consider abilify vs latuda for mood, may benefit from activating properties of abilify, has fear of oversedation. DECLINED AT PRESENT     - try low dose valium liquid 0.25mg  prn for anxiety, sleep.   Historically prefers low dose d/t fear of oversedation. Reports low dose was working very well to help relax her at night so can get some rest.  Hasn't taken in several months d/t previous bradycardia (subsequent pacemaker placement 5/2021)  Reminded this is not long term solution and need to have additional strategies to help manage anxiety and mood symptoms. Lots med anxiety. - never tried melatonin 3mg evening for sleep. Brother takes this.      -Labs: reviewed in Epic   9/21/20:  Vit d 26.4; lipids (total chol 254, trig 214, hdl 36, ldl 175)              11/26/19:  Lipids:  (Total chol 254, ldl 178); hgba1c 8.2 tsh wnl              5/7/19:  Vit d 21.4     - ENCOURAGED TO KEEP CONSISTENT SCHEDULE/ROUTINE TO INCLUDE WAKE/SLEEP TIME, MEAL TIMES, SHOWER TIMES, EXERCISE       -Recommend outpt therapy. Has tried to connect in past but limited with insurance options AND transportation issues. Unsure if wants to proceed or would be helpful. Likes talking to this provider. \"took a lot for me to come to you\"         -OARRS reviewed, c/w history  -R/b/se/a d/w pt who consents.     3. Medical  -established with Joselin Boyce MD       4. Substance   -No active issues.     5.  RTC - 4 weeks per pt preference;  prefers am appts; first am available 12/1 @ [address], [id number]  Psychiatric Nurse Practitioner

## 2021-10-28 DIAGNOSIS — E11.65 UNCONTROLLED TYPE 2 DIABETES MELLITUS WITH HYPERGLYCEMIA (HCC): ICD-10-CM

## 2021-10-28 LAB
CREATININE URINE: 472.1 MG/DL (ref 28–259)
MICROALBUMIN UR-MCNC: 12.8 MG/DL
MICROALBUMIN/CREAT UR-RTO: 27.1 MG/G (ref 0–30)

## 2021-10-29 LAB
ESTIMATED AVERAGE GLUCOSE: 168.6 MG/DL
HBA1C MFR BLD: 7.5 %

## 2021-11-23 ENCOUNTER — NURSE ONLY (OUTPATIENT)
Dept: CARDIOLOGY CLINIC | Age: 56
End: 2021-11-23
Payer: COMMERCIAL

## 2021-11-23 DIAGNOSIS — I44.30 HEART BLOCK ATRIOVENTRICULAR: ICD-10-CM

## 2021-11-23 DIAGNOSIS — Z95.0 PACEMAKER: ICD-10-CM

## 2021-11-23 PROCEDURE — 93294 REM INTERROG EVL PM/LDLS PM: CPT | Performed by: INTERNAL MEDICINE

## 2021-11-23 PROCEDURE — 93296 REM INTERROG EVL PM/IDS: CPT | Performed by: INTERNAL MEDICINE

## 2021-12-10 NOTE — PROGRESS NOTES
[address]  H+P // Clemente Igo // OP VISIT // FU VISIT    REFERRING Rebeca Gutierrez MD  ENC TYPE FU    CC HX HPI   GEN  Doing well. No new concerns. Cristiano PPM Denies cp, sob, dizzy, syncope, palps. CHOL  Last chol reviewed, on statin w/out sa   MED  Compliant with CV meds listed below w/out reported sa. HISTORY/ALLLERGY/ROS   MedHx  has a past medical history of Ankle fracture, left, Ankle fracture, right, Arthritis, Asthma, Bipolar depression ([address] Bladder problem, Cervical disc herniation, COVID toes, Diabetes mellitus ([address] Fatty liver disease, non-alcoholic, Fibromyalgia, Gastroparesis, GERD (gastroesophageal reflux disease), Glaucoma, Hyperlipidemia, IBS (irritable bowel syndrome), Lumbar herniated disc, Nausea & vomiting, Nephrolithiasis, Obesity (BMI 30-39.9), Partial Achilles tendon tear, Pneumonia, PONV (postoperative nausea and vomiting), RA (rheumatoid arthritis) ([address] Rapid or irregular heartbeat, Sleep apnea, Spinal stenosis, Stress incontinence, and Thyroid disease. SurgHx  has a past surgical history that includes Cholecystectomy; Dilation and curettage of uterus; Tonsillectomy; laparoscopy; Upper gastrointestinal endoscopy; Endometrial ablation; ERCP (5/25/2010); Esophagoscopy (10/5/10); Upper gastrointestinal endoscopy (04/12/2011); eye surgery; Upper gastrointestinal endoscopy (08/30/2011); Upper gastrointestinal endoscopy (3-9-12); Upper gastrointestinal endoscopy; Upper gastrointestinal endoscopy (11/20/12); Upper gastrointestinal endoscopy (6/4/13); ERCP (1-31-14); Upper gastrointestinal endoscopy (5/20/2014); Upper gastrointestinal endoscopy (12/12/14); Upper gastrointestinal endoscopy (09/09/2015); Endoscopy, colon, diagnostic; Upper gastrointestinal endoscopy (5/10/2016); Upper gastrointestinal endoscopy (04/11/2017); Upper gastrointestinal endoscopy (10/24/2017); Upper gastrointestinal endoscopy (03/06/2018); Colonoscopy; Colonoscopy (03/06/2018);  Upper gastrointestinal endoscopy (N/A, 5/7/2019); Upper gastrointestinal endoscopy (N/A, 5/7/2019); Upper gastrointestinal endoscopy (N/A, 3/13/2020); Upper gastrointestinal endoscopy (N/A, 3/13/2020); Upper gastrointestinal endoscopy (N/A, 10/6/2020); Upper gastrointestinal endoscopy (N/A, 10/6/2020); Cardiac surgery; Pacemaker insertion (05/10/2021); Upper gastrointestinal endoscopy (N/A, 8/10/2021); Upper gastrointestinal endoscopy (N/A, 8/10/2021); and Esophagus dilation (8/10/2021). SocHx  reports that she has never smoked. She has never used smokeless tobacco. She reports that she does not drink alcohol and does not use drugs. FamHx family history includes Diabetes in her brother and mother; Heart Disease in her brother, father, and mother; High Blood Pressure in her brother; High Cholesterol in her brother and brother; Stroke in her mother.    Allerg Lamictal [lamotrigine], Macrodantin [nitrofurantoin macrocrystal], Penicillins, Shellfish-derived products, Aspartame and phenylalanine, Bactrim, Biaxin [clarithromycin], Cephalexin, Ciprofloxacin, Hydrocodone-acetaminophen, Lansoprazole, Nexium [esomeprazole magnesium trihydrate], Other, Prilosec [omeprazole], Blueberry [vaccinium angustifolium], Monosodium glutamate, and Zmax [azithromycin dihydrate]   ROS [x]Full ROS obtained and negative except as mentioned in HPI      MEDICATIONS      Current Outpatient Medications   Medication Sig Dispense Refill    ASPIRIN LOW DOSE 81 MG EC tablet TAKE ONE TABLET BY MOUTH DAILY 90 tablet 3    TRUEplus Lancets 33G MISC USE TO CHECK BLOOD SUGAR 3-4 TIMES DAILY 100 each 3    [employer] PEN NEEDLES 32G X 4 MM MISC USE WITH INSULIN PEN ONCE DAILY 100 each 3    acetaminophen (TYLENOL) 500 MG tablet Take 500 mg by mouth every 6 hours as needed for Pain      meclizine (ANTIVERT) 25 MG tablet TAKE ONE TABLET BY MOUTH THREE TIMES A DAY AS NEEDED FOR DIZZINESS OR NAUSEA (Patient not taking: Reported on 10/21/2021) 15 tablet 0    melatonin 3 MG TABS tablet Take 1 tablet by mouth daily (Patient not taking: Reported on 10/21/2021) 30 tablet 2    insulin glargine (LANTUS SOLOSTAR) 100 UNIT/ML injection pen Inject 30 Units into the skin daily (Patient taking differently: Inject 30 Units into the skin daily Taking 15-30u/day since BS low lately) 4 pen 3    Blood Glucose Monitoring Suppl (TRUE METRIX METER) w/Device KIT As needed 1 kit 0    blood glucose test strips (TRUE METRIX BLOOD GLUCOSE TEST) strip 1 each by In Vitro route daily As needed. 100 each 3    blood glucose test strips (TRUE METRIX BLOOD GLUCOSE TEST) strip USE ONE STRIP TO TEST TWICE A DAY AS NEEDED 50 strip 2    loratadine (CLARITIN) 10 MG tablet TAKE ONE TABLET BY MOUTH DAILY (Patient not taking: Reported on 10/21/2021) 30 tablet 5    budesonide (RHINOCORT AQUA) 32 MCG/ACT nasal spray 1 spray by Each Nostril route daily (Patient not taking: Reported on 10/21/2021) 1 Bottle 3    guaiFENesin (MUCINEX MAXIMUM STRENGTH) 1200 MG TB12 Take 1 tablet by mouth daily For a PM dose.  10 tablet 0    Blood Pressure KIT 1 kit by Does not apply route daily 1 kit 0    albuterol sulfate  (90 Base) MCG/ACT inhaler INHALE TWO PUFFS BY MOUTH EVERY 6 HOURS AS NEEDED FOR WHEEZING 1 Inhaler 2    ketotifen (ZADITOR) 0.025 % ophthalmic solution INSTILL TWO DROPS IN [facility] EYE TWO TIMES A DAY AS NEEDED FOR ALLERGY 1 Bottle 2    famotidine (PEPCID) 40 MG tablet Take 1 tablet by mouth 2 times daily (Patient taking differently: Take 40 mg by mouth 2 times daily Indications: taking 1-2 times/day ) 30 tablet 5    latanoprost (XALATAN) 0.005 % ophthalmic solution Place 1 drop into both eyes nightly      Multiple Vitamins-Minerals (THERAPEUTIC MULTIVITAMIN-MINERALS) tablet Take 1 tablet by mouth daily       Cholecalciferol (VITAMIN D3) 25 MCG (1000 UT) TABS Take 1 tablet by mouth daily (Patient taking differently: Take 10,000 Units by mouth once a week ) 90 tablet 1    Incontinence Supply Disposable (TRE CLASSIC BRIEFS/LARGE) MISC 1 each by Does not apply route 3 times daily 5 each 5    Incontinence Supplies MISC 1 Package by Does not apply route 2 times daily 5 each 5    Disposable Gloves (VINYL GLOVES LARGE) MISC 1 Package by Does not apply route 4 times daily 5 each 5    GAS-X EXTRA STRENGTH 125 MG chewable tablet CHEW TWO TABLETS BY MOUTH THREE TIMES A DAY WITH EACH MEAL AS NEEDED FOR FLATULENCE 192 tablet 1     No current facility-administered medications for this visit.      Facility-Administered Medications Ordered in Other Visits   Medication Dose Route Frequency Provider Last Rate Last Admin    0.9 % sodium chloride infusion   IntraVENous Continuous Frederick Martinez MD         [x]Reviewed with patient and will remain unchanged except as mentioned in A/P    PHYSICAL EXAM   Vitals:    12/15/21 1024   BP: 124/72   Pulse: 74   SpO2: 100%        Gen Alert, coop, no distress Heart  Rrr, no mrg   Head NC, AT, no abnorm Abd  Soft, NT, +BS, no mass, no OM   Eyes PER, conj/corn clear Ext  Ext nl, AT, no C/C/E   Nose Nares nl, no drain, NT Pulse 2+ and symmetric   Throat Lips, mucosa, tongue nl Skin Col/text/turg nl, no vis rash/les   Neck S/S, TM, NT, no bruit/JVD Psych Nl mood and affect   Lung CTA-B, unlabored, no DTP Lymph   No cervical or axillary LA   Ch wall NT, no deform Neuro  Nl gross M/S exam     ASSESSMENT AND PLAN     *Bradycardia  Status PM implant 5/21  LHC 3/19 55%, 20% LAD  TTE 5.21 60%   Plan Per EP  *CHOL  Status  Uncontrolled with last LDL of 119(goal <70) and HDL of 33(5/21)  Plan Counseled on diet/exercise/weight, continue statin, lipid/liver surveillance per PCP  *COMPLIANCE  Status Compliant  Plan Discussed importance of compliance with meds/diet/salt/exercise; avoid tob/alc/drugs; patient verbalized understanding  *FOLLOWUP  IC PRN, EP for pacer    Scribe Attestation  I, Simran Royal, am scribing for and in the presence of Sarath Valencia MD.   Signed, Simran Royal 12/10/21 1:34 PM   Provider Greta Handley is working as a scribe for and in the presence of me Lion Baker MD). Working as a scribe, Yesi Amaro may have prepopulated components of this note with my historical  intellectual property under my direct supervision. Any additions to this intellectual property were performed in my presence and at my direction. Furthermore, the content and accuracy of this note have been reviewed by me Lion Baker MD).  12/15/2021 10:35 AM    CODING   Category Diagnosis   Stable chronic illness  (41193/21337 - 2 or more) Bao Dong, a[place]us   Chronic illness w/: Exac, progr or SA of Tx  ([id number] - 1 or more)    Time 30-39 minutes spent preparing to see patient including reviewing patient history/prior tests/prior consults, performing a medical exam, counseling and educating patient/family/caregiver, ordering medications/tests/procedures, referring and communicating with PCPs and other pertinent consultants, documenting information in the EMR, independently interpreting results and communicating to family and coordination of patient care.

## 2021-12-15 ENCOUNTER — OFFICE VISIT (OUTPATIENT)
Dept: CARDIOLOGY CLINIC | Age: 56
End: 2021-12-15
Payer: COMMERCIAL

## 2021-12-15 VITALS
HEIGHT: 62 IN | OXYGEN SATURATION: 100 % | SYSTOLIC BLOOD PRESSURE: 124 MMHG | HEART RATE: 74 BPM | DIASTOLIC BLOOD PRESSURE: 72 MMHG | BODY MASS INDEX: 34.41 KG/M2 | WEIGHT: 187 LBS

## 2021-12-15 DIAGNOSIS — R07.9 CHEST PAIN, UNSPECIFIED TYPE: Primary | ICD-10-CM

## 2021-12-15 DIAGNOSIS — R00.1 SYMPTOMATIC BRADYCARDIA: ICD-10-CM

## 2021-12-15 DIAGNOSIS — Z95.0 PACEMAKER: ICD-10-CM

## 2021-12-15 DIAGNOSIS — E78.00 HYPERCHOLESTEREMIA: ICD-10-CM

## 2021-12-15 PROCEDURE — 1036F TOBACCO NON-USER: CPT | Performed by: INTERNAL MEDICINE

## 2021-12-15 PROCEDURE — G8417 CALC BMI ABV UP PARAM F/U: HCPCS | Performed by: INTERNAL MEDICINE

## 2021-12-15 PROCEDURE — G8484 FLU IMMUNIZE NO ADMIN: HCPCS | Performed by: INTERNAL MEDICINE

## 2021-12-15 PROCEDURE — 3017F COLORECTAL CA SCREEN DOC REV: CPT | Performed by: INTERNAL MEDICINE

## 2021-12-15 PROCEDURE — 99214 OFFICE O/P EST MOD 30 MIN: CPT | Performed by: INTERNAL MEDICINE

## 2021-12-15 PROCEDURE — G8427 DOCREV CUR MEDS BY ELIG CLIN: HCPCS | Performed by: INTERNAL MEDICINE

## 2021-12-29 ENCOUNTER — VIRTUAL VISIT (OUTPATIENT)
Dept: PSYCHIATRY | Age: 56
End: 2021-12-29
Payer: COMMERCIAL

## 2021-12-29 DIAGNOSIS — F31.9 BIPOLAR DEPRESSION (HCC): Primary | ICD-10-CM

## 2021-12-29 DIAGNOSIS — F41.9 ANXIETY: ICD-10-CM

## 2021-12-29 PROCEDURE — 99214 OFFICE O/P EST MOD 30 MIN: CPT | Performed by: NURSE PRACTITIONER

## 2021-12-29 NOTE — PROGRESS NOTES
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     3. Medical  -established with Chepe Hernandez MD       4. Substance   -No active issues.     5.  RTC - 4 weeks per pt preference;  prefers am appts; first am available 1/28 @ [address] [facility]  Psychiatric Nurse Practitioner

## 2022-01-28 ENCOUNTER — VIRTUAL VISIT (OUTPATIENT)
Dept: PSYCHIATRY | Age: 57
End: 2022-01-28
Payer: COMMERCIAL

## 2022-01-28 DIAGNOSIS — F41.9 ANXIETY: Primary | ICD-10-CM

## 2022-01-28 DIAGNOSIS — F31.9 BIPOLAR DEPRESSION (HCC): ICD-10-CM

## 2022-01-28 PROCEDURE — 99443 PR PHYS/QHP TELEPHONE EVALUATION 21-30 MIN: CPT | Performed by: NURSE PRACTITIONER

## 2022-01-28 NOTE — PROGRESS NOTES
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 neutral option.      - vraylar not covered by insurance,tried samples, made \"angry\",  risperdal made \"angry\" and increased anxiety, multiple panic attacks/day    - HAD previously recommended pt trial seroquel d/t issues with insomnia in addition to mood/anxiety symptoms. REFUSED TO TRY SEROQUEL. Pt has lots medication anxiety, particularly with sedating medications. Fearful won't wake up if meds too sedating.      - opted to try lamictal.  Initially Had been tolerating lamictal well  but eventually  developed rash and peripheral edema  so was advised to stop. At fu, reported rash and edema resolved shortly after stopping lamictal.     - previously discussed cymbalta for anxiety, mood and chronic pain. DECLINED.   Says has had such negative responses to previous meds, fearful of introducing new meds at this point    - consider abilify vs latuda for mood, may benefit from activating properties of abilify, has fear of oversedation. DECLINED AT PRESENT     - never tried low dose valium liquid 0.25mg  prn for anxiety, sleep. Still worries about se's or over sedation. Does have it on hand if needed. Requests updated Rx in case does need to use this in the future and to have in the interim when looking for new provider    Historically prefers low dose d/t fear of oversedation. Reports low dose was working very well to help relax her at night so can get some rest.  Hasn't taken in several months d/t previous bradycardia (subsequent pacemaker placement 5/2021)  Reminded this is not long term solution and need to have additional strategies to help manage anxiety and mood symptoms. Lots med anxiety. - never tried melatonin 3mg evening for sleep. Brother takes this.      -Labs: reviewed in Epic   9/21/20:  Vit d 26.4; lipids (total chol 254, trig 214, hdl 36, ldl 175)              11/26/19:  Lipids:  (Total chol 254, ldl 178); hgba1c 8.2 tsh wnl              5/7/19:  Vit d 21.4     - ENCOURAGED TO KEEP CONSISTENT SCHEDULE/ROUTINE TO INCLUDE WAKE/SLEEP TIME, MEAL TIMES, SHOWER TIMES, EXERCISE       -Recommend outpt therapy. Has tried to connect in past but limited with insurance options AND transportation issues.         -OARRS reviewed, c/w history  -R/b/se/a d/w pt who consents.     3. Medical  -established with Hansel Castillo MD       4. Substance   -No active issues.     5. RTC - Discussed provider transition.   encouraged to contact insurance for covered provider list and call from there to schedule establishment visit        Duglas Shi, [facility]  Psychiatric Nurse Practitioner

## 2022-02-22 ENCOUNTER — NURSE ONLY (OUTPATIENT)
Dept: CARDIOLOGY CLINIC | Age: 57
End: 2022-02-22
Payer: COMMERCIAL

## 2022-02-22 DIAGNOSIS — Z95.0 PACEMAKER: ICD-10-CM

## 2022-02-22 DIAGNOSIS — I44.30 HEART BLOCK ATRIOVENTRICULAR: ICD-10-CM

## 2022-02-22 PROCEDURE — 93294 REM INTERROG EVL PM/LDLS PM: CPT | Performed by: INTERNAL MEDICINE

## 2022-02-22 PROCEDURE — 93296 REM INTERROG EVL PM/IDS: CPT | Performed by: INTERNAL MEDICINE

## 2022-02-22 NOTE — PROGRESS NOTES
We received remote transmission from patient's monitor at home. Transmission shows normal sensing and pacing function. EP physician will review. See interrogation under cardiology tab in the [address] [facility] [address] field for more details.

## 2022-02-23 ENCOUNTER — TELEPHONE (OUTPATIENT)
Dept: CARDIOLOGY CLINIC | Age: 57
End: 2022-02-23

## 2022-02-23 ENCOUNTER — TELEPHONE (OUTPATIENT)
Dept: ENDOCRINOLOGY | Age: 57
End: 2022-02-23

## 2022-02-23 DIAGNOSIS — E04.1 THYROID NODULE: Primary | ICD-10-CM

## 2022-02-24 NOTE — TELEPHONE ENCOUNTER
Please advise Patient that we received her remote trnamission. Her device shows Normal Function and had no epsidoes.  Thanks

## 2022-02-28 ENCOUNTER — TELEPHONE (OUTPATIENT)
Dept: ENDOCRINOLOGY | Age: 57
End: 2022-02-28

## 2022-03-01 ENCOUNTER — HOSPITAL ENCOUNTER (OUTPATIENT)
Dept: ULTRASOUND IMAGING | Age: 57
Discharge: HOME | End: 2022-03-01
Payer: COMMERCIAL

## 2022-03-01 ENCOUNTER — HOSPITAL ENCOUNTER (OUTPATIENT)
Age: 57
Discharge: HOME | End: 2022-03-01
Payer: COMMERCIAL

## 2022-03-01 DIAGNOSIS — E11.65 UNCONTROLLED TYPE 2 DIABETES MELLITUS WITH HYPERGLYCEMIA (HCC): Primary | ICD-10-CM

## 2022-03-01 DIAGNOSIS — E04.1 THYROID NODULE: ICD-10-CM

## 2022-03-01 DIAGNOSIS — E11.65 UNCONTROLLED TYPE 2 DIABETES MELLITUS WITH HYPERGLYCEMIA (HCC): ICD-10-CM

## 2022-03-01 PROCEDURE — 83036 HEMOGLOBIN GLYCOSYLATED A1C: CPT

## 2022-03-01 PROCEDURE — 36415 COLL VENOUS BLD VENIPUNCTURE: CPT

## 2022-03-01 PROCEDURE — 76536 US EXAM OF HEAD AND NECK: CPT

## 2022-03-02 LAB
ESTIMATED AVERAGE GLUCOSE: 185.8 MG/DL
HBA1C MFR BLD: 8.1 %

## 2022-03-04 ENCOUNTER — TELEMEDICINE (OUTPATIENT)
Dept: ENDOCRINOLOGY | Age: 57
End: 2022-03-04
Payer: COMMERCIAL

## 2022-03-04 DIAGNOSIS — E04.1 THYROID NODULE: ICD-10-CM

## 2022-03-04 DIAGNOSIS — E11.65 UNCONTROLLED TYPE 2 DIABETES MELLITUS WITH HYPERGLYCEMIA (HCC): Primary | ICD-10-CM

## 2022-03-04 PROCEDURE — 3052F HG A1C>EQUAL 8.0%<EQUAL 9.0%: CPT | Performed by: INTERNAL MEDICINE

## 2022-03-04 PROCEDURE — 2022F DILAT RTA XM EVC RTNOPTHY: CPT | Performed by: INTERNAL MEDICINE

## 2022-03-04 PROCEDURE — G8427 DOCREV CUR MEDS BY ELIG CLIN: HCPCS | Performed by: INTERNAL MEDICINE

## 2022-03-04 PROCEDURE — 99214 OFFICE O/P EST MOD 30 MIN: CPT | Performed by: INTERNAL MEDICINE

## 2022-03-04 PROCEDURE — 3017F COLORECTAL CA SCREEN DOC REV: CPT | Performed by: INTERNAL MEDICINE

## 2022-03-04 NOTE — PROGRESS NOTES
Seen as f/u patient for diabetes,      Patient was evaluated through a synchronous (real-time) audio-video  encounter. The patient (or guardian if applicable) is aware that this is a billable service, which includes applicable co-pays. This Virtual Visit was  conducted with patient's (and/or legal guardian's) consent. The visit was conducted pursuant to the emergency declaration under the [address] [place] [address] and the [employer] General Act. Patient identification was verified,  and a caregiver was present when appropriate. The patient was located in a state where the provider was licensed to provide care.         Interim:    Stop insulin due to lows 4 days ago  Lost more weight    H/o thyroid nodule  Difficulty swallowing  No lows    Diagnosed with Type 2 diabetes mellitus   Known diabetic complications: none     Current diabetic medications   Metformin ER 500mg - not taking reports lows with it- off of it   lantus 15 units once a day     Intolerant to plain metformin    Last A1c 8.1% <---- 7.2%<------ 9.6 %<----8.2%<-----9<-----7.8%<------7.8%<------ 9.2%<-----8.8%<----- 10.3 on 7/17<------10.8 on 12/16<----- 9.1 on 8/16<-----11.4 on 6/16<-----8.8 on 4.15<---8.9 <---10.1    Prior visit with dietician: Yes   Current diet: on average, 1 meals per day + Snacks   Reports h/o gastroparesis  Current exercise: walking   Current monitoring regimen: home blood tests -1  times daily does in forearms    Report h/o pancreatitis, stent in pancreatic duct  Also h/o yeast infection    Has brought blood glucose log/meter: no  Home blood sugar records:70-100s  Any episodes of hypoglycemia? none    No Hx of CAD , PVD, CVA    Cath showed 20 % blockage    Hyperlipidemia:  on 3/16   12/16 Vit D 17.4    Ck nl LFT nl  Start lipitor 10mg  Vit D3 10,000 IU weekly-did not tolerate D2  5/17  Vit D 20   Taking lipitor every week  8/17   1/19   Vit D 25.9   on 6/20     Last eye exam 8/19  Last foot exam: 2/19  Last microalbumin to creatinine ratio: 10/21  ACE-I or ARB: None    TSH 1.14 on 4/15  7/14 USG:  IMPRESSION: Multiple small hypoechoic nodules, likely benign. These are nonspecific and measures 6 to 7 mm each. No FH of thyroid cancer or disease    8/16 USG stable    9/11  FINDINGS- The thyroid contains multiple bilateral thyroid   nodules. Three of the thyroid nodules measure 7 mm. The other   nodules are less than 7 mm. Nodules are primarily hypoechoic with   some heterogeneity. Both thyroid lobes show otherwise normal   echogenicity, morphology, and normal color Doppler flow. The right   thyroid lobe is 4.8 x 1.6 x 1.9 cm. The left thyroid lobe is 3.6 x   0.8 x 2.1 cm. The thyroid isthmus is normal at 3 mm in thickness.       IMPRESSION- Multiple bilateral small thyroid nodules, minimally   changed since 2007     10/19 USG    Right 1.1cm  Reviewed images, Given slight change in size, hypoechoic and > 1cm, would recommend FNA    11/19 FNA benign    3/22 USG  Right 1cm---> 0.8cm previous FNA  Right mid 9mm---> 1.1cm  Left 7 mm    Past Medical History:   Diagnosis Date    Ankle fracture, left     Ankle fracture, right     Arthritis     Asthma     Bipolar depression ([address]     CAN'T AFFORD MEDS    Bladder problem     Cervical disc herniation     COVID toes     Diabetes mellitus ([address]     diet control    Fatty liver disease, non-alcoholic     Fibromyalgia     Gastroparesis     Dr Joana Barney GERD (gastroesophageal reflux disease)     gastroporesis    Glaucoma     Dr Harlene Seip Hyperlipidemia     elevated LFT on meds    IBS (irritable bowel syndrome)     Lumbar herniated disc     Nausea & vomiting     Nephrolithiasis 1/29/2019    Obesity (BMI 30-39.9) 3/11/2019    Partial Achilles tendon tear     Pneumonia     PONV (postoperative nausea and vomiting)     RA (rheumatoid arthritis) ([address]     Rapid or irregular heartbeat     Sleep apnea     does not use cpap    Spinal stenosis     Stress incontinence     Thyroid disease     growths     Past Surgical History:   Procedure Laterality Date    CARDIAC SURGERY      CHOLECYSTECTOMY      COLONOSCOPY      COLONOSCOPY  03/06/2018    with polypectomies    DILATION AND CURETTAGE OF UTERUS      ENDOMETRIAL ABLATION      ENDOSCOPY, COLON, DIAGNOSTIC      ERCP  5/25/2010    with stent    ERCP  1-31-14    ERCP WITH SPHINTER OF ODDI MANOMETERY    ESOPHAGEAL DILATATION  8/10/2021    ESOPHAGEAL DILATION ARTHUR performed by Johana Arias MD at [place] [address]  10/5/10    botox injection    EYE SURGERY      LASER FOR GLAUCOMA    LAPAROSCOPY      PACEMAKER INSERTION  05/10/2021    TONSILLECTOMY      UPPER GASTROINTESTINAL ENDOSCOPY      UPPER GASTROINTESTINAL ENDOSCOPY  04/12/2011    DILATATION, 58 FR ARTHUR, 100 UNITS BOTOX    UPPER GASTROINTESTINAL ENDOSCOPY  08/30/2011    58 FR DILATATION, 100 UNITS  BOTOX INJECTION    UPPER GASTROINTESTINAL ENDOSCOPY  3-9-12    with dilatation and submucosal injection: Botox    UPPER GASTROINTESTINAL ENDOSCOPY      UPPER GASTROINTESTINAL ENDOSCOPY  11/20/12     Esophagogastroduodenoscopy with Botulinum toxin injection of the pylorus and Arthur esophageal dilation    UPPER GASTROINTESTINAL ENDOSCOPY  6/4/13    WITH DIILITATION AND BOTOX INJECTION    UPPER GASTROINTESTINAL ENDOSCOPY  5/20/2014    100 unit Botox injection, 56 Fr.  Arthur esophageal dilatation    UPPER GASTROINTESTINAL ENDOSCOPY  12/12/14    with esophageal dilatation, and botox injection    UPPER GASTROINTESTINAL ENDOSCOPY  09/09/2015    With Dilitation and Botox injection    UPPER GASTROINTESTINAL ENDOSCOPY  5/10/2016    distal esophagus biopsy, stomach biopsy, botox injection    UPPER GASTROINTESTINAL ENDOSCOPY  04/11/2017    with dilatation and botox injection    UPPER GASTROINTESTINAL ENDOSCOPY  10/24/2017 DILATATION, BIOPSY, BOTOX    UPPER GASTROINTESTINAL ENDOSCOPY  03/06/2018    WITH BOTOX AND BALLOON DILATATION    UPPER GASTROINTESTINAL ENDOSCOPY N/A 5/7/2019    EGD SUBMUCOSAL/BOTOX INJECTION performed by Dwaine Dorado MD at [address] N/A 5/7/2019    EGD DILATION BALLOON performed by Dwaine Dorado MD at [address] N/A 3/13/2020    EGD SUBMUCOSAL/BOTOX INJECTION performed by Dwaine Dorado MD at [facility] N/A 3/13/2020    EGD DILATION [facility]ON [facility] N[facility] 10/6/2020    EGD DILATION BALLOON [facility] N/A 10/6/2020    EGD SUBMUCOSAL/BOTOX INJECTION performed by Dwaine Dorado MD at [address] [place] [address] N/A 8/10/2021    EGD SUBMUCOSAL/BOTOX INJECTION [facility] N/A 8/10/2021    EGD BIOPSY performed by Dwaine Dorado MD at [address]     Current Outpatient Medications   Medication Sig Dispense Refill    diazePAM (VALIUM) 1 MG/ML solution Take 0.5 mLs by mouth every 8 hours as needed (anxiety) for up to 90 days.  30 mL 2    ASPIRIN LOW DOSE 81 MG EC tablet TAKE ONE TABLET BY MOUTH DAILY 90 tablet 3    TRUEplus Lancets 33G MISC USE TO CHECK BLOOD SUGAR 3-4 TIMES DAILY 100 each 3    KROGER PEN NEEDLES 32G X 4 MM MISC USE WITH INSULIN PEN ONCE DAILY 100 each 3    acetaminophen (TYLENOL) 500 MG tablet Take 500 mg by mouth every 6 hours as needed for Pain      meclizine (ANTIVERT) 25 MG tablet TAKE ONE TABLET BY MOUTH THREE TIMES A DAY AS NEEDED FOR DIZZINESS OR NAUSEA 15 tablet 0    melatonin 3 MG TABS tablet Take 1 tablet by mouth daily 30 tablet 2    insulin glargine (LANTUS SOLOSTAR) 100 UNIT/ML injection pen Inject 30 Units into the skin daily 4 pen 3    Blood Glucose Monitoring Suppl (TRUE METRIX METER) w/Device KIT As needed 1 kit 0    blood glucose test strips (TRUE METRIX BLOOD GLUCOSE TEST) strip 1 each by In Vitro route daily As needed. 100 each 3    blood glucose test strips (TRUE METRIX BLOOD GLUCOSE TEST) strip USE ONE STRIP TO TEST TWICE A DAY AS NEEDED 50 strip 2    loratadine (CLARITIN) 10 MG tablet TAKE ONE TABLET BY MOUTH DAILY 30 tablet 5    guaiFENesin (MUCINEX MAXIMUM STRENGTH) 1200 MG TB12 Take 1 tablet by mouth daily For a PM dose.  10 tablet 0    Blood Pressure KIT 1 kit by Does not apply route daily 1 kit 0    albuterol sulfate  (90 Base) MCG/ACT inhaler INHALE TWO PUFFS BY MOUTH EVERY 6 HOURS AS NEEDED FOR WHEEZING 1 Inhaler 2    ketotifen (ZADITOR) 0.025 % ophthalmic solution INSTILL TWO DROPS IN [facility] EYE TWO TIMES A DAY AS NEEDED FOR ALLERGY 1 Bottle 2    famotidine (PEPCID) 40 MG tablet Take 1 tablet by mouth 2 times daily (Patient taking differently: Take 40 mg by mouth 2 times daily Indications: taking 1-2 times/day ) 30 tablet 5    latanoprost (XALATAN) 0.005 % ophthalmic solution Place 1 drop into both eyes nightly      Multiple Vitamins-Minerals (THERAPEUTIC MULTIVITAMIN-MINERALS) tablet Take 1 tablet by mouth daily       Cholecalciferol (VITAMIN D3) 25 MCG (1000 UT) TABS Take 1 tablet by mouth daily (Patient taking differently: Take 10,000 Units by mouth once a week ) 90 tablet 1    Incontinence Supply Disposable (TRE CLASSIC BRIEFS/LARGE) MISC 1 each by Does not apply route 3 times daily 5 each 5    Incontinence Supplies MISC 1 Package by Does not apply route 2 times daily 5 each 5    Disposable Gloves (VINYL GLOVES LARGE) MISC 1 Package by Does not apply route 4 times daily 5 each 5    GAS-X EXTRA STRENGTH 125 MG chewable tablet CHEW TWO TABLETS BY MOUTH THREE TIMES A DAY WITH EACH MEAL AS NEEDED FOR FLATULENCE 192 tablet 1 No current facility-administered medications for this visit. Facility-Administered Medications Ordered in Other Visits   Medication Dose Route Frequency Provider Last Rate Last Admin    0.9 % sodium chloride infusion   IntraVENous Continuous Bettina Fitzgerald MD             Review of Systems  Constitutional: + for weight loss and malaise/fatigue. Negative for fever and chills. HENT: Negative for hearing loss, ear pain, nosebleeds, neck pain and tinnitus. Eyes: Negative for blurred vision. Negative for double vision, photophobia and pain. Respiratory: Negative for cough and sputum production. +SOB  Cardiovascular:+ for chest pain, palpitations and leg swelling. Gastrointestinal: Negative for nausea, vomiting and abdominal pain. Genitourinary: Negative for dysuria, urgency and frequency. Musculoskeletal: + for back pain. No joint pain  Skin: Negative for itching and rash. Neurological: + for dizziness. Negative for tingling, tremors, focal weakness and + headaches. Endo/Heme/Allergies: see HPI  Psychiatric/Behavioral: Negative for depression and substance abuse. Pacemaker placement    PHYSICAL EXAMINATION:  [ INSTRUCTIONS:  \"[x]\" Indicates a positive item  \"[]\" Indicates a negative item  -- DELETE ALL ITEMS NOT EXAMINED]  Vital Signs: (As obtained by patient/caregiver or practitioner observation)    Blood pressure-  Heart rate-    Respiratory rate-    Temperature-  Pulse oximetry-     Constitutional Appears well-developed and well-nourished No apparent distress        Mental status  Alert and awake  Oriented to person/place/time  Able to follow commands      Eyes:  EOM    [x]  Normal    Sclera  [x]  Normal           Discharge [x]  None visible      HENT:   [x] Normocephalic, atraumatic.     [x] Mouth/Throat: Mucous membranes are moist.     External Ears [x] Normal  no discharge    Neck: [x] No visualized mass  no swelling    Pulmonary/Chest: [x] Respiratory effort normal.  [x] No visualized signs of difficulty breathing or respiratory distress             Musculoskeletal:   [x] Normal gait with no signs of ataxia         [x] Normal range of motion of neck          Head and neck stable, appears normal ROM, strength good    Neurological:        [x] No Facial Asymmetry (Cranial nerve 7 motor function) (limited exam to video visit)          [x] No gaze palsy                Skin:        [x] No significant exanthematous lesions or discoloration noted on facial skin                 Psychiatric:       [x] Normal Affect [x] No Hallucinations            Other pertinent observable physical exam findings-     Due to this being a TeleHealth encounter, evaluation of the following organ systems is limited: Vitals/Constitutional/EENT/Resp/CV/GI//MS/Neuro/Skin/Heme-Lymph-Imm. Services were provided through a video synchronous discussion virtually to substitute for in-person clinic visit. 2/19    Foot exam: No ulcer, 8/10 monofilament    Lab Reviewed   No components found for: CHLPL  Lab Results   Component Value Date    TRIG 112 05/07/2021    TRIG 214 (H) 09/21/2020    TRIG 185 (H) 06/22/2020     Lab Results   Component Value Date    HDL 33 (L) 05/07/2021    HDL 36 (L) 09/21/2020    HDL 33 (L) 06/22/2020     Lab Results   Component Value Date    LDLCALC 119 (H) 05/07/2021    LDLCALC 175 (H) 09/21/2020    LDLCALC 202 (H) 06/22/2020     Lab Results   Component Value Date    LABVLDL 22 05/07/2021    LABVLDL 43 09/21/2020    LABVLDL 37 06/22/2020     Lab Results   Component Value Date    LABA1C 8.1 03/01/2022       Assessment:     Candace Barrientos is a 61 y.o. female with :    1.T2DM: Longstanding, fairly controlled, reports low blood glucose with Metformin, so she stopped. Did not bring log, . Avoid DPP IV and GLP agonist given pancreatitis history. Avoid SGLT-2 Inhibitor given h/o yeast infection. Sulfonylurea may cause hypoglycemia. Needs basal insulin, discussed with her ,  started lower dose. She wanted amaryl , advised not a good option given A1c and lows. A1c better  She has relative hypoglycemia, so will improve  Gradually. Advised prandial, she is not willing to take. Microalbumin is high but ratio is normal.   Advised needs to restart basal insulin, can take lower dose due to lows  Check fructosamine next visit as mismatch with log  2. Elevated BP:  3.HLD:Familila hyperlipidemia,  Reports statins cause elevated LFT, per patient hepatologist advised not to take. Replaced Vitamin, so can tolerate lipitor. [employer] will not be covered, last LDL improved  Advised Zetia but she wants to wait. Advised Praluent, she states want to try lipitor taking more frequently  Discussed risk of CAD, CVA given high LDL  4. Obesity  5. Thyroid Nodule: Sub Centimeter nodule, monitor with USG, stable on 8/16, no increase in size, repeat USG shows right 1.1cm nodule, FNA benign  3/22 USG Right 1.1cm smaller in size--> 8mm   Additional right 9mm---> 1.1cm no previous FNA    F/u in one year USG    6. Fatty liver  7. Vitamin D deficiency: On 10,000IU weekly per PCP  8. Weight loss: Check TFT    Plan:       lantus 15---> 12 units daily, advise to take every day   Self titrate, increase by two unit every 3 days if BG > 140   Advise to check blood sugar 2 times a day. She does check in the forearm, advise finger sticks, keep log   Call if post meal >200 , as may need prandial   Patient to send blood sugar log for titration. Advise to exercise regularly but can not due to back pain, and OA. Advise to low simple carbohydrate and protein with each  meal diet. Diabetes Care: recommended yearly eye exam, foot exam and urine microalbumin to   creatinine ratio.      -Hyperlipidemia, LDL goal is <100 mg/dl   -Daily ASA : 81mg  -Smoking status: Non smoker    F/u in 6 months as will see PCP

## 2022-04-04 ENCOUNTER — HOSPITAL ENCOUNTER (EMERGENCY)
Age: 57
Discharge: HOME | End: 2022-04-04
Attending: EMERGENCY MEDICINE
Payer: COMMERCIAL

## 2022-04-04 ENCOUNTER — TELEPHONE (OUTPATIENT)
Dept: CARDIOLOGY CLINIC | Age: 57
End: 2022-04-04

## 2022-04-04 VITALS
RESPIRATION RATE: 16 BRPM | TEMPERATURE: 98.4 F | BODY MASS INDEX: 34.41 KG/M2 | WEIGHT: 187 LBS | DIASTOLIC BLOOD PRESSURE: 69 MMHG | OXYGEN SATURATION: 97 % | SYSTOLIC BLOOD PRESSURE: 150 MMHG | HEART RATE: 79 BPM | HEIGHT: 62 IN

## 2022-04-04 DIAGNOSIS — M79.605 LEFT LEG PAIN: Primary | ICD-10-CM

## 2022-04-04 LAB
ANION GAP SERPL CALCULATED.3IONS-SCNC: 11 MMOL/L (ref 3–16)
APTT: 32.2 SEC (ref 26.2–38.6)
BASOPHILS ABSOLUTE: 0.1 K/UL (ref 0–0.2)
BASOPHILS RELATIVE PERCENT: 1 %
BUN BLDV-MCNC: 17 MG/DL (ref 7–20)
CALCIUM SERPL-MCNC: 10.6 MG/DL (ref 8.3–10.6)
CHLORIDE BLD-SCNC: 99 MMOL/L (ref 99–110)
CO2: 26 MMOL/L (ref 21–32)
CREAT SERPL-MCNC: 0.8 MG/DL (ref 0.6–1.1)
D DIMER: 281 NG/ML DDU (ref 0–229)
EOSINOPHILS ABSOLUTE: 0.2 K/UL (ref 0–0.6)
EOSINOPHILS RELATIVE PERCENT: 1.8 %
GFR AFRICAN AMERICAN: >60
GFR NON-AFRICAN AMERICAN: >60
GLUCOSE BLD-MCNC: 196 MG/DL (ref 70–99)
HCT VFR BLD CALC: 42.2 % (ref 36–48)
HEMOGLOBIN: 13.8 G/DL (ref 12–16)
INR BLD: 0.98 (ref 0.88–1.12)
LYMPHOCYTES ABSOLUTE: 3.2 K/UL (ref 1–5.1)
LYMPHOCYTES RELATIVE PERCENT: 32.6 %
MCH RBC QN AUTO: 28.3 PG (ref 26–34)
MCHC RBC AUTO-ENTMCNC: 32.7 G/DL (ref 31–36)
MCV RBC AUTO: 86.6 FL (ref 80–100)
MONOCYTES ABSOLUTE: 0.7 K/UL (ref 0–1.3)
MONOCYTES RELATIVE PERCENT: 7.4 %
NEUTROPHILS ABSOLUTE: 5.7 K/UL (ref 1.7–7.7)
NEUTROPHILS RELATIVE PERCENT: 57.2 %
PDW BLD-RTO: 15 % (ref 12.4–15.4)
PLATELET # BLD: 200 K/UL (ref 135–450)
PMV BLD AUTO: 9.1 FL (ref 5–10.5)
POTASSIUM SERPL-SCNC: 4.4 MMOL/L (ref 3.5–5.1)
PROTHROMBIN TIME: 11.1 SEC (ref 9.9–12.7)
RBC # BLD: 4.87 M/UL (ref 4–5.2)
SODIUM BLD-SCNC: 136 MMOL/L (ref 136–145)
WBC # BLD: 9.9 K/UL (ref 4–11)

## 2022-04-04 PROCEDURE — 80048 BASIC METABOLIC PNL TOTAL CA: CPT

## 2022-04-04 PROCEDURE — 6370000000 HC RX 637 (ALT 250 FOR IP): Performed by: PHYSICIAN ASSISTANT

## 2022-04-04 PROCEDURE — 99285 EMERGENCY DEPT VISIT HI MDM: CPT

## 2022-04-04 PROCEDURE — 6360000002 HC RX W HCPCS: Performed by: PHYSICIAN ASSISTANT

## 2022-04-04 PROCEDURE — 85025 COMPLETE CBC W/AUTO DIFF WBC: CPT

## 2022-04-04 PROCEDURE — 85379 FIBRIN DEGRADATION QUANT: CPT

## 2022-04-04 PROCEDURE — 85610 PROTHROMBIN TIME: CPT

## 2022-04-04 PROCEDURE — 85730 THROMBOPLASTIN TIME PARTIAL: CPT

## 2022-04-04 RX ORDER — ACETAMINOPHEN 500 MG
1000 TABLET ORAL ONCE
Status: COMPLETED | OUTPATIENT
Start: 2022-04-04 | End: 2022-04-04

## 2022-04-04 RX ORDER — IBUPROFEN 600 MG/1
600 TABLET ORAL 3 TIMES DAILY PRN
Qty: 30 TABLET | Refills: 0 | Status: SHIPPED | OUTPATIENT
Start: 2022-04-04 | End: 2022-09-01

## 2022-04-04 RX ORDER — IBUPROFEN 600 MG/1
600 TABLET ORAL ONCE
Status: COMPLETED | OUTPATIENT
Start: 2022-04-04 | End: 2022-04-04

## 2022-04-04 RX ORDER — METHYLPREDNISOLONE 4 MG/1
4 TABLET ORAL ONCE
Status: COMPLETED | OUTPATIENT
Start: 2022-04-04 | End: 2022-04-04

## 2022-04-04 RX ORDER — METHYLPREDNISOLONE 4 MG/1
TABLET ORAL
Qty: 1 KIT | Refills: 0 | Status: SHIPPED | OUTPATIENT
Start: 2022-04-04 | End: 2022-04-10

## 2022-04-04 RX ADMIN — ACETAMINOPHEN 1000 MG: 500 TABLET ORAL at 20:54

## 2022-04-04 RX ADMIN — METHYLPREDNISOLONE 4 MG: 4 TABLET ORAL at 22:30

## 2022-04-04 RX ADMIN — IBUPROFEN 600 MG: 600 TABLET ORAL at 22:29

## 2022-04-04 ASSESSMENT — PAIN DESCRIPTION - PAIN TYPE: TYPE: ACUTE PAIN

## 2022-04-04 ASSESSMENT — PAIN DESCRIPTION - FREQUENCY: FREQUENCY: CONTINUOUS

## 2022-04-04 ASSESSMENT — ENCOUNTER SYMPTOMS
DIARRHEA: 0
COUGH: 0
RHINORRHEA: 0
SHORTNESS OF BREATH: 0
NAUSEA: 0
ABDOMINAL PAIN: 0
VOMITING: 0

## 2022-04-04 ASSESSMENT — PAIN DESCRIPTION - PROGRESSION: CLINICAL_PROGRESSION: GRADUALLY WORSENING

## 2022-04-04 ASSESSMENT — PAIN DESCRIPTION - ORIENTATION: ORIENTATION: LEFT;RIGHT

## 2022-04-04 ASSESSMENT — PAIN - FUNCTIONAL ASSESSMENT: PAIN_FUNCTIONAL_ASSESSMENT: 0-10

## 2022-04-04 ASSESSMENT — PAIN DESCRIPTION - LOCATION: LOCATION: LEG

## 2022-04-04 ASSESSMENT — PAIN SCALES - GENERAL
PAINLEVEL_OUTOF10: 6

## 2022-04-04 ASSESSMENT — PAIN DESCRIPTION - DESCRIPTORS: DESCRIPTORS: BURNING;TINGLING;NUMBNESS

## 2022-04-04 ASSESSMENT — PAIN DESCRIPTION - ONSET: ONSET: PROGRESSIVE

## 2022-04-04 NOTE — TELEPHONE ENCOUNTER
Pt called in and stated there was tingling, pain and warmness going on with her left leg. She states it can get worse or better depending on the amount of aspirin she takes and doesn't know whether she should get looked at or leg examined.     Cb is [phone]

## 2022-04-04 NOTE — TELEPHONE ENCOUNTER
Per DCE, patient should go to the ER to have leg examined. I relayed this to the patient and she is very hesitant to go the ER. I explained to her the possibility of having a blood clot and needing to check for one. She expressed understanding.

## 2022-04-05 ENCOUNTER — TELEPHONE (OUTPATIENT)
Dept: CARDIOLOGY CLINIC | Age: 57
End: 2022-04-05

## 2022-04-05 NOTE — ED PROVIDER NOTES
[address] [facility]        Pt Name: Elsa Luis  MRN: [id number]  [place] 1965  Date of evaluation: 4/4/2022  Provider: Porter Ewing PA-C  PCP: No primary care provider on file. Note Started: 9:22 PM EDT        I have seen and evaluated this patient with my supervising physician Carl Dodd [id number]       Chief Complaint   Patient presents with    Leg Pain     From home. Burning pain in both legs, worse in left with numbness down to toes, for the past 2-3 days with small knot on left calf. Hx of pacemaker. Had covid multiple times, shingles on lower back approx. 2 months ago. Has been taking 2-3 aspirins for the last few days. HISTORY OF PRESENT ILLNESS   (Location, Timing/Onset, Context/Setting, Quality, Duration, Modifying Factors, Severity, Associated Signs and Symptoms)  Note limiting factors. Chief Complaint: Leg pain    Elsa Luis is a 61 y.o. female who presents to the emergency department today for evaluation for leg pain. The patient has been experiencing pain to her left lower leg, and she states that this is actually been ongoing for the past couple of days. The patient states that she has felt a \"knot\" to the lateral aspect of her left lower leg. The patient states that she called her primary care physician's office and she was told to come to the emergency room for concern of a possible blood clot. The patient states that she will have a \"tingling\" sensation to her left foot as well. She denies any true numbness, or weakness. The patient is able to ambulate without any difficulty. She has no fall or injury. She is rating her discomfort as a 6/10. She otherwise denies any known alleviating or any factors. The patient denies any recent travels, immobilizations or surgeries. She has no history of DVT or PE. No estrogen therapies. No recent chemotherapy use.   She has no lower leg pain or edema.  She has no chest pain. No shortness of breath. No fever chills. No nausea or vomiting, no other complaint    Nursing Notes were all reviewed and agreed with or any disagreements were addressed in the HPI. REVIEW OF SYSTEMS    (2-9 systems for level 4, 10 or more for level 5)     Review of Systems   Constitutional: Negative for activity change, appetite change, chills and fever. HENT: Negative for congestion and rhinorrhea. Respiratory: Negative for cough and shortness of breath. Cardiovascular: Negative for chest pain. Gastrointestinal: Negative for abdominal pain, diarrhea, nausea and vomiting. Genitourinary: Negative for difficulty urinating, dysuria and hematuria. Musculoskeletal: Positive for arthralgias. Neurological: Negative for weakness and numbness. Positives and Pertinent negatives as per HPI. Except as noted above in the ROS, all other systems were reviewed and negative.        PAST MEDICAL HISTORY     Past Medical History:   Diagnosis Date    Ankle fracture, left     Ankle fracture, right     Arthritis     Asthma     Bipolar depression ([address]     CAN'T AFFORD MEDS    Bladder problem     Cervical disc herniation     COVID toes     Diabetes mellitus ([address]     diet control    Fatty liver disease, non-alcoholic     Fibromyalgia     Gastroparesis     Dr Julia Tolentino GERD (gastroesophageal reflux disease)     gastroporesis    Glaucoma     Dr Nara Fall Hyperlipidemia     elevated LFT on meds    IBS (irritable bowel syndrome)     Lumbar herniated disc     Nausea & vomiting     Nephrolithiasis 1/29/2019    Obesity (BMI 30-39.9) 3/11/2019    Partial Achilles tendon tear     Pneumonia     PONV (postoperative nausea and vomiting)     RA (rheumatoid arthritis) ([address]     Rapid or irregular heartbeat     Sleep apnea     does not use cpap    Spinal stenosis     Stress incontinence     Thyroid disease     growths         SURGICAL HISTORY     Past Surgical History:   Procedure Laterality Date    CARDIAC SURGERY      CHOLECYSTECTOMY      COLONOSCOPY      COLONOSCOPY  03/06/2018    with polypectomies    DILATION AND CURETTAGE OF UTERUS      ENDOMETRIAL ABLATION      ENDOSCOPY, COLON, DIAGNOSTIC      ERCP  5/25/2010    with stent    ERCP  1-31-14    ERCP WITH SPHINTER OF ODDI MANOMETERY    ESOPHAGEAL DILATATION  8/10/2021    ESOPHAGEAL DILATION ARTHUR performed by Julia Silva MD at [place] [address]  10/5/10    botox injection    EYE SURGERY      LASER FOR GLAUCOMA    LAPAROSCOPY      PACEMAKER INSERTION  05/10/2021    TONSILLECTOMY      UPPER GASTROINTESTINAL ENDOSCOPY      UPPER GASTROINTESTINAL ENDOSCOPY  04/12/2011    DILATATION, 58 FR ARTHUR, 100 UNITS BOTOX    UPPER GASTROINTESTINAL ENDOSCOPY  08/30/2011    58 FR DILATATION, 100 UNITS  BOTOX INJECTION    UPPER GASTROINTESTINAL ENDOSCOPY  3-9-12    with dilatation and submucosal injection: Botox    UPPER GASTROINTESTINAL ENDOSCOPY      UPPER GASTROINTESTINAL ENDOSCOPY  11/20/12     Esophagogastroduodenoscopy with Botulinum toxin injection of the pylorus and Arthur esophageal dilation    UPPER GASTROINTESTINAL ENDOSCOPY  6/4/13    WITH DIILITATION AND BOTOX INJECTION    UPPER GASTROINTESTINAL ENDOSCOPY  5/20/2014    100 unit Botox injection, 56 Fr.  Arthur esophageal dilatation    UPPER GASTROINTESTINAL ENDOSCOPY  12/12/14    with esophageal dilatation, and botox injection    UPPER GASTROINTESTINAL ENDOSCOPY  09/09/2015    With Dilitation and Botox injection    UPPER GASTROINTESTINAL ENDOSCOPY  5/10/2016    distal esophagus biopsy, stomach biopsy, botox injection    UPPER GASTROINTESTINAL ENDOSCOPY  04/11/2017    with dilatation and botox injection    UPPER GASTROINTESTINAL ENDOSCOPY  10/24/2017    DILATATION, BIOPSY, BOTOX    UPPER GASTROINTESTINAL ENDOSCOPY  03/06/2018    WITH BOTOX AND BALLOON DILATATION    UPPER GASTROINTESTINAL ENDOSCOPY N/A 5/7/2019 EGD SUBMUCOSAL/BOTOX INJECTION performed by Lakhwinder Talley MD at [address] [facility] 5/7/2019    EGD DILATION BALLOON performed by Lakhwinder Talley MD at [address] N/A 3/13/2020    EGD SUBMUCOSAL/BOTOX INJECTION performed by Lakhwinder Talley MD at [address] [facility] N/A 3/13/2020    EGD DILATION BALLOON performed by Lakhwinder Talley MD at [address] [place] [address] N/A 10/6/2020    EGD DILATION BALLOON performed by Lakhwinder Talley MD at [address] [place] [address] N/A 10/6/2020    EGD SUBMUCOSAL/BOTOX INJECTION performed by Lakhwinder Talley MD at [address] [place] [address] N/A 8/10/2021    EGD SUBMUCOSAL/BOTOX INJECTION performed by Lakhwinder Talley MD at [address] [place] [address] N/A 8/10/2021    EGD BIOPSY performed by Lakhwinder Talley MD at [address]       Previous Medications    ACETAMINOPHEN (TYLENOL) 500 MG TABLET    Take 500 mg by mouth every 6 hours as needed for Pain    ALBUTEROL SULFATE  (90 BASE) MCG/ACT INHALER    INHALE TWO PUFFS BY MOUTH EVERY 6 HOURS AS NEEDED FOR WHEEZING    ASPIRIN LOW DOSE 81 MG EC TABLET    TAKE ONE TABLET BY MOUTH DAILY    BLOOD GLUCOSE MONITORING SUPPL (TRUE METRIX METER) W/DEVICE KIT    As needed    BLOOD GLUCOSE TEST STRIPS (TRUE METRIX BLOOD GLUCOSE TEST) STRIP    USE ONE STRIP TO TEST TWICE A DAY AS NEEDED    BLOOD GLUCOSE TEST STRIPS (TRUE METRIX BLOOD GLUCOSE TEST) STRIP    1 each by In Vitro route daily As needed. BLOOD PRESSURE KIT    1 kit by Does not apply route daily    CHOLECALCIFEROL (VITAMIN D3) 25 MCG (1000 UT) TABS    Take 1 tablet by mouth daily    DIAZEPAM (VALIUM) 1 MG/ML SOLUTION    Take 0.5 mLs by mouth every 8 hours as needed (anxiety) for up to 90 days. DISPOSABLE GLOVES (VINYL GLOVES LARGE) MISC    1 Package by Does not apply route 4 times daily    FAMOTIDINE (PEPCID) 40 MG TABLET    Take 1 tablet by mouth 2 times daily    GAS-X EXTRA STRENGTH 125 MG CHEWABLE TABLET    CHEW TWO TABLETS BY MOUTH THREE TIMES A DAY WITH EACH MEAL AS NEEDED FOR FLATULENCE    GUAIFENESIN (MUCINEX MAXIMUM STRENGTH) 1200 MG TB12    Take 1 tablet by mouth daily For a PM dose.     INCONTINENCE SUPPLIES MISC    1 Package by Does not apply route 2 times daily    INCONTINENCE SUPPLY DISPOSABLE (TRE CLASSIC BRIEFS/LARGE) MISC    1 each by Does not apply route 3 times daily    INSULIN GLARGINE (LANTUS SOLOSTAR) 100 UNIT/ML INJECTION PEN    Inject 30 Units into the skin daily    KETOTIFEN (ZADITOR) 0.025 % OPHTHALMIC SOLUTION    INSTILL TWO DROPS IN [facility] EYE TWO TIMES A DAY AS NEEDED FOR ALLERGY    [employer] PEN NEEDLES 32G X 4 MM MISC    USE WITH INSULIN PEN ONCE DAILY    LATANOPROST (XALATAN) 0.005 % OPHTHALMIC SOLUTION    Place 1 drop into both eyes nightly    LORATADINE (CLARITIN) 10 MG TABLET    TAKE ONE TABLET BY MOUTH DAILY    MECLIZINE (ANTIVERT) 25 MG TABLET    TAKE ONE TABLET BY MOUTH THREE TIMES A DAY AS NEEDED FOR DIZZINESS OR NAUSEA    MELATONIN 3 MG TABS TABLET    Take 1 tablet by mouth daily    MULTIPLE VITAMINS-MINERALS (THERAPEUTIC MULTIVITAMIN-MINERALS) TABLET    Take 1 tablet by mouth daily     TRUEPLUS LANCETS 33G MISC    USE TO CHECK BLOOD SUGAR 3-4 TIMES DAILY         ALLERGIES     Lamictal [lamotrigine], Macrodantin [nitrofurantoin macrocrystal], Penicillins, Shellfish-derived products, Aspartame and phenylalanine, Bactrim, Biaxin [clarithromycin], Cephalexin, Ciprofloxacin, Hydrocodone-acetaminophen, Lansoprazole, Nexium [esomeprazole magnesium trihydrate], Other, Prilosec [omeprazole], Blueberry [vaccinium angustifolium], Monosodium glutamate, and Zmax [azithromycin dihydrate]    FAMILYHISTORY       Family History   Problem Relation Age of Onset    Diabetes Mother    Antonia Heart Disease Mother     Stroke Mother     Heart Disease Father     Heart Disease Brother     High Blood Pressure Brother     High Cholesterol Brother     Diabetes Brother     High Cholesterol Brother     Anesth Problems Neg Hx     Malig Hyperten Neg Hx     Hypotension Neg Hx     Malig Hypertherm Neg Hx     Pseudochol. Deficiency Neg Hx           SOCIAL HISTORY       Social History     Tobacco Use    Smoking status: Never Smoker    Smokeless tobacco: Never Used   Vaping Use    Vaping Use: Never used   Substance Use Topics    Alcohol use: No     Alcohol/week: 0.0 standard drinks    Drug use: No       SCREENINGS    Jd Coma Scale  Eye Opening: Spontaneous  Best Verbal Response: Oriented  Best Motor Response: Obeys commands  Jd Coma Scale Score: 15        PHYSICAL EXAM    (up to 7 for level 4, 8 or more for level 5)     ED Triage Vitals [04/04/22 2037]   BP Temp Temp Source Pulse Resp SpO2 Height Weight   (!) 150/69 98.4 °F (36.9 °C) Oral 79 16 97 % 5' 2\" (1.575 m) 187 lb (84.8 kg)       Physical Exam  Vitals and nursing note reviewed. Constitutional:       Appearance: She is well-developed. She is not diaphoretic. HENT:      Head: Normocephalic and atraumatic. Right Ear: External ear normal.      Left Ear: External ear normal.      Nose: Nose normal.   Eyes:      General:         Right eye: No discharge. Left eye: No discharge. Neck:      Trachea: No tracheal deviation. Cardiovascular:      Pulses: Normal pulses. Pulmonary:      Effort: Pulmonary effort is normal. No respiratory distress. Musculoskeletal:         General: Normal range of motion. Cervical back: Normal range of motion and neck supple. Comments: The lateral aspect of the lower leg, there are several varicose veins noted, with tenderness noted over this area. There is no erythema, warmth. There is no edema of the lower leg. Dorsalis pedis and posterior tibialis pulse are 2+.   Normal sensation light touch. Neurovascular intact. Full range of motion of all joints. Negative Homans' sign   Skin:     General: Skin is warm and dry. Neurological:      Mental Status: She is alert and oriented to person, place, and time. Psychiatric:         Behavior: Behavior normal.         DIAGNOSTIC RESULTS   LABS:    Labs Reviewed   BASIC METABOLIC PANEL - Abnormal; Notable for the following components:       Result Value    Glucose 196 (*)     All other components within normal limits   D-DIMER, QUANTITATIVE - Abnormal; Notable for the following components:    D-Dimer, Quant 281 (*)     All other components within normal limits   CBC WITH AUTO DIFFERENTIAL   PROTIME-INR   APTT       When ordered only abnormal lab results are displayed. All other labs were within normal range or not returned as of this dictation. EKG: When ordered, EKG's are interpreted by the Emergency Department Physician in the absence of a cardiologist.  Please see their note for interpretation of EKG. RADIOLOGY:   Non-plain film images such as CT, Ultrasound and MRI are read by the radiologist. Plain radiographic images are visualized and preliminarily interpreted by the ED Provider with the below findings:        Interpretation per the Radiologist below, if available at the time of this note:    VL EXTREMITY VENOUS LEFT    (Results Pending)     No results found.         PROCEDURES   Unless otherwise noted below, none     Procedures    CRITICAL CARE TIME       CONSULTS:  None      EMERGENCY DEPARTMENT COURSE and DIFFERENTIAL DIAGNOSIS/MDM:   Vitals:    Vitals:    04/04/22 2037   BP: (!) 150/69   Pulse: 79   Resp: 16   Temp: 98.4 °F (36.9 °C)   TempSrc: Oral   SpO2: 97%   Weight: 187 lb (84.8 kg)   Height: 5' 2\" (1.575 m)       Patient was given the following medications:  Medications   acetaminophen (TYLENOL) tablet 1,000 mg (1,000 mg Oral Given 4/4/22 2054)           Briefly, this is a 50-year-old female who presents to the emergency department today for left leg pain. The patient has been complaining of pain to the left lateral lower leg for a couple of days and she is also reporting a tingling. Examination she does have reproducible tenderness noted over the left lateral lower leg with some varicose veins, otherwise is unremarkable    Lab work in the ED is unremarkable, her age-adjusted D-dimer is normal, therefore she does not require anticoagulants at this time    Symptoms today could certainly be due to a peripheral neuropathy, or possibly due to her varicosities. She will be given Medrol Dosepak, anti-inflammatories. Recommend close follow-up with her primary care physician within 2 to 3 days for reevaluation. She is to return to the ED for any new or worsening symptoms, patient voiced understanding is agreeable with plan. Stable for discharge.    Results for orders placed or performed during the hospital encounter of 04/04/22   CBC with Auto Differential   Result Value Ref Range    WBC 9.9 4.0 - 11.0 K/uL    RBC 4.87 4.00 - 5.20 M/uL    Hemoglobin 13.8 12.0 - 16.0 g/dL    Hematocrit 42.2 36.0 - 48.0 %    MCV 86.6 80.0 - 100.0 fL    MCH 28.3 26.0 - 34.0 pg    MCHC 32.7 31.0 - 36.0 g/dL    RDW 15.0 12.4 - 15.4 %    Platelets 387 989 - 493 K/uL    MPV 9.1 5.0 - 10.5 fL    Neutrophils % 57.2 %    Lymphocytes % 32.6 %    Monocytes % 7.4 %    Eosinophils % 1.8 %    Basophils % 1.0 %    Neutrophils Absolute 5.7 1.7 - 7.7 K/uL    Lymphocytes Absolute 3.2 1.0 - 5.1 K/uL    Monocytes Absolute 0.7 0.0 - 1.3 K/uL    Eosinophils Absolute 0.2 0.0 - 0.6 K/uL    Basophils Absolute 0.1 0.0 - 0.2 K/uL   Basic Metabolic Panel   Result Value Ref Range    Sodium 136 136 - 145 mmol/L    Potassium 4.4 3.5 - 5.1 mmol/L    Chloride 99 99 - 110 mmol/L    CO2 26 21 - 32 mmol/L    Anion Gap 11 3 - 16    Glucose 196 (H) 70 - 99 mg/dL    BUN 17 7 - 20 mg/dL    CREATININE 0.8 0.6 - 1.1 mg/dL    GFR Non-African American >60 >60    GFR  >60 >60 Calcium 10.6 8.3 - 10.6 mg/dL   D-Dimer, Quantitative   Result Value Ref Range    D-Dimer, Quant 281 (H) 0 - 229 ng/mL DDU   Protime-INR   Result Value Ref Range    Protime 11.1 9.9 - 12.7 sec    INR 0.98 0.88 - 1.12   APTT   Result Value Ref Range    aPTT 32.2 26.2 - 38.6 sec       I estimate there is LOW risk for COMPARTMENT SYNDROME, DEEP VENOUS THROMBOSIS, SEPTIC ARTHRITIS, TENDON OR NEUROVASCULAR INJURY, thus I consider the discharge disposition reasonable. Candance Ina and I have discussed the diagnosis and risks, and we agree with discharging home to follow-up with their primary doctor or the referral orthopedist. We also discussed returning to the Emergency Department immediately if new or worsening symptoms occur. We have discussed the symptoms which are most concerning (e.g., changing or worsening pain, numbness, weakness) that necessitate immediate return. Final Impression    1. Left leg pain        Blood pressure (!) 150/69, pulse 79, temperature 98.4 °F (36.9 °C), temperature source Oral, resp. rate 16, height 5' 2\" (1.575 m), weight 187 lb (84.8 kg), SpO2 97 %, not currently breastfeeding. FINAL IMPRESSION      1. Left leg pain          DISPOSITION/PLAN   DISPOSITION        PATIENT REFERRED TO:  Your primary care physician    Schedule an appointment as soon as possible for a visit in 2 days      [facility] Emergency Department  [address] [place] [address]  [phone]    As needed, If symptoms worsen      DISCHARGE MEDICATIONS:  New Prescriptions    IBUPROFEN (ADVIL;MOTRIN) 600 MG TABLET    Take 1 tablet by mouth 3 times daily as needed for Pain    METHYLPREDNISOLONE (MEDROL, DESTINEY,) 4 MG TABLET    Take by mouth.        DISCONTINUED MEDICATIONS:  Discontinued Medications    No medications on file              (Please note that portions of this note were completed with a voice recognition program.  Efforts were made to edit the dictations but occasionally words are mis-transcribed.)    Ton Jones PA-C (electronically signed)            Ton Jones PA-C  04/04/22 [id number]

## 2022-04-05 NOTE — ED PROVIDER NOTES
days      [facility] Emergency Department  [address] [facility]  [address] [facility] [address]  [phone]    As needed, If symptoms worsen      Discharge Medications:  Discharge Medication List as of 4/4/2022  9:50 PM      START taking these medications    Details   methylPREDNISolone (MEDROL, DESTINEY,) 4 MG tablet Take by mouth., Disp-1 kit, R-0Normal      ibuprofen (ADVIL;MOTRIN) 600 MG tablet Take 1 tablet by mouth 3 times daily as needed for Pain, Disp-30 tablet, R-0Normal             FINAL IMPRESSION  1. Left leg pain        Blood pressure (!) 150/69, pulse 79, temperature 98.4 °F (36.9 °C), temperature source Oral, resp. rate 16, height 5' 2\" (1.575 m), weight 187 lb (84.8 kg), SpO2 97 %, not currently breastfeeding. For further details of [facility] emergency department encounter, please see documentation by advanced practice provider, PIPER Bond.     Rashi Barry DO (electronically signed)  Attending Emergency Physician       Rashi Barry DO  04/05/22 [id number]

## 2022-04-05 NOTE — TELEPHONE ENCOUNTER
Pt states she went to ER as directed by DCE and states they did some testing and asking for DCE to review and call to advise.

## 2022-04-06 NOTE — TELEPHONE ENCOUNTER
Per DCE, he reviewed her ER visit and does not have anything else to add. Patient should schedule the ultrasound of her legs that was ordered in the ER.  The patient also needs to follow up with PCP

## 2022-04-13 ENCOUNTER — HOSPITAL ENCOUNTER (OUTPATIENT)
Dept: VASCULAR LAB | Age: 57
Discharge: HOME | End: 2022-04-13
Payer: COMMERCIAL

## 2022-04-13 ENCOUNTER — HOSPITAL ENCOUNTER (EMERGENCY)
Age: 57
Discharge: HOME | End: 2022-04-13
Payer: COMMERCIAL

## 2022-04-13 ENCOUNTER — TELEPHONE (OUTPATIENT)
Dept: CARDIOLOGY CLINIC | Age: 57
End: 2022-04-13

## 2022-04-13 VITALS
SYSTOLIC BLOOD PRESSURE: 151 MMHG | RESPIRATION RATE: 16 BRPM | TEMPERATURE: 99.5 F | OXYGEN SATURATION: 99 % | HEART RATE: 74 BPM | DIASTOLIC BLOOD PRESSURE: 60 MMHG

## 2022-04-13 DIAGNOSIS — I82.462 ACUTE DEEP VEIN THROMBOSIS (DVT) OF CALF MUSCLE VEIN OF LEFT LOWER EXTREMITY (HCC): Primary | ICD-10-CM

## 2022-04-13 DIAGNOSIS — M79.605 LEFT LEG PAIN: ICD-10-CM

## 2022-04-13 PROCEDURE — 93971 EXTREMITY STUDY: CPT

## 2022-04-13 PROCEDURE — 6370000000 HC RX 637 (ALT 250 FOR IP): Performed by: PHYSICIAN ASSISTANT

## 2022-04-13 PROCEDURE — 99283 EMERGENCY DEPT VISIT LOW MDM: CPT

## 2022-04-13 RX ADMIN — APIXABAN 10 MG: 5 TABLET, FILM COATED ORAL at 18:40

## 2022-04-13 ASSESSMENT — ENCOUNTER SYMPTOMS
COUGH: 0
WHEEZING: 0
COLOR CHANGE: 0
BACK PAIN: 0
STRIDOR: 0
SHORTNESS OF BREATH: 0

## 2022-04-13 ASSESSMENT — PAIN SCALES - GENERAL: PAINLEVEL_OUTOF10: 2

## 2022-04-13 NOTE — ED PROVIDER NOTES
[facility]        Pt Name: Shahram Roberts  MRN: [id number]  Debgfpadma 1965  Date of evaluation: 2022  Provider: Josef Hart PA-C  PCP: No primary care provider on file. Note Started: 7:13 PM EDT       DAWOOD. I have evaluated this patient. My supervising physician was available for consultation. CHIEF COMPLAINT       Chief Complaint   Patient presents with    Leg Pain     Pt sent from vascular for blood clot in left lower leg. Simin had numbness and pain in the lower leg and was sent for outpatient doppler and they found a clot and sent her in. HISTORY OF PRESENT ILLNESS   (Location, Timing/Onset, Context/Setting, Quality, Duration, Modifying Factors, Severity, Associated Signs and Symptoms)  Note limiting factors. Chief Complaint: Left leg pain and tingling    Shahram Roberts is a 61 y.o. female who presents to the emergency department stating that she developed left lateral lower leg pain last week. Since then, the pain has somewhat subsided but reports a numbness tingling sensation over the left lateral leg and all the toes of her left foot. She is able to bear weight and ambulate without difficulty. Denies chest pain or shortness of breath. Denies cough or hemoptysis. She had an outpatient venous Doppler which showed evidence of DVT in the left lower extremity near the area that she is experiencing tingling and discomfort. She was advised to come to the emergency department for anticoagulation. She is on a baby aspirin daily. She does have history of peptic ulcer disease but denies history of bleeding ulcer. Denies history of GI bleed or intracranial bleeding or stroke. Nursing Notes were all reviewed and agreed with or any disagreements were addressed in the HPI.     REVIEW OF SYSTEMS    (2-9 systems for level 4, 10 or more for level 5)     Review of Systems   Constitutional: Negative for chills and fever. Respiratory: Negative for cough, shortness of breath, wheezing and stridor. Cardiovascular: Negative for chest pain, palpitations and leg swelling. Musculoskeletal: Positive for myalgias. Negative for arthralgias, back pain, gait problem, joint swelling, neck pain and neck stiffness. Skin: Negative for color change, pallor, rash and wound. Neurological: Negative for dizziness, tremors, seizures, syncope, facial asymmetry, speech difficulty, weakness, light-headedness and headaches. Psychiatric/Behavioral: Negative for confusion. All other systems reviewed and are negative. Positives and Pertinent negatives as per HPI. Except as noted above in the ROS, all other systems were reviewed and negative.        PAST MEDICAL HISTORY     Past Medical History:   Diagnosis Date    Ankle fracture, left     Ankle fracture, right     Arthritis     Asthma     Bipolar depression ([address]     CAN'T AFFORD MEDS    Bladder problem     Cervical disc herniation     COVID toes     Diabetes mellitus ([address]     diet control    Fatty liver disease, non-alcoholic     Fibromyalgia     Gastroparesis     Dr Becky Machuca GERD (gastroesophageal reflux disease)     gastroporesis    Glaucoma     Dr Rupinder Jasso Hyperlipidemia     elevated LFT on meds    IBS (irritable bowel syndrome)     Lumbar herniated disc     Nausea & vomiting     Nephrolithiasis 1/29/2019    Obesity (BMI 30-39.9) 3/11/2019    Partial Achilles tendon tear     Pneumonia     PONV (postoperative nausea and vomiting)     RA (rheumatoid arthritis) ([address]     Rapid or irregular heartbeat     Sleep apnea     does not use cpap    Spinal stenosis     Stress incontinence     Thyroid disease     growths         SURGICAL HISTORY     Past Surgical History:   Procedure Laterality Date    CARDIAC SURGERY      CHOLECYSTECTOMY      COLONOSCOPY      COLONOSCOPY  03/06/2018    with polypectomies    DILATION AND CURETTAGE OF UTERUS      ENDOMETRIAL ABLATION      ENDOSCOPY, COLON, DIAGNOSTIC      ERCP  5/25/2010    with stent    ERCP  1-31-14    ERCP WITH SPHINTER OF ODDI MANOMETERY    ESOPHAGEAL DILATATION  8/10/2021    ESOPHAGEAL DILATION ARTHUR performed by Lo Tilley MD at [place] [address]  10/5/10    botox injection    EYE SURGERY      LASER FOR GLAUCOMA    LAPAROSCOPY      PACEMAKER INSERTION  05/10/2021    TONSILLECTOMY      UPPER GASTROINTESTINAL ENDOSCOPY      UPPER GASTROINTESTINAL ENDOSCOPY  04/12/2011    DILATATION, 58 FR ARTHUR, 100 UNITS BOTOX    UPPER GASTROINTESTINAL ENDOSCOPY  08/30/2011    58 FR DILATATION, 100 UNITS  BOTOX INJECTION    UPPER GASTROINTESTINAL ENDOSCOPY  3-9-12    with dilatation and submucosal injection: Botox    UPPER GASTROINTESTINAL ENDOSCOPY      UPPER GASTROINTESTINAL ENDOSCOPY  11/20/12     Esophagogastroduodenoscopy with Botulinum toxin injection of the pylorus and Arthur esophageal dilation    UPPER GASTROINTESTINAL ENDOSCOPY  6/4/13    WITH DIILITATION AND BOTOX INJECTION    UPPER GASTROINTESTINAL ENDOSCOPY  5/20/2014    100 unit Botox injection, 56 Fr.  Arthur esophageal dilatation    UPPER GASTROINTESTINAL ENDOSCOPY  12/12/14    with esophageal dilatation, and botox injection    UPPER GASTROINTESTINAL ENDOSCOPY  09/09/2015    With Dilitation and Botox injection    UPPER GASTROINTESTINAL ENDOSCOPY  5/10/2016    distal esophagus biopsy, stomach biopsy, botox injection    UPPER GASTROINTESTINAL ENDOSCOPY  04/11/2017    with dilatation and botox injection    UPPER GASTROINTESTINAL ENDOSCOPY  10/24/2017    DILATATION, BIOPSY, BOTOX    UPPER GASTROINTESTINAL ENDOSCOPY  03/06/2018    WITH BOTOX AND BALLOON DILATATION    UPPER GASTROINTESTINAL ENDOSCOPY N/A 5/7/2019    EGD SUBMUCOSAL/BOTOX INJECTION performed by Lo Tilley MD at [address] [facility] N/A 5/7/2019    EGD DILATION BALLOON performed by Lo Tilley MD at [address] N/A 3/13/2020    EGD SUBMUCOSAL/BOTOX INJECTION performed by Caryl Woods MD at [address] 3/13/2020    EGD DILATION BALLOON performed by Caryl Woods MD at [address] N/A 10/6/2020    EGD DILATION BALLOON performed by Caryl Woods MD at [address] N/A 10/6/2020    EGD SUBMUCOSAL/BOTOX INJECTION performed by Caryl Woods MD at [address] N/A 8/10/2021    EGD SUBMUCOSAL/BOTOX INJECTION performed by aCryl Woods MD at [address] N/A 8/10/2021    EGD BIOPSY performed by Caryl Woods MD at [address]       Discharge Medication List as of 4/13/2022  6:39 PM      CONTINUE these medications which have NOT CHANGED    Details   ibuprofen (ADVIL;MOTRIN) 600 MG tablet Take 1 tablet by mouth 3 times daily as needed for Pain, Disp-30 tablet, R-0Normal      diazePAM (VALIUM) 1 MG/ML solution Take 0.5 mLs by mouth every 8 hours as needed (anxiety) for up to 90 days. , Disp-30 mL, R-2Normal      ASPIRIN LOW DOSE 81 MG EC tablet TAKE ONE TABLET BY MOUTH DAILY, Disp-90 tablet, R-3Normal      TRUEplus Lancets 33G MISC Disp-100 each, R-3, NormalUSE TO CHECK BLOOD SUGAR 3-4 TIMES DAILY      [employer] PEN NEEDLES 32G X 4 MM MISC USE WITH INSULIN PEN ONCE DAILY, Disp-100 each, R-3Normal      acetaminophen (TYLENOL) 500 MG tablet Take 500 mg by mouth every 6 hours as needed for PainHistorical Med      meclizine (ANTIVERT) 25 MG tablet TAKE ONE TABLET BY MOUTH THREE TIMES A DAY AS NEEDED FOR DIZZINESS OR NAUSEA, Disp-15 tablet, R-0Normal      melatonin 3 MG TABS tablet Take 1 tablet by mouth daily, Disp-30 tablet, R-2Normal      insulin glargine (LANTUS SOLOSTAR) 100 UNIT/ML injection pen Inject 30 Units into the skin daily, Disp-4 pen, R-3Normal      Blood Glucose Monitoring Suppl (TRUE METRIX METER) w/Device KIT As needed, Disp-1 kit, R-0Normal      !! blood glucose test strips (TRUE METRIX BLOOD GLUCOSE TEST) strip 1 each by In Vitro route daily As needed. , Disp-100 each, R-3Normal      !! blood glucose test strips (TRUE METRIX BLOOD GLUCOSE TEST) strip USE ONE STRIP TO TEST TWICE A DAY AS NEEDED, Disp-50 strip, R-2Normal      loratadine (CLARITIN) 10 MG tablet TAKE ONE TABLET BY MOUTH DAILY, Disp-30 tablet, R-5Normal      guaiFENesin (MUCINEX MAXIMUM STRENGTH) 1200 MG TB12 Take 1 tablet by mouth daily For a PM dose., Disp-10 tablet, R-0Normal      Blood Pressure KIT DAILY Starting Tue 5/11/2021, Disp-1 kit, R-0, Normal      albuterol sulfate  (90 Base) MCG/ACT inhaler INHALE TWO PUFFS BY MOUTH EVERY 6 HOURS AS NEEDED FOR WHEEZING, Disp-1 Inhaler, R-2Normal      ketotifen (ZADITOR) 0.025 % ophthalmic solution INSTILL TWO DROPS IN [facility] EYE TWO TIMES A DAY AS NEEDED FOR ALLERGY, Disp-1 Bottle, R-2Normal      famotidine (PEPCID) 40 MG tablet Take 1 tablet by mouth 2 times daily, Disp-30 tablet,R-5Print      latanoprost (XALATAN) 0.005 % ophthalmic solution Place 1 drop into both eyes nightlyHistorical Med      Multiple Vitamins-Minerals (THERAPEUTIC MULTIVITAMIN-MINERALS) tablet Take 1 tablet by mouth daily Historical Med      Cholecalciferol (VITAMIN D3) 25 MCG (1000 UT) TABS Take 1 tablet by mouth daily, Disp-90 tablet, R-1Normal      Incontinence Supply Disposable (TRE CLASSIC BRIEFS/LARGE) MISC 1 each by Does not apply route 3 times daily, Disp-5 each, R-5Patient needs a 2xPrint      Incontinence Supplies MISC 2 TIMES DAILY Starting Mon 3/4/2019, Disp-5 each, R-5, Print      Disposable Gloves (VINYL GLOVES LARGE) MISC 1 Package by Does not apply route 4 times daily, Disp-5 each, R-5Print      GAS-X EXTRA STRENGTH 125 MG chewable tablet CHEW TWO TABLETS BY MOUTH THREE TIMES A DAY WITH EACH MEAL AS NEEDED FOR FLATULENCE, Disp-192 tablet, R-1Normal       !! - Potential duplicate medications found. Please discuss with provider. ALLERGIES     Lamictal [lamotrigine], Macrodantin [nitrofurantoin macrocrystal], Penicillins, Shellfish-derived products, Aspartame and phenylalanine, Bactrim, Biaxin [clarithromycin], Cephalexin, Ciprofloxacin, Hydrocodone-acetaminophen, Lansoprazole, Nexium [esomeprazole magnesium trihydrate], Other, Prilosec [omeprazole], Blueberry [vaccinium angustifolium], Monosodium glutamate, and Zmax [azithromycin dihydrate]    FAMILYHISTORY       Family History   Problem Relation Age of Onset    Diabetes Mother     Heart Disease Mother     Stroke Mother     Heart Disease Father     Heart Disease Brother     High Blood Pressure Brother     High Cholesterol Brother     Diabetes Brother     High Cholesterol Brother     Anesth Problems Neg Hx     Malig Hyperten Neg Hx     Hypotension Neg Hx     Malig Hypertherm Neg Hx     Pseudochol. Deficiency Neg Hx           SOCIAL HISTORY       Social History     Tobacco Use    Smoking status: Never Smoker    Smokeless tobacco: Never Used   Vaping Use    Vaping Use: Never used   Substance Use Topics    Alcohol use: No     Alcohol/week: 0.0 standard drinks    Drug use: No       SCREENINGS    Jd Coma Scale  Eye Opening: Spontaneous  Best Verbal Response: Oriented  Best Motor Response: Obeys commands  [place] Coma Scale Score: 15        PHYSICAL EXAM    (up to 7 for level 4, 8 or more for level 5)     ED Triage Vitals [04/13/22 1800]   BP Temp Temp Source Pulse Resp SpO2 Height Weight   (!) 151/60 99.5 °F (37.5 °C) Oral 74 16 99 % -- --       Physical Exam  Vitals and nursing note reviewed. Constitutional:       Appearance: Normal appearance. She is well-developed. She is not toxic-appearing or diaphoretic. HENT:      Head: Normocephalic and atraumatic.       Right Ear: External ear normal.      Left Ear: External ear normal. Nose: Nose normal.      Mouth/Throat:      Mouth: Mucous membranes are moist.      Pharynx: Oropharynx is clear. Eyes:      General:         Right eye: No discharge. Left eye: No discharge. Extraocular Movements: Extraocular movements intact. Conjunctiva/sclera: Conjunctivae normal.      Pupils: Pupils are equal, round, and reactive to light. Cardiovascular:      Rate and Rhythm: Normal rate. Pulses:           Femoral pulses are 2+ on the right side and 2+ on the left side. Dorsalis pedis pulses are 2+ on the right side and 2+ on the left side. Posterior tibial pulses are 2+ on the right side and 2+ on the left side. Pulmonary:      Effort: Pulmonary effort is normal.      Breath sounds: Normal breath sounds. Abdominal:      General: Bowel sounds are normal. There is no abdominal bruit. Palpations: Abdomen is soft. There is no pulsatile mass. Tenderness: There is no abdominal tenderness. There is no right CVA tenderness, left CVA tenderness, guarding or rebound. Negative signs include Ryan's sign, Rovsing's sign, McBurney's sign, psoas sign and obturator sign. Musculoskeletal:         General: Normal range of motion. Cervical back: Normal range of motion. Comments: Left lateral calf tenderness without edema   Skin:     General: Skin is warm and dry. Capillary Refill: Capillary refill takes less than 2 seconds. Coloration: Skin is not jaundiced or pale. Findings: No bruising, erythema, lesion or rash. Neurological:      Mental Status: She is alert and oriented to person, place, and time. Cranial Nerves: No cranial nerve deficit (II-XII intact). Motor: No weakness. Deep Tendon Reflexes: Reflexes normal.      Comments: Subjective numbness/tingling over the left lateral calf and toes of the left foot without skin discoloration, edema, mottling, poikilothermia, pallor.    Psychiatric:         Behavior: Behavior normal. DIAGNOSTIC RESULTS   LABS:    Labs Reviewed - No data to display    When ordered only abnormal lab results are displayed. All other labs were within normal range or not returned as of this dictation. EKG: When ordered, EKG's are interpreted by the Emergency Department Physician in the absence of a cardiologist.  Please see their note for interpretation of EKG. RADIOLOGY:   Non-plain film images such as CT, Ultrasound and MRI are read by the radiologist. Plain radiographic images are visualized and preliminarily interpreted by the ED Provider with the below findings:        Interpretation per the Radiologist below, if available at the time of this note:    No orders to display     VL EXTREMITY VENOUS LEFT    Result Date: 4/13/2022  Vascular Lower Extremities DVT Study Procedure -- PRELIMINARY SONOGRAPHER REPORT --   Demographics   Patient Name       JORGE White   Date of Study      04/13/2022         Gender              Female   Patient Number     [id number]         Date of Birth       1965   Visit Number       [id number]          Age                 61 year(s)   Accession Number   [id number]         Room Number   Corporate ID       [id number]            Sonographer         TRAN DriscollS   Ordering Physician Ai Garcia,    Interpreting        Hawa Bonilla MD                     PA-C               Physician  Procedure Type of Study:   Veins:Lower Extremities DVT Study, VL EXTREMITY VENOUS DUPLEX LEFT. Tech Comments Left Indeterminate isolated partially occluding deep vein thrombosis involving the left gastroc veins zone 5. No other evidence of deep vein or superficial vein thrombosis involving the left lower extremity and the right common femoral vein. Patient referred to the ER, however she states she will be going to lunch and coming back. Results called to Dereje Harvey in the ER.           PROCEDURES   Unless otherwise noted below, none     Procedures    CRITICAL CARE TIME   n/a    CONSULTS:  I spoke with Manasa, pharmacist, at [phone] about eliquis dosing. EMERGENCY DEPARTMENT COURSE and DIFFERENTIAL DIAGNOSIS/MDM:   Vitals:    Vitals:    04/13/22 1800   BP: (!) 151/60   Pulse: 74   Resp: 16   Temp: 99.5 °F (37.5 °C)   TempSrc: Oral   SpO2: 99%       Patient was given the following medications:  Medications   apixaban (ELIQUIS) tablet 10 mg (10 mg Oral Given 4/13/22 1840)         This patient presents to the emergency department with left leg pain and numbness tingling sensation. Is been present for over 1 week. She had a recent venous Doppler showing evidence of DVT near the area of discomfort. Was given first dose of Eliquis here. Was sent home with starter pack and advised to  additional prescription at her pharmacy. Advised to follow-up with PCP for recheck and may return to ED per discharge instructions. She states that she has an appointment on Friday with the PCP. She denies chest pain or shortness of breath. My suspicion is low for subungual hematoma, paronychia, eponychia, felon, flexor tenosynovitis,  ACS, PE,foreign body, tendon rupture, compartment syndrome, acute fracture, dislocation, arterial compromise or occlusion, limb ischemia, gout, septic joint, abscess, cellulitis, osteomyelitis, gonococcal arthritis, SCFE, avascular necrosis, Osgood-Schlatter syndrome, or other concerning pathology. FINAL IMPRESSION      1.  Acute deep vein thrombosis (DVT) of calf muscle vein of left lower extremity [facility])          DISPOSITION/PLAN   DISPOSITION Decision To Discharge 04/13/2022 06:17:56 PM      PATIENT REFERRED TO:  Ed Franks MD  [address]  [phone]      see your PCP in 1-3 days    [facility] Emergency Department  [address] [place] [address]  [phone]    If symptoms worsen      DISCHARGE MEDICATIONS:  Discharge Medication List as of 4/13/2022  6:39 PM      START taking these medications    Details   apixaban starter pack (ELIQUIS DVT/PE STARTER PACK) 5 MG TBPK tablet Take 1 tablet by mouth See Admin Instructions 10 mg BID for 6 days. Then 5 mg BID for 23 days. , Disp-100 tablet, R-0Normal             DISCONTINUED MEDICATIONS:  Discharge Medication List as of 4/13/2022  6:39 PM                 (Please note that portions of this note were completed with a voice recognition program.  Efforts were made to edit the dictations but occasionally words are mis-transcribed.)    Chantal Marroquin PA-C (electronically signed)           Chantal Marroquin PA-C  04/13/22 [id number]

## 2022-04-13 NOTE — TELEPHONE ENCOUNTER
Oliver Lucas in vascular dept states pt was given doppler order from hospital and an isolated clot was found in left leg behind knee. Pt is asking if DCE would treat this. Please advise. Oliver Lucas is keeping pt until she hears back.

## 2022-04-15 ENCOUNTER — OFFICE VISIT (OUTPATIENT)
Dept: INTERNAL MEDICINE CLINIC | Age: 57
End: 2022-04-15
Payer: COMMERCIAL

## 2022-04-15 VITALS
HEART RATE: 75 BPM | DIASTOLIC BLOOD PRESSURE: 68 MMHG | HEIGHT: 62 IN | BODY MASS INDEX: 34.48 KG/M2 | OXYGEN SATURATION: 100 % | SYSTOLIC BLOOD PRESSURE: 94 MMHG | WEIGHT: 187.4 LBS

## 2022-04-15 DIAGNOSIS — E11.9 TYPE 2 DIABETES MELLITUS WITHOUT COMPLICATION, WITH LONG-TERM CURRENT USE OF INSULIN (HCC): ICD-10-CM

## 2022-04-15 DIAGNOSIS — I25.10 CORONARY ARTERY DISEASE INVOLVING NATIVE CORONARY ARTERY OF NATIVE HEART WITHOUT ANGINA PECTORIS: ICD-10-CM

## 2022-04-15 DIAGNOSIS — M06.9 RHEUMATOID ARTHRITIS INVOLVING MULTIPLE SITES, UNSPECIFIED WHETHER RHEUMATOID FACTOR PRESENT (HCC): ICD-10-CM

## 2022-04-15 DIAGNOSIS — Z79.4 TYPE 2 DIABETES MELLITUS WITHOUT COMPLICATION, WITH LONG-TERM CURRENT USE OF INSULIN (HCC): ICD-10-CM

## 2022-04-15 DIAGNOSIS — I82.412 DVT OF DEEP FEMORAL VEIN, LEFT (HCC): Primary | ICD-10-CM

## 2022-04-15 DIAGNOSIS — I82.412 DVT OF DEEP FEMORAL VEIN, LEFT (HCC): ICD-10-CM

## 2022-04-15 DIAGNOSIS — J45.20 MILD INTERMITTENT ASTHMA WITHOUT COMPLICATION: ICD-10-CM

## 2022-04-15 DIAGNOSIS — F33.1 MODERATE EPISODE OF RECURRENT MAJOR DEPRESSIVE DISORDER (HCC): ICD-10-CM

## 2022-04-15 PROBLEM — I20.0 UNSTABLE ANGINA (HCC): Status: RESOLVED | Noted: 2019-03-11 | Resolved: 2022-04-15

## 2022-04-15 PROBLEM — R07.9 CHEST PAIN: Status: RESOLVED | Noted: 2019-01-23 | Resolved: 2022-04-15

## 2022-04-15 PROBLEM — R42 DIZZINESS: Status: RESOLVED | Noted: 2020-07-09 | Resolved: 2022-04-15

## 2022-04-15 PROCEDURE — 2022F DILAT RTA XM EVC RTNOPTHY: CPT | Performed by: INTERNAL MEDICINE

## 2022-04-15 PROCEDURE — 3017F COLORECTAL CA SCREEN DOC REV: CPT | Performed by: INTERNAL MEDICINE

## 2022-04-15 PROCEDURE — G8417 CALC BMI ABV UP PARAM F/U: HCPCS | Performed by: INTERNAL MEDICINE

## 2022-04-15 PROCEDURE — 3052F HG A1C>EQUAL 8.0%<EQUAL 9.0%: CPT | Performed by: INTERNAL MEDICINE

## 2022-04-15 PROCEDURE — 99204 OFFICE O/P NEW MOD 45 MIN: CPT | Performed by: INTERNAL MEDICINE

## 2022-04-15 PROCEDURE — 1036F TOBACCO NON-USER: CPT | Performed by: INTERNAL MEDICINE

## 2022-04-15 PROCEDURE — G8427 DOCREV CUR MEDS BY ELIG CLIN: HCPCS | Performed by: INTERNAL MEDICINE

## 2022-04-15 SDOH — ECONOMIC STABILITY: FOOD INSECURITY: WITHIN THE PAST 12 MONTHS, THE FOOD YOU BOUGHT JUST DIDN'T LAST AND YOU DIDN'T HAVE MONEY TO GET MORE.: NEVER TRUE

## 2022-04-15 SDOH — ECONOMIC STABILITY: FOOD INSECURITY: WITHIN THE PAST 12 MONTHS, YOU WORRIED THAT YOUR FOOD WOULD RUN OUT BEFORE YOU GOT MONEY TO BUY MORE.: NEVER TRUE

## 2022-04-15 ASSESSMENT — PATIENT HEALTH QUESTIONNAIRE - PHQ9
1. LITTLE INTEREST OR PLEASURE IN DOING THINGS: 1
3. TROUBLE FALLING OR STAYING ASLEEP: 3
SUM OF ALL RESPONSES TO PHQ QUESTIONS 1-9: 17
SUM OF ALL RESPONSES TO PHQ QUESTIONS 1-9: 17
5. POOR APPETITE OR OVEREATING: 3
6. FEELING BAD ABOUT YOURSELF - OR THAT YOU ARE A FAILURE OR HAVE LET YOURSELF OR YOUR FAMILY DOWN: 1
4. FEELING TIRED OR HAVING LITTLE ENERGY: 3
8. MOVING OR SPEAKING SO SLOWLY THAT OTHER PEOPLE COULD HAVE NOTICED. OR THE OPPOSITE, BEING SO FIGETY OR RESTLESS THAT YOU HAVE BEEN MOVING AROUND A LOT MORE THAN USUAL: 3
7. TROUBLE CONCENTRATING ON THINGS, SUCH AS READING THE NEWSPAPER OR WATCHING TELEVISION: 2
9. THOUGHTS THAT YOU WOULD BE BETTER OFF DEAD, OR OF HURTING YOURSELF: 0
SUM OF ALL RESPONSES TO PHQ QUESTIONS 1-9: 17
SUM OF ALL RESPONSES TO PHQ QUESTIONS 1-9: 17
2. FEELING DOWN, DEPRESSED OR HOPELESS: 1
SUM OF ALL RESPONSES TO PHQ9 QUESTIONS 1 & 2: 2
10. IF YOU CHECKED OFF ANY PROBLEMS, HOW DIFFICULT HAVE THESE PROBLEMS MADE IT FOR YOU TO DO YOUR WORK, TAKE CARE OF THINGS AT HOME, OR GET ALONG WITH OTHER PEOPLE: 1

## 2022-04-15 ASSESSMENT — ENCOUNTER SYMPTOMS
CHEST TIGHTNESS: 0
BACK PAIN: 0
VOMITING: 0
COLOR CHANGE: 0
ABDOMINAL PAIN: 0
SORE THROAT: 0
SHORTNESS OF BREATH: 0
WHEEZING: 0
COUGH: 0
NAUSEA: 0
CONSTIPATION: 0

## 2022-04-15 ASSESSMENT — SOCIAL DETERMINANTS OF HEALTH (SDOH): HOW HARD IS IT FOR YOU TO PAY FOR THE VERY BASICS LIKE FOOD, HOUSING, MEDICAL CARE, AND HEATING?: NOT HARD AT ALL

## 2022-04-15 NOTE — ASSESSMENT & PLAN NOTE
Results of depression screening reviewed and discussed with patient, she is not on any medications at this point, she does not want to see psychiatry, states her diagnosis was bipolar not just major depression but she feels things are manageable now and does not want to be on any medications

## 2022-04-15 NOTE — ASSESSMENT & PLAN NOTE
Continue care and recommendations as per endocrinology, last hemoglobin A1c went up secondary to recent use of steroids however she is aware to maintain low-carb diet and insulin dose was adjusted by endocrinology

## 2022-04-15 NOTE — ASSESSMENT & PLAN NOTE
This has been stable and well-controlled, only using albuterol as needed, continue the same and avoid smoke and known triggers

## 2022-04-15 NOTE — ASSESSMENT & PLAN NOTE
Advised patient since no lab work done to confirm diagnosis we will go ahead and order the labs and then if in fact is supportive of rheumatoid arthritis will refer to rheumatology for further evaluation since currently unable to use NSAIDs due to her being on Eliquis.   Can use Tylenol for pain management for now

## 2022-04-15 NOTE — PROGRESS NOTES
ASSESSMENT/PLAN:  1. DVT of deep femoral vein, left [facility])  Assessment & Plan: This appears to be unprovoked at this point, explained to patient will need to be on anticoagulation for the next few weeks, will check coagulation studies and further action will be pending lab results and clinical response to Eliquis  Orders:  -     Factor 5 Activity; Future  -     PROTEIN C ANTIGEN, TOTAL; Future  -     PROTEIN S ANTIGEN, TOTAL; Future  -     apixaban (ELIQUIS) 5 MG TABS tablet; Take 1 tablet by mouth 2 times daily, Disp-60 tablet, R-2Normal  2. Rheumatoid arthritis involving multiple sites, unspecified whether rheumatoid factor present [facility])  Assessment & Plan:   Advised patient since no lab work done to confirm diagnosis we will go ahead and order the labs and then if in fact is supportive of rheumatoid arthritis will refer to rheumatology for further evaluation since currently unable to use NSAIDs due to her being on Eliquis. Can use Tylenol for pain management for now  Orders:  -     C-REACTIVE PROTEIN HIGH SENSITIVITY; Future  -     RHEUMATOID FACTOR; Future  -     MICHELLE Reflex to Antibody Cascade; Future  -     CYCLIC CITRUL PEPTIDE ANTIBODY, IGG; Future  3. Mild intermittent asthma without complication  Assessment & Plan: This has been stable and well-controlled, only using albuterol as needed, continue the same and avoid smoke and known triggers  4. Coronary artery disease involving native coronary artery of native heart without angina pectoris  Assessment & Plan:   Patient is to continue care and recommendation as per cardiology, she goes for device check regularly and is scheduled to see cardiologist in September. She is denying any chest pain or anginal symptoms, continue same medications for now  5.  Moderate episode of recurrent major depressive disorder [facility])  Assessment & Plan:  Results of depression screening reviewed and discussed with patient, she is not on any medications at this point, she does not want to see psychiatry, states her diagnosis was bipolar not just major depression but she feels things are manageable now and does not want to be on any medications    6. Type 2 diabetes mellitus without complication, with long-term current use of insulin [facility])  Assessment & Plan:   Continue care and recommendations as per endocrinology, last hemoglobin A1c went up secondary to recent use of steroids however she is aware to maintain low-carb diet and insulin dose was adjusted by endocrinology      Return in about 2 weeks (around 4/29/2022). SUBJECTIVE  HPI:   Patient here to establish new patient office visit and follow-up on recent emergency room visit for acute DVT of left lower extremity. She has not seen her PCP in over 2 years, she does have multiple medical problems and has been following up with her specialists regularly, she does follow-up with cardiology for coronary artery disease, Mobitz 2 heart block, s/p pacemaker, follows up with endocrinology for type 2 diabetes mellitus maintained on Lantus insulin, she also used to see psychiatry nurse practitioner for an anxiety and bipolar depression, last visit was in January however her practitioner left the practice and she did not establish with another psychiatrist since then. She presented to the emergency room with left leg pain and swelling on April 4, at that time venous Doppler could not be performed and patient had that completed the 13th, results came back positive for DVT and patient was sent back to the emergency room April 13 where she was started on Eliquis and requested to follow-up with PCP  She states she came down with COVID infection 3 times first time in 2020 and then twice in 2021 in January and December.   With this recent blood clot and leg pain she is denying any shortness of breath, she is not aware of any prior history of coagulation disorders, she states she was diagnosed with rheumatoid arthritis but never seen rheumatology and only used as needed ibuprofen for both rheumatoid and osteoarthritis affecting multiple joints      Review of Systems   Constitutional: Negative for activity change, appetite change and fatigue. HENT: Negative for congestion, hearing loss, mouth sores and sore throat. Eyes: Negative for visual disturbance. Respiratory: Negative for cough, chest tightness, shortness of breath and wheezing. Cardiovascular: Negative for chest pain, palpitations and leg swelling. Gastrointestinal: Negative for abdominal pain, constipation, nausea and vomiting. Genitourinary: Negative for difficulty urinating, dysuria, frequency, hematuria and urgency. Musculoskeletal: Positive for arthralgias and myalgias. Negative for back pain, gait problem and joint swelling. Skin: Negative for color change. Allergic/Immunologic: Negative for environmental allergies and immunocompromised state. Neurological: Negative for dizziness, weakness, light-headedness and headaches. Hematological: Does not bruise/bleed easily. Psychiatric/Behavioral: Positive for dysphoric mood and sleep disturbance. Negative for behavioral problems. The patient is nervous/anxious. OBJECTIVE:    BP 94/68   Pulse 75   Ht 5' 2\" (1.575 m)   Wt 187 lb 6.4 oz (85 kg)   LMP  (LMP Unknown)   SpO2 100%   BMI 34.28 kg/m²    Physical Exam  Constitutional:       General: She is not in acute distress. Appearance: Normal appearance. She is obese. She is not toxic-appearing. HENT:      Head: Normocephalic. Mouth/Throat:      Mouth: Mucous membranes are moist.      Pharynx: Oropharynx is clear. Eyes:      Conjunctiva/sclera: Conjunctivae normal.   Cardiovascular:      Rate and Rhythm: Normal rate and regular rhythm. Heart sounds: Normal heart sounds. No murmur heard. Pulmonary:      Effort: Pulmonary effort is normal. No respiratory distress. Breath sounds: Normal breath sounds. No wheezing.    Abdominal:      Palpations: Abdomen is soft. Musculoskeletal:         General: Swelling and tenderness present. Normal range of motion. Cervical back: Neck supple. Right lower leg: No edema. Left lower leg: Edema present. Skin:     General: Skin is warm and dry. Findings: No erythema. Neurological:      General: No focal deficit present. Mental Status: She is alert and oriented to person, place, and time. Cranial Nerves: No cranial nerve deficit. Psychiatric:         Mood and Affect: Mood normal.         Thought Content: Thought content normal.           Electronically signed by Pepe Givens MD on 4/15/2022 at 2:27 PM.    This dictation was generated by voice recognition computer software. Although all attempts are made to edit the dictation for accuracy, there may be errors in the transcription that are not intended.

## 2022-04-15 NOTE — ASSESSMENT & PLAN NOTE
Patient is to continue care and recommendation as per cardiology, she goes for device check regularly and is scheduled to see cardiologist in September.   She is denying any chest pain or anginal symptoms, continue same medications for now

## 2022-04-15 NOTE — ASSESSMENT & PLAN NOTE
This appears to be unprovoked at this point, explained to patient will need to be on anticoagulation for the next few weeks, will check coagulation studies and further action will be pending lab results and clinical response to Eliquis

## 2022-04-16 LAB
ANTI-NUCLEAR ANTIBODY (ANA): NEGATIVE
C-REACTIVE PROTEIN, HIGH SENSITIVITY: 65.71 MG/L (ref 0.16–3)
CYCLIC CITRULLINATED PEPTIDE ANTIBODY IGG: <0.5 U/ML (ref 0–2.9)
RHEUMATOID FACTOR: <10 IU/ML

## 2022-04-19 ENCOUNTER — TELEPHONE (OUTPATIENT)
Dept: CARDIOLOGY CLINIC | Age: 57
End: 2022-04-19

## 2022-04-19 NOTE — TELEPHONE ENCOUNTER
Patient called in and stated that she was seen by her new PCP on 4/15 and she wanted Dr. Orlando Luis to see all her test results. Patent was started on a new blood thinner   apixaban (ELIQUIS) 5 MG TABS tablet   she was started on this last Wed 4/13. She wants to make sure he is aware of everything that's going and look over her lab work as well. Patient can be called back at [phone].

## 2022-04-21 LAB — FACTOR V ACTIVITY: 88 % (ref 62–140)

## 2022-04-22 LAB — PROTEIN S ANTIGEN, TOTAL: 165 % (ref 63–126)

## 2022-04-23 LAB — PROTEIN C ANTIGEN: 93 % (ref 63–153)

## 2022-04-29 ENCOUNTER — OFFICE VISIT (OUTPATIENT)
Dept: INTERNAL MEDICINE CLINIC | Age: 57
End: 2022-04-29
Payer: COMMERCIAL

## 2022-04-29 VITALS
SYSTOLIC BLOOD PRESSURE: 114 MMHG | DIASTOLIC BLOOD PRESSURE: 80 MMHG | OXYGEN SATURATION: 98 % | HEIGHT: 62 IN | HEART RATE: 85 BPM | BODY MASS INDEX: 34.52 KG/M2 | WEIGHT: 187.6 LBS

## 2022-04-29 DIAGNOSIS — M15.9 OSTEOARTHRITIS OF MULTIPLE JOINTS, UNSPECIFIED OSTEOARTHRITIS TYPE: ICD-10-CM

## 2022-04-29 DIAGNOSIS — D68.59 PROTEIN S DEFICIENCY (HCC): ICD-10-CM

## 2022-04-29 DIAGNOSIS — I82.412 DVT OF DEEP FEMORAL VEIN, LEFT (HCC): Primary | ICD-10-CM

## 2022-04-29 DIAGNOSIS — J30.89 NON-SEASONAL ALLERGIC RHINITIS, UNSPECIFIED TRIGGER: ICD-10-CM

## 2022-04-29 PROBLEM — H93.8X3 SENSATION OF FULLNESS IN EAR, BILATERAL: Status: RESOLVED | Noted: 2020-07-09 | Resolved: 2022-04-29

## 2022-04-29 PROBLEM — H91.93 BILATERAL HEARING LOSS: Status: RESOLVED | Noted: 2020-07-09 | Resolved: 2022-04-29

## 2022-04-29 PROCEDURE — G8427 DOCREV CUR MEDS BY ELIG CLIN: HCPCS | Performed by: INTERNAL MEDICINE

## 2022-04-29 PROCEDURE — 1036F TOBACCO NON-USER: CPT | Performed by: INTERNAL MEDICINE

## 2022-04-29 PROCEDURE — 99214 OFFICE O/P EST MOD 30 MIN: CPT | Performed by: INTERNAL MEDICINE

## 2022-04-29 PROCEDURE — G8417 CALC BMI ABV UP PARAM F/U: HCPCS | Performed by: INTERNAL MEDICINE

## 2022-04-29 PROCEDURE — 3017F COLORECTAL CA SCREEN DOC REV: CPT | Performed by: INTERNAL MEDICINE

## 2022-04-29 RX ORDER — LORATADINE 10 MG/1
10 TABLET ORAL DAILY
Qty: 30 TABLET | Refills: 5 | Status: SHIPPED | OUTPATIENT
Start: 2022-04-29 | End: 2022-09-01 | Stop reason: SDUPTHER

## 2022-04-29 ASSESSMENT — ENCOUNTER SYMPTOMS
SHORTNESS OF BREATH: 0
VOMITING: 0
NAUSEA: 0
WHEEZING: 0
SORE THROAT: 0
COLOR CHANGE: 0
COUGH: 0
CHEST TIGHTNESS: 0
BACK PAIN: 0
ABDOMINAL PAIN: 0
CONSTIPATION: 0

## 2022-04-29 NOTE — PROGRESS NOTES
ASSESSMENT/PLAN:  1. DVT of deep femoral vein, left (HCC)  Assessment & Plan:   Results of protein S antigen were elevated, along with elevated C-reactive protein, advised patient not sure if this is accurate in the setting of anticoagulation therapy to suggest protein S deficiency however since this will affect decision of duration of Eliquis therapy recommend referral to hematology for further evaluation and work-up. She is tolerating Eliquis with no major side effects and no reports of any major bleeding, continue the same  Orders:  -     RICHELLE Hernandez MD, Oncology, [facility]  2. Osteoarthritis of multiple joints, unspecified osteoarthritis type  Assessment & Plan:   Advised patient will remove the diagnosis of rheumatoid arthritis from her chart since all markers for autoimmune arthritis came back negative and her clinical picture is not suggestive of rheumatoid, advised she may have seronegative rheumatoid arthritis but since she does not want to be on any biological agents will need to be treated as osteoarthritis of multiple sites, will continue as needed Tylenol since currently not using ibuprofen due to her being on Eliquis, explained to patient risk of blood thinning effect worsening with chronic and frequent use of NSAIDs on top of Eliquis however she can still use it infrequently for breakthrough pain  3. Non-seasonal allergic rhinitis, unspecified trigger  Assessment & Plan:   Refilled Claritin, avoid smoke and known triggers as possible  Orders:  -     loratadine (CLARITIN) 10 MG tablet; Take 1 tablet by mouth daily, Disp-30 tablet, R-5Normal  4. Protein S deficiency [facility])  Assessment & Plan:   As noted above  Orders:  -     RICHELLE Hernandez MD, Oncology, [facility]      Return in about 6 months (around 10/29/2022). SUBJECTIVE  HPI:   Is here to follow-up on results of lab work, she recently developed DVT of left lower extremity.   Was started on anticoagulation following ER visit and seen in the office 2 weeks ago. She has been feeling okay since her last visit other than her chronic medical problems and allergy flaring up      Review of Systems   Constitutional: Negative for activity change, appetite change, fatigue and unexpected weight change. HENT: Negative for congestion, hearing loss, mouth sores and sore throat. Eyes: Negative for visual disturbance. Respiratory: Negative for cough, chest tightness, shortness of breath and wheezing. Cardiovascular: Negative for chest pain, palpitations and leg swelling. Gastrointestinal: Negative for abdominal pain, constipation, nausea and vomiting. Genitourinary: Positive for frequency and urgency. Negative for difficulty urinating, dysuria and hematuria. Musculoskeletal: Positive for arthralgias. Negative for back pain, gait problem and joint swelling. Skin: Negative for color change. Allergic/Immunologic: Positive for environmental allergies. Negative for immunocompromised state. Neurological: Negative for dizziness, light-headedness and headaches. Psychiatric/Behavioral: Positive for dysphoric mood and sleep disturbance. Negative for behavioral problems. The patient is not nervous/anxious. OBJECTIVE:    /80   Pulse 85   Ht 5' 2\" (1.575 m)   Wt 187 lb 9.6 oz (85.1 kg)   LMP  (LMP Unknown)   SpO2 98%   BMI 34.31 kg/m²    Physical Exam  Constitutional:       General: She is not in acute distress. Appearance: Normal appearance. She is obese. She is not toxic-appearing. HENT:      Head: Normocephalic. Eyes:      Conjunctiva/sclera: Conjunctivae normal.   Cardiovascular:      Rate and Rhythm: Normal rate. Heart sounds: Normal heart sounds. Pulmonary:      Effort: Pulmonary effort is normal. No respiratory distress. Abdominal:      Palpations: Abdomen is soft. Musculoskeletal:         General: Normal range of motion. Left lower leg: Edema present.    Skin:     General: Skin is warm and dry. Neurological:      General: No focal deficit present. Mental Status: She is alert. Cranial Nerves: No cranial nerve deficit. Psychiatric:         Mood and Affect: Mood normal.           Electronically signed by Aristeo Sandoval MD on 4/29/2022 at 2:19 PM.    This dictation was generated by voice recognition computer software. Although all attempts are made to edit the dictation for accuracy, there may be errors in the transcription that are not intended.

## 2022-04-29 NOTE — ASSESSMENT & PLAN NOTE
Advised patient will remove the diagnosis of rheumatoid arthritis from her chart since all markers for autoimmune arthritis came back negative and her clinical picture is not suggestive of rheumatoid, advised she may have seronegative rheumatoid arthritis but since she does not want to be on any biological agents will need to be treated as osteoarthritis of multiple sites, will continue as needed Tylenol since currently not using ibuprofen due to her being on Eliquis, explained to patient risk of blood thinning effect worsening with chronic and frequent use of NSAIDs on top of Eliquis however she can still use it infrequently for breakthrough pain

## 2022-04-29 NOTE — ASSESSMENT & PLAN NOTE
Results of protein S antigen were elevated, along with elevated C-reactive protein, advised patient not sure if this is accurate in the setting of anticoagulation therapy to suggest protein S deficiency however since this will affect decision of duration of Eliquis therapy recommend referral to hematology for further evaluation and work-up.   She is tolerating Eliquis with no major side effects and no reports of any major bleeding, continue the same

## 2022-05-10 ENCOUNTER — HOSPITAL ENCOUNTER (OUTPATIENT)
Age: 57
Discharge: HOME | End: 2022-05-10
Payer: COMMERCIAL

## 2022-05-10 LAB
A/G RATIO: 1.8 (ref 1.1–2.2)
ALBUMIN SERPL-MCNC: 4.2 G/DL (ref 3.4–5)
ALP BLD-CCNC: 21 U/L (ref 40–129)
ALT SERPL-CCNC: 21 U/L (ref 10–40)
ANION GAP SERPL CALCULATED.3IONS-SCNC: 16 MMOL/L (ref 3–16)
AST SERPL-CCNC: 21 U/L (ref 15–37)
BASOPHILS ABSOLUTE: 0.1 K/UL (ref 0–0.2)
BASOPHILS RELATIVE PERCENT: 0.9 %
BILIRUB SERPL-MCNC: 0.3 MG/DL (ref 0–1)
BUN BLDV-MCNC: 14 MG/DL (ref 7–20)
CALCIUM SERPL-MCNC: 9.9 MG/DL (ref 8.3–10.6)
CHLORIDE BLD-SCNC: 99 MMOL/L (ref 99–110)
CO2: 24 MMOL/L (ref 21–32)
CREAT SERPL-MCNC: 0.7 MG/DL (ref 0.6–1.1)
EOSINOPHILS ABSOLUTE: 0.1 K/UL (ref 0–0.6)
EOSINOPHILS RELATIVE PERCENT: 1.2 %
GFR AFRICAN AMERICAN: >60
GFR NON-AFRICAN AMERICAN: >60
GLUCOSE BLD-MCNC: 265 MG/DL (ref 70–99)
HCT VFR BLD CALC: 39.2 % (ref 36–48)
HEMOGLOBIN: 12.7 G/DL (ref 12–16)
INR BLD: 1.14 (ref 0.88–1.12)
LYMPHOCYTES ABSOLUTE: 3.1 K/UL (ref 1–5.1)
LYMPHOCYTES RELATIVE PERCENT: 31 %
MCH RBC QN AUTO: 28.7 PG (ref 26–34)
MCHC RBC AUTO-ENTMCNC: 32.4 G/DL (ref 31–36)
MCV RBC AUTO: 88.5 FL (ref 80–100)
MONOCYTES ABSOLUTE: 0.6 K/UL (ref 0–1.3)
MONOCYTES RELATIVE PERCENT: 6 %
NEUTROPHILS ABSOLUTE: 6.1 K/UL (ref 1.7–7.7)
NEUTROPHILS RELATIVE PERCENT: 60.9 %
PDW BLD-RTO: 15.4 % (ref 12.4–15.4)
PLATELET # BLD: 212 K/UL (ref 135–450)
PMV BLD AUTO: 10 FL (ref 5–10.5)
POTASSIUM SERPL-SCNC: 4.2 MMOL/L (ref 3.5–5.1)
PROTHROMBIN TIME: 12.9 SEC (ref 9.9–12.7)
RBC # BLD: 4.43 M/UL (ref 4–5.2)
SODIUM BLD-SCNC: 139 MMOL/L (ref 136–145)
TOTAL PROTEIN: 6.5 G/DL (ref 6.4–8.2)
WBC # BLD: 10 K/UL (ref 4–11)

## 2022-05-10 PROCEDURE — 80053 COMPREHEN METABOLIC PANEL: CPT

## 2022-05-10 PROCEDURE — 85025 COMPLETE CBC W/AUTO DIFF WBC: CPT

## 2022-05-10 PROCEDURE — 36415 COLL VENOUS BLD VENIPUNCTURE: CPT

## 2022-05-10 PROCEDURE — 85610 PROTHROMBIN TIME: CPT

## 2022-05-24 ENCOUNTER — NURSE ONLY (OUTPATIENT)
Dept: CARDIOLOGY CLINIC | Age: 57
End: 2022-05-24
Payer: COMMERCIAL

## 2022-05-24 DIAGNOSIS — Z95.0 PACEMAKER: ICD-10-CM

## 2022-05-24 DIAGNOSIS — I44.30 HEART BLOCK ATRIOVENTRICULAR: ICD-10-CM

## 2022-05-24 PROCEDURE — 93294 REM INTERROG EVL PM/LDLS PM: CPT | Performed by: INTERNAL MEDICINE

## 2022-05-24 PROCEDURE — 93296 REM INTERROG EVL PM/IDS: CPT | Performed by: INTERNAL MEDICINE

## 2022-05-24 NOTE — PROGRESS NOTES
We received remote transmission from patient's monitor at home. Transmission shows normal sensing and pacing function. EP physician will review. See interrogation under cardiology tab in the [address] [facility] [address] field for more details.

## 2022-05-26 ENCOUNTER — HOSPITAL ENCOUNTER (OUTPATIENT)
Dept: WOMENS IMAGING | Age: 57
Discharge: HOME | End: 2022-05-26
Payer: COMMERCIAL

## 2022-05-26 VITALS — BODY MASS INDEX: 34.41 KG/M2 | WEIGHT: 187 LBS | HEIGHT: 62 IN

## 2022-05-26 DIAGNOSIS — Z12.31 BREAST CANCER SCREENING BY MAMMOGRAM: ICD-10-CM

## 2022-05-26 PROCEDURE — 77063 BREAST TOMOSYNTHESIS BI: CPT

## 2022-06-15 NOTE — PROGRESS NOTES
Patient reached ____ yes  __X___ no   VM instructions left __X__ yes   phone number __[phone]______                                ____ no-office notified          Date __6/23/2022_______  Time ___0830____  Arrival __0700   [facility]____    Nothing to eat or drink after midnight-follow your doctors prep instructions-this may include taking a second dose of your prep after midnight  Responsible adult 25 or older to stay on site while you are here-drive you home-stay with you after  Follow any instructions your doctors office has given you  Bring a complete list of all your medications and supplements including name,dose,how often taken the day of your procedure  If you normally take the following medications in the morning please do so the AM of your procedure with a small sip of water       Heart,blood pressure,seizure,thyroid or breathing medications-use your inhalers       DO NOT take blood pressure medications ending in \"marcus\" or \"pril\" the AM of procedure or evening prior  Take half or your normal dose of any long acting insulins the night before your procedure-do not take any diabetic medications the AM of procedure  Follow your doctors instructions regarding stopping or taking  any blood thinners-if you do not have instructions-call them  Any questions call your doctor  Other ____PLEASE GET GUIDANCE REGARDING YOUR [address] [place] [address] DOCTOR__________________________________________________________                VISITOR POLICY(subject to change)             The current policy is 2 visitors per patient. There are no children allowed. Everyone must mask. Visiting hours are 8a-8p. Overnight visitors will be at the discretion of the nurse.

## 2022-06-23 ENCOUNTER — HOSPITAL ENCOUNTER (OUTPATIENT)
Age: 57
Setting detail: OUTPATIENT SURGERY
Discharge: HOME | End: 2022-06-23
Attending: INTERNAL MEDICINE | Admitting: INTERNAL MEDICINE
Payer: COMMERCIAL

## 2022-06-23 ENCOUNTER — ANESTHESIA EVENT (OUTPATIENT)
Dept: ENDOSCOPY | Age: 57
End: 2022-06-23
Payer: COMMERCIAL

## 2022-06-23 ENCOUNTER — ANESTHESIA (OUTPATIENT)
Dept: ENDOSCOPY | Age: 57
End: 2022-06-23
Payer: COMMERCIAL

## 2022-06-23 VITALS
HEART RATE: 69 BPM | RESPIRATION RATE: 13 BRPM | SYSTOLIC BLOOD PRESSURE: 142 MMHG | HEIGHT: 62 IN | OXYGEN SATURATION: 100 % | DIASTOLIC BLOOD PRESSURE: 55 MMHG | TEMPERATURE: 97.5 F | WEIGHT: 187 LBS | BODY MASS INDEX: 34.41 KG/M2

## 2022-06-23 LAB
GLUCOSE BLD-MCNC: 198 MG/DL (ref 70–99)
PERFORMED ON: ABNORMAL

## 2022-06-23 PROCEDURE — 2709999900 HC NON-CHARGEABLE SUPPLY: Performed by: INTERNAL MEDICINE

## 2022-06-23 PROCEDURE — 7100000011 HC PHASE II RECOVERY - ADDTL 15 MIN: Performed by: INTERNAL MEDICINE

## 2022-06-23 PROCEDURE — 3700000000 HC ANESTHESIA ATTENDED CARE: Performed by: INTERNAL MEDICINE

## 2022-06-23 PROCEDURE — 6370000000 HC RX 637 (ALT 250 FOR IP): Performed by: ANESTHESIOLOGY

## 2022-06-23 PROCEDURE — 2580000003 HC RX 258: Performed by: INTERNAL MEDICINE

## 2022-06-23 PROCEDURE — 6360000002 HC RX W HCPCS: Performed by: INTERNAL MEDICINE

## 2022-06-23 PROCEDURE — 7100000001 HC PACU RECOVERY - ADDTL 15 MIN: Performed by: INTERNAL MEDICINE

## 2022-06-23 PROCEDURE — 7100000010 HC PHASE II RECOVERY - FIRST 15 MIN: Performed by: INTERNAL MEDICINE

## 2022-06-23 PROCEDURE — 7100000000 HC PACU RECOVERY - FIRST 15 MIN: Performed by: INTERNAL MEDICINE

## 2022-06-23 PROCEDURE — 3609015300 HC ESOPHAGEAL DILATION MALONEY: Performed by: INTERNAL MEDICINE

## 2022-06-23 PROCEDURE — 6360000002 HC RX W HCPCS: Performed by: NURSE ANESTHETIST, CERTIFIED REGISTERED

## 2022-06-23 PROCEDURE — 3609012700 HC EGD DILATION SAVORY: Performed by: INTERNAL MEDICINE

## 2022-06-23 PROCEDURE — 3700000001 HC ADD 15 MINUTES (ANESTHESIA): Performed by: INTERNAL MEDICINE

## 2022-06-23 PROCEDURE — 3609013800 HC EGD SUBMUCOSAL/BOTOX INJECTION: Performed by: INTERNAL MEDICINE

## 2022-06-23 PROCEDURE — 2500000003 HC RX 250 WO HCPCS: Performed by: NURSE ANESTHETIST, CERTIFIED REGISTERED

## 2022-06-23 RX ORDER — PROPOFOL 10 MG/ML
INJECTION, EMULSION INTRAVENOUS PRN
Status: DISCONTINUED | OUTPATIENT
Start: 2022-06-23 | End: 2022-06-23 | Stop reason: SDUPTHER

## 2022-06-23 RX ORDER — ONDANSETRON 2 MG/ML
INJECTION INTRAMUSCULAR; INTRAVENOUS PRN
Status: DISCONTINUED | OUTPATIENT
Start: 2022-06-23 | End: 2022-06-23 | Stop reason: SDUPTHER

## 2022-06-23 RX ORDER — GLYCOPYRROLATE 0.2 MG/ML
INJECTION INTRAMUSCULAR; INTRAVENOUS PRN
Status: DISCONTINUED | OUTPATIENT
Start: 2022-06-23 | End: 2022-06-23 | Stop reason: SDUPTHER

## 2022-06-23 RX ORDER — KETAMINE HCL IN NACL, ISO-OSM 100MG/10ML
SYRINGE (ML) INJECTION PRN
Status: DISCONTINUED | OUTPATIENT
Start: 2022-06-23 | End: 2022-06-23 | Stop reason: SDUPTHER

## 2022-06-23 RX ORDER — SCOLOPAMINE TRANSDERMAL SYSTEM 1 MG/1
1 PATCH, EXTENDED RELEASE TRANSDERMAL ONCE
Status: DISCONTINUED | OUTPATIENT
Start: 2022-06-23 | End: 2022-06-23 | Stop reason: HOSPADM

## 2022-06-23 RX ORDER — DEXAMETHASONE SODIUM PHOSPHATE 4 MG/ML
INJECTION, SOLUTION INTRA-ARTICULAR; INTRALESIONAL; INTRAMUSCULAR; INTRAVENOUS; SOFT TISSUE PRN
Status: DISCONTINUED | OUTPATIENT
Start: 2022-06-23 | End: 2022-06-23 | Stop reason: SDUPTHER

## 2022-06-23 RX ORDER — SODIUM CHLORIDE 9 MG/ML
INJECTION, SOLUTION INTRAVENOUS CONTINUOUS
Status: DISCONTINUED | OUTPATIENT
Start: 2022-06-23 | End: 2022-06-23 | Stop reason: HOSPADM

## 2022-06-23 RX ORDER — LIDOCAINE HYDROCHLORIDE 20 MG/ML
INJECTION, SOLUTION EPIDURAL; INFILTRATION; INTRACAUDAL; PERINEURAL PRN
Status: DISCONTINUED | OUTPATIENT
Start: 2022-06-23 | End: 2022-06-23 | Stop reason: SDUPTHER

## 2022-06-23 RX ADMIN — ONDANSETRON 4 MG: 2 INJECTION INTRAMUSCULAR; INTRAVENOUS at 08:47

## 2022-06-23 RX ADMIN — PROPOFOL 100 MG: 10 INJECTION, EMULSION INTRAVENOUS at 08:49

## 2022-06-23 RX ADMIN — DEXAMETHASONE SODIUM PHOSPHATE 4 MG: 4 INJECTION, SOLUTION INTRAMUSCULAR; INTRAVENOUS at 08:47

## 2022-06-23 RX ADMIN — Medication 20 MG: at 08:45

## 2022-06-23 RX ADMIN — PROPOFOL 50 MG: 10 INJECTION, EMULSION INTRAVENOUS at 08:46

## 2022-06-23 RX ADMIN — GLYCOPYRROLATE 0.2 MG: 0.2 INJECTION INTRAMUSCULAR; INTRAVENOUS at 08:38

## 2022-06-23 RX ADMIN — LIDOCAINE HYDROCHLORIDE 100 MG: 20 INJECTION, SOLUTION EPIDURAL; INFILTRATION; INTRACAUDAL; PERINEURAL at 08:43

## 2022-06-23 RX ADMIN — SODIUM CHLORIDE: 9 INJECTION, SOLUTION INTRAVENOUS at 08:29

## 2022-06-23 RX ADMIN — PROPOFOL 100 MG: 10 INJECTION, EMULSION INTRAVENOUS at 08:43

## 2022-06-23 ASSESSMENT — LIFESTYLE VARIABLES: SMOKING_STATUS: 0

## 2022-06-23 ASSESSMENT — PAIN - FUNCTIONAL ASSESSMENT: PAIN_FUNCTIONAL_ASSESSMENT: 0-10

## 2022-06-23 ASSESSMENT — COPD QUESTIONNAIRES: CAT_SEVERITY: MODERATE

## 2022-06-23 NOTE — ANESTHESIA POSTPROCEDURE EVALUATION
Department of Anesthesiology  Postprocedure Note    Patient: Stew Orellana  MRN: [id number]  YOB: 1965  Date of evaluation: 6/23/2022      Procedure Summary     Date: 06/23/22 Room / Location: [address]    Anesthesia Start: 2794 Anesthesia Stop: 0900    Procedures:       EGD DILATION ARTHUR (N/A Abdomen)      EGD SUBMUCOSAL/BOTOX INJECTION (N/A Abdomen) Diagnosis:       Gastroparesis      Dysphagia, unspecified type      (Gastroparesis [K31.84])      (Dysphagia, unspecified type [R13.10])    Surgeons: Sofia Guillen MD Responsible Provider: Aruna Orellana MD    Anesthesia Type: MAC ASA Status: 3          Anesthesia Type: No value filed. Harman Phase I: Harman Score: 8    Harman Phase II:      Last vitals:   Vitals Value Taken Time   /54 06/23/22 0915   Temp 97.2 °F (36.2 °C) 06/23/22 0912   Pulse 78 06/23/22 0919   Resp 13 06/23/22 0919   SpO2 99 % 06/23/22 0917   Vitals shown include unvalidated device data.       Anesthesia Post Evaluation    Patient location during evaluation: PACU  Patient participation: complete - patient participated  Level of consciousness: awake  Airway patency: patent  Nausea & Vomiting: no vomiting and no nausea  Complications: no  Cardiovascular status: hemodynamically stable  Respiratory status: acceptable  Hydration status: stable  Multimodal analgesia pain management approach

## 2022-06-23 NOTE — PROGRESS NOTES
Discharge instructions reviewed with patient/sister in law. All home medications have been reviewed, questions answered and patient verbalized understanding. Discharge instructions signed. Pt dc'd per wheelchair. Patient discharged home with belongings. Sister in law taking stable pt home.

## 2022-06-23 NOTE — PROGRESS NOTES
Pt arrived from endo to PACU bay 3. Report received from endo staff. Pt on RA, NSR, VSS. Will continue to monitor.

## 2022-06-23 NOTE — BRIEF OP NOTE
Brief Postoperative Note - Full Note in Chart Review/Procedures tab       Patient: Elsa Luis  YOB: 1965  MRN: [id number]    Date of Procedure: 6/23/2022    Pre-Op Diagnosis: Gastroparesis [K31.84]  Dysphagia, unspecified type [R13.10]    Post-Op Diagnosis: Same       Procedure(s):  EGD DILATION ARTHUR  EGD SUBMUCOSAL/BOTOX INJECTION    Surgeon(s):  Radha Agudelo MD    Assistant:  * No surgical staff found *    Anesthesia: Monitor Anesthesia Care    Estimated Blood Loss (mL): Minimal    Complications: None    Specimens:   * No specimens in log *    Implants:  * No implants in log *      Drains: * No LDAs found *    Findings:   1) Normal Esophagogastroduodenoscopy   2) Successful Botox injection of the pylorus performed  3) 58 Fr Arthur esophageal dilation    Rec:  1) Resume diet and meds    Electronically signed by Radha Agudelo MD on 6/23/2022 at 8:54 AM

## 2022-06-23 NOTE — PROGRESS NOTES
Teaching / education initiated regarding perioperative experience, expectations, and pain management during stay.

## 2022-06-23 NOTE — PROGRESS NOTES
Pt awake and alert. Pt on RA, VSS. Sister in law in the waiting room. Pt denies pain and nausea, tolerating PO. Pt meets criteria to be discharged from phase 1.

## 2022-06-23 NOTE — ANESTHESIA PRE PROCEDURE
Department of Anesthesiology  Preprocedure Note       Name:  Braxton Lawler   Age:  61 y.o.  :  1965                                          MRN:  [id number]         Date:  2022      Surgeon: Cat Turpin):  Alber Cramer MD    Procedure: Procedure(s):  EGD WITH BOTOX INJECTION AND DILATION    Medications prior to admission:   Prior to Admission medications    Medication Sig Start Date End Date Taking? Authorizing Provider   loratadine (CLARITIN) 10 MG tablet Take 1 tablet by mouth daily 22   Sobia Hurst MD   Incontinence Supply Disposable (TRE CLASSIC BRIEFS/X-LARGE) MISC by Does not apply route Size XXL    Historical Provider, MD   vitamin D (CHOLECALCIFEROL) 250 MCG (88651 UT) CAPS capsule Take 10,000 Units by mouth once a week    Historical Provider, MD   apixaban (ELIQUIS) 5 MG TABS tablet Take 1 tablet by mouth 2 times daily  Patient taking differently: Take 10 mg by mouth 2 times daily  4/15/22   Sobia Hurst MD   ibuprofen (ADVIL;MOTRIN) 600 MG tablet Take 1 tablet by mouth 3 times daily as needed for Pain 22   Jennifer Waters PA-C   ASPIRIN LOW DOSE 81 MG EC tablet TAKE ONE TABLET BY MOUTH DAILY 21   KRISHNA Andujar - CNP   TRUEplus Lancets 33G MISC USE TO CHECK BLOOD SUGAR 3-4 TIMES DAILY 21   Abner Fleming MD   [employer] PEN NEEDLES 32G X 4 MM MISC USE WITH INSULIN PEN ONCE DAILY 21   Abner Fleming MD   acetaminophen (TYLENOL) 500 MG tablet Take 500 mg by mouth every 6 hours as needed for Pain    Historical Provider, MD   insulin glargine (LANTUS SOLOSTAR) 100 UNIT/ML injection pen Inject 30 Units into the skin daily  Patient taking differently: Inject 30 Units into the skin daily 15-20 units daily 21   Abner Fleming MD   Blood Glucose Monitoring Suppl (TRUE METRIX METER) w/Device KIT As needed 21   Abner Fleming MD   blood glucose test strips (TRUE METRIX BLOOD GLUCOSE TEST) strip 1 each by In Vitro route daily As needed.  21 Britt House MD   blood glucose test strips (TRUE METRIX BLOOD GLUCOSE TEST) strip USE ONE STRIP TO TEST TWICE A DAY AS NEEDED 6/21/21   Britt House MD   guaiFENesin [facility] MAXIMUM STRENGTH) 1200 MG TB12 Take 1 tablet by mouth daily For a PM dose. 5/11/21   KRISHNA Crum CNP   Blood Pressure KIT 1 kit by Does not apply route daily 5/11/21   KRISHNA Crum CNP   albuterol sulfate  (90 Base) MCG/ACT inhaler INHALE TWO PUFFS BY MOUTH EVERY 6 HOURS AS NEEDED FOR WHEEZING 3/8/21   Cas Carbone MD   ketotifen (ZADITOR) 0.025 % ophthalmic solution INSTILL TWO DROPS IN [facility] EYE TWO TIMES A DAY AS NEEDED FOR ALLERGY 12/10/20   Cas Carbone MD   famotidine (PEPCID) 40 MG tablet Take 1 tablet by mouth 2 times daily  Patient taking differently: Take 40 mg by mouth 2 times daily Indications: taking 1-2 times/day  10/6/20   Dora Benoit MD   latanoprost (XALATAN) 0.005 % ophthalmic solution Place 1 drop into both eyes nightly    Historical Provider, MD   Multiple Vitamins-Minerals (THERAPEUTIC MULTIVITAMIN-MINERALS) tablet Take 1 tablet by mouth daily     Historical Provider, MD   Incontinence Supplies MISC 1 Package by Does not apply route 2 times daily 3/4/19   Debbie Matt MD   Disposable Gloves (VINYL GLOVES LARGE) [address] [facility] 1 Package by Does not apply route 4 times daily 3/4/19   Debbie Matt MD   GAS-X EXTRA STRENGTH 125 MG chewable tablet CHEW TWO TABLETS BY MOUTH THREE TIMES A DAY WITH EACH MEAL AS NEEDED FOR FLATULENCE 12/13/18   Debbie Matt MD       Current medications:    No current facility-administered medications for this visit. No current outpatient medications on file.      Facility-Administered Medications Ordered in Other Visits   Medication Dose Route Frequency Provider Last Rate Last Admin    onabotulinumtoxin A (BOTOX) injection 100 Units  100 Units IntraMUSCular Once Dora Benoit MD        0.9 % sodium chloride infusion IntraVENous Continuous Roxana Johnson MD           Allergies:     Allergies   Allergen Reactions    Lamictal [Lamotrigine] Swelling and Rash    Macrodantin [Nitrofurantoin Macrocrystal] Shortness Of Breath     Caused an asthma attack    Penicillins Hives and Shortness Of Breath    Shellfish-Derived Products Swelling     Throat and tongue swells, allergy to all seafood    Sulfamethoxazole Hives    Trimethoprim Hives    Acetaminophen Other (See Comments)    Aspartame And Phenylalanine      Severe headaches    Bactrim Hives    Biaxin [Clarithromycin] Hives    Cephalexin Other (See Comments)     blisters    Ciprofloxacin Other (See Comments)     Causes severe pain and tendon tears per pt    Hydrocodone Other (See Comments)    Hydrocodone-Acetaminophen Other (See Comments)     HALLUCINATIONS    Lansoprazole Hives    Nexium [Esomeprazole Magnesium Trihydrate] Hives    Other Other (See Comments)     TEGADERM-BLISTERS    Prilosec [Omeprazole] Hives    Blueberry [Vaccinium Angustifolium] Nausea And Vomiting     Projectile vomiting    Monosodium Glutamate Nausea And Vomiting    Zmax [Azithromycin Dihydrate] Nausea And Vomiting     NOT Z PACK its the Powder you had to mix and drink       Problem List:    Patient Active Problem List   Diagnosis Code    Type 2 diabetes mellitus without complication, with long-term current use of insulin ([facility]) E11.9, Z79.4    GERD (gastroesophageal reflux disease) K21.9    Fibromyalgia M79.7    Urine incontinence R32    Asthma J45.909    Chronic back pain M54.9, G89.29    Gastroparesis K31.84    Thyroid cyst E04.1    Vitamin D deficiency E55.9    Multiple thyroid nodules E04.2    IBS (irritable bowel syndrome) K58.9    Disequilibrium syndrome E87.8    Subjective tinnitus of both ears H93.13    Bilateral high frequency sensorineural hearing loss H90.3    Hypercholesteremia E78.00    Obstructive sleep apnea syndrome G47.33    Moderate episode of recurrent major depressive disorder ([facility] [address] F33.1    Nephrolithiasis N20.0    Obesity (BMI 30-39. 9) E66.9    Coronary artery disease involving native coronary artery of native heart without angina pectoris I25.10    Heart block atrioventricular I44.30    Pacemaker Z95.0    Mobitz type II atrioventricular block I44.1    DVT of deep femoral vein, left ([facility]) I82.412    Non-seasonal allergic rhinitis J30.89    Protein S deficiency ([facility]) D68.59    Osteoarthritis of multiple joints M15.9       Past Medical History:        Diagnosis Date    Ankle fracture, left     Ankle fracture, right     Anxiety     Arthritis     Asthma     Bipolar depression ([facility])     CAN'T AFFORD MEDS    Bladder problem     Cervical disc herniation     COVID toes     Depression     Diabetes mellitus ([facility])     diet control    Fatty liver disease, non-alcoholic     Fibromyalgia     Gastroparesis     Dr Arely Montoya GERD (gastroesophageal reflux disease)     gastroporesis    Glaucoma     Dr Dayami Del Rio Headache     Hx of blood clots     Hyperlipidemia     elevated LFT on meds    IBS (irritable bowel syndrome)     Lumbar herniated disc     Nausea & vomiting     Nephrolithiasis 1/29/2019    Obesity (BMI 30-39.9) 3/11/2019    Partial Achilles tendon tear     Pneumonia     PONV (postoperative nausea and vomiting)     RA (rheumatoid arthritis) ([facility])     Rapid or irregular heartbeat     Sleep apnea     does not use cpap    Spinal stenosis     Stress incontinence     Thyroid disease     growths       Past Surgical History:        Procedure Laterality Date    CARDIAC SURGERY      CHOLECYSTECTOMY      COLONOSCOPY      COLONOSCOPY  03/06/2018    with polypectomies    DILATION AND CURETTAGE OF UTERUS      ENDOMETRIAL ABLATION      ENDOSCOPY, COLON, DIAGNOSTIC      ERCP  05/25/2010    with stent    ERCP  01/31/2014    ERCP WITH SPHINTER OF ODDI MANOMETERY    ESOPHAGEAL DILATATION  08/10/2021    ESOPHAGEAL DILATION JERSON performed by Jose Juan Yeager MD at [facility]  10/05/2010    botox injection    EYE SURGERY      LASER FOR GLAUCOMA    LAPAROSCOPY      PACEMAKER INSERTION  05/10/2021    TONSILLECTOMY      UPPER GASTROINTESTINAL ENDOSCOPY      UPPER GASTROINTESTINAL ENDOSCOPY  04/12/2011    DILATATION, 58 FR ARTHUR, 100 UNITS BOTOX    UPPER GASTROINTESTINAL ENDOSCOPY  08/30/2011    58 FR DILATATION, 100 UNITS  BOTOX INJECTION    UPPER GASTROINTESTINAL ENDOSCOPY  03/09/2012    with dilatation and submucosal injection: Botox    UPPER GASTROINTESTINAL ENDOSCOPY      UPPER GASTROINTESTINAL ENDOSCOPY  11/20/2012     Esophagogastroduodenoscopy with Botulinum toxin injection of the pylorus and Arthur esophageal dilation    UPPER GASTROINTESTINAL ENDOSCOPY  06/04/2013    WITH DIILITATION AND BOTOX INJECTION    UPPER GASTROINTESTINAL ENDOSCOPY  05/20/2014    100 unit Botox injection, 56 Fr.  Arthur esophageal dilatation    UPPER GASTROINTESTINAL ENDOSCOPY  12/12/2014    with esophageal dilatation, and botox injection    UPPER GASTROINTESTINAL ENDOSCOPY  09/09/2015    With Dilitation and Botox injection    UPPER GASTROINTESTINAL ENDOSCOPY  05/10/2016    distal esophagus biopsy, stomach biopsy, botox injection    UPPER GASTROINTESTINAL ENDOSCOPY  04/11/2017    with dilatation and botox injection    UPPER GASTROINTESTINAL ENDOSCOPY  10/24/2017    DILATATION, BIOPSY, BOTOX    UPPER GASTROINTESTINAL ENDOSCOPY  03/06/2018    WITH BOTOX AND BALLOON DILATATION    UPPER GASTROINTESTINAL ENDOSCOPY N/A 05/07/2019    EGD SUBMUCOSAL/BOTOX INJECTION performed by Jose Juan Yeager MD at [place] [address] N/A 05/07/2019    EGD DILATION BALLOON performed by Jose Juan Yeager MD at [place] [address] N/A 03/13/2020    EGD SUBMUCOSAL/BOTOX INJECTION performed by Jose Juan Yeager MD at [place] [address] 03/13/2020    EGD DILATION BALLOON performed by Kimberlee Zuñiga MD at [address] N/A 10/06/2020    EGD DILATION BALLOON performed by Kimberlee Zuñiga MD at [address] N/A 10/06/2020    EGD SUBMUCOSAL/BOTOX INJECTION performed by Kimberlee Zuñiga MD at [address] N/A 08/10/2021    EGD SUBMUCOSAL/BOTOX INJECTION performed by Kimberlee Zuñiga MD at [address] N/A 08/10/2021    EGD BIOPSY performed by Kimberlee Zuñiga MD at [address], [address] History:    Social History     Tobacco Use    Smoking status: Never Smoker    Smokeless tobacco: Never Used   Substance Use Topics    Alcohol use: No     Alcohol/week: 0.0 standard drinks                                Counseling given: Not Answered      Vital Signs (Current): There were no vitals filed for this visit.                                            BP Readings from Last 3 Encounters:   04/29/22 114/80   04/15/22 94/68   04/13/22 (!) 151/60       NPO Status:                                                                                 BMI:   Wt Readings from Last 3 Encounters:   05/26/22 187 lb (84.8 kg)   04/29/22 187 lb 9.6 oz (85.1 kg)   04/15/22 187 lb 6.4 oz (85 kg)     There is no height or weight on file to calculate BMI.    CBC:   Lab Results   Component Value Date    WBC 10.0 05/10/2022    RBC 4.43 05/10/2022    HGB 12.7 05/10/2022    HCT 39.2 05/10/2022    MCV 88.5 05/10/2022    RDW 15.4 05/10/2022     05/10/2022       CMP:   Lab Results   Component Value Date     05/10/2022    K 4.2 05/10/2022    K 4.6 03/12/2019    CL 99 05/10/2022    CO2 24 05/10/2022    BUN 14 05/10/2022    CREATININE 0.7 05/10/2022    GFRAA >60 05/10/2022    GFRAA >60 06/04/2013    AGRATIO 1.8 05/10/2022    LABGLOM >60 05/10/2022    GLUCOSE 265 05/10/2022    PROT 6.5 05/10/2022    PROT 7.7 11/14/2012    CALCIUM 9.9 05/10/2022    BILITOT 0.3 05/10/2022    ALKPHOS 21 05/10/2022    AST 21 05/10/2022    ALT 21 05/10/2022       POC Tests: No results for input(s): POCGLU, POCNA, POCK, POCCL, POCBUN, POCHEMO, POCHCT in the last 72 hours. Coags:   Lab Results   Component Value Date    PROTIME 12.9 05/10/2022    INR 1.14 05/10/2022    APTT 32.2 04/04/2022       HCG (If Applicable):   Lab Results   Component Value Date    PREGTESTUR Negative 03/06/2018        ABGs: No results found for: PHART, PO2ART, VIR1KGA, MIG8LDI, BEART, G6SYWJKF     Type & Screen (If Applicable):  No results found for: LABABO, LABRH    Drug/Infectious Status (If Applicable):  Lab Results   Component Value Date    HEPCAB Non-Reactive (Negative) 05/23/2012       COVID-19 Screening (If Applicable):   Lab Results   Component Value Date    COVID19 Not Detected 05/06/2021         Anesthesia Evaluation  Patient summary reviewed and Nursing notes reviewed   history of anesthetic complications: PONV.   Airway: Mallampati: III  TM distance: >3 FB   Neck ROM: full  Mouth opening: > = 3 FB   Dental: normal exam         Pulmonary:normal exam  breath sounds clear to auscultation  (+) COPD: moderate,  sleep apnea: on noncompliant,  asthma (prn inhaler use, no recent exacerbations):     (-) not a current smoker                           Cardiovascular:  Exercise tolerance: poor (<4 METS),   (+) hypertension:, pacemaker: pacemaker, CAD (history of mild, non-obst CAD, cath 2019): non-obstructive, hyperlipidemia    (-) past MI, CABG/stent, dysrhythmias,  angina and  CHF (echo 20109 Ef 54 no RWMA)    ECG reviewed  Rhythm: regular  Rate: normal  Echocardiogram reviewed  Stress test reviewed                Neuro/Psych:   (+) neuromuscular disease (fibromyalgia, chronic back pain):, psychiatric history (bipolar):depression/anxiety    (-) seizures, TIA and CVA            ROS comment: FIBROMYALGIA GI/Hepatic/Renal:   (+) GERD (gastroparesis):, liver disease (SNIDER):,      (-) no renal disease       Endo/Other:    (+) Diabetes (a1c 9.1)Type II DM, using insulin, : arthritis (RA, no steroid req, no c-spine issues): rheumatoid. , .    (-) hypothyroidism, hyperthyroidism               Abdominal:   (+) obese,           Vascular: Other Findings:             Anesthesia Plan      MAC     ASA 3       Induction: intravenous. MIPS: Prophylactic antiemetics administered. Anesthetic plan and risks discussed with patient. Plan discussed with CRNA.                     Bishop Avani MD   6/23/2022

## 2022-06-23 NOTE — H&P
[facility]   Pre-operative History and Physical    Patient: Jessi Goldberg  : 1965  CSN:     History Obtained From: patient and/or guardian. HISTORY OF PRESENT ILLNESS:    The patient is a 61 y.o. female  here for EGD.    Past Medical History:        Diagnosis Date    Ankle fracture, left     Ankle fracture, right     Anxiety     Arthritis     Asthma     Bipolar depression (HCC)     CAN'T AFFORD MEDS    Bladder problem     Cervical disc herniation     COVID toes     Depression     Diabetes mellitus ([address]     diet control    Fatty liver disease, non-alcoholic     Fibromyalgia     Gastroparesis     Dr Stef Leong GERD (gastroesophageal reflux disease)     gastroporesis    Glaucoma     Dr Gayathri Rome Headache     Hx of blood clots     Hyperlipidemia     elevated LFT on meds    IBS (irritable bowel syndrome)     Lumbar herniated disc     Nausea & vomiting     Nephrolithiasis 2019    Obesity (BMI 30-39.9) 3/11/2019    Partial Achilles tendon tear     Pneumonia     PONV (postoperative nausea and vomiting)     RA (rheumatoid arthritis) ([address]     Rapid or irregular heartbeat     Sleep apnea     does not use cpap    Spinal stenosis     Stress incontinence     Thyroid disease     growths     Past Surgical History:        Procedure Laterality Date    CARDIAC SURGERY      CHOLECYSTECTOMY      COLONOSCOPY      COLONOSCOPY  2018    with polypectomies    DILATION AND CURETTAGE OF UTERUS      ENDOMETRIAL ABLATION      ENDOSCOPY, COLON, DIAGNOSTIC      ERCP  2010    with stent    ERCP  2014    ERCP WITH SPHINTER OF ODDI MANOMETERY    ESOPHAGEAL DILATATION  08/10/2021    ESOPHAGEAL DILATION JERSON performed by Hong Copeland MD at [place] [address]  10/05/2010    botox injection    EYE SURGERY      LASER FOR GLAUCOMA    LAPAROSCOPY      PACEMAKER INSERTION  05/10/2021    TONSILLECTOMY      UPPER GASTROINTESTINAL ENDOSCOPY      UPPER GASTROINTESTINAL ENDOSCOPY  04/12/2011    DILATATION, 58 FR ARTHUR, 100 UNITS BOTOX    UPPER GASTROINTESTINAL ENDOSCOPY  08/30/2011    58 FR DILATATION, 100 UNITS  BOTOX INJECTION    UPPER GASTROINTESTINAL ENDOSCOPY  03/09/2012    with dilatation and submucosal injection: Botox    UPPER GASTROINTESTINAL ENDOSCOPY      UPPER GASTROINTESTINAL ENDOSCOPY  11/20/2012     Esophagogastroduodenoscopy with Botulinum toxin injection of the pylorus and Arthur esophageal dilation    UPPER GASTROINTESTINAL ENDOSCOPY  06/04/2013    WITH DIILITATION AND BOTOX INJECTION    UPPER GASTROINTESTINAL ENDOSCOPY  05/20/2014    100 unit Botox injection, 56 Fr.  Arthur esophageal dilatation    UPPER GASTROINTESTINAL ENDOSCOPY  12/12/2014    with esophageal dilatation, and botox injection    UPPER GASTROINTESTINAL ENDOSCOPY  09/09/2015    With Dilitation and Botox injection    UPPER GASTROINTESTINAL ENDOSCOPY  05/10/2016    distal esophagus biopsy, stomach biopsy, botox injection    UPPER GASTROINTESTINAL ENDOSCOPY  04/11/2017    with dilatation and botox injection    UPPER GASTROINTESTINAL ENDOSCOPY  10/24/2017    DILATATION, BIOPSY, BOTOX    UPPER GASTROINTESTINAL ENDOSCOPY  03/06/2018    WITH BOTOX AND BALLOON DILATATION    UPPER GASTROINTESTINAL ENDOSCOPY N/A 05/07/2019    EGD SUBMUCOSAL/BOTOX INJECTION performed by Cat Marin MD at [address] N/A 05/07/2019    EGD DILATION BALLOON performed by Cat Marin MD at [address] N/A 03/13/2020    EGD SUBMUCOSAL/BOTOX INJECTION performed by Cat Marin MD at [address] N/A 03/13/2020    EGD DILATION BALLOON performed by Cat Marin MD at [address] N/A 10/06/2020    EGD DILATION BALLOON performed by Cat Marin MD at [facility] [facility] GASTROINTESTINAL ENDOSCOPY N/A 10/06/2020    EGD SUBMUCOSAL/BOTOX INJECTION performed by Lakhwinder Talley MD at [address] N/A 08/10/2021    EGD SUBMUCOSAL/BOTOX INJECTION performed by Lakhwinder Talley MD at [address] N/A 08/10/2021    EGD BIOPSY performed by Lakhwinder Talley MD at [address] [facility]     Medications Prior to Admission:   Current Facility-Administered Medications on File Prior to Encounter   Medication Dose Route Frequency Provider Last Rate Last Admin    0.9 % sodium chloride infusion   IntraVENous Continuous Ana Reilly MD         Current Outpatient Medications on File Prior to Encounter   Medication Sig Dispense Refill    loratadine (CLARITIN) 10 MG tablet Take 1 tablet by mouth daily 30 tablet 5    Incontinence Supply Disposable (TRE CLASSIC BRIEFS/X-LARGE) MISC by Does not apply route Size XXL      vitamin D (CHOLECALCIFEROL) 250 MCG (78079 UT) CAPS capsule Take 10,000 Units by mouth once a week      apixaban (ELIQUIS) 5 MG TABS tablet Take 1 tablet by mouth 2 times daily (Patient taking differently: Take 10 mg by mouth 2 times daily ) 60 tablet 2    ibuprofen (ADVIL;MOTRIN) 600 MG tablet Take 1 tablet by mouth 3 times daily as needed for Pain 30 tablet 0    ASPIRIN LOW DOSE 81 MG EC tablet TAKE ONE TABLET BY MOUTH DAILY 90 tablet 3    TRUEplus Lancets 33G MISC USE TO CHECK BLOOD SUGAR 3-4 TIMES DAILY 100 each 3    [employer] PEN NEEDLES 32G X 4 MM MISC USE WITH INSULIN PEN ONCE DAILY 100 each 3    acetaminophen (TYLENOL) 500 MG tablet Take 500 mg by mouth every 6 hours as needed for Pain      insulin glargine (LANTUS SOLOSTAR) 100 UNIT/ML injection pen Inject 30 Units into the skin daily (Patient taking differently: Inject 30 Units into the skin daily 15-20 units daily) 4 pen 3    Blood Glucose Monitoring Suppl (TRUE METRIX METER) w/Device KIT As needed 1 kit 0    blood glucose test strips (TRUE METRIX BLOOD GLUCOSE TEST) strip 1 each by In Vitro route daily As needed. 100 each 3    blood glucose test strips (TRUE METRIX BLOOD GLUCOSE TEST) strip USE ONE STRIP TO TEST TWICE A DAY AS NEEDED 50 strip 2    guaiFENesin (MUCINEX MAXIMUM STRENGTH) 1200 MG TB12 Take 1 tablet by mouth daily For a PM dose.  10 tablet 0    Blood Pressure KIT 1 kit by Does not apply route daily 1 kit 0    albuterol sulfate  (90 Base) MCG/ACT inhaler INHALE TWO PUFFS BY MOUTH EVERY 6 HOURS AS NEEDED FOR WHEEZING 1 Inhaler 2    ketotifen (ZADITOR) 0.025 % ophthalmic solution INSTILL TWO DROPS IN [facility] EYE TWO TIMES A DAY AS NEEDED FOR ALLERGY 1 Bottle 2    famotidine (PEPCID) 40 MG tablet Take 1 tablet by mouth 2 times daily (Patient taking differently: Take 40 mg by mouth 2 times daily Indications: taking 1-2 times/day ) 30 tablet 5    latanoprost (XALATAN) 0.005 % ophthalmic solution Place 1 drop into both eyes nightly      Multiple Vitamins-Minerals (THERAPEUTIC MULTIVITAMIN-MINERALS) tablet Take 1 tablet by mouth daily       Incontinence Supplies MISC 1 Package by Does not apply route 2 times daily 5 each 5    Disposable Gloves (VINYL GLOVES LARGE) MISC 1 Package by Does not apply route 4 times daily 5 each 5    GAS-X EXTRA STRENGTH 125 MG chewable tablet CHEW TWO TABLETS BY MOUTH THREE TIMES A DAY WITH EACH MEAL AS NEEDED FOR FLATULENCE 192 tablet 1     Allergies:  Lamictal [lamotrigine], Macrodantin [nitrofurantoin macrocrystal], Penicillins, Shellfish-derived products, Sulfamethoxazole, Trimethoprim, Acetaminophen, Aspartame and phenylalanine, Bactrim, Biaxin [clarithromycin], Cephalexin, Ciprofloxacin, Hydrocodone, Hydrocodone-acetaminophen, Lansoprazole, Nexium [esomeprazole magnesium trihydrate], Other, Prilosec [omeprazole], Blueberry [vaccinium angustifolium], Monosodium glutamate, and Zmax [azithromycin dihydrate]        Social History:   Social History     Tobacco Use    Smoking status: Never Smoker    Smokeless tobacco: Never Used   Substance Use Topics    Alcohol use: No     Alcohol/week: 0.0 standard drinks     Family History:   Family History   Problem Relation Age of Onset    Diabetes Mother     Heart Disease Mother     Stroke Mother     Arthritis Mother     Mental Illness Mother     Glaucoma Mother     Heart Disease Father     Arthritis Father     Glaucoma Father     Heart Disease Brother     High Blood Pressure Brother     High Cholesterol Brother     Diabetes Brother     High Cholesterol Brother        PHYSICAL EXAM:      LMP  (LMP Unknown)  I        Heart:  RRR,  Normal S1S2    Lungs:  CTA Bilat, normal effort    Abdomen:  ND NT      ASA Grade:  ASA 3 - Patient with moderate systemic disease with functional limitations    Mallampati Class:  Class I: Soft palate, uvula, fauces, pillars visible  __________  Class II: Soft palate, uvula, fauces visible  __________   Class III: Soft palate, base of uvula visible  _____X____  Class IV: Hard palate only visible   __________      ASSESSMENT AND PLAN:    1. Patient is a 61 y.o. female here for EGD with anesthesia  2. Procedure options, risks and benefits reviewed with patient. Patient expresses understanding.      Izabella Pettit, [address] [place] [address]  6/23/2022

## 2022-07-21 RX ORDER — CALCIUM CITRATE/VITAMIN D3 200MG-6.25
TABLET ORAL
Qty: 50 STRIP | Refills: 3 | Status: SHIPPED | OUTPATIENT
Start: 2022-07-21

## 2022-07-21 NOTE — TELEPHONE ENCOUNTER
Medication:   Requested Prescriptions     Pending Prescriptions Disp Refills    TRUE METRIX BLOOD GLUCOSE TEST strip [Pharmacy Med Name: Мария Mari METRIX GLUCOSE TEST STRIP] 50 strip      Sig: USE ONE STRIP TO TEST TWICE A DAY AS NEEDED       Last Filled:      Patient Phone Number: [phone] (home)     Last appt: 3/4/2022   Next appt: Visit date not found    Last Labs DM:   Lab Results   Component Value Date/Time    LABA1C 8.1 03/01/2022 01:47 PM

## 2022-08-02 ENCOUNTER — TELEPHONE (OUTPATIENT)
Dept: ENDOCRINOLOGY | Age: 57
End: 2022-08-02

## 2022-08-03 DIAGNOSIS — E11.65 UNCONTROLLED TYPE 2 DIABETES MELLITUS WITH HYPERGLYCEMIA (HCC): Primary | ICD-10-CM

## 2022-08-16 ENCOUNTER — HOSPITAL ENCOUNTER (OUTPATIENT)
Age: 57
Discharge: HOME | End: 2022-08-16
Payer: COMMERCIAL

## 2022-08-16 DIAGNOSIS — E11.65 UNCONTROLLED TYPE 2 DIABETES MELLITUS WITH HYPERGLYCEMIA (HCC): ICD-10-CM

## 2022-08-16 LAB
CREATININE URINE: 212.8 MG/DL (ref 28–259)
MICROALBUMIN UR-MCNC: 1.8 MG/DL
MICROALBUMIN/CREAT UR-RTO: 8.5 MG/G (ref 0–30)
TSH REFLEX: 1.32 UIU/ML (ref 0.27–4.2)

## 2022-08-16 PROCEDURE — 82985 ASSAY OF GLYCATED PROTEIN: CPT

## 2022-08-16 PROCEDURE — 36415 COLL VENOUS BLD VENIPUNCTURE: CPT

## 2022-08-16 PROCEDURE — 83036 HEMOGLOBIN GLYCOSYLATED A1C: CPT

## 2022-08-16 PROCEDURE — 82570 ASSAY OF URINE CREATININE: CPT

## 2022-08-16 PROCEDURE — 82043 UR ALBUMIN QUANTITATIVE: CPT

## 2022-08-16 PROCEDURE — 84443 ASSAY THYROID STIM HORMONE: CPT

## 2022-08-17 LAB
ESTIMATED AVERAGE GLUCOSE: 214.5 MG/DL
HBA1C MFR BLD: 9.1 %

## 2022-08-18 LAB — FRUCTOSAMINE: 355 UMOL/L (ref 205–285)

## 2022-08-23 ENCOUNTER — NURSE ONLY (OUTPATIENT)
Dept: CARDIOLOGY CLINIC | Age: 57
End: 2022-08-23
Payer: COMMERCIAL

## 2022-08-23 DIAGNOSIS — Z95.0 PACEMAKER: ICD-10-CM

## 2022-08-23 DIAGNOSIS — I44.30 HEART BLOCK ATRIOVENTRICULAR: ICD-10-CM

## 2022-08-23 PROCEDURE — 93296 REM INTERROG EVL PM/IDS: CPT | Performed by: INTERNAL MEDICINE

## 2022-08-23 PROCEDURE — 93294 REM INTERROG EVL PM/LDLS PM: CPT | Performed by: INTERNAL MEDICINE

## 2022-08-23 NOTE — PROGRESS NOTES
We received remote transmission from patient's monitor at home. Transmission shows normal sensing and pacing function. EP physician will review. See interrogation under cardiology tab in the [address] [place] [address] [address] field for more details.  99.9% (MVP On)  AP 37.9%    End of 91-day monitoring period 8-23-22.

## 2022-09-01 ENCOUNTER — OFFICE VISIT (OUTPATIENT)
Dept: INTERNAL MEDICINE CLINIC | Age: 57
End: 2022-09-01
Payer: COMMERCIAL

## 2022-09-01 VITALS
WEIGHT: 189 LBS | SYSTOLIC BLOOD PRESSURE: 122 MMHG | HEART RATE: 68 BPM | HEIGHT: 62 IN | TEMPERATURE: 98.1 F | BODY MASS INDEX: 34.78 KG/M2 | DIASTOLIC BLOOD PRESSURE: 78 MMHG

## 2022-09-01 DIAGNOSIS — Z00.00 ANNUAL PHYSICAL EXAM: Primary | ICD-10-CM

## 2022-09-01 DIAGNOSIS — J30.89 NON-SEASONAL ALLERGIC RHINITIS, UNSPECIFIED TRIGGER: ICD-10-CM

## 2022-09-01 DIAGNOSIS — Z00.00 ANNUAL PHYSICAL EXAM: ICD-10-CM

## 2022-09-01 DIAGNOSIS — I82.412 DVT OF DEEP FEMORAL VEIN, LEFT (HCC): ICD-10-CM

## 2022-09-01 DIAGNOSIS — L98.9 SKIN SORE: ICD-10-CM

## 2022-09-01 LAB
A/G RATIO: 1.9 (ref 1.1–2.2)
ALBUMIN SERPL-MCNC: 4.4 G/DL (ref 3.4–5)
ALP BLD-CCNC: 17 U/L (ref 40–129)
ALT SERPL-CCNC: 17 U/L (ref 10–40)
ANION GAP SERPL CALCULATED.3IONS-SCNC: 11 MMOL/L (ref 3–16)
AST SERPL-CCNC: 20 U/L (ref 15–37)
BILIRUB SERPL-MCNC: 0.4 MG/DL (ref 0–1)
BUN BLDV-MCNC: 14 MG/DL (ref 7–20)
CALCIUM SERPL-MCNC: 9.7 MG/DL (ref 8.3–10.6)
CHLORIDE BLD-SCNC: 102 MMOL/L (ref 99–110)
CHOLESTEROL, TOTAL: 241 MG/DL (ref 0–199)
CO2: 27 MMOL/L (ref 21–32)
CREAT SERPL-MCNC: 0.8 MG/DL (ref 0.6–1.1)
GFR AFRICAN AMERICAN: >60
GFR NON-AFRICAN AMERICAN: >60
GLUCOSE BLD-MCNC: 127 MG/DL (ref 70–99)
HCT VFR BLD CALC: 37.1 % (ref 36–48)
HDLC SERPL-MCNC: 36 MG/DL (ref 40–60)
HEMOGLOBIN: 12.3 G/DL (ref 12–16)
LDL CHOLESTEROL CALCULATED: 184 MG/DL
MCH RBC QN AUTO: 28.8 PG (ref 26–34)
MCHC RBC AUTO-ENTMCNC: 33.1 G/DL (ref 31–36)
MCV RBC AUTO: 87 FL (ref 80–100)
PDW BLD-RTO: 14.3 % (ref 12.4–15.4)
PLATELET # BLD: 202 K/UL (ref 135–450)
PMV BLD AUTO: 9.2 FL (ref 5–10.5)
POTASSIUM SERPL-SCNC: 4 MMOL/L (ref 3.5–5.1)
RBC # BLD: 4.26 M/UL (ref 4–5.2)
SODIUM BLD-SCNC: 140 MMOL/L (ref 136–145)
TOTAL PROTEIN: 6.7 G/DL (ref 6.4–8.2)
TRIGL SERPL-MCNC: 107 MG/DL (ref 0–150)
VLDLC SERPL CALC-MCNC: 21 MG/DL
WBC # BLD: 6.5 K/UL (ref 4–11)

## 2022-09-01 PROCEDURE — 99213 OFFICE O/P EST LOW 20 MIN: CPT | Performed by: INTERNAL MEDICINE

## 2022-09-01 PROCEDURE — G8417 CALC BMI ABV UP PARAM F/U: HCPCS | Performed by: INTERNAL MEDICINE

## 2022-09-01 PROCEDURE — 1036F TOBACCO NON-USER: CPT | Performed by: INTERNAL MEDICINE

## 2022-09-01 PROCEDURE — 3017F COLORECTAL CA SCREEN DOC REV: CPT | Performed by: INTERNAL MEDICINE

## 2022-09-01 PROCEDURE — 99396 PREV VISIT EST AGE 40-64: CPT | Performed by: INTERNAL MEDICINE

## 2022-09-01 PROCEDURE — G8427 DOCREV CUR MEDS BY ELIG CLIN: HCPCS | Performed by: INTERNAL MEDICINE

## 2022-09-01 RX ORDER — LORATADINE 10 MG/1
10 TABLET ORAL DAILY
Qty: 30 TABLET | Refills: 5 | Status: SHIPPED | OUTPATIENT
Start: 2022-09-01

## 2022-09-01 ASSESSMENT — ENCOUNTER SYMPTOMS
COLOR CHANGE: 0
SORE THROAT: 0
NAUSEA: 0
VOMITING: 0
WHEEZING: 0
SHORTNESS OF BREATH: 0
COUGH: 0
ABDOMINAL PAIN: 0
CHEST TIGHTNESS: 0
CONSTIPATION: 0
BACK PAIN: 0

## 2022-09-01 NOTE — ASSESSMENT & PLAN NOTE
will continue Eliquis for at least 6 months as recommended by heme-onc, encouraged to follow-up with oncology to see if will require an extended period of anticoagulation and will need to have repeat D-dimer

## 2022-09-01 NOTE — ASSESSMENT & PLAN NOTE
Small pea-sized superficial sore left buttock at the previous site for shingles lesion. No drainage or surrounding erythema, advised patient can continue applying Neosporin however needs to avoid direct pressure and prolonged sitting, explained that healing is delayed secondary to uncontrolled diabetes.   No need for any further intervention at this point since her is superficial without any evidence of infection however encouraged to call the office if any change or if it does not heal over the next few weeks

## 2022-09-01 NOTE — PROGRESS NOTES
ASSESSMENT/PLAN:  1. Annual physical exam  Assessment & Plan:   Age-related health maintenance and immunization recommendations reviewed and discussed with patient. She is declining any vaccines for now citing that she gets severe reactions and side effects with vaccines  Labs ordered, healthy diet and regular exercise recommendations reinforced, her sugar readings are improving, she will continue care and recommendations as per endocrinology, encouraged to increase level of physical activity as tolerated given arthritis limitations  Patient up to date with mammography  Patient up-to-date with colonoscopy  Patient up-to-date with gynecology exam  Follow-up in 6 months or sooner if needed  Orders:  -     Lipid Panel; Future  -     Comprehensive Metabolic Panel; Future  -     CBC; Future  2. DVT of deep femoral vein, left (HCC)  Assessment & Plan:  will continue Eliquis for at least 6 months as recommended by heme-onc, encouraged to follow-up with oncology to see if will require an extended period of anticoagulation and will need to have repeat D-dimer  Orders:  -     apixaban (ELIQUIS) 5 MG TABS tablet; Take 1 tablet by mouth 2 times daily, Disp-60 tablet, R-2Normal  3. Non-seasonal allergic rhinitis, unspecified trigger  -     loratadine (CLARITIN) 10 MG tablet; Take 1 tablet by mouth daily, Disp-30 tablet, R-5Normal  4. Skin sore  Assessment & Plan:   Small pea-sized superficial sore left buttock at the previous site for shingles lesion. No drainage or surrounding erythema, advised patient can continue applying Neosporin however needs to avoid direct pressure and prolonged sitting, explained that healing is delayed secondary to uncontrolled diabetes. No need for any further intervention at this point since her is superficial without any evidence of infection however encouraged to call the office if any change or if it does not heal over the next few weeks    Return in about 6 months (around 3/1/2023). SUBJECTIVE  HPI:   Presents today for annual physical exam  Continues to follow-up with endocrinology for her uncontrolled diabetes, has been following up with cardiology for coronary artery disease and heart block. Remains on oral anticoagulation following and provoked lower extremity DVT back in April. Review of Systems   Constitutional:  Negative for activity change, appetite change and fatigue. HENT:  Negative for congestion, hearing loss, mouth sores and sore throat. Respiratory:  Negative for cough, chest tightness, shortness of breath and wheezing. Cardiovascular:  Negative for chest pain, palpitations and leg swelling. Gastrointestinal:  Negative for abdominal pain, constipation, nausea and vomiting. Genitourinary:  Negative for difficulty urinating, dysuria, frequency, hematuria and urgency. Musculoskeletal:  Positive for arthralgias and myalgias. Negative for back pain, gait problem, joint swelling, neck pain and neck stiffness. Skin:  Negative for color change. Allergic/Immunologic: Negative for environmental allergies and immunocompromised state. Neurological:  Negative for dizziness, speech difficulty, light-headedness and headaches. Hematological:  Does not bruise/bleed easily. Psychiatric/Behavioral:  Negative for behavioral problems, dysphoric mood and sleep disturbance. The patient is not nervous/anxious. OBJECTIVE:    /78   Pulse 68   Temp 98.1 °F (36.7 °C) (Infrared)   Ht 5' 2\" (1.575 m)   Wt 189 lb (85.7 kg)   LMP  (LMP Unknown)   BMI 34.57 kg/m²    Physical Exam  Constitutional:       Appearance: Normal appearance. She is normal weight. HENT:      Head: Normocephalic. Right Ear: Tympanic membrane and ear canal normal.      Left Ear: Tympanic membrane and ear canal normal.      Nose: Nose normal.      Mouth/Throat:      Mouth: Mucous membranes are moist.      Pharynx: Oropharynx is clear.    Eyes:      Extraocular Movements: Extraocular movements intact. Conjunctiva/sclera: Conjunctivae normal.      Pupils: Pupils are equal, round, and reactive to light. Cardiovascular:      Rate and Rhythm: Normal rate and regular rhythm. Pulses: Normal pulses. Heart sounds: Normal heart sounds. No murmur heard. Pulmonary:      Effort: Pulmonary effort is normal. No respiratory distress. Breath sounds: Normal breath sounds. No wheezing. Abdominal:      General: Bowel sounds are normal.      Palpations: Abdomen is soft. There is no mass. Tenderness: There is no abdominal tenderness. Musculoskeletal:         General: No swelling, deformity or signs of injury. Normal range of motion. Cervical back: Normal range of motion and neck supple. Right lower leg: No edema. Left lower leg: No edema. Skin:     General: Skin is warm and dry. Neurological:      General: No focal deficit present. Mental Status: She is alert and oriented to person, place, and time. Mental status is at baseline. Cranial Nerves: No cranial nerve deficit. Psychiatric:         Mood and Affect: Mood normal.         Behavior: Behavior normal.         Thought Content: Thought content normal.         Electronically signed by Tong Akbar MD on 9/1/2022 at 1:21 PM.    This dictation was generated by voice recognition computer software. Although all attempts are made to edit the dictation for accuracy, there may be errors in the transcription that are not intended.

## 2022-09-02 ENCOUNTER — TELEPHONE (OUTPATIENT)
Dept: CARDIOLOGY CLINIC | Age: 57
End: 2022-09-02

## 2022-09-02 ENCOUNTER — TELEMEDICINE (OUTPATIENT)
Dept: ENDOCRINOLOGY | Age: 57
End: 2022-09-02
Payer: COMMERCIAL

## 2022-09-02 DIAGNOSIS — E11.9 TYPE 2 DIABETES MELLITUS WITHOUT COMPLICATION, WITH LONG-TERM CURRENT USE OF INSULIN (HCC): ICD-10-CM

## 2022-09-02 DIAGNOSIS — Z79.4 TYPE 2 DIABETES MELLITUS WITHOUT COMPLICATION, WITH LONG-TERM CURRENT USE OF INSULIN (HCC): ICD-10-CM

## 2022-09-02 PROCEDURE — 99214 OFFICE O/P EST MOD 30 MIN: CPT | Performed by: INTERNAL MEDICINE

## 2022-09-02 PROCEDURE — G8427 DOCREV CUR MEDS BY ELIG CLIN: HCPCS | Performed by: INTERNAL MEDICINE

## 2022-09-02 PROCEDURE — 3017F COLORECTAL CA SCREEN DOC REV: CPT | Performed by: INTERNAL MEDICINE

## 2022-09-02 PROCEDURE — 2022F DILAT RTA XM EVC RTNOPTHY: CPT | Performed by: INTERNAL MEDICINE

## 2022-09-02 PROCEDURE — 3046F HEMOGLOBIN A1C LEVEL >9.0%: CPT | Performed by: INTERNAL MEDICINE

## 2022-09-02 RX ORDER — CALCIUM CITRATE/VITAMIN D3 200MG-6.25
TABLET ORAL
Qty: 50 STRIP | Refills: 2 | Status: SHIPPED | OUTPATIENT
Start: 2022-09-02

## 2022-09-02 RX ORDER — CALCIUM CITRATE/VITAMIN D3 200MG-6.25
1 TABLET ORAL DAILY
Qty: 100 EACH | Refills: 3 | Status: SHIPPED | OUTPATIENT
Start: 2022-09-02

## 2022-09-02 RX ORDER — PEN NEEDLE, DIABETIC 32GX 5/32"
NEEDLE, DISPOSABLE MISCELLANEOUS
Qty: 100 EACH | Refills: 3 | Status: SHIPPED | OUTPATIENT
Start: 2022-09-02

## 2022-09-02 NOTE — TELEPHONE ENCOUNTER
Pt called stating she was not feeling right last night so she send a transmission and would like to know if the office has received the transmission, pt stated she was feeling what felt like a vibration.     Please advise thank you

## 2022-09-02 NOTE — PROGRESS NOTES
Seen as f/u patient for diabetes,      Patient was evaluated through a synchronous (real-time) audio-video  encounter. The patient (or guardian if applicable) is aware that this is a billable service, which includes applicable co-pays. This Virtual Visit was  conducted with patient's (and/or legal guardian's) consent. The visit was conducted pursuant to the emergency declaration under the [address] [place] [address] and the [employer] Act. Patient identification was verified,  and a caregiver was present when appropriate. The patient was located in a state where the provider was licensed to provide care.         Interim:    Since last A1c taking 22 units  Before it taking 12 units    H/o thyroid nodule  Difficulty swallowing  No lows    Diagnosed with Type 2 diabetes mellitus   Known diabetic complications: none     Current diabetic medications   Metformin ER 500mg - not taking reports lows with it- off of it   lantus 12 units once a day but taking 22 now    Intolerant to plain metformin    Last A1c 9.1%<-----8.1% <---- 7.2%<------ 9.6 %<----8.2%<-----9<-----7.8%<------7.8%<------ 9.2%<-----8.8%<----- 10.3 on 7/17<------10.8 on 12/16<----- 9.1 on 8/16<-----11.4 on 6/16<-----8.8 on 4.15<---8.9 <---10.1    Prior visit with dietician: Yes   Current diet: on average, 1 meals per day + Snacks   Reports h/o gastroparesis  Current exercise: walking   Current monitoring regimen: home blood tests -1  times daily does in forearms    Report h/o pancreatitis, stent in pancreatic duct  Also h/o yeast infection    Has brought blood glucose log/meter: no  Home blood sugar records:  Any episodes of hypoglycemia? none    No Hx of CAD , PVD, CVA    Cath showed 20 % blockage    Hyperlipidemia:  on 3/16   12/16 Vit D 17.4    Ck nl LFT nl  Start lipitor 10mg  Vit D3 10,000 IU weekly-did not tolerate D2  5/17  (postoperative nausea and vomiting)     RA (rheumatoid arthritis) (HCC)     Rapid or irregular heartbeat     Sleep apnea     does not use cpap    Spinal stenosis     Stress incontinence     Thyroid disease     growths     Past Surgical History:   Procedure Laterality Date    CARDIAC SURGERY      CHOLECYSTECTOMY      COLONOSCOPY      COLONOSCOPY  03/06/2018    with polypectomies    DILATION AND CURETTAGE OF UTERUS      ENDOMETRIAL ABLATION      ENDOSCOPY, COLON, DIAGNOSTIC      ERCP  05/25/2010    with stent    ERCP  01/31/2014    ERCP WITH SPHINTER OF ODDI MANOMETERY    ESOPHAGEAL DILATATION  08/10/2021    ESOPHAGEAL DILATION JERSON performed by Aidee Wen MD at [facility] N/A 6/23/2022    EGD DILATION JERSON performed by Aidee Wen MD at [address] [place] [address]  10/05/2010    botox injection    EYE SURGERY      LASER FOR GLAUCOMA    LAPAROSCOPY      PACEMAKER INSERTION  05/10/2021    TONSILLECTOMY      UPPER GASTROINTESTINAL ENDOSCOPY      UPPER GASTROINTESTINAL ENDOSCOPY  04/12/2011    DILATATION, 58 FR ARTHUR, 100 UNITS BOTOX    UPPER GASTROINTESTINAL ENDOSCOPY  08/30/2011    58 FR DILATATION, 100 UNITS  BOTOX INJECTION    UPPER GASTROINTESTINAL ENDOSCOPY  03/09/2012    with dilatation and submucosal injection: Botox    UPPER GASTROINTESTINAL ENDOSCOPY      UPPER GASTROINTESTINAL ENDOSCOPY  11/20/2012     Esophagogastroduodenoscopy with Botulinum toxin injection of the pylorus and Arthur esophageal dilation    UPPER GASTROINTESTINAL ENDOSCOPY  06/04/2013    WITH DIILITATION AND BOTOX INJECTION    UPPER GASTROINTESTINAL ENDOSCOPY  05/20/2014    100 unit Botox injection, 56 Fr.  Arthur esophageal dilatation    UPPER GASTROINTESTINAL ENDOSCOPY  12/12/2014    with esophageal dilatation, and botox injection    UPPER GASTROINTESTINAL ENDOSCOPY  09/09/2015    With Dilitation and Botox injection    UPPER GASTROINTESTINAL ENDOSCOPY  05/10/2016    distal esophagus biopsy, stomach biopsy, botox injection    UPPER GASTROINTESTINAL ENDOSCOPY  2017    with dilatation and botox injection    UPPER GASTROINTESTINAL ENDOSCOPY  10/24/2017    DILATATION, BIOPSY, BOTOX    UPPER GASTROINTESTINAL ENDOSCOPY  2018    WITH BOTOX AND BALLOON DILATATION    UPPER GASTROINTESTINAL ENDOSCOPY N/A 2019    EGD SUBMUCOSAL/BOTOX INJECTION performed by Lance Leslie MD at [address] [place] [address] N/A 2019    EGD DILATION BALLOON performed by Lance Leslie MD at [address] [place] [address] N/A 2020    EGD SUBMUCOSAL/BOTOX INJECTION performed by Lance Leslie MD at [address] [place] [address] N/A 2020    EGD DILATION BALLOON performed by Lance Leslie MD at [address] [place] [address] N/A 10/06/2020    EGD DILATION BALLOON performed by Lance Leslie MD at [address] [place] [address] N/A 10/06/2020    EGD SUBMUCOSAL/BOTOX INJECTION performed by Lance Leslie MD at [address] [place] [address] N/A 08/10/2021    EGD SUBMUCOSAL/BOTOX INJECTION performed by Lance Leslie MD at [address] [place] [address] N/A 08/10/2021    EGD BIOPSY performed by Lance Leslie MD at [address] [place] [address] N/A 2022    EGD SUBMUCOSAL/BOTOX INJECTION performed by Lance Leslie MD at [address] [place] [address]     Current Outpatient Medications   Medication Sig Dispense Refill    Blood Glucose Monitoring Suppl (TRUE METRIX METER) w/Device KIT As needed 1 kit 0    blood glucose test strips (TRUE METRIX BLOOD GLUCOSE TEST) strip USE ONE STRIP TO TEST TWICE A DAY AS NEEDED 50 strip 2    blood glucose test strips (TRUE METRIX BLOOD GLUCOSE TEST) strip 1 each by In Vitro route daily As needed.  100 each 3    Insulin Pen Needle (River Dre PEN NEEDLES) 32G X 4 MM MISC USE WITH INSULIN PEN ONCE DAILY 100 each 3    loratadine (CLARITIN) 10 MG tablet Take 1 tablet by mouth daily 30 tablet 5    apixaban (ELIQUIS) 5 MG TABS tablet Take 1 tablet by mouth 2 times daily 60 tablet 2    TRUE METRIX BLOOD GLUCOSE TEST strip USE ONE STRIP TO TEST TWICE A DAY AS NEEDED 50 strip 3    Incontinence Supply Disposable (TRE CLASSIC BRIEFS/X-LARGE) MISC by Does not apply route Size XXL      vitamin D (CHOLECALCIFEROL) 250 MCG (71190 UT) CAPS capsule Take 10,000 Units by mouth once a week      TRUEplus Lancets 33G MISC USE TO CHECK BLOOD SUGAR 3-4 TIMES DAILY 100 each 3    acetaminophen (TYLENOL) 500 MG tablet Take 500 mg by mouth every 6 hours as needed for Pain      insulin glargine (LANTUS SOLOSTAR) 100 UNIT/ML injection pen Inject 30 Units into the skin daily (Patient taking differently: Inject 22 Units into the skin daily 15-20 units daily) 4 pen 3    guaiFENesin (MUCINEX MAXIMUM STRENGTH) 1200 MG TB12 Take 1 tablet by mouth daily For a PM dose.  10 tablet 0    Blood Pressure KIT 1 kit by Does not apply route daily 1 kit 0    albuterol sulfate  (90 Base) MCG/ACT inhaler INHALE TWO PUFFS BY MOUTH EVERY 6 HOURS AS NEEDED FOR WHEEZING 1 Inhaler 2    ketotifen (ZADITOR) 0.025 % ophthalmic solution INSTILL TWO DROPS IN [facility] EYE TWO TIMES A DAY AS NEEDED FOR ALLERGY 1 Bottle 2    famotidine (PEPCID) 40 MG tablet Take 1 tablet by mouth 2 times daily (Patient taking differently: Take 40 mg by mouth 2 times daily Indications: taking 1-2 times/day) 30 tablet 5    latanoprost (XALATAN) 0.005 % ophthalmic solution Place 1 drop into both eyes nightly      Multiple Vitamins-Minerals (THERAPEUTIC MULTIVITAMIN-MINERALS) tablet Take 1 tablet by mouth daily       Incontinence Supplies MISC 1 Package by Does not apply route 2 times daily 5 each 5    Disposable Gloves (VINYL GLOVES LARGE) MISC 1 Package by Does not apply route 4 times daily 5 each 5    GAS-X EXTRA STRENGTH 125 MG chewable tablet CHEW TWO TABLETS BY MOUTH THREE TIMES A DAY WITH EACH MEAL AS NEEDED FOR FLATULENCE 192 tablet 1     No current facility-administered medications for this visit. Facility-Administered Medications Ordered in Other Visits   Medication Dose Route Frequency Provider Last Rate Last Admin    0.9 % sodium chloride infusion   IntraVENous Continuous Parker Morales MD             Review of Systems  Constitutional: + for weight loss and malaise/fatigue. Negative for fever and chills. HENT: Negative for hearing loss, ear pain, nosebleeds, neck pain and tinnitus. Eyes: Negative for blurred vision. Negative for double vision, photophobia and pain. Respiratory: Negative for cough and sputum production. +SOB  Cardiovascular:+ for chest pain, palpitations and leg swelling. Gastrointestinal: Negative for nausea, vomiting and abdominal pain. Genitourinary: Negative for dysuria, urgency and frequency. Musculoskeletal: + for back pain. No joint pain  Skin: Negative for itching and rash. Neurological: + for dizziness. Negative for tingling, tremors, focal weakness and + headaches. Endo/Heme/Allergies: see HPI  Psychiatric/Behavioral: Negative for depression and substance abuse. Pacemaker placement    PHYSICAL EXAMINATION:  [ INSTRUCTIONS:  \"[x]\" Indicates a positive item  \"[]\" Indicates a negative item  -- DELETE ALL ITEMS NOT EXAMINED]  Vital Signs: (As obtained by patient/caregiver or practitioner observation)    Blood pressure-  Heart rate-    Respiratory rate-    Temperature-  Pulse oximetry-     Constitutional Appears well-developed and well-nourished No apparent distress        Mental status  Alert and awake  Oriented to person/place/time  Able to follow commands      Eyes:  EOM    [x]  Normal    Sclera  [x]  Normal           Discharge [x]  None visible      HENT:   [x] Normocephalic, atraumatic.     [x] Mouth/Throat: Mucous membranes are moist.     External Ears [x] Normal  no discharge    Neck: [x] No visualized mass  no swelling    Pulmonary/Chest: [x] Respiratory effort normal.  [x] No visualized signs of difficulty breathing or respiratory distress             Musculoskeletal:   [x] Normal gait with no signs of ataxia         [x] Normal range of motion of neck          Head and neck stable, appears normal ROM, strength good    Neurological:        [x] No Facial Asymmetry (Cranial nerve 7 motor function) (limited exam to video visit)          [x] No gaze palsy                Skin:        [x] No significant exanthematous lesions or discoloration noted on facial skin                 Psychiatric:       [x] Normal Affect [x] No Hallucinations            Other pertinent observable physical exam findings-     Due to this being a TeleHealth encounter, evaluation of the following organ systems is limited: Vitals/Constitutional/EENT/Resp/CV/GI//MS/Neuro/Skin/Heme-Lymph-Imm. Services were provided through a video synchronous discussion virtually to substitute for in-person clinic visit. 2/19    Foot exam: No ulcer, 8/10 monofilament    Lab Reviewed   No components found for: CHLPL  Lab Results   Component Value Date    TRIG 107 09/01/2022    TRIG 112 05/07/2021    TRIG 214 (H) 09/21/2020     Lab Results   Component Value Date    HDL 36 (L) 09/01/2022    HDL 33 (L) 05/07/2021    HDL 36 (L) 09/21/2020     Lab Results   Component Value Date    LDLCALC 184 (H) 09/01/2022    LDLCALC 119 (H) 05/07/2021    LDLCALC 175 (H) 09/21/2020     Lab Results   Component Value Date    LABVLDL 21 09/01/2022    LABVLDL 22 05/07/2021    LABVLDL 43 09/21/2020     Lab Results   Component Value Date    LABA1C 9.1 08/16/2022       Assessment:     Elia Bernard is a 61 y.o. female with :    1.T2DM: Longstanding, fairly controlled, reports low blood glucose with Metformin, so she stopped. Did not bring log, . Avoid DPP IV and GLP agonist given pancreatitis history. Avoid SGLT-2 Inhibitor given h/o yeast infection. Sulfonylurea may cause hypoglycemia. Needs basal insulin, discussed with her ,  started lower dose. She wanted amaryl , advised not a good option given A1c and lows. A1c better  She has relative hypoglycemia, so will improve  Gradually. Advised prandial, she is not willing to take. Advised needs to restart basal insulin, can take lower dose due to lows  Mismatch with log, fructosamine also high  Not interested in CGM  2. Elevated BP:  3.HLD:Familila hyperlipidemia,  Reports statins cause elevated LFT, per patient hepatologist advised not to take. Replaced Vitamin, so can tolerate lipitor. [employer] will not be covered, last LDL improved  Advised Zetia but she wants to wait. Advised Praluent, she states want to try lipitor taking more frequently  Discussed risk of CAD, CVA given high LDL  4. Obesity  5. Thyroid Nodule: Sub Centimeter nodule, monitor with USG, stable on 8/16, no increase in size, repeat USG shows right 1.1cm nodule, FNA benign  3/22 USG Right 1.1cm smaller in size--> 8mm   Additional right 9mm---> 1.1cm no previous FNA    F/u in one year USG    6. Fatty liver  7. Vitamin D deficiency: On 10,000IU weekly per PCP  8. Weight loss: normal TSH , advised see PCP    Plan:       lantus 22 units daily, advise to take every day   Self titrate, increase by two unit every 3 days if BG > 140   Advise to check blood sugar 2 times a day. She does check in the forearm, advise finger sticks, keep log   Call if post meal >200 , as may need prandial   Patient to send blood sugar log for titration. Advise to exercise regularly but can not due to back pain, and OA. Advise to low simple carbohydrate and protein with each  meal diet. Diabetes Care: recommended yearly eye exam, foot exam and urine microalbumin to   creatinine ratio.      -Hyperlipidemia, LDL goal is <100 mg/dl   -Daily ASA : 81mg  -Smoking status: Non smoker    F/u in 6 months as will see PCP

## 2022-09-02 NOTE — TELEPHONE ENCOUNTER
No events on interrogation - Called LM to return call. Did not leave message per Release of information document.

## 2022-09-05 NOTE — PROGRESS NOTES
[facility]   Electrophysiology  Elroy Place, APRN-CNP  Attending EP: Dr. Mcfarlane Confer   Date: 9/7/2022  I had the privilege of visiting Noel Nina in the office. Chief Complaint:   Chief Complaint   Patient presents with    1 Year Follow Up    Chest Pain     Off and on achy chest pain onset 1w ago. History of Present Illness: History obtained from patient and medical record. Noel Nina is 61 y.o. female with a past medical history of HTN, HLD, DM, obesity, SHIRLEY, and nonobstructive CAD who presented with chest pressure and lightheadedness x 2 months. Found to be in 2:1 AVB on ECG with no reversible causes. S/p dual chamber PPM 5/10/2021 (Dr. Erna Ley). Developed fatigue and lightheadedness and seen in the office, found to have hypotension. Stat limited echo showed no pericardial effusion. Interval history: Today, Noel Nina is being seen for bradycardia, CAD, and hypertension. Reports chest pain over the last week that is not exertion, in the left anterior chest wall. Fees like an aching that is worse with lifting sometimes. She rests when she has it, however it does not always improved the pain. She has used Tylenol intermittently for it without significant response. She had LHC 2019 due to CP with 20% LAD lesion. Reports that she had vibration at the site of her PM lasting a few minutes last week. Device interrogation has been stable. Denies having chest pain, palpitations, shortness of breath, orthopnea or dizziness at the time of this visit. With regard to medication therapy the patient has been compliant with prescribed regimen. She has tolerated therapy to date.      Assessment:  Bradycardia/2:1 AVB/Implantable device   - S/p dual chamber PPM 5/10/2021   - The CIED was interrogated and I reviewed all data    - Device interrogation today shows JONG 10.7yrs, AT/AF 0, AP 29%,  86%  CAD/CP   - Mild Nonobstructive   - atypical CP likely musculoskeletal pain    - Follows with Dr. Dontrell Ma  HTN-goal <130/80   - Controlled   - Continue current medications   - Encouraged patient to check BP at home, log and bring to office visits  - Discussed lifestyle modifications, weight loss, low sodium diet  HLD   - Continue statin   - LFT OK  Plan  - No medication changes   - Call office if symptoms do not improve  - Take tylenol 1000 mg 3 times daily for 1 week; ice or heat therapy to the affected area 20 min on and 20 min off  - Check your blood pressure at home, if your top number blood pressure is >140/90 or <95/50 please call the office    F/U: Follow-up with EP in 1 year, 6 months with NPTS or sooner if CP persists  -Follow up with device clinic as scheduled  -Call MARY[address] at [phone] with any questions    Allergies:   Allergies   Allergen Reactions    Lamictal [Lamotrigine] Swelling and Rash    Macrodantin [Nitrofurantoin Macrocrystal] Shortness Of Breath     Caused an asthma attack    Penicillins Hives and Shortness Of Breath    Shellfish-Derived Products Swelling     Throat and tongue swells, allergy to all seafood    Sulfamethoxazole Hives    Trimethoprim Hives    Aspartame And Phenylalanine      Severe headaches    Bactrim Hives    Biaxin [Clarithromycin] Hives    Blueberry     Cephalexin Other (See Comments)     blisters    Ciprofloxacin Other (See Comments)     Causes severe pain and tendon tears per pt    Hydrocodone Other (See Comments)    Hydrocodone-Acetaminophen Other (See Comments)     HALLUCINATIONS    Lansoprazole Hives    Nexium [Esomeprazole Magnesium Trihydrate] Hives    Other Other (See Comments)     TEGADERM-BLISTERS    Prilosec [Omeprazole] Hives    Blueberry [Vaccinium Angustifolium] Nausea And Vomiting     Projectile vomiting    Monosodium Glutamate Nausea And Vomiting    Zmax [Azithromycin Dihydrate] Nausea And Vomiting     NOT Z PACK its the Powder you had to mix and drink     Home Medications:  Prior to Visit Medications    Medication Sig Taking? Authorizing Provider   Blood Glucose Monitoring Suppl (TRUE METRIX METER) w/Device KIT As needed  Siddharth Mcleod MD   blood glucose test strips (TRUE METRIX BLOOD GLUCOSE TEST) strip USE ONE STRIP TO TEST TWICE A DAY AS NEEDED  Siddharth Mcleod MD   blood glucose test strips (TRUE METRIX BLOOD GLUCOSE TEST) strip 1 each by In Vitro route daily As needed. Siddharth Mcleod MD   Insulin Pen Needle ([employer] PEN NEEDLES) 32G X 4 MM MISC USE WITH INSULIN PEN ONCE DAILY  Siddharth Mcleod MD   loratadine (CLARITIN) 10 MG tablet Take 1 tablet by mouth daily  Kati Lemus MD   apixaban (ELIQUIS) 5 MG TABS tablet Take 1 tablet by mouth 2 times daily  Kati Lemus MD   TRUE METRIX BLOOD GLUCOSE TEST strip USE ONE STRIP TO TEST TWICE A DAY AS NEEDED  Siddharth Mcleod MD   Incontinence Supply Disposable (TRE CLASSIC BRIEFS/X-LARGE) MISC by Does not apply route Size XXL  Historical Provider, MD   vitamin D (CHOLECALCIFEROL) 250 MCG (12335 UT) CAPS capsule Take 10,000 Units by mouth once a week  Historical Provider, MD   TRUEplus Lancets 33G MISC USE TO CHECK BLOOD SUGAR 3-4 Cielo Parisi MD   acetaminophen (TYLENOL) 500 MG tablet Take 500 mg by mouth every 6 hours as needed for Pain  Historical Provider, MD   insulin glargine (LANTUS SOLOSTAR) 100 UNIT/ML injection pen Inject 30 Units into the skin daily  Patient taking differently: Inject 22 Units into the skin daily 15-20 units daily  Siddharth Mcleod MD   guaiFENesin (MUCINEX MAXIMUM STRENGTH) 1200 MG TB12 Take 1 tablet by mouth daily For a PM dose.   KRISHNA Chavarria CNP   Blood Pressure KIT 1 kit by Does not apply route daily  KRISHNA Chavarria CNP   albuterol sulfate  (90 Base) MCG/ACT inhaler INHALE TWO PUFFS BY MOUTH EVERY 6 HOURS AS NEEDED FOR WHEEZING  Pavel Zimmer MD   ketotifen (ZADITOR) 0.025 % ophthalmic solution INSTILL TWO DROPS IN [facility] EYE TWO TIMES A DAY AS NEEDED FOR ALLERGY  Pavel Kim Krishnamurthy MD   famotidine (PEPCID) 40 MG tablet Take 1 tablet by mouth 2 times daily  Patient taking differently: Take 40 mg by mouth 2 times daily Indications: taking 1-2 times/day  Héctor Farmer MD   latanoprost (XALATAN) 0.005 % ophthalmic solution Place 1 drop into both eyes nightly  Historical Provider, MD   Multiple Vitamins-Minerals (THERAPEUTIC MULTIVITAMIN-MINERALS) tablet Take 1 tablet by mouth daily   Historical Provider, MD   Incontinence Supplies MISC 1 Package by Does not apply route 2 times daily  Marita Cordoba MD   Disposable Gloves (VINYL GLOVES LARGE) MISC 1 Package by Does not apply route 4 times daily  Marita Cordoba MD   GAS-X EXTRA STRENGTH 125 MG chewable tablet CHEW TWO TABLETS BY MOUTH THREE TIMES A DAY WITH EACH MEAL AS NEEDED FOR FLATULENCE  Marita Cordoba MD      Past Medical History:  Past Medical History:   Diagnosis Date    Ankle fracture, left     Ankle fracture, right     Anxiety     Arthritis     Asthma     Bipolar depression ([facility] [address]     CAN'T AFFORD MEDS    Bladder problem     Cervical disc herniation     COVID toes     Depression     Diabetes mellitus ([address]     diet control    Fatty liver disease, non-alcoholic     Fibromyalgia     Gastroparesis     Dr Lorelei Peoples    GERD (gastroesophageal reflux disease)     gastroporesis    Glaucoma     Dr Laurie Kaye    Headache     Hx of blood clots     Hyperlipidemia     elevated LFT on meds    IBS (irritable bowel syndrome)     Lumbar herniated disc     Nausea & vomiting     Nephrolithiasis 1/29/2019    Obesity (BMI 30-39.9) 3/11/2019    Partial Achilles tendon tear     Pneumonia     PONV (postoperative nausea and vomiting)     RA (rheumatoid arthritis) (HCC)     Rapid or irregular heartbeat     Sleep apnea     does not use cpap    Spinal stenosis     Stress incontinence     Thyroid disease     growths     Past Surgical History:    has a past surgical history that includes Cholecystectomy; Dilation and curettage of uterus; Tonsillectomy; laparoscopy; Upper gastrointestinal endoscopy; Endometrial ablation; ERCP (05/25/2010); Esophagoscopy (10/05/2010); Upper gastrointestinal endoscopy (04/12/2011); eye surgery; Upper gastrointestinal endoscopy (08/30/2011); Upper gastrointestinal endoscopy (03/09/2012); Upper gastrointestinal endoscopy; Upper gastrointestinal endoscopy (11/20/2012); Upper gastrointestinal endoscopy (06/04/2013); ERCP (01/31/2014); Upper gastrointestinal endoscopy (05/20/2014); Upper gastrointestinal endoscopy (12/12/2014); Upper gastrointestinal endoscopy (09/09/2015); Endoscopy, colon, diagnostic; Upper gastrointestinal endoscopy (05/10/2016); Upper gastrointestinal endoscopy (04/11/2017); Upper gastrointestinal endoscopy (10/24/2017); Upper gastrointestinal endoscopy (03/06/2018); Colonoscopy; Colonoscopy (03/06/2018); Upper gastrointestinal endoscopy (N/A, 05/07/2019); Upper gastrointestinal endoscopy (N/A, 05/07/2019); Upper gastrointestinal endoscopy (N/A, 03/13/2020); Upper gastrointestinal endoscopy (N/A, 03/13/2020); Upper gastrointestinal endoscopy (N/A, 10/06/2020); Upper gastrointestinal endoscopy (N/A, 10/06/2020); Cardiac surgery; Pacemaker insertion (05/10/2021); Upper gastrointestinal endoscopy (N/A, 08/10/2021); Upper gastrointestinal endoscopy (N/A, 08/10/2021); Esophagus dilation (08/10/2021); Esophagus dilation (N/A, 6/23/2022); and Upper gastrointestinal endoscopy (N/A, 6/23/2022). Social History:  Reviewed. reports that she has never smoked. She has never used smokeless tobacco. She reports that she does not drink alcohol and does not use drugs. Family History:  Reviewed. family history includes Arthritis in her father and mother; Diabetes in her brother and mother; Glaucoma in her father and mother; Heart Disease in her brother, father, and mother; High Blood Pressure in her brother; High Cholesterol in her brother and brother; Mental Illness in her mother; Stroke in her mother.    Denies family history of sudden cardiac death, arrhythmia, premature CAD    Review of System:  Constitutional: No weight changes or weakness  HEENT: No visual changes. No mouth sores or sore throat. Cardiovascular: admits to chest pain, denies dyspnea on exertion, denies palpitations or denies loss of consciousness. No cough, hemoptysis, denies pleuritic pain, or phlebitis. denies dizziness. Respiratory: denies cough or wheezing. Gastrointestinal: Negative, No blood in stools. Genitourinary: No hematuria. Neurological: No focal weakness  Psychiatric: No confusion, anxiety, or depression. Hem/Lymph: Denies abnormal bruising or bleeding. Physical Examination:  There were no vitals filed for this visit. Wt Readings from Last 3 Encounters:   09/01/22 189 lb (85.7 kg)   06/23/22 187 lb (84.8 kg)   05/26/22 187 lb (84.8 kg)     Constitutional: Cooperative and in no apparent distress, and appears well nourished  Skin: Warm and pink; no cyanosis or bruising  HEENT: Symmetric and normocephalic. Conjunctiva pink with clear sclera. Mucus membranes pink and moist. No visible masses/goiter  Respiratory: Respirations symmetric and unlabored. Lungs clear to auscultation bilaterally, no wheezing, rhonchi, or crackles. Cardiovascular:  regular rate and rhythm. S1 & S2 present, negative for murmur. negative elevation of JVP. No peripheral edema. Musculoskeletal:  No focal weakness. Neurological/Psych: Awake and orientated to person, place and time. Calm affect, appropriate mood.      Pertinent labs, diagnostic, device, and imaging results reviewed as a part of this visit    LABS    CBC:   Lab Results   Component Value Date    WBC 6.5 09/01/2022    HGB 12.3 09/01/2022    HCT 37.1 09/01/2022    MCV 87.0 09/01/2022     09/01/2022     BMP:   Lab Results   Component Value Date    CREATININE 0.8 09/01/2022    BUN 14 09/01/2022     09/01/2022    K 4.0 09/01/2022     09/01/2022    CO2 27 09/01/2022     Estimated Creatinine Clearance: 78 mL/min (based on SCr of 0.8 mg/dL). No results found for: BNP    Thyroid:   Lab Results   Component Value Date    TSH 1.40 2020     Lipid Panel:   Lab Results   Component Value Date/Time    CHOL 241 2022 01:21 PM    HDL 36 2022 01:21 PM    HDL 36 2010 06:56 PM    TRIG 107 2022 01:21 PM     LFTs:  Lab Results   Component Value Date    ALT 17 2022    AST 20 2022    ALKPHOS 17 (L) 2022    BILITOT 0.4 2022     Coags:   Lab Results   Component Value Date    PROTIME 12.9 (H) 05/10/2022    INR 1.14 (H) 05/10/2022    APTT 32.2 2022     EC2022 SR, LBBB HR 64, , , QTc 457     ECHO:  2021  Summary   Normal left ventricle size, wall thickness, and systolic function with an   estimated ejection fraction of 55-60%. No regional wall motion abnormalities   are seen. No pericardial effusion noted. No pleural effusion. 3/11/2019  Summary   -Normal left ventricle size, wall thickness and systolic function with an   estimated ejection fraction of 55%. - No regional wall motion abnormalities are seen.   -Normal diastolic function.   -Trivial tricuspid regurgitation. Stress Test:    Summary    There is normal isotope uptake at stress and rest. There is no evidence of    myocardial ischemia or scar. Normal LV size and systolic function    Left ventricular ejection fraction of 58 %. Cath: 3/2019  20% mid LAD     Diet & Exercise: The patient is counseled to follow a low salt diet to assure blood pressure remains controlled for cardiovascular risk factor modification  The patient is counseled to avoid excess caffeine, and energy drinks as this may exacerbated ectopy and arrhythmia  The patient is counseled to lose weight to control cardiovascular risk factors  Exercise program discussed:  To improve overall cardiovascular health, the patient is instructed to increase cardiovascular related activities with a goal of 150 min/week of moderate level activity or 10,000 steps per day. Encouraged to perform as much activity as tolerated     I have addressed the patient's cardiac risk factors and adjusted pharmacologic treatment as needed. In addition, I have reinforced the need for patient directed risk factor modification. I independently reviewed the device check interrogation and ECG    All questions and concerns were addressed with the patient. Alternatives to treatment were discussed. Thank you for allowing to us to participate in the care of rTue Dyer.     KRISHNA Sylvester-CNP  [place] [address]   Office: [phone]

## 2022-09-06 ENCOUNTER — TELEPHONE (OUTPATIENT)
Dept: ENDOCRINOLOGY | Age: 57
End: 2022-09-06

## 2022-09-06 NOTE — TELEPHONE ENCOUNTER
Srinath on [facility] is advising the patient that she needs a PA on : [address] [facility]    She has the test strips already.      Her phone is [phone]

## 2022-09-07 ENCOUNTER — NURSE ONLY (OUTPATIENT)
Dept: CARDIOLOGY CLINIC | Age: 57
End: 2022-09-07
Payer: COMMERCIAL

## 2022-09-07 ENCOUNTER — OFFICE VISIT (OUTPATIENT)
Dept: CARDIOLOGY CLINIC | Age: 57
End: 2022-09-07
Payer: COMMERCIAL

## 2022-09-07 VITALS
BODY MASS INDEX: 34.41 KG/M2 | WEIGHT: 187 LBS | SYSTOLIC BLOOD PRESSURE: 118 MMHG | HEART RATE: 69 BPM | DIASTOLIC BLOOD PRESSURE: 68 MMHG | HEIGHT: 62 IN | OXYGEN SATURATION: 97 %

## 2022-09-07 DIAGNOSIS — I44.1 MOBITZ TYPE II ATRIOVENTRICULAR BLOCK: Primary | ICD-10-CM

## 2022-09-07 DIAGNOSIS — Z95.0 PACEMAKER: ICD-10-CM

## 2022-09-07 DIAGNOSIS — I44.30 HEART BLOCK ATRIOVENTRICULAR: ICD-10-CM

## 2022-09-07 DIAGNOSIS — I10 ESSENTIAL HYPERTENSION: ICD-10-CM

## 2022-09-07 DIAGNOSIS — I25.10 CORONARY ARTERY DISEASE INVOLVING NATIVE CORONARY ARTERY OF NATIVE HEART WITHOUT ANGINA PECTORIS: ICD-10-CM

## 2022-09-07 PROCEDURE — 3017F COLORECTAL CA SCREEN DOC REV: CPT | Performed by: NURSE PRACTITIONER

## 2022-09-07 PROCEDURE — 1036F TOBACCO NON-USER: CPT | Performed by: NURSE PRACTITIONER

## 2022-09-07 PROCEDURE — 99214 OFFICE O/P EST MOD 30 MIN: CPT | Performed by: NURSE PRACTITIONER

## 2022-09-07 PROCEDURE — G8417 CALC BMI ABV UP PARAM F/U: HCPCS | Performed by: NURSE PRACTITIONER

## 2022-09-07 PROCEDURE — G8427 DOCREV CUR MEDS BY ELIG CLIN: HCPCS | Performed by: NURSE PRACTITIONER

## 2022-09-07 PROCEDURE — 93280 PM DEVICE PROGR EVAL DUAL: CPT | Performed by: INTERNAL MEDICINE

## 2022-09-07 NOTE — PROGRESS NOTES
Patient comes in for programming evaluation for her pacemaker. All sensing and pacing parameters are within normal range. Battery life 10.7 years  AP 29.8%.  86.3%. Dependent today at 30 bpm.   No episodes noted. No changes made this Session. Please see interrogation for more detail. Patient will see Jens Herrmann and follow up in 3 month in office or remotely.

## 2022-09-07 NOTE — PATIENT INSTRUCTIONS
- No medication changes   - Call office if symptoms do not improve  - Take tylenol 1000 mg 3 times daily for 1 week; ice or heat therapy to the affected area 20 min on and 20 min off  - Check your blood pressure at home, if your top number blood pressure is >140/90 or <95/50 please call the office  - Follow up in 1 year

## 2022-09-09 NOTE — TELEPHONE ENCOUNTER
Approved on September 8. This drug is covered on the Preferred Drug List. It does not require prior approval. You can get 1 kit per 365 days Please call the pharmacy to process the claim.

## 2022-10-01 PROBLEM — Z00.00 ANNUAL PHYSICAL EXAM: Status: RESOLVED | Noted: 2022-09-01 | Resolved: 2022-10-01

## 2022-10-03 RX ORDER — INSULIN GLARGINE 100 [IU]/ML
22 INJECTION, SOLUTION SUBCUTANEOUS DAILY
Qty: 3 ADJUSTABLE DOSE PRE-FILLED PEN SYRINGE | Refills: 3 | Status: SHIPPED | OUTPATIENT
Start: 2022-10-03 | End: 2022-10-05 | Stop reason: SDUPTHER

## 2022-10-03 NOTE — TELEPHONE ENCOUNTER
Medication:   Requested Prescriptions     Pending Prescriptions Disp Refills    insulin glargine (LANTUS SOLOSTAR) 100 UNIT/ML injection pen [Pharmacy Med Name: Hilary Sims 100 UNIT/ML] 3 Adjustable Dose Pre-filled Pen Syringe 3     Sig: Inject 22 Units into the skin daily 15-20 units daily       Last Filled:      Patient Phone Number: [phone] (home)     Last appt: 09/02/2022  Next appt: Due in Dec    Last Labs DM:   Lab Results   Component Value Date/Time    LABA1C 9.1 08/16/2022 11:58 AM

## 2022-10-05 RX ORDER — INSULIN GLARGINE 100 [IU]/ML
22 INJECTION, SOLUTION SUBCUTANEOUS DAILY
Qty: 3 ADJUSTABLE DOSE PRE-FILLED PEN SYRINGE | Refills: 3 | Status: SHIPPED | OUTPATIENT
Start: 2022-10-05 | End: 2022-10-06 | Stop reason: SDUPTHER

## 2022-10-05 NOTE — TELEPHONE ENCOUNTER
Pt calling and states the pharmacy is needing clarification on her Lantus rx. She states that it has 2 different directions but she takes 22 units and she was wanting to  rx today.  She goes through [facility]  [facility] [address]    # [phone]

## 2022-10-06 RX ORDER — INSULIN GLARGINE 100 [IU]/ML
22 INJECTION, SOLUTION SUBCUTANEOUS DAILY
Qty: 4 ADJUSTABLE DOSE PRE-FILLED PEN SYRINGE | Refills: 3 | Status: SHIPPED | OUTPATIENT
Start: 2022-10-06

## 2022-10-06 NOTE — TELEPHONE ENCOUNTER
Pt states she only got 1 pen when she normally gets 4 pens. She would like a new prescription sent.   insulin glargine (LANTUS SOLOSTAR) 100 UNIT/ML injection pen         [facility] [address] [facility], [address] [address]   Phone:  [phone]  Fax:  [phone]

## 2022-10-06 NOTE — TELEPHONE ENCOUNTER
Medication:   Requested Prescriptions      No prescriptions requested or ordered in this encounter       Last Filled:      Patient Phone Number: [phone] (home)     Last appt: 9/2/2022   Next appt: Visit date not found    Last Labs DM:   Lab Results   Component Value Date/Time    LABA1C 9.1 08/16/2022 11:58 AM

## 2022-11-15 ENCOUNTER — HOSPITAL ENCOUNTER (OUTPATIENT)
Age: 57
Discharge: HOME | End: 2022-11-15
Payer: COMMERCIAL

## 2022-11-15 LAB
A/G RATIO: 1.6 (ref 1.1–2.2)
ALBUMIN SERPL-MCNC: 4.5 G/DL (ref 3.4–5)
ALP BLD-CCNC: 16 U/L (ref 40–129)
ALT SERPL-CCNC: 20 U/L (ref 10–40)
ANION GAP SERPL CALCULATED.3IONS-SCNC: 16 MMOL/L (ref 3–16)
AST SERPL-CCNC: 25 U/L (ref 15–37)
BASOPHILS ABSOLUTE: 0.1 K/UL (ref 0–0.2)
BASOPHILS RELATIVE PERCENT: 1.3 %
BILIRUB SERPL-MCNC: 0.6 MG/DL (ref 0–1)
BUN BLDV-MCNC: 17 MG/DL (ref 7–20)
CALCIUM SERPL-MCNC: 10.5 MG/DL (ref 8.3–10.6)
CHLORIDE BLD-SCNC: 103 MMOL/L (ref 99–110)
CO2: 25 MMOL/L (ref 21–32)
CREAT SERPL-MCNC: 0.8 MG/DL (ref 0.6–1.1)
EOSINOPHILS ABSOLUTE: 0.1 K/UL (ref 0–0.6)
EOSINOPHILS RELATIVE PERCENT: 1.6 %
GFR SERPL CREATININE-BSD FRML MDRD: >60 ML/MIN/{1.73_M2}
GLUCOSE BLD-MCNC: 119 MG/DL (ref 70–99)
HCT VFR BLD CALC: 41 % (ref 36–48)
HEMOGLOBIN: 13.1 G/DL (ref 12–16)
INR BLD: 1.24 (ref 0.87–1.14)
LYMPHOCYTES ABSOLUTE: 3 K/UL (ref 1–5.1)
LYMPHOCYTES RELATIVE PERCENT: 38.3 %
MCH RBC QN AUTO: 28.7 PG (ref 26–34)
MCHC RBC AUTO-ENTMCNC: 31.9 G/DL (ref 31–36)
MCV RBC AUTO: 89.9 FL (ref 80–100)
MONOCYTES ABSOLUTE: 0.6 K/UL (ref 0–1.3)
MONOCYTES RELATIVE PERCENT: 7.1 %
NEUTROPHILS ABSOLUTE: 4.1 K/UL (ref 1.7–7.7)
NEUTROPHILS RELATIVE PERCENT: 51.7 %
PDW BLD-RTO: 15 % (ref 12.4–15.4)
PLATELET # BLD: 207 K/UL (ref 135–450)
PMV BLD AUTO: 10 FL (ref 5–10.5)
POTASSIUM SERPL-SCNC: 4.7 MMOL/L (ref 3.5–5.1)
PROTHROMBIN TIME: 15.6 SEC (ref 11.7–14.5)
RBC # BLD: 4.56 M/UL (ref 4–5.2)
SODIUM BLD-SCNC: 144 MMOL/L (ref 136–145)
TOTAL PROTEIN: 7.3 G/DL (ref 6.4–8.2)
WBC # BLD: 7.8 K/UL (ref 4–11)

## 2022-11-15 PROCEDURE — 36415 COLL VENOUS BLD VENIPUNCTURE: CPT

## 2022-11-15 PROCEDURE — 85025 COMPLETE CBC W/AUTO DIFF WBC: CPT

## 2022-11-15 PROCEDURE — 80053 COMPREHEN METABOLIC PANEL: CPT

## 2022-11-15 PROCEDURE — 85610 PROTHROMBIN TIME: CPT

## 2022-11-22 ENCOUNTER — NURSE ONLY (OUTPATIENT)
Dept: CARDIOLOGY CLINIC | Age: 57
End: 2022-11-22
Payer: COMMERCIAL

## 2022-11-22 DIAGNOSIS — Z95.0 PACEMAKER: ICD-10-CM

## 2022-11-22 DIAGNOSIS — I44.30 HEART BLOCK ATRIOVENTRICULAR: ICD-10-CM

## 2022-11-22 PROCEDURE — 93296 REM INTERROG EVL PM/IDS: CPT | Performed by: INTERNAL MEDICINE

## 2022-11-22 PROCEDURE — 93294 REM INTERROG EVL PM/LDLS PM: CPT | Performed by: INTERNAL MEDICINE

## 2022-11-22 NOTE — PROGRESS NOTES
We received remote transmission from patient's monitor at home. Transmission shows normal sensing and pacing function. EP physician will review. See interrogation under cardiology tab in the [address] [place] [address] [address] field for more details.  99.9% (MVP On)  AP 37.4%    End of 91-day monitoring period 11-22-22.

## 2023-01-16 NOTE — PROGRESS NOTES
Patient reached ____ yes  ___x__ no   VM instructions left __x__ yes   phone number ________                                ____ no-office notified          Date ___1/27/23______  Time __1250_____  Arrival __1120 masc____    Nothing to eat or drink after midnight-follow your doctors prep instructions-this may include taking a second dose of your prep after midnight  Responsible adult 25 or older to stay on site while you are here-drive you home-stay with you after  Follow any instructions your doctors office has given you  Bring a complete list of all your medications and supplements including name,dose,how often taken the day of your procedure  If you normally take the following medications in the morning please do so the AM of your procedure with a small sip of water       Heart,blood pressure,seizure,thyroid or breathing medications-use your inhalers-bring any rescue inhalers with you DOS       DO NOT take blood pressure medications ending in \"marcus\" or \"pril\" the AM of procedure or evening prior  Dr Bill Mcfadden patients are not to take any medications the AM of surgery  Take half or your normal dose of any long acting insulins the night before your procedure-do not take any diabetic medications the AM of procedure  Follow your doctors instructions regarding stopping or taking  any blood thinners-if you do not have instructions-call them  Any questions call your doctor  Other ______________________________________________________________                [facility] POLICY(subject to change)             The current policy is 2 visitors per patient. There are no children allowed. Mask at discretion of facility. Visiting hours are 8a-8p. Overnight visitors will be at the discretion of the nurse. All policies are subject to change.

## 2023-01-19 ENCOUNTER — TELEPHONE (OUTPATIENT)
Dept: ENDOCRINOLOGY | Age: 58
End: 2023-01-19

## 2023-01-27 ENCOUNTER — HOSPITAL ENCOUNTER (OUTPATIENT)
Age: 58
Setting detail: OUTPATIENT SURGERY
Discharge: HOME | End: 2023-01-27
Attending: INTERNAL MEDICINE | Admitting: INTERNAL MEDICINE
Payer: COMMERCIAL

## 2023-01-27 ENCOUNTER — ANESTHESIA (OUTPATIENT)
Dept: ENDOSCOPY | Age: 58
End: 2023-01-27
Payer: COMMERCIAL

## 2023-01-27 ENCOUNTER — ANESTHESIA EVENT (OUTPATIENT)
Dept: ENDOSCOPY | Age: 58
End: 2023-01-27
Payer: COMMERCIAL

## 2023-01-27 VITALS
SYSTOLIC BLOOD PRESSURE: 130 MMHG | WEIGHT: 181 LBS | DIASTOLIC BLOOD PRESSURE: 59 MMHG | RESPIRATION RATE: 16 BRPM | HEART RATE: 69 BPM | OXYGEN SATURATION: 98 % | HEIGHT: 62 IN | TEMPERATURE: 97.2 F | BODY MASS INDEX: 33.31 KG/M2

## 2023-01-27 LAB
GLUCOSE BLD-MCNC: 150 MG/DL (ref 70–99)
PERFORMED ON: ABNORMAL

## 2023-01-27 PROCEDURE — 3609015300 HC ESOPHAGEAL DILATION MALONEY: Performed by: INTERNAL MEDICINE

## 2023-01-27 PROCEDURE — 2500000003 HC RX 250 WO HCPCS: Performed by: NURSE ANESTHETIST, CERTIFIED REGISTERED

## 2023-01-27 PROCEDURE — 7100000011 HC PHASE II RECOVERY - ADDTL 15 MIN: Performed by: INTERNAL MEDICINE

## 2023-01-27 PROCEDURE — 2580000003 HC RX 258: Performed by: INTERNAL MEDICINE

## 2023-01-27 PROCEDURE — 6360000002 HC RX W HCPCS: Performed by: NURSE ANESTHETIST, CERTIFIED REGISTERED

## 2023-01-27 PROCEDURE — 7100000001 HC PACU RECOVERY - ADDTL 15 MIN: Performed by: INTERNAL MEDICINE

## 2023-01-27 PROCEDURE — 3700000000 HC ANESTHESIA ATTENDED CARE: Performed by: INTERNAL MEDICINE

## 2023-01-27 PROCEDURE — 3609012700 HC EGD DILATION SAVORY: Performed by: INTERNAL MEDICINE

## 2023-01-27 PROCEDURE — 6370000000 HC RX 637 (ALT 250 FOR IP): Performed by: ANESTHESIOLOGY

## 2023-01-27 PROCEDURE — 3609013800 HC EGD SUBMUCOSAL/BOTOX INJECTION: Performed by: INTERNAL MEDICINE

## 2023-01-27 PROCEDURE — 7100000000 HC PACU RECOVERY - FIRST 15 MIN: Performed by: INTERNAL MEDICINE

## 2023-01-27 PROCEDURE — 2580000003 HC RX 258: Performed by: NURSE ANESTHETIST, CERTIFIED REGISTERED

## 2023-01-27 PROCEDURE — 7100000010 HC PHASE II RECOVERY - FIRST 15 MIN: Performed by: INTERNAL MEDICINE

## 2023-01-27 PROCEDURE — 6360000002 HC RX W HCPCS: Performed by: INTERNAL MEDICINE

## 2023-01-27 PROCEDURE — 3700000001 HC ADD 15 MINUTES (ANESTHESIA): Performed by: INTERNAL MEDICINE

## 2023-01-27 PROCEDURE — 2709999900 HC NON-CHARGEABLE SUPPLY: Performed by: INTERNAL MEDICINE

## 2023-01-27 RX ORDER — SODIUM CHLORIDE 9 MG/ML
INJECTION, SOLUTION INTRAVENOUS CONTINUOUS PRN
Status: DISCONTINUED | OUTPATIENT
Start: 2023-01-27 | End: 2023-01-27 | Stop reason: SDUPTHER

## 2023-01-27 RX ORDER — PROPOFOL 10 MG/ML
INJECTION, EMULSION INTRAVENOUS PRN
Status: DISCONTINUED | OUTPATIENT
Start: 2023-01-27 | End: 2023-01-27 | Stop reason: SDUPTHER

## 2023-01-27 RX ORDER — SCOLOPAMINE TRANSDERMAL SYSTEM 1 MG/1
1 PATCH, EXTENDED RELEASE TRANSDERMAL
Status: DISCONTINUED | OUTPATIENT
Start: 2023-01-27 | End: 2023-01-27 | Stop reason: HOSPADM

## 2023-01-27 RX ORDER — SODIUM CHLORIDE 9 MG/ML
INJECTION, SOLUTION INTRAVENOUS CONTINUOUS
Status: DISCONTINUED | OUTPATIENT
Start: 2023-01-27 | End: 2023-01-27 | Stop reason: HOSPADM

## 2023-01-27 RX ORDER — LIDOCAINE HYDROCHLORIDE 20 MG/ML
INJECTION, SOLUTION EPIDURAL; INFILTRATION; INTRACAUDAL; PERINEURAL PRN
Status: DISCONTINUED | OUTPATIENT
Start: 2023-01-27 | End: 2023-01-27 | Stop reason: SDUPTHER

## 2023-01-27 RX ADMIN — PROPOFOL 50 MG: 10 INJECTION, EMULSION INTRAVENOUS at 13:11

## 2023-01-27 RX ADMIN — SODIUM CHLORIDE: 9 INJECTION, SOLUTION INTRAVENOUS at 12:28

## 2023-01-27 RX ADMIN — ONABOTULINUMTOXINA 100 UNITS: 100 INJECTION, POWDER, LYOPHILIZED, FOR SOLUTION INTRADERMAL; INTRAMUSCULAR at 13:14

## 2023-01-27 RX ADMIN — PROPOFOL 50 MG: 10 INJECTION, EMULSION INTRAVENOUS at 13:09

## 2023-01-27 RX ADMIN — PROPOFOL 50 MG: 10 INJECTION, EMULSION INTRAVENOUS at 13:18

## 2023-01-27 RX ADMIN — SODIUM CHLORIDE: 9 INJECTION, SOLUTION INTRAVENOUS at 13:05

## 2023-01-27 RX ADMIN — LIDOCAINE HYDROCHLORIDE 100 MG: 20 INJECTION, SOLUTION EPIDURAL; INFILTRATION; INTRACAUDAL; PERINEURAL at 13:09

## 2023-01-27 RX ADMIN — PROPOFOL 50 MG: 10 INJECTION, EMULSION INTRAVENOUS at 13:15

## 2023-01-27 RX ADMIN — PROPOFOL 50 MG: 10 INJECTION, EMULSION INTRAVENOUS at 13:21

## 2023-01-27 ASSESSMENT — PAIN SCALES - GENERAL: PAINLEVEL_OUTOF10: 0

## 2023-01-27 ASSESSMENT — LIFESTYLE VARIABLES: SMOKING_STATUS: 0

## 2023-01-27 ASSESSMENT — COPD QUESTIONNAIRES: CAT_SEVERITY: MODERATE

## 2023-01-27 NOTE — H&P
[facility] [place]   Pre-operative History and Physical    Patient: Sendy Hooker  : 1965  CSN:     History Obtained From: patient and/or guardian. HISTORY OF PRESENT ILLNESS:    The patient is a 60 y.o. female with gastroparesis, nausea/vomiting, and dysphagia here for EGD with Botox injection of the pylorus.     Past Medical History:        Diagnosis Date    Ankle fracture, left     Ankle fracture, right     Anxiety     Arthritis     Asthma     Bipolar depression ([address]     CAN'T AFFORD MEDS    Bladder problem     Cervical disc herniation     COVID toes     Depression     Diabetes mellitus ([address]     diet control    Fatty liver disease, non-alcoholic     Fibromyalgia     Gastroparesis     Dr Morris Beasley    GERD (gastroesophageal reflux disease)     gastroporesis    Glaucoma     Dr Poppy Ha    Headache     Hx of blood clots     Hyperlipidemia     elevated LFT on meds    IBS (irritable bowel syndrome)     Lumbar herniated disc     Nausea & vomiting     Nephrolithiasis 2019    Obesity (BMI 30-39.9) 3/11/2019    Partial Achilles tendon tear     Pneumonia     PONV (postoperative nausea and vomiting)     RA (rheumatoid arthritis) (HCC)     Rapid or irregular heartbeat     Sleep apnea     does not use cpap    Spinal stenosis     Stress incontinence     Thyroid disease     growths     Past Surgical History:        Procedure Laterality Date    CARDIAC SURGERY      CHOLECYSTECTOMY      COLONOSCOPY      COLONOSCOPY  2018    with polypectomies    DILATION AND CURETTAGE OF UTERUS      ENDOMETRIAL ABLATION      ENDOSCOPY, COLON, DIAGNOSTIC      ERCP  2010    with stent    ERCP  2014    ERCP WITH SPHINTER OF ODDI MANOMETERY    ESOPHAGEAL DILATATION  08/10/2021    ESOPHAGEAL DILATION Brina Adams performed by Damian Mendosa MD at [address] [facility] N/A 2022    EGD DILATION JERSON performed by Damian Mendosa MD at [address] [place] [address]  10/05/2010 botox injection    EYE SURGERY      LASER FOR GLAUCOMA    LAPAROSCOPY      PACEMAKER INSERTION  05/10/2021    TONSILLECTOMY      UPPER GASTROINTESTINAL ENDOSCOPY      UPPER GASTROINTESTINAL ENDOSCOPY  04/12/2011    DILATATION, 58 FR ARTHUR, 100 UNITS BOTOX    UPPER GASTROINTESTINAL ENDOSCOPY  08/30/2011    58 FR DILATATION, 100 UNITS  BOTOX INJECTION    UPPER GASTROINTESTINAL ENDOSCOPY  03/09/2012    with dilatation and submucosal injection: Botox    UPPER GASTROINTESTINAL ENDOSCOPY      UPPER GASTROINTESTINAL ENDOSCOPY  11/20/2012     Esophagogastroduodenoscopy with Botulinum toxin injection of the pylorus and Arthur esophageal dilation    UPPER GASTROINTESTINAL ENDOSCOPY  06/04/2013    WITH DIILITATION AND BOTOX INJECTION    UPPER GASTROINTESTINAL ENDOSCOPY  05/20/2014    100 unit Botox injection, 56 Fr.  Arthur esophageal dilatation    UPPER GASTROINTESTINAL ENDOSCOPY  12/12/2014    with esophageal dilatation, and botox injection    UPPER GASTROINTESTINAL ENDOSCOPY  09/09/2015    With Dilitation and Botox injection    UPPER GASTROINTESTINAL ENDOSCOPY  05/10/2016    distal esophagus biopsy, stomach biopsy, botox injection    UPPER GASTROINTESTINAL ENDOSCOPY  04/11/2017    with dilatation and botox injection    UPPER GASTROINTESTINAL ENDOSCOPY  10/24/2017    DILATATION, BIOPSY, BOTOX    UPPER GASTROINTESTINAL ENDOSCOPY  03/06/2018    WITH BOTOX AND BALLOON DILATATION    UPPER GASTROINTESTINAL ENDOSCOPY N/A 05/07/2019    EGD SUBMUCOSAL/BOTOX INJECTION performed by Melisa Benavidez MD at [address] N/A 05/07/2019    EGD DILATION BALLOON performed by Melisa Benavidez MD at [address] N/A 03/13/2020    EGD SUBMUCOSAL/BOTOX INJECTION performed by Melisa Benavidez MD at [address] N/A 03/13/2020    EGD DILATION BALLOON performed by Melisa Benavidez MD at [address] [place] [address] GASTROINTESTINAL ENDOSCOPY N/A 10/06/2020    EGD DILATION BALLOON performed by Delaney Villafuerte MD at [address] [place] [address] N/A 10/06/2020    EGD SUBMUCOSAL/BOTOX INJECTION performed by Delaney Villafuerte MD at [address] [facility] N/A 08/10/2021    EGD SUBMUCOSAL/BOTOX INJECTION performed by Delaney Villafuerte MD at [address] [place] [address] N/A 08/10/2021    EGD BIOPSY performed by Delaney Villafuerte MD at [address] [place] [address] N/A 6/23/2022    EGD SUBMUCOSAL/BOTOX INJECTION performed by Delaney Villafuerte MD at [address] [facility]     Medications Prior to Admission:   Current Facility-Administered Medications on File Prior to Encounter   Medication Dose Route Frequency Provider Last Rate Last Admin    0.9 % sodium chloride infusion   IntraVENous Continuous Sue Bautista MD         Current Outpatient Medications on File Prior to Encounter   Medication Sig Dispense Refill    insulin glargine (LANTUS SOLOSTAR) 100 UNIT/ML injection pen Inject 22 Units into the skin daily 4 Adjustable Dose Pre-filled Pen Syringe 3    Blood Glucose Monitoring Suppl (TRUE METRIX METER) w/Device KIT As needed 1 kit 0    blood glucose test strips (TRUE METRIX BLOOD GLUCOSE TEST) strip USE ONE STRIP TO TEST TWICE A DAY AS NEEDED 50 strip 2    blood glucose test strips (TRUE METRIX BLOOD GLUCOSE TEST) strip 1 each by In Vitro route daily As needed.  100 each 3    Insulin Pen Needle ([employer] PEN NEEDLES) 32G X 4 MM MISC USE WITH INSULIN PEN ONCE DAILY 100 each 3    loratadine (CLARITIN) 10 MG tablet Take 1 tablet by mouth daily 30 tablet 5    apixaban (ELIQUIS) 5 MG TABS tablet Take 1 tablet by mouth 2 times daily 60 tablet 2    TRUE METRIX BLOOD GLUCOSE TEST strip USE ONE STRIP TO TEST TWICE A DAY AS NEEDED 50 strip 3    Incontinence Supply Disposable (TRE CLASSIC BRIEFS/X-LARGE) MISC by Does not apply route Size XXL      vitamin D (CHOLECALCIFEROL) 250 MCG (46901 UT) CAPS capsule Take 10,000 Units by mouth once a week (Patient not taking: No sig reported)      TRUEplus Lancets 33G MISC USE TO CHECK BLOOD SUGAR 3-4 TIMES DAILY 100 each 3    acetaminophen (TYLENOL) 500 MG tablet Take 500 mg by mouth every 6 hours as needed for Pain      guaiFENesin (MUCINEX MAXIMUM STRENGTH) 1200 MG TB12 Take 1 tablet by mouth daily For a PM dose.  10 tablet 0    Blood Pressure KIT 1 kit by Does not apply route daily 1 kit 0    albuterol sulfate  (90 Base) MCG/ACT inhaler INHALE TWO PUFFS BY MOUTH EVERY 6 HOURS AS NEEDED FOR WHEEZING 1 Inhaler 2    ketotifen (ZADITOR) 0.025 % ophthalmic solution INSTILL TWO DROPS IN [facility] EYE TWO TIMES A DAY AS NEEDED FOR ALLERGY 1 Bottle 2    famotidine (PEPCID) 40 MG tablet Take 1 tablet by mouth 2 times daily (Patient taking differently: Take 40 mg by mouth 2 times daily Indications: taking 1-2 times/day) 30 tablet 5    latanoprost (XALATAN) 0.005 % ophthalmic solution Place 1 drop into both eyes nightly      Multiple Vitamins-Minerals (THERAPEUTIC MULTIVITAMIN-MINERALS) tablet Take 1 tablet by mouth daily       Incontinence Supplies MISC 1 Package by Does not apply route 2 times daily 5 each 5    Disposable Gloves (VINYL GLOVES LARGE) MISC 1 Package by Does not apply route 4 times daily 5 each 5    GAS-X EXTRA STRENGTH 125 MG chewable tablet CHEW TWO TABLETS BY MOUTH THREE TIMES A DAY WITH EACH MEAL AS NEEDED FOR FLATULENCE 192 tablet 1     Allergies:  Lamictal [lamotrigine], Macrodantin [nitrofurantoin macrocrystal], Penicillins, Shellfish-derived products, Sulfamethoxazole, Trimethoprim, Aspartame and phenylalanine, Bactrim, Biaxin [clarithromycin], Blueberry, Cephalexin, Ciprofloxacin, Hydrocodone, Hydrocodone-acetaminophen, Lansoprazole, Nexium [esomeprazole magnesium trihydrate], Other, Prilosec [omeprazole], Blueberry [vaccinium angustifolium], Monosodium glutamate, and Zmax [azithromycin dihydrate]        Social History:   Social History     Tobacco Use    Smoking status: Never    Smokeless tobacco: Never   Substance Use Topics    Alcohol use: No     Alcohol/week: 0.0 standard drinks     Family History:   Family History   Problem Relation Age of Onset    Diabetes Mother     Heart Disease Mother     Stroke Mother     Arthritis Mother     Mental Illness Mother     Glaucoma Mother     Heart Disease Father     Arthritis Father     Glaucoma Father     Heart Disease Brother     High Blood Pressure Brother     High Cholesterol Brother     Diabetes Brother     High Cholesterol Brother        PHYSICAL EXAM:      /76   Pulse 68   Temp 97.1 °F (36.2 °C) (Temporal)   Resp 18   Ht 5' 2\" (1.575 m)   Wt 181 lb (82.1 kg)   LMP  (LMP Unknown)   SpO2 98%   BMI 33.11 kg/m²  I        Heart:  RRR,  Normal S1S2    Lungs:  CTA Bilat, normal effort    Abdomen:  ND NT      ASA Grade:  ASA 2 - Patient with mild systemic disease with no functional limitations    Mallampati Class:  Class I: Soft palate, uvula, fauces, pillars visible  __________  Class II: Soft palate, uvula, fauces visible  __________   Class III: Soft palate, base of uvula visible  ____X_____  Class IV: Hard palate only visible   __________      ASSESSMENT AND PLAN:    1. Patient is a 60 y.o. female here for EGD with conscious sedation  2. Procedure options, risks and benefits reviewed with patient. Patient expresses understanding.       Duglas De Leon MD  [facility]  1/27/2023

## 2023-01-27 NOTE — PROGRESS NOTES
Pt arrived from Endo to PACU bay 2. Report received from Endo staff. Pt arousable to voice. Pt on 5L 02, V Paced, and VSS. Will continue to monitor.

## 2023-01-27 NOTE — DISCHARGE INSTRUCTIONS
ENDOSCOPY DISCHARGE INSTRUCTIONS    You may experience some lightheadedness for the next several hours. Plan on quiet relaxation for the rest of today. A responsible adult needs to stay with you today. Because of the medications you received today-do not drive,operate machinery,or sign any contractual agreement for the next 24 hours. Do not drink any alcoholic beverages or take any unprescribed medications tonight. Eat bland food and avoid anything greasy or spicy initially-progress to your normal diet gradually. Diet restrictions as instructed. You may resume home medications as instructed. It is not unusual to experience some mild cramping or gas pains, and you may not have a bowel movement for several days. If you have any of the following problems, notify your physician or return to the hospital emergency room : fever, chills, excessive bleeding, excessive vomiting, difficulty swallowing, uncontrolled pain, increased abdominal distention, shortness of breath or any other problems. If you have a sore throat, you may use lozenges or salt water gargles. ANESTHESIA DISCHARGE INSTRUCTIONS    Wear your seatbelt home. You are under the influence of drugs-do not drink alcohol,drive,operate machinery,or make any important decisions or sign any legal documentsfor 24 hours  A responsible adult needs to be with you for 24 hours. You may experience lightheadedness,dizziness,or sleepiness following surgery. Rest at home today- increase activity as tolerated. Progress slowly to a regular diet unless your physician has instructed you otherwise. Drink plenty of water. If nausea becomes a problem call your physician. Coughing,sore throat,and muscle aches are other side effects of anesthesia,and should improve with time. Do not drive,operate machinery while taking narcotics.

## 2023-01-27 NOTE — ANESTHESIA PRE PROCEDURE
Department of Anesthesiology  Preprocedure Note       Name:  Adrien Shetty   Age:  60 y.o.  :  1965                                          MRN:  [id number]         Date:  2023      Surgeon: Era Tucker):  Melisa Benavidez MD    Procedure: Procedure(s):  EGD SUBMUCOSAL/BOTOX INJECTION AND DILATION    Medications prior to admission:   Prior to Admission medications    Medication Sig Start Date End Date Taking? Authorizing Provider   insulin glargine (LANTUS SOLOSTAR) 100 UNIT/ML injection pen Inject 22 Units into the skin daily 10/6/22   Siddharth Mcleod MD   Blood Glucose Monitoring Suppl (TRUE METRIX METER) w/Device KIT As needed 22   Siddharth Mcleod MD   blood glucose test strips (TRUE METRIX BLOOD GLUCOSE TEST) strip USE ONE STRIP TO TEST TWICE A DAY AS NEEDED 22   Siddharth Mcleod MD   blood glucose test strips (TRUE METRIX BLOOD GLUCOSE TEST) strip 1 each by In Vitro route daily As needed.  22   Siddharth Mcleod MD   Insulin Pen Needle ([employer] PEN NEEDLES) 32G X 4 MM MISC USE WITH INSULIN PEN ONCE DAILY 22   Siddharth Mcleod MD   loratadine (CLARITIN) 10 MG tablet Take 1 tablet by mouth daily 22   Kati Lemus MD   apixaban (ELIQUIS) 5 MG TABS tablet Take 1 tablet by mouth 2 times daily 22   Kati Lemus MD   TRUE METRIX BLOOD GLUCOSE TEST strip USE ONE STRIP TO TEST TWICE A DAY AS NEEDED 22   Siddharth Mcleod MD   Incontinence Supply Disposable (TRE CLASSIC BRIEFS/X-LARGE) MISC by Does not apply route Size XXL    Historical Provider, MD   vitamin D (CHOLECALCIFEROL) 250 MCG (17441 UT) CAPS capsule Take 10,000 Units by mouth once a week  Patient not taking: Reported on 2022    Historical Provider, MD   TRUEplus Lancets 33G MISC USE TO CHECK BLOOD SUGAR 3-4 TIMES DAILY 21   Siddharth Mcleod MD   acetaminophen (TYLENOL) 500 MG tablet Take 500 mg by mouth every 6 hours as needed for Pain    Historical Provider, MD   guaiFENesin [facility] MAXIMUM STRENGTH) 1200 MG TB12 Take 1 tablet by mouth daily For a PM dose. 5/11/21   KRISHNA Mercer CNP   Blood Pressure KIT 1 kit by Does not apply route daily 5/11/21   KRISHNA Mercer CNP   albuterol sulfate  (90 Base) MCG/ACT inhaler INHALE TWO PUFFS BY MOUTH EVERY 6 HOURS AS NEEDED FOR WHEEZING 3/8/21   Nidhi Bradley MD   ketotifen (ZADITOR) 0.025 % ophthalmic solution INSTILL TWO DROPS IN [facility] EYE TWO TIMES A DAY AS NEEDED FOR ALLERGY 12/10/20   Nidhi Bradley MD   famotidine (PEPCID) 40 MG tablet Take 1 tablet by mouth 2 times daily  Patient taking differently: Take 40 mg by mouth 2 times daily Indications: taking 1-2 times/day 10/6/20   Kelsie Enriquez MD   latanoprost (XALATAN) 0.005 % ophthalmic solution Place 1 drop into both eyes nightly    Historical Provider, MD   Multiple Vitamins-Minerals (THERAPEUTIC MULTIVITAMIN-MINERALS) tablet Take 1 tablet by mouth daily     Historical Provider, MD   Incontinence Supplies MISC 1 Package by Does not apply route 2 times daily 3/4/19   Marisol Sandoval MD   Disposable Gloves (VINYL GLOVES LARGE) MISC 1 Package by Does not apply route 4 times daily 3/4/19   Marisol Sandoval MD   GAS-X EXTRA STRENGTH 125 MG chewable tablet CHEW TWO TABLETS BY MOUTH THREE TIMES A DAY WITH EACH MEAL AS NEEDED FOR FLATULENCE 12/13/18   Marisol Sandoval MD       Current medications:    Current Outpatient Medications   Medication Sig Dispense Refill    insulin glargine (LANTUS SOLOSTAR) 100 UNIT/ML injection pen Inject 22 Units into the skin daily 4 Adjustable Dose Pre-filled Pen Syringe 3    Blood Glucose Monitoring Suppl (TRUE METRIX METER) w/Device KIT As needed 1 kit 0    blood glucose test strips (TRUE METRIX BLOOD GLUCOSE TEST) strip USE ONE STRIP TO TEST TWICE A DAY AS NEEDED 50 strip 2    blood glucose test strips (TRUE METRIX BLOOD GLUCOSE TEST) strip 1 each by In Vitro route daily As needed.  100 each 3    Insulin Pen Needle ([employer] PEN NEEDLES) 32G X 4 MM MISC USE WITH INSULIN PEN ONCE DAILY 100 each 3    loratadine (CLARITIN) 10 MG tablet Take 1 tablet by mouth daily 30 tablet 5    apixaban (ELIQUIS) 5 MG TABS tablet Take 1 tablet by mouth 2 times daily 60 tablet 2    TRUE METRIX BLOOD GLUCOSE TEST strip USE ONE STRIP TO TEST TWICE A DAY AS NEEDED 50 strip 3    Incontinence Supply Disposable (TRE CLASSIC BRIEFS/X-LARGE) MISC by Does not apply route Size XXL      vitamin D (CHOLECALCIFEROL) 250 MCG (87922 UT) CAPS capsule Take 10,000 Units by mouth once a week (Patient not taking: Reported on 9/7/2022)      TRUEplus Lancets 33G MISC USE TO CHECK BLOOD SUGAR 3-4 TIMES DAILY 100 each 3    acetaminophen (TYLENOL) 500 MG tablet Take 500 mg by mouth every 6 hours as needed for Pain      guaiFENesin (MUCINEX MAXIMUM STRENGTH) 1200 MG TB12 Take 1 tablet by mouth daily For a PM dose.  10 tablet 0    Blood Pressure KIT 1 kit by Does not apply route daily 1 kit 0    albuterol sulfate  (90 Base) MCG/ACT inhaler INHALE TWO PUFFS BY MOUTH EVERY 6 HOURS AS NEEDED FOR WHEEZING 1 Inhaler 2    ketotifen (ZADITOR) 0.025 % ophthalmic solution INSTILL TWO DROPS IN [facility] EYE TWO TIMES A DAY AS NEEDED FOR ALLERGY 1 Bottle 2    famotidine (PEPCID) 40 MG tablet Take 1 tablet by mouth 2 times daily (Patient taking differently: Take 40 mg by mouth 2 times daily Indications: taking 1-2 times/day) 30 tablet 5    latanoprost (XALATAN) 0.005 % ophthalmic solution Place 1 drop into both eyes nightly      Multiple Vitamins-Minerals (THERAPEUTIC MULTIVITAMIN-MINERALS) tablet Take 1 tablet by mouth daily       Incontinence Supplies MISC 1 Package by Does not apply route 2 times daily 5 each 5    Disposable Gloves (VINYL GLOVES LARGE) MISC 1 Package by Does not apply route 4 times daily 5 each 5    GAS-X EXTRA STRENGTH 125 MG chewable tablet CHEW TWO TABLETS BY MOUTH THREE TIMES A DAY WITH EACH MEAL AS NEEDED FOR FLATULENCE 192 tablet 1     No current facility-administered medications for this visit. Facility-Administered Medications Ordered in Other Visits   Medication Dose Route Frequency Provider Last Rate Last Admin    0.9 % sodium chloride infusion   IntraVENous Continuous Charanjit Gregg MD           Allergies:     Allergies   Allergen Reactions    Lamictal [Lamotrigine] Swelling and Rash    Macrodantin [Nitrofurantoin Macrocrystal] Shortness Of Breath     Caused an asthma attack    Penicillins Hives and Shortness Of Breath    Shellfish-Derived Products Swelling     Throat and tongue swells, allergy to all seafood    Sulfamethoxazole Hives    Trimethoprim Hives    Aspartame And Phenylalanine      Severe headaches    Bactrim Hives    Biaxin [Clarithromycin] Hives    Blueberry     Cephalexin Other (See Comments)     blisters    Ciprofloxacin Other (See Comments)     Causes severe pain and tendon tears per pt    Hydrocodone Other (See Comments)    Hydrocodone-Acetaminophen Other (See Comments)     HALLUCINATIONS    Lansoprazole Hives    Nexium [Esomeprazole Magnesium Trihydrate] Hives    Other Other (See Comments)     TEGADERM-BLISTERS    Prilosec [Omeprazole] Hives    Blueberry [Vaccinium Angustifolium] Nausea And Vomiting     Projectile vomiting    Monosodium Glutamate Nausea And Vomiting    Zmax [Azithromycin Dihydrate] Nausea And Vomiting     NOT Z PACK its the Powder you had to mix and drink       Problem List:    Patient Active Problem List   Diagnosis Code    Type 2 diabetes mellitus without complication, with long-term current use of insulin (HCC) E11.9, Z79.4    GERD (gastroesophageal reflux disease) K21.9    Fibromyalgia M79.7    Urine incontinence R32    Asthma J45.909    Chronic back pain M54.9, G89.29    Gastroparesis K31.84    Thyroid cyst E04.1    Vitamin D deficiency E55.9    Multiple thyroid nodules E04.2    IBS (irritable bowel syndrome) K58.9    Disequilibrium syndrome E87.8    Subjective tinnitus of both ears H93.13    Bilateral high frequency sensorineural hearing loss H90.3    Hypercholesteremia E78.00    Obstructive sleep apnea syndrome G47.33    Moderate episode of recurrent major depressive disorder ([address] F33.1    Nephrolithiasis N20.0    Obesity (BMI 30-39. 9) E66.9    Coronary artery disease involving native coronary artery of native heart without angina pectoris I25.10    Heart block atrioventricular I44.30    Pacemaker Z95.0    Mobitz type II atrioventricular block I44.1    DVT of deep femoral vein, left ([facility]) I82.412    Non-seasonal allergic rhinitis J30.89    Protein S deficiency ([facility]) D68.59    Osteoarthritis of multiple joints M15.9    Skin sore L98.9       Past Medical History:        Diagnosis Date    Ankle fracture, left     Ankle fracture, right     Anxiety     Arthritis     Asthma     Bipolar depression ([facility])     CAN'T AFFORD MEDS    Bladder problem     Cervical disc herniation     COVID toes     Depression     Diabetes mellitus ([facility])     diet control    Fatty liver disease, non-alcoholic     Fibromyalgia     Gastroparesis     Dr Phi Nava GERD (gastroesophageal reflux disease)     gastroporesis    Glaucoma     Dr Nina Gall Headache     Hx of blood clots     Hyperlipidemia     elevated LFT on meds    IBS (irritable bowel syndrome)     Lumbar herniated disc     Nausea & vomiting     Nephrolithiasis 1/29/2019    Obesity (BMI 30-39.9) 3/11/2019    Partial Achilles tendon tear     Pneumonia     PONV (postoperative nausea and vomiting)     RA (rheumatoid arthritis) ([facility])     Rapid or irregular heartbeat     Sleep apnea     does not use cpap    Spinal stenosis     Stress incontinence     Thyroid disease     growths       Past Surgical History:        Procedure Laterality Date    CARDIAC SURGERY      CHOLECYSTECTOMY      COLONOSCOPY      COLONOSCOPY  03/06/2018    with polypectomies    DILATION AND CURETTAGE OF UTERUS      ENDOMETRIAL ABLATION      ENDOSCOPY, COLON, DIAGNOSTIC      ERCP  05/25/2010    with stent    ERCP  01/31/2014    ERCP WITH SPHINTER OF ODDI MANOMETERY    ESOPHAGEAL DILATATION  08/10/2021    ESOPHAGEAL DILATION JERSON performed by Bryan Pena MD at [address] N/A 6/23/2022    EGD DILATION JERSON performed by Bryan Pena MD at [address]  10/05/2010    botox injection    EYE SURGERY      LASER FOR GLAUCOMA    LAPAROSCOPY      PACEMAKER INSERTION  05/10/2021    TONSILLECTOMY      UPPER GASTROINTESTINAL ENDOSCOPY      UPPER GASTROINTESTINAL ENDOSCOPY  04/12/2011    DILATATION, 58 FR ARTHUR, 100 UNITS BOTOX    UPPER GASTROINTESTINAL ENDOSCOPY  08/30/2011    58 FR DILATATION, 100 UNITS  BOTOX INJECTION    UPPER GASTROINTESTINAL ENDOSCOPY  03/09/2012    with dilatation and submucosal injection: Botox    UPPER GASTROINTESTINAL ENDOSCOPY      UPPER GASTROINTESTINAL ENDOSCOPY  11/20/2012     Esophagogastroduodenoscopy with Botulinum toxin injection of the pylorus and Arthur esophageal dilation    UPPER GASTROINTESTINAL ENDOSCOPY  06/04/2013    WITH DIILITATION AND BOTOX INJECTION    UPPER GASTROINTESTINAL ENDOSCOPY  05/20/2014    100 unit Botox injection, 56 Fr.  Arthur esophageal dilatation    UPPER GASTROINTESTINAL ENDOSCOPY  12/12/2014    with esophageal dilatation, and botox injection    UPPER GASTROINTESTINAL ENDOSCOPY  09/09/2015    With Dilitation and Botox injection    UPPER GASTROINTESTINAL ENDOSCOPY  05/10/2016    distal esophagus biopsy, stomach biopsy, botox injection    UPPER GASTROINTESTINAL ENDOSCOPY  04/11/2017    with dilatation and botox injection    UPPER GASTROINTESTINAL ENDOSCOPY  10/24/2017    DILATATION, BIOPSY, BOTOX    UPPER GASTROINTESTINAL ENDOSCOPY  03/06/2018    WITH BOTOX AND BALLOON DILATATION    UPPER GASTROINTESTINAL ENDOSCOPY N/A 05/07/2019    EGD SUBMUCOSAL/BOTOX INJECTION performed by Bryan Pena MD at [address] [facility] N/A 05/07/2019    EGD DILATION BALLOON performed by Keegan Lee MD at [address] [facility] N/A 03/13/2020    EGD SUBMUCOSAL/BOTOX INJECTION performed by Keegan Lee MD at [address] [facility] N/A 03/13/2020    EGD DILATION BALLOON performed by Keegan Lee MD at [address] [facility] N/A 10/06/2020    EGD DILATION BALLOON performed by Keegan Lee MD at [address] [facility] N/A 10/06/2020    EGD SUBMUCOSAL/BOTOX INJECTION performed by Keegan Lee MD at [address] [facility] N/A 08/10/2021    EGD SUBMUCOSAL/BOTOX INJECTION performed by Keegan Lee MD at [address] [facility] N/A 08/10/2021    EGD BIOPSY performed by Keegan Lee MD at [address] [facility] N/A 6/23/2022    EGD SUBMUCOSAL/BOTOX INJECTION performed by Keegan Lee MD at [address] [facility] History:    Social History     Tobacco Use    Smoking status: Never    Smokeless tobacco: Never   Substance Use Topics    Alcohol use: No     Alcohol/week: 0.0 standard drinks                                Counseling given: Not Answered      Vital Signs (Current): There were no vitals filed for this visit.                                            BP Readings from Last 3 Encounters:   09/07/22 118/68   09/01/22 122/78   06/23/22 (!) 142/55       NPO Status:                                                                                 BMI:   Wt Readings from Last 3 Encounters:   09/07/22 187 lb (84.8 kg)   09/01/22 189 lb (85.7 kg)   06/23/22 187 lb (84.8 kg)     There is no height or weight on file to calculate BMI.    CBC:   Lab Results   Component Value Date/Time    WBC 7.8 11/15/2022 02:20 PM    RBC 4.56 11/15/2022 02:20 PM    HGB 13.1 11/15/2022 02:20 PM    HCT 41.0 11/15/2022 02:20 PM    MCV 89.9 11/15/2022 02:20 PM    RDW 15.0 11/15/2022 02:20 PM     11/15/2022 02:20 PM       CMP:   Lab Results   Component Value Date/Time     11/15/2022 02:20 PM    K 4.7 11/15/2022 02:20 PM    K 4.6 03/12/2019 05:35 AM     11/15/2022 02:20 PM    CO2 25 11/15/2022 02:20 PM    BUN 17 11/15/2022 02:20 PM    CREATININE 0.8 11/15/2022 02:20 PM    GFRAA >60 09/01/2022 01:21 PM    GFRAA >60 06/04/2013 06:50 AM    AGRATIO 1.6 11/15/2022 02:20 PM    LABGLOM >60 11/15/2022 02:20 PM    GLUCOSE 119 11/15/2022 02:20 PM    PROT 7.3 11/15/2022 02:20 PM    PROT 7.7 11/14/2012 01:00 PM    CALCIUM 10.5 11/15/2022 02:20 PM    BILITOT 0.6 11/15/2022 02:20 PM    ALKPHOS 16 11/15/2022 02:20 PM    AST 25 11/15/2022 02:20 PM    ALT 20 11/15/2022 02:20 PM       POC Tests: No results for input(s): POCGLU, POCNA, POCK, POCCL, POCBUN, POCHEMO, POCHCT in the last 72 hours. Coags:   Lab Results   Component Value Date/Time    PROTIME 15.6 11/15/2022 02:20 PM    INR 1.24 11/15/2022 02:20 PM    APTT 32.2 04/04/2022 08:59 PM       HCG (If Applicable):   Lab Results   Component Value Date    PREGTESTUR Negative 03/06/2018        ABGs: No results found for: PHART, PO2ART, UPF8TRF, CGQ6HJC, BEART, I4KVMERQ     Type & Screen (If Applicable):  No results found for: LABABO, LABRH    Drug/Infectious Status (If Applicable):  Lab Results   Component Value Date/Time    HEPCAB Non-Reactive (Negative) 05/23/2012 03:29 PM       COVID-19 Screening (If Applicable):   Lab Results   Component Value Date/Time    COVID19 Not Detected 05/06/2021 07:06 PM         Anesthesia Evaluation  Patient summary reviewed and Nursing notes reviewed   history of anesthetic complications: PONV.   Airway: Mallampati: III  TM distance: >3 FB   Neck ROM: full  Mouth opening: > = 3 FB   Dental: normal exam         Pulmonary:normal exam  breath sounds clear to auscultation  (+) COPD: moderate,  sleep apnea: on noncompliant,  asthma (prn inhaler use, no recent exacerbations):     (-) not a current smoker                           Cardiovascular:  Exercise tolerance: poor (<4 METS),   (+) hypertension:, pacemaker: pacemaker, CAD (history of mild, non-obst CAD, cath 2019): non-obstructive, hyperlipidemia    (-) past MI, CABG/stent, dysrhythmias,  angina and  CHF (echo 20109 Ef 54 no RWMA)    ECG reviewed  Rhythm: regular  Rate: normal  Echocardiogram reviewed  Stress test reviewed                Neuro/Psych:   (+) neuromuscular disease (fibromyalgia, chronic back pain):, psychiatric history (bipolar):depression/anxiety    (-) seizures, TIA and CVA            ROS comment: FIBROMYALGIA GI/Hepatic/Renal:   (+) GERD (gastroparesis):, liver disease (SNIDER):,      (-) no renal disease       Endo/Other:    (+) Diabetes (a1c 9.1)Type II DM, using insulin, : arthritis (RA, no steroid req, no c-spine issues): rheumatoid. , .    (-) hypothyroidism, hyperthyroidism               Abdominal:   (+) obese,           Vascular: Other Findings:             Anesthesia Plan      MAC     ASA 2       Induction: intravenous. MIPS: Prophylactic antiemetics administered. Anesthetic plan and risks discussed with patient. Plan discussed with CRNA.                     Mai Young MD   1/27/2023

## 2023-01-27 NOTE — PROGRESS NOTES
Reviewed patient's medical and surgical history in electronic record and with patient at the bedside. All questions regarding procedure answered. Scope number and equipment verified using a two person system. Family in waiting room.     Electronically signed by Belle Salguero RN on 1/27/2023 at 1:08 PM

## 2023-01-27 NOTE — ANESTHESIA POSTPROCEDURE EVALUATION
Department of Anesthesiology  Postprocedure Note    Patient: Ricki Madsen  MRN: [id number]  YOB: 1965  Date of evaluation: 1/27/2023      Procedure Summary     Date: 01/27/23 Room / Location: [address] [place] [address]    Anesthesia Start: 9474 Anesthesia Stop:     Procedures:       EGD SUBMUCOSAL/BOTOX INJECTION (Abdomen)      EGD DILATION SAVORY (Abdomen) Diagnosis:       Gastroparesis      (Gastroparesis [K31.84])    Surgeons: Clementina Sosa MD Responsible Provider: Delfino Paul MD    Anesthesia Type: MAC ASA Status: 2          Anesthesia Type: No value filed.     Harman Phase I: Harman Score: 10    Harman Phase II:        Anesthesia Post Evaluation    Patient location during evaluation: PACU  Patient participation: complete - patient participated  Level of consciousness: awake and awake and alert  Pain score: 2  Airway patency: patent  Nausea & Vomiting: no vomiting  Complications: no  Cardiovascular status: blood pressure returned to baseline  Respiratory status: acceptable  Hydration status: euvolemic  Multimodal analgesia pain management approach

## 2023-01-27 NOTE — PROGRESS NOTES
Discharge instructions reviewed with patient. All home medications have been reviewed, pt v/u. Discharge instructions signed. Pt discharged via wheelchair. Pt discharged with all belongings. Sister in law Fermin Briones taking stable pt home.

## 2023-01-27 NOTE — BRIEF OP NOTE
Brief Postoperative Note - Full Note in Chart Review/Procedures tab       Patient: Luis Pratt  YOB: 1965  MRN: [id number]    Date of Procedure: 1/27/2023    Pre-Op Diagnosis: Gastroparesis [K31.84]    Post-Op Diagnosis: Same       Procedure(s):  EGD SUBMUCOSAL/BOTOX INJECTION  EGD DILATION JUNO    Surgeon(s):  Elif Jessica MD    Assistant:  * No surgical staff found *    Anesthesia: Monitor Anesthesia Care    Estimated Blood Loss (mL): Minimal    Complications: None    Specimens:   * No specimens in log *    Implants:  * No implants in log *      Drains: * No LDAs found *    Findings:   Normal Esophagogastroduodenoscopy   Successful Botox injection of the pylorus performed  [address] Vel[address] dilation of esophagus performed    Rec:  Continue present diet  Continue present medications  Follow up in office in 6 months  Follow up with primary care physician  OK to resume Eliquis today    Electronically signed by Elif Jessica MD on 1/27/2023 at 1:24 PM

## 2023-02-21 ENCOUNTER — NURSE ONLY (OUTPATIENT)
Dept: CARDIOLOGY CLINIC | Age: 58
End: 2023-02-21
Payer: COMMERCIAL

## 2023-02-21 DIAGNOSIS — Z95.0 PACEMAKER: ICD-10-CM

## 2023-02-21 DIAGNOSIS — I44.30 HEART BLOCK ATRIOVENTRICULAR: ICD-10-CM

## 2023-02-21 PROCEDURE — 93294 REM INTERROG EVL PM/LDLS PM: CPT | Performed by: INTERNAL MEDICINE

## 2023-02-21 PROCEDURE — 93296 REM INTERROG EVL PM/IDS: CPT | Performed by: INTERNAL MEDICINE

## 2023-02-21 NOTE — PROGRESS NOTES
We received remote transmission from patient's monitor at home. Transmission shows normal sensing and pacing function. EP physician will review. See interrogation under cardiology tab in the [address] [place] [address] [address] field for more details.  99.9% (MVP On)  AP 35.5%    End of 91-day monitoring period 2-21-23.

## 2023-02-22 ENCOUNTER — TELEPHONE (OUTPATIENT)
Dept: CARDIOLOGY CLINIC | Age: 58
End: 2023-02-22

## 2023-02-22 NOTE — TELEPHONE ENCOUNTER
Pt calling for results from remote transmission. Pe LDDT:    We received remote transmission from patient's monitor at home. Transmission shows normal sensing and pacing function. EP physician will review. See interrogation under cardiology tab in the [address] [place] [address] [address] field for more details. I relayed results.

## 2023-02-23 ENCOUNTER — HOSPITAL ENCOUNTER (OUTPATIENT)
Age: 58
Discharge: HOME | End: 2023-02-23
Payer: COMMERCIAL

## 2023-02-23 LAB
ALBUMIN SERPL-MCNC: 4.2 G/DL (ref 3.4–5)
ALP BLD-CCNC: 16 U/L (ref 40–129)
ALT SERPL-CCNC: 21 U/L (ref 10–40)
AST SERPL-CCNC: 18 U/L (ref 15–37)
BILIRUB SERPL-MCNC: 0.4 MG/DL (ref 0–1)
BILIRUBIN DIRECT: <0.2 MG/DL (ref 0–0.3)
BILIRUBIN, INDIRECT: ABNORMAL MG/DL (ref 0–1)
TOTAL PROTEIN: 7.2 G/DL (ref 6.4–8.2)

## 2023-02-23 PROCEDURE — 36415 COLL VENOUS BLD VENIPUNCTURE: CPT

## 2023-02-23 PROCEDURE — 80076 HEPATIC FUNCTION PANEL: CPT

## 2023-03-01 DIAGNOSIS — K74.69 FATTY NUTRITIONAL CIRRHOSIS (HCC): Primary | ICD-10-CM

## 2023-03-06 ENCOUNTER — HOSPITAL ENCOUNTER (OUTPATIENT)
Dept: ULTRASOUND IMAGING | Age: 58
Discharge: HOME | End: 2023-03-06
Payer: COMMERCIAL

## 2023-03-06 ENCOUNTER — TELEPHONE (OUTPATIENT)
Dept: CARDIOLOGY CLINIC | Age: 58
End: 2023-03-06

## 2023-03-06 ENCOUNTER — OFFICE VISIT (OUTPATIENT)
Dept: CARDIOLOGY CLINIC | Age: 58
End: 2023-03-06
Payer: COMMERCIAL

## 2023-03-06 VITALS
WEIGHT: 188 LBS | BODY MASS INDEX: 34.6 KG/M2 | HEART RATE: 60 BPM | OXYGEN SATURATION: 98 % | DIASTOLIC BLOOD PRESSURE: 60 MMHG | SYSTOLIC BLOOD PRESSURE: 122 MMHG | HEIGHT: 62 IN

## 2023-03-06 DIAGNOSIS — I10 PRIMARY HYPERTENSION: ICD-10-CM

## 2023-03-06 DIAGNOSIS — E78.2 MIXED HYPERLIPIDEMIA: ICD-10-CM

## 2023-03-06 DIAGNOSIS — K74.60 CIRRHOSIS OF LIVER WITHOUT ASCITES, UNSPECIFIED HEPATIC CIRRHOSIS TYPE (HCC): ICD-10-CM

## 2023-03-06 DIAGNOSIS — I44.1 MOBITZ TYPE II ATRIOVENTRICULAR BLOCK: Primary | ICD-10-CM

## 2023-03-06 PROCEDURE — 3074F SYST BP LT 130 MM HG: CPT | Performed by: NURSE PRACTITIONER

## 2023-03-06 PROCEDURE — 99214 OFFICE O/P EST MOD 30 MIN: CPT | Performed by: NURSE PRACTITIONER

## 2023-03-06 PROCEDURE — 1036F TOBACCO NON-USER: CPT | Performed by: NURSE PRACTITIONER

## 2023-03-06 PROCEDURE — 3017F COLORECTAL CA SCREEN DOC REV: CPT | Performed by: NURSE PRACTITIONER

## 2023-03-06 PROCEDURE — G8427 DOCREV CUR MEDS BY ELIG CLIN: HCPCS | Performed by: NURSE PRACTITIONER

## 2023-03-06 PROCEDURE — 3078F DIAST BP <80 MM HG: CPT | Performed by: NURSE PRACTITIONER

## 2023-03-06 PROCEDURE — 76700 US EXAM ABDOM COMPLETE: CPT

## 2023-03-06 PROCEDURE — G8417 CALC BMI ABV UP PARAM F/U: HCPCS | Performed by: NURSE PRACTITIONER

## 2023-03-06 PROCEDURE — G8484 FLU IMMUNIZE NO ADMIN: HCPCS | Performed by: NURSE PRACTITIONER

## 2023-03-06 NOTE — TELEPHONE ENCOUNTER
Pt was curious if there is an in office device check needed or if she is ok with the remote transmission. Please advise.

## 2023-03-06 NOTE — PROGRESS NOTES
[address]     EP Outpatient Follow Up Note    Narciso Raya is 60 y.o. female who presents today with a history of non-obst CAD (20% LAD), HTN and hyperlipidemia. Her other hx includes: AVB s/p PPM May '21      CHIEF COMPLAINT / HPI:  Follow Up secondary to AVB s/p pacemaker placement     Subjective:   She denies significant chest pain. There is no SOB/FOSTER. However she has mild asthma/allergies that are bothersome at times. She has sleep apnea but does not use a mask. She'd lost a lot of weight. The patient denies orthopnea/PND. She sleeps in a chair d/t GERD and IBS/gastroparesis. The patient does not have swelling. The patients weight is stable . The patient is not experiencing palpitations or dizziness. Her home BP runs ~ 107/76  These symptoms show no change since the last OV. With regard to medication therapy the patient has been compliant with prescribed regimen. They have tolerated therapy to date.      Past Medical History:   Diagnosis Date    Ankle fracture, left     Ankle fracture, right     Anxiety     Arthritis     Asthma     Bipolar depression ([facility] [address]     CAN'T AFFORD MEDS    Bladder problem     Cervical disc herniation     COVID toes     Depression     Diabetes mellitus (HCC)     diet control    Fatty liver disease, non-alcoholic     Fibromyalgia     Gastroparesis     Dr Jean Marie Baugh    GERD (gastroesophageal reflux disease)     gastroporesis    Glaucoma     Dr Mindi Trejo    Headache     Hx of blood clots     Hyperlipidemia     elevated LFT on meds    IBS (irritable bowel syndrome)     Lumbar herniated disc     Nausea & vomiting     Nephrolithiasis 1/29/2019    Obesity (BMI 30-39.9) 3/11/2019    Partial Achilles tendon tear     Pneumonia     PONV (postoperative nausea and vomiting)     RA (rheumatoid arthritis) (HCC)     Rapid or irregular heartbeat     Sleep apnea     does not use cpap    Spinal stenosis     Stress incontinence     Thyroid disease     growths     Social History:    Social History Tobacco Use   Smoking Status Never   Smokeless Tobacco Never     Current Medications:  Current Outpatient Medications   Medication Sig Dispense Refill    apixaban (ELIQUIS) 2.5 MG TABS tablet Take 2.5 mg by mouth 2 times daily      insulin glargine (LANTUS SOLOSTAR) 100 UNIT/ML injection pen Inject 22 Units into the skin daily (Patient taking differently: Inject 22 Units into the skin daily as needed) 4 Adjustable Dose Pre-filled Pen Syringe 3    loratadine (CLARITIN) 10 MG tablet Take 1 tablet by mouth daily 30 tablet 5    acetaminophen (TYLENOL) 500 MG tablet Take 500 mg by mouth every 6 hours as needed for Pain      albuterol sulfate  (90 Base) MCG/ACT inhaler INHALE TWO PUFFS BY MOUTH EVERY 6 HOURS AS NEEDED FOR WHEEZING 1 Inhaler 2    ketotifen (ZADITOR) 0.025 % ophthalmic solution INSTILL TWO DROPS IN [facility] EYE TWO TIMES A DAY AS NEEDED FOR ALLERGY 1 Bottle 2    famotidine (PEPCID) 40 MG tablet Take 1 tablet by mouth 2 times daily (Patient taking differently: Take 40 mg by mouth 2 times daily Indications: taking 1-2 times/day) 30 tablet 5    latanoprost (XALATAN) 0.005 % ophthalmic solution Place 1 drop into both eyes nightly      Multiple Vitamins-Minerals (THERAPEUTIC MULTIVITAMIN-MINERALS) tablet Take 1 tablet by mouth daily       GAS-X EXTRA STRENGTH 125 MG chewable tablet CHEW TWO TABLETS BY MOUTH THREE TIMES A DAY WITH EACH MEAL AS NEEDED FOR FLATULENCE 192 tablet 1    Blood Glucose Monitoring Suppl (TRUE METRIX METER) w/Device KIT As needed 1 kit 0    blood glucose test strips (TRUE METRIX BLOOD GLUCOSE TEST) strip USE ONE STRIP TO TEST TWICE A DAY AS NEEDED 50 strip 2    blood glucose test strips (TRUE METRIX BLOOD GLUCOSE TEST) strip 1 each by In Vitro route daily As needed.  100 each 3    Insulin Pen Needle ([employer] PEN NEEDLES) 32G X 4 MM MISC USE WITH INSULIN PEN ONCE DAILY 100 each 3    TRUE METRIX BLOOD GLUCOSE TEST strip USE ONE STRIP TO TEST TWICE A DAY AS NEEDED 50 strip 3 Incontinence Supply Disposable (TRE CLASSIC BRIEFS/X-LARGE) MISC by Does not apply route Size XXL      TRUEplus Lancets 33G MISC USE TO CHECK BLOOD SUGAR 3-4 TIMES DAILY 100 each 3    guaiFENesin (MUCINEX MAXIMUM STRENGTH) 1200 MG TB12 Take 1 tablet by mouth daily For a PM dose. (Patient not taking: Reported on 3/6/2023) 10 tablet 0    Blood Pressure KIT 1 kit by Does not apply route daily 1 kit 0    Incontinence Supplies MISC 1 Package by Does not apply route 2 times daily 5 each 5    Disposable Gloves (VINYL GLOVES LARGE) MISC 1 Package by Does not apply route 4 times daily 5 each 5     No current facility-administered medications for this visit. Facility-Administered Medications Ordered in Other Visits   Medication Dose Route Frequency Provider Last Rate Last Admin    0.9 % sodium chloride infusion   IntraVENous Continuous Salvador Sandoval MD         REVIEW OF SYSTEMS:    CONSTITUTIONAL: No major weight gain or loss, fatigue, weakness, night sweats or fever. HEENT: No new vision difficulties or ringing in the ears. RESPIRATORY: No new SOB, PND, orthopnea or cough. CARDIOVASCULAR: See HPI  GI: No nausea, vomiting, diarrhea, constipation, abdominal pain or changes in bowel habits. : No urinary frequency, urgency, incontinence hematuria or dysuria. SKIN: No cyanosis or skin lesions. MUSCULOSKELETAL: No new muscle or joint pain. NEUROLOGICAL: No syncope or TIA-like symptoms.   PSYCHIATRIC: No anxiety, pain, insomnia or depression    Objective:   PHYSICAL EXAM:        Vitals:    03/06/23 1338 03/06/23 1402   BP: 110/70 122/60   Site: Right Upper Arm Right Upper Arm   Position: Sitting    Cuff Size: Large Adult Large Adult   Pulse: 60    SpO2: 98%    Weight: 188 lb (85.3 kg)    Height: 5' 2\" (1.575 m)        VITALS:  /70 (Site: Right Upper Arm, Position: Sitting, Cuff Size: Large Adult)   Pulse 60   Ht 5' 2\" (1.575 m)   Wt 188 lb (85.3 kg)   LMP  (LMP Unknown)   SpO2 98%   BMI 34.39 kg/m² CONSTITUTIONAL: Cooperative, no apparent distress, and appears well nourished / developed  NEUROLOGIC:  Awake and orientated to person, place and time. PSYCH: Calm affect. SKIN: Warm and dry. HEENT: Sclera non-icteric, normocephalic, neck supple, no elevation of JVP, normal carotid pulses with no bruits and thyroid normal size. LUNGS:  No increased work of breathing and clear to auscultation, no crackles or wheezing  CARDIOVASCULAR:  Regular rate 60 and rhythm with no murmurs, gallops, rubs, or abnormal heart sounds, normal PMI. The apical impulses not displaced  JVP less than 8 cm H2O  Heart tones are crisp and normal  Cervical veins are not engorged  The carotid upstroke is normal in amplitude and contour without delay or bruit  JVP is not elevated  ABDOMEN:  Normal bowel sounds, non-distended and non-tender to palpation  EXT: No edema, no calf tenderness. Pulses are present bilaterally.     DATA:    Lab Results   Component Value Date    ALT 21 02/23/2023    AST 18 02/23/2023    ALKPHOS 16 (L) 02/23/2023    BILITOT 0.4 02/23/2023     Lab Results   Component Value Date    CREATININE 0.8 11/15/2022    BUN 17 11/15/2022     11/15/2022    K 4.7 11/15/2022     11/15/2022    CO2 25 11/15/2022     Lab Results   Component Value Date    TSH 1.40 06/22/2020     Lab Results   Component Value Date    WBC 7.8 11/15/2022    HGB 13.1 11/15/2022    HCT 41.0 11/15/2022    MCV 89.9 11/15/2022     11/15/2022     No components found for: CHLPL  Lab Results   Component Value Date    TRIG 107 09/01/2022    TRIG 112 05/07/2021    TRIG 214 (H) 09/21/2020     Lab Results   Component Value Date    HDL 36 (L) 09/01/2022    HDL 33 (L) 05/07/2021    HDL 36 (L) 09/21/2020     Lab Results   Component Value Date    LDLCALC 184 (H) 09/01/2022    LDLCALC 119 (H) 05/07/2021    LDLCALC 175 (H) 09/21/2020     Lab Results   Component Value Date    LABVLDL 21 09/01/2022    LABVLDL 22 05/07/2021    LABVLDL 43 09/21/2020 Radiology Review:  Pertinent images / reports were reviewed as a part of this visit and reveals the following:    Echo: May '21   Summary   Normal left ventricle size, wall thickness, and systolic function with an   estimated ejection fraction of 55-60%. No regional wall motion abnormalities   are seen. No pericardial effusion noted. No pleural effusion. PaceArt transmission : 2/20/23   99.9% (MVP On)  AP 35.5%    Assessment:      Diagnosis Orders   1. Mobitz type II atrioventricular block   ~s/p PPM  ~ 99.9% / AP 35/5%  ~following in device clinic       2. Primary hypertension   ~controlled  ~untx sleep apnea       3. Mixed hyperlipidemia   ~LDL elevated   ~goal < given DM and non-obst CAD of LAD at 20% by cath '19  ~no longer taking atorvastatin / rosuvastatin / Livalo / simvastatin : ? Intolerance ; hx of SNIDER and cirrhosis             I had the opportunity to review the clinical symptoms and presentation of True Dyer. Plan:     Continue present management   F/U in one year  Consider statin Nexlizet     Overall the patient is stable from CV standpoint    I have addresed the patient's cardiac risk factors and adjusted pharmacologic treatment as needed. In addition, I have reinforced the need for patient directed risk factor modification. Further evaluation will be based upon the patient's clinical course and testing results. All questions and concerns were addressed to the patient/family. Alternatives to my treatment were discussed. The patient is not currently smoking. The risks related to smoking were reviewed with the patient. Recommend maintaining a smoke-free lifestyle. Products available for smoking cessation were discussed in detail.     Patient is not on a beta-blocker : bradycardia  Patient is not on an ace-i/ARB : neg CHF ; ? Renal protection with DM > defer to PCP  Patient is not on a statin : ? intolerances    anti-coagulation has been recommended / prescribed for this patient. Education conducted on adverse reactions including bleeding was discussed. Angiotension inhibitor/angiotension receptor blocker has __ been prescribed / recommended for congestive heart failure. Daily weight, low sodium diet were discussed. Patient instructed to call the office with a weight gain: > 3 # over night or 5# in one week; swelling, SOB/orthopnea/PND    The patient verbalizes understanding not to stop medications without discussing with us. Discussed exercise: 30-60 minutes 7 days/week : walks outside some weather dependent   Discussed Low saturated fat/YADIRA diet. She doesn't check her BS daily and last checked it was running pretty good    Thank you for allowing to us to participate in the care of Marck Betancourt.     KRISHNA Mathis    Documentation of today's visit sent to PCP

## 2023-03-07 NOTE — TELEPHONE ENCOUNTER
In office device check only needed once a year. Patient is scheduled for 9/12/2023 when she sees Dr. Naveed Rush. Please advise.  Thanks

## 2023-03-08 ENCOUNTER — TELEPHONE (OUTPATIENT)
Dept: INTERNAL MEDICINE CLINIC | Age: 58
End: 2023-03-08

## 2023-03-08 DIAGNOSIS — J45.20 MILD INTERMITTENT ASTHMA WITHOUT COMPLICATION: ICD-10-CM

## 2023-03-09 RX ORDER — ALBUTEROL SULFATE 90 UG/1
AEROSOL, METERED RESPIRATORY (INHALATION)
Qty: 1 EACH | Refills: 0 | Status: SHIPPED | OUTPATIENT
Start: 2023-03-09

## 2023-03-16 ENCOUNTER — OFFICE VISIT (OUTPATIENT)
Dept: ENDOCRINOLOGY | Age: 58
End: 2023-03-16
Payer: COMMERCIAL

## 2023-03-16 VITALS
HEIGHT: 62 IN | SYSTOLIC BLOOD PRESSURE: 124 MMHG | WEIGHT: 180.6 LBS | BODY MASS INDEX: 33.23 KG/M2 | OXYGEN SATURATION: 99 % | DIASTOLIC BLOOD PRESSURE: 84 MMHG | HEART RATE: 72 BPM | TEMPERATURE: 98 F | RESPIRATION RATE: 15 BRPM

## 2023-03-16 DIAGNOSIS — Z79.4 TYPE 2 DIABETES MELLITUS WITHOUT COMPLICATION, WITH LONG-TERM CURRENT USE OF INSULIN (HCC): Primary | ICD-10-CM

## 2023-03-16 DIAGNOSIS — E11.9 TYPE 2 DIABETES MELLITUS WITHOUT COMPLICATION, WITH LONG-TERM CURRENT USE OF INSULIN (HCC): Primary | ICD-10-CM

## 2023-03-16 LAB — HBA1C MFR BLD: 8.1 %

## 2023-03-16 PROCEDURE — 3052F HG A1C>EQUAL 8.0%<EQUAL 9.0%: CPT | Performed by: INTERNAL MEDICINE

## 2023-03-16 PROCEDURE — G8417 CALC BMI ABV UP PARAM F/U: HCPCS | Performed by: INTERNAL MEDICINE

## 2023-03-16 PROCEDURE — 3017F COLORECTAL CA SCREEN DOC REV: CPT | Performed by: INTERNAL MEDICINE

## 2023-03-16 PROCEDURE — 1036F TOBACCO NON-USER: CPT | Performed by: INTERNAL MEDICINE

## 2023-03-16 PROCEDURE — 83036 HEMOGLOBIN GLYCOSYLATED A1C: CPT | Performed by: INTERNAL MEDICINE

## 2023-03-16 PROCEDURE — 2022F DILAT RTA XM EVC RTNOPTHY: CPT | Performed by: INTERNAL MEDICINE

## 2023-03-16 PROCEDURE — G8484 FLU IMMUNIZE NO ADMIN: HCPCS | Performed by: INTERNAL MEDICINE

## 2023-03-16 PROCEDURE — G8427 DOCREV CUR MEDS BY ELIG CLIN: HCPCS | Performed by: INTERNAL MEDICINE

## 2023-03-16 PROCEDURE — 99214 OFFICE O/P EST MOD 30 MIN: CPT | Performed by: INTERNAL MEDICINE

## 2023-03-16 RX ORDER — CALCIUM CITRATE/VITAMIN D3 200MG-6.25
TABLET ORAL
Qty: 50 STRIP | Refills: 2 | Status: SHIPPED | OUTPATIENT
Start: 2023-03-16

## 2023-03-16 RX ORDER — GLUCOSAM/CHON-MSM1/C/MANG/BOSW 500-416.6
TABLET ORAL
Qty: 100 EACH | Refills: 3 | Status: SHIPPED | OUTPATIENT
Start: 2023-03-16

## 2023-03-16 NOTE — PROGRESS NOTES
Seen as f/u patient for diabetes,      Interim:    Taking lantus 4 time a week  States lows some days    H/o thyroid nodule  Difficulty swallowing  No lows    Diagnosed with Type 2 diabetes mellitus   Known diabetic complications: none     Current diabetic medications   Metformin ER 500mg - not taking reports lows with it- off of it   lantus 12 units once a day but taking 22 now    Intolerant to plain metformin    Last A1c 8.1%<----9.1%<-----8.1% <---- 7.2%<------ 9.6 %<----8.2%<-----9<-----7.8%<------7.8%<------ 9.2%<-----8.8%<----- 10.3 on 7/17<------10.8 on 12/16<----- 9.1 on 8/16<-----11.4 on 6/16<-----8.8 on 4.15<---8.9 <---10.1    Prior visit with dietician: Yes   Current diet: on average, 1 meals per day + Snacks   Reports h/o gastroparesis  Current exercise: walking   Current monitoring regimen: home blood tests -1  times daily does in forearms    Report h/o pancreatitis, stent in pancreatic duct  Also h/o yeast infection    Has brought blood glucose log/meter: no  Home blood sugar records:  Any episodes of hypoglycemia? none    No Hx of CAD , PVD, CVA    Cath showed 20 % blockage    Hyperlipidemia:  on 3/16   12/16 Vit D 17.4    Ck nl LFT nl  Start lipitor 10mg  Vit D3 10,000 IU weekly-did not tolerate D2  5/17  Vit D 20   Taking lipitor every week  8/17   1/19   Vit D 25.9   on 6/20     Last eye exam 6/22  Last foot exam: 3/23  Last microalbumin to creatinine ratio: 8/22  ACE-I or ARB: None    TSH 1.14 on 4/15  7/14 USG:  IMPRESSION: Multiple small hypoechoic nodules, likely benign. These are nonspecific and measures 6 to 7 mm each. No FH of thyroid cancer or disease    8/16 USG stable    9/11  FINDINGS- The thyroid contains multiple bilateral thyroid   nodules. Three of the thyroid nodules measure 7 mm. The other   nodules are less than 7 mm. Nodules are primarily hypoechoic with   some heterogeneity.  Both thyroid lobes show otherwise normal echogenicity, morphology, and normal color Doppler flow. The right   thyroid lobe is 4.8 x 1.6 x 1.9 cm. The left thyroid lobe is 3.6 x   0.8 x 2.1 cm. The thyroid isthmus is normal at 3 mm in thickness.       IMPRESSION- Multiple bilateral small thyroid nodules, minimally   changed since 2007     10/19 USG    Right 1.1cm  Reviewed images, Given slight change in size, hypoechoic and > 1cm, would recommend FNA    11/19 FNA benign    3/22 USG  Right 1cm---> 0.8cm previous FNA  Right mid 9mm---> 1.1cm  Left 7 mm    Past Medical History:   Diagnosis Date    Ankle fracture, left     Ankle fracture, right     Anxiety     Arthritis     Asthma     Bipolar depression ([address]     CAN'T AFFORD MEDS    Bladder problem     Cervical disc herniation     COVID toes     Depression     Diabetes mellitus ([address]     diet control    Fatty liver disease, non-alcoholic     Fibromyalgia     Gastroparesis     Dr Lenore aDsh    GERD (gastroesophageal reflux disease)     gastroporesis    Glaucoma     Dr Kevon Schneider    Headache     Hx of blood clots     Hyperlipidemia     elevated LFT on meds    IBS (irritable bowel syndrome)     Lumbar herniated disc     Nausea & vomiting     Nephrolithiasis 1/29/2019    Obesity (BMI 30-39.9) 3/11/2019    Partial Achilles tendon tear     Pneumonia     PONV (postoperative nausea and vomiting)     RA (rheumatoid arthritis) ([address]     Rapid or irregular heartbeat     Sleep apnea     does not use cpap    Spinal stenosis     Stress incontinence     Thyroid disease     growths     Past Surgical History:   Procedure Laterality Date    CARDIAC SURGERY      CHOLECYSTECTOMY      COLONOSCOPY      COLONOSCOPY  03/06/2018    with polypectomies    DILATION AND CURETTAGE OF UTERUS      ENDOMETRIAL ABLATION      ENDOSCOPY, COLON, DIAGNOSTIC      ERCP  05/25/2010    with stent    ERCP  01/31/2014    ERCP WITH SPHINTER OF ODDI MANOMETERY    ESOPHAGEAL DILATATION  08/10/2021    ESOPHAGEAL DILATION JERSON performed by Ivonne Aguilar MD at [facility] ENDOSCOPY    ESOPHAGEAL DILATATION N/A 6/23/2022    EGD DILATION ARTHUR performed by Dian Siddiqui MD at [address] [facility] N/A 1/27/2023    ESOPHAGEAL DILATION [place] performed by Dian Siddiqui MD at [address] [place] [address]  10/05/2010    botox injection    EYE SURGERY      LASER FOR GLAUCOMA    LAPAROSCOPY      PACEMAKER INSERTION  05/10/2021    TONSILLECTOMY      UPPER GASTROINTESTINAL ENDOSCOPY      UPPER GASTROINTESTINAL ENDOSCOPY  04/12/2011    DILATATION, 58 FR ARTHUR, 100 UNITS BOTOX    UPPER GASTROINTESTINAL ENDOSCOPY  08/30/2011    58 FR DILATATION, 100 UNITS  BOTOX INJECTION    UPPER GASTROINTESTINAL ENDOSCOPY  03/09/2012    with dilatation and submucosal injection: Botox    UPPER GASTROINTESTINAL ENDOSCOPY      UPPER GASTROINTESTINAL ENDOSCOPY  11/20/2012     Esophagogastroduodenoscopy with Botulinum toxin injection of the pylorus and Arthur esophageal dilation    UPPER GASTROINTESTINAL ENDOSCOPY  06/04/2013    WITH DIILITATION AND BOTOX INJECTION    UPPER GASTROINTESTINAL ENDOSCOPY  05/20/2014    100 unit Botox injection, 56 Fr.  Arthur esophageal dilatation    UPPER GASTROINTESTINAL ENDOSCOPY  12/12/2014    with esophageal dilatation, and botox injection    UPPER GASTROINTESTINAL ENDOSCOPY  09/09/2015    With Dilitation and Botox injection    UPPER GASTROINTESTINAL ENDOSCOPY  05/10/2016    distal esophagus biopsy, stomach biopsy, botox injection    UPPER GASTROINTESTINAL ENDOSCOPY  04/11/2017    with dilatation and botox injection    UPPER GASTROINTESTINAL ENDOSCOPY  10/24/2017    DILATATION, BIOPSY, BOTOX    UPPER GASTROINTESTINAL ENDOSCOPY  03/06/2018    WITH BOTOX AND BALLOON DILATATION    UPPER GASTROINTESTINAL ENDOSCOPY N/A 05/07/2019    EGD SUBMUCOSAL/BOTOX INJECTION performed by Dian Siddiqui MD at [address] [place] [address] N/A 05/07/2019    EGD DILATION BALLOON performed by Dian Siddiqui MD at [facility] [address] ENDOSCOPY    UPPER GASTROINTESTINAL ENDOSCOPY N/A 03/13/2020    EGD SUBMUCOSAL/BOTOX INJECTION performed by Cindy Ayala MD at [facility] 03/13/2020    EGD DILATION BALLOON performed by Cindy Ayala MD at [facility] N/A 10/06/2020    EGD DILATION BALLOON performed by Cindy Ayala MD at [facility] N/A 10/06/2020    EGD SUBMUCOSAL/BOTOX INJECTION performed by Cindy Ayala MD at [facility] N/A 08/10/2021    EGD SUBMUCOSAL/BOTOX INJECTION performed by Cindy Ayala MD at [facility] N/A 08/10/2021    EGD BIOPSY performed by Cindy Ayala MD at [facility] N/A 6/23/2022    EGD SUBMUCOSAL/BOTOX INJECTION performed by Cindy Ayala MD at [facility] N/A 1/27/2023    EGD SUBMUCOSAL/BOTOX INJECTION performed by Cindy Ayala MD at [address]     Current Outpatient Medications   Medication Sig Dispense Refill    albuterol sulfate HFA (PROVENTIL;VENTOLIN;PROAIR) 108 (90 Base) MCG/ACT inhaler INHALE TWO PUFFS BY MOUTH EVERY 6 HOURS AS NEEDED FOR WHEEZING 1 each 0    apixaban (ELIQUIS) 2.5 MG TABS tablet Take 2.5 mg by mouth 2 times daily      insulin glargine (LANTUS SOLOSTAR) 100 UNIT/ML injection pen Inject 22 Units into the skin daily (Patient taking differently: Inject 22 Units into the skin daily as needed) 4 Adjustable Dose Pre-filled Pen Syringe 3    Blood Glucose Monitoring Suppl (TRUE METRIX METER) w/Device KIT As needed 1 kit 0    blood glucose test strips (TRUE METRIX BLOOD GLUCOSE TEST) strip USE ONE STRIP TO TEST TWICE A DAY AS NEEDED 50 strip 2    blood glucose test strips (TRUE METRIX BLOOD GLUCOSE TEST) strip 1 each by In Vitro route daily As needed.  100 each 3    Insulin Pen Needle ([employer] PEN NEEDLES) 32G X 4 MM MISC USE WITH INSULIN PEN ONCE DAILY 100 each 3    loratadine (CLARITIN) 10 MG tablet Take 1 tablet by mouth daily 30 tablet 5    TRUE METRIX BLOOD GLUCOSE TEST strip USE ONE STRIP TO TEST TWICE A DAY AS NEEDED 50 strip 3    Incontinence Supply Disposable (TRE CLASSIC BRIEFS/X-LARGE) MISC by Does not apply route Size XXL      TRUEplus Lancets 33G MISC USE TO CHECK BLOOD SUGAR 3-4 TIMES DAILY 100 each 3    acetaminophen (TYLENOL) 500 MG tablet Take 500 mg by mouth every 6 hours as needed for Pain      guaiFENesin (MUCINEX MAXIMUM STRENGTH) 1200 MG TB12 Take 1 tablet by mouth daily For a PM dose. 10 tablet 0    Blood Pressure KIT 1 kit by Does not apply route daily 1 kit 0    ketotifen (ZADITOR) 0.025 % ophthalmic solution INSTILL TWO DROPS IN EACH EYE TWO TIMES A DAY AS NEEDED FOR ALLERGY 1 Bottle 2    famotidine (PEPCID) 40 MG tablet Take 1 tablet by mouth 2 times daily (Patient taking differently: Take 40 mg by mouth 2 times daily Indications: taking 1-2 times/day) 30 tablet 5    latanoprost (XALATAN) 0.005 % ophthalmic solution Place 1 drop into both eyes nightly      Multiple Vitamins-Minerals (THERAPEUTIC MULTIVITAMIN-MINERALS) tablet Take 1 tablet by mouth daily       Incontinence Supplies MISC 1 Package by Does not apply route 2 times daily 5 each 5    Disposable Gloves (VINYL GLOVES LARGE) MISC 1 Package by Does not apply route 4 times daily 5 each 5    GAS-X EXTRA STRENGTH 125 MG chewable tablet CHEW TWO TABLETS BY MOUTH THREE TIMES A DAY WITH EACH MEAL AS NEEDED FOR FLATULENCE 192 tablet 1     No current facility-administered medications for this visit.     Facility-Administered Medications Ordered in Other Visits   Medication Dose Route Frequency Provider Last Rate Last Admin    0.9 % sodium chloride infusion   IntraVENous Continuous Jazmyne Bhat MD             Review of Systems  Scanned, reviewed    Vitals:    03/16/23 0927   BP: 124/84   Pulse:  72   Resp: 15   Temp: 98 °F (36.7 °C)   SpO2: 99%       Constitutional: Well-developed, appears stated age, cooperative, in no acute distress  H/E/N/M/T:atraumatic, normocephalic, external ears, nose, lips normal without lesions  Eyes: Lids, lashes, conjunctivae and sclerae normal, No proptosis, no redness  Neck: supple, symmetrical, no swelling  Skin: No obvious rashes or lesions present. Skin and hair texture normal  Psychiatric: Judgement and Insight:  judgement and insight appear normal  Neuro: Normal without focal findings, speech is normal normal, speech is spontaneous  Chest: No labored breathing, no chest deformity, no stridor  Musculoskeletal: No joint deformity, swelling    3/23    Foot exam: No ulcer, 8/10 monofilament    Lab Reviewed   No components found for: CHLPL  Lab Results   Component Value Date    TRIG 107 09/01/2022    TRIG 112 05/07/2021    TRIG 214 (H) 09/21/2020     Lab Results   Component Value Date    HDL 36 (L) 09/01/2022    HDL 33 (L) 05/07/2021    HDL 36 (L) 09/21/2020     Lab Results   Component Value Date    LDLCALC 184 (H) 09/01/2022    LDLCALC 119 (H) 05/07/2021    LDLCALC 175 (H) 09/21/2020     Lab Results   Component Value Date    LABVLDL 21 09/01/2022    LABVLDL 22 05/07/2021    LABVLDL 43 09/21/2020     Lab Results   Component Value Date    LABA1C 9.1 08/16/2022       Assessment:     Jeremias Jeter is a 60 y.o. female with :    1.T2DM: Longstanding, fairly controlled, reports low blood glucose with Metformin, so she stopped. Did not bring log, . Avoid DPP IV and GLP agonist given pancreatitis history. Avoid SGLT-2 Inhibitor given h/o yeast infection. Sulfonylurea may cause hypoglycemia. Needs basal insulin, discussed with her ,  started lower dose. She wanted amaryl , advised not a good option given A1c and lows  Advise to take lantus daily, decrease dose. She has relative hypoglycemia, so will improve  Gradually. Advised prandial, she is not willing to take.    Mismatch with log, fructosamine also high  Not interested in CGM  2. Elevated BP:  3.HLD:Familila hyperlipidemia,  Reports statins cause elevated LFT, per patient hepatologist advised not to take. Replaced Vitamin, so can tolerate lipitor. [employer] will not be covered, last LDL improved  Advised Zetia but she wants to wait. Advised Praluent, she states want to try lipitor taking more frequently  Discussed risk of CAD, CVA given high LDL  4. Obesity  5. Thyroid Nodule: Sub Centimeter nodule, monitor with USG, stable on 8/16, no increase in size, repeat USG shows right 1.1cm nodule, FNA benign  3/22 USG Right 1.1cm smaller in size--> 8mm   Additional right 9mm---> 1.1cm no previous FNA    F/u in one year USG 2024    6. Fatty liver  7. Vitamin D deficiency: On 10,000IU weekly per PCP  8. Weight loss: normal TSH , advised see PCP    Plan:       lantus 22---> 16 units daily, advise to take every day   Self titrate, increase by two unit every 3 days if BG > 140   Advise to check blood sugar 2 times a day. She does check in the forearm, advise finger sticks, keep log   Call if post meal >200 , as may need prandial   Patient to send blood sugar log for titration. Advise to exercise regularly but can not due to back pain, and OA. Advise to low simple carbohydrate and protein with each  meal diet. Diabetes Care: recommended yearly eye exam, foot exam and urine microalbumin to   creatinine ratio.      -Hyperlipidemia, LDL goal is <100 mg/dl   -Daily ASA : 81mg  -Smoking status: Non smoker    F/u in 6 months as will see PCP

## 2023-03-31 ENCOUNTER — TELEPHONE (OUTPATIENT)
Dept: INTERNAL MEDICINE CLINIC | Age: 58
End: 2023-03-31

## 2023-03-31 NOTE — TELEPHONE ENCOUNTER
----- Message from Monroeamrit Mantilla sent at 3/31/2023  9:14 AM EDT -----  Subject: Message to Provider    QUESTIONS  Information for Provider? Rep called to inform that they are faxing over a   medical necessity form for Dr. Janny Ribeiro due to insurance requiring the   signature of prescription. Ext [phone]  ---------------------------------------------------------------------------  --------------  [facility] DARYL  [phone]; OK to leave message on voicemail  ---------------------------------------------------------------------------  --------------  SCRIPT ANSWERS  Relationship to Patient? Third Party  Third Party Type? Durable Medical Equipment? Representative Name?  [phone]

## 2023-04-03 ENCOUNTER — OFFICE VISIT (OUTPATIENT)
Dept: INTERNAL MEDICINE CLINIC | Age: 58
End: 2023-04-03
Payer: COMMERCIAL

## 2023-04-03 VITALS
DIASTOLIC BLOOD PRESSURE: 64 MMHG | BODY MASS INDEX: 35.68 KG/M2 | HEIGHT: 61 IN | OXYGEN SATURATION: 99 % | WEIGHT: 189 LBS | SYSTOLIC BLOOD PRESSURE: 120 MMHG | HEART RATE: 61 BPM

## 2023-04-03 DIAGNOSIS — I82.412 DVT OF DEEP FEMORAL VEIN, LEFT (HCC): ICD-10-CM

## 2023-04-03 DIAGNOSIS — F33.1 MODERATE EPISODE OF RECURRENT MAJOR DEPRESSIVE DISORDER (HCC): ICD-10-CM

## 2023-04-03 DIAGNOSIS — Z79.4 TYPE 2 DIABETES MELLITUS WITHOUT COMPLICATION, WITH LONG-TERM CURRENT USE OF INSULIN (HCC): ICD-10-CM

## 2023-04-03 DIAGNOSIS — K74.69 OTHER CIRRHOSIS OF LIVER (HCC): ICD-10-CM

## 2023-04-03 DIAGNOSIS — E11.9 TYPE 2 DIABETES MELLITUS WITHOUT COMPLICATION, WITH LONG-TERM CURRENT USE OF INSULIN (HCC): ICD-10-CM

## 2023-04-03 DIAGNOSIS — N39.46 MIXED STRESS AND URGE URINARY INCONTINENCE: Primary | ICD-10-CM

## 2023-04-03 DIAGNOSIS — K31.84 GASTROPARESIS: ICD-10-CM

## 2023-04-03 DIAGNOSIS — J45.20 MILD INTERMITTENT ASTHMA WITHOUT COMPLICATION: ICD-10-CM

## 2023-04-03 DIAGNOSIS — D68.59 PROTEIN S DEFICIENCY (HCC): ICD-10-CM

## 2023-04-03 PROBLEM — L98.9 SKIN SORE: Status: RESOLVED | Noted: 2022-09-01 | Resolved: 2023-04-03

## 2023-04-03 PROBLEM — I44.30 HEART BLOCK ATRIOVENTRICULAR: Status: RESOLVED | Noted: 2021-05-08 | Resolved: 2023-04-03

## 2023-04-03 PROCEDURE — 2022F DILAT RTA XM EVC RTNOPTHY: CPT | Performed by: INTERNAL MEDICINE

## 2023-04-03 PROCEDURE — 3052F HG A1C>EQUAL 8.0%<EQUAL 9.0%: CPT | Performed by: INTERNAL MEDICINE

## 2023-04-03 PROCEDURE — 99214 OFFICE O/P EST MOD 30 MIN: CPT | Performed by: INTERNAL MEDICINE

## 2023-04-03 PROCEDURE — 3017F COLORECTAL CA SCREEN DOC REV: CPT | Performed by: INTERNAL MEDICINE

## 2023-04-03 PROCEDURE — G8427 DOCREV CUR MEDS BY ELIG CLIN: HCPCS | Performed by: INTERNAL MEDICINE

## 2023-04-03 PROCEDURE — 1036F TOBACCO NON-USER: CPT | Performed by: INTERNAL MEDICINE

## 2023-04-03 PROCEDURE — G8417 CALC BMI ABV UP PARAM F/U: HCPCS | Performed by: INTERNAL MEDICINE

## 2023-04-03 ASSESSMENT — PATIENT HEALTH QUESTIONNAIRE - PHQ9
4. FEELING TIRED OR HAVING LITTLE ENERGY: 3
10. IF YOU CHECKED OFF ANY PROBLEMS, HOW DIFFICULT HAVE THESE PROBLEMS MADE IT FOR YOU TO DO YOUR WORK, TAKE CARE OF THINGS AT HOME, OR GET ALONG WITH OTHER PEOPLE: 1
6. FEELING BAD ABOUT YOURSELF - OR THAT YOU ARE A FAILURE OR HAVE LET YOURSELF OR YOUR FAMILY DOWN: 1
SUM OF ALL RESPONSES TO PHQ QUESTIONS 1-9: 13
2. FEELING DOWN, DEPRESSED OR HOPELESS: 1
SUM OF ALL RESPONSES TO PHQ QUESTIONS 1-9: 13
3. TROUBLE FALLING OR STAYING ASLEEP: 3
SUM OF ALL RESPONSES TO PHQ9 QUESTIONS 1 & 2: 3
1. LITTLE INTEREST OR PLEASURE IN DOING THINGS: 2
SUM OF ALL RESPONSES TO PHQ QUESTIONS 1-9: 13
7. TROUBLE CONCENTRATING ON THINGS, SUCH AS READING THE NEWSPAPER OR WATCHING TELEVISION: 1
5. POOR APPETITE OR OVEREATING: 2
9. THOUGHTS THAT YOU WOULD BE BETTER OFF DEAD, OR OF HURTING YOURSELF: 0
SUM OF ALL RESPONSES TO PHQ QUESTIONS 1-9: 13
8. MOVING OR SPEAKING SO SLOWLY THAT OTHER PEOPLE COULD HAVE NOTICED. OR THE OPPOSITE, BEING SO FIGETY OR RESTLESS THAT YOU HAVE BEEN MOVING AROUND A LOT MORE THAN USUAL: 0

## 2023-04-03 ASSESSMENT — ENCOUNTER SYMPTOMS
VOMITING: 0
SORE THROAT: 0
SHORTNESS OF BREATH: 0
CHEST TIGHTNESS: 0
ABDOMINAL PAIN: 0
COUGH: 0
CONSTIPATION: 0
COLOR CHANGE: 0
BACK PAIN: 1
NAUSEA: 1
WHEEZING: 0

## 2023-04-03 NOTE — ASSESSMENT & PLAN NOTE
This is a recent diagnosis, she is actively following up with GI and has labs ordered to complete prior to her next follow-up visit, encouraged continue abstinence from alcohol, avoid hepatotoxic agents and continue attempts for weight loss and maintaining healthy low-fat diet

## 2023-04-03 NOTE — ASSESSMENT & PLAN NOTE
Continue care and recommendation as per endocrinology, has been gradually improving, reinforced recommendations for low-carb diet and increase level of physical activity as tolerated

## 2023-04-03 NOTE — ASSESSMENT & PLAN NOTE
Multifactorial, has been worked up by urology in the past, refill incontinence supplies and fax order to her medical supply store

## 2023-04-03 NOTE — PROGRESS NOTES
specialists for her chronic medical problems, has been recently seen by endocrinology for diabetes management, continues to follow-up with cardiology for history of heart block and pacemaker device checks and follows up with regular cardiology for coronary artery disease. Continues to follow-up with GI for gastroparesis and recently diagnosed with nonalcoholic cirrhosis  Remains on anticoagulants for history of recurrent DVTs on a very small dose under supervision of hematology      Review of Systems   Constitutional:  Negative for activity change, appetite change and fatigue. HENT:  Negative for congestion, hearing loss, mouth sores and sore throat. Respiratory:  Negative for cough, chest tightness, shortness of breath and wheezing. Cardiovascular:  Negative for chest pain, palpitations and leg swelling. Gastrointestinal:  Positive for nausea. Negative for abdominal pain, constipation and vomiting. Genitourinary:  Negative for difficulty urinating, dysuria, frequency, hematuria and urgency. Musculoskeletal:  Positive for arthralgias and back pain. Negative for gait problem and joint swelling. Skin:  Negative for color change. Allergic/Immunologic: Negative for environmental allergies and immunocompromised state. Neurological:  Negative for dizziness, speech difficulty, light-headedness and headaches. Hematological:  Bruises/bleeds easily. Psychiatric/Behavioral:  Positive for dysphoric mood. Negative for behavioral problems and sleep disturbance. The patient is not nervous/anxious. OBJECTIVE:    /64   Pulse 61   Ht 5' 1.42\" (1.56 m)   Wt 189 lb (85.7 kg)   LMP  (LMP Unknown)   SpO2 99%   BMI 35.23 kg/m²    Physical Exam  Vitals and nursing note reviewed. Constitutional:       General: She is not in acute distress. Appearance: Normal appearance. She is obese. She is not toxic-appearing. HENT:      Head: Normocephalic.       Right Ear: Tympanic membrane, ear canal and

## 2023-04-03 NOTE — ASSESSMENT & PLAN NOTE
Results of depression screen noted and discussed with the patient, symptoms manageable with no red flags, will continue to monitor off medications, encouraged to call the office if any new or worsening symptoms

## 2023-04-03 NOTE — ASSESSMENT & PLAN NOTE
Stable on as needed albuterol, continue daily antihistamine, continue to avoid smoke and known irritants

## 2023-05-02 ENCOUNTER — TELEPHONE (OUTPATIENT)
Dept: ENDOCRINOLOGY | Age: 58
End: 2023-05-02
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
## 2023-05-04 ENCOUNTER — OFFICE VISIT (OUTPATIENT)
Dept: ENT CLINIC | Age: 58
End: 2023-05-04
Payer: COMMERCIAL

## 2023-05-04 VITALS
HEIGHT: 61 IN | TEMPERATURE: 97.3 F | DIASTOLIC BLOOD PRESSURE: 72 MMHG | SYSTOLIC BLOOD PRESSURE: 109 MMHG | BODY MASS INDEX: 35.5 KG/M2 | HEART RATE: 64 BPM | WEIGHT: 188 LBS | OXYGEN SATURATION: 96 %

## 2023-05-04 DIAGNOSIS — J30.9 ALLERGIC RHINITIS, UNSPECIFIED SEASONALITY, UNSPECIFIED TRIGGER: ICD-10-CM

## 2023-05-04 DIAGNOSIS — H69.83 DYSFUNCTION OF BOTH EUSTACHIAN TUBES: Primary | ICD-10-CM

## 2023-05-04 DIAGNOSIS — J34.89 NASAL MUCOSA DRY: ICD-10-CM

## 2023-05-04 DIAGNOSIS — H93.13 TINNITUS OF BOTH EARS: ICD-10-CM

## 2023-05-04 DIAGNOSIS — H61.21 IMPACTED CERUMEN OF RIGHT EAR: ICD-10-CM

## 2023-05-04 PROCEDURE — 3017F COLORECTAL CA SCREEN DOC REV: CPT | Performed by: STUDENT IN AN ORGANIZED HEALTH CARE EDUCATION/TRAINING PROGRAM

## 2023-05-04 PROCEDURE — G8427 DOCREV CUR MEDS BY ELIG CLIN: HCPCS | Performed by: STUDENT IN AN ORGANIZED HEALTH CARE EDUCATION/TRAINING PROGRAM

## 2023-05-04 PROCEDURE — 69210 REMOVE IMPACTED EAR WAX UNI: CPT | Performed by: STUDENT IN AN ORGANIZED HEALTH CARE EDUCATION/TRAINING PROGRAM

## 2023-05-04 PROCEDURE — 1036F TOBACCO NON-USER: CPT | Performed by: STUDENT IN AN ORGANIZED HEALTH CARE EDUCATION/TRAINING PROGRAM

## 2023-05-04 PROCEDURE — 99213 OFFICE O/P EST LOW 20 MIN: CPT | Performed by: STUDENT IN AN ORGANIZED HEALTH CARE EDUCATION/TRAINING PROGRAM

## 2023-05-04 PROCEDURE — G8417 CALC BMI ABV UP PARAM F/U: HCPCS | Performed by: STUDENT IN AN ORGANIZED HEALTH CARE EDUCATION/TRAINING PROGRAM

## 2023-05-04 RX ORDER — FLUTICASONE FUROATE 27.5 UG/1
2 SPRAY, METERED NASAL DAILY
Qty: 1 EACH | Refills: 3 | Status: SHIPPED | OUTPATIENT
Start: 2023-05-04

## 2023-05-04 NOTE — PROGRESS NOTES
BY MOUTH THREE TIMES A DAY WITH EACH MEAL AS NEEDED FOR FLATULENCE 192 tablet 1     No current facility-administered medications for this visit. Facility-Administered Medications Ordered in Other Visits   Medication Dose Route Frequency Provider Last Rate Last Admin    0.9 % sodium chloride infusion   IntraVENous Continuous Adam Jain MD           Review of Systems     REVIEW OF SYSTEM: See HPI above    PhysicalExam     Vitals:    05/04/23 1310   BP: 109/72   Site: Left Upper Arm   Position: Sitting   Cuff Size: Large Adult   Pulse: 64   Temp: 97.3 °F (36.3 °C)   TempSrc: Temporal   SpO2: 96%   Weight: 188 lb (85.3 kg)   Height: 5' 1\" (1.549 m)       PHYSICAL EXAM  /72 (Site: Left Upper Arm, Position: Sitting, Cuff Size: Large Adult)   Pulse 64   Temp 97.3 °F (36.3 °C) (Temporal)   Ht 5' 1\" (1.549 m)   Wt 188 lb (85.3 kg)   LMP  (LMP Unknown)   SpO2 96%   BMI 35.52 kg/m²     GENERAL: No acute distress, alert and oriented. EYES: EOMI, Anti-icteric. NOSE: On anterior rhinoscopy there is no epistaxis, nasal mucosa semi-dry, no purulent drainage.    EARS: Normal external appearance; on portable otomicroscopy:     -Ad: External auditory canal with cerumen impaction, see procedure note below.     -As: External auditory canal without stenosis, tympanic membrane clear, no middle ear effusions or retractions  FACE: HB 1/6 bilaterally, symmetric appearing, sensation equal bilaterally  ORAL CAVITY: No masses or lesions visualized or palpated, uvula is midline, moist mucous membranes, no oropharyngeal masses or oropharyngeal obstruction  NECK: Normal range of motion, no thyromegaly, trachea is midline, no palpable lymphadenopathy or neck masses, no crepitus  CHEST: Normal respiratory effort, breathing comfortably, no retractions  SKIN: No rashes, normal appearing skin, no evidence of skin lesions/tumors  NEURO: Cranial Nerves 2, 3, 4, 5, 6, 7, 11, 12 grossly intact bilaterally     I have performed a

## 2023-05-23 ENCOUNTER — TELEPHONE (OUTPATIENT)
Dept: INTERNAL MEDICINE CLINIC | Age: 58
End: 2023-05-23

## 2023-05-23 ENCOUNTER — NURSE ONLY (OUTPATIENT)
Dept: CARDIOLOGY CLINIC | Age: 58
End: 2023-05-23
Payer: COMMERCIAL

## 2023-05-23 ENCOUNTER — TELEPHONE (OUTPATIENT)
Dept: CARDIOLOGY CLINIC | Age: 58
End: 2023-05-23

## 2023-05-23 DIAGNOSIS — K74.69 OTHER CIRRHOSIS OF LIVER (HCC): ICD-10-CM

## 2023-05-23 DIAGNOSIS — M15.9 OSTEOARTHRITIS OF MULTIPLE JOINTS, UNSPECIFIED OSTEOARTHRITIS TYPE: ICD-10-CM

## 2023-05-23 DIAGNOSIS — I44.1 MOBITZ TYPE II ATRIOVENTRICULAR BLOCK: ICD-10-CM

## 2023-05-23 DIAGNOSIS — K58.9 IRRITABLE BOWEL SYNDROME WITHOUT DIARRHEA: ICD-10-CM

## 2023-05-23 DIAGNOSIS — I25.10 CORONARY ARTERY DISEASE INVOLVING NATIVE CORONARY ARTERY OF NATIVE HEART WITHOUT ANGINA PECTORIS: ICD-10-CM

## 2023-05-23 DIAGNOSIS — E66.9 OBESITY (BMI 30-39.9): ICD-10-CM

## 2023-05-23 DIAGNOSIS — M79.7 FIBROMYALGIA: ICD-10-CM

## 2023-05-23 DIAGNOSIS — I44.30 HEART BLOCK ATRIOVENTRICULAR: ICD-10-CM

## 2023-05-23 DIAGNOSIS — E11.9 TYPE 2 DIABETES MELLITUS WITHOUT COMPLICATION, WITH LONG-TERM CURRENT USE OF INSULIN (HCC): Primary | ICD-10-CM

## 2023-05-23 DIAGNOSIS — F33.1 MODERATE EPISODE OF RECURRENT MAJOR DEPRESSIVE DISORDER (HCC): ICD-10-CM

## 2023-05-23 DIAGNOSIS — Z79.4 TYPE 2 DIABETES MELLITUS WITHOUT COMPLICATION, WITH LONG-TERM CURRENT USE OF INSULIN (HCC): Primary | ICD-10-CM

## 2023-05-23 DIAGNOSIS — Z95.0 PACEMAKER: ICD-10-CM

## 2023-05-23 DIAGNOSIS — E87.8 DISEQUILIBRIUM SYNDROME: ICD-10-CM

## 2023-05-23 DIAGNOSIS — D68.59 PROTEIN S DEFICIENCY (HCC): ICD-10-CM

## 2023-05-23 DIAGNOSIS — J45.20 MILD INTERMITTENT ASTHMA WITHOUT COMPLICATION: ICD-10-CM

## 2023-05-23 DIAGNOSIS — E78.00 HYPERCHOLESTEREMIA: ICD-10-CM

## 2023-05-23 DIAGNOSIS — G47.33 OBSTRUCTIVE SLEEP APNEA SYNDROME: ICD-10-CM

## 2023-05-23 PROCEDURE — 93294 REM INTERROG EVL PM/LDLS PM: CPT | Performed by: INTERNAL MEDICINE

## 2023-05-23 PROCEDURE — 93296 REM INTERROG EVL PM/IDS: CPT | Performed by: INTERNAL MEDICINE

## 2023-05-23 NOTE — TELEPHONE ENCOUNTER
Spoke with Gordo Duran and the pt is getting into [facility]. She wanted to thank the office for the quick response and helping the pt out.

## 2023-05-23 NOTE — TELEPHONE ENCOUNTER
Pt called in asking if she could have a letter stating her health conditions and her living situation has become unsafe. So they told her to reach out to her PCP to see if they could write a letter for insurance to get her into assisted living. Please call patient and advise.  Thank you

## 2023-05-23 NOTE — PROGRESS NOTES
We received remote transmission from patient's monitor at home. Transmission shows normal sensing and pacing function. EP physician will review. See interrogation under cardiology tab in the [address] [facility] [address] field for more details. Noted NSVT. Pt not on a beta blocker. Last echo in 2021 showed EF of 55-60%     97.0% (MVP On)  AP 41.4%    End of 91-day monitoring period 5-23-23.
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
## 2023-05-23 NOTE — TELEPHONE ENCOUNTER
I have placed referral to PMR. Will likely need to take her to the ED for medical clearance and admission to rehab. May need to be inpatient for a few days to be accepted to rehab as well.

## 2023-05-27 DIAGNOSIS — J30.89 NON-SEASONAL ALLERGIC RHINITIS, UNSPECIFIED TRIGGER: ICD-10-CM

## 2023-05-30 RX ORDER — LORATADINE 10 MG/1
TABLET ORAL
Qty: 30 TABLET | Refills: 5 | Status: SHIPPED | OUTPATIENT
Start: 2023-05-30

## 2023-06-05 ENCOUNTER — TELEPHONE (OUTPATIENT)
Dept: CARDIOLOGY CLINIC | Age: 58
End: 2023-06-05

## 2023-06-05 NOTE — TELEPHONE ENCOUNTER
Last ov 3/6/23 TS     Mobitz type II atrioventricular block   ~s/p PPM  ~ 99.9% / AP 35/5%  ~following in device clinic          2. Primary hypertension   ~controlled  ~untx sleep apnea         3.  Mixed hyperlipidemia   ~LDL elevated   ~goal < given DM and non-obst CAD of LAD at 20% by cath '19  ~no longer taking atorvastatin / rosuvastatin / Livalo / simvastatin : ? Intolerance ; hx of SNIDER and cirrhosis

## 2023-06-05 NOTE — TELEPHONE ENCOUNTER
Pt called to inform the office that both legs are swollen, just uncomfortable. Per Pt she is wanting  to know if a water pill would help. Please call to discuss .   Thank you

## 2023-06-06 ENCOUNTER — TELEPHONE (OUTPATIENT)
Dept: CARDIOLOGY CLINIC | Age: 58
End: 2023-06-06
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
## 2023-06-06 NOTE — TELEPHONE ENCOUNTER
Pt called back stating that they are calling back because both legs are still  swollen and very  uncomfortable

## 2023-06-07 NOTE — TELEPHONE ENCOUNTER
Called pt to get scheduled, pt states she is an hour and a half away and cant drive down here, she just wants to know if she can start a water pill   Please advise

## 2023-06-21 ENCOUNTER — OFFICE VISIT (OUTPATIENT)
Dept: INTERNAL MEDICINE CLINIC | Age: 58
End: 2023-06-21
Payer: COMMERCIAL

## 2023-06-21 VITALS
HEIGHT: 62 IN | SYSTOLIC BLOOD PRESSURE: 130 MMHG | OXYGEN SATURATION: 99 % | HEART RATE: 69 BPM | WEIGHT: 202.8 LBS | BODY MASS INDEX: 37.32 KG/M2 | DIASTOLIC BLOOD PRESSURE: 86 MMHG

## 2023-06-21 DIAGNOSIS — K74.69 FATTY NUTRITIONAL CIRRHOSIS (HCC): ICD-10-CM

## 2023-06-21 DIAGNOSIS — Z79.4 TYPE 2 DIABETES MELLITUS WITHOUT COMPLICATION, WITH LONG-TERM CURRENT USE OF INSULIN (HCC): ICD-10-CM

## 2023-06-21 DIAGNOSIS — D68.59 PROTEIN S DEFICIENCY (HCC): ICD-10-CM

## 2023-06-21 DIAGNOSIS — Z95.0 PACEMAKER: ICD-10-CM

## 2023-06-21 DIAGNOSIS — K74.69 OTHER CIRRHOSIS OF LIVER (HCC): ICD-10-CM

## 2023-06-21 DIAGNOSIS — E11.9 TYPE 2 DIABETES MELLITUS WITHOUT COMPLICATION, WITH LONG-TERM CURRENT USE OF INSULIN (HCC): ICD-10-CM

## 2023-06-21 DIAGNOSIS — K21.9 GASTROESOPHAGEAL REFLUX DISEASE WITHOUT ESOPHAGITIS: ICD-10-CM

## 2023-06-21 DIAGNOSIS — K31.84 GASTROPARESIS: ICD-10-CM

## 2023-06-21 DIAGNOSIS — M15.9 PRIMARY OSTEOARTHRITIS INVOLVING MULTIPLE JOINTS: ICD-10-CM

## 2023-06-21 DIAGNOSIS — Z00.00 ANNUAL PHYSICAL EXAM: Primary | ICD-10-CM

## 2023-06-21 DIAGNOSIS — I25.10 CORONARY ARTERY DISEASE INVOLVING NATIVE CORONARY ARTERY OF NATIVE HEART WITHOUT ANGINA PECTORIS: ICD-10-CM

## 2023-06-21 DIAGNOSIS — I44.1 MOBITZ TYPE II ATRIOVENTRICULAR BLOCK: ICD-10-CM

## 2023-06-21 DIAGNOSIS — K58.1 IRRITABLE BOWEL SYNDROME WITH CONSTIPATION: ICD-10-CM

## 2023-06-21 DIAGNOSIS — F33.1 MODERATE EPISODE OF RECURRENT MAJOR DEPRESSIVE DISORDER (HCC): ICD-10-CM

## 2023-06-21 DIAGNOSIS — R60.0 LOWER EXTREMITY EDEMA: ICD-10-CM

## 2023-06-21 PROBLEM — G47.33 OBSTRUCTIVE SLEEP APNEA SYNDROME: Status: RESOLVED | Noted: 2017-05-02 | Resolved: 2023-06-21

## 2023-06-21 LAB — FERRITIN SERPL IA-MCNC: 92.3 NG/ML (ref 15–150)

## 2023-06-21 PROCEDURE — 3017F COLORECTAL CA SCREEN DOC REV: CPT | Performed by: INTERNAL MEDICINE

## 2023-06-21 PROCEDURE — 99214 OFFICE O/P EST MOD 30 MIN: CPT | Performed by: INTERNAL MEDICINE

## 2023-06-21 PROCEDURE — 2022F DILAT RTA XM EVC RTNOPTHY: CPT | Performed by: INTERNAL MEDICINE

## 2023-06-21 PROCEDURE — G8417 CALC BMI ABV UP PARAM F/U: HCPCS | Performed by: INTERNAL MEDICINE

## 2023-06-21 PROCEDURE — 1036F TOBACCO NON-USER: CPT | Performed by: INTERNAL MEDICINE

## 2023-06-21 PROCEDURE — 99396 PREV VISIT EST AGE 40-64: CPT | Performed by: INTERNAL MEDICINE

## 2023-06-21 PROCEDURE — G8427 DOCREV CUR MEDS BY ELIG CLIN: HCPCS | Performed by: INTERNAL MEDICINE

## 2023-06-21 RX ORDER — FUROSEMIDE 20 MG/1
20 TABLET ORAL DAILY PRN
Qty: 60 TABLET | Refills: 0 | Status: SHIPPED | OUTPATIENT
Start: 2023-06-21

## 2023-06-21 ASSESSMENT — ENCOUNTER SYMPTOMS
WHEEZING: 0
CHEST TIGHTNESS: 0
SORE THROAT: 0
NAUSEA: 1
COUGH: 0
SHORTNESS OF BREATH: 0
BACK PAIN: 1
COLOR CHANGE: 0
VOMITING: 0
ABDOMINAL PAIN: 0
CONSTIPATION: 0

## 2023-06-21 NOTE — ASSESSMENT & PLAN NOTE
Continue care and recommendation as per endocrinology, reinforced recommendations for diet and exercise, reminded of need for yearly eye exam

## 2023-06-21 NOTE — ASSESSMENT & PLAN NOTE
Age-related health maintenance and immunization recommendations reviewed and discussed with patient  She will complete lab work today as ordered previously, reinforced recommendations to maintain healthy low-salt low-fat low-carb diet and continue working on increasing level of physical activity as tolerated and attempt weight loss  Patient up-to-date with mammography  Patient up-to-date with colonoscopy  Pression is stable on current medication regimen, will start counseling in near future
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
## 2023-06-21 NOTE — ASSESSMENT & PLAN NOTE
Stable, discussed possibly physical therapy in the future once settled into the new housing situation to help strengthen lower extremity and help with balance, advised if once motorized scooter will need to have PT mobility evaluation to see if a candidate otherwise will not be covered by insurance

## 2023-06-21 NOTE — ASSESSMENT & PLAN NOTE
Multifactorial, encouraged increase level of physical activity, keep legs elevated when sitting, maintain low-salt diet, can do a trial of as needed Lasix, follow-up with vascular surgery if needed

## 2023-06-22 ENCOUNTER — TELEPHONE (OUTPATIENT)
Dept: INTERNAL MEDICINE CLINIC | Age: 58
End: 2023-06-22

## 2023-06-22 LAB
ALBUMIN SERPL-MCNC: 4.3 G/DL (ref 3.4–5)
ALBUMIN/GLOB SERPL: 1.6 {RATIO} (ref 1.1–2.2)
ALP SERPL-CCNC: 19 U/L (ref 40–129)
ALT SERPL-CCNC: 27 U/L (ref 10–40)
ANION GAP SERPL CALCULATED.3IONS-SCNC: 17 MMOL/L (ref 3–16)
AST SERPL-CCNC: 22 U/L (ref 15–37)
BILIRUB SERPL-MCNC: 0.4 MG/DL (ref 0–1)
BUN SERPL-MCNC: 18 MG/DL (ref 7–20)
CALCIUM SERPL-MCNC: 9.8 MG/DL (ref 8.3–10.6)
CHLORIDE SERPL-SCNC: 100 MMOL/L (ref 99–110)
CHOLEST SERPL-MCNC: 254 MG/DL (ref 0–199)
CO2 SERPL-SCNC: 22 MMOL/L (ref 21–32)
CREAT SERPL-MCNC: 0.8 MG/DL (ref 0.6–1.1)
CREAT UR-MCNC: 30.7 MG/DL (ref 28–259)
GFR SERPLBLD CREATININE-BSD FMLA CKD-EPI: >60 ML/MIN/{1.73_M2}
GLUCOSE SERPL-MCNC: 140 MG/DL (ref 70–99)
HAV AB SER QL IA: NEGATIVE
HBV CORE AB SERPL QL IA: NEGATIVE
HBV SURFACE AB SERPL IA-ACNC: <3.5 MIU/ML
HBV SURFACE AG SERPL QL IA: NORMAL
HCV AB SERPL QL IA: NORMAL
HDLC SERPL-MCNC: 48 MG/DL (ref 40–60)
IRON SATN MFR SERPL: 20 % (ref 15–50)
IRON SERPL-MCNC: 77 UG/DL (ref 37–145)
LDLC SERPL CALC-MCNC: 179 MG/DL
MICROALBUMIN UR DL<=1MG/L-MCNC: <1.2 MG/DL
MICROALBUMIN/CREAT UR: NORMAL MG/G (ref 0–30)
POTASSIUM SERPL-SCNC: 4.7 MMOL/L (ref 3.5–5.1)
PROT SERPL-MCNC: 7 G/DL (ref 6.4–8.2)
SODIUM SERPL-SCNC: 139 MMOL/L (ref 136–145)
TIBC SERPL-MCNC: 377 UG/DL (ref 260–445)
TRIGL SERPL-MCNC: 136 MG/DL (ref 0–150)
VIT B12 SERPL-MCNC: 452 PG/ML (ref 211–911)
VLDLC SERPL CALC-MCNC: 27 MG/DL

## 2023-06-22 NOTE — TELEPHONE ENCOUNTER
Pt calling was here yesterday for physical--needs the H&P faxed to [facility]---fax is [phone]  attn:  Keven Bael ---this is very important trying to get moved in there soon---Thanks.

## 2023-06-23 LAB — SMA IGG SER-ACNC: 6 UNITS (ref 0–19)

## 2023-06-24 LAB
A1AT PHENOTYP SERPL-IMP: NORMAL
A1AT SERPL-MCNC: 154 MG/DL (ref 90–200)

## 2023-06-27 LAB — MITOCHONDRIA M2 AB SER IA-ACNC: 1 U/ML (ref 0–4)

## 2023-07-10 DIAGNOSIS — R60.0 LOWER EXTREMITY EDEMA: ICD-10-CM

## 2023-07-11 RX ORDER — APIXABAN 5 MG/1
TABLET, FILM COATED ORAL
Qty: 180 TABLET | Refills: 1 | OUTPATIENT
Start: 2023-07-11

## 2023-07-11 RX ORDER — FUROSEMIDE 20 MG/1
TABLET ORAL
Qty: 60 TABLET | Refills: 5 | Status: SHIPPED | OUTPATIENT
Start: 2023-07-11 | End: 2023-07-26 | Stop reason: SDUPTHER

## 2023-07-11 RX ORDER — INSULIN GLARGINE 100 [IU]/ML
INJECTION, SOLUTION SUBCUTANEOUS
Qty: 12 ML | Refills: 2 | Status: SHIPPED | OUTPATIENT
Start: 2023-07-11

## 2023-07-11 NOTE — TELEPHONE ENCOUNTER
Medication:   Requested Prescriptions     Pending Prescriptions Disp Refills    LANTUS SOLOSTAR 100 UNIT/ML injection pen [Pharmacy Med Name: Donovan Spring 100 UNIT/ML] 12 mL      Sig: INJECT 22 UNITS UNDER THE SKIN DAILY       Last Filled:      Patient Phone Number: [phone] (home)     Last appt: 3/16/2023   Next appt: 9/21/2023    Last Labs DM:   Lab Results   Component Value Date/Time    LABA1C 8.1 03/16/2023 09:37 AM

## 2023-07-19 DIAGNOSIS — D68.59 PROTEIN S DEFICIENCY (HCC): Primary | ICD-10-CM

## 2023-07-19 RX ORDER — APIXABAN 5 MG/1
TABLET, FILM COATED ORAL
Qty: 60 TABLET | OUTPATIENT
Start: 2023-07-19

## 2023-07-21 PROBLEM — Z00.00 ANNUAL PHYSICAL EXAM: Status: RESOLVED | Noted: 2022-09-01 | Resolved: 2023-07-21

## 2023-07-25 ENCOUNTER — TELEPHONE (OUTPATIENT)
Dept: INTERNAL MEDICINE CLINIC | Age: 58
End: 2023-07-25

## 2023-07-25 DIAGNOSIS — R60.0 LOWER EXTREMITY EDEMA: ICD-10-CM

## 2023-07-25 NOTE — TELEPHONE ENCOUNTER
Amira Matamoros from [address] called. Patient states she was not aware that she should be on Eliquis 2.5 mg bid and wanted to clarify and make sure this should not be 5 mg bid. I advised dose was changed on 03/06/23 by Nadia Young. Pharmacy would also like to know if the furosemide script can be changed to 90 day supply. They are not able to do it verbally because it says prn but patient states she takes one everyday. Pt uses [facility] in [employer]. Can call Amira [facility]Keeper if needed otherwise can update any changes to pharmacy.  Amira Matamoros is not  back in the office until 11am tomorrow

## 2023-07-26 RX ORDER — FUROSEMIDE 20 MG/1
20 TABLET ORAL DAILY PRN
Qty: 90 TABLET | Refills: 1 | Status: SHIPPED | OUTPATIENT
Start: 2023-07-26

## 2023-07-26 RX ORDER — APIXABAN 5 MG/1
TABLET, FILM COATED ORAL
Qty: 60 TABLET | OUTPATIENT
Start: 2023-07-26

## 2023-07-26 NOTE — TELEPHONE ENCOUNTER
Lasix sent to pharmacy, if patient still has concerns about her Eliquis dose then she needs to verify that with cardiology

## 2023-07-26 NOTE — TELEPHONE ENCOUNTER
Patient advised. She will check with either cardiology or hematology for correct dosing of the Eliquis that she should be taking.

## 2023-08-16 DIAGNOSIS — E11.9 TYPE 2 DIABETES MELLITUS WITHOUT COMPLICATION, WITH LONG-TERM CURRENT USE OF INSULIN (HCC): ICD-10-CM

## 2023-08-16 DIAGNOSIS — Z79.4 TYPE 2 DIABETES MELLITUS WITHOUT COMPLICATION, WITH LONG-TERM CURRENT USE OF INSULIN (HCC): ICD-10-CM

## 2023-08-17 RX ORDER — PEN NEEDLE, DIABETIC 32GX 5/32"
NEEDLE, DISPOSABLE MISCELLANEOUS
Qty: 100 EACH | Refills: 3 | Status: SHIPPED | OUTPATIENT
Start: 2023-08-17

## 2023-08-17 NOTE — TELEPHONE ENCOUNTER
Medication:   Requested Prescriptions     Pending Prescriptions Disp Refills    [employer] PEN NEEDLES 32G X 4 MM MISC [Pharmacy Med Name: Felicia Simpson PEN NEEDLE 4MM X 32G] 100 each 3     Sig: USE WITH INSULIN PEN ONCE DAILY       Last Filled:      Patient Phone Number: [phone] (home)     Last appt: 3/16/2023   Next appt: 9/21/2023    Last Labs DM:   Lab Results   Component Value Date/Time    LABA1C 8.1 03/16/2023 09:37 AM

## 2023-08-28 ENCOUNTER — TELEPHONE (OUTPATIENT)
Dept: CARDIOLOGY CLINIC | Age: 58
End: 2023-08-28

## 2023-08-28 NOTE — TELEPHONE ENCOUNTER
Called and spoke with Patient. Let her know we received her transmission and it shows normal function. He VU and was thankful for the call.
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
## 2023-09-01 PROCEDURE — 93296 REM INTERROG EVL PM/IDS: CPT | Performed by: INTERNAL MEDICINE

## 2023-09-01 PROCEDURE — 93294 REM INTERROG EVL PM/LDLS PM: CPT | Performed by: INTERNAL MEDICINE

## 2023-09-06 NOTE — TELEPHONE ENCOUNTER
Pt called in wanting to change future appt to vv due to living 1.5 hour away. Is this ok? Will an order be placed for A1C prior to the visit?

## 2023-09-08 RX ORDER — CALCIUM CITRATE/VITAMIN D3 200MG-6.25
TABLET ORAL
Qty: 50 STRIP | Refills: 3 | Status: SHIPPED | OUTPATIENT
Start: 2023-09-08

## 2023-09-12 ENCOUNTER — OFFICE VISIT (OUTPATIENT)
Dept: CARDIOLOGY CLINIC | Age: 58
End: 2023-09-12
Payer: COMMERCIAL

## 2023-09-12 ENCOUNTER — NURSE ONLY (OUTPATIENT)
Dept: CARDIOLOGY CLINIC | Age: 58
End: 2023-09-12
Payer: COMMERCIAL

## 2023-09-12 VITALS
OXYGEN SATURATION: 97 % | HEIGHT: 62 IN | HEART RATE: 68 BPM | DIASTOLIC BLOOD PRESSURE: 70 MMHG | SYSTOLIC BLOOD PRESSURE: 122 MMHG | WEIGHT: 211 LBS | BODY MASS INDEX: 38.83 KG/M2

## 2023-09-12 DIAGNOSIS — Z95.0 PACEMAKER: Primary | ICD-10-CM

## 2023-09-12 DIAGNOSIS — I44.1 MOBITZ TYPE II ATRIOVENTRICULAR BLOCK: Primary | ICD-10-CM

## 2023-09-12 DIAGNOSIS — E66.9 OBESITY (BMI 30-39.9): ICD-10-CM

## 2023-09-12 DIAGNOSIS — G47.33 OBSTRUCTIVE SLEEP APNEA SYNDROME: ICD-10-CM

## 2023-09-12 DIAGNOSIS — I25.10 CORONARY ARTERY DISEASE INVOLVING NATIVE CORONARY ARTERY OF NATIVE HEART WITHOUT ANGINA PECTORIS: ICD-10-CM

## 2023-09-12 DIAGNOSIS — I44.1 MOBITZ TYPE II ATRIOVENTRICULAR BLOCK: ICD-10-CM

## 2023-09-12 DIAGNOSIS — I44.2 CHB (COMPLETE HEART BLOCK) (HCC): ICD-10-CM

## 2023-09-12 DIAGNOSIS — I10 ESSENTIAL HYPERTENSION, BENIGN: ICD-10-CM

## 2023-09-12 PROCEDURE — 99215 OFFICE O/P EST HI 40 MIN: CPT | Performed by: INTERNAL MEDICINE

## 2023-09-12 PROCEDURE — 93280 PM DEVICE PROGR EVAL DUAL: CPT | Performed by: INTERNAL MEDICINE

## 2023-09-12 PROCEDURE — G8427 DOCREV CUR MEDS BY ELIG CLIN: HCPCS | Performed by: INTERNAL MEDICINE

## 2023-09-12 PROCEDURE — 3017F COLORECTAL CA SCREEN DOC REV: CPT | Performed by: INTERNAL MEDICINE

## 2023-09-12 PROCEDURE — G8417 CALC BMI ABV UP PARAM F/U: HCPCS | Performed by: INTERNAL MEDICINE

## 2023-09-12 PROCEDURE — 93000 ELECTROCARDIOGRAM COMPLETE: CPT | Performed by: INTERNAL MEDICINE

## 2023-09-12 PROCEDURE — 1036F TOBACCO NON-USER: CPT | Performed by: INTERNAL MEDICINE

## 2023-09-12 PROCEDURE — 3074F SYST BP LT 130 MM HG: CPT | Performed by: INTERNAL MEDICINE

## 2023-09-12 PROCEDURE — 3078F DIAST BP <80 MM HG: CPT | Performed by: INTERNAL MEDICINE

## 2023-09-12 RX ORDER — POTASSIUM CHLORIDE 20 MEQ/1
20 TABLET, EXTENDED RELEASE ORAL DAILY PRN
Qty: 90 TABLET | Refills: 1 | Status: SHIPPED | OUTPATIENT
Start: 2023-09-12

## 2023-09-19 ENCOUNTER — TELEPHONE (OUTPATIENT)
Dept: CARDIOLOGY CLINIC | Age: 58
End: 2023-09-19

## 2023-09-19 RX ORDER — POTASSIUM CHLORIDE 750 MG/1
20 TABLET, EXTENDED RELEASE ORAL 2 TIMES DAILY
Qty: 360 TABLET | Refills: 1 | Status: SHIPPED | OUTPATIENT
Start: 2023-09-19

## 2023-09-19 NOTE — TELEPHONE ENCOUNTER
Pt called to inform the office that she can not swallow the potassium chloride 20 meq pill. Pt is requesting to be prescribe a potassium chloride pill that is smaller. Please send over to her Pharmacy the new script.   Than you

## 2023-09-19 NOTE — TELEPHONE ENCOUNTER
Called Pt and she wants to try the 2- 10 meq pills of the potassium chloride.       Medication Refill    Medication needing refilled:  potassium chloride     Dosage of the medication: 10meq    How are you taking this medication (QD, BID, TID, QID, PRN):  Take 2 meq tablet by mouth daily    30 or 90 day supply called in: 180    When will you run out of your medication:    Which Pharmacy are we sending the medication to?:    [facility] [id number]    [address] [place] [address]   Phone:  [phone]  Fax:  [phone]

## 2023-09-19 NOTE — TELEPHONE ENCOUNTER
We can order a suspension or we can order 10meq pills and she can take two. Please call and find out which she prefers.

## 2023-09-20 RX ORDER — GLUCOSAM/CHON-MSM1/C/MANG/BOSW 500-416.6
TABLET ORAL
Qty: 100 EACH | Refills: 3 | Status: SHIPPED | OUTPATIENT
Start: 2023-09-20 | End: 2023-09-21 | Stop reason: SDUPTHER

## 2023-09-20 RX ORDER — APIXABAN 5 MG/1
5 TABLET, FILM COATED ORAL 2 TIMES DAILY
Qty: 60 TABLET | Refills: 0 | Status: SHIPPED | OUTPATIENT
Start: 2023-09-20

## 2023-09-20 NOTE — TELEPHONE ENCOUNTER
Medication:   Requested Prescriptions     Pending Prescriptions Disp Refills    TRUEplus Lancets 33G [facility] [Pharmacy Med Name: Severo Hooker 100 each 3     Sig: USE TO CHECK BLOOD SUGAR 3-4 TIMES DAILY       Last Filled:      Patient Phone Number: [phone] (home)     Last appt: 3/16/2023   Next appt: 9/21/2023    Last Labs DM:   Lab Results   Component Value Date/Time    LABA1C 8.1 03/16/2023 09:37 AM

## 2023-09-21 ENCOUNTER — TELEMEDICINE (OUTPATIENT)
Dept: ENDOCRINOLOGY | Age: 58
End: 2023-09-21
Payer: COMMERCIAL

## 2023-09-21 DIAGNOSIS — E11.9 TYPE 2 DIABETES MELLITUS WITHOUT COMPLICATION, WITH LONG-TERM CURRENT USE OF INSULIN (HCC): ICD-10-CM

## 2023-09-21 DIAGNOSIS — Z79.4 TYPE 2 DIABETES MELLITUS WITHOUT COMPLICATION, WITH LONG-TERM CURRENT USE OF INSULIN (HCC): ICD-10-CM

## 2023-09-21 PROCEDURE — 3052F HG A1C>EQUAL 8.0%<EQUAL 9.0%: CPT | Performed by: INTERNAL MEDICINE

## 2023-09-21 PROCEDURE — G8428 CUR MEDS NOT DOCUMENT: HCPCS | Performed by: INTERNAL MEDICINE

## 2023-09-21 PROCEDURE — 99214 OFFICE O/P EST MOD 30 MIN: CPT | Performed by: INTERNAL MEDICINE

## 2023-09-21 PROCEDURE — 3017F COLORECTAL CA SCREEN DOC REV: CPT | Performed by: INTERNAL MEDICINE

## 2023-09-21 PROCEDURE — 2022F DILAT RTA XM EVC RTNOPTHY: CPT | Performed by: INTERNAL MEDICINE

## 2023-09-21 RX ORDER — PEN NEEDLE, DIABETIC 32GX 5/32"
NEEDLE, DISPOSABLE MISCELLANEOUS
Qty: 100 EACH | Refills: 3 | Status: SHIPPED | OUTPATIENT
Start: 2023-09-21

## 2023-09-21 RX ORDER — GLUCOSAM/CHON-MSM1/C/MANG/BOSW 500-416.6
TABLET ORAL
Qty: 100 EACH | Refills: 3 | Status: SHIPPED | OUTPATIENT
Start: 2023-09-21

## 2023-09-21 RX ORDER — DIPHENHYDRAMINE HCL 25 MG
TABLET ORAL
COMMUNITY
End: 2023-09-21 | Stop reason: SDUPTHER

## 2023-09-21 RX ORDER — INSULIN GLARGINE 100 [IU]/ML
INJECTION, SOLUTION SUBCUTANEOUS
Qty: 12 ML | Refills: 2 | Status: SHIPPED | OUTPATIENT
Start: 2023-09-21

## 2023-09-21 RX ORDER — CALCIUM CITRATE/VITAMIN D3 200MG-6.25
TABLET ORAL
Qty: 50 STRIP | Refills: 3 | Status: SHIPPED | OUTPATIENT
Start: 2023-09-21

## 2023-09-21 RX ORDER — DIPHENHYDRAMINE HCL 25 MG
TABLET ORAL
Qty: 1 KIT | Refills: 0 | Status: SHIPPED | OUTPATIENT
Start: 2023-09-21

## 2023-09-21 RX ORDER — ARM BRACE
EACH MISCELLANEOUS
Qty: 2 EACH | Refills: 0 | Status: SHIPPED | OUTPATIENT
Start: 2023-09-21

## 2023-09-21 NOTE — PROGRESS NOTES
:    1.T2DM: Longstanding, fairly controlled, reports low blood glucose with Metformin, so she stopped. Did not bring log, . Avoid DPP IV and GLP agonist given pancreatitis history. Avoid SGLT-2 Inhibitor given h/o yeast infection. Sulfonylurea may cause hypoglycemia. Needs basal insulin, discussed with her ,  started lower dose. She wanted amaryl , advised not a good option given A1c and lows  Advise to take lantus daily  She has relative hypoglycemia, so will improve  Gradually. Advised prandial, she is not willing to take. Mismatch with log, fructosamine also high  Not interested in CGM  2. Elevated BP:  3.HLD:Familila hyperlipidemia,  Reports statins cause elevated LFT, per patient hepatologist advised not to take. Replaced Vitamin, so can tolerate lipitor. [employer] will not be covered, last LDL improved  Advised Zetia but she wants to wait. Advised Praluent, she states want to try lipitor taking more frequently  Discussed risk of CAD, CVA given high LDL  4. Obesity  5. Thyroid Nodule: Sub Centimeter nodule, monitor with USG, stable on 8/16, no increase in size, repeat USG shows right 1.1cm nodule, FNA benign  3/22 USG Right 1.1cm smaller in size--> 8mm   Additional right 9mm---> 1.1cm no previous FNA    F/u in one year USG 2024    6. Fatty liver  7. Vitamin D deficiency: On 10,000IU weekly per PCP  8. Weight loss: normal TSH , advised see PCP  9. Hands Neuropathy: Reports h/o Carpel Tunnel, advised brace. Discuss with PCP if needs to see surgery    Plan:       lantus 22 units daily, advise to take every day   Self titrate, increase by two unit every 3 days if BG > 140   Advise to check blood sugar 2 times a day. She does check in the forearm, advise finger sticks, keep log   Call if post meal >200 , as may need prandial   Patient to send blood sugar log for titration. Advise to exercise regularly but can not due to back pain, and OA. Advise to low simple carbohydrate and protein with each  meal diet.     Diabetes Care:

## 2023-10-02 ENCOUNTER — TELEMEDICINE (OUTPATIENT)
Dept: INTERNAL MEDICINE CLINIC | Age: 58
End: 2023-10-02
Payer: COMMERCIAL

## 2023-10-02 DIAGNOSIS — F31.9 BIPOLAR DEPRESSION (HCC): ICD-10-CM

## 2023-10-02 DIAGNOSIS — I25.10 CORONARY ARTERY DISEASE INVOLVING NATIVE CORONARY ARTERY OF NATIVE HEART WITHOUT ANGINA PECTORIS: ICD-10-CM

## 2023-10-02 DIAGNOSIS — R60.0 LOWER EXTREMITY EDEMA: ICD-10-CM

## 2023-10-02 DIAGNOSIS — E11.42 DIABETIC PERIPHERAL NEUROPATHY (HCC): ICD-10-CM

## 2023-10-02 DIAGNOSIS — Z79.4 TYPE 2 DIABETES MELLITUS WITHOUT COMPLICATION, WITH LONG-TERM CURRENT USE OF INSULIN (HCC): ICD-10-CM

## 2023-10-02 DIAGNOSIS — E11.9 TYPE 2 DIABETES MELLITUS WITHOUT COMPLICATION, WITH LONG-TERM CURRENT USE OF INSULIN (HCC): ICD-10-CM

## 2023-10-02 DIAGNOSIS — F33.1 MODERATE EPISODE OF RECURRENT MAJOR DEPRESSIVE DISORDER (HCC): Primary | ICD-10-CM

## 2023-10-02 PROCEDURE — 1036F TOBACCO NON-USER: CPT | Performed by: INTERNAL MEDICINE

## 2023-10-02 PROCEDURE — G8417 CALC BMI ABV UP PARAM F/U: HCPCS | Performed by: INTERNAL MEDICINE

## 2023-10-02 PROCEDURE — G8427 DOCREV CUR MEDS BY ELIG CLIN: HCPCS | Performed by: INTERNAL MEDICINE

## 2023-10-02 PROCEDURE — 99214 OFFICE O/P EST MOD 30 MIN: CPT | Performed by: INTERNAL MEDICINE

## 2023-10-02 PROCEDURE — 3017F COLORECTAL CA SCREEN DOC REV: CPT | Performed by: INTERNAL MEDICINE

## 2023-10-02 PROCEDURE — 3052F HG A1C>EQUAL 8.0%<EQUAL 9.0%: CPT | Performed by: INTERNAL MEDICINE

## 2023-10-02 PROCEDURE — 2022F DILAT RTA XM EVC RTNOPTHY: CPT | Performed by: INTERNAL MEDICINE

## 2023-10-02 PROCEDURE — G8484 FLU IMMUNIZE NO ADMIN: HCPCS | Performed by: INTERNAL MEDICINE

## 2023-10-02 RX ORDER — FUROSEMIDE 20 MG/1
TABLET ORAL
Qty: 180 TABLET | Refills: 1 | Status: SHIPPED | OUTPATIENT
Start: 2023-10-02

## 2023-10-02 SDOH — ECONOMIC STABILITY: FOOD INSECURITY: WITHIN THE PAST 12 MONTHS, THE FOOD YOU BOUGHT JUST DIDN'T LAST AND YOU DIDN'T HAVE MONEY TO GET MORE.: SOMETIMES TRUE

## 2023-10-02 SDOH — ECONOMIC STABILITY: HOUSING INSECURITY
IN THE LAST 12 MONTHS, WAS THERE A TIME WHEN YOU DID NOT HAVE A STEADY PLACE TO SLEEP OR SLEPT IN A SHELTER (INCLUDING NOW)?: YES

## 2023-10-02 SDOH — ECONOMIC STABILITY: INCOME INSECURITY: HOW HARD IS IT FOR YOU TO PAY FOR THE VERY BASICS LIKE FOOD, HOUSING, MEDICAL CARE, AND HEATING?: SOMEWHAT HARD

## 2023-10-02 SDOH — ECONOMIC STABILITY: FOOD INSECURITY: WITHIN THE PAST 12 MONTHS, YOU WORRIED THAT YOUR FOOD WOULD RUN OUT BEFORE YOU GOT MONEY TO BUY MORE.: SOMETIMES TRUE

## 2023-10-02 ASSESSMENT — ENCOUNTER SYMPTOMS
ABDOMINAL PAIN: 0
COUGH: 0
NAUSEA: 0
CHEST TIGHTNESS: 0
BACK PAIN: 0
VOMITING: 0
WHEEZING: 0
SORE THROAT: 0
COLOR CHANGE: 0
SHORTNESS OF BREATH: 0
CONSTIPATION: 0

## 2023-10-02 NOTE — ASSESSMENT & PLAN NOTE
this is a past diagnosis, currently not seeing psychiatry and does not feel depressed however as noted above would like counseling to help her adjust to new living Environment

## 2023-10-02 NOTE — ASSESSMENT & PLAN NOTE
continue care and recommendation as per endocrinology, reinforced recommendations to adhere to low-carb low-fat diet and continue to increase level of physical activity as tolerated, encouraged to update labs

## 2023-10-02 NOTE — ASSESSMENT & PLAN NOTE
remained stable and mostly manageable without medications, she is interested in counseling again, she still having hard time adjusting to the move to her new living place but denies feeling depressed. Offered virtual counseling with Dr. Maico Frederick however reports she will check to see if there is someone available where she lives for in person counseling. She does understand that eventually she will have to make in person visits to our location and if still unable to then she should find a PCP closer to where she lives.   Patient verbalized understanding and will schedule 6 months follow-up

## 2023-10-02 NOTE — ASSESSMENT & PLAN NOTE
Lasix has been helpful however wants to increase dose because sometimes swelling gets worse, advised can take an extra dose as needed however recommended she update his kidney function and continue potassium supplements.   Encouraged to continue attempts for weight loss and continue attempts to increase level of physical activity and keep legs elevated when sitting

## 2023-10-02 NOTE — PROGRESS NOTES
Cj Zamarripa, was evaluated through a synchronous (real-time) audio-video encounter. The patient (or guardian if applicable) is aware that this is a billable service, which includes applicable co-pays. This Virtual Visit was conducted with patient's (and/or legal guardian's) consent. Patient identification was verified, and a caregiver was present when appropriate. The patient was located at Home: [address] [place] [address]  [address]  [facility] [address]  Provider was located at [facility] (Appt Dept): [facility],  [address]      Cj Zamarripa (:  1965) is a Established patient, presenting virtually for evaluation of the following:    Assessment & Plan   Below is the assessment and plan developed based on review of pertinent history, physical exam, labs, studies, and medications. 1. Moderate episode of recurrent major depressive disorder ([address] )  Assessment & Plan:    remained stable and mostly manageable without medications, she is interested in counseling again, she still having hard time adjusting to the move to her new living place but denies feeling depressed. Offered virtual counseling with Dr. Henny Kessler however reports she will check to see if there is someone available where she lives for in person counseling. She does understand that eventually she will have to make in person visits to our location and if still unable to then she should find a PCP closer to where she lives. Patient verbalized understanding and will schedule 6 months follow-up  2. Bipolar depression ([address] )  Assessment & Plan:    this is a past diagnosis, currently not seeing psychiatry and does not feel depressed however as noted above would like counseling to help her adjust to new living Environment  3.  Lower extremity edema  Assessment & Plan:    Lasix has been helpful however wants to increase dose because sometimes swelling gets worse, advised can take an extra dose as needed however recommended she update his kidney

## 2023-10-09 NOTE — PROGRESS NOTES
-Received report from Selena ELMORE  -Assumed patients care.  -Assessment performed and done  -VSS  -Patient is alert and oriented x 3 - some confusion about when she was admitted.   -In no apparent distress  -Denies chest pain. SOB and N/V  -Reports 10/10 pain   -Telemetry box on. Rate and rhythm verified.   -Patient and visitors educated on the use of call light, communicating with the staff on patient needs and concerns.   -Provided time to answer pt questions and address concerns.    -Proper hand hygiene before and after patient care to ensure patient will remain free from infection.     -Patient educated on POC for the day, upcoming tests, and medication information.   -Patient educated on safety precautions and safety equipment. Bed in low position, call light within reach, and hourly rounding conducted.    -Will continue to answer questions and educate patient and visitors as necessary  -No further needs at this time, will continue to monitor.     2ml released

## 2023-10-24 RX ORDER — APIXABAN 5 MG/1
5 TABLET, FILM COATED ORAL 2 TIMES DAILY
Qty: 60 TABLET | Refills: 0 | Status: SHIPPED | OUTPATIENT
Start: 2023-10-24

## 2023-11-10 ENCOUNTER — TELEPHONE (OUTPATIENT)
Dept: CARDIOLOGY CLINIC | Age: 58
End: 2023-11-10

## 2023-11-14 NOTE — PROGRESS NOTES
Patient reached __X__ yes  _____ no   VM instructions left ____ yes   phone number ________                                ____ no-office notified          Date ___11/16/23  ______  Time ___1130____  Arrival __1000  hosp-endo____    Nothing to eat or drink after midnight-follow your doctors prep instructions-this may include taking a second dose of your prep after midnight  Responsible adult 25 or older to stay on site while you are here-drive you home-stay with you after  Follow any instructions your doctors office has given you  Bring a complete list of all your medications and supplements including name,dose,how often taken the day of your procedure  If you normally take the following medications in the morning please do so the AM of your procedure with a small sip of water       Heart,blood pressure,seizure,thyroid or breathing medications-use your inhalers-bring any rescue inhalers with you DOS       DO NOT take blood pressure medications ending in \"marcus\" or \"pril\" the AM of procedure or evening prior  Dr Abhishek Chand patients are not to take any medications the AM of surgery  Take half or your normal dose of any long acting insulins the night before your procedure-do not take any diabetic medications the AM of procedure  Follow your doctors instructions regarding stopping or taking  any blood thinners-if you do not have instructions-call them-CHECK ELIQUIS  Any questions call your doctor  Other ______________________________________________________________      [facility] POLICY(subject to change)             The current policy is 2 visitors per patient. There are no children allowed. Mask at discretion of facility. Visiting hours are 8a-8p. Overnight visitors will be at the discretion of the nurse. All policies are subject to change.

## 2023-11-16 ENCOUNTER — ANESTHESIA (OUTPATIENT)
Dept: ENDOSCOPY | Age: 58
End: 2023-11-16
Payer: COMMERCIAL

## 2023-11-16 ENCOUNTER — HOSPITAL ENCOUNTER (OUTPATIENT)
Age: 58
Setting detail: OUTPATIENT SURGERY
Discharge: HOME | End: 2023-11-16
Attending: INTERNAL MEDICINE | Admitting: INTERNAL MEDICINE
Payer: COMMERCIAL

## 2023-11-16 ENCOUNTER — ANESTHESIA EVENT (OUTPATIENT)
Dept: ENDOSCOPY | Age: 58
End: 2023-11-16
Payer: COMMERCIAL

## 2023-11-16 VITALS
SYSTOLIC BLOOD PRESSURE: 128 MMHG | WEIGHT: 210 LBS | HEART RATE: 70 BPM | HEIGHT: 62 IN | TEMPERATURE: 97.2 F | DIASTOLIC BLOOD PRESSURE: 61 MMHG | OXYGEN SATURATION: 98 % | BODY MASS INDEX: 38.64 KG/M2 | RESPIRATION RATE: 18 BRPM

## 2023-11-16 LAB
GLUCOSE BLD-MCNC: 249 MG/DL (ref 70–99)
GLUCOSE BLD-MCNC: 272 MG/DL (ref 70–99)
PERFORMED ON: ABNORMAL
PERFORMED ON: ABNORMAL

## 2023-11-16 PROCEDURE — 2709999900 HC NON-CHARGEABLE SUPPLY: Performed by: INTERNAL MEDICINE

## 2023-11-16 PROCEDURE — 3700000000 HC ANESTHESIA ATTENDED CARE: Performed by: INTERNAL MEDICINE

## 2023-11-16 PROCEDURE — 6360000002 HC RX W HCPCS: Performed by: NURSE ANESTHETIST, CERTIFIED REGISTERED

## 2023-11-16 PROCEDURE — 2500000003 HC RX 250 WO HCPCS: Performed by: NURSE ANESTHETIST, CERTIFIED REGISTERED

## 2023-11-16 PROCEDURE — 6360000002 HC RX W HCPCS: Performed by: ANESTHESIOLOGY

## 2023-11-16 PROCEDURE — 3609015300 HC ESOPHAGEAL DILATION MALONEY: Performed by: INTERNAL MEDICINE

## 2023-11-16 PROCEDURE — 3700000001 HC ADD 15 MINUTES (ANESTHESIA): Performed by: INTERNAL MEDICINE

## 2023-11-16 PROCEDURE — 7100000010 HC PHASE II RECOVERY - FIRST 15 MIN: Performed by: INTERNAL MEDICINE

## 2023-11-16 PROCEDURE — 7100000011 HC PHASE II RECOVERY - ADDTL 15 MIN: Performed by: INTERNAL MEDICINE

## 2023-11-16 PROCEDURE — 2580000003 HC RX 258: Performed by: INTERNAL MEDICINE

## 2023-11-16 PROCEDURE — 6370000000 HC RX 637 (ALT 250 FOR IP): Performed by: ANESTHESIOLOGY

## 2023-11-16 PROCEDURE — 3609013800 HC EGD SUBMUCOSAL/BOTOX INJECTION: Performed by: INTERNAL MEDICINE

## 2023-11-16 PROCEDURE — 6360000002 HC RX W HCPCS: Performed by: INTERNAL MEDICINE

## 2023-11-16 RX ORDER — ONDANSETRON 2 MG/ML
4 INJECTION INTRAMUSCULAR; INTRAVENOUS ONCE
Status: COMPLETED | OUTPATIENT
Start: 2023-11-16 | End: 2023-11-16

## 2023-11-16 RX ORDER — SODIUM CHLORIDE 9 MG/ML
INJECTION, SOLUTION INTRAVENOUS CONTINUOUS
Status: DISCONTINUED | OUTPATIENT
Start: 2023-11-16 | End: 2023-11-16 | Stop reason: HOSPADM

## 2023-11-16 RX ORDER — PROPOFOL 10 MG/ML
INJECTION, EMULSION INTRAVENOUS PRN
Status: DISCONTINUED | OUTPATIENT
Start: 2023-11-16 | End: 2023-11-16 | Stop reason: SDUPTHER

## 2023-11-16 RX ORDER — SCOLOPAMINE TRANSDERMAL SYSTEM 1 MG/1
1 PATCH, EXTENDED RELEASE TRANSDERMAL ONCE
Status: DISCONTINUED | OUTPATIENT
Start: 2023-11-16 | End: 2023-11-16 | Stop reason: HOSPADM

## 2023-11-16 RX ORDER — LIDOCAINE HYDROCHLORIDE 20 MG/ML
INJECTION, SOLUTION INFILTRATION; PERINEURAL PRN
Status: DISCONTINUED | OUTPATIENT
Start: 2023-11-16 | End: 2023-11-16 | Stop reason: SDUPTHER

## 2023-11-16 RX ADMIN — PROPOFOL 70 MG: 10 INJECTION, EMULSION INTRAVENOUS at 10:41

## 2023-11-16 RX ADMIN — ONDANSETRON 4 MG: 2 INJECTION INTRAMUSCULAR; INTRAVENOUS at 10:18

## 2023-11-16 RX ADMIN — LIDOCAINE HYDROCHLORIDE 100 MG: 20 INJECTION, SOLUTION INFILTRATION; PERINEURAL at 10:35

## 2023-11-16 RX ADMIN — PROPOFOL 70 MG: 10 INJECTION, EMULSION INTRAVENOUS at 10:36

## 2023-11-16 RX ADMIN — SODIUM CHLORIDE: 9 INJECTION, SOLUTION INTRAVENOUS at 10:19

## 2023-11-16 ASSESSMENT — PAIN - FUNCTIONAL ASSESSMENT: PAIN_FUNCTIONAL_ASSESSMENT: NONE - DENIES PAIN

## 2023-11-16 NOTE — BRIEF OP NOTE
Brief Postoperative Note - Full Note in Chart Review/Procedures tab       Patient: Uzma Redd  YOB: 1965  MRN: [id number]    Date of Procedure: 11/16/2023    Pre-Op Diagnosis Codes:     * Gastroparesis [K31.84]    Post-Op Diagnosis: Same       Procedure(s):  EGD SUBMUCOSAL/BOTOX INJECTION  ESOPHAGEAL DILATION JERSON    Surgeon(s):  Acacia Rivera MD    Assistant:  * No surgical staff found *    Anesthesia: Monitor Anesthesia Care    Estimated Blood Loss (mL): Minimal    Complications: None    Specimens:   * No specimens in log *    Implants:  * No implants in log *      Drains: * No LDAs found *    Findings:   Normal Esophagogastroduodenoscopy   Successful Botox injection of pylorus performed  [address] esophageal dilation performed    Rec:  Continue present diet  Continue present medications  Follow up in office in 6 to 12 months  Follow up with primary care physician  OK to resume Eliquis today.       Electronically signed by Acacia Rivera MD on 11/16/2023 at 10:48 AM

## 2023-11-16 NOTE — DISCHARGE INSTRUCTIONS
EGD DISCHARGE INSTRUCTIONS    Use salt water gargle, lozenges, or Chloraseptic spray as needed for sore throat. If you have fever, chills, excessive bleeding, severe chest pain, or abdominal pain, or any other problems, contact your physician's office immediately at [phone]. If you had an esophageal dilatation, and experience fever, chills, excessive bleeding, shortness of breath, chest or abdominal pain, or any other unusual symptom, call the office immediately at [phone]. Continue home medications as directed. Call physician's office in five to seven business days for biopsy results or further instructions. Call your physician at [phone] for a follow up appointment in ______________    You need another EGD in _______________________________    See your physician's report for procedure details and recommendations. ANESTHESIA DISCHARGE INSTRUCTIONS    Wear your seatbelt home. A responsible adult needs to be with you for 24 hours  You are under the influence of drugs-do not drink alcohol, drive ,operate machinery,or make any important decisions or sign any legal documentsfor 24 hours. You may resume normal activities tomorrow. You may experience lightheadedness,dizziness,or sleepiness following surgery. Rest at home today - increase activity as tolerated. It is recommended to take a four hour nap after procedure. Progress slowly to a regular diet unless your physician has instructed you otherwise. Avoid spicy and greasy food on first meal.  Drink plenty of water. If nausea becomes a problem call your physician. Call your doctor if concerns arise.

## 2023-11-16 NOTE — ANESTHESIA PRE PROCEDURE
Department of Anesthesiology  Preprocedure Note       Name:  Raji Del Real   Age:  60 y.o.  :  1965                                          MRN:  [id number]         Date:  2023      Surgeon: Lindsey Barahona):  Veronica Tapia MD    Procedure: Procedure(s):  EGD SUBMUCOSAL/BOTOX INJECTION    Medications prior to admission:   Prior to Admission medications    Medication Sig Start Date End Date Taking?  Authorizing Provider   furosemide (LASIX) 20 MG tablet Take 1-2 tabs daily as needed for swelling 10/2/23   Pillo Mary MD   Insulin Pen Needle ([employer] PEN NEEDLES) 32G X 4 MM MISC daily 23   Edwin Carver MD   insulin glargine (LANTUS SOLOSTAR) 100 UNIT/ML injection pen INJECT 22 UNITS UNDER THE SKIN DAILY  Patient taking differently: INJECT 30 UNITS UNDER THE SKIN DAILY IN EVENING 23   Edwin Carver MD   blood glucose test strips (TRUE METRIX BLOOD GLUCOSE TEST) strip USE 1 STRIP TO TEST BLOOD GLUCOSE DAILY AS NEEDED 23   Edwin Carver MD   TRUEplus Lancets 33G MISC Once a day 23   Edwin Carver MD   Blood Glucose Monitoring Suppl (TRUE METRIX AIR GLUCOSE METER) w/Device KIT daily 23   Edwin Carver MD   Elastic Bandages & Supports (ACE WRIST BRACE) MISC For neuropathy 23   Edwin Carver MD   potassium chloride (KLOR-CON M) 10 MEQ extended release tablet Take 2 tablets by mouth 2 times daily  Patient taking differently: Take 1 tablet by mouth daily 23   Cleo Duckworth MD   apixaban (ELIQUIS) 2.5 MG TABS tablet Take 1 tablet by mouth 2 times daily 23   Pillo Mary MD   loratadine (CLARITIN) 10 MG tablet TAKE ONE TABLET BY MOUTH DAILY 23   Nestor MALDONADO MD   fluticasone (FLONASE SENSIMIST) 27.5 MCG/SPRAY nasal spray 2 sprays by Each Nostril route daily  Patient taking differently: 2 sprays by Each Nostril route as needed 23   Jamila Rogers DO   albuterol sulfate HFA (PROVENTIL;VENTOLIN;PROAIR) 108 (90 Base)

## 2023-11-16 NOTE — H&P
[facility]   Pre-operative History and Physical    Patient: Neema Silva  : 1965  CSN:     History Obtained From: patient and/or guardian.       HISTORY OF PRESENT ILLNESS:    The patient is a 60 y.o. female with gastroparesis and dysphagia here for EGD with Botox inj of pylorus as it has relieved symptoms in the past.     Past Medical History:        Diagnosis Date    Ankle fracture, left     Ankle fracture, right     Anxiety     Arthritis     Asthma     Bipolar depression ([address])     CAN'T AFFORD MEDS    Bladder problem     Cervical disc herniation     COVID toes     Depression     Diabetes mellitus ([address])     diet control    Fatty liver disease, non-alcoholic     Fibromyalgia     Gastroparesis     Dr Diana Ivy    GERD (gastroesophageal reflux disease)     gastroporesis    Glaucoma     Dr Nati Bradley    Headache     Hx of blood clots     Hyperlipidemia     elevated LFT on meds    IBS (irritable bowel syndrome)     Lumbar herniated disc     Nausea & vomiting     Nephrolithiasis 2019    Obesity (BMI 30-39.9) 3/11/2019    Partial Achilles tendon tear     Pneumonia     PONV (postoperative nausea and vomiting)     RA (rheumatoid arthritis) ([address])     Rapid or irregular heartbeat     Sleep apnea     does not use cpap    Spinal stenosis     Stress incontinence     Thyroid disease     growths     Past Surgical History:        Procedure Laterality Date    CARDIAC SURGERY      CHOLECYSTECTOMY      COLONOSCOPY      COLONOSCOPY  2018    with polypectomies    DILATION AND CURETTAGE OF UTERUS      ENDOMETRIAL ABLATION      ENDOSCOPY, COLON, DIAGNOSTIC      ERCP  2010    with stent    ERCP  2014    ERCP WITH SPHINTER OF ODDI MANOMETERY    ESOPHAGEAL DILATATION  08/10/2021    ESOPHAGEAL DILATION JERSON performed by Dakota Nieto MD at [address] N/A 2022    EGD DILATION JERSON performed by Dakota Nieto MD at [address] [facility]

## 2023-11-17 DIAGNOSIS — J30.89 NON-SEASONAL ALLERGIC RHINITIS, UNSPECIFIED TRIGGER: ICD-10-CM

## 2023-11-17 RX ORDER — LORATADINE 10 MG/1
10 TABLET ORAL DAILY
Qty: 30 TABLET | Refills: 5 | Status: SHIPPED | OUTPATIENT
Start: 2023-11-17

## 2023-11-17 NOTE — TELEPHONE ENCOUNTER
Last OV: 10/2/2023  Next OV: Visit date not found    Next appointment due: 04/02/2024      Last fill: 05/30/2023  Refills: 5

## 2023-11-17 NOTE — TELEPHONE ENCOUNTER
Medication:   Requested Prescriptions     Pending Prescriptions Disp Refills    Blood Glucose Monitoring Suppl (TRUE METRIX METER) w/Device KIT [Pharmacy Med Name: Pedro Pablo Meyers TRUE METRIX BLOOD GLUCOSE MTR]       Sig: USE TO TEST DAILY       Last Filled:      Patient Phone Number: [phone] (home)     Last appt: 9/21/2023   Next appt: Visit date not found    Last Labs DM:   Lab Results   Component Value Date/Time    LABA1C 8.1 03/16/2023 09:37 AM

## 2023-12-01 PROCEDURE — 93294 REM INTERROG EVL PM/LDLS PM: CPT | Performed by: INTERNAL MEDICINE

## 2023-12-01 PROCEDURE — 93296 REM INTERROG EVL PM/IDS: CPT | Performed by: INTERNAL MEDICINE

## 2023-12-11 ENCOUNTER — TELEPHONE (OUTPATIENT)
Dept: CARDIOLOGY CLINIC | Age: 58
End: 2023-12-11

## 2023-12-11 NOTE — TELEPHONE ENCOUNTER
Pt called stating they went to [facility] and spent the night. Pt was light headed and end up spending the night. Pt was told to schedule appt with RMM as soon as possible, pt did send over a manual transmission. Is there a date and rime pt can get scheduled?       Pls advise thank you

## 2023-12-11 NOTE — TELEPHONE ENCOUNTER
Please call and schedule next available with RMM or [facility] . She can be placed on a wait list. There are currently no openings. States she lifted a tote that was to heavy and felt pain in her abd and chest. Her blood pressure went up  and  her BP was 198/93. She states her tiopronin were 25-45. She state she could feel her heart racing and her blood sugar was elevated. She states she had a CT scan  and is usure of the results, She was told she may have had an anxiety attack. She states  she was told she needed  a stress test.  Unable to view [facility] records.

## 2023-12-12 ENCOUNTER — TELEPHONE (OUTPATIENT)
Dept: CARDIOLOGY CLINIC | Age: 58
End: 2023-12-12

## 2023-12-12 ENCOUNTER — APPOINTMENT (OUTPATIENT)
Dept: GENERAL RADIOLOGY | Age: 58
End: 2023-12-12
Payer: COMMERCIAL

## 2023-12-12 ENCOUNTER — HOSPITAL ENCOUNTER (INPATIENT)
Age: 58
LOS: 4 days | Discharge: HOME | End: 2023-12-16
Attending: HOSPITALIST | Admitting: HOSPITALIST
Payer: COMMERCIAL

## 2023-12-12 DIAGNOSIS — R07.9 CHEST PAIN, UNSPECIFIED TYPE: Primary | ICD-10-CM

## 2023-12-12 DIAGNOSIS — R73.9 HYPERGLYCEMIA: ICD-10-CM

## 2023-12-12 DIAGNOSIS — R79.89 ELEVATED TROPONIN: ICD-10-CM

## 2023-12-12 PROBLEM — I21.4 NSTEMI (NON-ST ELEVATED MYOCARDIAL INFARCTION) (HCC): Status: ACTIVE | Noted: 2023-12-12

## 2023-12-12 LAB
ALBUMIN SERPL-MCNC: 4.1 G/DL (ref 3.4–5)
ALBUMIN/GLOB SERPL: 1.2 {RATIO} (ref 1.1–2.2)
ALP SERPL-CCNC: 15 U/L (ref 40–129)
ALT SERPL-CCNC: 50 U/L (ref 10–40)
ANION GAP SERPL CALCULATED.3IONS-SCNC: 11 MMOL/L (ref 3–16)
APTT BLD: 18.3 SEC (ref 22.7–35.9)
APTT BLD: 27.6 SEC (ref 22.7–35.9)
APTT BLD: 41.6 SEC (ref 22.7–35.9)
AST SERPL-CCNC: 57 U/L (ref 15–37)
BASOPHILS # BLD: 0.1 K/UL (ref 0–0.2)
BASOPHILS NFR BLD: 0.5 %
BILIRUB SERPL-MCNC: 0.5 MG/DL (ref 0–1)
BUN SERPL-MCNC: 18 MG/DL (ref 7–20)
CALCIUM SERPL-MCNC: 9.9 MG/DL (ref 8.3–10.6)
CHLORIDE SERPL-SCNC: 98 MMOL/L (ref 99–110)
CO2 SERPL-SCNC: 27 MMOL/L (ref 21–32)
CREAT SERPL-MCNC: 0.8 MG/DL (ref 0.6–1.1)
DEPRECATED RDW RBC AUTO: 14.9 % (ref 12.4–15.4)
DEPRECATED RDW RBC AUTO: 15 % (ref 12.4–15.4)
EKG ATRIAL RATE: 85 BPM
EKG DIAGNOSIS: NORMAL
EKG P AXIS: 49 DEGREES
EKG P-R INTERVAL: 182 MS
EKG Q-T INTERVAL: 444 MS
EKG QRS DURATION: 182 MS
EKG QTC CALCULATION (BAZETT): 528 MS
EKG R AXIS: -7 DEGREES
EKG T AXIS: 165 DEGREES
EKG VENTRICULAR RATE: 85 BPM
EOSINOPHIL # BLD: 0.2 K/UL (ref 0–0.6)
EOSINOPHIL NFR BLD: 1.4 %
GFR SERPLBLD CREATININE-BSD FMLA CKD-EPI: >60 ML/MIN/{1.73_M2}
GLUCOSE BLD-MCNC: 250 MG/DL (ref 70–99)
GLUCOSE BLD-MCNC: 288 MG/DL (ref 70–99)
GLUCOSE SERPL-MCNC: 328 MG/DL (ref 70–99)
HCT VFR BLD AUTO: 41.9 % (ref 36–48)
HCT VFR BLD AUTO: 43.1 % (ref 36–48)
HGB BLD-MCNC: 13.8 G/DL (ref 12–16)
HGB BLD-MCNC: 14 G/DL (ref 12–16)
INR PPP: 1 (ref 0.84–1.16)
INR PPP: 1.1 (ref 0.84–1.16)
LYMPHOCYTES # BLD: 2.8 K/UL (ref 1–5.1)
LYMPHOCYTES NFR BLD: 25 %
MCH RBC QN AUTO: 28.6 PG (ref 26–34)
MCH RBC QN AUTO: 28.6 PG (ref 26–34)
MCHC RBC AUTO-ENTMCNC: 32.4 G/DL (ref 31–36)
MCHC RBC AUTO-ENTMCNC: 33 G/DL (ref 31–36)
MCV RBC AUTO: 86.9 FL (ref 80–100)
MCV RBC AUTO: 88.2 FL (ref 80–100)
MONOCYTES # BLD: 0.7 K/UL (ref 0–1.3)
MONOCYTES NFR BLD: 6.3 %
NEUTROPHILS # BLD: 7.5 K/UL (ref 1.7–7.7)
NEUTROPHILS NFR BLD: 66.8 %
NT-PROBNP SERPL-MCNC: 440 PG/ML (ref 0–124)
PERFORMED ON: ABNORMAL
PERFORMED ON: ABNORMAL
PLATELET # BLD AUTO: 187 K/UL (ref 135–450)
PLATELET # BLD AUTO: ABNORMAL K/UL (ref 135–450)
PMV BLD AUTO: 9.6 FL (ref 5–10.5)
PMV BLD AUTO: ABNORMAL FL (ref 5–10.5)
POTASSIUM SERPL-SCNC: 4.8 MMOL/L (ref 3.5–5.1)
PROT SERPL-MCNC: 7.5 G/DL (ref 6.4–8.2)
PROTHROMBIN TIME: 13.2 SEC (ref 11.5–14.8)
PROTHROMBIN TIME: 14.2 SEC (ref 11.5–14.8)
RBC # BLD AUTO: 4.82 M/UL (ref 4–5.2)
RBC # BLD AUTO: 4.89 M/UL (ref 4–5.2)
SODIUM SERPL-SCNC: 136 MMOL/L (ref 136–145)
TROPONIN, HIGH SENSITIVITY: 23 NG/L (ref 0–14)
TROPONIN, HIGH SENSITIVITY: 30 NG/L (ref 0–14)
WBC # BLD AUTO: 11.3 K/UL (ref 4–11)
WBC # BLD AUTO: 12.7 K/UL (ref 4–11)

## 2023-12-12 PROCEDURE — 1200000000 HC SEMI PRIVATE

## 2023-12-12 PROCEDURE — 85730 THROMBOPLASTIN TIME PARTIAL: CPT

## 2023-12-12 PROCEDURE — 99285 EMERGENCY DEPT VISIT HI MDM: CPT

## 2023-12-12 PROCEDURE — 85025 COMPLETE CBC W/AUTO DIFF WBC: CPT

## 2023-12-12 PROCEDURE — 84484 ASSAY OF TROPONIN QUANT: CPT

## 2023-12-12 PROCEDURE — 85610 PROTHROMBIN TIME: CPT

## 2023-12-12 PROCEDURE — 6370000000 HC RX 637 (ALT 250 FOR IP): Performed by: PHYSICIAN ASSISTANT

## 2023-12-12 PROCEDURE — 80053 COMPREHEN METABOLIC PANEL: CPT

## 2023-12-12 PROCEDURE — 6360000002 HC RX W HCPCS: Performed by: HOSPITALIST

## 2023-12-12 PROCEDURE — 71045 X-RAY EXAM CHEST 1 VIEW: CPT

## 2023-12-12 PROCEDURE — 6370000000 HC RX 637 (ALT 250 FOR IP): Performed by: HOSPITALIST

## 2023-12-12 PROCEDURE — 2580000003 HC RX 258: Performed by: HOSPITALIST

## 2023-12-12 PROCEDURE — 99222 1ST HOSP IP/OBS MODERATE 55: CPT | Performed by: INTERNAL MEDICINE

## 2023-12-12 PROCEDURE — 83880 ASSAY OF NATRIURETIC PEPTIDE: CPT

## 2023-12-12 PROCEDURE — 93010 ELECTROCARDIOGRAM REPORT: CPT | Performed by: INTERNAL MEDICINE

## 2023-12-12 PROCEDURE — 6370000000 HC RX 637 (ALT 250 FOR IP): Performed by: NURSE PRACTITIONER

## 2023-12-12 PROCEDURE — 6360000002 HC RX W HCPCS: Performed by: PHYSICIAN ASSISTANT

## 2023-12-12 PROCEDURE — 83036 HEMOGLOBIN GLYCOSYLATED A1C: CPT

## 2023-12-12 PROCEDURE — 85027 COMPLETE CBC AUTOMATED: CPT

## 2023-12-12 PROCEDURE — 93005 ELECTROCARDIOGRAM TRACING: CPT | Performed by: HOSPITALIST

## 2023-12-12 RX ORDER — HEPARIN SODIUM 1000 [USP'U]/ML
4000 INJECTION, SOLUTION INTRAVENOUS; SUBCUTANEOUS PRN
Status: DISCONTINUED | OUTPATIENT
Start: 2023-12-12 | End: 2023-12-12

## 2023-12-12 RX ORDER — HEPARIN SODIUM 10000 [USP'U]/100ML
5-30 INJECTION, SOLUTION INTRAVENOUS CONTINUOUS
Status: DISCONTINUED | OUTPATIENT
Start: 2023-12-12 | End: 2023-12-14 | Stop reason: ALTCHOICE

## 2023-12-12 RX ORDER — ONDANSETRON 2 MG/ML
4 INJECTION INTRAMUSCULAR; INTRAVENOUS EVERY 6 HOURS PRN
Status: DISCONTINUED | OUTPATIENT
Start: 2023-12-12 | End: 2023-12-16 | Stop reason: HOSPADM

## 2023-12-12 RX ORDER — POTASSIUM CHLORIDE 20 MEQ/1
20 TABLET, EXTENDED RELEASE ORAL 2 TIMES DAILY
Status: ON HOLD | COMMUNITY
End: 2023-12-16 | Stop reason: HOSPADM

## 2023-12-12 RX ORDER — HEPARIN SODIUM 1000 [USP'U]/ML
2000 INJECTION, SOLUTION INTRAVENOUS; SUBCUTANEOUS PRN
Status: DISCONTINUED | OUTPATIENT
Start: 2023-12-12 | End: 2023-12-12

## 2023-12-12 RX ORDER — LOPERAMIDE HYDROCHLORIDE 2 MG/1
2 CAPSULE ORAL 4 TIMES DAILY PRN
COMMUNITY

## 2023-12-12 RX ORDER — INSULIN LISPRO 100 [IU]/ML
0-4 INJECTION, SOLUTION INTRAVENOUS; SUBCUTANEOUS
Status: DISCONTINUED | OUTPATIENT
Start: 2023-12-12 | End: 2023-12-13 | Stop reason: DRUGHIGH

## 2023-12-12 RX ORDER — SODIUM CHLORIDE 0.9 % (FLUSH) 0.9 %
5-40 SYRINGE (ML) INJECTION EVERY 12 HOURS SCHEDULED
Status: DISCONTINUED | OUTPATIENT
Start: 2023-12-12 | End: 2023-12-15 | Stop reason: SDUPTHER

## 2023-12-12 RX ORDER — FUROSEMIDE 20 MG/1
20 TABLET ORAL DAILY
Status: DISCONTINUED | OUTPATIENT
Start: 2023-12-13 | End: 2023-12-16 | Stop reason: HOSPADM

## 2023-12-12 RX ORDER — FAMOTIDINE 20 MG/1
40 TABLET, FILM COATED ORAL 2 TIMES DAILY
Status: DISCONTINUED | OUTPATIENT
Start: 2023-12-12 | End: 2023-12-16 | Stop reason: HOSPADM

## 2023-12-12 RX ORDER — KETOTIFEN FUMARATE 0.35 MG/ML
1 SOLUTION/ DROPS OPHTHALMIC 2 TIMES DAILY PRN
Status: DISCONTINUED | OUTPATIENT
Start: 2023-12-12 | End: 2023-12-16 | Stop reason: HOSPADM

## 2023-12-12 RX ORDER — ONDANSETRON 4 MG/1
4 TABLET, ORALLY DISINTEGRATING ORAL EVERY 8 HOURS PRN
Status: DISCONTINUED | OUTPATIENT
Start: 2023-12-12 | End: 2023-12-16 | Stop reason: HOSPADM

## 2023-12-12 RX ORDER — HEPARIN SODIUM 1000 [USP'U]/ML
2000 INJECTION, SOLUTION INTRAVENOUS; SUBCUTANEOUS PRN
Status: DISCONTINUED | OUTPATIENT
Start: 2023-12-12 | End: 2023-12-14 | Stop reason: ALTCHOICE

## 2023-12-12 RX ORDER — DEXTROSE MONOHYDRATE 100 MG/ML
INJECTION, SOLUTION INTRAVENOUS CONTINUOUS PRN
Status: DISCONTINUED | OUTPATIENT
Start: 2023-12-12 | End: 2023-12-16 | Stop reason: HOSPADM

## 2023-12-12 RX ORDER — SODIUM CHLORIDE 9 MG/ML
INJECTION, SOLUTION INTRAVENOUS PRN
Status: DISCONTINUED | OUTPATIENT
Start: 2023-12-12 | End: 2023-12-16 | Stop reason: HOSPADM

## 2023-12-12 RX ORDER — POTASSIUM CHLORIDE 7.45 MG/ML
10 INJECTION INTRAVENOUS PRN
Status: DISCONTINUED | OUTPATIENT
Start: 2023-12-12 | End: 2023-12-16 | Stop reason: HOSPADM

## 2023-12-12 RX ORDER — HEPARIN SODIUM 10000 [USP'U]/100ML
5-30 INJECTION, SOLUTION INTRAVENOUS CONTINUOUS
Status: DISCONTINUED | OUTPATIENT
Start: 2023-12-12 | End: 2023-12-12

## 2023-12-12 RX ORDER — INSULIN GLARGINE 100 [IU]/ML
22 INJECTION, SOLUTION SUBCUTANEOUS NIGHTLY
Status: DISCONTINUED | OUTPATIENT
Start: 2023-12-12 | End: 2023-12-12

## 2023-12-12 RX ORDER — MORPHINE SULFATE 2 MG/ML
2 INJECTION, SOLUTION INTRAMUSCULAR; INTRAVENOUS EVERY 4 HOURS PRN
Status: DISCONTINUED | OUTPATIENT
Start: 2023-12-12 | End: 2023-12-16 | Stop reason: HOSPADM

## 2023-12-12 RX ORDER — INSULIN GLARGINE 100 [IU]/ML
34 INJECTION, SOLUTION SUBCUTANEOUS NIGHTLY
Status: DISCONTINUED | OUTPATIENT
Start: 2023-12-12 | End: 2023-12-16 | Stop reason: HOSPADM

## 2023-12-12 RX ORDER — ACETAMINOPHEN 650 MG/1
650 SUPPOSITORY RECTAL EVERY 6 HOURS PRN
Status: DISCONTINUED | OUTPATIENT
Start: 2023-12-12 | End: 2023-12-16 | Stop reason: HOSPADM

## 2023-12-12 RX ORDER — SODIUM CHLORIDE 0.9 % (FLUSH) 0.9 %
5-40 SYRINGE (ML) INJECTION PRN
Status: DISCONTINUED | OUTPATIENT
Start: 2023-12-12 | End: 2023-12-15 | Stop reason: SDUPTHER

## 2023-12-12 RX ORDER — ACETAMINOPHEN 325 MG/1
650 TABLET ORAL EVERY 6 HOURS PRN
Status: DISCONTINUED | OUTPATIENT
Start: 2023-12-12 | End: 2023-12-14 | Stop reason: DRUGHIGH

## 2023-12-12 RX ORDER — POTASSIUM CHLORIDE 20 MEQ/1
40 TABLET, EXTENDED RELEASE ORAL PRN
Status: DISCONTINUED | OUTPATIENT
Start: 2023-12-12 | End: 2023-12-16 | Stop reason: HOSPADM

## 2023-12-12 RX ORDER — HEPARIN SODIUM 1000 [USP'U]/ML
4000 INJECTION, SOLUTION INTRAVENOUS; SUBCUTANEOUS PRN
Status: DISCONTINUED | OUTPATIENT
Start: 2023-12-12 | End: 2023-12-14 | Stop reason: ALTCHOICE

## 2023-12-12 RX ORDER — ASPIRIN 81 MG/1
324 TABLET, CHEWABLE ORAL ONCE
Status: COMPLETED | OUTPATIENT
Start: 2023-12-12 | End: 2023-12-12

## 2023-12-12 RX ORDER — HEPARIN SODIUM 1000 [USP'U]/ML
4000 INJECTION, SOLUTION INTRAVENOUS; SUBCUTANEOUS ONCE
Status: DISCONTINUED | OUTPATIENT
Start: 2023-12-12 | End: 2023-12-12

## 2023-12-12 RX ORDER — GLUCAGON 1 MG/ML
1 KIT INJECTION PRN
Status: DISCONTINUED | OUTPATIENT
Start: 2023-12-12 | End: 2023-12-16 | Stop reason: HOSPADM

## 2023-12-12 RX ORDER — POLYETHYLENE GLYCOL 3350 17 G/17G
17 POWDER, FOR SOLUTION ORAL DAILY PRN
Status: DISCONTINUED | OUTPATIENT
Start: 2023-12-12 | End: 2023-12-16 | Stop reason: HOSPADM

## 2023-12-12 RX ORDER — INSULIN LISPRO 100 [IU]/ML
0-4 INJECTION, SOLUTION INTRAVENOUS; SUBCUTANEOUS NIGHTLY
Status: DISCONTINUED | OUTPATIENT
Start: 2023-12-12 | End: 2023-12-13 | Stop reason: DRUGHIGH

## 2023-12-12 RX ORDER — SIMETHICONE 80 MG
80 TABLET,CHEWABLE ORAL 4 TIMES DAILY PRN
Status: DISCONTINUED | OUTPATIENT
Start: 2023-12-12 | End: 2023-12-16 | Stop reason: HOSPADM

## 2023-12-12 RX ORDER — MAGNESIUM SULFATE IN WATER 40 MG/ML
2000 INJECTION, SOLUTION INTRAVENOUS PRN
Status: DISCONTINUED | OUTPATIENT
Start: 2023-12-12 | End: 2023-12-16 | Stop reason: HOSPADM

## 2023-12-12 RX ADMIN — INSULIN LISPRO 2 UNITS: 100 INJECTION, SOLUTION INTRAVENOUS; SUBCUTANEOUS at 17:11

## 2023-12-12 RX ADMIN — ASPIRIN 324 MG: 81 TABLET, CHEWABLE ORAL at 14:10

## 2023-12-12 RX ADMIN — MORPHINE SULFATE 2 MG: 2 INJECTION, SOLUTION INTRAMUSCULAR; INTRAVENOUS at 17:12

## 2023-12-12 RX ADMIN — HEPARIN SODIUM 4000 UNITS: 1000 INJECTION INTRAVENOUS; SUBCUTANEOUS at 21:52

## 2023-12-12 RX ADMIN — FAMOTIDINE 40 MG: 20 TABLET ORAL at 20:50

## 2023-12-12 RX ADMIN — Medication 10 ML: at 22:57

## 2023-12-12 RX ADMIN — HEPARIN SODIUM 14 UNITS/KG/HR: 10000 INJECTION, SOLUTION INTRAVENOUS at 21:53

## 2023-12-12 RX ADMIN — NITROGLYCERIN 0.5 INCH: 20 OINTMENT TOPICAL at 14:10

## 2023-12-12 RX ADMIN — HEPARIN SODIUM 10 UNITS/KG/HR: 10000 INJECTION, SOLUTION INTRAVENOUS at 15:44

## 2023-12-12 RX ADMIN — INSULIN GLARGINE 34 UNITS: 100 INJECTION, SOLUTION SUBCUTANEOUS at 20:50

## 2023-12-12 RX ADMIN — SIMETHICONE 80 MG: 80 TABLET, CHEWABLE ORAL at 21:56

## 2023-12-12 RX ADMIN — NITROGLYCERIN 0.5 INCH: 20 OINTMENT TOPICAL at 18:25

## 2023-12-12 ASSESSMENT — PAIN SCALES - GENERAL
PAINLEVEL_OUTOF10: 2
PAINLEVEL_OUTOF10: 4
PAINLEVEL_OUTOF10: 5
PAINLEVEL_OUTOF10: 6
PAINLEVEL_OUTOF10: 6

## 2023-12-12 ASSESSMENT — PAIN DESCRIPTION - PAIN TYPE
TYPE: ACUTE PAIN

## 2023-12-12 ASSESSMENT — LIFESTYLE VARIABLES
HOW MANY STANDARD DRINKS CONTAINING ALCOHOL DO YOU HAVE ON A TYPICAL DAY: PATIENT DOES NOT DRINK
HOW OFTEN DO YOU HAVE A DRINK CONTAINING ALCOHOL: NEVER

## 2023-12-12 ASSESSMENT — PAIN - FUNCTIONAL ASSESSMENT: PAIN_FUNCTIONAL_ASSESSMENT: 0-10

## 2023-12-12 ASSESSMENT — PAIN DESCRIPTION - DESCRIPTORS
DESCRIPTORS: PRESSURE

## 2023-12-12 ASSESSMENT — PAIN DESCRIPTION - LOCATION
LOCATION: CHEST

## 2023-12-12 NOTE — ED NOTES
Report given to CVICU RN at this time, all questions answered during handoff, VSS for the floor at this time.      Simin Suarez, KARELY  12/12/23 1643

## 2023-12-12 NOTE — CONSULTS
[facility]  H+P  Consult  OP Visit  FU Visit   CC/HPI   CC New patient visit for cp. HPI 60 y.o., female  presents with CP. CP substernal, pressure, radiating arm, associated sob, worse exertion. States present for last week or so but exacerbated by trying to  heavy object. Cardiac Hx PPM   EKG AV paced. Troponin Lab Results   Component Value Date    TROPHS 30 (H) 12/12/2023    TROPHS 23 (H) 12/12/2023      HISTORY/ALLERGY/ROS   MEDHx  has a past medical history of Ankle fracture, left, Ankle fracture, right, Anxiety, Arthritis, Asthma, Bipolar depression ([address]), Bladder problem, Cervical disc herniation, COVID toes, Depression, Diabetes mellitus ([address]), Fatty liver disease, non-alcoholic, Fibromyalgia, Gastroparesis, GERD (gastroesophageal reflux disease), Glaucoma, Headache, Hx of blood clots, Hyperlipidemia, IBS (irritable bowel syndrome), Lumbar herniated disc, Nausea & vomiting, Nephrolithiasis, Obesity (BMI 30-39.9), Partial Achilles tendon tear, Pneumonia, PONV (postoperative nausea and vomiting), RA (rheumatoid arthritis) ([address]), Rapid or irregular heartbeat, Sleep apnea, Spinal stenosis, Stress incontinence, and Thyroid disease. SURGHx  has a past surgical history that includes Cholecystectomy; Dilation and curettage of uterus; Tonsillectomy; laparoscopy; Upper gastrointestinal endoscopy; Endometrial ablation; ERCP (05/25/2010); Esophagoscopy (10/05/2010); Upper gastrointestinal endoscopy (04/12/2011); eye surgery; Upper gastrointestinal endoscopy (08/30/2011); Upper gastrointestinal endoscopy (03/09/2012); Upper gastrointestinal endoscopy; Upper gastrointestinal endoscopy (11/20/2012); Upper gastrointestinal endoscopy (06/04/2013); ERCP (01/31/2014); Upper gastrointestinal endoscopy (05/20/2014); Upper gastrointestinal endoscopy (12/12/2014); Upper gastrointestinal endoscopy (09/09/2015); Endoscopy, colon, diagnostic; Upper gastrointestinal endoscopy (05/10/2016);  Upper gastrointestinal endoscopy (04/11/2017); Upper gastrointestinal endoscopy (10/24/2017); Upper gastrointestinal endoscopy (03/06/2018); Colonoscopy; Colonoscopy (03/06/2018); Upper gastrointestinal endoscopy (N/A, 05/07/2019); Upper gastrointestinal endoscopy (N/A, 05/07/2019); Upper gastrointestinal endoscopy (N/A, 03/13/2020); Upper gastrointestinal endoscopy (N/A, 03/13/2020); Upper gastrointestinal endoscopy (N/A, 10/06/2020); Upper gastrointestinal endoscopy (N/A, 10/06/2020); Cardiac surgery; Pacemaker insertion (05/10/2021); Upper gastrointestinal endoscopy (N/A, 08/10/2021); Upper gastrointestinal endoscopy (N/A, 08/10/2021); Esophagus dilation (08/10/2021); Esophagus dilation (N/A, 06/23/2022); Upper gastrointestinal endoscopy (N/A, 06/23/2022); Upper gastrointestinal endoscopy (N/A, 01/27/2023); Esophagus dilation (N/A, 01/27/2023); Upper gastrointestinal endoscopy (N/A, 11/16/2023); and Esophagus dilation (11/16/2023). SOCHx  reports that she has never smoked. She has never used smokeless tobacco. She reports that she does not drink alcohol and does not use drugs. FAMHx family history includes Arthritis in her father and mother; Diabetes in her brother and mother; Glaucoma in her father and mother; Heart Disease in her brother, father, and mother; High Blood Pressure in her brother; High Cholesterol in her brother and brother; Mental Illness in her mother; Stroke in her mother.    ALLERG Lamictal [lamotrigine], Macrodantin [nitrofurantoin macrocrystal], Penicillins, Shellfish-derived products, Sulfamethoxazole, Trimethoprim, Aspartame and phenylalanine, Bactrim, Biaxin [clarithromycin], Blueberry, Cephalexin, Ciprofloxacin, Hydrocodone, Hydrocodone-acetaminophen, Lansoprazole, Nexium [esomeprazole magnesium trihydrate], Other, Prilosec [omeprazole], Blueberry [vaccinium angustifolium], Monosodium glutamate, and Zmax [azithromycin dihydrate]   ROS Full ROS obtained and negative except as mentioned in HPI

## 2023-12-12 NOTE — ED NOTES
Pt transported to CVU 2917 via bed on portable monitor, vss at handoff, and all questions asked by [facility] RN.      Emily Plummer RN  12/12/23 2545

## 2023-12-12 NOTE — TELEPHONE ENCOUNTER
Spoke to PT she has pain in her chest and numbness in her arm and now pulsating in her lip. BP this am was 193/95 and feeling lightheaded and very hot in her face. Advised she be evaluated in the ER. Please advise if anything needed further.

## 2023-12-12 NOTE — PROGRESS NOTES
Patient seen in ED, room 20. Admission completed with the following exceptions:  4 Eyes Assessment, Immunizations, Vaccines, Rights and Responsibilities, Orientation to room, Plan of Care, Education/Learning Assessment and Education Plan, white board, height and weight, pain assessment and head to toe assessment. Patient is alert and oriented X 4. Patient lives in an apartment alone and is being admitted for NSTEMI. Home Medications as well as Outside Sources has been verbally reviewed with patient and updated as appropriate and is now Completed per pharmacy technician [facility]. Plan of care updated if indicated. All questions answered. Patient sister in law is at bedside.      Patient reports she does need help with getting her socks on or a device to assist since she lives at home; patient also reports a rash under her breast and abdominal fold areas in which she uses Nystatin powder

## 2023-12-12 NOTE — TELEPHONE ENCOUNTER
Pt states her left side of her face is now feeling hot, wants to know if she should go to the ER. Her lip on the right side is pulsating, she is worn out and is having pressure in her chest.    Please advise.

## 2023-12-12 NOTE — H&P
HOSPITALISTS HISTORY AND PHYSICAL    12/12/2023 3:42 PM    Patient Information:  Sofia Lopez is a 60 y.o. female [id number]  PCP:  Stacie Magdaleno MD (Tel: [phone] )    Chief complaint:    Chief Complaint   Patient presents with    Chest Pain     Pt released from [address] on Sunday for cp states she had felt better, today the pain returned in her chest and shoulder        History of Present Illness:  Sofia Lopez is a 60 y.o. female who presented with presents to ER with complaints of chest pain. Patient apparently was admitted at [facility] Sunday for chest pain. Patient said was feeling better so was released today the pain returned back. Midsternal chest pain going to the left shoulder. Patient with history of CAD pacemaker diabetes hypertension history of DVTs. Patient is on Eliquis for that. Patient apparently was offered stress test but declined as patient was told her sister never had a stress test done while she has a pacemaker. Patient stated got worse this morning so came here. Patient has history of cath x 2 several years ago. REVIEW OF SYSTEMS:   Constitutional: Negative for fever,chills or night sweats  ENT: Negative for rhinorrhea, epistaxis, hoarseness, sore throat. Respiratory: Negative for shortness of breath,wheezing  Cardiovascular: Negative for chest pain, palpitations   Gastrointestinal: Negative for nausea, vomiting, diarrhea  Genitourinary: Negative for polyuria, dysuria   Hematologic/Lymphatic: Negative for bleeding tendency, easy bruising  Musculoskeletal: Negative for myalgias and arthralgias  Neurologic: Negative for confusion,dysarthria. Skin: Negative for itching,rash, good capillary refill. Psychiatric: Negative for depression,anxiety, agitation.   Endocrine: Negative for polydipsia,polyuria,heat endoscopy (N/A, 10/06/2020); Upper gastrointestinal endoscopy (N/A, 10/06/2020); Cardiac surgery; Pacemaker insertion (05/10/2021); Upper gastrointestinal endoscopy (N/A, 08/10/2021); Upper gastrointestinal endoscopy (N/A, 08/10/2021); Esophagus dilation (08/10/2021); Esophagus dilation (N/A, 06/23/2022); Upper gastrointestinal endoscopy (N/A, 06/23/2022); Upper gastrointestinal endoscopy (N/A, 01/27/2023); Esophagus dilation (N/A, 01/27/2023); Upper gastrointestinal endoscopy (N/A, 11/16/2023); and Esophagus dilation (11/16/2023).      Medications:  Current Facility-Administered Medications on File Prior to Encounter   Medication Dose Route Frequency Provider Last Rate Last Admin    0.9 % sodium chloride infusion   IntraVENous Continuous Pola Jaime MD         Current Outpatient Medications on File Prior to Encounter   Medication Sig Dispense Refill    potassium chloride (KLOR-CON M) 20 MEQ extended release tablet Take 1 tablet by mouth 2 times daily      loperamide (IMODIUM) 2 MG capsule Take 1 capsule by mouth 4 times daily as needed for Diarrhea      Blood Glucose Monitoring Suppl (TRUE METRIX METER) w/Device KIT USE TO TEST DAILY 1 kit 0    loratadine (CLARITIN) 10 MG tablet TAKE 1 TABLET BY MOUTH DAILY 30 tablet 5    furosemide (LASIX) 20 MG tablet Take 1-2 tabs daily as needed for swelling (Patient taking differently: Take 1-2 tablets by mouth daily as needed Take 1-2 tabs daily as needed for swelling) 180 tablet 1    Insulin Pen Needle ([employer] PEN NEEDLES) 32G X 4 MM MISC daily 100 each 3    insulin glargine (LANTUS SOLOSTAR) 100 UNIT/ML injection pen INJECT 22 UNITS UNDER THE SKIN DAILY (Patient taking differently: Inject 34 Units into the skin nightly) 12 mL 2    blood glucose test strips (TRUE METRIX BLOOD GLUCOSE TEST) strip USE 1 STRIP TO TEST BLOOD GLUCOSE DAILY AS NEEDED 50 strip 3    TRUEplus Lancets 33G MISC Once a day 100 each 3    Elastic Bandages & Supports (ACE WRIST BRACE) MISC For

## 2023-12-12 NOTE — PROGRESS NOTES
Pharmacy Home Medication Reconciliation Note    A medication reconciliation has been completed for Ramses Poe 1965    Pharmacy: [place], [place]  Information provided by: patient w/med bottles    The patient's home medication list is as follows:  Current Facility-Administered Medications on File Prior to Encounter   Medication Dose Route Frequency Provider Last Rate Last Admin    0.9 % sodium chloride infusion   IntraVENous Continuous Andrey Herrmann MD         Current Outpatient Medications on File Prior to Encounter   Medication Sig Dispense Refill    potassium chloride (KLOR-CON M) 20 MEQ extended release tablet Take 1 tablet by mouth 2 times daily      loperamide (IMODIUM) 2 MG capsule Take 1 capsule by mouth 4 times daily as needed for Diarrhea      Blood Glucose Monitoring Suppl (TRUE METRIX METER) w/Device KIT USE TO TEST DAILY 1 kit 0    loratadine (CLARITIN) 10 MG tablet TAKE 1 TABLET BY MOUTH DAILY 30 tablet 5    furosemide (LASIX) 20 MG tablet Take 1-2 tabs daily as needed for swelling (Patient taking differently: Take 1-2 tablets by mouth daily as needed Take 1-2 tabs daily as needed for swelling) 180 tablet 1    Insulin Pen Needle ([employer] PEN NEEDLES) 32G X 4 MM MISC daily 100 each 3    insulin glargine (LANTUS SOLOSTAR) 100 UNIT/ML injection pen INJECT 22 UNITS UNDER THE SKIN DAILY (Patient taking differently: Inject 34 Units into the skin nightly) 12 mL 2    blood glucose test strips (TRUE METRIX BLOOD GLUCOSE TEST) strip USE 1 STRIP TO TEST BLOOD GLUCOSE DAILY AS NEEDED 50 strip 3    TRUEplus Lancets 33G MISC Once a day 100 each 3    Elastic Bandages & Supports (ACE WRIST BRACE) MISC For neuropathy 2 each 0    potassium chloride (KLOR-CON M) 10 MEQ extended release tablet Take 2 tablets by mouth 2 times daily (Patient not taking: Reported on 12/12/2023) 360 tablet 1    apixaban (ELIQUIS) 2.5 MG TABS tablet Take 1 tablet by mouth 2 times daily 180

## 2023-12-12 NOTE — TELEPHONE ENCOUNTER
Pt called stating she is still having pt issues and it is starting to scare her current BP is 170/102. Pt is SOB, has Chest Pressure, arm lip numbness.      Pt would like to know what they should do they have never had BP issues     Pls advise thank you     [phone]

## 2023-12-12 NOTE — ED PROVIDER NOTES
program.  Efforts were made to edit the dictations but occasionally words are mis-transcribed.)    Shy Edmonds PA-C (electronically signed)           Everardo Bruno PA-C  12/12/23 3060

## 2023-12-13 LAB
ANION GAP SERPL CALCULATED.3IONS-SCNC: 8 MMOL/L (ref 3–16)
APTT BLD: 124.5 SEC (ref 22.7–35.9)
APTT BLD: 36.8 SEC (ref 22.7–35.9)
APTT BLD: 77.7 SEC (ref 22.7–35.9)
APTT BLD: >180 SEC (ref 22.7–35.9)
BASOPHILS # BLD: 0 K/UL (ref 0–0.2)
BASOPHILS NFR BLD: 0 %
BUN SERPL-MCNC: 19 MG/DL (ref 7–20)
CALCIUM SERPL-MCNC: 9.2 MG/DL (ref 8.3–10.6)
CHLORIDE SERPL-SCNC: 98 MMOL/L (ref 99–110)
CO2 SERPL-SCNC: 26 MMOL/L (ref 21–32)
CREAT SERPL-MCNC: 0.8 MG/DL (ref 0.6–1.1)
DEPRECATED RDW RBC AUTO: 14.8 % (ref 12.4–15.4)
EOSINOPHIL # BLD: 0.1 K/UL (ref 0–0.6)
EOSINOPHIL NFR BLD: 1 %
EST. AVERAGE GLUCOSE BLD GHB EST-MCNC: 297.7 MG/DL
GFR SERPLBLD CREATININE-BSD FMLA CKD-EPI: >60 ML/MIN/{1.73_M2}
GLUCOSE BLD-MCNC: 198 MG/DL (ref 70–99)
GLUCOSE BLD-MCNC: 220 MG/DL (ref 70–99)
GLUCOSE BLD-MCNC: 259 MG/DL (ref 70–99)
GLUCOSE BLD-MCNC: 284 MG/DL (ref 70–99)
GLUCOSE BLD-MCNC: 416 MG/DL (ref 70–99)
GLUCOSE SERPL-MCNC: 282 MG/DL (ref 70–99)
HBA1C MFR BLD: 12 %
HCT VFR BLD AUTO: 34.6 % (ref 36–48)
HGB BLD-MCNC: 11.5 G/DL (ref 12–16)
LYMPHOCYTES # BLD: 5.8 K/UL (ref 1–5.1)
LYMPHOCYTES NFR BLD: 46 %
MCH RBC QN AUTO: 29 PG (ref 26–34)
MCHC RBC AUTO-ENTMCNC: 33.3 G/DL (ref 31–36)
MCV RBC AUTO: 87.1 FL (ref 80–100)
MONOCYTES # BLD: 0.9 K/UL (ref 0–1.3)
MONOCYTES NFR BLD: 7 %
NEUTROPHILS # BLD: 5.8 K/UL (ref 1.7–7.7)
NEUTROPHILS NFR BLD: 46 %
PATH INTERP BLD-IMP: NORMAL
PATH INTERP BLD-IMP: YES
PERFORMED ON: ABNORMAL
PLATELET # BLD AUTO: 169 K/UL (ref 135–450)
PLATELET BLD QL SMEAR: ADEQUATE
PMV BLD AUTO: 9.8 FL (ref 5–10.5)
POTASSIUM SERPL-SCNC: 3.9 MMOL/L (ref 3.5–5.1)
RBC # BLD AUTO: 3.97 M/UL (ref 4–5.2)
SLIDE REVIEW: ABNORMAL
SODIUM SERPL-SCNC: 132 MMOL/L (ref 136–145)
WBC # BLD AUTO: 12.6 K/UL (ref 4–11)

## 2023-12-13 PROCEDURE — 2580000003 HC RX 258: Performed by: INTERNAL MEDICINE

## 2023-12-13 PROCEDURE — 1200000000 HC SEMI PRIVATE

## 2023-12-13 PROCEDURE — 85025 COMPLETE CBC W/AUTO DIFF WBC: CPT

## 2023-12-13 PROCEDURE — 6370000000 HC RX 637 (ALT 250 FOR IP): Performed by: HOSPITALIST

## 2023-12-13 PROCEDURE — 2500000003 HC RX 250 WO HCPCS: Performed by: HOSPITALIST

## 2023-12-13 PROCEDURE — 80048 BASIC METABOLIC PNL TOTAL CA: CPT

## 2023-12-13 PROCEDURE — 6370000000 HC RX 637 (ALT 250 FOR IP): Performed by: PHYSICIAN ASSISTANT

## 2023-12-13 PROCEDURE — 85730 THROMBOPLASTIN TIME PARTIAL: CPT

## 2023-12-13 PROCEDURE — 6360000002 HC RX W HCPCS: Performed by: HOSPITALIST

## 2023-12-13 PROCEDURE — 6370000000 HC RX 637 (ALT 250 FOR IP): Performed by: NURSE PRACTITIONER

## 2023-12-13 PROCEDURE — 2580000003 HC RX 258: Performed by: HOSPITALIST

## 2023-12-13 RX ORDER — INSULIN LISPRO 100 [IU]/ML
0-4 INJECTION, SOLUTION INTRAVENOUS; SUBCUTANEOUS NIGHTLY
Status: DISCONTINUED | OUTPATIENT
Start: 2023-12-13 | End: 2023-12-14 | Stop reason: DRUGHIGH

## 2023-12-13 RX ORDER — SODIUM CHLORIDE 0.9 % (FLUSH) 0.9 %
5-40 SYRINGE (ML) INJECTION EVERY 12 HOURS SCHEDULED
Status: DISCONTINUED | OUTPATIENT
Start: 2023-12-13 | End: 2023-12-15 | Stop reason: SDUPTHER

## 2023-12-13 RX ORDER — SODIUM CHLORIDE 9 MG/ML
INJECTION, SOLUTION INTRAVENOUS PRN
Status: DISCONTINUED | OUTPATIENT
Start: 2023-12-13 | End: 2023-12-16 | Stop reason: HOSPADM

## 2023-12-13 RX ORDER — SODIUM CHLORIDE 0.9 % (FLUSH) 0.9 %
5-40 SYRINGE (ML) INJECTION PRN
Status: DISCONTINUED | OUTPATIENT
Start: 2023-12-13 | End: 2023-12-15 | Stop reason: SDUPTHER

## 2023-12-13 RX ORDER — INSULIN LISPRO 100 [IU]/ML
0-8 INJECTION, SOLUTION INTRAVENOUS; SUBCUTANEOUS
Status: DISCONTINUED | OUTPATIENT
Start: 2023-12-13 | End: 2023-12-14 | Stop reason: DRUGHIGH

## 2023-12-13 RX ADMIN — NITROGLYCERIN 0.5 INCH: 20 OINTMENT TOPICAL at 17:53

## 2023-12-13 RX ADMIN — HEPARIN SODIUM 8 UNITS/KG/HR: 10000 INJECTION, SOLUTION INTRAVENOUS at 14:34

## 2023-12-13 RX ADMIN — INSULIN LISPRO 4 UNITS: 100 INJECTION, SOLUTION INTRAVENOUS; SUBCUTANEOUS at 16:38

## 2023-12-13 RX ADMIN — HEPARIN SODIUM 8 UNITS/KG/HR: 10000 INJECTION, SOLUTION INTRAVENOUS at 13:16

## 2023-12-13 RX ADMIN — SIMETHICONE 80 MG: 80 TABLET, CHEWABLE ORAL at 09:33

## 2023-12-13 RX ADMIN — FAMOTIDINE 40 MG: 20 TABLET ORAL at 09:33

## 2023-12-13 RX ADMIN — SIMETHICONE 80 MG: 80 TABLET, CHEWABLE ORAL at 16:16

## 2023-12-13 RX ADMIN — FUROSEMIDE 20 MG: 20 TABLET ORAL at 09:33

## 2023-12-13 RX ADMIN — NITROGLYCERIN 0.5 INCH: 20 OINTMENT TOPICAL at 14:27

## 2023-12-13 RX ADMIN — INSULIN LISPRO 4 UNITS: 100 INJECTION, SOLUTION INTRAVENOUS; SUBCUTANEOUS at 20:34

## 2023-12-13 RX ADMIN — MICONAZOLE NITRATE: 20 POWDER TOPICAL at 23:21

## 2023-12-13 RX ADMIN — HEPARIN SODIUM 4000 UNITS: 1000 INJECTION INTRAVENOUS; SUBCUTANEOUS at 17:47

## 2023-12-13 RX ADMIN — INSULIN GLARGINE 34 UNITS: 100 INJECTION, SOLUTION SUBCUTANEOUS at 20:35

## 2023-12-13 RX ADMIN — MICONAZOLE NITRATE: 20 POWDER TOPICAL at 14:35

## 2023-12-13 RX ADMIN — Medication 10 ML: at 09:34

## 2023-12-13 RX ADMIN — MORPHINE SULFATE 2 MG: 2 INJECTION, SOLUTION INTRAMUSCULAR; INTRAVENOUS at 04:03

## 2023-12-13 RX ADMIN — NITROGLYCERIN 0.5 INCH: 20 OINTMENT TOPICAL at 00:40

## 2023-12-13 RX ADMIN — FAMOTIDINE 40 MG: 20 TABLET ORAL at 20:35

## 2023-12-13 RX ADMIN — Medication 10 ML: at 23:21

## 2023-12-13 RX ADMIN — Medication 10 ML: at 20:35

## 2023-12-13 RX ADMIN — ACETAMINOPHEN 650 MG: 325 TABLET ORAL at 22:20

## 2023-12-13 ASSESSMENT — PAIN DESCRIPTION - DESCRIPTORS
DESCRIPTORS: ACHING
DESCRIPTORS: ACHING

## 2023-12-13 ASSESSMENT — PAIN DESCRIPTION - LOCATION
LOCATION: GENERALIZED
LOCATION: GENERALIZED

## 2023-12-13 ASSESSMENT — PAIN SCALES - GENERAL
PAINLEVEL_OUTOF10: 5
PAINLEVEL_OUTOF10: 3

## 2023-12-13 NOTE — PLAN OF CARE
Problem: ABCDS Injury Assessment  Goal: Absence of physical injury  12/13/2023 0755 by KRISHNA Gutierrez CNP  Outcome: Progressing  12/12/2023 2305 by Nathalia Wayne RN  Outcome: Progressing     Problem: Pain  Goal: Verbalizes/displays adequate comfort level or baseline comfort level  12/13/2023 0755 by KRISHNA Gutierrez CNP  Outcome: Progressing  12/12/2023 2305 by Nathalia Wayne RN  Outcome: Progressing

## 2023-12-13 NOTE — PLAN OF CARE
Problem: ABCDS Injury Assessment  Goal: Absence of physical injury  12/12/2023 2305 by Janee Jacob RN  Outcome: Progressing  12/12/2023 1747 by KRISHNA Hensley - CNP  Outcome: Progressing     Problem: Pain  Goal: Verbalizes/displays adequate comfort level or baseline comfort level  Outcome: Progressing

## 2023-12-13 NOTE — PROGRESS NOTES
0-4 Units SubCUTAneous Nightly    nitroglycerin  0.5 inch Topical 4 times per day    famotidine  40 mg Oral BID    furosemide  20 mg Oral Daily    sodium chloride flush  5-40 mL IntraVENous 2 times per day    insulin glargine  34 Units SubCUTAneous Nightly      Infusions:    heparin (PORCINE) Infusion 14 Units/kg/hr (12/13/23 0628)    dextrose      sodium chloride       PRN Meds: heparin (porcine), 4,000 Units, PRN  heparin (porcine), 2,000 Units, PRN  ketotifen fumarate, 1 drop, BID PRN  dextrose bolus, 125 mL, PRN   Or  dextrose bolus, 250 mL, PRN  glucagon (rDNA), 1 mg, PRN  dextrose, , Continuous PRN  sodium chloride flush, 5-40 mL, PRN  sodium chloride, , PRN  potassium chloride, 40 mEq, PRN   Or  potassium alternative oral replacement, 40 mEq, PRN   Or  potassium chloride, 10 mEq, PRN  magnesium sulfate, 2,000 mg, PRN  [Held by provider] ondansetron, 4 mg, Q8H PRN   Or  [Held by provider] ondansetron, 4 mg, Q6H PRN  polyethylene glycol, 17 g, Daily PRN  acetaminophen, 650 mg, Q6H PRN   Or  acetaminophen, 650 mg, Q6H PRN  morphine, 2 mg, Q4H PRN  simethicone, 80 mg, 4x Daily PRN        Labs and Imaging   XR CHEST PORTABLE    Result Date: 12/12/2023  EXAMINATION: ONE XRAY VIEW OF THE CHEST 12/12/2023 2:05 pm COMPARISON: 05/26/2021 HISTORY: ORDERING SYSTEM PROVIDED HISTORY: SOB TECHNOLOGIST PROVIDED HISTORY: Reason for exam:->SOB Reason for Exam: SOB FINDINGS: The lungs appear clear. There are no pulmonary infiltrates, masses or pneumothorax. There is borderline cardiomegaly. Pacer leads are in good position. Bony structures appear normal.     Lungs appear clear. Borderline cardiomegaly.        CBC:   Recent Labs     12/12/23  1426 12/12/23  1544 12/13/23  0355   WBC 11.3* 12.7* 12.6*   HGB 14.0 13.8 11.5*   PLT see below 187 169     BMP:    Recent Labs     12/12/23  1426 12/13/23  0355    132*   K 4.8 3.9   CL 98* 98*   CO2 27 26   BUN 18 19   CREATININE 0.8 0.8   GLUCOSE 328* 282*     Hepatic: Recent Labs     12/12/23  1426   AST 57*   ALT 50*   BILITOT 0.5   ALKPHOS 15*     Lipids:   Lab Results   Component Value Date/Time    CHOL 254 06/21/2023 12:17 PM    HDL 48 06/21/2023 12:17 PM    HDL 36 06/18/2010 06:56 PM    TRIG 136 06/21/2023 12:17 PM     Hemoglobin A1C:   Lab Results   Component Value Date/Time    LABA1C 8.1 03/16/2023 09:37 AM     TSH:   Lab Results   Component Value Date/Time    TSH 1.40 06/22/2020 10:06 AM     Troponin: No results found for: \"TROPONINT\"  Lactic Acid: No results for input(s): \"LACTA\" in the last 72 hours. BNP:   Recent Labs     12/12/23  1426   PROBNP 440*     UA:  Lab Results   Component Value Date/Time    NITRU Negative 05/26/2021 05:42 PM    COLORU YELLOW 05/26/2021 05:42 PM    PHUR 5.5 05/26/2021 05:42 PM    LABCAST 0-1 Hyaline 09/14/2013 02:15 PM    WBCUA 239 05/26/2021 05:42 PM    RBCUA 3-4 05/26/2021 05:42 PM    YEAST Present 09/14/2013 02:15 PM    BACTERIA 1+ 05/26/2021 05:42 PM    CLARITYU TURBID 05/26/2021 05:42 PM    SPECGRAV 1.021 05/26/2021 05:42 PM    LEUKOCYTESUR LARGE 05/26/2021 05:42 PM    UROBILINOGEN 0.2 05/26/2021 05:42 PM    BILIRUBINUR Negative 05/26/2021 05:42 PM    BILIRUBINUR small 02/27/2018 11:56 AM    BLOODU Negative 05/26/2021 05:42 PM    GLUCOSEU Negative 05/26/2021 05:42 PM    KETUA TRACE 05/26/2021 05:42 PM     Urine Cultures:   Lab Results   Component Value Date/Time    LABURIN  05/26/2021 05:30 PM     <10,000 CFU/ml mixed skin/urogenital ramo.  No further workup     Blood Cultures: No results found for: \"BC\"  No results found for: \"BLOODCULT2\"  Organism: No results found for: \"ORG\"      Electronically signed by Mark Teixeira MD on 12/13/2023 at 9:02 AM

## 2023-12-13 NOTE — CARE COORDINATION
Chart reviewed at this time for discharge planning. Pt from home. Has [employer]. Pt has PCP. Cardiology consult pending. Cm will follow for plan and discharge needs.        Javad Cornejo RN, BSN  [phone]

## 2023-12-14 LAB
ANION GAP SERPL CALCULATED.3IONS-SCNC: 6 MMOL/L (ref 3–16)
APTT BLD: 68.3 SEC (ref 22.7–35.9)
BASE EXCESS BLDA CALC-SCNC: -0.2 MMOL/L (ref -3–3)
BASOPHILS # BLD: 0 K/UL (ref 0–0.2)
BASOPHILS NFR BLD: 0 %
BUN SERPL-MCNC: 16 MG/DL (ref 7–20)
CALCIUM SERPL-MCNC: 9.4 MG/DL (ref 8.3–10.6)
CHLORIDE SERPL-SCNC: 101 MMOL/L (ref 99–110)
CO2 BLDA-SCNC: 60.2 MMOL/L
CO2 SERPL-SCNC: 27 MMOL/L (ref 21–32)
COHGB MFR BLDA: 1.2 % (ref 0–1.5)
CREAT SERPL-MCNC: 0.7 MG/DL (ref 0.6–1.1)
DEPRECATED RDW RBC AUTO: 14.9 % (ref 12.4–15.4)
EOSINOPHIL # BLD: 0 K/UL (ref 0–0.6)
EOSINOPHIL NFR BLD: 0 %
GFR SERPLBLD CREATININE-BSD FMLA CKD-EPI: >60 ML/MIN/{1.73_M2}
GLUCOSE BLD-MCNC: 203 MG/DL (ref 70–99)
GLUCOSE BLD-MCNC: 235 MG/DL (ref 70–99)
GLUCOSE BLD-MCNC: 414 MG/DL (ref 70–99)
GLUCOSE BLD-MCNC: 452 MG/DL (ref 70–99)
GLUCOSE SERPL-MCNC: 217 MG/DL (ref 70–99)
HCO3 BLDA-SCNC: 25.5 MMOL/L (ref 21–29)
HCT VFR BLD AUTO: 36.1 % (ref 36–48)
HGB BLD-MCNC: 12.2 G/DL (ref 12–16)
HGB BLDA-MCNC: 12.2 G/DL (ref 12–16)
LYMPHOCYTES # BLD: 5 K/UL (ref 1–5.1)
LYMPHOCYTES NFR BLD: 35 %
MCH RBC QN AUTO: 29.5 PG (ref 26–34)
MCHC RBC AUTO-ENTMCNC: 33.9 G/DL (ref 31–36)
MCV RBC AUTO: 87.1 FL (ref 80–100)
METHGB MFR BLDA: 0.3 %
MONOCYTES # BLD: 0.9 K/UL (ref 0–1.3)
MONOCYTES NFR BLD: 7 %
NEUTROPHILS # BLD: 6.9 K/UL (ref 1.7–7.7)
NEUTROPHILS NFR BLD: 54 %
O2 THERAPY: ABNORMAL
PATH INTERP BLD-IMP: NO
PCO2 BLDA: 44.8 MMHG (ref 35–45)
PERFORMED ON: ABNORMAL
PH BLDA: 7.36 [PH] (ref 7.35–7.45)
PLATELET # BLD AUTO: 178 K/UL (ref 135–450)
PLATELET BLD QL SMEAR: ADEQUATE
PMV BLD AUTO: 9 FL (ref 5–10.5)
PO2 BLDA: 166 MMHG (ref 75–108)
POC ACT LR: 199 SEC
POTASSIUM SERPL-SCNC: 3.7 MMOL/L (ref 3.5–5.1)
RBC # BLD AUTO: 4.14 M/UL (ref 4–5.2)
RBC MORPH BLD: NORMAL
SAO2 % BLDA: 99.8 %
SLIDE REVIEW: ABNORMAL
SODIUM SERPL-SCNC: 134 MMOL/L (ref 136–145)
VARIANT LYMPHS NFR BLD MANUAL: 4 % (ref 0–6)
WBC # BLD AUTO: 12.8 K/UL (ref 4–11)

## 2023-12-14 PROCEDURE — 6360000002 HC RX W HCPCS

## 2023-12-14 PROCEDURE — 82803 BLOOD GASES ANY COMBINATION: CPT

## 2023-12-14 PROCEDURE — 2500000003 HC RX 250 WO HCPCS

## 2023-12-14 PROCEDURE — 93458 L HRT ARTERY/VENTRICLE ANGIO: CPT

## 2023-12-14 PROCEDURE — 85025 COMPLETE CBC W/AUTO DIFF WBC: CPT

## 2023-12-14 PROCEDURE — 85347 COAGULATION TIME ACTIVATED: CPT

## 2023-12-14 PROCEDURE — 93799 UNLISTED CV SVC/PROCEDURE: CPT

## 2023-12-14 PROCEDURE — 6370000000 HC RX 637 (ALT 250 FOR IP): Performed by: NURSE PRACTITIONER

## 2023-12-14 PROCEDURE — 1200000000 HC SEMI PRIVATE

## 2023-12-14 PROCEDURE — C1769 GUIDE WIRE: HCPCS

## 2023-12-14 PROCEDURE — 2709999900 HC NON-CHARGEABLE SUPPLY

## 2023-12-14 PROCEDURE — 6370000000 HC RX 637 (ALT 250 FOR IP): Performed by: HOSPITALIST

## 2023-12-14 PROCEDURE — 99153 MOD SED SAME PHYS/QHP EA: CPT

## 2023-12-14 PROCEDURE — C1887 CATHETER, GUIDING: HCPCS

## 2023-12-14 PROCEDURE — 85730 THROMBOPLASTIN TIME PARTIAL: CPT

## 2023-12-14 PROCEDURE — 92978 ENDOLUMINL IVUS OCT C 1ST: CPT

## 2023-12-14 PROCEDURE — 2580000003 HC RX 258

## 2023-12-14 PROCEDURE — 6360000004 HC RX CONTRAST MEDICATION: Performed by: STUDENT IN AN ORGANIZED HEALTH CARE EDUCATION/TRAINING PROGRAM

## 2023-12-14 PROCEDURE — 6360000002 HC RX W HCPCS: Performed by: HOSPITALIST

## 2023-12-14 PROCEDURE — 6370000000 HC RX 637 (ALT 250 FOR IP): Performed by: STUDENT IN AN ORGANIZED HEALTH CARE EDUCATION/TRAINING PROGRAM

## 2023-12-14 PROCEDURE — 99152 MOD SED SAME PHYS/QHP 5/>YRS: CPT

## 2023-12-14 PROCEDURE — C1894 INTRO/SHEATH, NON-LASER: HCPCS

## 2023-12-14 PROCEDURE — C1753 CATH, INTRAVAS ULTRASOUND: HCPCS

## 2023-12-14 PROCEDURE — 6370000000 HC RX 637 (ALT 250 FOR IP): Performed by: PHYSICIAN ASSISTANT

## 2023-12-14 PROCEDURE — 2580000003 HC RX 258: Performed by: STUDENT IN AN ORGANIZED HEALTH CARE EDUCATION/TRAINING PROGRAM

## 2023-12-14 PROCEDURE — 80048 BASIC METABOLIC PNL TOTAL CA: CPT

## 2023-12-14 RX ORDER — CLOPIDOGREL 300 MG/1
600 TABLET, FILM COATED ORAL ONCE
Status: COMPLETED | OUTPATIENT
Start: 2023-12-14 | End: 2023-12-14

## 2023-12-14 RX ORDER — SODIUM CHLORIDE 9 MG/ML
INJECTION, SOLUTION INTRAVENOUS PRN
Status: DISCONTINUED | OUTPATIENT
Start: 2023-12-14 | End: 2023-12-16 | Stop reason: HOSPADM

## 2023-12-14 RX ORDER — SODIUM CHLORIDE 0.9 % (FLUSH) 0.9 %
5-40 SYRINGE (ML) INJECTION PRN
Status: DISCONTINUED | OUTPATIENT
Start: 2023-12-14 | End: 2023-12-16 | Stop reason: HOSPADM

## 2023-12-14 RX ORDER — INSULIN LISPRO 100 [IU]/ML
4 INJECTION, SOLUTION INTRAVENOUS; SUBCUTANEOUS ONCE
Status: COMPLETED | OUTPATIENT
Start: 2023-12-14 | End: 2023-12-14

## 2023-12-14 RX ORDER — SODIUM CHLORIDE 0.9 % (FLUSH) 0.9 %
5-40 SYRINGE (ML) INJECTION EVERY 12 HOURS SCHEDULED
Status: DISCONTINUED | OUTPATIENT
Start: 2023-12-14 | End: 2023-12-16 | Stop reason: HOSPADM

## 2023-12-14 RX ORDER — INSULIN LISPRO 100 [IU]/ML
0-16 INJECTION, SOLUTION INTRAVENOUS; SUBCUTANEOUS
Status: DISCONTINUED | OUTPATIENT
Start: 2023-12-14 | End: 2023-12-16 | Stop reason: HOSPADM

## 2023-12-14 RX ORDER — INSULIN LISPRO 100 [IU]/ML
0-4 INJECTION, SOLUTION INTRAVENOUS; SUBCUTANEOUS NIGHTLY
Status: DISCONTINUED | OUTPATIENT
Start: 2023-12-14 | End: 2023-12-16 | Stop reason: HOSPADM

## 2023-12-14 RX ORDER — CETIRIZINE HYDROCHLORIDE 10 MG/1
10 TABLET ORAL DAILY
Status: DISCONTINUED | OUTPATIENT
Start: 2023-12-14 | End: 2023-12-16 | Stop reason: HOSPADM

## 2023-12-14 RX ORDER — CLOPIDOGREL BISULFATE 75 MG/1
75 TABLET ORAL DAILY
Status: DISCONTINUED | OUTPATIENT
Start: 2023-12-15 | End: 2023-12-16 | Stop reason: HOSPADM

## 2023-12-14 RX ORDER — ACETAMINOPHEN 325 MG/1
650 TABLET ORAL EVERY 4 HOURS PRN
Status: DISCONTINUED | OUTPATIENT
Start: 2023-12-14 | End: 2023-12-16 | Stop reason: HOSPADM

## 2023-12-14 RX ADMIN — FAMOTIDINE 40 MG: 20 TABLET ORAL at 12:08

## 2023-12-14 RX ADMIN — INSULIN LISPRO 4 UNITS: 100 INJECTION, SOLUTION INTRAVENOUS; SUBCUTANEOUS at 21:18

## 2023-12-14 RX ADMIN — Medication 10 ML: at 21:19

## 2023-12-14 RX ADMIN — NITROGLYCERIN 0.5 INCH: 20 OINTMENT TOPICAL at 23:38

## 2023-12-14 RX ADMIN — SIMETHICONE 80 MG: 80 TABLET, CHEWABLE ORAL at 12:08

## 2023-12-14 RX ADMIN — CETIRIZINE HYDROCHLORIDE 10 MG: 10 TABLET, FILM COATED ORAL at 12:08

## 2023-12-14 RX ADMIN — INSULIN GLARGINE 34 UNITS: 100 INJECTION, SOLUTION SUBCUTANEOUS at 21:18

## 2023-12-14 RX ADMIN — ACETAMINOPHEN 650 MG: 325 TABLET ORAL at 12:07

## 2023-12-14 RX ADMIN — SIMETHICONE 80 MG: 80 TABLET, CHEWABLE ORAL at 17:32

## 2023-12-14 RX ADMIN — INSULIN LISPRO 16 UNITS: 100 INJECTION, SOLUTION INTRAVENOUS; SUBCUTANEOUS at 17:00

## 2023-12-14 RX ADMIN — INSULIN LISPRO 4 UNITS: 100 INJECTION, SOLUTION INTRAVENOUS; SUBCUTANEOUS at 21:19

## 2023-12-14 RX ADMIN — MICONAZOLE NITRATE: 20 POWDER TOPICAL at 22:00

## 2023-12-14 RX ADMIN — POTASSIUM BICARBONATE 20 MEQ: 782 TABLET, EFFERVESCENT ORAL at 17:32

## 2023-12-14 RX ADMIN — MICONAZOLE NITRATE: 20 POWDER TOPICAL at 09:00

## 2023-12-14 RX ADMIN — NITROGLYCERIN 0.5 INCH: 20 OINTMENT TOPICAL at 17:35

## 2023-12-14 RX ADMIN — NITROGLYCERIN 0.5 INCH: 20 OINTMENT TOPICAL at 00:44

## 2023-12-14 RX ADMIN — IOPAMIDOL 58 ML: 755 INJECTION, SOLUTION INTRAVENOUS at 10:22

## 2023-12-14 RX ADMIN — CLOPIDOGREL BISULFATE 600 MG: 300 TABLET, FILM COATED ORAL at 17:34

## 2023-12-14 RX ADMIN — NITROGLYCERIN 0.5 INCH: 20 OINTMENT TOPICAL at 12:11

## 2023-12-14 RX ADMIN — Medication 5 ML: at 11:00

## 2023-12-14 RX ADMIN — FAMOTIDINE 40 MG: 20 TABLET ORAL at 21:18

## 2023-12-14 RX ADMIN — HEPARIN SODIUM 2000 UNITS: 1000 INJECTION INTRAVENOUS; SUBCUTANEOUS at 04:56

## 2023-12-14 ASSESSMENT — PAIN DESCRIPTION - DESCRIPTORS: DESCRIPTORS: ACHING

## 2023-12-14 ASSESSMENT — PAIN DESCRIPTION - LOCATION: LOCATION: BACK;CHEST

## 2023-12-14 ASSESSMENT — PAIN SCALES - GENERAL
PAINLEVEL_OUTOF10: 0
PAINLEVEL_OUTOF10: 5

## 2023-12-14 ASSESSMENT — PAIN DESCRIPTION - ORIENTATION: ORIENTATION: ANTERIOR

## 2023-12-14 NOTE — PROGRESS NOTES
INTERVENTIONAL UPDATE NOTE    Discussed case with patient and primary cardiologist Dr. Cecilia Kauffman for PCI of the LAD in AM with Dr. Annita Zhang  NPO after midnight  Plavix 600mg x1 now, 75mg daily in AM      Teresa Morgan MD

## 2023-12-14 NOTE — PROCEDURES
[facility]  Procedure Note      The risks, benefits, and details of the procedure were explained to the patient. The patient verbalized understanding and wanted to proceed. Informed written consent was obtained. INDICATION:  NSTEMI    PROCEDURES PERFORMED:   [facility]  Coronary angiogram    PROCEDURE TECHNIQUE:  The patient was approached from the right radial artery using a 5-6  English slender sheath. Left coronary angiography was done using a Gali L3.5 diagnostic catheter. Right coronary angiography was done using a Gali R4 guide catheter. Interventional details below     CONTRAST:  Total of 58 cc. COMPLICATIONS:  None. At the end of the procedure a radial hemostasis band device was used for hemostasis. EBL: <20 cc    Moderate Sedation:  Start time: 0900  Stop time: 0930  2 mg versed   100 mcg fentanyl   An independent trained observer pushed medications at my direction. We monitored the patient's level of consciousness and vital signs/physiologic status throughout the procedure duration (see start and start times above). HEMODYNAMICS:  Aortic pressure was 159/86;  LVEDP 8. There was no gradient between the left ventricle and aorta. ANGIOGRAM/CORONARY ARTERIOGRAM:       The left main coronary artery is mild fibrotic plaque by IVUS approximal 10% of vessel lumen, MLA > 10mm2. The left anterior descending artery has a proximal densely calcified 40% stenosis, followed by a mid vessel 80% stenosis which is focal.  DFR was performed and significant at 0.86. D1 is angiographically free of disease    The left circumflex artery is angiographically free of disease. OM1 is angiographically free of disease    The right coronary artery is dominant vessel without angiographically significant disease.    RPDA is angiographically free of disease    INTERVENTION  Guide catheter: XB 3.0 catheter  Lesion wired with a Comet II FFR wire which was DFR positive  IVUS performed with

## 2023-12-14 NOTE — PROGRESS NOTES
Paged hospitalist regarding patients elevated blood sugar. Order from hospitalist to recheck blood sugar at 0000.

## 2023-12-14 NOTE — TELEPHONE ENCOUNTER
GUANAKO  Patient rescheduled to Dr. Golden Boyce on 11-5-19 @ 12:30pm with 12:00pm arrival. Patient requested change so is in know of new appoinment. delmi wife:  try to change to nasal cannula:  delmi welsh as above:  dw acp delmi wife:  try to change to nasal cannula:  delmi welsh as above:  delmi acp : now pt is dnr and for palliative care:  doing very poorly : palliative care following:  delmi wife in detail at bedside delmi acp : doing very poorly today : family aware dw acp delmi acp : high risk for aspiration  : delmi wife at bedside:  try to decrease o2 : check xray today dw acp : dc planning dw acp as above:  delmi acp : now pt is dnr and for palliative care:  doing very poorly dw acp : and wife delmi acp : and wife  for dc today

## 2023-12-14 NOTE — PROGRESS NOTES
PATIENT HISTORY    ECHO: DATE: 5/26/2021       EF:  55-60%  STRESS TEST PREFORMED:  Yes FINDINGS:  Stress Nuclear: Date:  4/14/2014  Result: Negative     EF: 58%  EKG: Yes    ECG     Result: Normal  Pre CATH Rhythm:  Paced  HYPERTENSION: No  DYSLIPIDEMIA: Yes  FAMILY HX OF CAD: Yes  PRIOR MI: No  PRIOR PCI: No  PRIOR CABG: No  CEREBROVASCULAR DX: No  PERIPHERAL ARTERIAL DISEASE: No  CHRONIC LUNG DISEASE: No  TOBACCO: Never  DIABETIC: Yes, Insulin Treatment  CARDIAC ARREST: {No  DIALYSIS: No  HEART FAILURE: No  FRAILTY SCORE: 3 MANAGING WELL (medical problems are well controlled, not regularly active beyond routine walking)  CARDIAC CTA PREFORMED:  No  AGATSTON CORONARY CALCIUM SCORE:   Assessed: No  Prior Diagnostic Coronary Angioplasty Procedure:  Yes    Most Recent Date: 3/12/2019    Results: Non-obstructive

## 2023-12-15 DIAGNOSIS — I25.83 CORONARY ARTERY DISEASE DUE TO LIPID RICH PLAQUE: Primary | ICD-10-CM

## 2023-12-15 DIAGNOSIS — I25.10 CORONARY ARTERY DISEASE DUE TO LIPID RICH PLAQUE: Primary | ICD-10-CM

## 2023-12-15 LAB
ANION GAP SERPL CALCULATED.3IONS-SCNC: 9 MMOL/L (ref 3–16)
BASOPHILS # BLD: 0.1 K/UL (ref 0–0.2)
BASOPHILS NFR BLD: 0.5 %
BUN SERPL-MCNC: 19 MG/DL (ref 7–20)
CALCIUM SERPL-MCNC: 9.2 MG/DL (ref 8.3–10.6)
CHLORIDE SERPL-SCNC: 100 MMOL/L (ref 99–110)
CO2 SERPL-SCNC: 24 MMOL/L (ref 21–32)
CREAT SERPL-MCNC: 0.8 MG/DL (ref 0.6–1.1)
DEPRECATED RDW RBC AUTO: 14.9 % (ref 12.4–15.4)
EOSINOPHIL # BLD: 0 K/UL (ref 0–0.6)
EOSINOPHIL NFR BLD: 0.1 %
GFR SERPLBLD CREATININE-BSD FMLA CKD-EPI: >60 ML/MIN/{1.73_M2}
GLUCOSE BLD-MCNC: 124 MG/DL (ref 70–99)
GLUCOSE BLD-MCNC: 131 MG/DL (ref 70–99)
GLUCOSE BLD-MCNC: 255 MG/DL (ref 70–99)
GLUCOSE BLD-MCNC: 256 MG/DL (ref 70–99)
GLUCOSE SERPL-MCNC: 316 MG/DL (ref 70–99)
HCT VFR BLD AUTO: 34.3 % (ref 36–48)
HGB BLD-MCNC: 11.4 G/DL (ref 12–16)
LYMPHOCYTES # BLD: 2.2 K/UL (ref 1–5.1)
LYMPHOCYTES NFR BLD: 15.6 %
MCH RBC QN AUTO: 29.2 PG (ref 26–34)
MCHC RBC AUTO-ENTMCNC: 33.1 G/DL (ref 31–36)
MCV RBC AUTO: 88.2 FL (ref 80–100)
MONOCYTES # BLD: 1.1 K/UL (ref 0–1.3)
MONOCYTES NFR BLD: 7.9 %
NEUTROPHILS # BLD: 10.7 K/UL (ref 1.7–7.7)
NEUTROPHILS NFR BLD: 75.9 %
PERFORMED ON: ABNORMAL
PLATELET # BLD AUTO: 187 K/UL (ref 135–450)
PMV BLD AUTO: 9 FL (ref 5–10.5)
POC ACT LR: 162 SEC
POTASSIUM SERPL-SCNC: 4.4 MMOL/L (ref 3.5–5.1)
RBC # BLD AUTO: 3.89 M/UL (ref 4–5.2)
SODIUM SERPL-SCNC: 133 MMOL/L (ref 136–145)
WBC # BLD AUTO: 14.2 K/UL (ref 4–11)

## 2023-12-15 PROCEDURE — C1894 INTRO/SHEATH, NON-LASER: HCPCS

## 2023-12-15 PROCEDURE — 6370000000 HC RX 637 (ALT 250 FOR IP): Performed by: PHYSICIAN ASSISTANT

## 2023-12-15 PROCEDURE — 1200000000 HC SEMI PRIVATE

## 2023-12-15 PROCEDURE — 6370000000 HC RX 637 (ALT 250 FOR IP): Performed by: NURSE PRACTITIONER

## 2023-12-15 PROCEDURE — 6360000004 HC RX CONTRAST MEDICATION: Performed by: INTERNAL MEDICINE

## 2023-12-15 PROCEDURE — 6360000002 HC RX W HCPCS

## 2023-12-15 PROCEDURE — 2580000003 HC RX 258: Performed by: INTERNAL MEDICINE

## 2023-12-15 PROCEDURE — 80048 BASIC METABOLIC PNL TOTAL CA: CPT

## 2023-12-15 PROCEDURE — 6370000000 HC RX 637 (ALT 250 FOR IP): Performed by: STUDENT IN AN ORGANIZED HEALTH CARE EDUCATION/TRAINING PROGRAM

## 2023-12-15 PROCEDURE — 85347 COAGULATION TIME ACTIVATED: CPT

## 2023-12-15 PROCEDURE — 93005 ELECTROCARDIOGRAM TRACING: CPT | Performed by: INTERNAL MEDICINE

## 2023-12-15 PROCEDURE — C1874 STENT, COATED/COV W/DEL SYS: HCPCS

## 2023-12-15 PROCEDURE — 2500000003 HC RX 250 WO HCPCS

## 2023-12-15 PROCEDURE — 85025 COMPLETE CBC W/AUTO DIFF WBC: CPT

## 2023-12-15 PROCEDURE — C1769 GUIDE WIRE: HCPCS

## 2023-12-15 PROCEDURE — 6370000000 HC RX 637 (ALT 250 FOR IP): Performed by: INTERNAL MEDICINE

## 2023-12-15 PROCEDURE — 99153 MOD SED SAME PHYS/QHP EA: CPT

## 2023-12-15 PROCEDURE — C1887 CATHETER, GUIDING: HCPCS

## 2023-12-15 PROCEDURE — 2580000003 HC RX 258: Performed by: STUDENT IN AN ORGANIZED HEALTH CARE EDUCATION/TRAINING PROGRAM

## 2023-12-15 PROCEDURE — C1725 CATH, TRANSLUMIN NON-LASER: HCPCS

## 2023-12-15 PROCEDURE — 6370000000 HC RX 637 (ALT 250 FOR IP): Performed by: HOSPITALIST

## 2023-12-15 PROCEDURE — 99152 MOD SED SAME PHYS/QHP 5/>YRS: CPT

## 2023-12-15 PROCEDURE — 92928 PRQ TCAT PLMT NTRAC ST 1 LES: CPT

## 2023-12-15 RX ORDER — SODIUM CHLORIDE 9 MG/ML
INJECTION, SOLUTION INTRAVENOUS PRN
Status: DISCONTINUED | OUTPATIENT
Start: 2023-12-15 | End: 2023-12-16 | Stop reason: HOSPADM

## 2023-12-15 RX ORDER — ASPIRIN 81 MG/1
81 TABLET, CHEWABLE ORAL DAILY
Status: DISCONTINUED | OUTPATIENT
Start: 2023-12-16 | End: 2023-12-15 | Stop reason: SDUPTHER

## 2023-12-15 RX ORDER — ASPIRIN 325 MG
325 TABLET ORAL ONCE
Status: COMPLETED | OUTPATIENT
Start: 2023-12-15 | End: 2023-12-15

## 2023-12-15 RX ORDER — ONDANSETRON 2 MG/ML
4 INJECTION INTRAMUSCULAR; INTRAVENOUS EVERY 6 HOURS PRN
Status: DISCONTINUED | OUTPATIENT
Start: 2023-12-15 | End: 2023-12-15

## 2023-12-15 RX ORDER — CLOPIDOGREL BISULFATE 75 MG/1
75 TABLET ORAL DAILY
Status: DISCONTINUED | OUTPATIENT
Start: 2023-12-16 | End: 2023-12-15 | Stop reason: SDUPTHER

## 2023-12-15 RX ORDER — ASPIRIN 81 MG/1
81 TABLET ORAL DAILY
Status: DISCONTINUED | OUTPATIENT
Start: 2023-12-15 | End: 2023-12-16 | Stop reason: HOSPADM

## 2023-12-15 RX ORDER — SODIUM CHLORIDE 0.9 % (FLUSH) 0.9 %
5-40 SYRINGE (ML) INJECTION PRN
Status: DISCONTINUED | OUTPATIENT
Start: 2023-12-15 | End: 2023-12-16 | Stop reason: HOSPADM

## 2023-12-15 RX ORDER — SODIUM CHLORIDE 0.9 % (FLUSH) 0.9 %
5-40 SYRINGE (ML) INJECTION EVERY 12 HOURS SCHEDULED
Status: DISCONTINUED | OUTPATIENT
Start: 2023-12-15 | End: 2023-12-16 | Stop reason: HOSPADM

## 2023-12-15 RX ADMIN — FAMOTIDINE 40 MG: 20 TABLET ORAL at 08:59

## 2023-12-15 RX ADMIN — IOPAMIDOL 40 ML: 755 INJECTION, SOLUTION INTRAVENOUS at 15:47

## 2023-12-15 RX ADMIN — NITROGLYCERIN 0.5 INCH: 20 OINTMENT TOPICAL at 16:47

## 2023-12-15 RX ADMIN — Medication 10 ML: at 20:57

## 2023-12-15 RX ADMIN — INSULIN GLARGINE 34 UNITS: 100 INJECTION, SOLUTION SUBCUTANEOUS at 20:47

## 2023-12-15 RX ADMIN — MICONAZOLE NITRATE: 20 POWDER TOPICAL at 09:12

## 2023-12-15 RX ADMIN — SIMETHICONE 80 MG: 80 TABLET, CHEWABLE ORAL at 09:08

## 2023-12-15 RX ADMIN — NITROGLYCERIN 0.5 INCH: 20 OINTMENT TOPICAL at 06:19

## 2023-12-15 RX ADMIN — NITROGLYCERIN 0.5 INCH: 20 OINTMENT TOPICAL at 11:33

## 2023-12-15 RX ADMIN — CETIRIZINE HYDROCHLORIDE 10 MG: 10 TABLET, FILM COATED ORAL at 08:59

## 2023-12-15 RX ADMIN — INSULIN LISPRO 8 UNITS: 100 INJECTION, SOLUTION INTRAVENOUS; SUBCUTANEOUS at 09:08

## 2023-12-15 RX ADMIN — ACETAMINOPHEN 650 MG: 325 TABLET ORAL at 08:59

## 2023-12-15 RX ADMIN — ASPIRIN 81 MG: 81 TABLET, COATED ORAL at 16:47

## 2023-12-15 RX ADMIN — FAMOTIDINE 40 MG: 20 TABLET ORAL at 20:48

## 2023-12-15 RX ADMIN — ASPIRIN 325 MG: 325 TABLET ORAL at 08:59

## 2023-12-15 RX ADMIN — CLOPIDOGREL BISULFATE 75 MG: 75 TABLET ORAL at 09:00

## 2023-12-15 RX ADMIN — Medication 10 ML: at 20:58

## 2023-12-15 RX ADMIN — MICONAZOLE NITRATE: 20 POWDER TOPICAL at 20:57

## 2023-12-15 ASSESSMENT — PAIN DESCRIPTION - LOCATION: LOCATION: HIP;KNEE;BACK

## 2023-12-15 ASSESSMENT — PAIN DESCRIPTION - DESCRIPTORS: DESCRIPTORS: ACHING

## 2023-12-15 ASSESSMENT — PAIN SCALES - GENERAL
PAINLEVEL_OUTOF10: 7
PAINLEVEL_OUTOF10: 0
PAINLEVEL_OUTOF10: 0
PAINLEVEL_OUTOF10: 4

## 2023-12-15 ASSESSMENT — PAIN DESCRIPTION - PAIN TYPE: TYPE: CHRONIC PAIN

## 2023-12-15 NOTE — PROGRESS NOTES
Patient returned from [facility] to room 2917. Right TR band intact with 12ml of air, will start removing air after 1120. Call light within reach.

## 2023-12-15 NOTE — PROGRESS NOTES
Messaged Hospitalist about pt BS of 452.  Added extra one time dose of 4 units Humalog     Electronically signed by Jesse Sanon RN on 12/14/2023 at 9:50 PM

## 2023-12-15 NOTE — OP NOTE
[facility] Operative Note     PROCEDURE SUMMARY   Procedure PCI LAD   Indication UA   Consent Obtained   Access RRA   US US guidance used to determine artery patency, size (>2mm), anatomic variations and ideal puncture location. Real-time US utilized concurrent with vascular needle entry into artery. Image(s) permanently recorded and reported in chart. Bleed Risk Low   Sedation Minimal conscious sedation for comfort. Independent trained observer pushed medications at my direction. Level of consciousness and vital signs/physiologic status monitored throughout the procedure (see start and stop times above, as well as medication dosages). Start Time 1521   Stop Time 1545   Versed 2mg   Fentanyl 100mcg   Contrast 40cc   Flouro 4.5min   EBL <03FW   Complicat None   Specimens None   Procedure Detail Patient taken to cath lab in postabsorptive state, informed consent obtained.   RRA prepped and draped in normal sterile fashion  Micro needle used to access artery With US guidance  Micro wire advanced into artery and 5/6 Slender sheath advanced over wire   JL 3.5 advanced over wire to engage LM coronary artery and image in multiple views  JL 3.5 exchanged over a wire for JR4 to engage RCA and image in multiple views  JR4 removed over J wire  Arterial hemostasis obtained with Radial band     FINDINGS   Artery Findings   LM Normal   LAD Mid 70%     INTERVENTION(S)     Artery Location Intervention Result   LAD Mid Synergy 3.0X48 (PD 3.0NC)  *Synergy used for length to avoid 2 stents No Residual     POST CATH  RECOMMENDATIONS   Continued aggressive medical therapy and risk factor modification  Dual antiplatelet for >/= 6 months and ASA 81mg daily thereafter

## 2023-12-15 NOTE — PROGRESS NOTES
PATIENT HISTORY    ECHO: DATE: 5/26/21       EF: 55%  STRESS TEST PREFORMED:  Yes FINDINGS:  Stress Nuclear: Date:  4/14/14  Result: Negative     EF: 55%  EKG: Yes    ECG     Result: Normal  Pre CATH Rhythm:  paced  HYPERTENSION: Yes  DYSLIPIDEMIA: Yes  FAMILY HX OF CAD: Yes  PRIOR MI: No  PRIOR PCI: No  PRIOR CABG: No  CEREBROVASCULAR DX: No  PERIPHERAL ARTERIAL DISEASE: No  CHRONIC LUNG DISEASE: No  TOBACCO: Never  DIABETIC: yes insulin  CARDIAC ARREST: {No  DIALYSIS: No  HEART FAILURE: No  FRAILTY SCORE: 4 VULNERABLE (while not dependent on others for IADLs, symptoms limit activities)  CARDIAC CTA PREFORMED:  No  AGATSTON CORONARY CALCIUM SCORE:   Assessed: No  Prior Diagnostic Coronary Angioplasty Procedure:  Yes    Most Recent Date: 12/14/23    Results: Obstructive

## 2023-12-16 VITALS
DIASTOLIC BLOOD PRESSURE: 65 MMHG | HEIGHT: 62 IN | WEIGHT: 214.8 LBS | SYSTOLIC BLOOD PRESSURE: 138 MMHG | OXYGEN SATURATION: 98 % | TEMPERATURE: 97.5 F | HEART RATE: 76 BPM | BODY MASS INDEX: 39.53 KG/M2 | RESPIRATION RATE: 18 BRPM

## 2023-12-16 PROBLEM — R79.89 ELEVATED TROPONIN: Status: ACTIVE | Noted: 2023-12-16

## 2023-12-16 PROBLEM — R07.9 CHEST PAIN: Status: ACTIVE | Noted: 2023-12-16

## 2023-12-16 LAB
ANION GAP SERPL CALCULATED.3IONS-SCNC: 9 MMOL/L (ref 3–16)
BUN SERPL-MCNC: 20 MG/DL (ref 7–20)
CALCIUM SERPL-MCNC: 9.4 MG/DL (ref 8.3–10.6)
CHLORIDE SERPL-SCNC: 101 MMOL/L (ref 99–110)
CO2 SERPL-SCNC: 24 MMOL/L (ref 21–32)
CREAT SERPL-MCNC: 0.9 MG/DL (ref 0.6–1.1)
DEPRECATED RDW RBC AUTO: 15.1 % (ref 12.4–15.4)
EKG ATRIAL RATE: 68 BPM
EKG DIAGNOSIS: NORMAL
EKG P AXIS: 50 DEGREES
EKG P-R INTERVAL: 190 MS
EKG Q-T INTERVAL: 496 MS
EKG QRS DURATION: 178 MS
EKG QTC CALCULATION (BAZETT): 527 MS
EKG R AXIS: -5 DEGREES
EKG T AXIS: 142 DEGREES
EKG VENTRICULAR RATE: 68 BPM
GFR SERPLBLD CREATININE-BSD FMLA CKD-EPI: >60 ML/MIN/{1.73_M2}
GLUCOSE BLD-MCNC: 223 MG/DL (ref 70–99)
GLUCOSE BLD-MCNC: 292 MG/DL (ref 70–99)
GLUCOSE SERPL-MCNC: 170 MG/DL (ref 70–99)
HCT VFR BLD AUTO: 35.7 % (ref 36–48)
HGB BLD-MCNC: 11.6 G/DL (ref 12–16)
MCH RBC QN AUTO: 28.9 PG (ref 26–34)
MCHC RBC AUTO-ENTMCNC: 32.5 G/DL (ref 31–36)
MCV RBC AUTO: 88.7 FL (ref 80–100)
PERFORMED ON: ABNORMAL
PERFORMED ON: ABNORMAL
PLATELET # BLD AUTO: 202 K/UL (ref 135–450)
PMV BLD AUTO: 9.1 FL (ref 5–10.5)
POTASSIUM SERPL-SCNC: 4.3 MMOL/L (ref 3.5–5.1)
RBC # BLD AUTO: 4.02 M/UL (ref 4–5.2)
SODIUM SERPL-SCNC: 134 MMOL/L (ref 136–145)
WBC # BLD AUTO: 13.2 K/UL (ref 4–11)

## 2023-12-16 PROCEDURE — 80048 BASIC METABOLIC PNL TOTAL CA: CPT

## 2023-12-16 PROCEDURE — 2580000003 HC RX 258: Performed by: INTERNAL MEDICINE

## 2023-12-16 PROCEDURE — 6370000000 HC RX 637 (ALT 250 FOR IP): Performed by: INTERNAL MEDICINE

## 2023-12-16 PROCEDURE — 6370000000 HC RX 637 (ALT 250 FOR IP): Performed by: HOSPITALIST

## 2023-12-16 PROCEDURE — C8929 TTE W OR WO FOL WCON,DOPPLER: HCPCS

## 2023-12-16 PROCEDURE — 6370000000 HC RX 637 (ALT 250 FOR IP): Performed by: NURSE PRACTITIONER

## 2023-12-16 PROCEDURE — 6370000000 HC RX 637 (ALT 250 FOR IP): Performed by: PHYSICIAN ASSISTANT

## 2023-12-16 PROCEDURE — 85027 COMPLETE CBC AUTOMATED: CPT

## 2023-12-16 PROCEDURE — 6370000000 HC RX 637 (ALT 250 FOR IP): Performed by: STUDENT IN AN ORGANIZED HEALTH CARE EDUCATION/TRAINING PROGRAM

## 2023-12-16 PROCEDURE — 6360000004 HC RX CONTRAST MEDICATION: Performed by: INTERNAL MEDICINE

## 2023-12-16 RX ORDER — FUROSEMIDE 20 MG/1
20 TABLET ORAL DAILY
Qty: 60 TABLET | Refills: 3 | Status: SHIPPED | OUTPATIENT
Start: 2023-12-17

## 2023-12-16 RX ORDER — SENNA AND DOCUSATE SODIUM 50; 8.6 MG/1; MG/1
2 TABLET, FILM COATED ORAL DAILY PRN
Status: DISCONTINUED | OUTPATIENT
Start: 2023-12-16 | End: 2023-12-16 | Stop reason: HOSPADM

## 2023-12-16 RX ORDER — ASPIRIN 81 MG/1
81 TABLET ORAL DAILY
Qty: 30 TABLET | Refills: 3 | Status: SHIPPED | OUTPATIENT
Start: 2023-12-17

## 2023-12-16 RX ORDER — CLOPIDOGREL BISULFATE 75 MG/1
75 TABLET ORAL DAILY
Qty: 30 TABLET | Refills: 3 | Status: SHIPPED | OUTPATIENT
Start: 2023-12-17

## 2023-12-16 RX ADMIN — NITROGLYCERIN 0.5 INCH: 20 OINTMENT TOPICAL at 06:30

## 2023-12-16 RX ADMIN — PERFLUTREN 1.5 ML: 6.52 INJECTION, SUSPENSION INTRAVENOUS at 10:13

## 2023-12-16 RX ADMIN — INSULIN LISPRO 4 UNITS: 100 INJECTION, SOLUTION INTRAVENOUS; SUBCUTANEOUS at 08:33

## 2023-12-16 RX ADMIN — FAMOTIDINE 40 MG: 20 TABLET ORAL at 08:33

## 2023-12-16 RX ADMIN — INSULIN LISPRO 5 UNITS: 100 INJECTION, SOLUTION INTRAVENOUS; SUBCUTANEOUS at 11:44

## 2023-12-16 RX ADMIN — CLOPIDOGREL BISULFATE 75 MG: 75 TABLET ORAL at 08:32

## 2023-12-16 RX ADMIN — NITROGLYCERIN 0.5 INCH: 20 OINTMENT TOPICAL at 00:41

## 2023-12-16 RX ADMIN — FUROSEMIDE 20 MG: 20 TABLET ORAL at 08:32

## 2023-12-16 RX ADMIN — Medication 10 ML: at 08:33

## 2023-12-16 RX ADMIN — MICONAZOLE NITRATE: 20 POWDER TOPICAL at 08:38

## 2023-12-16 RX ADMIN — ACETAMINOPHEN 650 MG: 325 TABLET ORAL at 04:32

## 2023-12-16 RX ADMIN — SIMETHICONE 80 MG: 80 TABLET, CHEWABLE ORAL at 08:38

## 2023-12-16 RX ADMIN — STANDARDIZED SENNA CONCENTRATE AND DOCUSATE SODIUM 2 TABLET: 8.6; 5 TABLET ORAL at 11:44

## 2023-12-16 RX ADMIN — ASPIRIN 81 MG: 81 TABLET, COATED ORAL at 08:32

## 2023-12-16 RX ADMIN — CETIRIZINE HYDROCHLORIDE 10 MG: 10 TABLET, FILM COATED ORAL at 08:32

## 2023-12-16 ASSESSMENT — PAIN DESCRIPTION - DESCRIPTORS: DESCRIPTORS: ACHING

## 2023-12-16 ASSESSMENT — PAIN SCALES - GENERAL
PAINLEVEL_OUTOF10: 3
PAINLEVEL_OUTOF10: 0

## 2023-12-16 ASSESSMENT — PAIN DESCRIPTION - ORIENTATION: ORIENTATION: LOWER;MID

## 2023-12-16 ASSESSMENT — PAIN DESCRIPTION - LOCATION: LOCATION: BACK

## 2023-12-16 NOTE — PLAN OF CARE
Problem: ABCDS Injury Assessment  Goal: Absence of physical injury  Outcome: Adequate for Discharge     Problem: Pain  Goal: Verbalizes/displays adequate comfort level or baseline comfort level  Outcome: Adequate for Discharge     Problem: Chronic Conditions and Co-morbidities  Goal: Patient's chronic conditions and co-morbidity symptoms are monitored and maintained or improved  Outcome: Adequate for Discharge

## 2023-12-16 NOTE — DISCHARGE INSTR - COC
Continuity of Care Form    Patient Name: Imelda Jasso   :  1965  MRN:  [id number]    Admit date:  2023  Discharge date:  2023    Code Status Order: Full Code   Advance Directives:     Admitting Physician:  Graciela Pena MD  PCP: Faby Short MD    Discharging Nurse: [facility] Unit/Room#: PGF-7934/3717-12  Discharging Unit Phone Number: [phone]    Emergency Contact:   Extended Emergency Contact Information  Primary Emergency Contact: Papa Carbajal   [facility] of [address] [place] Phone: [phone]  Relation: Brother/Sister  Secondary Emergency Contact: Jameel Fernandez  Address: [address] [place] [address] street           [address],  [address] [facility] of [address] [place] Phone: [phone]  Relation: Other Relative    Past Surgical History:  Past Surgical History:   Procedure Laterality Date    CARDIAC SURGERY      2 cardiac caths    CHOLECYSTECTOMY      COLONOSCOPY      COLONOSCOPY  2018    with polypectomies    DILATION AND CURETTAGE OF UTERUS      ENDOMETRIAL ABLATION      ENDOSCOPY, COLON, DIAGNOSTIC      ERCP  2010    with stent    ERCP  2014    ERCP WITH SPHINTER OF ODDI MANOMETERY    ESOPHAGEAL DILATATION  08/10/2021    ESOPHAGEAL DILATION Savana Shrestha performed by Radha Navarro MD at [address] N/A 2022    EGD DILATION ARTHUR performed by Radha Navarro MD at [address] N/A 2023    [address] [place] performed by Radha Navarro MD at [facility]  2023    [address] [place] performed by Radha Navarro MD at [facility]  10/05/2010    botox injection    EYE SURGERY      LASER FOR GLAUCOMA    LAPAROSCOPY      PACEMAKER INSERTION  05/10/2021    TONSILLECTOMY      UPPER GASTROINTESTINAL ENDOSCOPY      UPPER GASTROINTESTINAL ENDOSCOPY  2011    DILATATION, [address], 100 UNITS BOTOX

## 2023-12-16 NOTE — PROGRESS NOTES
[facility]   Cardiology Progress Note     Date: 12/15/2023  Admit Date: 12/12/2023     Reason for consultation:     Chief Complaint   Patient presents with    Chest Pain     Pt released from [address] on Sunday for cp states she had felt better, today the pain returned in her chest and shoulder       History of Present Illness: History obtained from patient and medical record. Katie Durbin is a 60 y.o. female presented with CP. CP substernal, pressure, radiating arm, associated sob, worse exertion. States present for last week or so but exacerbated by trying to  heavy object.  (per consult note)    Interval Hx:   C 12/15/23: s/p PCI of mid LAD with ROSALIA   Today, she is feeling ok. No chest pain or sob. Tele stable. Feeling tired today. Right radial procedure site c/d/I. No hematoma or bruising. Good pulses, color, warmth, and sensation to that extremity. Patient seen and examined. Clinical notes reviewed. Telemetry reviewed. No new complaints today. No major events overnight. Denies having chest pain, palpitations, shortness of breath, orthopnea/PND, cough, or dizziness at the time of this visit.       Past Medical History:  Past Medical History:   Diagnosis Date    Ankle fracture, left     Ankle fracture, right     Anxiety     Arthritis     Asthma     Bipolar depression ([address])     Bladder problem     Cervical disc herniation     COVID toes     Depression     Diabetes mellitus (HCC)     Lantus    Fatty liver disease, non-alcoholic     Fibromyalgia     Gastroparesis     Dr Gee Benedict    GERD (gastroesophageal reflux disease)     gastroporesis    Glaucoma     Dr Shelley Almeida    Headache     Hx of blood clots     DVT: left leg    Hyperlipidemia     elevated LFT on meds    IBS (irritable bowel syndrome)     Lumbar herniated disc     Nausea & vomiting     Nephrolithiasis 01/29/2019    Obesity (BMI 30-39.9) 03/11/2019    Partial Achilles tendon tear     Pneumonia     PONV (postoperative nausea

## 2023-12-16 NOTE — DISCHARGE INSTRUCTIONS
Post Angiogram/ Intervention Discharge Instructions      Do you have the help you need at home? Activity:  You may drive in [employer] unless instructed by your doctor   Resume normal activity in one week  Avoid lifting, pushing, or pulling more than 10 lbs. For one week. (A gallon of milk is 7 lbs)  Limit stair climbing to once a day for two weeks. You may walk at an easy pace on ground level. Plan rest periods during the day. Avoid tension and stress. Learn to manage your stress. Avoid work that increases muscle tension.        (straining with bowel movements, moving furniture)    Wound Care: You may shower, but no bathtubs, pools, or hot tubs for one week. Inspect area daily. Normal observations:  Soreness and tenderness that may last for one week, mild pink to red oozing from incision site for up to 24 hrs after discharge,    bruising that could last up to two weeks. Clean with soap and water. NO lotion or powder. Dry area thoroughly. Apply band    aide for 48 hrs. Nutrition:   Low fat, low salt diet (guidelines for Heart Healthy eating)  Limit caffeine to 1-2 cups per day (coffee, tea, chocolate, soda)  Eat high fiber to avoid constipation and straining during bowel movements (fresh veggies and fruit, whole grain)  Limit alcohol to two servings a day ( 8 oz beer, 1 oz liquor, 4 oz wine )  Drink at least 8 to 10 glasses of decaffeinated, non-alcoholic fluid for the next 24 hours to flush the x-ray dye used for your angiogram out of your body. Depression: It is not unusual to have feelings of anxiety, fear, or depression after your procedure. If you need help with these feelings, call your primary care physician. There are medications to help you and healing usually occurs sooner if you are not depressed. Cardiac Rehab Information Given : [phone]  Your physician has referred you to Cardiac Rehab. This is an important part of your recovery.    You have been given a brief

## 2023-12-17 ENCOUNTER — TELEPHONE (OUTPATIENT)
Dept: CARDIOLOGY CLINIC | Age: 58
End: 2023-12-17

## 2023-12-17 RX ORDER — LISINOPRIL 2.5 MG/1
2.5 TABLET ORAL DAILY
Qty: 30 TABLET | Refills: 3 | Status: SHIPPED | OUTPATIENT
Start: 2023-12-17

## 2023-12-17 RX ORDER — METOPROLOL SUCCINATE 25 MG/1
25 TABLET, EXTENDED RELEASE ORAL DAILY
Qty: 30 TABLET | Refills: 3 | Status: SHIPPED | OUTPATIENT
Start: 2023-12-17 | End: 2023-12-28

## 2023-12-17 NOTE — TELEPHONE ENCOUNTER
Echo completed yesterday with mildly reduced heart squeeze. Pt needs to be on guideline medical therapy. Start toprol 25mg daily and lisinopril 2.5mg daily. Needs to check home BP. Has close follow up in office 12/22. Attempted to call pt without answer.

## 2023-12-18 NOTE — TELEPHONE ENCOUNTER
I spoke with pt, she states that she cannot take BP meds because it causes her to pass out. She also states that she was suppose to be started on Lipitor but nothing has been sent to her pharmacy, she is requesting 20 mg. Please advise.

## 2023-12-21 RX ORDER — ATORVASTATIN CALCIUM 40 MG/1
40 TABLET, FILM COATED ORAL DAILY
Qty: 90 TABLET | Refills: 1 | Status: SHIPPED | OUTPATIENT
Start: 2023-12-21

## 2023-12-27 NOTE — TELEPHONE ENCOUNTER
Patient notified and verbalized understanding. Pt states that she did get lipitor and has started taking that.

## 2023-12-28 ENCOUNTER — OFFICE VISIT (OUTPATIENT)
Dept: CARDIOLOGY CLINIC | Age: 58
End: 2023-12-28
Payer: COMMERCIAL

## 2023-12-28 ENCOUNTER — HOSPITAL ENCOUNTER (OUTPATIENT)
Age: 58
Discharge: HOME | End: 2023-12-28
Payer: COMMERCIAL

## 2023-12-28 VITALS
HEIGHT: 62 IN | SYSTOLIC BLOOD PRESSURE: 160 MMHG | OXYGEN SATURATION: 96 % | DIASTOLIC BLOOD PRESSURE: 84 MMHG | BODY MASS INDEX: 39.93 KG/M2 | WEIGHT: 217 LBS | HEART RATE: 71 BPM | RESPIRATION RATE: 22 BRPM

## 2023-12-28 DIAGNOSIS — I25.10 CORONARY ARTERY DISEASE INVOLVING NATIVE CORONARY ARTERY OF NATIVE HEART WITHOUT ANGINA PECTORIS: ICD-10-CM

## 2023-12-28 DIAGNOSIS — I10 PRIMARY HYPERTENSION: ICD-10-CM

## 2023-12-28 DIAGNOSIS — I44.2 CHB (COMPLETE HEART BLOCK) (HCC): ICD-10-CM

## 2023-12-28 DIAGNOSIS — E78.2 MIXED HYPERLIPIDEMIA: ICD-10-CM

## 2023-12-28 DIAGNOSIS — I25.10 CORONARY ARTERY DISEASE INVOLVING NATIVE CORONARY ARTERY OF NATIVE HEART WITHOUT ANGINA PECTORIS: Primary | ICD-10-CM

## 2023-12-28 LAB
ALBUMIN SERPL-MCNC: 4.1 G/DL (ref 3.4–5)
ALBUMIN/GLOB SERPL: 1.3 {RATIO} (ref 1.1–2.2)
ALP SERPL-CCNC: 18 U/L (ref 40–129)
ALT SERPL-CCNC: 25 U/L (ref 10–40)
ANION GAP SERPL CALCULATED.3IONS-SCNC: 11 MMOL/L (ref 3–16)
AST SERPL-CCNC: 19 U/L (ref 15–37)
BILIRUB SERPL-MCNC: 0.3 MG/DL (ref 0–1)
BUN SERPL-MCNC: 20 MG/DL (ref 7–20)
CALCIUM SERPL-MCNC: 9.6 MG/DL (ref 8.3–10.6)
CHLORIDE SERPL-SCNC: 99 MMOL/L (ref 99–110)
CO2 SERPL-SCNC: 24 MMOL/L (ref 21–32)
CREAT SERPL-MCNC: 0.8 MG/DL (ref 0.6–1.1)
GFR SERPLBLD CREATININE-BSD FMLA CKD-EPI: >60 ML/MIN/{1.73_M2}
GLUCOSE SERPL-MCNC: 247 MG/DL (ref 70–99)
POTASSIUM SERPL-SCNC: 4.1 MMOL/L (ref 3.5–5.1)
PROT SERPL-MCNC: 7.2 G/DL (ref 6.4–8.2)
SODIUM SERPL-SCNC: 134 MMOL/L (ref 136–145)

## 2023-12-28 PROCEDURE — 80053 COMPREHEN METABOLIC PANEL: CPT

## 2023-12-28 PROCEDURE — 3079F DIAST BP 80-89 MM HG: CPT | Performed by: NURSE PRACTITIONER

## 2023-12-28 PROCEDURE — G8427 DOCREV CUR MEDS BY ELIG CLIN: HCPCS | Performed by: NURSE PRACTITIONER

## 2023-12-28 PROCEDURE — 99214 OFFICE O/P EST MOD 30 MIN: CPT | Performed by: NURSE PRACTITIONER

## 2023-12-28 PROCEDURE — 3074F SYST BP LT 130 MM HG: CPT | Performed by: NURSE PRACTITIONER

## 2023-12-28 PROCEDURE — G8484 FLU IMMUNIZE NO ADMIN: HCPCS | Performed by: NURSE PRACTITIONER

## 2023-12-28 PROCEDURE — 1036F TOBACCO NON-USER: CPT | Performed by: NURSE PRACTITIONER

## 2023-12-28 PROCEDURE — 3017F COLORECTAL CA SCREEN DOC REV: CPT | Performed by: NURSE PRACTITIONER

## 2023-12-28 PROCEDURE — 93000 ELECTROCARDIOGRAM COMPLETE: CPT | Performed by: NURSE PRACTITIONER

## 2023-12-28 PROCEDURE — 1111F DSCHRG MED/CURRENT MED MERGE: CPT | Performed by: NURSE PRACTITIONER

## 2023-12-28 PROCEDURE — G8417 CALC BMI ABV UP PARAM F/U: HCPCS | Performed by: NURSE PRACTITIONER

## 2023-12-28 PROCEDURE — 36415 COLL VENOUS BLD VENIPUNCTURE: CPT

## 2023-12-28 RX ORDER — METOPROLOL SUCCINATE 25 MG/1
25 TABLET, EXTENDED RELEASE ORAL DAILY
Qty: 30 TABLET | Refills: 3 | Status: SHIPPED | OUTPATIENT
Start: 2023-12-28

## 2023-12-28 RX ORDER — NYSTATIN 100000 [USP'U]/G
POWDER TOPICAL
Qty: 1 G | Refills: 0 | Status: SHIPPED | OUTPATIENT
Start: 2023-12-28

## 2023-12-28 NOTE — PATIENT INSTRUCTIONS
Labs today    Ok to take lasix as needed     Begin metoprolol succinate 25 mg once a day / at nighttime    Appt in four weeks    Ask Dr. GOEL about taking Eliquis now that you're on aspirin and plavix

## 2023-12-28 NOTE — PROGRESS NOTES
[facility]     Outpatient Follow Up Note    CHIEF COMPLAINT / HPI: Hospital Follow Up secondary to status post coronary angioplasty    Hospital record has been reviewed  Hospital Course progressed as follows per discharge summary:     Pt released from [facility] on Sunday for cp states she had felt better, today the pain returned in her chest and shoulder     Problem-based Hospital Course.  Non-STEMI   Underwent left heart cath with PCI of mild LAD with ROSALIA.  Patient will continue on aspirin Plavix statin on discharge.  Outpatient follow-up echocardiogram noted.  Hemodynamically stable at the time of discharge     Lanny Carbajal is 58 y.o. female who presents today for a routine follow up after a recent hospitalization related to the above mentioned issues. She recalls her BP shooting up, had chest pain with neck radiation and couldn't breath. She went to [facility]. A stress test was recommended > declined d/t hx of pacemaker  She returned to [facility] the following day: 193/94    Subjective:   Since the time of discharge, the patient admits their symptoms have improved.    She has a little discomfort to the side of her left breast. It last a few minutes and goes away rubbing it.  She gets a tightness in her mid chest with over exertion. It goes away with rest and not similar to her presenting CP.  There is SOB every once in awhile, ie long distances. She was diagnosed with sleep apnea but doesn't use a machine.   She gets swelling in her Rt foot and ankle controlled using prn lasix. Her home wt is down some to 210#.   The patient is not experiencing palpitations.   Her BP has been pretty good: ~ 150/70 at the high end and ~ 119/60    These symptoms are improving over the last many days.   With regard to medication therapy the patient has been compliant with prescribed regimen. They have tolerated therapy to date.     Past Medical History:   Diagnosis Date    Ankle fracture, left     Ankle

## 2023-12-29 ENCOUNTER — TELEPHONE (OUTPATIENT)
Dept: CARDIOLOGY CLINIC | Age: 58
End: 2023-12-29

## 2023-12-29 NOTE — TELEPHONE ENCOUNTER
Pt is calling to let NPTS know that   at [facility] wants her on the Eliquis 2.5 MG twice a day.

## 2024-01-08 NOTE — TELEPHONE ENCOUNTER
[facility]    Last ov:2023 NPST    Last labs:2023    Last EK2023    Last Refill:2023    Next appointment:2024 DCE

## 2024-01-09 RX ORDER — NYSTATIN 100000 [USP'U]/G
POWDER TOPICAL
Qty: 15 G | OUTPATIENT
Start: 2024-01-09

## 2024-01-12 ENCOUNTER — TELEPHONE (OUTPATIENT)
Dept: INTERNAL MEDICINE CLINIC | Age: 59
End: 2024-01-12

## 2024-01-12 NOTE — TELEPHONE ENCOUNTER
Echo  with [employer] calling stating pt is needing a rollator walker. Advised if pt has not discussed this in an appt she may need to be seen before ordering. Asking for an order sent to [facility], their phone # is [phone]. Asked for fax #, states they will not take order over fax that it has to be uploaded to [email or web address]

## 2024-01-15 PROBLEM — R79.89 ELEVATED TROPONIN: Status: RESOLVED | Noted: 2023-12-16 | Resolved: 2024-01-15

## 2024-01-15 NOTE — TELEPHONE ENCOUNTER
Appointment needed, patient only saw cardiology following her hospital discharge.  Unable to sign any home health or equipment orders without in office evaluation

## 2024-01-15 NOTE — TELEPHONE ENCOUNTER
Patient is asking if she can do a virtual hospital follow since she lives [place] a half away.

## 2024-01-15 NOTE — TELEPHONE ENCOUNTER
Visit needs to be in person since it is going to certify the need for medical equipment and will need physical evaluation

## 2024-01-15 NOTE — TELEPHONE ENCOUNTER
Called patient and left message informing she needs an appointment before any orders can be signed.

## 2024-01-16 ENCOUNTER — TELEPHONE (OUTPATIENT)
Dept: ENDOCRINOLOGY | Age: 59
End: 2024-01-16

## 2024-01-16 DIAGNOSIS — I70.1 RAS (RENAL ARTERY STENOSIS) (HCC): Primary | ICD-10-CM

## 2024-01-16 RX ORDER — INSULIN GLARGINE 100 [IU]/ML
INJECTION, SOLUTION SUBCUTANEOUS
Qty: 15 ML | Refills: 0 | Status: SHIPPED | OUTPATIENT
Start: 2024-01-16 | End: 2024-01-19 | Stop reason: SDUPTHER

## 2024-01-16 NOTE — TELEPHONE ENCOUNTER
Call from pt stating that she was admitted to the hospital in December 2023 for 2 heart attacks and then had a stint placed in her heart     Pt stated that during that time her BS was very high   Pt stated since being released from the hospital her BS has been between 200-300 very day. Pt stated that she has been using 40 units a day instead of the 22 units a day of her Lantus     Pt is wanting to know if Dr. Baez could send in a new script for Lantus solostar 100 unit with new directions to reflect the 40 units     Pt is using [facility] pharmacy on [facility]    Please advise   CB# [phone]

## 2024-01-16 NOTE — PROGRESS NOTES
Pt notified: DCE recommended a NE eval/treatment in regards to rafael ROMEL noted on CT abd/pelvis Dec '23.  No intervention needed from his standpoint    [facility]

## 2024-01-19 ENCOUNTER — TELEMEDICINE (OUTPATIENT)
Dept: ENDOCRINOLOGY | Age: 59
End: 2024-01-19
Payer: COMMERCIAL

## 2024-01-19 DIAGNOSIS — E11.65 UNCONTROLLED TYPE 2 DIABETES MELLITUS WITH HYPERGLYCEMIA (HCC): Primary | ICD-10-CM

## 2024-01-19 PROCEDURE — 3017F COLORECTAL CA SCREEN DOC REV: CPT | Performed by: INTERNAL MEDICINE

## 2024-01-19 PROCEDURE — 3046F HEMOGLOBIN A1C LEVEL >9.0%: CPT | Performed by: INTERNAL MEDICINE

## 2024-01-19 PROCEDURE — 99214 OFFICE O/P EST MOD 30 MIN: CPT | Performed by: INTERNAL MEDICINE

## 2024-01-19 PROCEDURE — 2022F DILAT RTA XM EVC RTNOPTHY: CPT | Performed by: INTERNAL MEDICINE

## 2024-01-19 PROCEDURE — G8427 DOCREV CUR MEDS BY ELIG CLIN: HCPCS | Performed by: INTERNAL MEDICINE

## 2024-01-19 RX ORDER — INSULIN GLARGINE 100 [IU]/ML
INJECTION, SOLUTION SUBCUTANEOUS
Qty: 30 ML | Refills: 2 | Status: SHIPPED | OUTPATIENT
Start: 2024-01-19

## 2024-01-19 RX ORDER — CALCIUM CITRATE/VITAMIN D3 200MG-6.25
TABLET ORAL
Qty: 50 STRIP | Refills: 3 | Status: SHIPPED | OUTPATIENT
Start: 2024-01-19

## 2024-01-19 RX ORDER — INSULIN LISPRO 100 [IU]/ML
INJECTION, SOLUTION INTRAVENOUS; SUBCUTANEOUS
Qty: 5 ADJUSTABLE DOSE PRE-FILLED PEN SYRINGE | Refills: 3 | Status: SHIPPED | OUTPATIENT
Start: 2024-01-19

## 2024-01-19 NOTE — PROGRESS NOTES
Seen as f/u patient for diabetes,    Lanny Carbajal, was evaluated through a synchronous (real-time) audio-video encounter. The patient (or guardian if applicable) is aware that this is a billable service, which includes applicable co-pays. This Virtual Visit was conducted with patient's (and/or legal guardian's) consent. Patient identification was verified, and a caregiver was present when appropriate.   The patient was located at home  Provider was located at facility  Patient had connection issue, completed through phone    Interim:    She had PCI  ACS    Neuropathy in hands    H/o thyroid nodule  Difficulty swallowing  No lows    Diagnosed with Type 2 diabetes mellitus   Known diabetic complications: none     Current diabetic medications   Metformin ER 500mg - not taking reports lows with it- off of it   lantus 42  unit daily    Intolerant to plain metformin    Last A1c 12%<-----8.1%<----9.1%<-----8.1% <---- 7.2%<------ 9.6 %<----8.2%<-----9<-----7.8%<------7.8%<------ 9.2%<-----8.8%<----- 10.3 on 7/17<------10.8 on 12/16<----- 9.1 on 8/16<-----11.4 on 6/16<-----8.8 on 4.15<---8.9 <---10.1    Prior visit with dietician: Yes   Current diet: on average, 1 meals per day + Snacks   Reports h/o gastroparesis  Current exercise: walking   Current monitoring regimen: home blood tests -1  times daily does in forearms    Report h/o pancreatitis, stent in pancreatic duct  Also h/o yeast infection    Has brought blood glucose log/meter: no  Home blood sugar records:fasting > 200  Any episodes of hypoglycemia? none    No Hx of CAD , PVD, CVA    Cath showed 20 % blockage    Hyperlipidemia:  on 3/16   12/16 Vit D 17.4    Ck nl LFT nl  Start lipitor 10mg  Vit D3 10,000 IU weekly-did not tolerate D2  5/17  Vit D 20   Taking lipitor every week  8/17   1/19   Vit D 25.9   on 6/20 6/23  not taking    Now on lipitor daily    Last eye exam 6/22  Last foot exam: 3/23  Last

## 2024-01-25 ENCOUNTER — OFFICE VISIT (OUTPATIENT)
Age: 59
End: 2024-01-25
Payer: COMMERCIAL

## 2024-01-25 VITALS
SYSTOLIC BLOOD PRESSURE: 136 MMHG | WEIGHT: 220.24 LBS | BODY MASS INDEX: 40.53 KG/M2 | HEART RATE: 70 BPM | OXYGEN SATURATION: 99 % | HEIGHT: 62 IN | DIASTOLIC BLOOD PRESSURE: 78 MMHG

## 2024-01-25 DIAGNOSIS — E78.2 MIXED HYPERLIPIDEMIA: ICD-10-CM

## 2024-01-25 DIAGNOSIS — I51.89 LEFT VENTRICULAR HYPOKINESIS: ICD-10-CM

## 2024-01-25 DIAGNOSIS — I25.10 CORONARY ARTERY DISEASE INVOLVING NATIVE CORONARY ARTERY OF NATIVE HEART WITHOUT ANGINA PECTORIS: Primary | ICD-10-CM

## 2024-01-25 DIAGNOSIS — I25.10 CORONARY ARTERY DISEASE INVOLVING NATIVE CORONARY ARTERY OF NATIVE HEART WITHOUT ANGINA PECTORIS: ICD-10-CM

## 2024-01-25 PROCEDURE — 99214 OFFICE O/P EST MOD 30 MIN: CPT | Performed by: INTERNAL MEDICINE

## 2024-01-25 PROCEDURE — 3017F COLORECTAL CA SCREEN DOC REV: CPT | Performed by: INTERNAL MEDICINE

## 2024-01-25 PROCEDURE — 1036F TOBACCO NON-USER: CPT | Performed by: INTERNAL MEDICINE

## 2024-01-25 PROCEDURE — G8484 FLU IMMUNIZE NO ADMIN: HCPCS | Performed by: INTERNAL MEDICINE

## 2024-01-25 PROCEDURE — G8417 CALC BMI ABV UP PARAM F/U: HCPCS | Performed by: INTERNAL MEDICINE

## 2024-01-25 PROCEDURE — G8427 DOCREV CUR MEDS BY ELIG CLIN: HCPCS | Performed by: INTERNAL MEDICINE

## 2024-01-25 RX ORDER — CLOPIDOGREL BISULFATE 75 MG/1
75 TABLET ORAL DAILY
Qty: 90 TABLET | Refills: 1 | Status: SHIPPED | OUTPATIENT
Start: 2024-01-25

## 2024-01-25 NOTE — PROGRESS NOTES
[facility]  H+P  Consult  OP Visit  FU Visit   CC/HPI   CC Followup visit for cardiac conditions detailed in assessment and plan below.   Intervention PCI   General Doing fair.  Concerned with occasional dizziness.  No cp, sob.  Mild LE edema occasionally.    Cardiac Sx -CP, -SOB, -syncope, -edema, -orthopnea, -pnd, -fatigue, +dizziness   HISTORY/ALLERGY/ROS   M/S/S/F Hx Reviewed in Epic and updated as appropriate   ALLERG Lamictal [lamotrigine], Macrodantin [nitrofurantoin macrocrystal], Penicillins, Shellfish-derived products, Sulfamethoxazole, Trimethoprim, Aspartame and phenylalanine, Bactrim, Biaxin [clarithromycin], Cephalexin, Ciprofloxacin, Hydrocodone, Hydrocodone-acetaminophen, Lansoprazole, Nexium [esomeprazole magnesium trihydrate], Other, Prilosec [omeprazole], Z-maría [azithromycin], Blueberry [vaccinium angustifolium], Monosodium glutamate, and Zmax [azithromycin dihydrate]   ROS Full ROS obtained and negative except as mentioned in HPI   MEDICATIONS   Cardiac medications reviewed in [facility] during visit.   PHYSICAL EXAM   Vitals Ht 1.575 m (5' 2.01\")   Wt 99.9 kg (220 lb 3.8 oz)   BMI 40.27 kg/m²    Gen Alert, coop, no distress Heart  RRR, no MRG   Lung CTA-B, unlabored, no DTP Extrem Edema -Grade 1 (2mm)      COMPLIANCE   Discussed and counseled on diet, exercise, weight loss, smoking, alcohol, drugs.  All questions answered.   CODING   SCI (66079) - 30-39 mins spent reviewing hx/tests/consults, performing exam, counseling/educating, ordering meds/tests/procedures, referring/communicating w/PCP/consultants, documenting in EMR, interpreting results, communicating medical information and plan with family.   SCRIBE ATTESTATION   Nurse I, Ese Irving RN, am scribing for and in the presence of Raffy Minaya MD. 1/25/2024   Doctor Ese Irving is working as scribe for and in presence of me and may have prepopulated components of note with my historical intellectual property (IP) under my

## 2024-01-26 LAB
ALBUMIN SERPL-MCNC: 4.2 G/DL (ref 3.4–5)
ALP SERPL-CCNC: 23 U/L (ref 40–129)
ALT SERPL-CCNC: 29 U/L (ref 10–40)
AST SERPL-CCNC: 24 U/L (ref 15–37)
BILIRUB DIRECT SERPL-MCNC: <0.2 MG/DL (ref 0–0.3)
BILIRUB INDIRECT SERPL-MCNC: ABNORMAL MG/DL (ref 0–1)
BILIRUB SERPL-MCNC: 0.4 MG/DL (ref 0–1)
CHOLEST SERPL-MCNC: 153 MG/DL (ref 0–199)
HDLC SERPL-MCNC: 35 MG/DL (ref 40–60)
LDL CHOLESTEROL CALCULATED: 82 MG/DL
PROT SERPL-MCNC: 6.9 G/DL (ref 6.4–8.2)
TRIGL SERPL-MCNC: 180 MG/DL (ref 0–150)
VLDLC SERPL CALC-MCNC: 36 MG/DL

## 2024-01-31 DIAGNOSIS — Z79.4 TYPE 2 DIABETES MELLITUS WITHOUT COMPLICATION, WITH LONG-TERM CURRENT USE OF INSULIN (HCC): ICD-10-CM

## 2024-01-31 DIAGNOSIS — E11.9 TYPE 2 DIABETES MELLITUS WITHOUT COMPLICATION, WITH LONG-TERM CURRENT USE OF INSULIN (HCC): ICD-10-CM

## 2024-01-31 NOTE — TELEPHONE ENCOUNTER
Patient called requesting a refill, pt stated she is injecting 3-4x a day. She stated that she needs a new script to reflect    Rx- Insulin Pen Needle (WazeTripOGER PEN NEEDLES) 32G X 4 MM    Pharmacy- [facility] [id number] - [place], [place] - [facility] [address]    LOV- 1/19/24 VV   NOV- no future appt     Please advise

## 2024-02-01 ENCOUNTER — TELEPHONE (OUTPATIENT)
Dept: CARDIOLOGY CLINIC | Age: 59
End: 2024-02-01

## 2024-02-01 RX ORDER — PEN NEEDLE, DIABETIC 32GX 5/32"
NEEDLE, DISPOSABLE MISCELLANEOUS
Qty: 200 EACH | Refills: 3 | Status: SHIPPED | OUTPATIENT
Start: 2024-02-01

## 2024-02-02 RX ORDER — INSULIN GLARGINE 100 [IU]/ML
INJECTION, SOLUTION SUBCUTANEOUS
Qty: 15 ML | Refills: 2 | Status: SHIPPED | OUTPATIENT
Start: 2024-02-02

## 2024-02-02 NOTE — TELEPHONE ENCOUNTER
Medication:   Requested Prescriptions     Pending Prescriptions Disp Refills    insulin glargine (LANTUS SOLOSTAR) 100 UNIT/ML injection pen [Pharmacy Med Name: LANTUS SOLOSTAR 100 UNIT/ML] 15 mL      Sig: INJECT 40 UNITS UNDER THE SKIN DAILY       Last Filled:      Patient Phone Number: [phone] (home)     Last appt: 1/19/2024   Next appt: Visit date not found    Last Labs DM:   Lab Results   Component Value Date/Time    LABA1C 12.0 12/12/2023 03:44 PM

## 2024-02-26 ENCOUNTER — TELEPHONE (OUTPATIENT)
Dept: ENDOCRINOLOGY | Age: 59
End: 2024-02-26

## 2024-02-26 NOTE — TELEPHONE ENCOUNTER
Call from pt stating that she received a refill for Lantus with directions of 22 units. Pt stated that she only received 3 pens   Pt stated that she is taking 50 units of Lantus.      Pt is wanting to know if Dr. Baez could send in a new script for Lantus with directions to Inject 50 units daily. Sent to [facility] pharmacy     Please advise

## 2024-02-27 ENCOUNTER — TELEPHONE (OUTPATIENT)
Dept: ENDOCRINOLOGY | Age: 59
End: 2024-02-27

## 2024-02-27 NOTE — TELEPHONE ENCOUNTER
Pt asking if her rx can get updated takes sugar 4-5 times a day      blood glucose test strips (TRUE METRIX BLOOD GLUCOSE TEST) strip       [facility] [id number] - [place], [place] - [address] - P [phone] - F [phone]  [address], [place] [address]  Phone: [phone]  Fax: [phone]

## 2024-02-28 RX ORDER — CALCIUM CITRATE/VITAMIN D3 200MG-6.25
TABLET ORAL
Qty: 150 STRIP | Refills: 3 | Status: SHIPPED | OUTPATIENT
Start: 2024-02-28

## 2024-02-28 RX ORDER — INSULIN GLARGINE 100 [IU]/ML
INJECTION, SOLUTION SUBCUTANEOUS
Qty: 15 ML | Refills: 2 | Status: SHIPPED | OUTPATIENT
Start: 2024-02-28

## 2024-03-05 ENCOUNTER — HOSPITAL ENCOUNTER (OUTPATIENT)
Dept: ULTRASOUND IMAGING | Age: 59
Discharge: HOME | End: 2024-03-05
Attending: INTERNAL MEDICINE
Payer: COMMERCIAL

## 2024-03-05 ENCOUNTER — HOSPITAL ENCOUNTER (OUTPATIENT)
Age: 59
Discharge: HOME | End: 2024-03-05
Attending: INTERNAL MEDICINE
Payer: COMMERCIAL

## 2024-03-05 DIAGNOSIS — N20.0 URIC ACID NEPHROLITHIASIS: ICD-10-CM

## 2024-03-05 LAB
25(OH)D3 SERPL-MCNC: 22 NG/ML
ALBUMIN SERPL-MCNC: 4.2 G/DL (ref 3.4–5)
ALBUMIN/GLOB SERPL: 1.4 {RATIO} (ref 1.1–2.2)
ALP SERPL-CCNC: 25 U/L (ref 40–129)
ALT SERPL-CCNC: 33 U/L (ref 10–40)
ANION GAP SERPL CALCULATED.3IONS-SCNC: 10 MMOL/L (ref 3–16)
BASOPHILS # BLD: 0.1 K/UL (ref 0–0.2)
BASOPHILS NFR BLD: 0.6 %
BILIRUB SERPL-MCNC: 0.4 MG/DL (ref 0–1)
BILIRUB UR QL STRIP.AUTO: NEGATIVE
BUN SERPL-MCNC: 18 MG/DL (ref 7–20)
CALCIUM SERPL-MCNC: 9.8 MG/DL (ref 8.3–10.6)
CHLORIDE SERPL-SCNC: 97 MMOL/L (ref 99–110)
CLARITY UR: CLEAR
CO2 SERPL-SCNC: 26 MMOL/L (ref 21–32)
COLOR UR: YELLOW
CREAT SERPL-MCNC: 0.9 MG/DL (ref 0.6–1.2)
CREAT UR-MCNC: 43.7 MG/DL (ref 28–259)
DEPRECATED RDW RBC AUTO: 15.1 % (ref 12.4–15.4)
EOSINOPHIL # BLD: 0.2 K/UL (ref 0–0.6)
EOSINOPHIL NFR BLD: 2.1 %
GFR SERPLBLD CREATININE-BSD FMLA CKD-EPI: >60 ML/MIN/{1.73_M2}
GLUCOSE SERPL-MCNC: 211 MG/DL (ref 70–99)
GLUCOSE UR STRIP.AUTO-MCNC: NEGATIVE MG/DL
HCT VFR BLD AUTO: 39.6 % (ref 36–48)
HGB BLD-MCNC: 13 G/DL (ref 12–16)
HGB UR QL STRIP.AUTO: NEGATIVE
KETONES UR STRIP.AUTO-MCNC: NEGATIVE MG/DL
LEUKOCYTE ESTERASE UR QL STRIP.AUTO: NEGATIVE
LYMPHOCYTES # BLD: 3.2 K/UL (ref 1–5.1)
LYMPHOCYTES NFR BLD: 30.8 %
MCH RBC QN AUTO: 28.3 PG (ref 26–34)
MCV RBC AUTO: 86.1 FL (ref 80–100)
MICROALBUMIN/CREAT UR: NORMAL MG/G (ref 0–30)
MONOCYTES # BLD: 0.6 K/UL (ref 0–1.3)
MONOCYTES NFR BLD: 6 %
NEUTROPHILS # BLD: 6.2 K/UL (ref 1.7–7.7)
NEUTROPHILS NFR BLD: 60.5 %
NITRITE UR QL STRIP.AUTO: NEGATIVE
PH UR STRIP.AUTO: 6 [PH] (ref 5–8)
PLATELET # BLD AUTO: 194 K/UL (ref 135–450)
PMV BLD AUTO: 9.6 FL (ref 5–10.5)
POTASSIUM SERPL-SCNC: 4.6 MMOL/L (ref 3.5–5.1)
PROT SERPL-MCNC: 7.3 G/DL (ref 6.4–8.2)
PROT UR STRIP.AUTO-MCNC: NEGATIVE MG/DL
PROT UR-MCNC: 5 MG/DL
PROT/CREAT UR-RTO: 0.1 MG/DL
PTH-INTACT SERPL-MCNC: 44.5 PG/ML (ref 14–72)
RBC # BLD AUTO: 4.6 M/UL (ref 4–5.2)
SODIUM SERPL-SCNC: 133 MMOL/L (ref 136–145)
SP GR UR STRIP.AUTO: <=1.005 (ref 1–1.03)
UA DIPSTICK W REFLEX MICRO PNL UR: NORMAL
URATE SERPL-MCNC: 3.6 MG/DL (ref 2.6–6)
URN SPEC COLLECT METH UR: NORMAL
UROBILINOGEN UR STRIP-ACNC: 0.2 E.U./DL

## 2024-03-05 PROCEDURE — 85025 COMPLETE CBC W/AUTO DIFF WBC: CPT

## 2024-03-05 PROCEDURE — 84550 ASSAY OF BLOOD/URIC ACID: CPT

## 2024-03-05 PROCEDURE — 76770 US EXAM ABDO BACK WALL COMP: CPT

## 2024-03-05 PROCEDURE — 82610 CYSTATIN C: CPT

## 2024-03-05 PROCEDURE — 36415 COLL VENOUS BLD VENIPUNCTURE: CPT

## 2024-03-05 PROCEDURE — 84155 ASSAY OF PROTEIN SERUM: CPT

## 2024-03-05 PROCEDURE — 82306 VITAMIN D 25 HYDROXY: CPT

## 2024-03-05 PROCEDURE — 80053 COMPREHEN METABOLIC PANEL: CPT

## 2024-03-05 PROCEDURE — 82570 ASSAY OF URINE CREATININE: CPT

## 2024-03-05 PROCEDURE — 84156 ASSAY OF PROTEIN URINE: CPT

## 2024-03-05 PROCEDURE — 84165 PROTEIN E-PHORESIS SERUM: CPT

## 2024-03-05 PROCEDURE — 82043 UR ALBUMIN QUANTITATIVE: CPT

## 2024-03-05 PROCEDURE — 81003 URINALYSIS AUTO W/O SCOPE: CPT

## 2024-03-05 PROCEDURE — 83970 ASSAY OF PARATHORMONE: CPT

## 2024-03-06 LAB
ALBUMIN SERPL ELPH-MCNC: 3.5 G/DL (ref 3.1–4.9)
ALPHA1 GLOB SERPL ELPH-MCNC: 0.3 G/DL (ref 0.2–0.4)
ALPHA2 GLOB SERPL ELPH-MCNC: 0.8 G/DL (ref 0.4–1.1)
B-GLOBULIN SERPL ELPH-MCNC: 1.6 G/DL (ref 0.9–1.6)
GAMMA GLOB SERPL ELPH-MCNC: 1.2 G/DL (ref 0.6–1.8)
SPE/IFE INTERPRETATION: NORMAL

## 2024-03-07 LAB
CYSTATIN C SERPL-MCNC: 1.3 MG/L (ref 0.5–1.2)
EGFR BY CYSTATIN C: 51 ML/MIN/BSA

## 2024-04-05 ENCOUNTER — TELEPHONE (OUTPATIENT)
Dept: ENDOCRINOLOGY | Age: 59
End: 2024-04-05

## 2024-04-05 RX ORDER — GLUCOSAM/CHON-MSM1/C/MANG/BOSW 500-416.6
TABLET ORAL
Qty: 100 EACH | Refills: 3 | Status: SHIPPED | OUTPATIENT
Start: 2024-04-05 | End: 2024-04-05 | Stop reason: SDUPTHER

## 2024-04-05 RX ORDER — GLUCOSAM/CHON-MSM1/C/MANG/BOSW 500-416.6
TABLET ORAL
Qty: 150 EACH | Refills: 3 | Status: SHIPPED | OUTPATIENT
Start: 2024-04-05 | End: 2024-04-05 | Stop reason: SDUPTHER

## 2024-04-05 RX ORDER — GLUCOSAM/CHON-MSM1/C/MANG/BOSW 500-416.6
TABLET ORAL
Qty: 150 EACH | Refills: 3 | Status: SHIPPED | OUTPATIENT
Start: 2024-04-05

## 2024-04-05 NOTE — TELEPHONE ENCOUNTER
Spoke to [facility] pharmacy, pt was there trying to refill her lancets, script is written for once a day, last note says to check twice a day, pt told pharmacy she is checking 5 times a day, pls advise and send new script if needed

## 2024-04-05 NOTE — TELEPHONE ENCOUNTER
Lancets sent.  Please advise patient we are scheduling months out. She is due to be seen every 3-4 months. Would recommend to schedule a visit for continuing care

## 2024-04-05 NOTE — TELEPHONE ENCOUNTER
Pt was scheduled VV back in January. Pt stated vv does not work and it was just a phone call. Pt was due to follow up in 6 weeks and did not. Pt called in for refills and I let her know we would have to schedule a follow up. She stated she was told she did not have to follow up in office or vv and she could just do a phone call for her appts. I let her know can not do that every time. We understand if technology does not always work but something would need to be scheduled. Pt declined scheduling follow up in office even though we had 2 openings this month and pt declined scheduling vv as she does not have service where she lives. Please advise.    Pt also needs refill of  TRUEplus Lancets 33G [facility]   New pharmacy  [facility] Address: [address], [place] [address]  Phone: [phone]

## 2024-04-15 NOTE — TELEPHONE ENCOUNTER
Medication Refill    Medication needing refilled:      PLEASE SEND TO NEW PHARMACY LOCATION LISTED BELOW  metoprolol succinate (TOPROL XL) 25 MG once daily    clopidogrel (PLAVIX) 75 MG once daily    atorvastatin (LIPITOR) 40 MG once daily  Dosage of the medication:    How are you taking this medication (QD, BID, TID, QID, PRN):   30 or 90 day supply called in: 90 day supply     When will you run out of your medication:    Which Pharmacy are we sending the medication to?:[facility] [id number] [place] [address] - P [phone] - F [phone]       Medication Question/Concern    What is the name of the medication you need to speak with someone about?    Dosage of the medication:    How are you taking this medication:    What issues/concerns are you having with this medication:      Medication Samples    Medication:    Dosage of the medication:    How are you taking this medication (QD, BID, TID, QID, PRN):     in the office or Mail to your home?

## 2024-04-16 RX ORDER — CLOPIDOGREL BISULFATE 75 MG/1
75 TABLET ORAL DAILY
Qty: 90 TABLET | Refills: 2 | Status: SHIPPED | OUTPATIENT
Start: 2024-04-16

## 2024-04-16 RX ORDER — ATORVASTATIN CALCIUM 40 MG/1
40 TABLET, FILM COATED ORAL DAILY
Qty: 90 TABLET | Refills: 3 | Status: SHIPPED | OUTPATIENT
Start: 2024-04-16

## 2024-04-16 RX ORDER — METOPROLOL SUCCINATE 25 MG/1
25 TABLET, EXTENDED RELEASE ORAL DAILY
Qty: 90 TABLET | Refills: 3 | Status: SHIPPED | OUTPATIENT
Start: 2024-04-16

## 2024-04-19 ENCOUNTER — TELEMEDICINE (OUTPATIENT)
Dept: ENDOCRINOLOGY | Age: 59
End: 2024-04-19
Payer: COMMERCIAL

## 2024-04-19 DIAGNOSIS — E11.9 TYPE 2 DIABETES MELLITUS WITHOUT COMPLICATION, WITH LONG-TERM CURRENT USE OF INSULIN (HCC): ICD-10-CM

## 2024-04-19 DIAGNOSIS — Z79.4 TYPE 2 DIABETES MELLITUS WITHOUT COMPLICATION, WITH LONG-TERM CURRENT USE OF INSULIN (HCC): ICD-10-CM

## 2024-04-19 PROCEDURE — G8427 DOCREV CUR MEDS BY ELIG CLIN: HCPCS | Performed by: INTERNAL MEDICINE

## 2024-04-19 PROCEDURE — 3046F HEMOGLOBIN A1C LEVEL >9.0%: CPT | Performed by: INTERNAL MEDICINE

## 2024-04-19 PROCEDURE — 99214 OFFICE O/P EST MOD 30 MIN: CPT | Performed by: INTERNAL MEDICINE

## 2024-04-19 PROCEDURE — 2022F DILAT RTA XM EVC RTNOPTHY: CPT | Performed by: INTERNAL MEDICINE

## 2024-04-19 PROCEDURE — 3017F COLORECTAL CA SCREEN DOC REV: CPT | Performed by: INTERNAL MEDICINE

## 2024-04-19 RX ORDER — GLUCOSAM/CHON-MSM1/C/MANG/BOSW 500-416.6
TABLET ORAL
Qty: 150 EACH | Refills: 3 | Status: SHIPPED | OUTPATIENT
Start: 2024-04-19

## 2024-04-19 RX ORDER — CALCIUM CITRATE/VITAMIN D3 200MG-6.25
TABLET ORAL
Qty: 150 STRIP | Refills: 3 | Status: SHIPPED | OUTPATIENT
Start: 2024-04-19

## 2024-04-19 RX ORDER — INSULIN GLARGINE 100 [IU]/ML
INJECTION, SOLUTION SUBCUTANEOUS
Qty: 15 ML | Refills: 2 | Status: SHIPPED | OUTPATIENT
Start: 2024-04-19

## 2024-04-19 RX ORDER — PEN NEEDLE, DIABETIC 32GX 5/32"
NEEDLE, DISPOSABLE MISCELLANEOUS
Qty: 200 EACH | Refills: 3 | Status: SHIPPED | OUTPATIENT
Start: 2024-04-19

## 2024-04-19 RX ORDER — INSULIN LISPRO 100 [IU]/ML
INJECTION, SOLUTION INTRAVENOUS; SUBCUTANEOUS
Qty: 5 ADJUSTABLE DOSE PRE-FILLED PEN SYRINGE | Refills: 3 | Status: SHIPPED | OUTPATIENT
Start: 2024-04-19

## 2024-04-19 NOTE — PROGRESS NOTES
size, repeat USG shows right 1.1cm nodule, FNA benign  3/22 USG Right 1.1cm smaller in size--> 8mm   Additional right 9mm---> 1.1cm no previous FNA  F/u in one year USG 2024  6.Fatty liver  7.Vitamin D deficiency: On 10,000IU weekly per PCP  8.Weight loss: normal TSH , advised see PCP  9.Hands Neuropathy: Reports h/o Carpel Tunnel, advised brace. Discuss with PCP if needs to see surgery    Plan:       lantus 54 units daily, advise to take every day   Self titrate, increase by two unit every 3 days if BG > 140   Humalog 6/6/6   combined scale SSI 2 for 50 > 150 AC TID   Advise to check blood sugar 2 times a day.She does check in the forearm, advise finger sticks, keep log   Call if post meal >200 , as may need prandial   Patient to send blood sugar log for titration.   Advise to exercise regularly but can not due to back pain, and OA. Advise to low simple carbohydrate and protein with each  meal diet.    Diabetes Care: recommended yearly eye exam, foot exam and urine microalbumin to   creatinine ratio.     -Hyperlipidemia, LDL goal is <100 mg/dl   -Daily ASA : 81mg  -Smoking status: Non smoker    F/u in 6 weeks

## 2024-04-25 DIAGNOSIS — E87.1 HYPOSMOLALITY SYNDROME: ICD-10-CM

## 2024-04-26 LAB
OSMOLALITY SERPL: 303 MOSM/KG (ref 280–301)
OSMOLALITY UR: 753 MOSM/KG (ref 390–1070)
SODIUM SERPL-SCNC: 137 MMOL/L (ref 136–145)
SODIUM UR-SCNC: 113 MMOL/L

## 2024-05-28 ENCOUNTER — TRANSCRIBE ORDERS (OUTPATIENT)
Dept: ADMINISTRATIVE | Age: 59
End: 2024-05-28

## 2024-05-28 DIAGNOSIS — N20.0 KIDNEY STONE: Primary | ICD-10-CM

## 2024-06-10 DIAGNOSIS — J30.89 NON-SEASONAL ALLERGIC RHINITIS, UNSPECIFIED TRIGGER: ICD-10-CM

## 2024-06-11 RX ORDER — LORATADINE 10 MG/1
10 TABLET ORAL DAILY
Qty: 30 TABLET | Refills: 5 | Status: SHIPPED | OUTPATIENT
Start: 2024-06-11

## 2024-06-11 NOTE — TELEPHONE ENCOUNTER
Last OV: 10/2/2023  Next OV: Visit date not found    Next appointment due: 4/2/2024    Last fill: 11/17/2023  Refills: 5

## 2024-06-24 ENCOUNTER — HOSPITAL ENCOUNTER (OUTPATIENT)
Age: 59
Discharge: HOME | End: 2024-06-24
Attending: INTERNAL MEDICINE
Payer: COMMERCIAL

## 2024-06-24 ENCOUNTER — HOSPITAL ENCOUNTER (OUTPATIENT)
Dept: CT IMAGING | Age: 59
Discharge: HOME | End: 2024-06-24
Attending: INTERNAL MEDICINE
Payer: COMMERCIAL

## 2024-06-24 DIAGNOSIS — N20.0 KIDNEY STONE: ICD-10-CM

## 2024-06-24 PROCEDURE — 74176 CT ABD & PELVIS W/O CONTRAST: CPT

## 2024-06-26 NOTE — PROGRESS NOTES
[facility]  H+P  Consult  OP Visit  FU Visit   CC/HPI   CC Followup visit for cardiac conditions detailed in assessment and plan below.   Intervention PCI, PPM   General Doing fair.  Reports 1-2 weeks of left axillary pain. Intermittent, lasts seconds to hours, with and without exertion, did not contact MD with symptoms.  5/10 cp currently.     Cardiac Sx -dizziness, -syncope, -edema, -orthopnea, -pnd, -fatigue, +CP, +SOB   HISTORY/ALLERGY/ROS   M/S/S/F Hx Reviewed in Epic and updated as appropriate   ALLERG Lamictal [lamotrigine], Macrodantin [nitrofurantoin macrocrystal], Penicillins, Shellfish-derived products, Sulfamethoxazole, Trimethoprim, Aspartame and phenylalanine, Bactrim, Biaxin [clarithromycin], Cephalexin, Ciprofloxacin, Hydrocodone, Hydrocodone-acetaminophen, Lansoprazole, Nexium [esomeprazole magnesium trihydrate], Other, Prilosec [omeprazole], Z-maría [azithromycin], Blueberry [vaccinium angustifolium], Monosodium glutamate, and Zmax [azithromycin dihydrate]   ROS Full ROS obtained and negative except as mentioned in HPI   MEDICATIONS   Cardiac medications reviewed in Epic during visit with patient.   PHYSICAL EXAM   Vitals /78 (Site: Right Upper Arm, Position: Sitting, Cuff Size: Large Adult)   Pulse 70   Ht 1.575 m (5' 2\")   Wt 99.8 kg (220 lb 0.3 oz)   SpO2 99%   BMI 40.24 kg/m²    Gen Alert, coop, no distress Heart  RRR, no MRG   Lung CTA-B, unlabored, no DTP Extrem Edema -Grade 0 (0mm)      COMPLIANCE   Discussed and counseled on diet, exercise, weight loss, smoking, alcohol, drugs.  All questions answered.   CODING   SCI (75084) - 30-39 mins spent reviewing hx/tests/consults, performing exam, counseling/educating, ordering meds/tests/procedures, referring/communicating w/PCP/consultants, documenting in EMR, interpreting results, communicating medical information and plan with family.   SCRIBE ATTESTATION   Nurse TIMOTHY, Ese Irving RN, am scribing for and in the presence of

## 2024-06-26 NOTE — PATIENT INSTRUCTIONS
Left Heart Cath Patient Pre-procedure Instructions    Do NOT eat or drink anything after midnight morning of procedure    MEDICATIONS:  Your medications have been reviewed. Please follow medication instructions below  It is okay to drink a sip of water with meds morning of procedure  Eliquis-do not take for 48 hours (2 days) prior to procedure  Diuretics- Hold diuretics (water pill) for comfort morning of procedure. Your diuretics to hold: Furosemide (Lasix)  Metformin/Insulin-Consider holding the night before and morning of procedure if your sugars might run low because of not eating any breakfast.    Transportation: Bring someone to drive you home.     Scheduling: Dasha LOPEZ is our full time procedure . She will call you to schedule your procedure.    Allergies: Do you have an allergy to dye or contrast? No.

## 2024-07-05 ENCOUNTER — APPOINTMENT (OUTPATIENT)
Age: 59
DRG: 191 | End: 2024-07-05
Payer: COMMERCIAL

## 2024-07-05 ENCOUNTER — HOSPITAL ENCOUNTER (OUTPATIENT)
Age: 59
DRG: 191 | End: 2024-07-05
Attending: INTERNAL MEDICINE
Payer: COMMERCIAL

## 2024-07-05 ENCOUNTER — HOSPITAL ENCOUNTER (INPATIENT)
Age: 59
LOS: 3 days | Discharge: NURSING, REHAB OR LONG-TERM CARE | DRG: 191 | End: 2024-07-08
Attending: EMERGENCY MEDICINE | Admitting: INTERNAL MEDICINE
Payer: COMMERCIAL

## 2024-07-05 ENCOUNTER — OFFICE VISIT (OUTPATIENT)
Age: 59
End: 2024-07-05
Payer: COMMERCIAL

## 2024-07-05 VITALS
HEIGHT: 62 IN | BODY MASS INDEX: 40.49 KG/M2 | HEART RATE: 70 BPM | OXYGEN SATURATION: 99 % | DIASTOLIC BLOOD PRESSURE: 78 MMHG | WEIGHT: 220.02 LBS | SYSTOLIC BLOOD PRESSURE: 120 MMHG

## 2024-07-05 VITALS — HEIGHT: 62 IN | WEIGHT: 220 LBS | BODY MASS INDEX: 40.48 KG/M2

## 2024-07-05 DIAGNOSIS — I20.0 UNSTABLE ANGINA (HCC): ICD-10-CM

## 2024-07-05 DIAGNOSIS — I20.89 ATYPICAL ANGINA (HCC): Primary | ICD-10-CM

## 2024-07-05 DIAGNOSIS — I51.9 LV DYSFUNCTION: ICD-10-CM

## 2024-07-05 DIAGNOSIS — I25.10 CORONARY ARTERY DISEASE DUE TO LIPID RICH PLAQUE: ICD-10-CM

## 2024-07-05 DIAGNOSIS — I51.89 LEFT VENTRICULAR HYPOKINESIS: ICD-10-CM

## 2024-07-05 DIAGNOSIS — E11.9 TYPE 2 DIABETES MELLITUS WITHOUT COMPLICATION, WITH LONG-TERM CURRENT USE OF INSULIN (HCC): Primary | ICD-10-CM

## 2024-07-05 DIAGNOSIS — I10 ESSENTIAL HYPERTENSION: ICD-10-CM

## 2024-07-05 DIAGNOSIS — Z79.4 TYPE 2 DIABETES MELLITUS WITHOUT COMPLICATION, WITH LONG-TERM CURRENT USE OF INSULIN (HCC): Primary | ICD-10-CM

## 2024-07-05 DIAGNOSIS — I51.9 LV DYSFUNCTION: Primary | ICD-10-CM

## 2024-07-05 DIAGNOSIS — R07.89 OTHER CHEST PAIN: ICD-10-CM

## 2024-07-05 DIAGNOSIS — I44.1 MOBITZ TYPE II ATRIOVENTRICULAR BLOCK: ICD-10-CM

## 2024-07-05 DIAGNOSIS — I25.83 CORONARY ARTERY DISEASE DUE TO LIPID RICH PLAQUE: ICD-10-CM

## 2024-07-05 DIAGNOSIS — R07.9 CHEST PAIN: ICD-10-CM

## 2024-07-05 DIAGNOSIS — I25.10 CORONARY ARTERY DISEASE INVOLVING NATIVE CORONARY ARTERY OF NATIVE HEART WITHOUT ANGINA PECTORIS: ICD-10-CM

## 2024-07-05 DIAGNOSIS — E78.00 HYPERCHOLESTEROLEMIA: ICD-10-CM

## 2024-07-05 DIAGNOSIS — R07.9 CHEST PAIN, UNSPECIFIED TYPE: ICD-10-CM

## 2024-07-05 LAB
ALBUMIN SERPL-MCNC: 3.9 G/DL (ref 3.4–5)
ALBUMIN SERPL-MCNC: NORMAL G/DL (ref 3.4–5)
ALBUMIN/GLOB SERPL: 1.4 {RATIO}
ALP SERPL-CCNC: 24 U/L (ref 40–129)
ALP SERPL-CCNC: NORMAL U/L (ref 40–129)
ALT SERPL-CCNC: 27 U/L (ref 10–40)
ALT SERPL-CCNC: NORMAL U/L (ref 10–40)
ANION GAP SERPL CALCULATED.3IONS-SCNC: 13 MMOL/L (ref 3–16)
ANION GAP SERPL CALCULATED.3IONS-SCNC: NORMAL MMOL/L (ref 3–16)
AST SERPL-CCNC: 29 U/L (ref 15–37)
AST SERPL-CCNC: NORMAL U/L (ref 15–37)
BASOPHILS # BLD: 0.05 K/UL (ref 0–0.2)
BASOPHILS NFR BLD: 1 %
BILIRUB SERPL-MCNC: 0.4 MG/DL (ref 0–1)
BILIRUB SERPL-MCNC: NORMAL MG/DL (ref 0–1)
BNP SERPL-MCNC: 150 PG/ML (ref 0–124)
BUN SERPL-MCNC: 20 MG/DL (ref 7–20)
BUN SERPL-MCNC: NORMAL MG/DL (ref 7–20)
CALCIUM SERPL-MCNC: 10 MG/DL (ref 8.3–10.6)
CALCIUM SERPL-MCNC: NORMAL MG/DL (ref 8.3–10.6)
CHLORIDE SERPL-SCNC: 98 MMOL/L (ref 99–110)
CHLORIDE SERPL-SCNC: NORMAL MMOL/L (ref 99–110)
CO2 SERPL-SCNC: 24 MMOL/L (ref 21–32)
CO2 SERPL-SCNC: NORMAL MMOL/L (ref 21–32)
CREAT SERPL-MCNC: 1 MG/DL (ref 0.6–1.2)
CREAT SERPL-MCNC: NORMAL MG/DL (ref 0.6–1.2)
ECHO AV AREA PEAK VELOCITY: 3.3 CM2
ECHO AV AREA VTI: 3.3 CM2
ECHO AV AREA/BSA PEAK VELOCITY: 1.7 CM2/M2
ECHO AV AREA/BSA VTI: 1.7 CM2/M2
ECHO AV MEAN GRADIENT: 2 MMHG
ECHO AV MEAN VELOCITY: 0.7 M/S
ECHO AV PEAK GRADIENT: 5 MMHG
ECHO AV PEAK VELOCITY: 1.1 M/S
ECHO AV VELOCITY RATIO: 0.91
ECHO AV VTI: 23.1 CM
ECHO BSA: 2.09 M2
ECHO EST RA PRESSURE: 3 MMHG
ECHO LA AREA 2C: 18.2 CM2
ECHO LA AREA 4C: 17.2 CM2
ECHO LA MAJOR AXIS: 5.1 CM
ECHO LA MINOR AXIS: 4.8 CM
ECHO LA VOL BP: 54 ML (ref 22–52)
ECHO LA VOL MOD A2C: 57 ML (ref 22–52)
ECHO LA VOL MOD A4C: 49 ML (ref 22–52)
ECHO LA VOL/BSA BIPLANE: 27 ML/M2 (ref 16–34)
ECHO LA VOLUME INDEX MOD A2C: 29 ML/M2 (ref 16–34)
ECHO LA VOLUME INDEX MOD A4C: 25 ML/M2 (ref 16–34)
ECHO LV E' LATERAL VELOCITY: 8 CM/S
ECHO LV E' SEPTAL VELOCITY: 6 CM/S
ECHO LV FRACTIONAL SHORTENING: 20 % (ref 28–44)
ECHO LV INTERNAL DIMENSION DIASTOLE INDEX: 2.96 CM/M2
ECHO LV INTERNAL DIMENSION DIASTOLIC: 5.9 CM (ref 3.9–5.3)
ECHO LV INTERNAL DIMENSION SYSTOLIC INDEX: 2.36 CM/M2
ECHO LV INTERNAL DIMENSION SYSTOLIC: 4.7 CM
ECHO LV IVSD: 1 CM (ref 0.6–0.9)
ECHO LV MASS 2D: 239.9 G (ref 67–162)
ECHO LV MASS INDEX 2D: 120.6 G/M2 (ref 43–95)
ECHO LV POSTERIOR WALL DIASTOLIC: 1 CM (ref 0.6–0.9)
ECHO LV RELATIVE WALL THICKNESS RATIO: 0.34
ECHO LVOT AREA: 3.5 CM2
ECHO LVOT AV VTI INDEX: 0.95
ECHO LVOT DIAM: 2.1 CM
ECHO LVOT MEAN GRADIENT: 2 MMHG
ECHO LVOT PEAK GRADIENT: 4 MMHG
ECHO LVOT PEAK VELOCITY: 1 M/S
ECHO LVOT STROKE VOLUME INDEX: 38.3 ML/M2
ECHO LVOT SV: 76.2 ML
ECHO LVOT VTI: 22 CM
ECHO MV A VELOCITY: 0.83 M/S
ECHO MV AREA VTI: 3.8 CM2
ECHO MV E VELOCITY: 0.65 M/S
ECHO MV E/A RATIO: 0.78
ECHO MV E/E' LATERAL: 8.13
ECHO MV E/E' RATIO (AVERAGED): 9.48
ECHO MV E/E' SEPTAL: 10.83
ECHO MV LVOT VTI INDEX: 0.92
ECHO MV MAX VELOCITY: 1.1 M/S
ECHO MV MEAN GRADIENT: 2 MMHG
ECHO MV MEAN VELOCITY: 0.6 M/S
ECHO MV PEAK GRADIENT: 5 MMHG
ECHO MV VTI: 20.3 CM
ECHO PV MAX VELOCITY: 0.9 M/S
ECHO PV PEAK GRADIENT: 3 MMHG
ECHO RV FREE WALL PEAK S': 8 CM/S
ECHO RV TAPSE: 1.5 CM (ref 1.7–?)
EOSINOPHIL # BLD: 0.22 K/UL (ref 0–0.6)
EOSINOPHILS RELATIVE PERCENT: 2 %
ERYTHROCYTE [DISTWIDTH] IN BLOOD BY AUTOMATED COUNT: 15.3 % (ref 12.4–15.4)
GFR, ESTIMATED: 68 ML/MIN/1.73M2
GFR, ESTIMATED: NORMAL ML/MIN/1.73M2
GLUCOSE BLD-MCNC: 303 MG/DL (ref 70–99)
GLUCOSE BLD-MCNC: 329 MG/DL (ref 70–99)
GLUCOSE SERPL-MCNC: 421 MG/DL (ref 70–99)
GLUCOSE SERPL-MCNC: NORMAL MG/DL (ref 70–99)
HCT VFR BLD AUTO: 40.8 % (ref 36–48)
HGB BLD-MCNC: 12.8 G/DL (ref 12–16)
IMM GRANULOCYTES # BLD AUTO: 0.05 K/UL (ref 0–0.5)
IMM GRANULOCYTES NFR BLD: 1 %
INR PPP: 1 (ref 0.9–1.2)
LYMPHOCYTES NFR BLD: 3.06 K/UL (ref 1–5.1)
LYMPHOCYTES RELATIVE PERCENT: 30 %
MCH RBC QN AUTO: 27.6 PG (ref 26–34)
MCHC RBC AUTO-ENTMCNC: 31.4 G/DL (ref 31–36)
MCV RBC AUTO: 88.1 FL (ref 80–100)
MONOCYTES NFR BLD: 0.65 K/UL (ref 0–1.3)
MONOCYTES NFR BLD: 6 %
NEUTROPHILS NFR BLD: 61 %
NEUTS SEG NFR BLD: 6.29 K/UL (ref 1.7–7.7)
PLATELET # BLD AUTO: 190 K/UL (ref 135–450)
PMV BLD AUTO: 11.1 FL (ref 9.4–12.4)
POTASSIUM SERPL-SCNC: 4.5 MMOL/L (ref 3.5–5.1)
POTASSIUM SERPL-SCNC: NORMAL MMOL/L (ref 3.5–5.1)
PROT SERPL-MCNC: 6.6 G/DL (ref 6.4–8.2)
PROT SERPL-MCNC: NORMAL G/DL (ref 6.4–8.2)
PROTHROMBIN TIME: 13.1 SEC (ref 11.9–14.9)
RBC # BLD AUTO: 4.63 M/UL (ref 4–5.2)
SODIUM SERPL-SCNC: 135 MMOL/L (ref 136–145)
SODIUM SERPL-SCNC: NORMAL MMOL/L (ref 136–145)
TROPONIN I SERPL HS-MCNC: 20 NG/L (ref 0–14)
TROPONIN I SERPL HS-MCNC: 21 NG/L (ref 0–14)
WBC OTHER # BLD: 10.3 K/UL (ref 4–11)

## 2024-07-05 PROCEDURE — G8427 DOCREV CUR MEDS BY ELIG CLIN: HCPCS | Performed by: INTERNAL MEDICINE

## 2024-07-05 PROCEDURE — 6360000004 HC RX CONTRAST MEDICATION: Performed by: INTERNAL MEDICINE

## 2024-07-05 PROCEDURE — 6370000000 HC RX 637 (ALT 250 FOR IP): Performed by: INTERNAL MEDICINE

## 2024-07-05 PROCEDURE — C8929 TTE W OR WO FOL WCON,DOPPLER: HCPCS

## 2024-07-05 PROCEDURE — 6360000002 HC RX W HCPCS: Performed by: INTERNAL MEDICINE

## 2024-07-05 PROCEDURE — 3017F COLORECTAL CA SCREEN DOC REV: CPT | Performed by: INTERNAL MEDICINE

## 2024-07-05 PROCEDURE — 71045 X-RAY EXAM CHEST 1 VIEW: CPT

## 2024-07-05 PROCEDURE — 85610 PROTHROMBIN TIME: CPT

## 2024-07-05 PROCEDURE — 80053 COMPREHEN METABOLIC PANEL: CPT

## 2024-07-05 PROCEDURE — 93306 TTE W/DOPPLER COMPLETE: CPT | Performed by: INTERNAL MEDICINE

## 2024-07-05 PROCEDURE — 1036F TOBACCO NON-USER: CPT | Performed by: INTERNAL MEDICINE

## 2024-07-05 PROCEDURE — 84484 ASSAY OF TROPONIN QUANT: CPT

## 2024-07-05 PROCEDURE — 93000 ELECTROCARDIOGRAM COMPLETE: CPT | Performed by: INTERNAL MEDICINE

## 2024-07-05 PROCEDURE — 83880 ASSAY OF NATRIURETIC PEPTIDE: CPT

## 2024-07-05 PROCEDURE — 99214 OFFICE O/P EST MOD 30 MIN: CPT | Performed by: INTERNAL MEDICINE

## 2024-07-05 PROCEDURE — 2060000000 HC ICU INTERMEDIATE R&B

## 2024-07-05 PROCEDURE — 85025 COMPLETE CBC W/AUTO DIFF WBC: CPT

## 2024-07-05 PROCEDURE — 82962 GLUCOSE BLOOD TEST: CPT

## 2024-07-05 PROCEDURE — 2580000003 HC RX 258: Performed by: INTERNAL MEDICINE

## 2024-07-05 PROCEDURE — G8417 CALC BMI ABV UP PARAM F/U: HCPCS | Performed by: INTERNAL MEDICINE

## 2024-07-05 PROCEDURE — 93005 ELECTROCARDIOGRAM TRACING: CPT | Performed by: EMERGENCY MEDICINE

## 2024-07-05 PROCEDURE — 3078F DIAST BP <80 MM HG: CPT | Performed by: INTERNAL MEDICINE

## 2024-07-05 PROCEDURE — 3074F SYST BP LT 130 MM HG: CPT | Performed by: INTERNAL MEDICINE

## 2024-07-05 PROCEDURE — 99285 EMERGENCY DEPT VISIT HI MDM: CPT

## 2024-07-05 RX ORDER — ALBUTEROL SULFATE 90 UG/1
2 AEROSOL, METERED RESPIRATORY (INHALATION) EVERY 6 HOURS PRN
Status: DISCONTINUED | OUTPATIENT
Start: 2024-07-05 | End: 2024-07-05 | Stop reason: CLARIF

## 2024-07-05 RX ORDER — ACETAMINOPHEN 325 MG/1
650 TABLET ORAL EVERY 4 HOURS PRN
Status: DISCONTINUED | OUTPATIENT
Start: 2024-07-05 | End: 2024-07-08 | Stop reason: HOSPADM

## 2024-07-05 RX ORDER — SODIUM CHLORIDE 0.9 % (FLUSH) 0.9 %
5-40 SYRINGE (ML) INJECTION EVERY 12 HOURS SCHEDULED
Status: DISCONTINUED | OUTPATIENT
Start: 2024-07-05 | End: 2024-07-08 | Stop reason: HOSPADM

## 2024-07-05 RX ORDER — INSULIN LISPRO 100 [IU]/ML
0-8 INJECTION, SOLUTION INTRAVENOUS; SUBCUTANEOUS
Status: DISCONTINUED | OUTPATIENT
Start: 2024-07-05 | End: 2024-07-08 | Stop reason: HOSPADM

## 2024-07-05 RX ORDER — POLYETHYLENE GLYCOL 3350 17 G/17G
17 POWDER, FOR SOLUTION ORAL DAILY PRN
Status: DISCONTINUED | OUTPATIENT
Start: 2024-07-05 | End: 2024-07-08 | Stop reason: HOSPADM

## 2024-07-05 RX ORDER — CYCLOSPORINE 0.5 MG/ML
EMULSION OPHTHALMIC
Status: ON HOLD | COMMUNITY
Start: 2024-04-25

## 2024-07-05 RX ORDER — POTASSIUM CHLORIDE 20 MEQ/1
40 TABLET, EXTENDED RELEASE ORAL PRN
Status: DISCONTINUED | OUTPATIENT
Start: 2024-07-05 | End: 2024-07-08 | Stop reason: HOSPADM

## 2024-07-05 RX ORDER — MAGNESIUM SULFATE IN WATER 40 MG/ML
2000 INJECTION, SOLUTION INTRAVENOUS PRN
Status: DISCONTINUED | OUTPATIENT
Start: 2024-07-05 | End: 2024-07-08 | Stop reason: HOSPADM

## 2024-07-05 RX ORDER — ALBUTEROL SULFATE 2.5 MG/3ML
2.5 SOLUTION RESPIRATORY (INHALATION) EVERY 6 HOURS PRN
Status: DISCONTINUED | OUTPATIENT
Start: 2024-07-05 | End: 2024-07-08 | Stop reason: HOSPADM

## 2024-07-05 RX ORDER — LIDOCAINE HYDROCHLORIDE 10 MG/ML
INJECTION, SOLUTION EPIDURAL; INFILTRATION; INTRACAUDAL; PERINEURAL
Status: DISCONTINUED
Start: 2024-07-05 | End: 2024-07-05 | Stop reason: WASHOUT

## 2024-07-05 RX ORDER — SODIUM CHLORIDE 9 MG/ML
INJECTION, SOLUTION INTRAVENOUS PRN
Status: DISCONTINUED | OUTPATIENT
Start: 2024-07-05 | End: 2024-07-08 | Stop reason: HOSPADM

## 2024-07-05 RX ORDER — VITAMIN B COMPLEX
1000 TABLET ORAL DAILY
Status: ON HOLD | COMMUNITY

## 2024-07-05 RX ORDER — ENOXAPARIN SODIUM 100 MG/ML
40 INJECTION SUBCUTANEOUS DAILY
Status: DISCONTINUED | OUTPATIENT
Start: 2024-07-05 | End: 2024-07-06

## 2024-07-05 RX ORDER — FUROSEMIDE 20 MG/1
20 TABLET ORAL ONCE
Status: COMPLETED | OUTPATIENT
Start: 2024-07-05 | End: 2024-07-05

## 2024-07-05 RX ORDER — POTASSIUM CHLORIDE 20MEQ/15ML
20 LIQUID (ML) ORAL DAILY
Status: ON HOLD | COMMUNITY
Start: 2024-06-21

## 2024-07-05 RX ORDER — INSULIN GLARGINE 100 [IU]/ML
INJECTION, SOLUTION SUBCUTANEOUS
Qty: 15 ML | Refills: 0 | Status: ON HOLD | OUTPATIENT
Start: 2024-07-05

## 2024-07-05 RX ORDER — ATORVASTATIN CALCIUM 40 MG/1
40 TABLET, FILM COATED ORAL NIGHTLY
Status: DISCONTINUED | OUTPATIENT
Start: 2024-07-05 | End: 2024-07-08 | Stop reason: HOSPADM

## 2024-07-05 RX ORDER — CLOPIDOGREL BISULFATE 75 MG/1
75 TABLET ORAL DAILY
Status: DISCONTINUED | OUTPATIENT
Start: 2024-07-06 | End: 2024-07-08 | Stop reason: HOSPADM

## 2024-07-05 RX ORDER — INSULIN GLARGINE 100 [IU]/ML
52 INJECTION, SOLUTION SUBCUTANEOUS NIGHTLY
Status: DISCONTINUED | OUTPATIENT
Start: 2024-07-05 | End: 2024-07-08

## 2024-07-05 RX ORDER — CETIRIZINE HYDROCHLORIDE 10 MG/1
10 TABLET ORAL DAILY
Status: DISCONTINUED | OUTPATIENT
Start: 2024-07-06 | End: 2024-07-08 | Stop reason: HOSPADM

## 2024-07-05 RX ORDER — POTASSIUM CHLORIDE 7.45 MG/ML
10 INJECTION INTRAVENOUS PRN
Status: DISCONTINUED | OUTPATIENT
Start: 2024-07-05 | End: 2024-07-08 | Stop reason: HOSPADM

## 2024-07-05 RX ORDER — INSULIN LISPRO 100 [IU]/ML
0-4 INJECTION, SOLUTION INTRAVENOUS; SUBCUTANEOUS NIGHTLY
Status: DISCONTINUED | OUTPATIENT
Start: 2024-07-05 | End: 2024-07-08 | Stop reason: HOSPADM

## 2024-07-05 RX ORDER — GLUCAGON 1 MG/ML
1 KIT INJECTION PRN
Status: DISCONTINUED | OUTPATIENT
Start: 2024-07-05 | End: 2024-07-08 | Stop reason: HOSPADM

## 2024-07-05 RX ORDER — SODIUM CHLORIDE 0.9 % (FLUSH) 0.9 %
5-40 SYRINGE (ML) INJECTION PRN
Status: DISCONTINUED | OUTPATIENT
Start: 2024-07-05 | End: 2024-07-08 | Stop reason: HOSPADM

## 2024-07-05 RX ORDER — DEXTROSE MONOHYDRATE 100 MG/ML
INJECTION, SOLUTION INTRAVENOUS CONTINUOUS PRN
Status: DISCONTINUED | OUTPATIENT
Start: 2024-07-05 | End: 2024-07-08 | Stop reason: HOSPADM

## 2024-07-05 RX ORDER — LATANOPROST 50 UG/ML
1 SOLUTION/ DROPS OPHTHALMIC NIGHTLY
Status: DISCONTINUED | OUTPATIENT
Start: 2024-07-05 | End: 2024-07-08 | Stop reason: HOSPADM

## 2024-07-05 RX ORDER — METOPROLOL SUCCINATE 25 MG/1
25 TABLET, EXTENDED RELEASE ORAL NIGHTLY
Status: DISCONTINUED | OUTPATIENT
Start: 2024-07-05 | End: 2024-07-08 | Stop reason: HOSPADM

## 2024-07-05 RX ORDER — FAMOTIDINE 20 MG/1
40 TABLET, FILM COATED ORAL 2 TIMES DAILY
Status: DISCONTINUED | OUTPATIENT
Start: 2024-07-05 | End: 2024-07-08 | Stop reason: HOSPADM

## 2024-07-05 RX ORDER — POLYVINYL ALCOHOL 14 MG/ML
2 SOLUTION/ DROPS OPHTHALMIC PRN
Status: DISCONTINUED | OUTPATIENT
Start: 2024-07-05 | End: 2024-07-08 | Stop reason: HOSPADM

## 2024-07-05 RX ADMIN — PERFLUTREN 1.5 ML: 6.52 INJECTION, SUSPENSION INTRAVENOUS at 11:21

## 2024-07-05 RX ADMIN — LATANOPROST 1 DROP: 50 SOLUTION OPHTHALMIC at 22:25

## 2024-07-05 RX ADMIN — FUROSEMIDE 20 MG: 20 TABLET ORAL at 18:03

## 2024-07-05 RX ADMIN — INSULIN LISPRO 6 UNITS: 100 INJECTION, SOLUTION INTRAVENOUS; SUBCUTANEOUS at 17:57

## 2024-07-05 RX ADMIN — FAMOTIDINE 40 MG: 20 TABLET ORAL at 20:56

## 2024-07-05 RX ADMIN — ENOXAPARIN SODIUM 40 MG: 100 INJECTION SUBCUTANEOUS at 17:52

## 2024-07-05 RX ADMIN — METOPROLOL SUCCINATE 25 MG: 25 TABLET, EXTENDED RELEASE ORAL at 20:56

## 2024-07-05 RX ADMIN — INSULIN GLARGINE 52 UNITS: 100 INJECTION, SOLUTION SUBCUTANEOUS at 20:56

## 2024-07-05 RX ADMIN — SODIUM CHLORIDE, PRESERVATIVE FREE 10 ML: 5 INJECTION INTRAVENOUS at 20:57

## 2024-07-05 RX ADMIN — ATORVASTATIN CALCIUM 40 MG: 40 TABLET, FILM COATED ORAL at 20:56

## 2024-07-05 RX ADMIN — ACETAMINOPHEN 650 MG: 325 TABLET ORAL at 18:51

## 2024-07-05 ASSESSMENT — PAIN DESCRIPTION - FREQUENCY: FREQUENCY: CONTINUOUS

## 2024-07-05 ASSESSMENT — PAIN DESCRIPTION - LOCATION
LOCATION: CHEST
LOCATION: CHEST
LOCATION: GENERALIZED

## 2024-07-05 ASSESSMENT — PAIN SCALES - GENERAL
PAINLEVEL_OUTOF10: 5

## 2024-07-05 ASSESSMENT — PAIN DESCRIPTION - PAIN TYPE
TYPE: CHRONIC PAIN
TYPE: CHRONIC PAIN

## 2024-07-05 ASSESSMENT — PAIN - FUNCTIONAL ASSESSMENT
PAIN_FUNCTIONAL_ASSESSMENT: ACTIVITIES ARE NOT PREVENTED
PAIN_FUNCTIONAL_ASSESSMENT: ACTIVITIES ARE NOT PREVENTED

## 2024-07-05 ASSESSMENT — PAIN DESCRIPTION - DESCRIPTORS
DESCRIPTORS: ACHING

## 2024-07-05 ASSESSMENT — LIFESTYLE VARIABLES
HOW OFTEN DO YOU HAVE A DRINK CONTAINING ALCOHOL: NEVER
HOW MANY STANDARD DRINKS CONTAINING ALCOHOL DO YOU HAVE ON A TYPICAL DAY: PATIENT DOES NOT DRINK

## 2024-07-05 ASSESSMENT — PAIN SCALES - WONG BAKER: WONGBAKER_NUMERICALRESPONSE: NO HURT

## 2024-07-05 ASSESSMENT — PAIN DESCRIPTION - ONSET
ONSET: ON-GOING
ONSET: ON-GOING

## 2024-07-05 NOTE — ED TRIAGE NOTES
Patient presents to ED referred in by Dr Neely cardiology, reported abnormal echo, and has been having chest pain for the last week.

## 2024-07-05 NOTE — RT PROTOCOL NOTE
RT Inhaler-Nebulizer Bronchodilator Protocol Note    There is a bronchodilator order in the chart from a provider indicating to follow the RT Bronchodilator Protocol and there is an “Initiate RT Inhaler-Nebulizer Bronchodilator Protocol” order as well (see protocol at bottom of note).    CXR Findings:  XR CHEST PORTABLE    Result Date: 7/5/2024  Left lower lung zone atelectasis. Electronically signed by Pritesh Graves      The findings from the last RT Protocol Assessment were as follows:   History Pulmonary Disease: None or smoker <15 pack years  Respiratory Pattern: Regular pattern and RR 12-20 bpm  Breath Sounds: Slightly diminished and/or crackles  Cough: Strong, spontaneous, non-productive  Indication for Bronchodilator Therapy:    Bronchodilator Assessment Score: 2    Aerosolized bronchodilator medication orders have been revised according to the RT Inhaler-Nebulizer Bronchodilator Protocol below.    Respiratory Therapist to perform RT Therapy Protocol Assessment initially then follow the protocol.  Repeat RT Therapy Protocol Assessment PRN for score 0-3 or on second treatment, BID, and PRN for scores above 3.    No Indications - adjust the frequency to every 6 hours PRN wheezing or bronchospasm, if no treatments needed after 48 hours then discontinue using Per Protocol order mode.     If indication present, adjust the RT bronchodilator orders based on the Bronchodilator Assessment Score as indicated below.  Use Inhaler orders unless patient has one or more of the following: on home nebulizer, not able to hold breath for 10 seconds, is not alert and oriented, cannot activate and use MDI correctly, or respiratory rate 25 breaths per minute or more, then use the equivalent nebulizer order(s) with same Frequency and PRN reasons based on the score.  If a patient is on this medication at home then do not decrease Frequency below that used at home.    0-3 - enter or revise RT bronchodilator order(s) to equivalent RT  1.68

## 2024-07-05 NOTE — H&P
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  Patient reports substernal pressure-like sensation radiating to her upper chest.  No aggravating or relieving factors.  Not associated with activity. Patient was seen at her cardiologist office who recommended admission for possible left heart cath after anticoagulation washout period  On 7/8/2024.    Objective:   No intake or output data in the 24 hours ending 07/05/24 1550   Vitals:   Vitals:    07/05/24 1516   BP: 135/62   Pulse: 75   Resp: 24   Temp:    SpO2: 96%     Physical Exam:   General-no acute distress  CVS S1-S2, RRR  Respiratory-bilateral entry fair  Abdomen-soft, nontender, nondistended  CNS-AOx3, no focal deficit    Past Medical History:      Past Medical History:   Diagnosis Date    Ankle fracture, left     Ankle fracture, right     Anxiety     Arthritis     Asthma     Bipolar depression (HCC)     Bladder problem     Cervical disc herniation     COVID toes     Depression     Diabetes mellitus (HCC)     Lantus    Fatty liver disease, non-alcoholic     Fibromyalgia     Gastroparesis     Dr Velarde    GERD (gastroesophageal reflux disease)     gastroporesis    Glaucoma     Dr Malone    Headache     Hx of blood clots     DVT: left leg    Hyperlipidemia     elevated LFT on meds    IBS (irritable bowel syndrome)     Lumbar herniated disc     Nausea & vomiting     Nephrolithiasis 01/29/2019    Obesity (BMI 30-39.9) 03/11/2019    Partial Achilles tendon tear     Pneumonia     PONV (postoperative nausea and vomiting)     RA (rheumatoid arthritis) (HCC)     Rapid or irregular heartbeat     Sleep apnea     Spinal stenosis     Stress incontinence     Thyroid disease      PSHX:  has a past surgical history that includes

## 2024-07-05 NOTE — ED PROVIDER NOTES
[facility] Emergency Department [facility], Jm Lujan MD, am the primary clinician of record.    CHIEF COMPLAINT  Chief Complaint   Patient presents with    Chest Pain     Sent by Dr. Neely, abnormal echo results        HISTORY OF PRESENT ILLNESS  Lanny Carbajal is a 59 y.o. female  who presents to the ED complaining of atypical angina type symptoms, sent by cardiology office.  She tells me that she has had about a week or 2 of some pain in the left axilla radiating into the chest periodically, sometimes exertional and sometimes not.  She saw her cardiologist incidentally this morning on an appointment that was already scheduled in follow-up for heart catheterizations and stent placement that she had in January of this year.  Cardiology sent her here to be admitted with enzymes and ACS evaluation.  The patient does sometimes feel short of breath particularly with exertion.  No leg swelling acutely.  She apparently had an abnormal echo compared to her baseline earlier today as well with reduced LV function specifically.  Anticoagulated, has a pacemaker as well.    No other complaints, modifying factors or associated symptoms.     I have reviewed the following from the nursing documentation.    Past Medical History:   Diagnosis Date    Ankle fracture, left     Ankle fracture, right     Anxiety     Arthritis     Asthma     Bipolar depression (HCC)     Bladder problem     Cervical disc herniation     COVID toes     Depression     Diabetes mellitus (HCC)     Lantus    Fatty liver disease, non-alcoholic     Fibromyalgia     Gastroparesis     Dr Velarde    GERD (gastroesophageal reflux disease)     gastroporesis    Glaucoma     Dr Malone    Headache     Hx of blood clots     DVT: left leg    Hyperlipidemia     elevated LFT on meds    IBS (irritable bowel syndrome)     Lumbar herniated disc     Nausea & vomiting     Nephrolithiasis 01/29/2019    Obesity (BMI 30-39.9) 03/11/2019    Partial Achilles tendon

## 2024-07-05 NOTE — ED NOTES
Hospitalist at bedside.   
Pt ambulated to restroom without difficulty, and with a smooth and steady gait. Pt denied dizziness upon standing or during ambulation.     
X-ray at bedside.   
logical, thought processes intact, and able to concentrate and follow conversation  Orientation Level:    NIH Score:    C-SSRS: Risk of Suicide: No Risk  Bedside swallow:    Breeden Coma Scale (GCS): Breeden Coma Scale  Eye Opening: Spontaneous  Best Verbal Response: Oriented  Best Motor Response: Obeys commands  Jd Coma Scale Score: 15  Active LDA's:   Peripheral IV 07/05/24 Right Forearm (Active)                 PO Status: Regular  Pertinent or High Risk Medications/Drips: no   If Yes, please provide details:   Pending Blood Product Administration: no       You may also review the ED PT Care Timeline found under the Summary Nursing Index tab.    Recommendation    Pending orders   Plan for Discharge (if known):   Additional Comments: Pt alert and oriented x4, ambulated without assistance in ED, visitor at bedside.   If any further questions, please call Sending RN at [phone]      Electronically signed by: Electronically signed by Adia Perez RN on 7/5/2024 at 4:02 PM

## 2024-07-06 LAB
ALBUMIN SERPL-MCNC: 3.8 G/DL (ref 3.4–5)
ALBUMIN/GLOB SERPL: 1.3 {RATIO}
ALP SERPL-CCNC: 19 U/L (ref 40–129)
ALT SERPL-CCNC: 25 U/L (ref 10–40)
ANION GAP SERPL CALCULATED.3IONS-SCNC: 12 MMOL/L (ref 3–16)
AST SERPL-CCNC: 28 U/L (ref 15–37)
BASOPHILS # BLD: 0.05 K/UL (ref 0–0.2)
BASOPHILS NFR BLD: 1 %
BILIRUB SERPL-MCNC: 0.6 MG/DL (ref 0–1)
BUN SERPL-MCNC: 20 MG/DL (ref 7–20)
CALCIUM SERPL-MCNC: 9.6 MG/DL (ref 8.3–10.6)
CHLORIDE SERPL-SCNC: 100 MMOL/L (ref 99–110)
CO2 SERPL-SCNC: 24 MMOL/L (ref 21–32)
CREAT SERPL-MCNC: 1 MG/DL (ref 0.6–1.2)
EOSINOPHIL # BLD: 0.24 K/UL (ref 0–0.6)
EOSINOPHILS RELATIVE PERCENT: 3 %
ERYTHROCYTE [DISTWIDTH] IN BLOOD BY AUTOMATED COUNT: 15.4 % (ref 12.4–15.4)
GFR, ESTIMATED: 65 ML/MIN/1.73M2
GLUCOSE BLD-MCNC: 203 MG/DL (ref 70–99)
GLUCOSE BLD-MCNC: 252 MG/DL (ref 70–99)
GLUCOSE BLD-MCNC: 273 MG/DL (ref 70–99)
GLUCOSE BLD-MCNC: 312 MG/DL (ref 70–99)
GLUCOSE SERPL-MCNC: 245 MG/DL (ref 70–99)
HCT VFR BLD AUTO: 38.8 % (ref 36–48)
HGB BLD-MCNC: 12.4 G/DL (ref 12–16)
IMM GRANULOCYTES # BLD AUTO: 0.05 K/UL (ref 0–0.5)
IMM GRANULOCYTES NFR BLD: 1 %
LYMPHOCYTES NFR BLD: 3.36 K/UL (ref 1–5.1)
LYMPHOCYTES RELATIVE PERCENT: 36 %
MCH RBC QN AUTO: 28.2 PG (ref 26–34)
MCHC RBC AUTO-ENTMCNC: 32 G/DL (ref 31–36)
MCV RBC AUTO: 88.2 FL (ref 80–100)
MONOCYTES NFR BLD: 0.74 K/UL (ref 0–1.3)
MONOCYTES NFR BLD: 8 %
NEUTROPHILS NFR BLD: 53 %
NEUTS SEG NFR BLD: 4.93 K/UL (ref 1.7–7.7)
PLATELET # BLD AUTO: 188 K/UL (ref 135–450)
PMV BLD AUTO: 10.8 FL
POTASSIUM SERPL-SCNC: 4.5 MMOL/L (ref 3.5–5.1)
PROT SERPL-MCNC: 6.7 G/DL (ref 6.4–8.2)
RBC # BLD AUTO: 4.4 M/UL (ref 4–5.2)
SODIUM SERPL-SCNC: 136 MMOL/L (ref 136–145)
WBC OTHER # BLD: 9.4 K/UL (ref 4–11)

## 2024-07-06 PROCEDURE — 85025 COMPLETE CBC W/AUTO DIFF WBC: CPT

## 2024-07-06 PROCEDURE — 2580000003 HC RX 258: Performed by: INTERNAL MEDICINE

## 2024-07-06 PROCEDURE — 99223 1ST HOSP IP/OBS HIGH 75: CPT | Performed by: INTERNAL MEDICINE

## 2024-07-06 PROCEDURE — 6370000000 HC RX 637 (ALT 250 FOR IP): Performed by: INTERNAL MEDICINE

## 2024-07-06 PROCEDURE — 6360000002 HC RX W HCPCS: Performed by: INTERNAL MEDICINE

## 2024-07-06 PROCEDURE — 36415 COLL VENOUS BLD VENIPUNCTURE: CPT

## 2024-07-06 PROCEDURE — 82962 GLUCOSE BLOOD TEST: CPT

## 2024-07-06 PROCEDURE — 80053 COMPREHEN METABOLIC PANEL: CPT

## 2024-07-06 PROCEDURE — 2060000000 HC ICU INTERMEDIATE R&B

## 2024-07-06 RX ORDER — ENOXAPARIN SODIUM 100 MG/ML
30 INJECTION SUBCUTANEOUS 2 TIMES DAILY
Status: DISCONTINUED | OUTPATIENT
Start: 2024-07-06 | End: 2024-07-08 | Stop reason: HOSPADM

## 2024-07-06 RX ADMIN — CLOPIDOGREL BISULFATE 75 MG: 75 TABLET ORAL at 08:21

## 2024-07-06 RX ADMIN — ATORVASTATIN CALCIUM 40 MG: 40 TABLET, FILM COATED ORAL at 22:08

## 2024-07-06 RX ADMIN — ACETAMINOPHEN 650 MG: 325 TABLET ORAL at 22:09

## 2024-07-06 RX ADMIN — SACUBITRIL AND VALSARTAN 1 TABLET: 24; 26 TABLET, FILM COATED ORAL at 11:26

## 2024-07-06 RX ADMIN — INSULIN LISPRO 6 UNITS: 100 INJECTION, SOLUTION INTRAVENOUS; SUBCUTANEOUS at 13:36

## 2024-07-06 RX ADMIN — CETIRIZINE HYDROCHLORIDE 10 MG: 10 TABLET, FILM COATED ORAL at 08:21

## 2024-07-06 RX ADMIN — FAMOTIDINE 40 MG: 20 TABLET ORAL at 22:07

## 2024-07-06 RX ADMIN — ACETAMINOPHEN 650 MG: 325 TABLET ORAL at 08:20

## 2024-07-06 RX ADMIN — FAMOTIDINE 40 MG: 20 TABLET ORAL at 08:21

## 2024-07-06 RX ADMIN — ENOXAPARIN SODIUM 40 MG: 100 INJECTION SUBCUTANEOUS at 08:30

## 2024-07-06 RX ADMIN — SODIUM CHLORIDE, PRESERVATIVE FREE 10 ML: 5 INJECTION INTRAVENOUS at 22:10

## 2024-07-06 RX ADMIN — INSULIN LISPRO 4 UNITS: 100 INJECTION, SOLUTION INTRAVENOUS; SUBCUTANEOUS at 18:14

## 2024-07-06 RX ADMIN — LATANOPROST 1 DROP: 50 SOLUTION OPHTHALMIC at 22:23

## 2024-07-06 RX ADMIN — ENOXAPARIN SODIUM 30 MG: 100 INJECTION SUBCUTANEOUS at 22:08

## 2024-07-06 RX ADMIN — METOPROLOL SUCCINATE 25 MG: 25 TABLET, EXTENDED RELEASE ORAL at 22:08

## 2024-07-06 RX ADMIN — INSULIN GLARGINE 52 UNITS: 100 INJECTION, SOLUTION SUBCUTANEOUS at 22:09

## 2024-07-06 RX ADMIN — SODIUM CHLORIDE, PRESERVATIVE FREE 10 ML: 5 INJECTION INTRAVENOUS at 08:21

## 2024-07-06 RX ADMIN — INSULIN LISPRO 2 UNITS: 100 INJECTION, SOLUTION INTRAVENOUS; SUBCUTANEOUS at 08:20

## 2024-07-06 RX ADMIN — SACUBITRIL AND VALSARTAN 1 TABLET: 24; 26 TABLET, FILM COATED ORAL at 22:08

## 2024-07-06 ASSESSMENT — PAIN DESCRIPTION - LOCATION
LOCATION: HAND
LOCATION: ABDOMEN

## 2024-07-06 ASSESSMENT — PAIN SCALES - GENERAL
PAINLEVEL_OUTOF10: 0
PAINLEVEL_OUTOF10: 4
PAINLEVEL_OUTOF10: 0
PAINLEVEL_OUTOF10: 5
PAINLEVEL_OUTOF10: 0

## 2024-07-06 ASSESSMENT — PAIN DESCRIPTION - ORIENTATION
ORIENTATION: LEFT;RIGHT
ORIENTATION: LEFT

## 2024-07-06 ASSESSMENT — PAIN DESCRIPTION - PAIN TYPE
TYPE: CHRONIC PAIN
TYPE: CHRONIC PAIN

## 2024-07-06 ASSESSMENT — PAIN DESCRIPTION - ONSET
ONSET: ON-GOING
ONSET: ON-GOING

## 2024-07-06 ASSESSMENT — PAIN SCALES - WONG BAKER
WONGBAKER_NUMERICALRESPONSE: NO HURT

## 2024-07-06 ASSESSMENT — PAIN DESCRIPTION - FREQUENCY
FREQUENCY: CONTINUOUS
FREQUENCY: CONTINUOUS

## 2024-07-06 ASSESSMENT — PAIN DESCRIPTION - DESCRIPTORS
DESCRIPTORS: ACHING
DESCRIPTORS: ACHING

## 2024-07-06 NOTE — PLAN OF CARE
Problem: Pain  Goal: Verbalizes/displays adequate comfort level or baseline comfort level  Outcome: Progressing  Note: No c/o chest pain during shift      Problem: Safety - Adult  Goal: Free from fall injury  Outcome: Progressing  Note: Patient alert and oriented, independent in room, will call for assistance if needed

## 2024-07-06 NOTE — PLAN OF CARE
Problem: Discharge Planning  Goal: Discharge to home or other facility with appropriate resources  Outcome: Progressing     Problem: Pain  Goal: Verbalizes/displays adequate comfort level or baseline comfort level  7/6/2024 0759 by Virginia Vazquez RN  Outcome: Progressing  7/6/2024 0631 by Ilsa Ryan RN  Outcome: Progressing  Note: No c/o chest pain during shift      Problem: Safety - Adult  Goal: Free from fall injury  7/6/2024 0759 by Virginia Vazquez RN  Outcome: Progressing  7/6/2024 0631 by Ilsa Ryan RN  Outcome: Progressing  Note: Patient alert and oriented, independent in room, will call for assistance if needed

## 2024-07-07 LAB
GLUCOSE BLD-MCNC: 211 MG/DL (ref 70–99)
GLUCOSE BLD-MCNC: 274 MG/DL (ref 70–99)
GLUCOSE BLD-MCNC: 289 MG/DL (ref 70–99)
GLUCOSE BLD-MCNC: 297 MG/DL (ref 70–99)

## 2024-07-07 PROCEDURE — 82962 GLUCOSE BLOOD TEST: CPT

## 2024-07-07 PROCEDURE — 2580000003 HC RX 258: Performed by: INTERNAL MEDICINE

## 2024-07-07 PROCEDURE — 6360000002 HC RX W HCPCS: Performed by: INTERNAL MEDICINE

## 2024-07-07 PROCEDURE — 6370000000 HC RX 637 (ALT 250 FOR IP): Performed by: INTERNAL MEDICINE

## 2024-07-07 PROCEDURE — 99231 SBSQ HOSP IP/OBS SF/LOW 25: CPT | Performed by: INTERNAL MEDICINE

## 2024-07-07 PROCEDURE — 6370000000 HC RX 637 (ALT 250 FOR IP): Performed by: STUDENT IN AN ORGANIZED HEALTH CARE EDUCATION/TRAINING PROGRAM

## 2024-07-07 PROCEDURE — 2060000000 HC ICU INTERMEDIATE R&B

## 2024-07-07 RX ORDER — PREDNISONE 20 MG/1
60 TABLET ORAL ONCE
Status: COMPLETED | OUTPATIENT
Start: 2024-07-08 | End: 2024-07-08

## 2024-07-07 RX ORDER — DIPHENHYDRAMINE HCL 25 MG
50 TABLET ORAL ONCE
Status: COMPLETED | OUTPATIENT
Start: 2024-07-07 | End: 2024-07-07

## 2024-07-07 RX ORDER — INSULIN GLARGINE 100 [IU]/ML
35 INJECTION, SOLUTION SUBCUTANEOUS ONCE
Status: COMPLETED | OUTPATIENT
Start: 2024-07-07 | End: 2024-07-07

## 2024-07-07 RX ORDER — DIPHENHYDRAMINE HCL 25 MG
25 TABLET ORAL ONCE
Status: COMPLETED | OUTPATIENT
Start: 2024-07-08 | End: 2024-07-08

## 2024-07-07 RX ORDER — FLUCONAZOLE 100 MG/1
150 TABLET ORAL ONCE
Status: COMPLETED | OUTPATIENT
Start: 2024-07-07 | End: 2024-07-07

## 2024-07-07 RX ADMIN — ENOXAPARIN SODIUM 30 MG: 100 INJECTION SUBCUTANEOUS at 21:31

## 2024-07-07 RX ADMIN — INSULIN LISPRO 2 UNITS: 100 INJECTION, SOLUTION INTRAVENOUS; SUBCUTANEOUS at 09:36

## 2024-07-07 RX ADMIN — INSULIN LISPRO 4 UNITS: 100 INJECTION, SOLUTION INTRAVENOUS; SUBCUTANEOUS at 18:22

## 2024-07-07 RX ADMIN — FAMOTIDINE 40 MG: 20 TABLET ORAL at 09:36

## 2024-07-07 RX ADMIN — LATANOPROST 1 DROP: 50 SOLUTION OPHTHALMIC at 21:42

## 2024-07-07 RX ADMIN — INSULIN GLARGINE 35 UNITS: 100 INJECTION, SOLUTION SUBCUTANEOUS at 21:37

## 2024-07-07 RX ADMIN — CLOPIDOGREL BISULFATE 75 MG: 75 TABLET ORAL at 09:36

## 2024-07-07 RX ADMIN — ATORVASTATIN CALCIUM 40 MG: 40 TABLET, FILM COATED ORAL at 21:25

## 2024-07-07 RX ADMIN — ENOXAPARIN SODIUM 30 MG: 100 INJECTION SUBCUTANEOUS at 09:36

## 2024-07-07 RX ADMIN — SODIUM CHLORIDE, PRESERVATIVE FREE 10 ML: 5 INJECTION INTRAVENOUS at 21:31

## 2024-07-07 RX ADMIN — ACETAMINOPHEN 650 MG: 325 TABLET ORAL at 21:32

## 2024-07-07 RX ADMIN — SODIUM CHLORIDE, PRESERVATIVE FREE 10 ML: 5 INJECTION INTRAVENOUS at 09:37

## 2024-07-07 RX ADMIN — SACUBITRIL AND VALSARTAN 1 TABLET: 24; 26 TABLET, FILM COATED ORAL at 09:36

## 2024-07-07 RX ADMIN — INSULIN LISPRO 4 UNITS: 100 INJECTION, SOLUTION INTRAVENOUS; SUBCUTANEOUS at 13:38

## 2024-07-07 RX ADMIN — CETIRIZINE HYDROCHLORIDE 10 MG: 10 TABLET, FILM COATED ORAL at 09:36

## 2024-07-07 RX ADMIN — FLUCONAZOLE 150 MG: 100 TABLET ORAL at 10:59

## 2024-07-07 RX ADMIN — SACUBITRIL AND VALSARTAN 1 TABLET: 24; 26 TABLET, FILM COATED ORAL at 21:26

## 2024-07-07 RX ADMIN — DIPHENHYDRAMINE HYDROCHLORIDE 50 MG: 25 TABLET ORAL at 21:25

## 2024-07-07 RX ADMIN — FAMOTIDINE 40 MG: 20 TABLET ORAL at 21:25

## 2024-07-07 ASSESSMENT — PAIN SCALES - WONG BAKER
WONGBAKER_NUMERICALRESPONSE: NO HURT

## 2024-07-07 ASSESSMENT — PAIN - FUNCTIONAL ASSESSMENT
PAIN_FUNCTIONAL_ASSESSMENT: ACTIVITIES ARE NOT PREVENTED

## 2024-07-07 ASSESSMENT — PAIN DESCRIPTION - LOCATION
LOCATION: HEAD
LOCATION: HEAD
LOCATION: OTHER (COMMENT)

## 2024-07-07 ASSESSMENT — PAIN DESCRIPTION - FREQUENCY
FREQUENCY: CONTINUOUS

## 2024-07-07 ASSESSMENT — PAIN DESCRIPTION - ORIENTATION
ORIENTATION: MID
ORIENTATION: MID

## 2024-07-07 ASSESSMENT — PAIN SCALES - GENERAL
PAINLEVEL_OUTOF10: 0
PAINLEVEL_OUTOF10: 0
PAINLEVEL_OUTOF10: 3
PAINLEVEL_OUTOF10: 0
PAINLEVEL_OUTOF10: 5
PAINLEVEL_OUTOF10: 0
PAINLEVEL_OUTOF10: 3
PAINLEVEL_OUTOF10: 0

## 2024-07-07 ASSESSMENT — PAIN DESCRIPTION - ONSET
ONSET: ON-GOING
ONSET: ON-GOING

## 2024-07-07 ASSESSMENT — PAIN DESCRIPTION - PAIN TYPE
TYPE: CHRONIC PAIN

## 2024-07-07 ASSESSMENT — PAIN DESCRIPTION - DESCRIPTORS
DESCRIPTORS: ACHING

## 2024-07-07 NOTE — PLAN OF CARE
Problem: Discharge Planning  Goal: Discharge to home or other facility with appropriate resources  7/7/2024 1016 by Virginia Vazquez RN  Outcome: Progressing  7/6/2024 2349 by Mei Coon RN  Outcome: Progressing  Flowsheets (Taken 7/6/2024 1943)  Discharge to home or other facility with appropriate resources:   Identify barriers to discharge with patient and caregiver   Arrange for needed discharge resources and transportation as appropriate   Identify discharge learning needs (meds, wound care, etc)   Arrange for interpreters to assist at discharge as needed   Refer to discharge planning if patient needs post-hospital services based on physician order or complex needs related to functional status, cognitive ability or social support system     Problem: Pain  Goal: Verbalizes/displays adequate comfort level or baseline comfort level  7/7/2024 1016 by Virginia Vazquez RN  Outcome: Progressing  7/6/2024 2349 by Mei Coon, RN  Outcome: Progressing  Flowsheets (Taken 7/6/2024 1943)  Verbalizes/displays adequate comfort level or baseline comfort level:   Encourage patient to monitor pain and request assistance   Assess pain using appropriate pain scale     Problem: Safety - Adult  Goal: Free from fall injury  7/7/2024 1016 by Virginia Vazquez RN  Outcome: Progressing  7/6/2024 2349 by Mei Coon RN  Outcome: Progressing  Flowsheets (Taken 7/6/2024 1943)  Free From Fall Injury:   Instruct family/caregiver on patient safety   Based on caregiver fall risk screen, instruct family/caregiver to ask for assistance with transferring infant if caregiver noted to have fall risk factors

## 2024-07-07 NOTE — PLAN OF CARE
Problem: Discharge Planning  Goal: Discharge to home or other facility with appropriate resources  Outcome: Progressing  Flowsheets (Taken 7/6/2024 1943)  Discharge to home or other facility with appropriate resources:   Identify barriers to discharge with patient and caregiver   Arrange for needed discharge resources and transportation as appropriate   Identify discharge learning needs (meds, wound care, etc)   Arrange for interpreters to assist at discharge as needed   Refer to discharge planning if patient needs post-hospital services based on physician order or complex needs related to functional status, cognitive ability or social support system     Problem: Pain  Goal: Verbalizes/displays adequate comfort level or baseline comfort level  Outcome: Progressing  Flowsheets (Taken 7/6/2024 1943)  Verbalizes/displays adequate comfort level or baseline comfort level:   Encourage patient to monitor pain and request assistance   Assess pain using appropriate pain scale     Problem: Safety - Adult  Goal: Free from fall injury  Outcome: Progressing  Flowsheets (Taken 7/6/2024 1943)  Free From Fall Injury:   Instruct family/caregiver on patient safety   Based on caregiver fall risk screen, instruct family/caregiver to ask for assistance with transferring infant if caregiver noted to have fall risk factors

## 2024-07-08 ENCOUNTER — APPOINTMENT (OUTPATIENT)
Age: 59
DRG: 191 | End: 2024-07-08
Attending: INTERNAL MEDICINE
Payer: COMMERCIAL

## 2024-07-08 ENCOUNTER — HOSPITAL ENCOUNTER (INPATIENT)
Age: 59
LOS: 4 days | Discharge: HOME | End: 2024-07-12
Attending: INTERNAL MEDICINE | Admitting: INTERNAL MEDICINE
Payer: COMMERCIAL

## 2024-07-08 VITALS
OXYGEN SATURATION: 96 % | RESPIRATION RATE: 20 BRPM | WEIGHT: 230.93 LBS | DIASTOLIC BLOOD PRESSURE: 59 MMHG | BODY MASS INDEX: 42.5 KG/M2 | SYSTOLIC BLOOD PRESSURE: 121 MMHG | TEMPERATURE: 98.6 F | HEART RATE: 87 BPM | HEIGHT: 62 IN

## 2024-07-08 DIAGNOSIS — R53.1 WEAKNESS: Primary | ICD-10-CM

## 2024-07-08 DIAGNOSIS — R07.9 CHEST PAIN: ICD-10-CM

## 2024-07-08 DIAGNOSIS — J01.90 ACUTE RHINOSINUSITIS: ICD-10-CM

## 2024-07-08 PROBLEM — R07.89 OTHER CHEST PAIN: Status: ACTIVE | Noted: 2024-07-05

## 2024-07-08 PROBLEM — I20.0 UNSTABLE ANGINA (HCC): Status: ACTIVE | Noted: 2024-07-08

## 2024-07-08 LAB
ANION GAP SERPL CALCULATED.3IONS-SCNC: 13 MMOL/L (ref 3–16)
BUN SERPL-MCNC: 24 MG/DL (ref 7–20)
CALCIUM SERPL-MCNC: 9.5 MG/DL (ref 8.3–10.6)
CHLORIDE SERPL-SCNC: 99 MMOL/L (ref 99–110)
CO2 SERPL-SCNC: 22 MMOL/L (ref 21–32)
CREAT SERPL-MCNC: 1.1 MG/DL (ref 0.6–1.2)
ECHO BSA: 2.09 M2
ECHO BSA: 2.09 M2
EKG ATRIAL RATE: 82 BPM
EKG DIAGNOSIS: NORMAL
EKG P AXIS: 50 DEGREES
EKG P-R INTERVAL: 184 MS
EKG Q-T INTERVAL: 446 MS
EKG QRS DURATION: 178 MS
EKG QTC CALCULATION (BAZETT): 521 MS
EKG R AXIS: 1 DEGREES
EKG T AXIS: 175 DEGREES
EKG VENTRICULAR RATE: 82 BPM
GFR, ESTIMATED: 58 ML/MIN/1.73M2
GLUCOSE BLD-MCNC: 282 MG/DL (ref 70–99)
GLUCOSE BLD-MCNC: 284 MG/DL (ref 70–99)
GLUCOSE BLD-MCNC: 333 MG/DL (ref 70–99)
GLUCOSE BLD-MCNC: 361 MG/DL (ref 70–99)
GLUCOSE SERPL-MCNC: 300 MG/DL (ref 70–99)
PERFORMED ON: ABNORMAL
PERFORMED ON: ABNORMAL
POTASSIUM SERPL-SCNC: 4.6 MMOL/L (ref 3.5–5.1)
SODIUM SERPL-SCNC: 135 MMOL/L (ref 136–145)

## 2024-07-08 PROCEDURE — 6370000000 HC RX 637 (ALT 250 FOR IP): Performed by: STUDENT IN AN ORGANIZED HEALTH CARE EDUCATION/TRAINING PROGRAM

## 2024-07-08 PROCEDURE — 99153 MOD SED SAME PHYS/QHP EA: CPT | Performed by: STUDENT IN AN ORGANIZED HEALTH CARE EDUCATION/TRAINING PROGRAM

## 2024-07-08 PROCEDURE — 2709999900 HC NON-CHARGEABLE SUPPLY: Performed by: STUDENT IN AN ORGANIZED HEALTH CARE EDUCATION/TRAINING PROGRAM

## 2024-07-08 PROCEDURE — 36415 COLL VENOUS BLD VENIPUNCTURE: CPT

## 2024-07-08 PROCEDURE — 2580000003 HC RX 258: Performed by: INTERNAL MEDICINE

## 2024-07-08 PROCEDURE — 93571 IV DOP VEL&/PRESS C FLO 1ST: CPT | Performed by: STUDENT IN AN ORGANIZED HEALTH CARE EDUCATION/TRAINING PROGRAM

## 2024-07-08 PROCEDURE — C1769 GUIDE WIRE: HCPCS | Performed by: STUDENT IN AN ORGANIZED HEALTH CARE EDUCATION/TRAINING PROGRAM

## 2024-07-08 PROCEDURE — B2111ZZ FLUOROSCOPY OF MULTIPLE CORONARY ARTERIES USING LOW OSMOLAR CONTRAST: ICD-10-PCS | Performed by: STUDENT IN AN ORGANIZED HEALTH CARE EDUCATION/TRAINING PROGRAM

## 2024-07-08 PROCEDURE — C1887 CATHETER, GUIDING: HCPCS | Performed by: STUDENT IN AN ORGANIZED HEALTH CARE EDUCATION/TRAINING PROGRAM

## 2024-07-08 PROCEDURE — 99152 MOD SED SAME PHYS/QHP 5/>YRS: CPT | Performed by: STUDENT IN AN ORGANIZED HEALTH CARE EDUCATION/TRAINING PROGRAM

## 2024-07-08 PROCEDURE — 99024 POSTOP FOLLOW-UP VISIT: CPT | Performed by: STUDENT IN AN ORGANIZED HEALTH CARE EDUCATION/TRAINING PROGRAM

## 2024-07-08 PROCEDURE — C1894 INTRO/SHEATH, NON-LASER: HCPCS | Performed by: STUDENT IN AN ORGANIZED HEALTH CARE EDUCATION/TRAINING PROGRAM

## 2024-07-08 PROCEDURE — 6360000004 HC RX CONTRAST MEDICATION: Performed by: STUDENT IN AN ORGANIZED HEALTH CARE EDUCATION/TRAINING PROGRAM

## 2024-07-08 PROCEDURE — 6370000000 HC RX 637 (ALT 250 FOR IP): Performed by: INTERNAL MEDICINE

## 2024-07-08 PROCEDURE — 4A023N7 MEASUREMENT OF CARDIAC SAMPLING AND PRESSURE, LEFT HEART, PERCUTANEOUS APPROACH: ICD-10-PCS | Performed by: STUDENT IN AN ORGANIZED HEALTH CARE EDUCATION/TRAINING PROGRAM

## 2024-07-08 PROCEDURE — 93458 L HRT ARTERY/VENTRICLE ANGIO: CPT | Performed by: STUDENT IN AN ORGANIZED HEALTH CARE EDUCATION/TRAINING PROGRAM

## 2024-07-08 PROCEDURE — 82962 GLUCOSE BLOOD TEST: CPT

## 2024-07-08 PROCEDURE — 6360000002 HC RX W HCPCS: Performed by: STUDENT IN AN ORGANIZED HEALTH CARE EDUCATION/TRAINING PROGRAM

## 2024-07-08 PROCEDURE — 80048 BASIC METABOLIC PNL TOTAL CA: CPT

## 2024-07-08 PROCEDURE — 2500000003 HC RX 250 WO HCPCS: Performed by: STUDENT IN AN ORGANIZED HEALTH CARE EDUCATION/TRAINING PROGRAM

## 2024-07-08 PROCEDURE — 2060000000 HC ICU INTERMEDIATE R&B

## 2024-07-08 RX ORDER — ASPIRIN 325 MG
325 TABLET ORAL ONCE
Status: COMPLETED | OUTPATIENT
Start: 2024-07-08 | End: 2024-07-08

## 2024-07-08 RX ORDER — INSULIN LISPRO 100 [IU]/ML
0-8 INJECTION, SOLUTION INTRAVENOUS; SUBCUTANEOUS
Status: DISCONTINUED | OUTPATIENT
Start: 2024-07-09 | End: 2024-07-08

## 2024-07-08 RX ORDER — INSULIN LISPRO 100 [IU]/ML
0-8 INJECTION, SOLUTION INTRAVENOUS; SUBCUTANEOUS
Status: DISCONTINUED | OUTPATIENT
Start: 2024-07-08 | End: 2024-07-09

## 2024-07-08 RX ORDER — ONDANSETRON 4 MG/1
4 TABLET, ORALLY DISINTEGRATING ORAL EVERY 8 HOURS PRN
Status: DISCONTINUED | OUTPATIENT
Start: 2024-07-08 | End: 2024-07-12 | Stop reason: HOSPADM

## 2024-07-08 RX ORDER — POTASSIUM CHLORIDE 20 MEQ/1
40 TABLET, EXTENDED RELEASE ORAL PRN
Status: DISCONTINUED | OUTPATIENT
Start: 2024-07-08 | End: 2024-07-08 | Stop reason: SDUPTHER

## 2024-07-08 RX ORDER — SODIUM CHLORIDE 9 MG/ML
INJECTION, SOLUTION INTRAVENOUS PRN
Status: DISCONTINUED | OUTPATIENT
Start: 2024-07-08 | End: 2024-07-08 | Stop reason: SDUPTHER

## 2024-07-08 RX ORDER — CLOPIDOGREL BISULFATE 75 MG/1
75 TABLET ORAL DAILY
Status: DISCONTINUED | OUTPATIENT
Start: 2024-07-09 | End: 2024-07-12 | Stop reason: HOSPADM

## 2024-07-08 RX ORDER — ACETAMINOPHEN 325 MG/1
650 TABLET ORAL EVERY 4 HOURS PRN
Status: DISCONTINUED | OUTPATIENT
Start: 2024-07-08 | End: 2024-07-08 | Stop reason: SDUPTHER

## 2024-07-08 RX ORDER — SODIUM CHLORIDE 9 MG/ML
INJECTION, SOLUTION INTRAVENOUS PRN
Status: CANCELLED | OUTPATIENT
Start: 2024-07-08

## 2024-07-08 RX ORDER — SODIUM CHLORIDE 9 MG/ML
INJECTION, SOLUTION INTRAVENOUS CONTINUOUS
Status: CANCELLED | OUTPATIENT
Start: 2024-07-08 | End: 2024-07-08

## 2024-07-08 RX ORDER — ENOXAPARIN SODIUM 100 MG/ML
30 INJECTION SUBCUTANEOUS 2 TIMES DAILY
Status: DISCONTINUED | OUTPATIENT
Start: 2024-07-08 | End: 2024-07-08 | Stop reason: SDUPTHER

## 2024-07-08 RX ORDER — METOPROLOL SUCCINATE 25 MG/1
25 TABLET, EXTENDED RELEASE ORAL NIGHTLY
Status: DISCONTINUED | OUTPATIENT
Start: 2024-07-08 | End: 2024-07-12 | Stop reason: HOSPADM

## 2024-07-08 RX ORDER — POLYVINYL ALCOHOL 14 MG/ML
2 SOLUTION/ DROPS OPHTHALMIC PRN
Status: DISCONTINUED | OUTPATIENT
Start: 2024-07-08 | End: 2024-07-12 | Stop reason: HOSPADM

## 2024-07-08 RX ORDER — SIMETHICONE 40MG/0.6ML
40 SUSPENSION, DROPS(FINAL DOSAGE FORM)(ML) ORAL EVERY 6 HOURS PRN
Status: DISCONTINUED | OUTPATIENT
Start: 2024-07-08 | End: 2024-07-09

## 2024-07-08 RX ORDER — MAGNESIUM SULFATE IN WATER 40 MG/ML
2000 INJECTION, SOLUTION INTRAVENOUS PRN
Status: DISCONTINUED | OUTPATIENT
Start: 2024-07-08 | End: 2024-07-12 | Stop reason: HOSPADM

## 2024-07-08 RX ORDER — ACETAMINOPHEN 650 MG/1
650 SUPPOSITORY RECTAL EVERY 6 HOURS PRN
Status: DISCONTINUED | OUTPATIENT
Start: 2024-07-08 | End: 2024-07-12 | Stop reason: HOSPADM

## 2024-07-08 RX ORDER — ONDANSETRON 2 MG/ML
INJECTION INTRAMUSCULAR; INTRAVENOUS PRN
Status: DISCONTINUED | OUTPATIENT
Start: 2024-07-08 | End: 2024-07-08 | Stop reason: HOSPADM

## 2024-07-08 RX ORDER — POLYETHYLENE GLYCOL 3350 17 G/17G
17 POWDER, FOR SOLUTION ORAL DAILY PRN
Status: DISCONTINUED | OUTPATIENT
Start: 2024-07-08 | End: 2024-07-08 | Stop reason: SDUPTHER

## 2024-07-08 RX ORDER — INSULIN LISPRO 100 [IU]/ML
0-8 INJECTION, SOLUTION INTRAVENOUS; SUBCUTANEOUS
Status: DISCONTINUED | OUTPATIENT
Start: 2024-07-08 | End: 2024-07-08

## 2024-07-08 RX ORDER — NITROGLYCERIN 20 MG/100ML
INJECTION INTRAVENOUS PRN
Status: DISCONTINUED | OUTPATIENT
Start: 2024-07-08 | End: 2024-07-08 | Stop reason: HOSPADM

## 2024-07-08 RX ORDER — SODIUM CHLORIDE 9 MG/ML
INJECTION, SOLUTION INTRAVENOUS PRN
Status: DISCONTINUED | OUTPATIENT
Start: 2024-07-08 | End: 2024-07-08 | Stop reason: HOSPADM

## 2024-07-08 RX ORDER — SODIUM CHLORIDE 0.9 % (FLUSH) 0.9 %
5-40 SYRINGE (ML) INJECTION EVERY 12 HOURS SCHEDULED
Status: DISCONTINUED | OUTPATIENT
Start: 2024-07-08 | End: 2024-07-12 | Stop reason: HOSPADM

## 2024-07-08 RX ORDER — SODIUM CHLORIDE 0.9 % (FLUSH) 0.9 %
5-40 SYRINGE (ML) INJECTION EVERY 12 HOURS SCHEDULED
Status: DISCONTINUED | OUTPATIENT
Start: 2024-07-08 | End: 2024-07-08 | Stop reason: SDUPTHER

## 2024-07-08 RX ORDER — DEXTROSE MONOHYDRATE 100 MG/ML
INJECTION, SOLUTION INTRAVENOUS CONTINUOUS PRN
Status: DISCONTINUED | OUTPATIENT
Start: 2024-07-08 | End: 2024-07-12 | Stop reason: HOSPADM

## 2024-07-08 RX ORDER — SODIUM CHLORIDE 0.9 % (FLUSH) 0.9 %
5-40 SYRINGE (ML) INJECTION EVERY 12 HOURS SCHEDULED
Status: DISCONTINUED | OUTPATIENT
Start: 2024-07-08 | End: 2024-07-08 | Stop reason: HOSPADM

## 2024-07-08 RX ORDER — ATORVASTATIN CALCIUM 40 MG/1
40 TABLET, FILM COATED ORAL NIGHTLY
Status: DISCONTINUED | OUTPATIENT
Start: 2024-07-08 | End: 2024-07-12 | Stop reason: HOSPADM

## 2024-07-08 RX ORDER — MAGNESIUM SULFATE IN WATER 40 MG/ML
2000 INJECTION, SOLUTION INTRAVENOUS PRN
Status: DISCONTINUED | OUTPATIENT
Start: 2024-07-08 | End: 2024-07-08 | Stop reason: SDUPTHER

## 2024-07-08 RX ORDER — POTASSIUM CHLORIDE 7.45 MG/ML
10 INJECTION INTRAVENOUS PRN
Status: DISCONTINUED | OUTPATIENT
Start: 2024-07-08 | End: 2024-07-12 | Stop reason: HOSPADM

## 2024-07-08 RX ORDER — INSULIN GLARGINE 100 [IU]/ML
56 INJECTION, SOLUTION SUBCUTANEOUS NIGHTLY
Status: DISCONTINUED | OUTPATIENT
Start: 2024-07-08 | End: 2024-07-12 | Stop reason: HOSPADM

## 2024-07-08 RX ORDER — HEPARIN SODIUM 1000 [USP'U]/ML
INJECTION, SOLUTION INTRAVENOUS; SUBCUTANEOUS PRN
Status: DISCONTINUED | OUTPATIENT
Start: 2024-07-08 | End: 2024-07-08 | Stop reason: HOSPADM

## 2024-07-08 RX ORDER — FAMOTIDINE 20 MG/1
40 TABLET, FILM COATED ORAL 2 TIMES DAILY
Status: DISCONTINUED | OUTPATIENT
Start: 2024-07-08 | End: 2024-07-12 | Stop reason: HOSPADM

## 2024-07-08 RX ORDER — SODIUM CHLORIDE 0.9 % (FLUSH) 0.9 %
5-40 SYRINGE (ML) INJECTION PRN
Status: DISCONTINUED | OUTPATIENT
Start: 2024-07-08 | End: 2024-07-08 | Stop reason: SDUPTHER

## 2024-07-08 RX ORDER — MIDAZOLAM HYDROCHLORIDE 1 MG/ML
INJECTION INTRAMUSCULAR; INTRAVENOUS PRN
Status: DISCONTINUED | OUTPATIENT
Start: 2024-07-08 | End: 2024-07-08 | Stop reason: HOSPADM

## 2024-07-08 RX ORDER — POTASSIUM CHLORIDE 20 MEQ/1
40 TABLET, EXTENDED RELEASE ORAL PRN
Status: DISCONTINUED | OUTPATIENT
Start: 2024-07-08 | End: 2024-07-12 | Stop reason: HOSPADM

## 2024-07-08 RX ORDER — ACETAMINOPHEN 325 MG/1
650 TABLET ORAL EVERY 6 HOURS PRN
Status: DISCONTINUED | OUTPATIENT
Start: 2024-07-08 | End: 2024-07-12 | Stop reason: HOSPADM

## 2024-07-08 RX ORDER — ENOXAPARIN SODIUM 100 MG/ML
30 INJECTION SUBCUTANEOUS 2 TIMES DAILY
Status: DISCONTINUED | OUTPATIENT
Start: 2024-07-08 | End: 2024-07-12 | Stop reason: HOSPADM

## 2024-07-08 RX ORDER — SODIUM CHLORIDE 0.9 % (FLUSH) 0.9 %
5-40 SYRINGE (ML) INJECTION EVERY 12 HOURS SCHEDULED
Status: CANCELLED | OUTPATIENT
Start: 2024-07-08

## 2024-07-08 RX ORDER — SODIUM CHLORIDE 0.9 % (FLUSH) 0.9 %
5-40 SYRINGE (ML) INJECTION PRN
Status: DISCONTINUED | OUTPATIENT
Start: 2024-07-08 | End: 2024-07-12 | Stop reason: HOSPADM

## 2024-07-08 RX ORDER — INSULIN GLARGINE 100 [IU]/ML
56 INJECTION, SOLUTION SUBCUTANEOUS NIGHTLY
Status: DISCONTINUED | OUTPATIENT
Start: 2024-07-08 | End: 2024-07-08 | Stop reason: HOSPADM

## 2024-07-08 RX ORDER — POTASSIUM CHLORIDE 7.45 MG/ML
10 INJECTION INTRAVENOUS PRN
Status: DISCONTINUED | OUTPATIENT
Start: 2024-07-08 | End: 2024-07-08 | Stop reason: SDUPTHER

## 2024-07-08 RX ORDER — POLYETHYLENE GLYCOL 3350 17 G/17G
17 POWDER, FOR SOLUTION ORAL DAILY PRN
Status: DISCONTINUED | OUTPATIENT
Start: 2024-07-08 | End: 2024-07-12 | Stop reason: HOSPADM

## 2024-07-08 RX ORDER — FENTANYL CITRATE 50 UG/ML
INJECTION, SOLUTION INTRAMUSCULAR; INTRAVENOUS PRN
Status: DISCONTINUED | OUTPATIENT
Start: 2024-07-08 | End: 2024-07-08 | Stop reason: HOSPADM

## 2024-07-08 RX ORDER — ALBUTEROL SULFATE 2.5 MG/3ML
2.5 SOLUTION RESPIRATORY (INHALATION) EVERY 6 HOURS PRN
Status: DISCONTINUED | OUTPATIENT
Start: 2024-07-08 | End: 2024-07-12 | Stop reason: HOSPADM

## 2024-07-08 RX ORDER — SODIUM CHLORIDE 0.9 % (FLUSH) 0.9 %
5-40 SYRINGE (ML) INJECTION PRN
Status: CANCELLED | OUTPATIENT
Start: 2024-07-08

## 2024-07-08 RX ORDER — CYCLOBENZAPRINE HCL 10 MG
5 TABLET ORAL 3 TIMES DAILY PRN
Status: DISCONTINUED | OUTPATIENT
Start: 2024-07-08 | End: 2024-07-12 | Stop reason: HOSPADM

## 2024-07-08 RX ORDER — SODIUM CHLORIDE 9 MG/ML
INJECTION, SOLUTION INTRAVENOUS PRN
Status: DISCONTINUED | OUTPATIENT
Start: 2024-07-08 | End: 2024-07-12 | Stop reason: HOSPADM

## 2024-07-08 RX ORDER — GLUCAGON 1 MG/ML
1 KIT INJECTION PRN
Status: DISCONTINUED | OUTPATIENT
Start: 2024-07-08 | End: 2024-07-08 | Stop reason: RX

## 2024-07-08 RX ORDER — INSULIN LISPRO 100 [IU]/ML
0-4 INJECTION, SOLUTION INTRAVENOUS; SUBCUTANEOUS NIGHTLY
Status: DISCONTINUED | OUTPATIENT
Start: 2024-07-08 | End: 2024-07-08

## 2024-07-08 RX ORDER — VERAPAMIL HYDROCHLORIDE 2.5 MG/ML
INJECTION, SOLUTION INTRAVENOUS PRN
Status: DISCONTINUED | OUTPATIENT
Start: 2024-07-08 | End: 2024-07-08 | Stop reason: HOSPADM

## 2024-07-08 RX ORDER — ONDANSETRON 2 MG/ML
4 INJECTION INTRAMUSCULAR; INTRAVENOUS EVERY 6 HOURS PRN
Status: DISCONTINUED | OUTPATIENT
Start: 2024-07-08 | End: 2024-07-12 | Stop reason: HOSPADM

## 2024-07-08 RX ORDER — CETIRIZINE HYDROCHLORIDE 10 MG/1
5 TABLET ORAL DAILY
Status: DISCONTINUED | OUTPATIENT
Start: 2024-07-09 | End: 2024-07-12 | Stop reason: HOSPADM

## 2024-07-08 RX ORDER — LATANOPROST 50 UG/ML
1 SOLUTION/ DROPS OPHTHALMIC NIGHTLY
Status: DISCONTINUED | OUTPATIENT
Start: 2024-07-08 | End: 2024-07-12 | Stop reason: HOSPADM

## 2024-07-08 RX ADMIN — DIPHENHYDRAMINE HYDROCHLORIDE 25 MG: 25 TABLET ORAL at 05:36

## 2024-07-08 RX ADMIN — INSULIN LISPRO 4 UNITS: 100 INJECTION, SOLUTION INTRAVENOUS; SUBCUTANEOUS at 14:04

## 2024-07-08 RX ADMIN — CETIRIZINE HYDROCHLORIDE 10 MG: 10 TABLET, FILM COATED ORAL at 08:26

## 2024-07-08 RX ADMIN — SODIUM CHLORIDE, PRESERVATIVE FREE 10 ML: 5 INJECTION INTRAVENOUS at 08:25

## 2024-07-08 RX ADMIN — ACETAMINOPHEN 650 MG: 325 TABLET ORAL at 22:02

## 2024-07-08 RX ADMIN — METOPROLOL SUCCINATE 25 MG: 25 TABLET, EXTENDED RELEASE ORAL at 22:02

## 2024-07-08 RX ADMIN — PREDNISONE 60 MG: 20 TABLET ORAL at 05:36

## 2024-07-08 RX ADMIN — INSULIN LISPRO 8 UNITS: 100 INJECTION, SOLUTION INTRAVENOUS; SUBCUTANEOUS at 22:12

## 2024-07-08 RX ADMIN — SACUBITRIL AND VALSARTAN 1 TABLET: 24; 26 TABLET, FILM COATED ORAL at 22:02

## 2024-07-08 RX ADMIN — CLOPIDOGREL BISULFATE 75 MG: 75 TABLET ORAL at 10:53

## 2024-07-08 RX ADMIN — FAMOTIDINE 40 MG: 20 TABLET ORAL at 08:25

## 2024-07-08 RX ADMIN — INSULIN GLARGINE 56 UNITS: 100 INJECTION, SOLUTION SUBCUTANEOUS at 22:04

## 2024-07-08 RX ADMIN — LATANOPROST 1 DROP: 50 SOLUTION OPHTHALMIC at 22:00

## 2024-07-08 RX ADMIN — FAMOTIDINE 40 MG: 20 TABLET, FILM COATED ORAL at 22:02

## 2024-07-08 RX ADMIN — ASPIRIN 325 MG: 325 TABLET ORAL at 10:53

## 2024-07-08 RX ADMIN — SODIUM CHLORIDE, PRESERVATIVE FREE 10 ML: 5 INJECTION INTRAVENOUS at 22:01

## 2024-07-08 RX ADMIN — INSULIN LISPRO 6 UNITS: 100 INJECTION, SOLUTION INTRAVENOUS; SUBCUTANEOUS at 18:08

## 2024-07-08 RX ADMIN — INSULIN LISPRO 4 UNITS: 100 INJECTION, SOLUTION INTRAVENOUS; SUBCUTANEOUS at 08:24

## 2024-07-08 RX ADMIN — ATORVASTATIN CALCIUM 40 MG: 40 TABLET, FILM COATED ORAL at 22:02

## 2024-07-08 ASSESSMENT — PAIN DESCRIPTION - LOCATION
LOCATION: GENERALIZED

## 2024-07-08 ASSESSMENT — PAIN SCALES - GENERAL
PAINLEVEL_OUTOF10: 6
PAINLEVEL_OUTOF10: 0
PAINLEVEL_OUTOF10: 6
PAINLEVEL_OUTOF10: 0
PAINLEVEL_OUTOF10: 6
PAINLEVEL_OUTOF10: 5

## 2024-07-08 ASSESSMENT — PAIN DESCRIPTION - PAIN TYPE
TYPE: CHRONIC PAIN
TYPE: CHRONIC PAIN

## 2024-07-08 ASSESSMENT — PAIN DESCRIPTION - DESCRIPTORS
DESCRIPTORS: ACHING

## 2024-07-08 NOTE — CARE COORDINATION
Review of chart for any potential discharge needs.   No needs identified for discharge intervention at this time.   MD and bedside RN, if needs arise please consult case management for discharge intervention.  CM will follow as needed

## 2024-07-08 NOTE — CARE COORDINATION
Discharge Note     Discharge order received.  This patient will discharge home with no needs.     Destination: Home    All case management needs met.      Comment: Pt d/c home.

## 2024-07-08 NOTE — FLOWSHEET NOTE
07/08/24 0830   Vital Signs   Temp 98.4 °F (36.9 °C)   Temp Source Oral   Pulse 75   Heart Rate Source Monitor   Respirations 20   /70   MAP (Calculated) 90   BP Location Left upper arm   BP Method Automatic   Patient Position Up in chair   Pain Assessment   Pain Assessment None - Denies Pain   Pain Level 0   Oxygen Therapy   SpO2 97 %   O2 Device None (Room air)     Patient assessment completed.  Meds given per MAR. Held plavix and entresto d/t heart cath today.  Patient awaiting for time of heart cath. No s/s of distress. No additional needs at this time.

## 2024-07-08 NOTE — PRE SEDATION
botox injection    EYE SURGERY      LASER FOR GLAUCOMA    LAPAROSCOPY      PACEMAKER INSERTION  05/10/2021    TONSILLECTOMY      UPPER GASTROINTESTINAL ENDOSCOPY      UPPER GASTROINTESTINAL ENDOSCOPY  04/12/2011    DILATATION, 58 FR ARTHUR, 100 UNITS BOTOX    UPPER GASTROINTESTINAL ENDOSCOPY  08/30/2011    58 FR DILATATION, 100 UNITS  BOTOX INJECTION    UPPER GASTROINTESTINAL ENDOSCOPY  03/09/2012    with dilatation and submucosal injection: Botox    UPPER GASTROINTESTINAL ENDOSCOPY      UPPER GASTROINTESTINAL ENDOSCOPY  11/20/2012     Esophagogastroduodenoscopy with Botulinum toxin injection of the pylorus and Arthur esophageal dilation    UPPER GASTROINTESTINAL ENDOSCOPY  06/04/2013    WITH DIILITATION AND BOTOX INJECTION    UPPER GASTROINTESTINAL ENDOSCOPY  05/20/2014    100 unit Botox injection, 56 Fr. Arthur esophageal dilatation    UPPER GASTROINTESTINAL ENDOSCOPY  12/12/2014    with esophageal dilatation, and botox injection    UPPER GASTROINTESTINAL ENDOSCOPY  09/09/2015    With Dilitation and Botox injection    UPPER GASTROINTESTINAL ENDOSCOPY  05/10/2016    distal esophagus biopsy, stomach biopsy, botox injection    UPPER GASTROINTESTINAL ENDOSCOPY  04/11/2017    with dilatation and botox injection    UPPER GASTROINTESTINAL ENDOSCOPY  10/24/2017    DILATATION, BIOPSY, BOTOX    UPPER GASTROINTESTINAL ENDOSCOPY  03/06/2018    WITH BOTOX AND BALLOON DILATATION    UPPER GASTROINTESTINAL ENDOSCOPY N/A 05/07/2019    EGD SUBMUCOSAL/BOTOX INJECTION performed by Geoffrey Velarde MD at [facility] ENDOSCOPY    UPPER GASTROINTESTINAL ENDOSCOPY N/A 05/07/2019    EGD DILATION BALLOON performed by Geoffrey Velarde MD at [facility] ENDOSCOPY    UPPER GASTROINTESTINAL ENDOSCOPY N/A 03/13/2020    EGD SUBMUCOSAL/BOTOX INJECTION performed by Geoffrey Velarde MD at [facility] ENDOSCOPY    UPPER GASTROINTESTINAL ENDOSCOPY N/A 03/13/2020    EGD DILATION BALLOON performed by Geoffrey Velarde MD at [facility] ENDOSCOPY    UPPER

## 2024-07-08 NOTE — PROGRESS NOTES
Pt awake in bed and VSS. Family at bedside. Cath site WNL. No bleeding or selling noted. Extremity warm to touch with full sensation and strong pulse. Denies pain or any other needs. Call light in reach and bed alarm engaged.

## 2024-07-08 NOTE — PROGRESS NOTES
Hospitalist Progress Note      Name:  Lanny Carbajal /Age/Sex: 1965  (59 y.o. 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    Upon arrival in the emergency to have troponin x 2, 20 and 21, proBNP 150, no other significant lab abnormalities were noted.  EKG shows paced rhythm.     Assessment     Chest pain-rule out ACS  History of CAD s/p PCI  History of bradycardia s/p PPM  History of DVT/protein S deficiency  Diabetes mellitus type 2 with hyperglycemia  Hypertension  Hyperlipidemia     Plan     Telemetry monitoring  Hold Eliquis  Cardiology consult appreciated  Plan for left heart cath 2024.  N.p.o. past midnight  Continue Plavix/Toprol-XL/statin therapy  Started on Entresto  Continue Lantus/insulin sliding scale  Diabetic diet  Continue Pepcid      Diet ADULT DIET; Regular; 4 carb choices (60 gm/meal); Low Fat/Low Chol/High Fiber/2 gm Na   DVT Prophylaxis [x] Lovenox, []  Heparin, [] SCDs, [] Ambulation   GI Prophylaxis [] PPI,  [] H2 Blocker,  [] Carafate,  [] Diet/Tube Feeds   Code Status Full Code   Disposition Patient requires continued admission due to pending left heart cath   MDM [] Low, [] Moderate,[x]  High  Patient's risk as above      History of Present Illness:     Chief Complaint: Chest pain    Patient seen and examined this morning.  Patient is sitting in the chair.  Denies any chest pain at this time.  Reports her abdominal spasms are better 
         Hospitalist Progress Note      Name:  Lanny Carbajal /Age/Sex: 1965  (59 y.o. 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    Upon arrival in the emergency to have troponin x 2, 20 and 21, proBNP 150, no other significant lab abnormalities were noted.  EKG shows paced rhythm.     Assessment     Chest pain-rule out ACS  History of CAD s/p PCI  History of bradycardia s/p PPM  History of DVT/protein S deficiency  Diabetes mellitus type 2 with hyperglycemia  Hypertension  Hyperlipidemia     Plan     Telemetry monitoring  Hold Eliquis  Cardiology consult appreciated  Plan for left heart cath 2024.  N.p.o. past midnight  Continue Plavix/Toprol-XL/statin therapy  Started on Entresto  Continue Lantus/insulin sliding scale.  Increase Lantus to 56 units subcu nightly.  Diabetic diet  Continue Pepcid      Diet Diet NPO Exceptions are: Sips of Water with Meds   DVT Prophylaxis [x] Lovenox, []  Heparin, [] SCDs, [] Ambulation   GI Prophylaxis [] PPI,  [] H2 Blocker,  [] Carafate,  [] Diet/Tube Feeds   Code Status Full Code   Disposition Patient requires continued admission due to pending left heart cath   MDM [] Low, [] Moderate,[x]  High  Patient's risk as above      History of Present Illness:     Chief Complaint: Chest pain    Patient seen and examined at bedside.  S/p LHC today.  Resting comfortably.  Denies any chest pain or shortness of breath.    Objective: 
         Hospitalist Progress Note      Name:  Lanny Carbajal /Age/Sex: 1965  (59 y.o. 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    Upon arrival in the emergency to have troponin x 2, 20 and 21, proBNP 150, no other significant lab abnormalities were noted.  EKG shows paced rhythm.     Assessment     Chest pain-rule out ACS  History of CAD s/p PCI  History of bradycardia s/p PPM  History of DVT/protein S deficiency  Diabetes mellitus type 2 with hyperglycemia  Hypertension  Hyperlipidemia     Plan     Telemetry monitoring  Hold Eliquis  Cardiology consult appreciated  Plan for left heart cath 2024.  Continue Plavix/Toprol-XL/statin therapy  Started on Entresto  Continue Lantus/insulin sliding scale  Diabetic diet  Continue Pepcid  Trial of heat pack for abdominal spasm.      Diet ADULT DIET; Regular; 4 carb choices (60 gm/meal); Low Fat/Low Chol/High Fiber/2 gm Na   DVT Prophylaxis [x] Lovenox, []  Heparin, [] SCDs, [] Ambulation   GI Prophylaxis [] PPI,  [] H2 Blocker,  [] Carafate,  [] Diet/Tube Feeds   Code Status Full Code   Disposition Patient requires continued admission due to pending left heart cath   MDM [] Low, [] Moderate,[x]  High  Patient's risk as above      History of Present Illness:     Chief Complaint: Chest pain    Patient seen and examined at bedside.  Denies any chest pain at this time.  Reported brief episode of left shoulder pain last night.  
    Pharmacist Review and Automatic Dose Adjustment of Prophylactic Enoxaparin    The reviewing pharmacist has made an adjustment to the ordered enoxaparin dose or converted to UFH per the approved [facility] protocol and table as identified below.        Lanny Carbajla is a 59 y.o. female.     Recent Labs     07/05/24  1358 07/05/24  1435 07/06/24  0528   CREATININE Unable to perform testing: Specimen hemolyzed. 1.0 1.0       Estimated Creatinine Clearance: 68 mL/min (based on SCr of 1 mg/dL).    Recent Labs     07/05/24  1358 07/06/24  0528   HGB 12.8 12.4   HCT 40.8 38.8    188     Recent Labs     07/05/24  1358   INR 1.0       Height:   Ht Readings from Last 1 Encounters:   07/05/24 1.575 m (5' 2\")     Weight:  Wt Readings from Last 1 Encounters:   07/05/24 104.8 kg (230 lb 14.9 oz)       Plan: Based upon the patient's weight and renal function, the ordered enoxaparin dose of 40 mg daily has been changed/converted to 30 mg twice daily.      Thank you,  Radames Lucas, PharmD  [facility]   [phone]  7/6/2024   9:44 AM            
  [facility] [place] Daily Progress Note      Admit Date:  7/5/2024    Chief Complaint:  CP    Subjective:  Ms. Carbajal feels okay today. No new complaints. Discussed LHC. Agreeable to proceed. Understanding of diagnostic only capabilities with transfer to  if PCI needed.     Objective:   /70   Pulse 75   Temp 98.4 °F (36.9 °C) (Oral)   Resp 20   Ht 1.575 m (5' 2\")   Wt 104.8 kg (230 lb 14.9 oz)   SpO2 97%   BMI 42.24 kg/m²     Intake/Output Summary (Last 24 hours) at 7/8/2024 1044  Last data filed at 7/7/2024 2131  Gross per 24 hour   Intake 160 ml   Output --   Net 160 ml       Physical Exam:  General:  Awake, alert, NAD  Skin:  Warm and dry  Neck:  JVD not visualized sitting upright  Chest:  CTAB, PPM L chest  Cardiovascular:  RRR S1S2, no S3, No murmur  Abdomen:  Soft, ND, NT, No HSM  Extremities:  No edema    Medications:    sodium chloride flush  5-40 mL IntraVENous 2 times per day    insulin glargine  56 Units SubCUTAneous Nightly    aspirin  325 mg Oral Once    sacubitril-valsartan  1 tablet Oral BID    enoxaparin  30 mg SubCUTAneous BID    atorvastatin  40 mg Oral Nightly    clopidogrel  75 mg Oral Daily    famotidine  40 mg Oral BID    insulin lispro  0-8 Units SubCUTAneous TID WC    insulin lispro  0-4 Units SubCUTAneous Nightly    latanoprost  1 drop Both Eyes Nightly    cetirizine  10 mg Oral Daily    metoprolol succinate  25 mg Oral Nightly    sodium chloride flush  5-40 mL IntraVENous 2 times per day      sodium chloride      sodium chloride      dextrose       sodium chloride, polyvinyl alcohol, sodium chloride flush, sodium chloride, potassium chloride **OR** potassium alternative oral replacement **OR** potassium chloride, magnesium sulfate, polyethylene glycol, glucose, dextrose bolus **OR** dextrose bolus, glucagon (rDNA), dextrose, albuterol, acetaminophen    TELEMETRY (Personally reviewed by me): Sinus     Lab Data:  CBC:   Recent Labs     07/05/24  1358 07/06/24  0528   WBC 
Cath Lab Pre Procedure Flowsheet    Plan of Care:     Hemodynamics and cardiac rhythm will remain stable.   Comfort level will be maintained.   Respiratory function will remain adequate.   Pt/family will verbalize understanding of the procedure.   Procedure will be tolerated without complications.   Patient will recover from procedure without complications.   ID armband on patient and identification verified.   Informed consent obtained.   Non invasive blood pressure cuff applied, monitoring initiated.   EKG pads and pulse oximeter applied, monitoring initiated.   Instructions given. Patient and / or family verbalize understanding.   H&P will be documented by physician in [facility].     Pre-procedure:    NPO Status: Pt has been NPO since midnight. .    Contrast / IV Dye Allergy: No    Pregnancy Test: N/A.    Anticoagulants: Lovenox.  Last dose 7/7/2024 at 2131.     Chief Complaint:  Chest Pain    Diabetic: Yes, Diet controlled     Pre SBP: 129/70    IV access: Right Forearm    
Chest pain  LV dysfunction wih EF 35%    Numerous drug allergies    Rec-trial of entresto while in hospital  Cardiac cath soon    If unable to tolerate entresto then consider upgrade of device to biventricular pacemaker  
Patient educated on discharge instructions as well as new medications use, dosage, administration and possible side effects.  Patient verified knowledge. IV removed without difficulty and dry dressing in place. Telemetry monitor removed and returned to [facility]. Pt left facility in stable condition to [facility] with all of their personal belongings.      Report given to KARELY Ayala at [facility].   
Patient left the floor for heart Cath.   
Pt A/Ox4, RA, AV paced on the monitor. Ambulates independently. Utilizing the pain scale during assessment- appropriately following with meds per the MAR guidelines. Pt oriented to room, call light, and standard safety precautions in place.   
  /66   Pulse 68   Temp 97.7 °F (36.5 °C) (Oral)   Resp 16   Ht 1.575 m (5' 2\")   Wt 104.8 kg (230 lb 14.9 oz)   SpO2 98%   BMI 42.24 kg/m²      Respiratory:  Resp Assessment: Normal respiratory effort  Resp Auscultation: Clear to auscultation bilaterally   Cardiovascular:  Auscultation: regular rate and rhythm, normal S1S2, no murmur, rub or gallop  Palpation:  Nl PMI  JVP:  normal  Extremities: No Edema  Abdomen:  Soft, non-tender  Normal bowel sounds  Extremities:   No Cyanosis or Clubbing  Neurological/Psychiatric:  Oriented to time, place, and person  Non-anxious  Skin Warm and dry    Assessment:    Principal Problem:    Chest pain  Plan: somewhat atypical    CAD with LAD stenting    Ischemic cardiomyopathy with F 35%  S/p dual chamber pacemaker    Plan:  Cardiac cath tomorrow  2. Continue entresto

## 2024-07-08 NOTE — PLAN OF CARE
Pt A/ox4, RA, AV paced on tele, ambulates independently- oriented to call light no alarm on d/t patient calling out appropriately. Educated on safety and the importance of utilizing bed/chair alarm, pt still refused. Patient deemed independent, has gripper socks on and environment is clear of clutter. Adjusted lantus dose d/t LHC in the morning, pt will be NPO after midnight.       Problem: Discharge Planning  Goal: Discharge to home or other facility with appropriate resources  7/7/2024 2302 by Mei Coon RN  Outcome: Progressing  Flowsheets (Taken 7/7/2024 2007)  Discharge to home or other facility with appropriate resources:   Identify barriers to discharge with patient and caregiver   Arrange for needed discharge resources and transportation as appropriate   Identify discharge learning needs (meds, wound care, etc)   Arrange for interpreters to assist at discharge as needed   Refer to discharge planning if patient needs post-hospital services based on physician order or complex needs related to functional status, cognitive ability or social support system  7/7/2024 1016 by iVrginia Vazquez RN  Outcome: Progressing     Problem: Pain  Goal: Verbalizes/displays adequate comfort level or baseline comfort level  7/7/2024 2302 by Mei Coon RN  Outcome: Progressing  7/7/2024 1016 by Virginia Vazquez RN  Outcome: Progressing     Problem: Safety - Adult  Goal: Free from fall injury  7/7/2024 2302 by Mei Coon RN  Outcome: Progressing  Flowsheets (Taken 7/7/2024 2007)  Free From Fall Injury:   Instruct family/caregiver on patient safety   Based on caregiver fall risk screen, instruct family/caregiver to ask for assistance with transferring infant if caregiver noted to have fall risk factors  7/7/2024 1016 by Virginia Vazquez RN  Outcome: Progressing

## 2024-07-08 NOTE — PROGRESS NOTES
Patient returned from Heart Cath.  12cc of air in armband.  Told to start removing air at 1315 by cath lab.

## 2024-07-08 NOTE — CONSULTS
mellitus, hypertension, hyperlipidemia.    PAST SURGICAL HISTORY:  Includes PCI of LAD and pacemaker placement.    Additional surgical history includes a cholecystectomy, numerous GI procedures.    SOCIAL HISTORY:  Lives in [place].  No smoking use history.  Lives with her family.  Not currently .  No alcohol use history.    FAMILY HISTORY:  Diabetes in family members; coronary artery disease in brother, father, mother; hypertension in brother, hyperlipidemia in family members.    ALLERGIES:  QUITE LENGTHY, WHICH INCLUDES LAMICTAL, MACRODANTIN, PENICILLIN, SHELLFISH-DERIVED PRODUCTS, BACTRIM, BIAXIN, CEPHALEXIN, CIPRO, HYDROCODONE, NEXIUM, MAGNESIUM, PRILOSEC, Z-BEC.    REVIEW OF SYSTEMS:  Fourteen-system review of systems is otherwise negative.    PHYSICAL EXAMINATION:  GENERAL:  Currently, appears comfortable at rest.  She is in no acute distress.   VITAL SIGNS:  Current blood pressure 130/70, pulse rate 70.  Telemetry with paced rhythm.  HEENT:  Sclerae clear.  Posterior pharynx clear.    SKIN:  Warm and dry.    PULMONARY:  Lung fields are clear.  HEART:  Cardiac sounds are normal.   ABDOMEN:  Soft, nontender.  EXTREMITIES:  Without edema.  She has good peripheral pulses.    NEURO:  Moves all extremities well.    IMPRESSION:  Coronary artery disease with chest pain somewhat atypical, but worsening left ventricular dysfunction based on echocardiogram done yesterday, ejection fraction now 35%.  Lab work does not suggest acute coronary syndrome, status post bradycardia, status post permanent pacemaker, currently with dual-chamber pacemaker.  Hypertension, controlled.  Hyperlipidemia, controlled.    RECOMMENDATIONS:  The patient will be admitted for cardiac catheterization with a worsening left ventricular dysfunction.  I think it is worth a trial of Entresto with observation in the hospital.  Based on discussion with both her and her sister-in-law, she consents to trying the medication, although she is

## 2024-07-08 NOTE — PROGRESS NOTES
[facility] TR band was removed by RN Eliza not long after patient arrived to [facility]. Hematoma noted to right radial site at 1800 . Manual pressure held x 10 min. Area is softer but tender to touch, bruising noted. Extremity elevated on pillow. Education provided.

## 2024-07-08 NOTE — H&P
Hospital Medicine History & Physical      PCP: Fidencio Mares MD    Date of Admission: 7/8/2024    Date of Service: Pt seen/examined on 7/8/2024 and Admitted to Inpatient with expected LOS greater than two midnights due to medical therapy.     Chief Complaint: Unstable angina      History Of Present Illness:    The patient is a 59 y.o. female with history of bipolar disorder, type 2 diabetes, fibromyalgia, GERD, hyperlipidemia, GERD, CAD who was admitted to [facility] for chest pains and underwent left heart cath today noted for 70% LAD stenosis plan for staged PCI.  Patient was sent to [facility] for further management.  Seen this afternoon otherwise stable with no complaints of chest pains or shortness of breath at this time.    Past Medical History:        Diagnosis Date    Ankle fracture, left     Ankle fracture, right     Anxiety     Arthritis     Asthma     Bipolar depression (HCC)     Bladder problem     Cervical disc herniation     COVID toes     Depression     Diabetes mellitus (HCC)     Lantus    Fatty liver disease, non-alcoholic     Fibromyalgia     Gastroparesis     Dr Velarde    GERD (gastroesophageal reflux disease)     gastroporesis    Glaucoma     Dr Malone    Headache     Hx of blood clots     DVT: left leg    Hyperlipidemia     elevated LFT on meds    IBS (irritable bowel syndrome)     Lumbar herniated disc     Nausea & vomiting     Nephrolithiasis 01/29/2019    Obesity (BMI 30-39.9) 03/11/2019    Partial Achilles tendon tear     Pneumonia     PONV (postoperative nausea and vomiting)     RA (rheumatoid arthritis) ([facility])     Rapid or irregular heartbeat     Sleep apnea     Spinal stenosis     Stress incontinence     Thyroid disease        Past Surgical History:        Procedure Laterality Date    CARDIAC PROCEDURE N/A 7/8/2024    Left heart cath / coronary angiography performed by Chevy Pearson MD at [facility] CARDIAC CATH LAB    CARDIAC SURGERY      2 cardiac caths    CHOLECYSTECTOMY

## 2024-07-08 NOTE — DISCHARGE SUMMARY
Discharge Summary           Name:  Lanny Carbajal /Age/Sex: 1965  (59 y.o. 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  Patient was seen by cardiology and underwent left heart cath 2024 which revealed 70% stenosis LAD.  Cardiology recommends staged PCI to LAD.  Recommend transfer to [facility].  Recommend to continue holding Eliquis for now.  Continue dual antiplatelet therapy.  Patient is currently being discharged in stable condition on 2024.  The patient expressed appropriate understanding of and agreement with the discharge recommendations, medications, and plan.     Discharge diagnosis    Unstable angina  CAD  Ischemic cardiomyopathy  History of bradycardia s/p PPM  History of DVT/protein S deficiency  Diabetes mellitus type 2 with hyperglycemia  Hypertension  Hyperlipidemia    Consults this admission:  IP CONSULT TO CARDIOLOGY         Objective Findings at Discharge:   BP (!) 111/55   Pulse 87   Temp 98.4 °F (36.9 °C) (Oral)   Resp 18   Ht 1.575 m (5' 2\")   Wt 104.8 kg (230 lb 14.9 oz)   SpO2 96%   BMI 42.24 kg/m²            PHYSICAL EXAM   See today's progress note    BMP/CBC  Recent Labs     24  1358 24  1435 24  0528 24  0441   NA Unable to perform testing: Specimen hemolyzed. 135* 136 135*   K Unable to perform testing: Specimen hemolyzed. 4.5 4.5 4.6   CL Unable to perform testing: Specimen hemolyzed. 98* 100 99

## 2024-07-09 LAB
ANION GAP SERPL CALCULATED.3IONS-SCNC: 11 MMOL/L (ref 3–16)
BASOPHILS # BLD: 0.1 K/UL (ref 0–0.2)
BASOPHILS NFR BLD: 0.7 %
BUN SERPL-MCNC: 31 MG/DL (ref 7–20)
CALCIUM SERPL-MCNC: 9.4 MG/DL (ref 8.3–10.6)
CHLORIDE SERPL-SCNC: 101 MMOL/L (ref 99–110)
CO2 SERPL-SCNC: 24 MMOL/L (ref 21–32)
CREAT SERPL-MCNC: 1.3 MG/DL (ref 0.6–1.2)
DEPRECATED RDW RBC AUTO: 16.1 % (ref 12.4–15.4)
EOSINOPHIL # BLD: 0 K/UL (ref 0–0.6)
EOSINOPHIL NFR BLD: 0.4 %
GFR SERPLBLD CREATININE-BSD FMLA CKD-EPI: 47 ML/MIN/{1.73_M2}
GLUCOSE BLD-MCNC: 224 MG/DL (ref 70–99)
GLUCOSE BLD-MCNC: 228 MG/DL (ref 70–99)
GLUCOSE BLD-MCNC: 254 MG/DL (ref 70–99)
GLUCOSE BLD-MCNC: 317 MG/DL (ref 70–99)
GLUCOSE BLD-MCNC: 400 MG/DL (ref 70–99)
GLUCOSE BLD-MCNC: 429 MG/DL (ref 70–99)
GLUCOSE SERPL-MCNC: 341 MG/DL (ref 70–99)
HCT VFR BLD AUTO: 37.6 % (ref 36–48)
HGB BLD-MCNC: 12.1 G/DL (ref 12–16)
LYMPHOCYTES # BLD: 2.5 K/UL (ref 1–5.1)
LYMPHOCYTES NFR BLD: 18.8 %
MCH RBC QN AUTO: 27.7 PG (ref 26–34)
MCHC RBC AUTO-ENTMCNC: 32.2 G/DL (ref 31–36)
MCV RBC AUTO: 86 FL (ref 80–100)
MONOCYTES # BLD: 1.2 K/UL (ref 0–1.3)
MONOCYTES NFR BLD: 8.6 %
NEUTROPHILS # BLD: 9.7 K/UL (ref 1.7–7.7)
NEUTROPHILS NFR BLD: 71.5 %
PERFORMED ON: ABNORMAL
PLATELET # BLD AUTO: 189 K/UL (ref 135–450)
PMV BLD AUTO: 9.3 FL (ref 5–10.5)
POTASSIUM SERPL-SCNC: 4.6 MMOL/L (ref 3.5–5.1)
RBC # BLD AUTO: 4.38 M/UL (ref 4–5.2)
SODIUM SERPL-SCNC: 136 MMOL/L (ref 136–145)
WBC # BLD AUTO: 13.5 K/UL (ref 4–11)

## 2024-07-09 PROCEDURE — 82570 ASSAY OF URINE CREATININE: CPT

## 2024-07-09 PROCEDURE — 6370000000 HC RX 637 (ALT 250 FOR IP): Performed by: INTERNAL MEDICINE

## 2024-07-09 PROCEDURE — 36415 COLL VENOUS BLD VENIPUNCTURE: CPT

## 2024-07-09 PROCEDURE — 6370000000 HC RX 637 (ALT 250 FOR IP): Performed by: NURSE PRACTITIONER

## 2024-07-09 PROCEDURE — 6360000002 HC RX W HCPCS: Performed by: INTERNAL MEDICINE

## 2024-07-09 PROCEDURE — 85025 COMPLETE CBC W/AUTO DIFF WBC: CPT

## 2024-07-09 PROCEDURE — 80048 BASIC METABOLIC PNL TOTAL CA: CPT

## 2024-07-09 PROCEDURE — 84300 ASSAY OF URINE SODIUM: CPT

## 2024-07-09 PROCEDURE — 99232 SBSQ HOSP IP/OBS MODERATE 35: CPT | Performed by: INTERNAL MEDICINE

## 2024-07-09 PROCEDURE — 2580000003 HC RX 258: Performed by: INTERNAL MEDICINE

## 2024-07-09 PROCEDURE — 2060000000 HC ICU INTERMEDIATE R&B

## 2024-07-09 RX ORDER — INSULIN LISPRO 100 [IU]/ML
0-4 INJECTION, SOLUTION INTRAVENOUS; SUBCUTANEOUS NIGHTLY
Status: DISCONTINUED | OUTPATIENT
Start: 2024-07-09 | End: 2024-07-12 | Stop reason: HOSPADM

## 2024-07-09 RX ORDER — INSULIN LISPRO 100 [IU]/ML
15 INJECTION, SOLUTION INTRAVENOUS; SUBCUTANEOUS ONCE
Status: COMPLETED | OUTPATIENT
Start: 2024-07-09 | End: 2024-07-09

## 2024-07-09 RX ORDER — SODIUM CHLORIDE 9 MG/ML
INJECTION, SOLUTION INTRAVENOUS CONTINUOUS
Status: DISCONTINUED | OUTPATIENT
Start: 2024-07-09 | End: 2024-07-12 | Stop reason: ALTCHOICE

## 2024-07-09 RX ORDER — SIMETHICONE 80 MG
80 TABLET,CHEWABLE ORAL
Status: DISCONTINUED | OUTPATIENT
Start: 2024-07-09 | End: 2024-07-12 | Stop reason: HOSPADM

## 2024-07-09 RX ORDER — INSULIN LISPRO 100 [IU]/ML
0-16 INJECTION, SOLUTION INTRAVENOUS; SUBCUTANEOUS
Status: DISCONTINUED | OUTPATIENT
Start: 2024-07-09 | End: 2024-07-12 | Stop reason: HOSPADM

## 2024-07-09 RX ORDER — VITAMIN B COMPLEX
1000 TABLET ORAL DAILY
Status: DISCONTINUED | OUTPATIENT
Start: 2024-07-09 | End: 2024-07-12 | Stop reason: HOSPADM

## 2024-07-09 RX ADMIN — ENOXAPARIN SODIUM 30 MG: 100 INJECTION SUBCUTANEOUS at 09:40

## 2024-07-09 RX ADMIN — FAMOTIDINE 40 MG: 20 TABLET, FILM COATED ORAL at 09:37

## 2024-07-09 RX ADMIN — ACETAMINOPHEN 650 MG: 325 TABLET ORAL at 18:07

## 2024-07-09 RX ADMIN — POLYETHYLENE GLYCOL 3350 17 G: 17 POWDER, FOR SOLUTION ORAL at 13:56

## 2024-07-09 RX ADMIN — FAMOTIDINE 40 MG: 20 TABLET, FILM COATED ORAL at 20:49

## 2024-07-09 RX ADMIN — INSULIN LISPRO 4 UNITS: 100 INJECTION, SOLUTION INTRAVENOUS; SUBCUTANEOUS at 09:37

## 2024-07-09 RX ADMIN — LATANOPROST 1 DROP: 50 SOLUTION OPHTHALMIC at 21:13

## 2024-07-09 RX ADMIN — SIMETHICONE 80 MG: 80 TABLET, CHEWABLE ORAL at 12:13

## 2024-07-09 RX ADMIN — INSULIN LISPRO 12 UNITS: 100 INJECTION, SOLUTION INTRAVENOUS; SUBCUTANEOUS at 12:54

## 2024-07-09 RX ADMIN — INSULIN LISPRO 15 UNITS: 100 INJECTION, SOLUTION INTRAVENOUS; SUBCUTANEOUS at 02:47

## 2024-07-09 RX ADMIN — SIMETHICONE 80 MG: 80 TABLET, CHEWABLE ORAL at 17:27

## 2024-07-09 RX ADMIN — INSULIN LISPRO 4 UNITS: 100 INJECTION, SOLUTION INTRAVENOUS; SUBCUTANEOUS at 18:05

## 2024-07-09 RX ADMIN — INSULIN GLARGINE 56 UNITS: 100 INJECTION, SOLUTION SUBCUTANEOUS at 20:49

## 2024-07-09 RX ADMIN — CETIRIZINE HYDROCHLORIDE 5 MG: 10 TABLET, FILM COATED ORAL at 09:36

## 2024-07-09 RX ADMIN — METOPROLOL SUCCINATE 25 MG: 25 TABLET, EXTENDED RELEASE ORAL at 20:48

## 2024-07-09 RX ADMIN — ATORVASTATIN CALCIUM 40 MG: 40 TABLET, FILM COATED ORAL at 20:49

## 2024-07-09 RX ADMIN — SODIUM CHLORIDE: 9 INJECTION, SOLUTION INTRAVENOUS at 17:30

## 2024-07-09 RX ADMIN — Medication 1000 UNITS: at 12:13

## 2024-07-09 RX ADMIN — CLOPIDOGREL BISULFATE 75 MG: 75 TABLET ORAL at 09:36

## 2024-07-09 RX ADMIN — SODIUM CHLORIDE, PRESERVATIVE FREE 10 ML: 5 INJECTION INTRAVENOUS at 09:38

## 2024-07-09 RX ADMIN — ENOXAPARIN SODIUM 30 MG: 100 INJECTION SUBCUTANEOUS at 20:49

## 2024-07-09 RX ADMIN — ACETAMINOPHEN 650 MG: 325 TABLET ORAL at 12:13

## 2024-07-09 ASSESSMENT — PAIN SCALES - GENERAL
PAINLEVEL_OUTOF10: 3
PAINLEVEL_OUTOF10: 3
PAINLEVEL_OUTOF10: 1
PAINLEVEL_OUTOF10: 0
PAINLEVEL_OUTOF10: 5
PAINLEVEL_OUTOF10: 2
PAINLEVEL_OUTOF10: 5
PAINLEVEL_OUTOF10: 5
PAINLEVEL_OUTOF10: 3
PAINLEVEL_OUTOF10: 2
PAINLEVEL_OUTOF10: 5

## 2024-07-09 ASSESSMENT — PAIN DESCRIPTION - PAIN TYPE
TYPE: CHRONIC PAIN

## 2024-07-09 ASSESSMENT — PAIN DESCRIPTION - DESCRIPTORS
DESCRIPTORS: ACHING

## 2024-07-09 ASSESSMENT — PAIN DESCRIPTION - LOCATION
LOCATION: GENERALIZED

## 2024-07-09 NOTE — PLAN OF CARE
Problem: Safety - Adult  Goal: Free from fall injury  Outcome: Progressing     Problem: Discharge Planning  Goal: Discharge to home or other facility with appropriate resources  Outcome: Progressing     Problem: ABCDS Injury Assessment  Goal: Absence of physical injury  Outcome: Progressing     Problem: Pain  Goal: Verbalizes/displays adequate comfort level or baseline comfort level  Outcome: Progressing     Problem: Cardiovascular - Adult  Goal: Maintains optimal cardiac output and hemodynamic stability  Outcome: Progressing  Goal: Absence of cardiac dysrhythmias or at baseline  Outcome: Progressing

## 2024-07-09 NOTE — PROGRESS NOTES
Nephrology Consult Note  [facility]  [phone]   [facility]        Reason for Consult: MONIQUE    HISTORY OF PRESENT ILLNESS:             59-year-old female with history of renal stone, bipolar, DM, fibro myalgia, CAD is admitted with chest pain. She underwent LHC on 7/8/2024 that showed 70% LAD stenosis and plan was for staged PCI.  Nephrology consulted for MONIQUE, SCr worsened to 1.3 from prior baseline of 0.8-1.  Blood pressure has been normal.  She was seen and examined today.  Denies any complaints.    Past Medical History:        Diagnosis Date    Ankle fracture, left     Ankle fracture, right     Anxiety     Arthritis     Asthma     Bipolar depression (HCC)     Bladder problem     Cervical disc herniation     COVID toes     Depression     Diabetes mellitus (HCC)     Lantus    Fatty liver disease, non-alcoholic     Fibromyalgia     Gastroparesis     Dr Velarde    GERD (gastroesophageal reflux disease)     gastroporesis    Glaucoma     Dr Malone    Headache     Hx of blood clots     DVT: left leg    Hyperlipidemia     elevated LFT on meds    IBS (irritable bowel syndrome)     Lumbar herniated disc     Nausea & vomiting     Nephrolithiasis 01/29/2019    Obesity (BMI 30-39.9) 03/11/2019    Partial Achilles tendon tear     Pneumonia     PONV (postoperative nausea and vomiting)     RA (rheumatoid arthritis) (HCC)     Rapid or irregular heartbeat     Sleep apnea     Spinal stenosis     Stress incontinence     Thyroid disease        Past Surgical History:        Procedure Laterality Date    CARDIAC PROCEDURE N/A 7/8/2024    Left heart cath / coronary angiography performed by Chevy Pearson MD at [facility] CARDIAC CATH LAB    CARDIAC SURGERY      2 cardiac caths    CHOLECYSTECTOMY      COLONOSCOPY      COLONOSCOPY  03/06/2018    with polypectomies    DILATION AND CURETTAGE OF UTERUS      ENDOMETRIAL ABLATION      ENDOSCOPY, COLON, DIAGNOSTIC      ERCP  05/25/2010    with stent    ERCP  01/31/2014       Substance and Sexual Activity    Alcohol use: No     Alcohol/week: 0.0 standard drinks of alcohol    Drug use: No    Sexual activity: Not Currently   Other Topics Concern    Not on file   Social History Narrative    Caffeine:        SODA -1 a month          TEA - 1 glass a day        COFFEE - 0     Social Determinants of Health     Financial Resource Strain: Medium Risk (10/2/2023)    Overall Financial Resource Strain (CARDIA)     Difficulty of Paying Living Expenses: Somewhat hard   Food Insecurity: No Food Insecurity (7/8/2024)    Hunger Vital Sign     Worried About Running Out of Food in the Last Year: Never true     Ran Out of Food in the Last Year: Never true   Transportation Needs: No Transportation Needs (7/8/2024)    PRAPARE - Transportation     Lack of Transportation (Medical): No     Lack of Transportation (Non-Medical): No   Physical Activity: Not on file   Stress: Not on file   Social Connections: Not on file   Intimate Partner Violence: Not on file   Housing Stability: High Risk (7/8/2024)    Housing Stability Vital Sign     Unable to Pay for Housing in the Last Year: Yes     Number of Places Lived in the Last Year: 1     Unstable Housing in the Last Year: No       Family History:       Problem Relation Age of Onset    Diabetes Mother     Heart Disease Mother     Stroke Mother     Arthritis Mother     Mental Illness Mother     Glaucoma Mother     Heart Disease Father     Arthritis Father     Glaucoma Father     Heart Disease Brother     High Blood Pressure Brother     High Cholesterol Brother     Diabetes Brother     High Cholesterol Brother          REVIEW OF SYSTEMS:      The remainder of the ROS is negative except per HPI.    PHYSICAL EXAM:    Vitals:    /62   Pulse 70   Temp 97 °F (36.1 °C) (Temporal)   Resp 16   Ht 1.575 m (5' 2\")   Wt 106 kg (233 lb 11 oz)   SpO2 96%   BMI 42.74 kg/m²   I/O last 3 completed shifts:  In: 480 [P.O.:480]  Out: -   I/O this shift:  In: 720

## 2024-07-09 NOTE — PROGRESS NOTES
[facility]  H+P  Consult  OP Visit  FU Visit   CC/INTERVAL HX   Admit 7/8/2024   CC CAD, ARF   Subjective CAD, ARF   Tele Reviewed available   EXAM   VS /63   Pulse 73   Temp 97.2 °F (36.2 °C) (Temporal)   Resp 18   Ht 1.575 m (5' 2\")   Wt 106 kg (233 lb 11 oz)   SpO2 97%   BMI 42.74 kg/m²    Gen Alert, coop, no distress Heart  RRR, no MRG   Head NC, AT, no abnorm Abd  Soft, NT, +BS, no mass, no OM   Eyes PER, conj/corn clear Ext  Ext nl, AT, no C/C/E   Nose Nares nl, no drain, NT Pulse 2+ and symmetric   Throat Lips, mucosa, tongue nl Skin Col/text/turg nl, no vis rash/les   Neck S/S, TM, NT, no bruit/JVD Psych Nl mood and affect   Lung CTA-B, unlabored, no DTP Lymph   No cervical or axillary LA   Ch wall NT, no deform Neuro  Nl gross M/S exam      MEDICATIONS   Relevant cardiac medications Reviewed in EPIC   LABS   Hgb Lab Results   Component Value Date    HGB 12.1 07/09/2024      Cr Lab Results   Component Value Date    CREATININE 1.3 (H) 07/09/2024      Trop Lab Results   Component Value Date    CKTOTAL 129 12/27/2016    TROPHS 21 (H) 07/05/2024    TROPHS 20 (H) 07/05/2024    TROPHS 30 (H) 12/12/2023       ASSESSMENT TIME   More than 35 minutes spent reviewing patient chart (including but not limited to notes, labs, imaging and other testing), interviewing patient and/or family members, performing a physical exam, documentation of my findings above and subsequent follow-up of ordered testing.  More than 50% of that time spent face to face with patient discussing clinical condition and counseling regarding treatment plan.     ASSESSMENT AND PLAN   *CAD  Status PCI LAD recently  Plan Early ISR, plan for IVUS/POBA tomorrow (Wedenesday) - deferred due to creatinine  *ARF  Status Worsening creatinine with contrast and entresto  Plan Hydration today, hold entresto  *CHOL  Status  On statin  Plan Continue statin

## 2024-07-09 NOTE — PROGRESS NOTES
Hospitalist Progress Note      PCP: Fidencio Mares MD    Date of Admission: 7/8/2024    LOS: 1    Chief Complaint: No chief complaint on file.      Case Summary:   59-year-old lady with history of  bipolar disorder, type 2 diabetes, fibromyalgia, GERD, hyperlipidemia, GERD, CAD who was admitted to [facility] for chest pains and underwent left heart cath today noted for 70% LAD stenosis planned for staged PCI      Active Hospital Problems    Diagnosis Date Noted    Unstable angina (HCC) [I20.0] 07/08/2024    NSTEMI (non-ST elevated myocardial infarction) (HCC) [I21.4] 12/12/2023    Bipolar depression (HCC) [F31.9] 10/02/2023    Coronary artery disease involving native coronary artery of native heart without angina pectoris [I25.10] 03/19/2019    Hypercholesteremia [E78.00] 05/02/2017    GERD (gastroesophageal reflux disease) [K21.9] 01/05/2012    Type 2 diabetes mellitus without complication, with long-term current use of insulin ([facility]) [E11.9, Z79.4] 01/05/2012         Principal Problem:    Unstable angina with ischemic cardiomyopathy    NSTEMI (non-ST elevated myocardial infarction) ([facility])  Transferred from [facility].  Angiogram was noted for significant LAD disease for planned staged PCI  - Seen cardiology this morning with plans for cath tomorrow  - Continue antianginal therapy with clopidogrel, statin, metoprolol      Acute kidney injury: Noted creatinine bump to 1.3 this morning.  Patient had follow-up with outpatient nephrology before.  Will consult nephrology  - Monitor renal function electrolytes  - Avoid nephrotoxic agents  - Gentle IV hydration    Active Problems:    Type 2 diabetes mellitus without complication, with long-term current use of insulin ([facility]): On sliding scale insulin with glargine.  Will adjust for hyperglycemia    GERD (gastroesophageal reflux disease): Stable on famotidine    Hypercholesteremia: On statin    Coronary artery disease involving native coronary artery of   Normal respiratory effort. Clear to auscultation, no Rales/Wheezes/Rhonchi.  Cardiovascular: S1/S2 without murmurs, rubs or gallops. RRR  Abdomen: Soft, non-tender, non-distended, bowel sounds present.  Musculoskeletal: No clubbing, cyanosis or edema bilaterally.    Skin: Skin color, texture, turgor normal.  No rashes or lesions.  Neurologic:  Cranial nerves: II-XII intact, ASIM, No focal sensory/motor deficits  Psychiatric: Alert and oriented, thought content appropriate  Capillary Refill: Brisk,< 3 seconds   Peripheral Pulses: +2 palpable, equal bilaterally       Intake/Output Summary (Last 24 hours) at 7/9/2024 1237  Last data filed at 7/9/2024 1030  Gross per 24 hour   Intake 720 ml   Output --   Net 720 ml       Labs:   Recent Labs     07/09/24  0434   WBC 13.5*   HGB 12.1   HCT 37.6         Recent Labs     07/08/24  0441 07/09/24  0433   * 136   K 4.6 4.6   CL 99 101   CO2 22 24   BUN 24* 31*   CREATININE 1.1 1.3*   CALCIUM 9.5 9.4     No results for input(s): \"CKTOTAL\", \"TROPONINI\" in the last 72 hours.    Urinalysis:    Lab Results   Component Value Date/Time    NITRU Negative 03/05/2024 02:52 PM    WBCUA 239 05/26/2021 05:42 PM    BACTERIA 1+ 05/26/2021 05:42 PM    RBCUA 3-4 05/26/2021 05:42 PM    BLOODU Negative 03/05/2024 02:52 PM    SPECGRAV 1.030 02/27/2018 11:56 AM    GLUCOSEU Negative 03/05/2024 02:52 PM       Radiology:  No orders to display           Janes Smallwood MD      Please excuse brevity and/or typos. This report was transcribed using voice recognition software. Every effort was made to ensure accuracy, however, inadvertent computerized transcription errors may be present.

## 2024-07-09 NOTE — PROGRESS NOTES
Cath lab nurse Tanika informed this nurse that pt may eat a light breakfast because procedure will be later this afternoon.    @0920  Per Dr. Minaya, rasta tomorrow. Pt can eat today

## 2024-07-10 DIAGNOSIS — I25.10 CORONARY ARTERY DISEASE DUE TO LIPID RICH PLAQUE: Primary | ICD-10-CM

## 2024-07-10 DIAGNOSIS — I25.83 CORONARY ARTERY DISEASE DUE TO LIPID RICH PLAQUE: Primary | ICD-10-CM

## 2024-07-10 LAB
ANION GAP SERPL CALCULATED.3IONS-SCNC: 10 MMOL/L (ref 3–16)
BUN SERPL-MCNC: 24 MG/DL (ref 7–20)
CALCIUM SERPL-MCNC: 9.2 MG/DL (ref 8.3–10.6)
CHLORIDE SERPL-SCNC: 103 MMOL/L (ref 99–110)
CO2 SERPL-SCNC: 24 MMOL/L (ref 21–32)
CREAT SERPL-MCNC: 1 MG/DL (ref 0.6–1.2)
CREAT UR-MCNC: 88 MG/DL (ref 28–259)
GFR SERPLBLD CREATININE-BSD FMLA CKD-EPI: 64 ML/MIN/{1.73_M2}
GLUCOSE BLD-MCNC: 178 MG/DL (ref 70–99)
GLUCOSE BLD-MCNC: 215 MG/DL (ref 70–99)
GLUCOSE BLD-MCNC: 341 MG/DL (ref 70–99)
GLUCOSE BLD-MCNC: 342 MG/DL (ref 70–99)
GLUCOSE SERPL-MCNC: 217 MG/DL (ref 70–99)
PERFORMED ON: ABNORMAL
POC ACT LR: 276 SEC
POC ACT LR: 290 SEC
POTASSIUM SERPL-SCNC: 5 MMOL/L (ref 3.5–5.1)
SODIUM SERPL-SCNC: 137 MMOL/L (ref 136–145)
SODIUM UR-SCNC: 72 MMOL/L

## 2024-07-10 PROCEDURE — C1725 CATH, TRANSLUMIN NON-LASER: HCPCS | Performed by: INTERNAL MEDICINE

## 2024-07-10 PROCEDURE — 2500000003 HC RX 250 WO HCPCS: Performed by: INTERNAL MEDICINE

## 2024-07-10 PROCEDURE — C1753 CATH, INTRAVAS ULTRASOUND: HCPCS | Performed by: INTERNAL MEDICINE

## 2024-07-10 PROCEDURE — 99152 MOD SED SAME PHYS/QHP 5/>YRS: CPT | Performed by: INTERNAL MEDICINE

## 2024-07-10 PROCEDURE — 85347 COAGULATION TIME ACTIVATED: CPT

## 2024-07-10 PROCEDURE — B240ZZ3 ULTRASONOGRAPHY OF SINGLE CORONARY ARTERY, INTRAVASCULAR: ICD-10-PCS | Performed by: INTERNAL MEDICINE

## 2024-07-10 PROCEDURE — 80048 BASIC METABOLIC PNL TOTAL CA: CPT

## 2024-07-10 PROCEDURE — 6360000002 HC RX W HCPCS

## 2024-07-10 PROCEDURE — 2580000003 HC RX 258

## 2024-07-10 PROCEDURE — 027034Z DILATION OF CORONARY ARTERY, ONE ARTERY WITH DRUG-ELUTING INTRALUMINAL DEVICE, PERCUTANEOUS APPROACH: ICD-10-PCS | Performed by: INTERNAL MEDICINE

## 2024-07-10 PROCEDURE — 2580000003 HC RX 258: Performed by: INTERNAL MEDICINE

## 2024-07-10 PROCEDURE — 6360000004 HC RX CONTRAST MEDICATION: Performed by: INTERNAL MEDICINE

## 2024-07-10 PROCEDURE — 36415 COLL VENOUS BLD VENIPUNCTURE: CPT

## 2024-07-10 PROCEDURE — C1887 CATHETER, GUIDING: HCPCS | Performed by: INTERNAL MEDICINE

## 2024-07-10 PROCEDURE — 2060000000 HC ICU INTERMEDIATE R&B

## 2024-07-10 PROCEDURE — 92978 ENDOLUMINL IVUS OCT C 1ST: CPT | Performed by: INTERNAL MEDICINE

## 2024-07-10 PROCEDURE — 99153 MOD SED SAME PHYS/QHP EA: CPT | Performed by: INTERNAL MEDICINE

## 2024-07-10 PROCEDURE — 92928 PRQ TCAT PLMT NTRAC ST 1 LES: CPT | Performed by: INTERNAL MEDICINE

## 2024-07-10 PROCEDURE — 6370000000 HC RX 637 (ALT 250 FOR IP): Performed by: INTERNAL MEDICINE

## 2024-07-10 PROCEDURE — C1769 GUIDE WIRE: HCPCS | Performed by: INTERNAL MEDICINE

## 2024-07-10 PROCEDURE — 2709999900 HC NON-CHARGEABLE SUPPLY: Performed by: INTERNAL MEDICINE

## 2024-07-10 PROCEDURE — C1894 INTRO/SHEATH, NON-LASER: HCPCS | Performed by: INTERNAL MEDICINE

## 2024-07-10 PROCEDURE — 2500000003 HC RX 250 WO HCPCS

## 2024-07-10 PROCEDURE — 6360000002 HC RX W HCPCS: Performed by: INTERNAL MEDICINE

## 2024-07-10 PROCEDURE — C1760 CLOSURE DEV, VASC: HCPCS | Performed by: INTERNAL MEDICINE

## 2024-07-10 PROCEDURE — C1874 STENT, COATED/COV W/DEL SYS: HCPCS | Performed by: INTERNAL MEDICINE

## 2024-07-10 PROCEDURE — 6370000000 HC RX 637 (ALT 250 FOR IP): Performed by: PHYSICIAN ASSISTANT

## 2024-07-10 DEVICE — XIENCE SIERRA™ EVEROLIMUS ELUTING CORONARY STENT SYSTEM 4.00 MM X 23 MM / RAPID-EXCHANGE
Type: IMPLANTABLE DEVICE | Status: FUNCTIONAL
Brand: XIENCE SIERRA™

## 2024-07-10 RX ORDER — FENTANYL CITRATE 50 UG/ML
INJECTION, SOLUTION INTRAMUSCULAR; INTRAVENOUS PRN
Status: DISCONTINUED | OUTPATIENT
Start: 2024-07-10 | End: 2024-07-10 | Stop reason: HOSPADM

## 2024-07-10 RX ORDER — HEPARIN SODIUM 1000 [USP'U]/ML
INJECTION, SOLUTION INTRAVENOUS; SUBCUTANEOUS PRN
Status: DISCONTINUED | OUTPATIENT
Start: 2024-07-10 | End: 2024-07-10 | Stop reason: HOSPADM

## 2024-07-10 RX ORDER — METHYLPREDNISOLONE SODIUM SUCCINATE 125 MG/2ML
INJECTION, POWDER, LYOPHILIZED, FOR SOLUTION INTRAMUSCULAR; INTRAVENOUS PRN
Status: DISCONTINUED | OUTPATIENT
Start: 2024-07-10 | End: 2024-07-10 | Stop reason: HOSPADM

## 2024-07-10 RX ORDER — ONDANSETRON 2 MG/ML
INJECTION INTRAMUSCULAR; INTRAVENOUS PRN
Status: DISCONTINUED | OUTPATIENT
Start: 2024-07-10 | End: 2024-07-10 | Stop reason: HOSPADM

## 2024-07-10 RX ORDER — INSULIN LISPRO 100 [IU]/ML
10 INJECTION, SOLUTION INTRAVENOUS; SUBCUTANEOUS
Status: DISCONTINUED | OUTPATIENT
Start: 2024-07-10 | End: 2024-07-11

## 2024-07-10 RX ORDER — DIPHENHYDRAMINE HYDROCHLORIDE 50 MG/ML
INJECTION INTRAMUSCULAR; INTRAVENOUS PRN
Status: DISCONTINUED | OUTPATIENT
Start: 2024-07-10 | End: 2024-07-10 | Stop reason: HOSPADM

## 2024-07-10 RX ORDER — ASPIRIN 81 MG/1
81 TABLET ORAL DAILY
Status: DISCONTINUED | OUTPATIENT
Start: 2024-07-10 | End: 2024-07-12 | Stop reason: HOSPADM

## 2024-07-10 RX ORDER — MIDAZOLAM HYDROCHLORIDE 1 MG/ML
INJECTION INTRAMUSCULAR; INTRAVENOUS PRN
Status: DISCONTINUED | OUTPATIENT
Start: 2024-07-10 | End: 2024-07-10 | Stop reason: HOSPADM

## 2024-07-10 RX ADMIN — SODIUM CHLORIDE: 9 INJECTION, SOLUTION INTRAVENOUS at 15:39

## 2024-07-10 RX ADMIN — METOPROLOL SUCCINATE 25 MG: 25 TABLET, EXTENDED RELEASE ORAL at 20:39

## 2024-07-10 RX ADMIN — INSULIN GLARGINE 56 UNITS: 100 INJECTION, SOLUTION SUBCUTANEOUS at 20:40

## 2024-07-10 RX ADMIN — Medication 1000 UNITS: at 08:11

## 2024-07-10 RX ADMIN — SIMETHICONE 80 MG: 80 TABLET, CHEWABLE ORAL at 13:42

## 2024-07-10 RX ADMIN — FAMOTIDINE 40 MG: 20 TABLET, FILM COATED ORAL at 08:11

## 2024-07-10 RX ADMIN — CETIRIZINE HYDROCHLORIDE 5 MG: 10 TABLET, FILM COATED ORAL at 08:12

## 2024-07-10 RX ADMIN — SODIUM CHLORIDE, PRESERVATIVE FREE 10 ML: 5 INJECTION INTRAVENOUS at 08:12

## 2024-07-10 RX ADMIN — INSULIN LISPRO 12 UNITS: 100 INJECTION, SOLUTION INTRAVENOUS; SUBCUTANEOUS at 18:33

## 2024-07-10 RX ADMIN — SIMETHICONE 80 MG: 80 TABLET, CHEWABLE ORAL at 17:25

## 2024-07-10 RX ADMIN — INSULIN LISPRO 4 UNITS: 100 INJECTION, SOLUTION INTRAVENOUS; SUBCUTANEOUS at 20:40

## 2024-07-10 RX ADMIN — SODIUM CHLORIDE: 9 INJECTION, SOLUTION INTRAVENOUS at 10:05

## 2024-07-10 RX ADMIN — ATORVASTATIN CALCIUM 40 MG: 40 TABLET, FILM COATED ORAL at 20:39

## 2024-07-10 RX ADMIN — POLYETHYLENE GLYCOL 3350 17 G: 17 POWDER, FOR SOLUTION ORAL at 20:38

## 2024-07-10 RX ADMIN — ACETAMINOPHEN 650 MG: 325 TABLET ORAL at 14:21

## 2024-07-10 RX ADMIN — SODIUM CHLORIDE, PRESERVATIVE FREE 10 ML: 5 INJECTION INTRAVENOUS at 20:41

## 2024-07-10 RX ADMIN — INSULIN LISPRO 10 UNITS: 100 INJECTION, SOLUTION INTRAVENOUS; SUBCUTANEOUS at 18:34

## 2024-07-10 RX ADMIN — ACETAMINOPHEN 650 MG: 325 TABLET ORAL at 04:02

## 2024-07-10 RX ADMIN — FAMOTIDINE 40 MG: 20 TABLET, FILM COATED ORAL at 20:39

## 2024-07-10 RX ADMIN — ENOXAPARIN SODIUM 30 MG: 100 INJECTION SUBCUTANEOUS at 20:40

## 2024-07-10 RX ADMIN — LATANOPROST 1 DROP: 50 SOLUTION OPHTHALMIC at 20:41

## 2024-07-10 RX ADMIN — INSULIN LISPRO 4 UNITS: 100 INJECTION, SOLUTION INTRAVENOUS; SUBCUTANEOUS at 13:43

## 2024-07-10 RX ADMIN — SIMETHICONE 80 MG: 80 TABLET, CHEWABLE ORAL at 08:12

## 2024-07-10 RX ADMIN — CLOPIDOGREL BISULFATE 75 MG: 75 TABLET ORAL at 08:12

## 2024-07-10 RX ADMIN — SODIUM CHLORIDE: 9 INJECTION, SOLUTION INTRAVENOUS at 06:11

## 2024-07-10 RX ADMIN — ASPIRIN 81 MG: 81 TABLET, COATED ORAL at 17:25

## 2024-07-10 ASSESSMENT — PAIN SCALES - GENERAL
PAINLEVEL_OUTOF10: 5
PAINLEVEL_OUTOF10: 3
PAINLEVEL_OUTOF10: 0
PAINLEVEL_OUTOF10: 6
PAINLEVEL_OUTOF10: 3
PAINLEVEL_OUTOF10: 4
PAINLEVEL_OUTOF10: 5
PAINLEVEL_OUTOF10: 7
PAINLEVEL_OUTOF10: 3
PAINLEVEL_OUTOF10: 6
PAINLEVEL_OUTOF10: 6
PAINLEVEL_OUTOF10: 0

## 2024-07-10 ASSESSMENT — PAIN DESCRIPTION - LOCATION
LOCATION: GROIN;CHEST
LOCATION: GENERALIZED
LOCATION: GENERALIZED
LOCATION: GROIN
LOCATION: GROIN
LOCATION: GENERALIZED
LOCATION: CHEST;GROIN

## 2024-07-10 ASSESSMENT — PAIN DESCRIPTION - PAIN TYPE
TYPE: CHRONIC PAIN
TYPE: CHRONIC PAIN

## 2024-07-10 ASSESSMENT — PAIN DESCRIPTION - ORIENTATION
ORIENTATION: LEFT
ORIENTATION: RIGHT

## 2024-07-10 ASSESSMENT — PAIN DESCRIPTION - DESCRIPTORS
DESCRIPTORS: ACHING

## 2024-07-10 NOTE — PROGRESS NOTES
Nephrology Progress Note  [facility]  [phone]   [facility]    Patient:  Lanny Carbajal   : 1965    CC: MONIQUE    Subjective:  Patient had staged PCI this morning.  On maintenance IV fluid.  Kidney function looks little better.    ROS:   No complaints doing okay    SHx:  Sister-in-law at bedside    Meds:  Scheduled Meds:   simethicone  80 mg Oral TID WC    Vitamin D  1,000 Units Oral Daily    insulin lispro  0-16 Units SubCUTAneous TID WC    insulin lispro  0-4 Units SubCUTAneous Nightly    atorvastatin  40 mg Oral Nightly    clopidogrel  75 mg Oral Daily    famotidine  40 mg Oral BID    latanoprost  1 drop Both Eyes Nightly    cetirizine  5 mg Oral Daily    metoprolol succinate  25 mg Oral Nightly    sodium chloride flush  5-40 mL IntraVENous 2 times per day    insulin glargine  56 Units SubCUTAneous Nightly    enoxaparin  30 mg SubCUTAneous BID     Continuous Infusions:   sodium chloride 75 mL/hr at 07/10/24 1005    sodium chloride      dextrose       PRN Meds:.polyvinyl alcohol, sodium chloride flush, sodium chloride, dextrose bolus **OR** dextrose bolus, dextrose, albuterol, potassium chloride **OR** potassium alternative oral replacement **OR** potassium chloride, magnesium sulfate, ondansetron **OR** ondansetron, polyethylene glycol, acetaminophen **OR** acetaminophen, cyclobenzaprine    Vitals:  BP (!) 150/76   Pulse 63   Temp 97.4 °F (36.3 °C) (Temporal)   Resp 16   Ht 1.575 m (5' 2\")   Wt 107.9 kg (237 lb 12.8 oz)   SpO2 100%   BMI 43.49 kg/m²     Physical Exam:  Gen: Resting in bed, NAD.    HEENT: MMM, OP clear.  CV: RRR, no S3.  Lungs: good respiratory effort and clear air entry   Abd: S/NT +BS  Ext: No edema, no cyanosis  Skin: Warm.  No rashes appreciated.    Labs:  CBC with Differential:    Lab Results   Component Value Date/Time    WBC 13.5 2024 04:34 AM    RBC 4.38 2024 04:34 AM    HGB 12.1 2024 04:34 AM    HCT 37.6 2024 04:34 AM

## 2024-07-10 NOTE — PROGRESS NOTES
[facility]    7/10/2024 2:12 PM        Name: Lanny Carbajal .              Admitted: 7/8/2024  Primary Care Provider: Fidencio Mares MD (Tel: [phone])      Subjective:    Patient had L heart cath with stenting of LAD this am. Mild chest pain and present time. No abdominal or back pain. No SOB. She lives about 1.5 hours away.    Reviewed interval ancillary notes    Current Medications  simethicone (MYLICON) chewable tablet 80 mg, TID WC  Vitamin D (CHOLECALCIFEROL) tablet 1,000 Units, Daily  insulin lispro (HUMALOG,ADMELOG) injection vial 0-16 Units, TID WC  insulin lispro (HUMALOG,ADMELOG) injection vial 0-4 Units, Nightly  0.9 % sodium chloride infusion, Continuous  atorvastatin (LIPITOR) tablet 40 mg, Nightly  clopidogrel (PLAVIX) tablet 75 mg, Daily  famotidine (PEPCID) tablet 40 mg, BID  latanoprost (XALATAN) 0.005 % ophthalmic solution 1 drop, Nightly  cetirizine (ZYRTEC) tablet 5 mg, Daily  metoprolol succinate (TOPROL XL) extended release tablet 25 mg, Nightly  polyvinyl alcohol (LIQUIFILM TEARS) 1.4 % ophthalmic solution 2 drop, PRN  sodium chloride flush 0.9 % injection 5-40 mL, 2 times per day  sodium chloride flush 0.9 % injection 5-40 mL, PRN  0.9 % sodium chloride infusion, PRN  dextrose bolus 10% 125 mL, PRN   Or  dextrose bolus 10% 250 mL, PRN  dextrose 10 % infusion, Continuous PRN  albuterol (PROVENTIL) (2.5 MG/3ML) 0.083% nebulizer solution 2.5 mg, Q6H PRN  insulin glargine (LANTUS) injection vial 56 Units, Nightly  potassium chloride (KLOR-CON M) extended release tablet 40 mEq, PRN   Or  potassium bicarb-citric acid (EFFER-K) effervescent tablet 40 mEq, PRN   Or  potassium chloride 10 mEq/100 mL IVPB (Peripheral Line), PRN  magnesium sulfate 2000 mg in 50 mL IVPB premix, PRN  enoxaparin Sodium (LOVENOX) injection 30 mg, BID  ondansetron (ZOFRAN-ODT) disintegrating tablet 4 mg, Q8H PRN    without angina pectoris    Bipolar depression (HCC)    NSTEMI (non-ST elevated myocardial infarction) ([facility])  Resolved Problems:    * No resolved hospital problems. *       Assessment & Plan:   CAD-patient presented with  unstable angina from [place]. Had LAD angioplasty and stenting today. Minimal L sided chest pain. Continue asa, plavix, statin, metoprolol.   Dm 2-on 52 units of lantus at home and SSI. Will continue lantus and add 10 of humalog. Continue SSI. Check A1c.   Weakness-consult pt/ot      Diet: ADULT DIET; Regular; 3 carb choices (45 gm/meal)  Code:Full Code  DVT PPX lovenox  Disposition home      Adilene Macdonald PA-C   7/10/2024 2:12 PM

## 2024-07-10 NOTE — CARE COORDINATION
Case Management Assessment  Initial Evaluation    Date/Time of Evaluation: 7/10/2024 8:43 AM  Assessment Completed by: FISH CARCAMO RN    If patient is discharged prior to next notation, then this note serves as note for discharge by case management.    Patient Name: Lanny Carbajal                   YOB: 1965  Diagnosis: Unstable angina (HCC) [I20.0]                   Date / Time: 7/8/2024  3:45 PM    Patient Admission Status: Inpatient   Readmission Risk (Low < 19, Mod (19-27), High > 27): Readmission Risk Score: 16.3    Current PCP: Fidencio Mares MD  PCP verified by CM? No    Chart Reviewed: Yes      History Provided by: Patient, Medical Record  Patient Orientation: Alert and Oriented, Person, Situation    Patient Cognition: Alert    Hospitalization in the last 30 days (Readmission):  Yes    If yes, Readmission Assessment in CM Navigator will be completed.    Advance Directives:      Code Status: Full Code   Patient's Primary Decision Maker is: Legal Next of Kin      Discharge Planning:    Patient lives with: Alone Type of Home: Apartment  Primary Care Giver: Self  Patient Support Systems include: Family Members   Current Financial resources: Medicaid  Current community resources: None  Current services prior to admission: Durable Medical Equipment            Current DME: [employer]            Type of Home Care services:  None    ADLS  Prior functional level: Independent in ADLs/IADLs  Current functional level: Independent in ADLs/IADLs    PT AM-PAC:   /24  OT AM-PAC:   /24    Family can provide assistance at DC: Yes  Would you like Case Management to discuss the discharge plan with any other family members/significant others, and if so, who? No  Plans to Return to Present Housing: Yes  Other Identified Issues/Barriers to RETURNING to current housing: No  Potential Assistance needed at discharge: N/A            Potential DME:    Patient expects to discharge to: Apartment  Plan for transportation at  discharge:      Financial    Payor: [facility] PLAN / Plan: [facility] PLAN / Product Type: *No Product type* /     Does insurance require precert for SNF: Yes    Potential assistance Purchasing Medications: No  Meds-to-Beds request:        [facility] [id number] - [place], [place] - [address] - P [phone] - F [phone]  [address]  [facility] [address]  Phone: [phone] Fax: [phone]      Notes:    Factors facilitating achievement of predicted outcomes: Family support, Motivated, and Has needed Durable Medical Equipment at home    Barriers to discharge: Limited family support and Decreased endurance    Additional Case Management Notes:   Patient was sent from [facility] for a cardiac procedure- PCI. Patient lives alone at home.   CM will continue to follow for any discharge needs .    The Plan for Transition of Care is related to the following treatment goals of Unstable angina (HCC) [I20.0]    IF APPLICABLE: The Patient and/or patient representative Lanny and her family were provided with a choice of provider and agrees with the discharge plan. Freedom of choice list with basic dialogue that supports the patient's individualized plan of care/goals and shares the quality data associated with the providers was provided to: Patient   Patient Representative Name:       The Patient and/or Patient Representative Agree with the Discharge Plan? Yes    FISH CARCAMO RN/BSN/CCM  Case Management Department

## 2024-07-10 NOTE — PROGRESS NOTES
Pt return from cath to room 5904 [id number]. Right groin Cath site CDI, pulses+1 bilaterally, perclose closure device. Bedrest complete at 1141. VSS. Report from cath nurseAmber. Telemetry verified. Pt in supine position and resting.

## 2024-07-10 NOTE — PLAN OF CARE
Problem: Safety - Adult  Goal: Free from fall injury  Outcome: Progressing     Problem: Discharge Planning  Goal: Discharge to home or other facility with appropriate resources  Outcome: Progressing     Problem: ABCDS Injury Assessment  Goal: Absence of physical injury  Outcome: Progressing     Problem: Pain  Goal: Verbalizes/displays adequate comfort level or baseline comfort level  Outcome: Progressing     Problem: Cardiovascular - Adult  Goal: Maintains optimal cardiac output and hemodynamic stability  Outcome: Progressing  Goal: Absence of cardiac dysrhythmias or at baseline  Outcome: Progressing     Problem: Chronic Conditions and Co-morbidities  Goal: Patient's chronic conditions and co-morbidity symptoms are monitored and maintained or improved  Outcome: Progressing

## 2024-07-10 NOTE — PROGRESS NOTES
@1215  Pt's groin site oozing.VSS. Manual pressure applied for 10 min. Pressure dressing applied. Pt in bed supine. Will monitor  @1344  Site still oozing slightly.pressure dressing readjusted.pt supine and eating. Will monitor  @1500   Still slightly oozing. Pressure dressing reapplied  @~1630  Oozing stopped. Pt OOB

## 2024-07-10 NOTE — PRE SEDATION
[facility]  Pre-sedation Assessment   PROCEDURE   Planned Procedure Cardiac catheterization   Consent I have discussed with the patient and/or the patient representative the indication, alternatives, and the possible risks and/or complications of the planned procedure and the anesthesia methods. The patient and/or patient representative appear to understand and agree to proceed.   INDICATION   Chief Complaint Chest Pain/Pressure   Presentation Stable Known CAD   Anginal Class (2 wks) CCS III - Symptoms with everyday living activities, i.e., moderate limitation   Anginal Symptoms Typical chest pain or anginal equivalent   CHF Class (2 wks) [] I     [x]II     []III     []IV    CV Instability Yes   ISCHEMIC WORKUP/MANAGEMENT/EVALUTION   Stress Test No   Anginal Meds Yes: Beta Blockers and STATIN   UPCOMING SURGERY   Valve surgery No   MEDICAL HISTORY   Vital Signs /73   Pulse 60   Temp 96.9 °F (36.1 °C) (Temporal)   Resp 16   Ht 1.575 m (5' 2\")   Wt 107.9 kg (237 lb 12.8 oz)   SpO2 98%   BMI 43.49 kg/m²    Allergies Reviewed in EMR   Medications Reviewed in EMR   Medical History Reviewed in EMR   Surgical History Reviewed in EMR   PRE-SEDATION   Exam I have assessed the patient and reviewed the H&P on the chart.   Hx Anesthesia Issue None   Mod Mallampati II   ASA Class Class 2 - A normal healthy patient with mild systemic disease   ABIODUN SCALE   Activity 2 - Able to move 4 extrem on command   Respiration 2 - Able to breath deeply and cough freely   Circulation 2 - BP +/- 20mmHg of normal   Consciousness 2 - Fully awake   Oxygen Saturation 2 - Able to maintain oxygen sat >92% on room air   SEDATION/ANESTHESIA PLAN   Goal Guard patient safety and welfare. Minimize physical discomfort and pain. Minimize negative psychological responses to treatment by providing sedation and analgesia and maximize the potential amnesia.  Patient to meet pre-procedure discharge plan.   Medications Midazolam  intravenously and fentanyl intravenously   Appropriate Candidate Yes   Post Procedure Return to same level of care

## 2024-07-10 NOTE — CARE COORDINATION
07/10/24 0836   Readmission Assessment   Number of Days since last admission? 1-7 days  (Patient was sent from [facility] for a cardiac procedure ( PCI))   Previous Disposition Other (comment)  (Patient was sent from [facility] for a cardiac procedure)   Who is being Interviewed Patient   What was the patient's/caregiver's perception as to why they think they needed to return back to the hospital? Other (Comment)  (Patient was sent from [facility] for a cardiac procedure)   Did you visit your Primary Care Physician after you left the hospital, before you returned this time? No   Why weren't you able to visit your PCP? Did not have an appointment   Did you see a specialist, such as Cardiac, Pulmonary, Orthopedic Physician, etc. after you left the hospital? No   Who advised the patient to return to the hospital? Physician   Does the patient report anything that got in the way of taking their medications? No   In our efforts to provide the best possible care to you and others like you, can you think of anything that we could have done to help you after you left the hospital the first time, so that you might not have needed to return so soon? Other (Comment)  (Patient was sent from [facility] for a cardiac procedure ( PCI))

## 2024-07-11 ENCOUNTER — APPOINTMENT (OUTPATIENT)
Dept: GENERAL RADIOLOGY | Age: 59
End: 2024-07-11
Attending: INTERNAL MEDICINE
Payer: COMMERCIAL

## 2024-07-11 LAB
ANION GAP SERPL CALCULATED.3IONS-SCNC: 13 MMOL/L (ref 3–16)
ANION GAP SERPL CALCULATED.3IONS-SCNC: 9 MMOL/L (ref 3–16)
BILIRUB UR QL STRIP.AUTO: NEGATIVE
BUN SERPL-MCNC: 23 MG/DL (ref 7–20)
BUN SERPL-MCNC: 25 MG/DL (ref 7–20)
CALCIUM SERPL-MCNC: 10 MG/DL (ref 8.3–10.6)
CALCIUM SERPL-MCNC: 9.6 MG/DL (ref 8.3–10.6)
CHLORIDE SERPL-SCNC: 101 MMOL/L (ref 99–110)
CHLORIDE SERPL-SCNC: 103 MMOL/L (ref 99–110)
CLARITY UR: CLEAR
CO2 SERPL-SCNC: 24 MMOL/L (ref 21–32)
CO2 SERPL-SCNC: 24 MMOL/L (ref 21–32)
COLOR UR: YELLOW
CREAT SERPL-MCNC: 1 MG/DL (ref 0.6–1.2)
CREAT SERPL-MCNC: 1 MG/DL (ref 0.6–1.2)
DEPRECATED RDW RBC AUTO: 16.2 % (ref 12.4–15.4)
EST. AVERAGE GLUCOSE BLD GHB EST-MCNC: 246 MG/DL
GFR SERPLBLD CREATININE-BSD FMLA CKD-EPI: 64 ML/MIN/{1.73_M2}
GFR SERPLBLD CREATININE-BSD FMLA CKD-EPI: 64 ML/MIN/{1.73_M2}
GLUCOSE BLD-MCNC: 170 MG/DL (ref 70–99)
GLUCOSE BLD-MCNC: 182 MG/DL (ref 70–99)
GLUCOSE BLD-MCNC: 213 MG/DL (ref 70–99)
GLUCOSE BLD-MCNC: 228 MG/DL (ref 70–99)
GLUCOSE SERPL-MCNC: 151 MG/DL (ref 70–99)
GLUCOSE SERPL-MCNC: 314 MG/DL (ref 70–99)
GLUCOSE UR STRIP.AUTO-MCNC: 500 MG/DL
HBA1C MFR BLD: 10.2 %
HCT VFR BLD AUTO: 35.8 % (ref 36–48)
HGB BLD-MCNC: 11.5 G/DL (ref 12–16)
HGB UR QL STRIP.AUTO: NEGATIVE
KETONES UR STRIP.AUTO-MCNC: NEGATIVE MG/DL
LEUKOCYTE ESTERASE UR QL STRIP.AUTO: NEGATIVE
MCH RBC QN AUTO: 27.7 PG (ref 26–34)
MCHC RBC AUTO-ENTMCNC: 32.1 G/DL (ref 31–36)
MCV RBC AUTO: 86.4 FL (ref 80–100)
NITRITE UR QL STRIP.AUTO: NEGATIVE
PERFORMED ON: ABNORMAL
PH UR STRIP.AUTO: 6 [PH] (ref 5–8)
PLATELET # BLD AUTO: 194 K/UL (ref 135–450)
PMV BLD AUTO: 9.5 FL (ref 5–10.5)
POTASSIUM SERPL-SCNC: 4.6 MMOL/L (ref 3.5–5.1)
POTASSIUM SERPL-SCNC: 5.3 MMOL/L (ref 3.5–5.1)
PROT UR STRIP.AUTO-MCNC: NEGATIVE MG/DL
RBC # BLD AUTO: 4.14 M/UL (ref 4–5.2)
SODIUM SERPL-SCNC: 136 MMOL/L (ref 136–145)
SODIUM SERPL-SCNC: 138 MMOL/L (ref 136–145)
SP GR UR STRIP.AUTO: 1.02 (ref 1–1.03)
UA DIPSTICK W REFLEX MICRO PNL UR: ABNORMAL
URN SPEC COLLECT METH UR: ABNORMAL
UROBILINOGEN UR STRIP-ACNC: 1 E.U./DL
WBC # BLD AUTO: 15.7 K/UL (ref 4–11)

## 2024-07-11 PROCEDURE — 97530 THERAPEUTIC ACTIVITIES: CPT

## 2024-07-11 PROCEDURE — 85027 COMPLETE CBC AUTOMATED: CPT

## 2024-07-11 PROCEDURE — 36415 COLL VENOUS BLD VENIPUNCTURE: CPT

## 2024-07-11 PROCEDURE — 81003 URINALYSIS AUTO W/O SCOPE: CPT

## 2024-07-11 PROCEDURE — 6370000000 HC RX 637 (ALT 250 FOR IP): Performed by: INTERNAL MEDICINE

## 2024-07-11 PROCEDURE — 97165 OT EVAL LOW COMPLEX 30 MIN: CPT

## 2024-07-11 PROCEDURE — 99232 SBSQ HOSP IP/OBS MODERATE 35: CPT

## 2024-07-11 PROCEDURE — 97161 PT EVAL LOW COMPLEX 20 MIN: CPT

## 2024-07-11 PROCEDURE — 97116 GAIT TRAINING THERAPY: CPT

## 2024-07-11 PROCEDURE — 6370000000 HC RX 637 (ALT 250 FOR IP): Performed by: PHYSICIAN ASSISTANT

## 2024-07-11 PROCEDURE — 6360000002 HC RX W HCPCS: Performed by: INTERNAL MEDICINE

## 2024-07-11 PROCEDURE — 97535 SELF CARE MNGMENT TRAINING: CPT

## 2024-07-11 PROCEDURE — 80048 BASIC METABOLIC PNL TOTAL CA: CPT

## 2024-07-11 PROCEDURE — 2060000000 HC ICU INTERMEDIATE R&B

## 2024-07-11 PROCEDURE — 6370000000 HC RX 637 (ALT 250 FOR IP): Performed by: NURSE PRACTITIONER

## 2024-07-11 PROCEDURE — 83036 HEMOGLOBIN GLYCOSYLATED A1C: CPT

## 2024-07-11 PROCEDURE — 71046 X-RAY EXAM CHEST 2 VIEWS: CPT

## 2024-07-11 PROCEDURE — 2580000003 HC RX 258: Performed by: INTERNAL MEDICINE

## 2024-07-11 RX ORDER — BISACODYL 10 MG
10 SUPPOSITORY, RECTAL RECTAL DAILY PRN
Status: DISCONTINUED | OUTPATIENT
Start: 2024-07-11 | End: 2024-07-12 | Stop reason: HOSPADM

## 2024-07-11 RX ORDER — INSULIN LISPRO 100 [IU]/ML
8 INJECTION, SOLUTION INTRAVENOUS; SUBCUTANEOUS
Status: DISCONTINUED | OUTPATIENT
Start: 2024-07-11 | End: 2024-07-12 | Stop reason: HOSPADM

## 2024-07-11 RX ORDER — INSULIN LISPRO 100 [IU]/ML
15 INJECTION, SOLUTION INTRAVENOUS; SUBCUTANEOUS
Status: DISCONTINUED | OUTPATIENT
Start: 2024-07-11 | End: 2024-07-11

## 2024-07-11 RX ORDER — ALLOPURINOL 300 MG/1
150 TABLET ORAL DAILY
Status: DISCONTINUED | OUTPATIENT
Start: 2024-07-11 | End: 2024-07-12 | Stop reason: HOSPADM

## 2024-07-11 RX ADMIN — FAMOTIDINE 40 MG: 20 TABLET, FILM COATED ORAL at 20:50

## 2024-07-11 RX ADMIN — Medication 1000 UNITS: at 08:11

## 2024-07-11 RX ADMIN — SODIUM CHLORIDE, PRESERVATIVE FREE 10 ML: 5 INJECTION INTRAVENOUS at 08:14

## 2024-07-11 RX ADMIN — POLYETHYLENE GLYCOL 3350 17 G: 17 POWDER, FOR SOLUTION ORAL at 14:45

## 2024-07-11 RX ADMIN — ASPIRIN 81 MG: 81 TABLET, COATED ORAL at 08:10

## 2024-07-11 RX ADMIN — SODIUM CHLORIDE, PRESERVATIVE FREE 10 ML: 5 INJECTION INTRAVENOUS at 20:50

## 2024-07-11 RX ADMIN — SIMETHICONE 80 MG: 80 TABLET, CHEWABLE ORAL at 12:14

## 2024-07-11 RX ADMIN — CETIRIZINE HYDROCHLORIDE 5 MG: 10 TABLET, FILM COATED ORAL at 08:10

## 2024-07-11 RX ADMIN — INSULIN LISPRO 10 UNITS: 100 INJECTION, SOLUTION INTRAVENOUS; SUBCUTANEOUS at 08:11

## 2024-07-11 RX ADMIN — ALLOPURINOL 150 MG: 300 TABLET ORAL at 12:14

## 2024-07-11 RX ADMIN — FAMOTIDINE 40 MG: 20 TABLET, FILM COATED ORAL at 08:10

## 2024-07-11 RX ADMIN — INSULIN LISPRO 8 UNITS: 100 INJECTION, SOLUTION INTRAVENOUS; SUBCUTANEOUS at 16:59

## 2024-07-11 RX ADMIN — SIMETHICONE 80 MG: 80 TABLET, CHEWABLE ORAL at 08:11

## 2024-07-11 RX ADMIN — CLOPIDOGREL BISULFATE 75 MG: 75 TABLET ORAL at 08:10

## 2024-07-11 RX ADMIN — INSULIN LISPRO 4 UNITS: 100 INJECTION, SOLUTION INTRAVENOUS; SUBCUTANEOUS at 08:11

## 2024-07-11 RX ADMIN — INSULIN GLARGINE 56 UNITS: 100 INJECTION, SOLUTION SUBCUTANEOUS at 20:49

## 2024-07-11 RX ADMIN — INSULIN LISPRO 8 UNITS: 100 INJECTION, SOLUTION INTRAVENOUS; SUBCUTANEOUS at 12:42

## 2024-07-11 RX ADMIN — BISACODYL 10 MG: 10 SUPPOSITORY RECTAL at 22:19

## 2024-07-11 RX ADMIN — LATANOPROST 1 DROP: 50 SOLUTION OPHTHALMIC at 20:52

## 2024-07-11 RX ADMIN — METOPROLOL SUCCINATE 25 MG: 25 TABLET, EXTENDED RELEASE ORAL at 20:50

## 2024-07-11 RX ADMIN — SIMETHICONE 80 MG: 80 TABLET, CHEWABLE ORAL at 17:00

## 2024-07-11 RX ADMIN — ENOXAPARIN SODIUM 30 MG: 100 INJECTION SUBCUTANEOUS at 20:49

## 2024-07-11 RX ADMIN — CYCLOBENZAPRINE 5 MG: 10 TABLET, FILM COATED ORAL at 17:56

## 2024-07-11 RX ADMIN — ACETAMINOPHEN 650 MG: 325 TABLET ORAL at 20:49

## 2024-07-11 RX ADMIN — ENOXAPARIN SODIUM 30 MG: 100 INJECTION SUBCUTANEOUS at 08:14

## 2024-07-11 RX ADMIN — ATORVASTATIN CALCIUM 40 MG: 40 TABLET, FILM COATED ORAL at 20:50

## 2024-07-11 ASSESSMENT — PAIN SCALES - GENERAL
PAINLEVEL_OUTOF10: 0
PAINLEVEL_OUTOF10: 0
PAINLEVEL_OUTOF10: 6
PAINLEVEL_OUTOF10: 0
PAINLEVEL_OUTOF10: 6
PAINLEVEL_OUTOF10: 0

## 2024-07-11 ASSESSMENT — PAIN DESCRIPTION - LOCATION
LOCATION: CHEST;GENERALIZED
LOCATION: CHEST

## 2024-07-11 ASSESSMENT — PAIN DESCRIPTION - DESCRIPTORS: DESCRIPTORS: ACHING

## 2024-07-11 NOTE — PROGRESS NOTES
[facility] - Inpatient Rehabilitation Department   Phone: [phone]    Physical Therapy    [x] Initial Evaluation            [] Daily Treatment Note         [] Discharge Summary      Patient: Lanny Carbajal   : 1965   MRN: [id number]   Date of Service:  2024  Admitting Diagnosis: Unstable angina (HCC)  Current Admission Summary: Lanny Carbajal is a 59 y.o. female  who presents to the ED complaining of atypical angina type symptoms, sent by cardiology office.  She tells me that she has had about a week or 2 of some pain in the left axilla radiating into the chest periodically, sometimes exertional and sometimes not.  She saw her cardiologist incidentally this morning on an appointment that was already scheduled in follow-up for heart catheterizations and stent placement that she had in January of this year.  Cardiology sent her here to be admitted with enzymes and ACS evaluation.  The patient does sometimes feel short of breath particularly with exertion.  No leg swelling acutely.  She apparently had an abnormal echo compared to her baseline earlier today as well with reduced LV function specifically.  Anticoagulated, has a pacemaker as well.  Past Medical History:  has a past medical history of Ankle fracture, left, Ankle fracture, right, Anxiety, Arthritis, Asthma, Bipolar depression ([facility]), Bladder problem, Cervical disc herniation, COVID toes, Depression, Diabetes mellitus ([facility]), Fatty liver disease, non-alcoholic, Fibromyalgia, Gastroparesis, GERD (gastroesophageal reflux disease), Glaucoma, Headache, Hx of blood clots, Hyperlipidemia, IBS (irritable bowel syndrome), Lumbar herniated disc, Nausea & vomiting, Nephrolithiasis, Obesity (BMI 30-39.9), Partial Achilles tendon tear, Pneumonia, PONV (postoperative nausea and vomiting), RA (rheumatoid arthritis) ([facility]), Rapid or irregular heartbeat, Sleep apnea, Spinal stenosis, Stress incontinence, and Thyroid disease.  Past Surgical History:

## 2024-07-11 NOTE — PROGRESS NOTES
[facility]   Cardiology Hospital Progress Note     Date: 7/11/2024  Admit Date: 7/8/2024     Reason for consultation:     No chief complaint on file.      History of Present Illness: History obtained from patient and medical record.     Lanny Carbajal is a 59 y.o. female with a past medical history of The patient is a 59 y.o. female with history of bipolar disorder, type 2 diabetes, fibromyalgia, GERD, hyperlipidemia, GERD, CAD who was admitted to [facility] for chest pains and underwent left heart cath today noted for 70% LAD stenosis plan for staged PCI.  Patient was sent to [facility] for further management.  (per consult note)      Interval Hx: Today, she is ambulating with Physical Therapy. Tolerating it well. She had left heart cath yesterday. Groin site with dressing clean and dry. Some bruising noted. Family at the bedside. She complained of chest pain yesterday, dull ache most of the day. Non-radiating, no other symptoms voiced such as shortness of breah or palpitaitons. Again, this am after ambulating with PT she had chest pain for 15 minutes. No associated symptoms. Ache in nature. Relieved on its own. Chest discomfort discussed with Dr. Minaya. He is aware and feels she is safe for discharge. Telemetry stable at [facility].     Patient seen and examined. Clinical notes reviewed. Telemetry reviewed.  No new complaints today. No major events overnight.   Denies having chest pain, palpitations, shortness of breath, orthopnea/PND, cough, or dizziness at the time of this visit.      Past Medical History:  Past Medical History:   Diagnosis Date    Ankle fracture, left     Ankle fracture, right     Anxiety     Arthritis     Asthma     Bipolar depression (HCC)     Bladder problem     Cervical disc herniation     COVID toes     Depression     Diabetes mellitus (HCC)     Lantus    Fatty liver disease, non-alcoholic     Fibromyalgia     Gastroparesis     Dr Velarde    GERD (gastroesophageal  addressed to the patient. Alternatives to my treatment were discussed. I have discussed the above stated plan with patient and the nurse. The patient verbalized understanding and agreed with the plan.    Thank you for allowing to us to participate in the care of Lanny Carbajal.    Total visit time > 55 minutes; > 50% spend counseling / coordinating care. I reviewed interval history, physical exam, review of data including labs, imaging, development and implementation of treatment plan and coordination of complex care. Counseled on risk factor modifications.     KRISHNA [facility]   Office: [phone]    NOTE:  This report was transcribed using voice recognition software.  Every effort was made to ensure accuracy; however, inadvertent computerized transcription errors may be present.\

## 2024-07-11 NOTE — PROGRESS NOTES
Sodium (LOVENOX) injection 30 mg, BID  ondansetron (ZOFRAN-ODT) disintegrating tablet 4 mg, Q8H PRN   Or  ondansetron (ZOFRAN) injection 4 mg, Q6H PRN  polyethylene glycol (GLYCOLAX) packet 17 g, Daily PRN  acetaminophen (TYLENOL) tablet 650 mg, Q6H PRN   Or  acetaminophen (TYLENOL) suppository 650 mg, Q6H PRN  cyclobenzaprine (FLEXERIL) tablet 5 mg, TID PRN        Objective:  /64   Pulse 66   Temp 97.8 °F (36.6 °C) (Temporal)   Resp 16   Ht 1.575 m (5' 2\")   Wt 106.5 kg (234 lb 12.6 oz)   SpO2 98%   BMI 42.94 kg/m²     Intake/Output Summary (Last 24 hours) at 7/11/2024 1328  Last data filed at 7/11/2024 1301  Gross per 24 hour   Intake 3368.94 ml   Output 2500 ml   Net 868.94 ml        Wt Readings from Last 3 Encounters:   07/11/24 106.5 kg (234 lb 12.6 oz)   07/05/24 104.8 kg (230 lb 14.9 oz)   07/05/24 99.8 kg (220 lb)       General appearance:  Appears comfortable. AAOx3  HEENT: atraumatic, Pupils equal, muscous membranes moist, no masses appreciated  Cardiovascular: Regular rate and rhythm no murmurs appreciated  Respiratory: CTAB no wheezing  Gastrointestinal: Abdomen soft, non-tender, BS+  EXT: no edema  Neurology: no gross focal deficts  Psychiatry: Appropriate affect. Not agitated  Skin: Warm, dry, no rashes appreciated    Labs and Tests:  CBC:   Recent Labs     07/09/24  0434 07/11/24  0439   WBC 13.5* 15.7*   HGB 12.1 11.5*    194       BMP:    Recent Labs     07/10/24  0427 07/11/24  0439 07/11/24  1208    136 138   K 5.0 5.3* 4.6    103 101   CO2 24 24 24   BUN 24* 23* 25*   CREATININE 1.0 1.0 1.0   GLUCOSE 217* 314* 151*       Hepatic: No results for input(s): \"AST\", \"ALT\", \"BILITOT\", \"ALKPHOS\" in the last 72 hours.    Invalid input(s): \"ALB\"  XR CHEST (2 VW)    (Results Pending)       Recent imaging reviewed    Problem List  Principal Problem:    Unstable angina (HCC)  Active Problems:    Primary hypertension    Type 2 diabetes mellitus without complication, with

## 2024-07-11 NOTE — PROGRESS NOTES
Physician Progress Note      PATIENT:               KURTIS PATEL  [facility] #:                  [id number]  :                       1965  ADMIT DATE:       2024 3:45 PM  DISCH DATE:  RESPONDING  PROVIDER #:        LYNDSAY ARRIAGA PA-C          QUERY TEXT:    Pt admitted with NSTEMI. Pt noted to have \"CAD in native artery\" on 24   Internal Medicine Progress note and \"old stent\" documented on 24 Cardiac   Procedure note. If possible, please document in progress notes and discharge   summary the relationship, if any, between NSTEMI and CAD of native artery and   old stent.    The medical record reflects the following:  Risk Factors: 59 y.o. female with history of ischemic cardiomyopathy, chronic   systolic CHF, bipolar disorder, type 2 diabetes, fibromyalgia, GERD,   hyperlipidemia, GERD, CAD and prior MI in 2023 who was admitted to   [facility] for chest pains  Clinical Indicators:   Cardiac Procedure Note   \"INTERVENTION...Artery...LAD...Location...Mid...Xience 4.0X23...PD 4.0...POBA   old stent with 3.5NC...IVUS old stent for ISR\"     Internal Medicine Progress Note \"Still having intermittent left sided   chest pain...NSTEMI-s/p LAD angioplasty and stenting by Dr Minaya. Minimal   L sided chest pain...Echo 24 EF of 35% Anterior, anterolateral,   anteroseptal inferoseptal hypokinesis\"    Treatment: [facility] with balloon angioplasty, IVUS, and insertion of ROSALIA to   mid-LAD, asa 81mg po qday, avorvastatin po, metoprolol po, entresto po,   telemetry monitoring, Cardiology consult, supportive care  Options provided:  -- Acute Type 4 MI due to in-stent restenosis of LAD  -- NSTEMI due to disease progression of CAD of LAD  -- Other - I will add my own diagnosis  -- Disagree - Not applicable / Not valid  -- Disagree - Clinically unable to determine / Unknown  -- Refer to Clinical Documentation Reviewer    PROVIDER RESPONSE TEXT:    This patient has an NSTEMI due to disease progression

## 2024-07-11 NOTE — PLAN OF CARE
Problem: Safety - Adult  Goal: Free from fall injury  7/11/2024 0643 by Veronica Johnson RN  Outcome: Progressing     Problem: Discharge Planning  Goal: Discharge to home or other facility with appropriate resources  7/11/2024 0643 by Veronica Johnson RN  Outcome: Progressing     Problem: ABCDS Injury Assessment  Goal: Absence of physical injury  7/11/2024 0643 by Veronica Johnson RN  Outcome: Progressing

## 2024-07-11 NOTE — PROGRESS NOTES
Nephrology Progress Note  [facility]  [phone]   [employer]    Patient:  Lanny Carbajal   : 1965    CC: MONIQUE    Subjective:  Patient doing okay.  Maintenance fluids since last night.  Potassium 5.3 today however hemolyzed sample and will repeat BMP today.    ROS:   No complaints doing okay    SHx:  Sister-in-law at bedside    Meds:  Scheduled Meds:   insulin lispro  15 Units SubCUTAneous TID WC    allopurinol  150 mg Oral Daily    aspirin  81 mg Oral Daily    simethicone  80 mg Oral TID WC    Vitamin D  1,000 Units Oral Daily    insulin lispro  0-16 Units SubCUTAneous TID WC    insulin lispro  0-4 Units SubCUTAneous Nightly    atorvastatin  40 mg Oral Nightly    clopidogrel  75 mg Oral Daily    famotidine  40 mg Oral BID    latanoprost  1 drop Both Eyes Nightly    cetirizine  5 mg Oral Daily    metoprolol succinate  25 mg Oral Nightly    sodium chloride flush  5-40 mL IntraVENous 2 times per day    insulin glargine  56 Units SubCUTAneous Nightly    enoxaparin  30 mg SubCUTAneous BID     Continuous Infusions:   sodium chloride Stopped (07/10/24 1810)    sodium chloride      dextrose       PRN Meds:.polyvinyl alcohol, sodium chloride flush, sodium chloride, dextrose bolus **OR** dextrose bolus, dextrose, albuterol, potassium chloride **OR** potassium alternative oral replacement **OR** potassium chloride, magnesium sulfate, ondansetron **OR** ondansetron, polyethylene glycol, acetaminophen **OR** acetaminophen, cyclobenzaprine    Vitals:  /64   Pulse 66   Temp 97.8 °F (36.6 °C) (Temporal)   Resp 16   Ht 1.575 m (5' 2\")   Wt 106.5 kg (234 lb 12.6 oz)   SpO2 98%   BMI 42.94 kg/m²     Physical Exam:  Gen: Resting in bed, NAD.    HEENT: MMM, OP clear.  CV: RRR, no S3.  Lungs: good respiratory effort and clear air entry   Abd: S/NT +BS  Ext: No edema, no cyanosis  Skin: Warm.  No rashes appreciated.    Labs:  CBC with Differential:    Lab Results   Component Value

## 2024-07-11 NOTE — PROGRESS NOTES
[facility] - Inpatient Rehabilitation Department   Phone: [phone]    Occupational Therapy    [x] Initial Evaluation            [] Daily Treatment Note         [] Discharge Summary      Patient: Lanny Carbajal   : 1965   MRN: [id number]   Date of Service:  2024    Admitting Diagnosis:  Unstable angina (HCC)  Current Admission Summary:   Chest Pain        Sent by Dr. Neely, abnormal echo results         HISTORY OF PRESENT ILLNESS  Lanny Carbajal is a 59 y.o. female  who presents to the ED complaining of atypical angina type symptoms, sent by cardiology office.  She tells me that she has had about a week or 2 of some pain in the left axilla radiating into the chest periodically, sometimes exertional and sometimes not.  She saw her cardiologist incidentally this morning on an appointment that was already scheduled in follow-up for heart catheterizations and stent placement that she had in January of this year.  Cardiology sent her here to be admitted with enzymes and ACS evaluation.  The patient does sometimes feel short of breath particularly with exertion.  No leg swelling acutely.  She apparently had an abnormal echo compared to her baseline earlier today as well with reduced LV function specifically.  Anticoagulated, has a pacemaker as well.  Past Medical History:  has a past medical history of Ankle fracture, left, Ankle fracture, right, Anxiety, Arthritis, Asthma, Bipolar depression ([facility]), Bladder problem, Cervical disc herniation, COVID toes, Depression, Diabetes mellitus ([facility]), Fatty liver disease, non-alcoholic, Fibromyalgia, Gastroparesis, GERD (gastroesophageal reflux disease), Glaucoma, Headache, Hx of blood clots, Hyperlipidemia, IBS (irritable bowel syndrome), Lumbar herniated disc, Nausea & vomiting, Nephrolithiasis, Obesity (BMI 30-39.9), Partial Achilles tendon tear, Pneumonia, PONV (postoperative nausea and vomiting), RA (rheumatoid arthritis) ([facility]), Rapid or irregular  Daily Activity Raw Score: 24                                    Cognition  WFL  Orientation:    alert and oriented x 4  Command Following:   WFL     Education  Barriers To Learning: none  Patient Education: patient educated on discharge recommendations-- no further OT indicated  Learning Assessment:  patient verbalizes and demonstrates understanding    Assessment  Activity Tolerance: Good, however has multiple medical issues that limits long walks per patient report  Impairments Requiring Therapeutic Intervention: none - eval with same day discharge  Prognosis: good  Clinical Assessment: Patient appears to be baseline for ADLs and mobility with use of RW. Patient requesting a 4WRW (see PT evaluation for further details)  Patient does not need any further OT at this time.   Safety Interventions: patient left in chair, call light within reach, nurse notified, and family/caregiver present    Plan  Frequency: Eval with same day discharge.  No follow up required.  Current Treatment Recommendations: not applicable, evaluation completed with same day discharge.    Goals  Patient Goals: none stated   Short Term Goals:  Time Frame: n/a  Patient eval with same day discharge.  No goals set as patient is at baseline status.    Above goals reviewed on 7/11/2024.  All goals are ongoing at this time unless indicated above.       Therapy Session Time     Individual Group Co-treatment   Time In    0950   Time Out    1035   Minutes    45        Timed Code Treatment Minutes:  30    Total Treatment Minutes:  45       Electronically Signed By: Shelly Quiros, OTR/L [id number]

## 2024-07-11 NOTE — PLAN OF CARE
Problem: Safety - Adult  Goal: Free from fall injury  7/11/2024 1257 by Kaykay Martinez RN  Outcome: Progressing     Problem: Discharge Planning  Goal: Discharge to home or other facility with appropriate resources  7/11/2024 1257 by Kaykay Martniez RN  Outcome: Progressing     Problem: ABCDS Injury Assessment  Goal: Absence of physical injury  7/11/2024 1257 by Kaykay Martinez RN  Outcome: Progressing     Problem: Pain  Goal: Verbalizes/displays adequate comfort level or baseline comfort level  Outcome: Progressing     Problem: Cardiovascular - Adult  Goal: Maintains optimal cardiac output and hemodynamic stability  Outcome: Progressing     Problem: Cardiovascular - Adult  Goal: Absence of cardiac dysrhythmias or at baseline  Outcome: Progressing

## 2024-07-12 VITALS
SYSTOLIC BLOOD PRESSURE: 125 MMHG | OXYGEN SATURATION: 97 % | WEIGHT: 235.01 LBS | TEMPERATURE: 97.2 F | HEIGHT: 62 IN | RESPIRATION RATE: 16 BRPM | BODY MASS INDEX: 43.25 KG/M2 | DIASTOLIC BLOOD PRESSURE: 60 MMHG | HEART RATE: 71 BPM

## 2024-07-12 LAB
ANION GAP SERPL CALCULATED.3IONS-SCNC: 11 MMOL/L (ref 3–16)
BASOPHILS # BLD: 0.1 K/UL (ref 0–0.2)
BASOPHILS NFR BLD: 0.7 %
BUN SERPL-MCNC: 23 MG/DL (ref 7–20)
CALCIUM SERPL-MCNC: 9.7 MG/DL (ref 8.3–10.6)
CHLORIDE SERPL-SCNC: 103 MMOL/L (ref 99–110)
CO2 SERPL-SCNC: 25 MMOL/L (ref 21–32)
CREAT SERPL-MCNC: 0.9 MG/DL (ref 0.6–1.2)
DEPRECATED RDW RBC AUTO: 16.4 % (ref 12.4–15.4)
EOSINOPHIL # BLD: 0.2 K/UL (ref 0–0.6)
EOSINOPHIL NFR BLD: 1.5 %
GFR SERPLBLD CREATININE-BSD FMLA CKD-EPI: 73 ML/MIN/{1.73_M2}
GLUCOSE BLD-MCNC: 111 MG/DL (ref 70–99)
GLUCOSE BLD-MCNC: 141 MG/DL (ref 70–99)
GLUCOSE BLD-MCNC: 190 MG/DL (ref 70–99)
GLUCOSE BLD-MCNC: 92 MG/DL (ref 70–99)
GLUCOSE SERPL-MCNC: 93 MG/DL (ref 70–99)
HCT VFR BLD AUTO: 35.7 % (ref 36–48)
HGB BLD-MCNC: 11.7 G/DL (ref 12–16)
LYMPHOCYTES # BLD: 5.5 K/UL (ref 1–5.1)
LYMPHOCYTES NFR BLD: 38.1 %
MCH RBC QN AUTO: 28.6 PG (ref 26–34)
MCHC RBC AUTO-ENTMCNC: 32.8 G/DL (ref 31–36)
MCV RBC AUTO: 87.3 FL (ref 80–100)
MONOCYTES # BLD: 0.9 K/UL (ref 0–1.3)
MONOCYTES NFR BLD: 6.6 %
NEUTROPHILS # BLD: 7.6 K/UL (ref 1.7–7.7)
NEUTROPHILS NFR BLD: 53.1 %
PATH INTERP BLD-IMP: NO
PERFORMED ON: ABNORMAL
PERFORMED ON: NORMAL
PLATELET # BLD AUTO: 170 K/UL (ref 135–450)
PMV BLD AUTO: 9.3 FL (ref 5–10.5)
POTASSIUM SERPL-SCNC: 4.5 MMOL/L (ref 3.5–5.1)
RBC # BLD AUTO: 4.09 M/UL (ref 4–5.2)
SODIUM SERPL-SCNC: 139 MMOL/L (ref 136–145)
WBC # BLD AUTO: 14.3 K/UL (ref 4–11)

## 2024-07-12 PROCEDURE — 6370000000 HC RX 637 (ALT 250 FOR IP): Performed by: INTERNAL MEDICINE

## 2024-07-12 PROCEDURE — 2580000003 HC RX 258: Performed by: INTERNAL MEDICINE

## 2024-07-12 PROCEDURE — 80048 BASIC METABOLIC PNL TOTAL CA: CPT

## 2024-07-12 PROCEDURE — 85025 COMPLETE CBC W/AUTO DIFF WBC: CPT

## 2024-07-12 PROCEDURE — 99233 SBSQ HOSP IP/OBS HIGH 50: CPT | Performed by: NURSE PRACTITIONER

## 2024-07-12 PROCEDURE — 6360000002 HC RX W HCPCS: Performed by: INTERNAL MEDICINE

## 2024-07-12 PROCEDURE — 6370000000 HC RX 637 (ALT 250 FOR IP): Performed by: PHYSICIAN ASSISTANT

## 2024-07-12 PROCEDURE — 94760 N-INVAS EAR/PLS OXIMETRY 1: CPT

## 2024-07-12 PROCEDURE — 36415 COLL VENOUS BLD VENIPUNCTURE: CPT

## 2024-07-12 RX ORDER — GUAIFENESIN 1200 MG/1
1 TABLET, EXTENDED RELEASE ORAL EVERY 12 HOURS PRN
COMMUNITY
Start: 2024-07-12

## 2024-07-12 RX ORDER — ASPIRIN 81 MG/1
81 TABLET ORAL DAILY
Qty: 30 TABLET | Refills: 1 | Status: CANCELLED | OUTPATIENT
Start: 2024-07-13

## 2024-07-12 RX ORDER — FUROSEMIDE 20 MG/1
20 TABLET ORAL DAILY PRN
Qty: 60 TABLET | Refills: 0 | Status: SHIPPED | OUTPATIENT
Start: 2024-07-12

## 2024-07-12 RX ORDER — CYCLOBENZAPRINE HCL 5 MG
5 TABLET ORAL 3 TIMES DAILY PRN
Qty: 30 TABLET | Refills: 0 | Status: SHIPPED | OUTPATIENT
Start: 2024-07-12 | End: 2024-07-22

## 2024-07-12 RX ORDER — ALLOPURINOL 300 MG/1
150 TABLET ORAL DAILY
Qty: 30 TABLET | Refills: 1 | Status: SHIPPED | OUTPATIENT
Start: 2024-07-13

## 2024-07-12 RX ADMIN — INSULIN LISPRO 8 UNITS: 100 INJECTION, SOLUTION INTRAVENOUS; SUBCUTANEOUS at 12:11

## 2024-07-12 RX ADMIN — CLOPIDOGREL BISULFATE 75 MG: 75 TABLET ORAL at 08:02

## 2024-07-12 RX ADMIN — Medication 1000 UNITS: at 08:02

## 2024-07-12 RX ADMIN — SACUBITRIL AND VALSARTAN 1 TABLET: 24; 26 TABLET, FILM COATED ORAL at 13:19

## 2024-07-12 RX ADMIN — ALLOPURINOL 150 MG: 300 TABLET ORAL at 08:02

## 2024-07-12 RX ADMIN — INSULIN LISPRO 8 UNITS: 100 INJECTION, SOLUTION INTRAVENOUS; SUBCUTANEOUS at 08:02

## 2024-07-12 RX ADMIN — SIMETHICONE 80 MG: 80 TABLET, CHEWABLE ORAL at 12:11

## 2024-07-12 RX ADMIN — ACETAMINOPHEN 650 MG: 325 TABLET ORAL at 08:03

## 2024-07-12 RX ADMIN — CYCLOBENZAPRINE 5 MG: 10 TABLET, FILM COATED ORAL at 14:27

## 2024-07-12 RX ADMIN — ASPIRIN 81 MG: 81 TABLET, COATED ORAL at 08:02

## 2024-07-12 RX ADMIN — FAMOTIDINE 40 MG: 20 TABLET, FILM COATED ORAL at 08:02

## 2024-07-12 RX ADMIN — SODIUM CHLORIDE, PRESERVATIVE FREE 10 ML: 5 INJECTION INTRAVENOUS at 08:02

## 2024-07-12 RX ADMIN — CETIRIZINE HYDROCHLORIDE 5 MG: 10 TABLET, FILM COATED ORAL at 08:02

## 2024-07-12 RX ADMIN — SIMETHICONE 80 MG: 80 TABLET, CHEWABLE ORAL at 08:02

## 2024-07-12 RX ADMIN — ENOXAPARIN SODIUM 30 MG: 100 INJECTION SUBCUTANEOUS at 08:02

## 2024-07-12 ASSESSMENT — PAIN DESCRIPTION - DESCRIPTORS: DESCRIPTORS: ACHING;DULL

## 2024-07-12 ASSESSMENT — PAIN - FUNCTIONAL ASSESSMENT: PAIN_FUNCTIONAL_ASSESSMENT: ACTIVITIES ARE NOT PREVENTED

## 2024-07-12 ASSESSMENT — PAIN SCALES - GENERAL
PAINLEVEL_OUTOF10: 1
PAINLEVEL_OUTOF10: 2
PAINLEVEL_OUTOF10: 0

## 2024-07-12 ASSESSMENT — PAIN DESCRIPTION - LOCATION: LOCATION: ABDOMEN

## 2024-07-12 ASSESSMENT — PAIN DESCRIPTION - PAIN TYPE: TYPE: ACUTE PAIN

## 2024-07-12 NOTE — DISCHARGE SUMMARY
Hospital Medicine Discharge Summary    Patient: Lanny Carbajal     Gender: female  : 1965   Age: 59 y.o.  MRN: [id number]    Admitting Physician: Janes Smallwood MD  Discharge Physician: Adilene Macdonald PA-C     Code Status: Full Code     Admit Date: 2024   Discharge Date:   24    Disposition:  Home  Time spent arranging discharge: 34 minutes    Discharge Diagnoses:    Active Hospital Problems    Diagnosis Date Noted    CAD in native artery [I25.10] 07/10/2024    Unstable angina (HCC) [I20.0] 2024    NSTEMI (non-ST elevated myocardial infarction) (HCC) [I21.4] 2023    Bipolar depression (HCC) [F31.9] 10/02/2023    Coronary artery disease involving native coronary artery of native heart without angina pectoris [I25.10] 2019    Hypercholesteremia [E78.00] 2017    GERD (gastroesophageal reflux disease) [K21.9] 2012    Type 2 diabetes mellitus without complication, with long-term current use of insulin (HCC) [E11.9, Z79.4] 2012    Primary hypertension [I10] 2012       Recommendations: Follow up with cardiology in 2 weeks    Condition at Discharge:  Stable  Patient is a 59-year-old female who presented to [place] with chest pain.  She has a history of coronary artery disease and is status post LAD stenting back in December.  She underwent a left heart cath on  which revealed a 70% stenosis of the LAD.  She was transferred to [facility] for this.    Hospital Course:     Coronary artery disease-patient was admitted and was seen by cardiology she was taken to the Cath Lab by Dr. Minaya on July 10 where she underwent successful LAD angioplasty and stenting.  She will continue plavix, Eliquis, statin, and metoprolol.   HFrEF-EF 35%.  Patient is euvolemic.  Her Entresto was restarted at time of discharge.  No mRNA, SGLT2 inhibitor due to kidney function/  Dm 2-poorly controlled.  A1c is 10.2.  Patient is on Lantus, Humalog.  She will follow-up  times daily  Qty: 60 tablet, Refills: 1    Comments: Ordered by hema/onc           Current Discharge Medication List        CONTINUE these medications which have NOT CHANGED    Details   insulin glargine (LANTUS SOLOSTAR) 100 UNIT/ML injection pen INJECT 52 UNITS UNDER THE SKIN DAILY  Qty: 15 mL, Refills: 0    Associated Diagnoses: Type 2 diabetes mellitus without complication, with long-term current use of insulin ([facility])      RESTASIS 0.05 % ophthalmic emulsion       Vitamin D (CHOLECALCIFEROL) 25 MCG (1000 UT) TABS tablet Take 1 tablet by mouth daily      loratadine (CLARITIN) 10 MG tablet TAKE 1 TABLET BY MOUTH DAILY  Qty: 30 tablet, Refills: 5    Associated Diagnoses: Non-seasonal allergic rhinitis, unspecified trigger      insulin lispro, 1 Unit Dial, (HUMALOG KWIKPEN) 100 UNIT/ML SOPN 6-12 units AC TID  Qty: 5 Adjustable Dose Pre-filled Pen Syringe, Refills: 3      Insulin Pen Needle ([employer] PEN NEEDLES) 32G X 4 MM MISC Use to administer insulin, 3-4 times daily  Qty: 200 each, Refills: 3    Associated Diagnoses: Type 2 diabetes mellitus without complication, with long-term current use of insulin ([facility])      blood glucose test strips (TRUE METRIX BLOOD GLUCOSE TEST) strip USE 4-5  STRIP TO TEST BLOOD GLUCOSE DAILY AS NEEDED  Qty: 150 strip, Refills: 3      TRUEplus Lancets 33G MISC 5 times  a day  Qty: 150 each, Refills: 3      atorvastatin (LIPITOR) 40 MG tablet Take 1 tablet by mouth daily  Qty: 90 tablet, Refills: 3      clopidogrel (PLAVIX) 75 MG tablet Take 1 tablet by mouth daily  Qty: 90 tablet, Refills: 2      metoprolol succinate (TOPROL XL) 25 MG extended release tablet Take 1 tablet by mouth daily  Qty: 90 tablet, Refills: 3      loperamide (IMODIUM) 2 MG capsule Take 1 capsule by mouth 4 times daily as needed for Diarrhea      !! Blood Glucose Monitoring Suppl (TRUE METRIX METER) w/Device KIT USE TO TEST DAILY  Qty: 1 kit, Refills: 0      Elastic Bandages & Supports (ACE WRIST BRACE) MISC For

## 2024-07-12 NOTE — DISCHARGE INSTRUCTIONS
Coronary Angiogram: About This Test  What is a coronary angiogram?     A coronary angiogram is a test to look at the large blood vessels of your heart (coronary arteries). These blood vessels feed blood, oxygen, and nutrients to your heart.  Why is this test done?  This test is done to check blood flow in your coronary arteries. It can show the size and location of narrowed or blocked sections of an artery.  It's done for people who have coronary artery disease, also known as heart disease. The test can show how serious the disease is and how best to treat it. Or it can be done for people who have symptoms of heart disease to find out if there is a problem with the artery.  The Doctor can look at the shape of your heart, the motion of the heart, and valves in the heart.   What happens during the test?  You will get medicine to help you relax.  A thin tube called a catheter is put into a blood vessel in your groin or arm.  You will get a shot to numb the skin where the catheter goes in. You may feel pressure when the doctor moves the catheter through your blood vessel into your heart.  Dye is put into your coronary arteries through the catheter. Your doctor can see the dye as it moves through the arteries. This lets your doctor look for areas that are narrowed or blocked.  You may feel hot or flushed for several seconds when the dye is put in.  How long does it take?  The test will take about 30 minutes to an hour. But you need time to get ready for it and time to recover. If a problem is found and the doctor treats it, it can take a few hours longer.     Care of your puncture site:  Remove bandage 24 hours after the procedure.  May shower in 24 hours but do not sit in a bathtub/pool of water for 5 days or until the wound is healed.  Inspect the site daily and gently clean using soap and water while standing in the shower.  Dry thoroughly and apply a Band-Aid that covers the entire site. Do not apply powder or  lotion.    Normal Observations:  Soreness or tenderness which may last one week.  Mild oozing from the incision site.  Possible bruising that could last 2 weeks.    Activity:  You may resume driving 24 hours following the procedure.  You may resume normal activity in 5 days or after the wound heals.  Avoid lifting more than 10 pounds for 5 days or until the wound heals.  Avoid strenuous exercise or activity for 1 week.    Nutrition:  Carb control/ cardiac diet.    Call your doctor immediately if your condition worsens, for any other concerns, for a follow-up appointment or if you experience any of the following:  Significant bleeding that does not stop after 10 minutes of applying firm pressure on the puncture site.  Increased swelling on the groin or leg.  Unusual pain, numbness, or tingling of the groin or down the leg.  Any signs of infection such as: redness, yellow drainage at the site, swelling or pain.    Other Instructions:  Hold Metformin or Metformin containing drugs for 48 hours after procedure.

## 2024-07-12 NOTE — PROGRESS NOTES
Data- discharge order received, pt verbalized agreement to discharge, disposition to previous residence, no needs for HHC/DME. Referral given for outpatient physical therapy.     Action- discharge instructions prepared and given to pt, pt verbalized understanding. Medication information packet given r/t NEW and/or CHANGED prescriptions emphasizing name/purpose/side effects, pt verbalized understanding. Discharge instruction summary: Diet- carb control/cardiac, Activity- restrictions following procedure discussed with patient and added on AVS, Primary Care Physician as follows: Fidencio Mares -[phone] f/u appointment in 1 week, f/up with cardiology and nephrology, immunizations reviewed, prescription medications filled at [facility] outpatient pharmacy. Inpatient surgical procedure precautions reviewed and added on AVS. CHF Education reviewed. Pt/ Family has had a total of 60 minutes CHF education this admission encounter.     1. WEIGHT: Admit Weight - Scale: 106 kg (233 lb 11 oz) (07/09/24 0530)        Today  Weight - Scale: 106.6 kg (235 lb 0.2 oz) (07/12/24 0310)       2. O2 SAT.: SpO2: 97 % (07/12/24 0830)    Response- Pt belongings gathered, IV removed. Disposition is home (no HHC/DME needs), transported with sister in law, taken to lobby via w/c w/ RN, no complications.

## 2024-07-12 NOTE — DISCHARGE INSTR - COC
1680 [P.O.:1680]  Out: 1951 [Urine:1951]    Safety Concerns:     At Risk for Falls    Impairments/Disabilities:      None    Nutrition Therapy:  Current Nutrition Therapy:   - Oral Diet:  Carb Control 4 carbs/meal (1800kcals/day)    Routes of Feeding: Oral  Liquids: No Restrictions  Daily Fluid Restriction: no  Last Modified Barium Swallow with Video (Video Swallowing Test): not done    Treatments at the Time of Hospital Discharge:   Respiratory Treatments: per AVS  Oxygen Therapy:  is not on home oxygen therapy.  Ventilator:    - No ventilator support    Rehab Therapies: Physical Therapy, Occupational Therapy, and skilled nursing  Weight Bearing Status/Restrictions: No weight bearing restrictions  Other Medical Equipment (for information only, NOT a DME order):  walker  Other Treatments: per AVS following [facility]    Patient's personal belongings (please select all that are sent with patient):  Packed by patient    RN SIGNATURE:  Electronically signed by Tamera Murillo RN on 7/12/24 at 11:43 AM EDT    CASE MANAGEMENT/SOCIAL WORK SECTION    Inpatient Status Date: 7/8/24    Readmission Risk Assessment Score:  Readmission Risk              Risk of Unplanned Readmission:  23           Discharging to Facility/ Agency     [facility]  Phone: [phone]  Fax: [phone]    / signature: Electronically signed by Berna Aquino RN on 7/12/24 at 2:46 PM EDT    PHYSICIAN SECTION    Prognosis: Good    Condition at Discharge: Stable    Rehab Potential (if transferring to Rehab): Good    Recommended Labs or Other Treatments After Discharge:     Physician Certification: I certify the above information and transfer of Lanny Carbajal  is necessary for the continuing treatment of the diagnosis listed and that she requires Home Care for less 30 days.     Update Admission H&P: No change in H&P    PHYSICIAN SIGNATURE:  Electronically signed by Adilene Macdonald PA-C on 7/12/24 at  11:51 AM EDT

## 2024-07-12 NOTE — CARE COORDINATION
Discharge Planning Note:    CM spoke to pt regarding PT at [facility] and need for 4WW.  Pt reported that needed [facility], she is unable to come to an outpatient therapy center.     Patient lives in [place], HHC is limited. Patient agreeable to whatever agency is in the area that accepts her insurance.     Referral made to:  [facility] - OON  [facility] - verifying staffing availability.       Patient is in need of a 4WW at [facility].  DME Rx in.  Italo with [employer] will deliver to room.       Electronically signed by Berna Aquino RN on 7/12/2024 at 12:38 PM      -CM called and LVM to f/u with [facility].  -Spoke to Ilsa at [facility], they are OON  -Spoke to Cathie at [facility] - will check staffing for that area, taking Yuli on case by case basis, will call this CM back  -Spoke to Manuel at [facility] - OON  -Spoke to [facility] - LVM for Lisha  -Spoke to Vannessa at [facility] - OON  -Spoke to Sophie at [facility] - does not cover the area  -[facility] - OON  -[facility] - OON  -[facility] - non-skilled only  -[facility] - no staffing  -[facility] - non-skilled only    Electronically signed by Berna Aquino RN on 7/12/2024 at 2:30 PM      Due to inability to securte [facility] services in [facility], patient will be offered outpatient PT services [facility]. CM to fax clinicals to [phone].  CM discussed with patient who is agreeable to Outpt therapy.     Electronically signed by Berna Aquino RN on 7/12/2024 at 2:44 PM

## 2024-07-12 NOTE — PROGRESS NOTES
dizziness at the time of this visit.      Past Medical History:  Past Medical History:   Diagnosis Date    Ankle fracture, left     Ankle fracture, right     Anxiety     Arthritis     Asthma     Bipolar depression (HCC)     Bladder problem     Cervical disc herniation     COVID toes     Depression     Diabetes mellitus (HCC)     Lantus    Fatty liver disease, non-alcoholic     Fibromyalgia     Gastroparesis     Dr Velarde    GERD (gastroesophageal reflux disease)     gastroporesis    Glaucoma     Dr Malone    Headache     Hx of blood clots     DVT: left leg    Hyperlipidemia     elevated LFT on meds    IBS (irritable bowel syndrome)     Lumbar herniated disc     Nausea & vomiting     Nephrolithiasis 01/29/2019    Obesity (BMI 30-39.9) 03/11/2019    Partial Achilles tendon tear     Pneumonia     PONV (postoperative nausea and vomiting)     RA (rheumatoid arthritis) (HCC)     Rapid or irregular heartbeat     Sleep apnea     Spinal stenosis     Stress incontinence     Thyroid disease         Past Surgical History:    has a past surgical history that includes Cholecystectomy; Dilation and curettage of uterus; Tonsillectomy; laparoscopy; Upper gastrointestinal endoscopy; Endometrial ablation; ERCP (05/25/2010); Esophagoscopy (10/05/2010); Upper gastrointestinal endoscopy (04/12/2011); eye surgery; Upper gastrointestinal endoscopy (08/30/2011); Upper gastrointestinal endoscopy (03/09/2012); Upper gastrointestinal endoscopy; Upper gastrointestinal endoscopy (11/20/2012); Upper gastrointestinal endoscopy (06/04/2013); ERCP (01/31/2014); Upper gastrointestinal endoscopy (05/20/2014); Upper gastrointestinal endoscopy (12/12/2014); Upper gastrointestinal endoscopy (09/09/2015); Endoscopy, colon, diagnostic; Upper gastrointestinal endoscopy (05/10/2016); Upper gastrointestinal endoscopy (04/11/2017); Upper gastrointestinal endoscopy (10/24/2017); Upper gastrointestinal endoscopy (03/06/2018); Colonoscopy; Colonoscopy  MD Ricky   famotidine (PEPCID) 40 MG tablet Take 1 tablet by mouth 2 times daily  Geoffrey Velarde MD   latanoprost (XALATAN) 0.005 % ophthalmic solution Place 1 drop into both eyes nightly  Provider, MD Radha   Incontinence Supplies MISC 1 Package by Does not apply route 2 times daily  Danita Santos MD   Disposable Gloves (VINYL GLOVES LARGE) MISC 1 Package by Does not apply route 4 times daily  Danita Santos MD   GAS-X EXTRA STRENGTH 125 MG chewable tablet CHEW TWO TABLETS BY MOUTH THREE TIMES A DAY WITH EACH MEAL AS NEEDED FOR FLATULENCE  Patient taking differently: Take 1 tablet by mouth 3 times daily (with meals) Take one tablet three times daily with meals.  Danita Santos MD        Scheduled Meds:   sacubitril-valsartan  1 tablet Oral BID    allopurinol  150 mg Oral Daily    insulin lispro  8 Units SubCUTAneous TID WC    aspirin  81 mg Oral Daily    simethicone  80 mg Oral TID WC    Vitamin D  1,000 Units Oral Daily    insulin lispro  0-16 Units SubCUTAneous TID WC    insulin lispro  0-4 Units SubCUTAneous Nightly    atorvastatin  40 mg Oral Nightly    clopidogrel  75 mg Oral Daily    famotidine  40 mg Oral BID    latanoprost  1 drop Both Eyes Nightly    cetirizine  5 mg Oral Daily    metoprolol succinate  25 mg Oral Nightly    sodium chloride flush  5-40 mL IntraVENous 2 times per day    insulin glargine  56 Units SubCUTAneous Nightly    enoxaparin  30 mg SubCUTAneous BID     Continuous Infusions:   sodium chloride      dextrose       PRN Meds:bisacodyl, polyvinyl alcohol, sodium chloride flush, sodium chloride, dextrose bolus **OR** dextrose bolus, dextrose, albuterol, potassium chloride **OR** potassium alternative oral replacement **OR** potassium chloride, magnesium sulfate, ondansetron **OR** ondansetron, polyethylene glycol, acetaminophen **OR** acetaminophen, cyclobenzaprine     Review of Systems:  Constitutional: Negative for fever, night sweats, chills,  Skin: Negative for

## 2024-07-12 NOTE — PROGRESS NOTES
CLINICAL PHARMACY NOTE: MEDS TO BEDS    Total # of Prescriptions Filled: 5   The following medications were delivered to the patient:  CYCLOBENZAPRINE HCL 5MG  FUROSEMIDE 20MG  ALLOPURINOL 300MG  ENTRESTO 24MG  ELIQUIS 2.5MG    Additional Documentation:  Delivered to patients room = signed  Ok to be delivered per KARELY Marie - Pharmacy Tech.

## 2024-07-12 NOTE — CARE COORDINATION
Case Management -  Discharge Note      Patient Name: Lanny Carbajal                   YOB: 1965            Readmission Risk (Low < 19, Mod (19-27), High > 27): Readmission Risk Score: 16.6    Current PCP: Fidencio Mares MD      PT AM-PAC: 20 /24  OT AM-PAC: 24 /24    Patient/patient representative has been educated on the benefits of Outpatient PT as well as the possible risks of declining recommended services. Patient/patient representative has acknowledged the information provided and decided on the following discharge plan. Patient/ patient representative has been provided freedom of choice regarding service provider, supported by basic dialogue that supports the patient's individualized plan of care/goals.    [facility]  Phone: [phone]  Fax: [phone]    Patient was provided a 4WW at [facility] via [employer]    Financial    [facility] [place] / Plan: [facility] HEALTH PLAN / Product Type: *No Product type* /     Pharmacy:  Potential assistance Purchasing Medications: No  Meds-to-Beds request: Yes      [facility] [id number] - [place], [place] - [address] - P [phone] - F [phone]  [address]  [facility] [address]  Phone: [phone] Fax: [phone]      Notes:    Additional Case Management Notes: family will transport home at [facility].    Electronically signed by Berna Aquino RN on 7/12/2024 at 2:58 PM

## 2024-07-12 NOTE — PLAN OF CARE
Problem: Safety - Adult  Goal: Free from fall injury  7/12/2024 0942 by Tamera Murillo RN  Outcome: Progressing  Flowsheets (Taken 7/12/2024 0942)  Free From Fall Injury:   Instruct family/caregiver on patient safety   Based on caregiver fall risk screen, instruct family/caregiver to ask for assistance with transferring infant if caregiver noted to have fall risk factors     Problem: Discharge Planning  Goal: Discharge to home or other facility with appropriate resources  Outcome: Progressing  Flowsheets (Taken 7/12/2024 0942)  Discharge to home or other facility with appropriate resources:   Identify barriers to discharge with patient and caregiver   Arrange for needed discharge resources and transportation as appropriate   Identify discharge learning needs (meds, wound care, etc)   Refer to discharge planning if patient needs post-hospital services based on physician order or complex needs related to functional status, cognitive ability or social support system     Problem: ABCDS Injury Assessment  Goal: Absence of physical injury  7/12/2024 0942 by Tamera Murillo RN  Outcome: Progressing  Flowsheets (Taken 7/12/2024 0942)  Absence of Physical Injury: Implement safety measures based on patient assessment     Problem: Pain  Goal: Verbalizes/displays adequate comfort level or baseline comfort level  7/12/2024 0942 by Tamera Murillo RN  Outcome: Progressing  Flowsheets (Taken 7/12/2024 0942)  Verbalizes/displays adequate comfort level or baseline comfort level:   Encourage patient to monitor pain and request assistance   Assess pain using appropriate pain scale   Administer analgesics based on type and severity of pain and evaluate response   Implement non-pharmacological measures as appropriate and evaluate response     Problem: Cardiovascular - Adult  Goal: Maintains optimal cardiac output and hemodynamic stability  Outcome: Progressing  Flowsheets (Taken 7/12/2024 0942)  Maintains optimal cardiac output and

## 2024-07-18 ENCOUNTER — APPOINTMENT (OUTPATIENT)
Dept: GENERAL RADIOLOGY | Age: 59
End: 2024-07-18
Payer: COMMERCIAL

## 2024-07-18 ENCOUNTER — APPOINTMENT (OUTPATIENT)
Dept: CT IMAGING | Age: 59
End: 2024-07-18
Payer: COMMERCIAL

## 2024-07-18 ENCOUNTER — HOSPITAL ENCOUNTER (EMERGENCY)
Age: 59
Discharge: HOME | End: 2024-07-18
Attending: STUDENT IN AN ORGANIZED HEALTH CARE EDUCATION/TRAINING PROGRAM
Payer: COMMERCIAL

## 2024-07-18 ENCOUNTER — OFFICE VISIT (OUTPATIENT)
Dept: CARDIOLOGY CLINIC | Age: 59
End: 2024-07-18
Payer: COMMERCIAL

## 2024-07-18 VITALS
RESPIRATION RATE: 18 BRPM | BODY MASS INDEX: 42.14 KG/M2 | WEIGHT: 229 LBS | DIASTOLIC BLOOD PRESSURE: 55 MMHG | HEIGHT: 62 IN | TEMPERATURE: 97.6 F | OXYGEN SATURATION: 99 % | HEART RATE: 74 BPM | SYSTOLIC BLOOD PRESSURE: 145 MMHG

## 2024-07-18 VITALS
OXYGEN SATURATION: 99 % | SYSTOLIC BLOOD PRESSURE: 122 MMHG | HEART RATE: 67 BPM | WEIGHT: 229 LBS | BODY MASS INDEX: 42.14 KG/M2 | DIASTOLIC BLOOD PRESSURE: 78 MMHG | HEIGHT: 62 IN

## 2024-07-18 DIAGNOSIS — I10 PRIMARY HYPERTENSION: ICD-10-CM

## 2024-07-18 DIAGNOSIS — E78.2 MIXED HYPERLIPIDEMIA: ICD-10-CM

## 2024-07-18 DIAGNOSIS — I44.2 CHB (COMPLETE HEART BLOCK) (HCC): ICD-10-CM

## 2024-07-18 DIAGNOSIS — R07.89 CHEST DISCOMFORT: Primary | ICD-10-CM

## 2024-07-18 DIAGNOSIS — R51.9 ACUTE NONINTRACTABLE HEADACHE, UNSPECIFIED HEADACHE TYPE: ICD-10-CM

## 2024-07-18 DIAGNOSIS — I25.83 CORONARY ARTERY DISEASE DUE TO LIPID RICH PLAQUE: Primary | ICD-10-CM

## 2024-07-18 DIAGNOSIS — R10.9 ACUTE RIGHT FLANK PAIN: ICD-10-CM

## 2024-07-18 DIAGNOSIS — I51.9 LV DYSFUNCTION: ICD-10-CM

## 2024-07-18 DIAGNOSIS — I25.10 CORONARY ARTERY DISEASE DUE TO LIPID RICH PLAQUE: Primary | ICD-10-CM

## 2024-07-18 LAB
ALBUMIN SERPL-MCNC: 4.5 G/DL (ref 3.4–5)
ALBUMIN/GLOB SERPL: 1.3 {RATIO} (ref 1.1–2.2)
ALP SERPL-CCNC: 22 U/L (ref 40–129)
ALT SERPL-CCNC: 31 U/L (ref 10–40)
ANION GAP SERPL CALCULATED.3IONS-SCNC: 13 MMOL/L (ref 3–16)
APTT BLD: 22.1 SEC (ref 22.1–36.4)
AST SERPL-CCNC: 28 U/L (ref 15–37)
BACTERIA URNS QL MICRO: ABNORMAL /HPF
BASOPHILS # BLD: 0.1 K/UL (ref 0–0.2)
BASOPHILS NFR BLD: 1.1 %
BILIRUB SERPL-MCNC: 0.7 MG/DL (ref 0–1)
BILIRUB UR QL STRIP.AUTO: NEGATIVE
BUN SERPL-MCNC: 13 MG/DL (ref 7–20)
CALCIUM SERPL-MCNC: 10.9 MG/DL (ref 8.3–10.6)
CHLORIDE SERPL-SCNC: 97 MMOL/L (ref 99–110)
CLARITY UR: CLEAR
CO2 SERPL-SCNC: 23 MMOL/L (ref 21–32)
COLOR UR: YELLOW
CREAT SERPL-MCNC: 0.8 MG/DL (ref 0.6–1.2)
DEPRECATED RDW RBC AUTO: 16.7 % (ref 12.4–15.4)
EKG ATRIAL RATE: 78 BPM
EKG DIAGNOSIS: NORMAL
EKG P AXIS: 33 DEGREES
EKG P-R INTERVAL: 182 MS
EKG Q-T INTERVAL: 450 MS
EKG QRS DURATION: 182 MS
EKG QTC CALCULATION (BAZETT): 513 MS
EKG R AXIS: -2 DEGREES
EKG T AXIS: 161 DEGREES
EKG VENTRICULAR RATE: 78 BPM
EOSINOPHIL # BLD: 0.2 K/UL (ref 0–0.6)
EOSINOPHIL NFR BLD: 1.9 %
EPI CELLS #/AREA URNS AUTO: 4 /HPF (ref 0–5)
GFR SERPLBLD CREATININE-BSD FMLA CKD-EPI: 84 ML/MIN/{1.73_M2}
GLUCOSE SERPL-MCNC: 234 MG/DL (ref 70–99)
GLUCOSE UR STRIP.AUTO-MCNC: NEGATIVE MG/DL
HCT VFR BLD AUTO: 41 % (ref 36–48)
HGB BLD-MCNC: 13.5 G/DL (ref 12–16)
HGB UR QL STRIP.AUTO: ABNORMAL
HYALINE CASTS #/AREA URNS AUTO: 0 /LPF (ref 0–8)
INR PPP: 0.98 (ref 0.85–1.15)
KETONES UR STRIP.AUTO-MCNC: NEGATIVE MG/DL
LEUKOCYTE ESTERASE UR QL STRIP.AUTO: ABNORMAL
LYMPHOCYTES # BLD: 2.7 K/UL (ref 1–5.1)
LYMPHOCYTES NFR BLD: 25.3 %
MCH RBC QN AUTO: 28.7 PG (ref 26–34)
MCHC RBC AUTO-ENTMCNC: 32.9 G/DL (ref 31–36)
MCV RBC AUTO: 87.2 FL (ref 80–100)
MONOCYTES # BLD: 0.7 K/UL (ref 0–1.3)
MONOCYTES NFR BLD: 6.6 %
NEUTROPHILS # BLD: 7 K/UL (ref 1.7–7.7)
NEUTROPHILS NFR BLD: 65.1 %
NITRITE UR QL STRIP.AUTO: NEGATIVE
NT-PROBNP SERPL-MCNC: 129 PG/ML (ref 0–124)
PH UR STRIP.AUTO: 5.5 [PH] (ref 5–8)
PLATELET # BLD AUTO: 204 K/UL (ref 135–450)
PMV BLD AUTO: 9 FL (ref 5–10.5)
POTASSIUM SERPL-SCNC: 4.4 MMOL/L (ref 3.5–5.1)
PROT SERPL-MCNC: 8.1 G/DL (ref 6.4–8.2)
PROT UR STRIP.AUTO-MCNC: NEGATIVE MG/DL
PROTHROMBIN TIME: 13.2 SEC (ref 11.9–14.9)
RBC # BLD AUTO: 4.7 M/UL (ref 4–5.2)
RBC CLUMPS #/AREA URNS AUTO: 2 /HPF (ref 0–4)
SODIUM SERPL-SCNC: 133 MMOL/L (ref 136–145)
SP GR UR STRIP.AUTO: <=1.005 (ref 1–1.03)
TROPONIN, HIGH SENSITIVITY: 52 NG/L (ref 0–14)
TROPONIN, HIGH SENSITIVITY: 52 NG/L (ref 0–14)
UA COMPLETE W REFLEX CULTURE PNL UR: ABNORMAL
UA DIPSTICK W REFLEX MICRO PNL UR: YES
URN SPEC COLLECT METH UR: ABNORMAL
UROBILINOGEN UR STRIP-ACNC: 0.2 E.U./DL
WBC # BLD AUTO: 10.7 K/UL (ref 4–11)
WBC #/AREA URNS AUTO: 7 /HPF (ref 0–5)

## 2024-07-18 PROCEDURE — 3017F COLORECTAL CA SCREEN DOC REV: CPT | Performed by: NURSE PRACTITIONER

## 2024-07-18 PROCEDURE — 70450 CT HEAD/BRAIN W/O DYE: CPT

## 2024-07-18 PROCEDURE — 1111F DSCHRG MED/CURRENT MED MERGE: CPT | Performed by: NURSE PRACTITIONER

## 2024-07-18 PROCEDURE — 3074F SYST BP LT 130 MM HG: CPT | Performed by: NURSE PRACTITIONER

## 2024-07-18 PROCEDURE — G8427 DOCREV CUR MEDS BY ELIG CLIN: HCPCS | Performed by: NURSE PRACTITIONER

## 2024-07-18 PROCEDURE — 74176 CT ABD & PELVIS W/O CONTRAST: CPT

## 2024-07-18 PROCEDURE — 6370000000 HC RX 637 (ALT 250 FOR IP): Performed by: PHYSICIAN ASSISTANT

## 2024-07-18 PROCEDURE — 99285 EMERGENCY DEPT VISIT HI MDM: CPT

## 2024-07-18 PROCEDURE — 83880 ASSAY OF NATRIURETIC PEPTIDE: CPT

## 2024-07-18 PROCEDURE — 93000 ELECTROCARDIOGRAM COMPLETE: CPT | Performed by: NURSE PRACTITIONER

## 2024-07-18 PROCEDURE — 1036F TOBACCO NON-USER: CPT | Performed by: NURSE PRACTITIONER

## 2024-07-18 PROCEDURE — G8417 CALC BMI ABV UP PARAM F/U: HCPCS | Performed by: NURSE PRACTITIONER

## 2024-07-18 PROCEDURE — 81001 URINALYSIS AUTO W/SCOPE: CPT

## 2024-07-18 PROCEDURE — 85025 COMPLETE CBC W/AUTO DIFF WBC: CPT

## 2024-07-18 PROCEDURE — 84484 ASSAY OF TROPONIN QUANT: CPT

## 2024-07-18 PROCEDURE — 85610 PROTHROMBIN TIME: CPT

## 2024-07-18 PROCEDURE — 71045 X-RAY EXAM CHEST 1 VIEW: CPT

## 2024-07-18 PROCEDURE — 99214 OFFICE O/P EST MOD 30 MIN: CPT | Performed by: NURSE PRACTITIONER

## 2024-07-18 PROCEDURE — 85730 THROMBOPLASTIN TIME PARTIAL: CPT

## 2024-07-18 PROCEDURE — 3078F DIAST BP <80 MM HG: CPT | Performed by: NURSE PRACTITIONER

## 2024-07-18 PROCEDURE — 80053 COMPREHEN METABOLIC PANEL: CPT

## 2024-07-18 RX ORDER — ACETAMINOPHEN 325 MG/1
325 TABLET ORAL ONCE
Status: COMPLETED | OUTPATIENT
Start: 2024-07-18 | End: 2024-07-18

## 2024-07-18 RX ADMIN — ACETAMINOPHEN 325 MG: 325 TABLET ORAL at 16:51

## 2024-07-18 ASSESSMENT — LIFESTYLE VARIABLES: HOW OFTEN DO YOU HAVE A DRINK CONTAINING ALCOHOL: NEVER

## 2024-07-18 ASSESSMENT — ENCOUNTER SYMPTOMS
NAUSEA: 0
COUGH: 0
WHEEZING: 0
CONSTIPATION: 0
STRIDOR: 0
ABDOMINAL PAIN: 0
VOMITING: 0
SHORTNESS OF BREATH: 1
DIARRHEA: 0
COLOR CHANGE: 0

## 2024-07-18 ASSESSMENT — PAIN SCALES - GENERAL
PAINLEVEL_OUTOF10: 7
PAINLEVEL_OUTOF10: 9

## 2024-07-18 ASSESSMENT — PAIN DESCRIPTION - LOCATION
LOCATION: CHEST;ABDOMEN
LOCATION: CHEST

## 2024-07-18 ASSESSMENT — PAIN - FUNCTIONAL ASSESSMENT: PAIN_FUNCTIONAL_ASSESSMENT: 0-10

## 2024-07-18 NOTE — PROGRESS NOTES
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  Patient was seen by cardiology and underwent left heart cath 7/8/2024 which revealed 70% stenosis LAD.    Cardiology recommends staged PCI to LAD.  Recommend transfer to [facility].  Recommend to continue holding Eliquis for now.  Continue dual antiplatelet therapy.  Patient is currently being discharged in stable condition on 7/8/2024.  The patient expressed appropriate understanding of and agreement with the discharge recommendations, medications, and plan.      Discharge diagnosis     Unstable angina  CAD  Ischemic cardiomyopathy  History of bradycardia s/p PPM  History of DVT/protein S deficiency  Diabetes mellitus type 2 with hyperglycemia  Hypertension  Hyperlipidemia     Consults this admission:  IP CONSULT TO CARDIOLOGY    #2)  [facility]  Patient is a 59-year-old female who presented to [facility] with chest pain.  She has a history of coronary artery disease and is status post LAD stenting back in December.  She underwent a left heart cath on July 8 which revealed a 70% stenosis of the LAD.  She was transferred to [facility] for this.     Hospital Course:      Coronary artery disease-patient was admitted and was seen by cardiology she was taken to the Cath Lab by Dr. Minaya on July 10 where she underwent successful LAD angioplasty and

## 2024-07-18 NOTE — ED PROVIDER NOTES
[facility] EMERGENCY DEPARTMENT  EMERGENCY DEPARTMENT ENCOUNTER        Pt Name: Lanny Carbajal  MRN: [id number]  Birthdate 1965  Date of evaluation: 7/18/2024  Provider: Johnathon Small PA-C  PCP: Yeimy Owen APRN - CNP  Note Started: 4:47 PM EDT 7/18/24       I have seen and evaluated this patient with my supervising physician No att. providers found.      CHIEF COMPLAINT       Chief Complaint   Patient presents with    Chest Pain     Chest pain and SOB worse since discharge from hospital        HISTORY OF PRESENT ILLNESS: 1 or more Elements     History from : Patient    Limitations to history : None    Lanny Carbajal is a 59 y.o. female who presents to the emergency department with numerous complaints.  First, patient states that she has had intermittent chest pain and shortness of breath after being discharged from the hospital earlier this month.  She had a heart cath and a stent placed during her hospitalization.  She saw cardiology today and informed them that she is still having chest pain and shortness of breath.  She was told that if she has any worsening symptoms to go to the emergency department.  She is still experiencing left-sided chest pain.  She takes Plavix and Eliquis.  Secondly, patient states that this afternoon she developed a right-sided headache.  She rates her pain to be a 2 out of 10 on pain scale.  It is very mild.  She is requesting Tylenol.  Lastly, patient reports right flank pain for several days.  She does have history of kidney stone.  She denies hematuria, dysuria, urine frequency, urgency or abdominal pain.    Nursing Notes were all reviewed and agreed with or any disagreements were addressed in the HPI.    REVIEW OF SYSTEMS :      Review of Systems   Constitutional:  Positive for fatigue. Negative for chills and fever.   HENT: Negative.     Eyes:  Negative for visual disturbance.   Respiratory:  Positive for shortness of breath. Negative for

## 2024-07-18 NOTE — ED PROVIDER NOTES
In addition to the advanced practice provider, I personally saw Lanny Carbajal and performed a substantive portion of the visit including all aspects of the medical decision making.    Medical Decision making  In brief patient is a 59-year-old female with history significant for hypertension, hyperlipidemia, diabetes, CAD who presents for intermittent chest pain and shortness of breath.  Patient had a heart cath earlier this month as noted on EHR review below.  Patient reports the pain is left-sided and associated with shortness of breath.  Patient also complains of developing right-sided headache this afternoon, reports it is mild and not the worst she is ever had and not consistent from prior headaches.  Patient also reported complaints of right flank pain with a history of kidney stones.  States the pain has been going on for \"several days\" without dysuria, hematuria or change in frequency or abdominal pain.  Patient denies fever/chills, nausea/vomiting or other associated symptoms not listed above.    On EHR review patient was seen at the office by cardiology earlier today, is currently on anticoagulation with Eliquis and has a dual-chamber pacemaker.  Patient was seen and admitted for heart cath 7/8/2024 and received stenting, was discharged 7/12/2024.  Her office visit today was a routine follow-up visit.  Per cardiology note from today patient was reporting that she still has chest pain with symptoms unchanged from recent admission as well as shortness of breath.  Cardiology plan remarks that the patient was found to be stable since hospital discharge and to continue on current medication regimen.    Constitutional: Alert, awake, appears non-toxic.  ENT: Mucous membranes moist.  Heart: Regular rate and regular rhythm.    Vascular: Radial pulses 2+ and equal b/l.  Lungs: Clear to auscultation bilaterally.  Normal respiratory pattern without conversational dyspnea or respiratory distress.  Abdomen: Soft,

## 2024-07-19 ENCOUNTER — TELEPHONE (OUTPATIENT)
Dept: CARDIOLOGY CLINIC | Age: 59
End: 2024-07-19

## 2024-07-19 DIAGNOSIS — I25.83 CORONARY ARTERY DISEASE DUE TO LIPID RICH PLAQUE: Primary | ICD-10-CM

## 2024-07-19 DIAGNOSIS — I25.10 CORONARY ARTERY DISEASE DUE TO LIPID RICH PLAQUE: Primary | ICD-10-CM

## 2024-07-19 NOTE — TELEPHONE ENCOUNTER
Per office, patient needs fu with DCE. Will discuss with DCE on Monday when he would like to see patient again. Patient went to ER yesterday evening after her OV with NPTS. Troponin 52 x2. CP atypical. Patient discharged. Patient is continuing to have same chest discomfort

## 2024-07-19 NOTE — TELEPHONE ENCOUNTER
Called and left message with instructions to call back to schedule tentative OV and discuss echo denial received from insurance

## 2024-07-19 NOTE — TELEPHONE ENCOUNTER
Patient called back and we discussed her symptoms. They are somewhat better since OV with TSNP and ER visit. Symptoms are slightly better than pre-stent. Chest discomfort occurs sometimes with activity, resolves with rest, dull pain under left breast, sharp pain on sternum.     Called patient and discussed echo denial letter. We will send in appeal. Also discussed Cath results. Patient confirms she is taking plavix daily and has not missed any doses.     Patient complains of dizziness in the evenings when she takes metoprolol and entresto. BP has been running low around 109/60s. Advised pt to check BP/HR when she is feeling dizzy. Advised to take metoprolol mid day inbetween entresto doses to see if that helps dizziness     Patient scheduled with DCE 9/6 will discuss with DCE next week.

## 2024-07-19 NOTE — TELEPHONE ENCOUNTER
Pt returned MDRN call, she said she will have her phone with this afternoon, but if she misses the call she will call right back.

## 2024-07-24 PROBLEM — I20.89 ATYPICAL ANGINA (HCC): Status: ACTIVE | Noted: 2024-07-24

## 2024-07-24 NOTE — TELEPHONE ENCOUNTER
DCE called patient and answered all questions. Stop metoprolol due to fatigue, low bp, dizziness. Encouraged cardiac rehab

## 2024-07-26 ENCOUNTER — TELEPHONE (OUTPATIENT)
Dept: CARDIAC REHAB | Age: 59
End: 2024-07-26

## 2024-07-30 ENCOUNTER — TELEPHONE (OUTPATIENT)
Dept: CARDIOLOGY CLINIC | Age: 59
End: 2024-07-30

## 2024-07-30 NOTE — TELEPHONE ENCOUNTER
I spoke with pt . She stated that she thinks that maybe her Entresto is making her feel tired. She states that her BP have been borderline low  HR low normal. She stated that she has had this feeling since leaving the hospital. She stated that she was told to call us and give us an update on how she is doing on the med.

## 2024-07-30 NOTE — TELEPHONE ENCOUNTER
Pt called back to speak to Ese, Pt stated they have been super tired since stopping the since stopping the Metoprolol, however they do not know if that is from the [facility]  [phone]

## 2024-07-31 NOTE — TELEPHONE ENCOUNTER
Patient still having dizzy spells. Would like to start breaking pills in half to see if that helps. Patient will call next week with update. /60s, HR 60-70

## 2024-08-05 ENCOUNTER — TELEPHONE (OUTPATIENT)
Dept: CARDIOLOGY CLINIC | Age: 59
End: 2024-08-05

## 2024-08-05 NOTE — TELEPHONE ENCOUNTER
Pt states she received a letter saying they denied 7/5/24 echo stating insufficient medical and unsigned doctor note. Please address.

## 2024-08-08 ENCOUNTER — TELEPHONE (OUTPATIENT)
Dept: CARDIAC REHAB | Age: 59
End: 2024-08-08

## 2024-08-10 DIAGNOSIS — E11.9 TYPE 2 DIABETES MELLITUS WITHOUT COMPLICATION, WITH LONG-TERM CURRENT USE OF INSULIN (HCC): ICD-10-CM

## 2024-08-10 DIAGNOSIS — Z79.4 TYPE 2 DIABETES MELLITUS WITHOUT COMPLICATION, WITH LONG-TERM CURRENT USE OF INSULIN (HCC): ICD-10-CM

## 2024-08-12 RX ORDER — INSULIN GLARGINE 100 [IU]/ML
INJECTION, SOLUTION SUBCUTANEOUS
Qty: 15 ML | Refills: 0 | Status: SHIPPED | OUTPATIENT
Start: 2024-08-12

## 2024-08-12 NOTE — TELEPHONE ENCOUNTER
Medication:   Requested Prescriptions     Pending Prescriptions Disp Refills    LANTUS SOLOSTAR 100 UNIT/ML injection pen [Pharmacy Med Name: LANTUS SOLOSTAR 100 UNIT/ML] 15 mL 0     Sig: INJECT 52 UNITS UNDER THE SKIN DAILY       Last Filled:      Patient Phone Number: [phone] (home)     Last appt: 4/19/2024   Next appt: Visit date not found    Last Labs DM:   Lab Results   Component Value Date/Time    LABA1C 10.2 07/11/2024 04:39 AM

## 2024-08-12 NOTE — PROGRESS NOTES
JERSON performed by Geoffrey Velarde MD at [facility] ENDOSCOPY    ESOPHAGOSCOPY  10/05/2010    botox injection    EYE SURGERY      LASER FOR GLAUCOMA    LAPAROSCOPY      PACEMAKER INSERTION  05/10/2021    TONSILLECTOMY      UPPER GASTROINTESTINAL ENDOSCOPY      UPPER GASTROINTESTINAL ENDOSCOPY  04/12/2011    DILATATION, 58 FR VELAZQUEZ, 100 UNITS BOTOX    UPPER GASTROINTESTINAL ENDOSCOPY  08/30/2011    58 FR DILATATION, 100 UNITS  BOTOX INJECTION    UPPER GASTROINTESTINAL ENDOSCOPY  03/09/2012    with dilatation and submucosal injection: Botox    UPPER GASTROINTESTINAL ENDOSCOPY      UPPER GASTROINTESTINAL ENDOSCOPY  11/20/2012     Esophagogastroduodenoscopy with Botulinum toxin injection of the pylorus and Velazquez esophageal dilation    UPPER GASTROINTESTINAL ENDOSCOPY  06/04/2013    WITH DIILITATION AND BOTOX INJECTION    UPPER GASTROINTESTINAL ENDOSCOPY  05/20/2014    100 unit Botox injection, 56 Fr. Velazquez esophageal dilatation    UPPER GASTROINTESTINAL ENDOSCOPY  12/12/2014    with esophageal dilatation, and botox injection    UPPER GASTROINTESTINAL ENDOSCOPY  09/09/2015    With Dilitation and Botox injection    UPPER GASTROINTESTINAL ENDOSCOPY  05/10/2016    distal esophagus biopsy, stomach biopsy, botox injection    UPPER GASTROINTESTINAL ENDOSCOPY  04/11/2017    with dilatation and botox injection    UPPER GASTROINTESTINAL ENDOSCOPY  10/24/2017    DILATATION, BIOPSY, BOTOX    UPPER GASTROINTESTINAL ENDOSCOPY  03/06/2018    WITH BOTOX AND BALLOON DILATATION    UPPER GASTROINTESTINAL ENDOSCOPY N/A 05/07/2019    EGD SUBMUCOSAL/BOTOX INJECTION performed by Geoffrey Velarde MD at [facility] ENDOSCOPY    UPPER GASTROINTESTINAL ENDOSCOPY N/A 05/07/2019    EGD DILATION BALLOON performed by Geoffrey Velarde MD at [facility] ENDOSCOPY    UPPER GASTROINTESTINAL ENDOSCOPY N/A 03/13/2020    EGD SUBMUCOSAL/BOTOX INJECTION performed by Geoffrey Velarde MD at [facility] ENDOSCOPY    UPPER GASTROINTESTINAL ENDOSCOPY N/A

## 2024-08-15 ENCOUNTER — TELEPHONE (OUTPATIENT)
Dept: ENDOCRINOLOGY | Age: 59
End: 2024-08-15

## 2024-08-15 NOTE — TELEPHONE ENCOUNTER
Please have staff schedule f/u as due    Spoke to patient, increase lantus by 6 units and humalog scale by 4 units given hyperglycemia

## 2024-08-15 NOTE — TELEPHONE ENCOUNTER
Pt calling and states she would like to talk to Dr Cartwright states she was in hospital last month for stent in her heart, her other stent had collapsed. She was given steroids and so her sugar went up to the 400 range while she was in hospital  Pt states it has went down now that she is off steroids but still in the 200 range. Yesterday morning it was 194 when she woke up. She has been taking 16 u of her mealtime insulin. Pt states it got up to 259 yesterday    # [phone]

## 2024-08-20 ENCOUNTER — OFFICE VISIT (OUTPATIENT)
Dept: CARDIOLOGY CLINIC | Age: 59
End: 2024-08-20
Payer: COMMERCIAL

## 2024-08-20 ENCOUNTER — NURSE ONLY (OUTPATIENT)
Dept: CARDIOLOGY CLINIC | Age: 59
End: 2024-08-20

## 2024-08-20 VITALS
OXYGEN SATURATION: 98 % | HEIGHT: 62 IN | WEIGHT: 233.8 LBS | HEART RATE: 77 BPM | SYSTOLIC BLOOD PRESSURE: 118 MMHG | BODY MASS INDEX: 43.02 KG/M2 | DIASTOLIC BLOOD PRESSURE: 74 MMHG

## 2024-08-20 DIAGNOSIS — I25.10 CORONARY ARTERY DISEASE INVOLVING NATIVE CORONARY ARTERY OF NATIVE HEART WITHOUT ANGINA PECTORIS: ICD-10-CM

## 2024-08-20 DIAGNOSIS — I10 PRIMARY HYPERTENSION: Primary | ICD-10-CM

## 2024-08-20 DIAGNOSIS — R07.9 CHEST PAIN, UNSPECIFIED TYPE: ICD-10-CM

## 2024-08-20 DIAGNOSIS — D68.59 PROTEIN S DEFICIENCY (HCC): ICD-10-CM

## 2024-08-20 DIAGNOSIS — R06.02 SHORTNESS OF BREATH: ICD-10-CM

## 2024-08-20 DIAGNOSIS — I44.1 MOBITZ TYPE II ATRIOVENTRICULAR BLOCK: ICD-10-CM

## 2024-08-20 DIAGNOSIS — I51.9 LV DYSFUNCTION: ICD-10-CM

## 2024-08-20 DIAGNOSIS — E66.9 OBESITY (BMI 30-39.9): ICD-10-CM

## 2024-08-20 DIAGNOSIS — Z95.0 PACEMAKER: Primary | ICD-10-CM

## 2024-08-20 DIAGNOSIS — Z95.0 PACEMAKER: ICD-10-CM

## 2024-08-20 PROCEDURE — 3078F DIAST BP <80 MM HG: CPT | Performed by: INTERNAL MEDICINE

## 2024-08-20 PROCEDURE — 93000 ELECTROCARDIOGRAM COMPLETE: CPT | Performed by: INTERNAL MEDICINE

## 2024-08-20 PROCEDURE — 3017F COLORECTAL CA SCREEN DOC REV: CPT | Performed by: INTERNAL MEDICINE

## 2024-08-20 PROCEDURE — 1036F TOBACCO NON-USER: CPT | Performed by: INTERNAL MEDICINE

## 2024-08-20 PROCEDURE — 99215 OFFICE O/P EST HI 40 MIN: CPT | Performed by: INTERNAL MEDICINE

## 2024-08-20 PROCEDURE — 3074F SYST BP LT 130 MM HG: CPT | Performed by: INTERNAL MEDICINE

## 2024-08-20 PROCEDURE — G8427 DOCREV CUR MEDS BY ELIG CLIN: HCPCS | Performed by: INTERNAL MEDICINE

## 2024-08-20 PROCEDURE — G8417 CALC BMI ABV UP PARAM F/U: HCPCS | Performed by: INTERNAL MEDICINE

## 2024-08-20 RX ORDER — CARVEDILOL 3.12 MG/1
3.12 TABLET ORAL 2 TIMES DAILY
Qty: 60 TABLET | Refills: 5 | Status: SHIPPED | OUTPATIENT
Start: 2024-08-20

## 2024-08-21 ENCOUNTER — TELEPHONE (OUTPATIENT)
Dept: CARDIOLOGY CLINIC | Age: 59
End: 2024-08-21

## 2024-08-21 NOTE — TELEPHONE ENCOUNTER
Pt saw RMM on 08/20, he started the Pt on a new med carvedilol  3.125mg.  Pt stated she does not have high bp, A lot of med made her dizzy and to the point of passing out. Pt wants to know if DCE wants her to take this med.   Please call to discuss this matter.  Thank you

## 2024-08-21 NOTE — TELEPHONE ENCOUNTER
Called patient. Per last conversation with DCE my consider coreg if tolerated. Advised patient to try and if she becomes symptomatic to hold and call the office.    Per Dr Gonzales's note consider Farxiga will discuss with DCE next week

## 2024-08-26 NOTE — TELEPHONE ENCOUNTER
Per DCE refer to Dr Gonzales/HF for reduced EF, CHF, medication optimization. Called and left message for patient to call back and schedule appointment.

## 2024-08-30 ENCOUNTER — TELEMEDICINE (OUTPATIENT)
Dept: ENDOCRINOLOGY | Age: 59
End: 2024-08-30

## 2024-08-30 DIAGNOSIS — E78.49 OTHER HYPERLIPIDEMIA: Primary | ICD-10-CM

## 2024-08-30 DIAGNOSIS — E04.1 THYROID NODULE: ICD-10-CM

## 2024-08-30 DIAGNOSIS — E11.9 TYPE 2 DIABETES MELLITUS WITHOUT COMPLICATION, WITH LONG-TERM CURRENT USE OF INSULIN (HCC): ICD-10-CM

## 2024-08-30 DIAGNOSIS — Z79.4 TYPE 2 DIABETES MELLITUS WITHOUT COMPLICATION, WITH LONG-TERM CURRENT USE OF INSULIN (HCC): ICD-10-CM

## 2024-08-30 RX ORDER — CALCIUM CITRATE/VITAMIN D3 200MG-6.25
TABLET ORAL
Qty: 150 STRIP | Refills: 3 | Status: SHIPPED | OUTPATIENT
Start: 2024-08-30

## 2024-08-30 RX ORDER — INSULIN GLARGINE 100 [IU]/ML
INJECTION, SOLUTION SUBCUTANEOUS
Qty: 15 ML | Refills: 0 | Status: SHIPPED | OUTPATIENT
Start: 2024-08-30

## 2024-08-30 RX ORDER — GLUCOSAM/CHON-MSM1/C/MANG/BOSW 500-416.6
TABLET ORAL
Qty: 150 EACH | Refills: 3 | Status: SHIPPED | OUTPATIENT
Start: 2024-08-30

## 2024-08-30 RX ORDER — PEN NEEDLE, DIABETIC 32GX 5/32"
NEEDLE, DISPOSABLE MISCELLANEOUS
Qty: 200 EACH | Refills: 3 | Status: SHIPPED | OUTPATIENT
Start: 2024-08-30

## 2024-08-30 RX ORDER — INSULIN LISPRO 100 [IU]/ML
INJECTION, SOLUTION INTRAVENOUS; SUBCUTANEOUS
Qty: 5 ADJUSTABLE DOSE PRE-FILLED PEN SYRINGE | Refills: 3 | Status: SHIPPED | OUTPATIENT
Start: 2024-08-30

## 2024-08-30 NOTE — PROGRESS NOTES
Seen as f/u patient for diabetes,      Patient was evaluated through a synchronous (real-time) audio-video encounter. The patient (or guardian if applicable) is aware that this is a billable service, which includes applicable co-pays. This Virtual Visit was conducted with patient's (and/or legal guardian's) consent. Patient identification was verified, and a caregiver was present when appropriate.   The [place]:   Provider was [place]    STOP! Confirm you are appropriately licensed, registered, or certified to deliver care in the state where the patient is located as indicated above. If you are not or unsure, please re-schedule the visit.    Are you appropriately licensed in the patient's state?: Yes        Interim:    Seen after 4/24  Glucose better    Reports gastroparesis    She had PCI  ACS    Neuropathy in hands    H/o thyroid nodule  Difficulty swallowing  No lows    Diagnosed with Type 2 diabetes mellitus   Known diabetic complications: none     Current diabetic medications   Metformin ER 500mg - not taking reports lows with it- off of it   lantus 54  unit daily  Humalog 10/10/10   SSI 2 for >150    Intolerant to plain metformin    Last A1c 10.2%<----12%<-----8.1%<----9.1%<-----8.1% <---- 7.2%<------ 9.6 %<----8.2%<-----9<-----7.8%<------7.8%<------ 9.2%<-----8.8%<----- 10.3 on 7/17<------10.8 on 12/16<----- 9.1 on 8/16<-----11.4 on 6/16<-----8.8 on 4.15<---8.9 <---10.1    Prior visit with dietician: Yes   Current diet: on average, 1 meals per day + Snacks   Reports h/o gastroparesis  Current exercise: walking   Current monitoring regimen: home blood tests -1  times daily does in forearms    Report h/o pancreatitis, stent in pancreatic duct  Also h/o yeast infection    Has brought blood glucose log/meter: yes  Home blood sugar records: 176-289  Any episodes of hypoglycemia? none    No Hx of CAD , PVD, CVA    Cath showed 20 % blockage    Hyperlipidemia:  on 3/16

## 2024-08-31 ENCOUNTER — APPOINTMENT (OUTPATIENT)
Age: 59
End: 2024-08-31
Payer: COMMERCIAL

## 2024-08-31 ENCOUNTER — HOSPITAL ENCOUNTER (EMERGENCY)
Age: 59
Discharge: HOME | End: 2024-08-31
Attending: EMERGENCY MEDICINE
Payer: COMMERCIAL

## 2024-08-31 VITALS
OXYGEN SATURATION: 98 % | DIASTOLIC BLOOD PRESSURE: 62 MMHG | RESPIRATION RATE: 14 BRPM | WEIGHT: 232 LBS | TEMPERATURE: 98.3 F | BODY MASS INDEX: 42.69 KG/M2 | SYSTOLIC BLOOD PRESSURE: 129 MMHG | HEART RATE: 67 BPM | HEIGHT: 62 IN

## 2024-08-31 DIAGNOSIS — N20.1 URETEROLITHIASIS: Primary | ICD-10-CM

## 2024-08-31 LAB
ALBUMIN SERPL-MCNC: 4 G/DL (ref 3.4–5)
ALBUMIN/GLOB SERPL: 1.3 {RATIO}
ALP SERPL-CCNC: 17 U/L (ref 40–129)
ALT SERPL-CCNC: 24 U/L (ref 10–40)
ANION GAP SERPL CALCULATED.3IONS-SCNC: 11 MMOL/L (ref 3–16)
AST SERPL-CCNC: 33 U/L (ref 15–37)
BACTERIA URNS QL MICRO: ABNORMAL
BASOPHILS # BLD: 0.05 K/UL (ref 0–0.2)
BASOPHILS NFR BLD: 1 %
BILIRUB SERPL-MCNC: 0.4 MG/DL (ref 0–1)
BILIRUB UR QL STRIP: NEGATIVE
BUN SERPL-MCNC: 15 MG/DL (ref 7–20)
CALCIUM SERPL-MCNC: 9.8 MG/DL (ref 8.3–10.6)
CHARACTER UR: ABNORMAL
CHLORIDE SERPL-SCNC: 104 MMOL/L (ref 99–110)
CLARITY UR: CLEAR
CO2 SERPL-SCNC: 23 MMOL/L (ref 21–32)
COLOR UR: ABNORMAL
CREAT SERPL-MCNC: 0.9 MG/DL (ref 0.6–1.2)
EOSINOPHIL # BLD: 0.61 K/UL (ref 0–0.6)
EOSINOPHILS RELATIVE PERCENT: 6 %
EPI CELLS #/AREA URNS HPF: ABNORMAL /HPF
ERYTHROCYTE [DISTWIDTH] IN BLOOD BY AUTOMATED COUNT: 15.2 % (ref 12.4–15.4)
GFR, ESTIMATED: 77 ML/MIN/1.73M2
GLUCOSE SERPL-MCNC: 155 MG/DL (ref 70–99)
GLUCOSE UR STRIP-MCNC: NEGATIVE MG/DL
HCT VFR BLD AUTO: 38.6 % (ref 36–48)
HGB BLD-MCNC: 12.2 G/DL (ref 12–16)
HGB UR QL STRIP.AUTO: ABNORMAL
IMM GRANULOCYTES # BLD AUTO: 0.03 K/UL (ref 0–0.5)
IMM GRANULOCYTES NFR BLD: 0 %
KETONES UR STRIP-MCNC: NEGATIVE MG/DL
LEUKOCYTE ESTERASE UR QL STRIP: ABNORMAL
LYMPHOCYTES NFR BLD: 3.43 K/UL (ref 1–5.1)
LYMPHOCYTES RELATIVE PERCENT: 35 %
MCH RBC QN AUTO: 28.5 PG (ref 26–34)
MCHC RBC AUTO-ENTMCNC: 31.6 G/DL (ref 31–36)
MCV RBC AUTO: 90.2 FL (ref 80–100)
MONOCYTES NFR BLD: 0.64 K/UL (ref 0–1.3)
MONOCYTES NFR BLD: 7 %
NEUTROPHILS NFR BLD: 52 %
NEUTS SEG NFR BLD: 5.04 K/UL (ref 1.7–7.7)
NITRITE UR QL STRIP: NEGATIVE
PH UR STRIP: 6 [PH] (ref 5–8)
PLATELET # BLD AUTO: 213 K/UL (ref 135–450)
PMV BLD AUTO: 10.9 FL
POTASSIUM SERPL-SCNC: 4.6 MMOL/L (ref 3.5–5.1)
PROT SERPL-MCNC: 7.1 G/DL (ref 6.4–8.2)
PROT UR STRIP-MCNC: 30 MG/DL
RBC # BLD AUTO: 4.28 M/UL (ref 4–5.2)
RBC #/AREA URNS HPF: ABNORMAL /HPF
SODIUM SERPL-SCNC: 138 MMOL/L (ref 136–145)
SP GR UR STRIP: <1.005 (ref 1–1.03)
UROBILINOGEN UR STRIP-ACNC: 0.2 EU/DL (ref 0–1)
WBC #/AREA URNS HPF: ABNORMAL /HPF
WBC OTHER # BLD: 9.8 K/UL (ref 4–11)

## 2024-08-31 PROCEDURE — 2580000003 HC RX 258: Performed by: EMERGENCY MEDICINE

## 2024-08-31 PROCEDURE — 96374 THER/PROPH/DIAG INJ IV PUSH: CPT

## 2024-08-31 PROCEDURE — 99284 EMERGENCY DEPT VISIT MOD MDM: CPT

## 2024-08-31 PROCEDURE — 74176 CT ABD & PELVIS W/O CONTRAST: CPT

## 2024-08-31 PROCEDURE — 85025 COMPLETE CBC W/AUTO DIFF WBC: CPT

## 2024-08-31 PROCEDURE — 6360000002 HC RX W HCPCS: Performed by: EMERGENCY MEDICINE

## 2024-08-31 PROCEDURE — 80053 COMPREHEN METABOLIC PANEL: CPT

## 2024-08-31 PROCEDURE — 81001 URINALYSIS AUTO W/SCOPE: CPT

## 2024-08-31 PROCEDURE — 6370000000 HC RX 637 (ALT 250 FOR IP): Performed by: EMERGENCY MEDICINE

## 2024-08-31 RX ORDER — ONDANSETRON 4 MG/1
4 TABLET, ORALLY DISINTEGRATING ORAL 3 TIMES DAILY PRN
Qty: 21 TABLET | Refills: 0 | Status: SHIPPED | OUTPATIENT
Start: 2024-08-31

## 2024-08-31 RX ORDER — MORPHINE SULFATE 4 MG/ML
4 INJECTION, SOLUTION INTRAMUSCULAR; INTRAVENOUS
Status: DISCONTINUED | OUTPATIENT
Start: 2024-08-31 | End: 2024-08-31 | Stop reason: HOSPADM

## 2024-08-31 RX ORDER — ACETAMINOPHEN 500 MG
1000 TABLET ORAL ONCE
Status: COMPLETED | OUTPATIENT
Start: 2024-08-31 | End: 2024-08-31

## 2024-08-31 RX ORDER — KETOROLAC TROMETHAMINE 15 MG/ML
15 INJECTION, SOLUTION INTRAMUSCULAR; INTRAVENOUS ONCE
Status: COMPLETED | OUTPATIENT
Start: 2024-08-31 | End: 2024-08-31

## 2024-08-31 RX ORDER — 0.9 % SODIUM CHLORIDE 0.9 %
1000 INTRAVENOUS SOLUTION INTRAVENOUS ONCE
Status: COMPLETED | OUTPATIENT
Start: 2024-08-31 | End: 2024-08-31

## 2024-08-31 RX ORDER — OXYCODONE AND ACETAMINOPHEN 5; 325 MG/1; MG/1
1 TABLET ORAL EVERY 6 HOURS PRN
Qty: 12 TABLET | Refills: 0 | Status: SHIPPED | OUTPATIENT
Start: 2024-08-31 | End: 2024-09-03

## 2024-08-31 RX ADMIN — ACETAMINOPHEN 1000 MG: 500 TABLET ORAL at 19:49

## 2024-08-31 RX ADMIN — SODIUM CHLORIDE 1000 ML: 9 INJECTION, SOLUTION INTRAVENOUS at 19:57

## 2024-08-31 RX ADMIN — KETOROLAC TROMETHAMINE 15 MG: 15 INJECTION, SOLUTION INTRAMUSCULAR; INTRAVENOUS at 19:49

## 2024-08-31 ASSESSMENT — PAIN SCALES - GENERAL
PAINLEVEL_OUTOF10: 6
PAINLEVEL_OUTOF10: 7

## 2024-08-31 ASSESSMENT — PAIN DESCRIPTION - ORIENTATION: ORIENTATION: RIGHT;LEFT

## 2024-08-31 ASSESSMENT — PAIN DESCRIPTION - PAIN TYPE: TYPE: ACUTE PAIN

## 2024-08-31 ASSESSMENT — PAIN DESCRIPTION - LOCATION: LOCATION: FLANK;ABDOMEN

## 2024-08-31 ASSESSMENT — PAIN - FUNCTIONAL ASSESSMENT: PAIN_FUNCTIONAL_ASSESSMENT: 0-10

## 2024-08-31 NOTE — ED PROVIDER NOTES
of Onset    Diabetes Mother     Heart Disease Mother     Stroke Mother     Arthritis Mother     Mental Illness Mother     Glaucoma Mother     Heart Disease Father     Arthritis Father     Glaucoma Father     Heart Disease Brother     High Blood Pressure Brother     High Cholesterol Brother     Diabetes Brother     High Cholesterol Brother        SOCIAL HISTORY  Social History     Socioeconomic History    Marital status: Single     Spouse name: Not on file    Number of children: 0    Years of education: 13    Highest education level: Not on file   Occupational History    Not on file   Tobacco Use    Smoking status: Never     Passive exposure: Never    Smokeless tobacco: Never   Vaping Use    Vaping status: Never Used   Substance and Sexual Activity    Alcohol use: No     Alcohol/week: 0.0 standard drinks of alcohol    Drug use: No    Sexual activity: Not Currently   Other Topics Concern    Not on file   Social History Narrative    Caffeine:        SODA -1 a month          TEA - 1 glass a day        COFFEE - 0     Social Determinants of Health     Financial Resource Strain: Medium Risk (10/2/2023)    Overall Financial Resource Strain (CARDIA)     Difficulty of Paying Living Expenses: Somewhat hard   Food Insecurity: No Food Insecurity (7/8/2024)    Hunger Vital Sign     Worried About Running Out of Food in the Last Year: Never true     Ran Out of Food in the Last Year: Never true   Transportation Needs: No Transportation Needs (7/8/2024)    PRAPARE - Transportation     Lack of Transportation (Medical): No     Lack of Transportation (Non-Medical): No   Physical Activity: Not on file   Stress: Not on file   Social Connections: Not on file   Intimate Partner Violence: Not At Risk (6/20/2024)    [facility]     Interpersonal Safety     Do you feel physically or emotionally unsafe where you currently live?: No     Within the past 12 months, have you been hit, slapped, kicked or otherwise physically  12.2 12.0 - 16.0 g/dL    Hematocrit 38.6 36.0 - 48.0 %    MCV 90.2 80.0 - 100.0 fL    MCH 28.5 26.0 - 34.0 pg    MCHC 31.6 31.0 - 36.0 g/dL    RDW 15.2 12.4 - 15.4 %    Platelets 213 135 - 450 k/uL    MPV 10.9 fL    Neutrophils % 52 %    Lymphocytes % 35 %    Monocytes % 7 %    Eosinophils % 6 %    Basophils % 1 %    Immature Granulocytes % 0 0 %    Neutrophils Absolute 5.04 1.70 - 7.70 k/uL    Lymphocytes Absolute 3.43 1.00 - 5.10 k/uL    Monocytes Absolute 0.64 0.00 - 1.30 k/uL    Eosinophils Absolute 0.61 (H) 0.00 - 0.60 k/uL    Basophils Absolute 0.05 0.00 - 0.20 k/uL    Immature Granulocytes Absolute 0.03 0.00 - 0.50 k/uL   CMP w/ Reflex to MG   Result Value Ref Range    Sodium 138 136 - 145 mmol/L    Potassium 4.6 3.5 - 5.1 mmol/L    Chloride 104 99 - 110 mmol/L    CO2 23 21 - 32 mmol/L    Anion Gap 11 3 - 16 mmol/L    Glucose 155 (H) 70 - 99 mg/dL    BUN 15 7 - 20 mg/dL    Creatinine 0.9 0.6 - 1.2 mg/dL    Est, Glom Filt Rate 77 >60 mL/min/1.73m2    Calcium 9.8 8.3 - 10.6 mg/dL    Total Protein 7.1 6.4 - 8.2 g/dL    Albumin 4.0 3.4 - 5.0 g/dL    Albumin/Globulin Ratio 1.3     Total Bilirubin 0.4 0.0 - 1.0 mg/dL    Alkaline Phosphatase 17 (L) 40 - 129 U/L    ALT 24 10 - 40 U/L    AST 33 15 - 37 U/L   Microscopic Urinalysis   Result Value Ref Range    WBC, UA 6 TO 9 (A) 0 TO 5 /HPF    RBC, UA GREATER THAN 100 (A) 0 TO 2 /HPF    Epithelial Cells, UA 3 to 5 /HPF    Bacteria, UA 1+ (A) None    Other Observations UA Culture not indicated. (A) NOT REQ.       RADIOLOGY  Any applicable radiology studies including x-ray, CT, MRI, and/or ultrasound, were reviewed independently by me in addition to the radiologist.  I reviewed all radiology images and reports as well from this evaluation.  CT ABDOMEN PELVIS WO CONTRAST Additional Contrast? None   Final Result      1.  5 x 3 mm obstructing calculus at the right ureteropelvic junction resulting   in mild right hydronephrosis.    2.  Additional nonobstructing right renal  history, physical exam, and plan. I have also reviewed and agree with the medications, allergies and past medical history section for this patient.    Electronically signed by: Diogo Echols Jr., , HOMER, FAAEM, 8/31/2024  7:10 PM       Diogo Echols,   08/31/24 0070

## 2024-09-06 ENCOUNTER — OFFICE VISIT (OUTPATIENT)
Age: 59
End: 2024-09-06
Payer: COMMERCIAL

## 2024-09-06 VITALS — DIASTOLIC BLOOD PRESSURE: 78 MMHG | SYSTOLIC BLOOD PRESSURE: 132 MMHG | HEART RATE: 65 BPM | OXYGEN SATURATION: 98 %

## 2024-09-06 DIAGNOSIS — I10 ESSENTIAL HYPERTENSION: ICD-10-CM

## 2024-09-06 DIAGNOSIS — I25.83 CORONARY ARTERY DISEASE DUE TO LIPID RICH PLAQUE: Primary | ICD-10-CM

## 2024-09-06 DIAGNOSIS — I44.2 CHB (COMPLETE HEART BLOCK) (HCC): ICD-10-CM

## 2024-09-06 DIAGNOSIS — I25.10 CORONARY ARTERY DISEASE DUE TO LIPID RICH PLAQUE: Primary | ICD-10-CM

## 2024-09-06 DIAGNOSIS — E78.00 HYPERCHOLESTEROLEMIA: ICD-10-CM

## 2024-09-06 PROCEDURE — 3075F SYST BP GE 130 - 139MM HG: CPT | Performed by: INTERNAL MEDICINE

## 2024-09-06 PROCEDURE — 3078F DIAST BP <80 MM HG: CPT | Performed by: INTERNAL MEDICINE

## 2024-09-06 PROCEDURE — G8417 CALC BMI ABV UP PARAM F/U: HCPCS | Performed by: INTERNAL MEDICINE

## 2024-09-06 PROCEDURE — 1036F TOBACCO NON-USER: CPT | Performed by: INTERNAL MEDICINE

## 2024-09-06 PROCEDURE — 99214 OFFICE O/P EST MOD 30 MIN: CPT | Performed by: INTERNAL MEDICINE

## 2024-09-06 PROCEDURE — G8427 DOCREV CUR MEDS BY ELIG CLIN: HCPCS | Performed by: INTERNAL MEDICINE

## 2024-09-06 PROCEDURE — 3017F COLORECTAL CA SCREEN DOC REV: CPT | Performed by: INTERNAL MEDICINE

## 2024-09-06 RX ORDER — CARVEDILOL 6.25 MG/1
6.25 TABLET ORAL 2 TIMES DAILY
Qty: 180 TABLET | Refills: 3 | Status: SHIPPED | OUTPATIENT
Start: 2024-09-06

## 2024-09-06 NOTE — PATIENT INSTRUCTIONS
We cannot stop your plavix for 1 year because your stent could clot. Your PCP can advise on your eliquis.    Recommend trial of 20mg lasix daily. Follow up with kidney doctor    Increase carvedilol to 6mg

## 2024-09-06 NOTE — PROGRESS NOTES
additions to this IP performed in my presence and at my direction. Content and accuracy of this note reviewed by me (Raffy Minaya MD).   NEW MEDICATIONS   Pt verbalizes understanding of the need for treatment and education provided at today's visit.  Additional education provided in AVS.   ASSESSMENT AND PLAN   *CAD    Date EF Results   Sx     Stable, as detailed above   [facility] 3/19  12/23  7/24   Nonobstructive (Rei)  PCI LAD (Rei)  ISR of LAD, POBA and PCI LAD (Rei)   TTE 12/23 7/24 7/24 45%  35%  35%          Plan     Increase coreg to 6.25bid  Slow med changes due to hx of multiple intolerances  Did not tolerate higher dose entresto  Did not tolerate metoprolol  Renal doctor told patient no farxiga and jardiance due to ckd  FU with EP and CHF as scheduled   *CHB  Status Plan   S/p PPM 5/21 Per EP   *HTN  Status Advice Plan   Controlled Diet/salt/xcise/wt counseling Continue antihypertensives at current dosages   *CHOL  LDL Advice Plan   82, 1/24 Diet/salt/xcise/wt counseling Continue MT/HI statin   *Protein S deficiency   Status Plan   On long term AC Eliquis 2.5 BID Per PCP   *FOLLOWUP  6 months

## 2024-09-24 NOTE — PROGRESS NOTES
mildly dilated.   The aortic root is dilated.   The ascending aorta is dilated.      12/14/23 [facility]  HEMODYNAMICS:  Aortic pressure was 159/86;  LVEDP 8.  There was no gradient between the left ventricle and aorta.        ANGIOGRAM/CORONARY ARTERIOGRAM:        The left main coronary artery is mild fibrotic plaque by IVUS approximal 10% of vessel lumen, MLA > 10mm2.     The left anterior descending artery has a proximal densely calcified 40% stenosis, followed by a mid vessel 80% stenosis which is focal.  DFR was performed and significant at 0.86.              D1 is angiographically free of disease     The left circumflex artery is angiographically free of disease.              OM1 is angiographically free of disease     The right coronary artery is dominant vessel without angiographically significant disease.              RPDA is angiographically free of disease     INTERVENTION  Guide catheter: XB 3.0 catheter  Lesion wired with a Comet II FFR wire which was DFR positive  IVUS performed with more diffuse disease than anticipated and calcification ostially though the MLA was 5.6 mm2 at the ostial LAD    - Plan for PCI in AM with Dr. Minaya of the LAD     Steven Ashraf MD         Assessment:    1. Systolic CHF, chronic (HCC)    2. Coronary artery disease due to lipid rich plaque    3. Essential hypertension    4. Hypercholesterolemia    5. Obstructive sleep apnea syndrome    6. Protein S deficiency (HCC)    7. Shortness of breath         Systolic Heart Failure/Ischemic Cardiomyopathy/ SOB     Heart Failure Therapies:  The 4 Pillars of Heart Failure Therapies    ACE inhibitors, angiotensin receptor blockers, or ARNI (Entresto): Entresto 24-26mg HALF tab BID      2.   Beta blockers: Coreg 6.25mg BID      3.   Aldosterone blockers: None due to low BP in the past.        Start Spironolactone 25mg on Monday and Friday ONLY (10/3/24)      4.   SGLT2 inhibitors:  Renal doctor told patient no Farxiga and Jardiance due

## 2024-10-03 ENCOUNTER — OFFICE VISIT (OUTPATIENT)
Dept: CARDIOLOGY CLINIC | Age: 59
End: 2024-10-03
Payer: COMMERCIAL

## 2024-10-03 VITALS
WEIGHT: 233 LBS | OXYGEN SATURATION: 98 % | DIASTOLIC BLOOD PRESSURE: 50 MMHG | SYSTOLIC BLOOD PRESSURE: 98 MMHG | HEIGHT: 62 IN | HEART RATE: 65 BPM | BODY MASS INDEX: 42.88 KG/M2

## 2024-10-03 DIAGNOSIS — G47.33 OBSTRUCTIVE SLEEP APNEA SYNDROME: ICD-10-CM

## 2024-10-03 DIAGNOSIS — D68.59 PROTEIN S DEFICIENCY (HCC): ICD-10-CM

## 2024-10-03 DIAGNOSIS — I10 ESSENTIAL HYPERTENSION: ICD-10-CM

## 2024-10-03 DIAGNOSIS — I50.22 SYSTOLIC CHF, CHRONIC (HCC): Primary | ICD-10-CM

## 2024-10-03 DIAGNOSIS — I25.10 CORONARY ARTERY DISEASE DUE TO LIPID RICH PLAQUE: ICD-10-CM

## 2024-10-03 DIAGNOSIS — R06.02 SHORTNESS OF BREATH: ICD-10-CM

## 2024-10-03 DIAGNOSIS — E78.00 HYPERCHOLESTEROLEMIA: ICD-10-CM

## 2024-10-03 DIAGNOSIS — I25.83 CORONARY ARTERY DISEASE DUE TO LIPID RICH PLAQUE: ICD-10-CM

## 2024-10-03 PROCEDURE — 3017F COLORECTAL CA SCREEN DOC REV: CPT | Performed by: INTERNAL MEDICINE

## 2024-10-03 PROCEDURE — 3078F DIAST BP <80 MM HG: CPT | Performed by: INTERNAL MEDICINE

## 2024-10-03 PROCEDURE — 3074F SYST BP LT 130 MM HG: CPT | Performed by: INTERNAL MEDICINE

## 2024-10-03 PROCEDURE — 1036F TOBACCO NON-USER: CPT | Performed by: INTERNAL MEDICINE

## 2024-10-03 PROCEDURE — G8484 FLU IMMUNIZE NO ADMIN: HCPCS | Performed by: INTERNAL MEDICINE

## 2024-10-03 PROCEDURE — G8427 DOCREV CUR MEDS BY ELIG CLIN: HCPCS | Performed by: INTERNAL MEDICINE

## 2024-10-03 PROCEDURE — G8417 CALC BMI ABV UP PARAM F/U: HCPCS | Performed by: INTERNAL MEDICINE

## 2024-10-03 PROCEDURE — 99215 OFFICE O/P EST HI 40 MIN: CPT | Performed by: INTERNAL MEDICINE

## 2024-10-03 RX ORDER — SPIRONOLACTONE 25 MG/1
25 TABLET ORAL
Qty: 30 TABLET | Refills: 3 | Status: SHIPPED | OUTPATIENT
Start: 2024-10-03

## 2024-10-03 RX ORDER — DOXYCYCLINE 100 MG/1
100 CAPSULE ORAL 2 TIMES DAILY
COMMUNITY
Start: 2024-09-24

## 2024-10-03 RX ORDER — BROMPHENIRAMINE MALEATE, PSEUDOEPHEDRINE HYDROCHLORIDE, AND DEXTROMETHORPHAN HYDROBROMIDE 2; 30; 10 MG/5ML; MG/5ML; MG/5ML
10 SYRUP ORAL EVERY 6 HOURS
COMMUNITY
Start: 2024-09-24

## 2024-10-03 NOTE — PATIENT INSTRUCTIONS
Plan:   Drink 16-20oz of WATER first thing in the morning to help avoid dizziness.    2.    START Spironolactone 25mg on Mondays and Fridays Only. See printed education.   3.    Labs in 2 weeks.  BMP (kidney), BNP (fluid level).   4.    See Dr. Gonzales in December or January.   5.    Continue Lasix/Furosemide No More that Twice a week on NON-Spironolactone days.   6.    If you feel dizzy, please sit down and drink a 8oz glass of water.

## 2024-10-14 DIAGNOSIS — E11.9 TYPE 2 DIABETES MELLITUS WITHOUT COMPLICATION, WITH LONG-TERM CURRENT USE OF INSULIN (HCC): ICD-10-CM

## 2024-10-14 DIAGNOSIS — Z79.4 TYPE 2 DIABETES MELLITUS WITHOUT COMPLICATION, WITH LONG-TERM CURRENT USE OF INSULIN (HCC): ICD-10-CM

## 2024-10-14 RX ORDER — INSULIN GLARGINE 100 [IU]/ML
INJECTION, SOLUTION SUBCUTANEOUS
Qty: 15 ML | Refills: 3 | Status: SHIPPED | OUTPATIENT
Start: 2024-10-14

## 2024-10-21 ENCOUNTER — TELEPHONE (OUTPATIENT)
Dept: CARDIOLOGY CLINIC | Age: 59
End: 2024-10-21

## 2024-10-21 NOTE — TELEPHONE ENCOUNTER
Pt letting nat know she had her labs on Friday 10/18/24 at [facility].

## 2024-10-21 NOTE — TELEPHONE ENCOUNTER
Please call patient.  I reviewed the labs.  Except for the glucose being elevated, everything is stable.  No changes.  TOREY

## 2024-11-06 NOTE — PROGRESS NOTES
[facility]   Electrophysiology Follow up   Date: 11/12/2024  I had the privilege of visiting Lanny Carbajal in the office.       CC: Cardiomyopathy   HPI: Lanny Carbajal is a 59 y.o. female PMH of HTN, HLD, DM, Obesity, SHIRLEY and non obstructive CAD. Protein S deficiency - on long term AC.      2:1 AVB on ECG, s/p dual chamber PPM 5/10/2021.       Had chest pain, low EF by echo and saw interventional cardiology and had PCI to LAD 7/11/2024    Echo today shows EF visually estimated at <30%     Lanny presents to the office in follow up. We discussed device upgrade to BiV ICD vs BiV PPM. Risks and benefits discussed in detail. All questions answered.     Assessment and plan:   Ischemic cardiomyopathy, severe LV systolic function with EF: 30%, NYHAFC II, Stage C on medical therapy,   msec  Patient has prior history of MI  Patient is on guideline directed medical therapy for more than three months with BB and ARB.     Decision aid tool for patient considering CRT-D implantation for primary prevention \"[place] Program for Patient-Centered Decision\" were used for shared decision making and a copy was given to patient for review.     Patient has a resonable expectation of survival of more than one year.   Patient is a candidate for Biv-AICD implantation for primary prevention.   She is pacer dependent and has wide QRS. Biv-AICD is recommended.     She needs lead extraction and upgrade to Biv-AICD. Risks, benefits and alternative of lead extraction were discussed with patient.  The risks including, but not limited to, major life-threatening complication of 1% (SVC tear, vascular complications, hemothorax and cardiac tamponade) requiring open surgical repair discussed in detail. Other risks including pneumothorax, lead dislodgement, etc discussed.   Educational material about lead extraction, risks, benefits and alternatives were given to patient. Patient was encouraged to review information, and call back with

## 2024-11-12 ENCOUNTER — NURSE ONLY (OUTPATIENT)
Dept: CARDIOLOGY CLINIC | Age: 59
End: 2024-11-12

## 2024-11-12 ENCOUNTER — OFFICE VISIT (OUTPATIENT)
Dept: CARDIOLOGY CLINIC | Age: 59
End: 2024-11-12
Attending: INTERNAL MEDICINE

## 2024-11-12 ENCOUNTER — HOSPITAL ENCOUNTER (OUTPATIENT)
Age: 59
Discharge: HOME | End: 2024-11-14
Attending: INTERNAL MEDICINE
Payer: COMMERCIAL

## 2024-11-12 VITALS
DIASTOLIC BLOOD PRESSURE: 76 MMHG | BODY MASS INDEX: 37.2 KG/M2 | WEIGHT: 237 LBS | OXYGEN SATURATION: 98 % | HEART RATE: 71 BPM | HEIGHT: 67 IN | SYSTOLIC BLOOD PRESSURE: 124 MMHG

## 2024-11-12 VITALS
DIASTOLIC BLOOD PRESSURE: 84 MMHG | SYSTOLIC BLOOD PRESSURE: 134 MMHG | WEIGHT: 233 LBS | BODY MASS INDEX: 42.88 KG/M2 | HEIGHT: 62 IN

## 2024-11-12 DIAGNOSIS — I25.10 CORONARY ARTERY DISEASE INVOLVING NATIVE CORONARY ARTERY OF NATIVE HEART WITHOUT ANGINA PECTORIS: ICD-10-CM

## 2024-11-12 DIAGNOSIS — Z95.0 PACEMAKER: ICD-10-CM

## 2024-11-12 DIAGNOSIS — I10 PRIMARY HYPERTENSION: Primary | ICD-10-CM

## 2024-11-12 DIAGNOSIS — I51.9 LV DYSFUNCTION: ICD-10-CM

## 2024-11-12 DIAGNOSIS — Z95.0 PACEMAKER: Primary | ICD-10-CM

## 2024-11-12 DIAGNOSIS — R06.02 SHORTNESS OF BREATH: ICD-10-CM

## 2024-11-12 DIAGNOSIS — R07.9 CHEST PAIN, UNSPECIFIED TYPE: ICD-10-CM

## 2024-11-12 DIAGNOSIS — I44.1 MOBITZ TYPE II ATRIOVENTRICULAR BLOCK: ICD-10-CM

## 2024-11-12 DIAGNOSIS — I20.89 ATYPICAL ANGINA (HCC): ICD-10-CM

## 2024-11-12 LAB
ECHO AO ROOT DIAM: 3.4 CM
ECHO AO ROOT INDEX: 1.67 CM/M2
ECHO AV AREA PEAK VELOCITY: 2.1 CM2
ECHO AV AREA VTI: 2.2 CM2
ECHO AV AREA/BSA PEAK VELOCITY: 1 CM2/M2
ECHO AV AREA/BSA VTI: 1.1 CM2/M2
ECHO AV MEAN GRADIENT: 4 MMHG
ECHO AV MEAN VELOCITY: 1 M/S
ECHO AV PEAK GRADIENT: 9 MMHG
ECHO AV PEAK VELOCITY: 1.5 M/S
ECHO AV VELOCITY RATIO: 0.6
ECHO AV VTI: 32.9 CM
ECHO BSA: 2.15 M2
ECHO EST RA PRESSURE: 8 MMHG
ECHO LA AREA 2C: 20.2 CM2
ECHO LA AREA 4C: 16.6 CM2
ECHO LA MAJOR AXIS: 5.8 CM
ECHO LA MINOR AXIS: 5.9 CM
ECHO LA VOL BP: 48 ML (ref 22–52)
ECHO LA VOL MOD A2C: 59 ML (ref 22–52)
ECHO LA VOL MOD A4C: 38 ML (ref 22–52)
ECHO LA VOL/BSA BIPLANE: 24 ML/M2 (ref 16–34)
ECHO LA VOLUME INDEX MOD A2C: 29 ML/M2 (ref 16–34)
ECHO LA VOLUME INDEX MOD A4C: 19 ML/M2 (ref 16–34)
ECHO LV E' LATERAL VELOCITY: 8.1 CM/S
ECHO LV E' SEPTAL VELOCITY: 4.4 CM/S
ECHO LV EDV A2C: 176 ML
ECHO LV EDV A4C: 201 ML
ECHO LV EDV INDEX A4C: 99 ML/M2
ECHO LV EDV NDEX A2C: 86 ML/M2
ECHO LV EF PHYSICIAN: 35 %
ECHO LV EJECTION FRACTION A2C: 29 %
ECHO LV EJECTION FRACTION A4C: 39 %
ECHO LV EJECTION FRACTION BIPLANE: 32 % (ref 55–100)
ECHO LV ESV A2C: 125 ML
ECHO LV ESV A4C: 123 ML
ECHO LV ESV INDEX A2C: 61 ML/M2
ECHO LV ESV INDEX A4C: 60 ML/M2
ECHO LV FRACTIONAL SHORTENING: 18 % (ref 28–44)
ECHO LV INTERNAL DIMENSION DIASTOLE INDEX: 2.99 CM/M2
ECHO LV INTERNAL DIMENSION DIASTOLIC: 6.1 CM (ref 3.9–5.3)
ECHO LV INTERNAL DIMENSION SYSTOLIC INDEX: 2.45 CM/M2
ECHO LV INTERNAL DIMENSION SYSTOLIC: 5 CM
ECHO LV IVSD: 0.8 CM (ref 0.6–0.9)
ECHO LV MASS 2D: 206.6 G (ref 67–162)
ECHO LV MASS INDEX 2D: 101.3 G/M2 (ref 43–95)
ECHO LV POSTERIOR WALL DIASTOLIC: 0.9 CM (ref 0.6–0.9)
ECHO LV RELATIVE WALL THICKNESS RATIO: 0.3
ECHO LVOT AREA: 3.5 CM2
ECHO LVOT AV VTI INDEX: 0.63
ECHO LVOT DIAM: 2.1 CM
ECHO LVOT MEAN GRADIENT: 2 MMHG
ECHO LVOT PEAK GRADIENT: 3 MMHG
ECHO LVOT PEAK VELOCITY: 0.9 M/S
ECHO LVOT STROKE VOLUME INDEX: 35.1 ML/M2
ECHO LVOT SV: 71.7 ML
ECHO LVOT VTI: 20.7 CM
ECHO MV A VELOCITY: 0.85 M/S
ECHO MV E VELOCITY: 0.68 M/S
ECHO MV E/A RATIO: 0.8
ECHO MV E/E' LATERAL: 8.4
ECHO MV E/E' RATIO (AVERAGED): 11.92
ECHO MV E/E' SEPTAL: 15.45
ECHO PV MAX VELOCITY: 0.9 M/S
ECHO PV PEAK GRADIENT: 3 MMHG
ECHO RA AREA 4C: 15.8 CM2
ECHO RA END SYSTOLIC VOLUME APICAL 4 CHAMBER INDEX BSA: 20 ML/M2
ECHO RA VOLUME: 40 ML
ECHO RV BASAL DIMENSION: 4 CM
ECHO RV FREE WALL PEAK S': 11.2 CM/S
ECHO RV TAPSE: 2.5 CM (ref 1.7–?)

## 2024-11-12 PROCEDURE — 6360000004 HC RX CONTRAST MEDICATION: Performed by: INTERNAL MEDICINE

## 2024-11-12 PROCEDURE — 93306 TTE W/DOPPLER COMPLETE: CPT | Performed by: INTERNAL MEDICINE

## 2024-11-12 PROCEDURE — C8929 TTE W OR WO FOL WCON,DOPPLER: HCPCS

## 2024-11-12 RX ORDER — PREDNISONE 50 MG/1
TABLET ORAL
Qty: 3 TABLET | Refills: 0 | Status: SHIPPED | OUTPATIENT
Start: 2024-11-12

## 2024-11-12 RX ADMIN — SULFUR HEXAFLUORIDE 2 ML: 60.7; .19; .19 INJECTION, POWDER, LYOPHILIZED, FOR SUSPENSION INTRAVENOUS; INTRAVESICAL at 13:43

## 2024-11-12 NOTE — PATIENT INSTRUCTIONS
Allergy pre-medication instructions:    Please take one 50 mg tablet of prednisone:  In the morning the day before the procedure  In the evening the day before the procedure   The morning of the procedure       You are to be scheduled for a laser lead extraction     Our  will call you to discuss a date for you procedure within 5-10 business days       PRE-PROCEDURE  INSTRUCTIONS   -You will need to complete lab work the day before your procedure at [facility].    -One week before please start washing with an antibacterial soap, when showering. The night before your procedure you will need to scrub with Hibiclens wash.  -The day of your procedure you will need to check in at the registration desk in the main lobby.   -You will need to fast for at least 8 hours prior to your procedure.  -You will need to hold your Eliquis for 2 days (CONTINUE PLAVIX)  -If you are taking a diuretic (water pill) please hold the morning of the procedure  -If you take long acting insulin, take 1/2 the dose the evening before or morning of depending on when you take your dosage.  -You may take all of your other medications with a sip of water.   -Please have a responsible adult to drive you home upon discharge.   -The discharging unit will be giving you discharge instructions.    If you have any questions regarding your procedure itself or your medications, please call [phone] and ask to talk to an EP nurse.    You will be seen in the office in 1 week for a wound check and then 3 months following implantation.

## 2024-11-13 DIAGNOSIS — Z01.818 PRE-OP TESTING: Primary | ICD-10-CM

## 2024-11-13 DIAGNOSIS — I51.9 LV DYSFUNCTION: ICD-10-CM

## 2024-11-13 DIAGNOSIS — I25.10 CAD IN NATIVE ARTERY: ICD-10-CM

## 2024-11-27 ENCOUNTER — TELEPHONE (OUTPATIENT)
Dept: CARDIOLOGY CLINIC | Age: 59
End: 2024-11-27

## 2024-12-02 NOTE — TELEPHONE ENCOUNTER
Called patient and discussed concerns. All questions answered. Patient wanted to make sure no additional stents were needed before pacemaker. No additional LHCs or stents planned at this time. Per DCE last note patient needs EP workup. Patient vu.     Patient states she has not been called yet to schedule EP procedure. I will forward to EP  for follow up

## 2024-12-05 NOTE — TELEPHONE ENCOUNTER
Pt called to see when will her procedure be scheduled.  Pt is wanting it to be January, 2025.  Please call to discuss her concern.  Thank you

## 2024-12-09 NOTE — TELEPHONE ENCOUNTER
Pt called to see when will her laser lead extraction/ placement w/RMM.  pt sated they are suppose to get the explant and a new placed with more leads.     Pt is wanting it to be January, 2025.       Please call Pt Thank you

## 2024-12-16 NOTE — TELEPHONE ENCOUNTER
1/10/2025 at 7:30 am with RMM (will discuss admission the day before with RMM)    Arrive 645 am      Allergy pre-medication instructions:     Please take one 50 mg tablet of prednisone:  In the morning the day before the procedure  In the evening the day before the procedure   The morning of the procedure         You are to be scheduled for a laser lead extraction      Our  will call you to discuss a date for you procedure within 5-10 business days         PRE-PROCEDURE  INSTRUCTIONS              -You will need to complete lab work the day before your procedure at [facility].               -One week before please start washing with an antibacterial soap, when showering. The night before your procedure you will need to scrub with Hibiclens wash.  -The day of your procedure you will need to check in at the registration desk in the main lobby.              -You will need to fast for at least 8 hours prior to your procedure.  -You will need to hold your Eliquis for 2 days (CONTINUE PLAVIX)  -If you are taking a diuretic (water pill) please hold the morning of the procedure  -If you take long acting insulin, take 1/2 the dose the evening before or morning of depending on when you take your dosage.  -You may take all of your other medications with a sip of water.              -Please have a responsible adult to drive you home upon discharge.              -The discharging unit will be giving you discharge instructions.     If you have any questions regarding your procedure itself or your medications, please call [phone] and ask to talk to an EP nurse.     You will be seen in the office in 1 week for a wound check and then 3 months following implantation.

## 2024-12-17 PROBLEM — I25.5 ISCHEMIC CARDIOMYOPATHY: Status: ACTIVE | Noted: 2024-11-12

## 2024-12-17 NOTE — TELEPHONE ENCOUNTER
Date Of Procedure:  01/10/25     Time Of Arrival: 645am     Procedure Time: 730am        Called and spoke to patient and she is agreeable to the date and time. Reviewed the Pre-Procedure instructions and they verbalized understanding. Encouraged to call with any questions or concerns.       Published on [employer] [facility] / emailed to Cath lab / note in chart / scheduled in Epic/Cupid/Carto

## 2024-12-27 ENCOUNTER — TELEPHONE (OUTPATIENT)
Dept: CARDIOLOGY CLINIC | Age: 59
End: 2024-12-27

## 2024-12-27 RX ORDER — CHLORHEXIDINE GLUCONATE 40 MG/ML
SOLUTION TOPICAL DAILY PRN
Qty: 118 ML | Refills: 0 | Status: SHIPPED | OUTPATIENT
Start: 2024-12-27

## 2024-12-27 NOTE — TELEPHONE ENCOUNTER
Pt asking if pre sx cleanser can be called into her local pharmacy [id number] - [place], [place] - [address] - P [phone] - F [phone]     Also has questions regarding labs prior. Please call to go over preps.

## 2024-12-27 NOTE — TELEPHONE ENCOUNTER
Rx has been sent to pharmacy per patient request. Received notice that rx is not covered by insurance. Please advise she may purchase OTC

## 2024-12-30 ENCOUNTER — TELEPHONE (OUTPATIENT)
Dept: CARDIOLOGY CLINIC | Age: 59
End: 2024-12-30

## 2024-12-30 NOTE — TELEPHONE ENCOUNTER
Medication Refill    Medication needing refilled:  clopidogrel (PLAVIX) 75 MG tablet   Dosage of the medication:  75 mg   How are you taking this medication (QD, BID, TID, QID, PRN):  Take 1 tablet by mouth daily   30 or 90 day supply called in:  90 tablet   When will you run out of your medication:    Which Pharmacy are we sending the medication to?:    [id number] - [place], [place] - [address] [facility] P [phone] - F [phone]

## 2025-01-02 RX ORDER — CLOPIDOGREL BISULFATE 75 MG/1
75 TABLET ORAL DAILY
Qty: 90 TABLET | Refills: 1 | Status: SHIPPED | OUTPATIENT
Start: 2025-01-02

## 2025-01-08 ENCOUNTER — TELEPHONE (OUTPATIENT)
Dept: CARDIOLOGY CLINIC | Age: 60
End: 2025-01-08

## 2025-01-08 NOTE — TELEPHONE ENCOUNTER
Interventional Radiology Progress Note    Patient: Chriss Alcaraz Date: 2021  : 1955 Attending: MD Hilary Webster, CNP   65 year old male     Diagnosis/Comorbidities/Complications:  Patient Active Problem List   Diagnosis   • Cocaine abuse (CMS/HCC)   • Personal history of tobacco use, presenting hazards to health   • Essential hypertension, benign   • Type 2 diabetes mellitus with stage 2 chronic kidney disease, without long-term current use of insulin (CMS/HCC)   • Proteinuria   • Renal mass, left   • Malignant neoplasm of left kidney (CMS/HCC)   • Bradycardia   • Lightheadedness   • Orthostatic hypotension   • Other hydrocele   • Adjustment disorder with depressed mood   • Normocytic anemia   • Colostomy care (CMS/HCC)   • GERD (gastroesophageal reflux disease)   • CAD (coronary artery disease), native coronary artery   • Chronic indwelling Carballo catheter   • Perforated bowel (CMS/HCC)   • Lactic acidosis   • Hyperlipidemia   • Abdominal pain   • S/P colostomy (CMS/HCC)   • Intra-abdominal abscess (CMS/HCC)   • Acute blood loss anemia     History: 65 year old male who initially presented (21) with worsening abdominal pain x1 week. Previously received treatment in [place] ~2 months ago for perforated diverticulitis s/p bowel resection and colostomy. CT Abd/Pelvis () with pneumatosis & small bowel thickening. Emergently taken to OR for ex-lap, LOLITA, small bowel resection and colostomy revision (, Dr. Quesada). Post-operative course complicated by hypovolemic/septic shock. Returned to OR () for additional ex-lap and ostomy revision (Dr. Ordonez). Repeat imaging prompted by persistent low-grade fever. CT Abd/Pelvis () with large dominant loculated fluid collection in anterior superior abdomen and second smaller fluid collection in LUQ adjacent to spleen. IR consulted for drainage.     Reason for Follow-up:  -- 21: s/p 12Fr midabdominal (labeled as #1, Left)  This patient is scheduled for a procedure with [facility] 1/10/25- she is stating that she was told she would be staying overnight after the procedure, but that she also told the  she would need to stay the night prior as well but has not heard anything back.  She also states no one from the hospital has called her regarding pre-registering. Please call to discuss [phone]   and 12Fr perihepatic (labeled as #2, Right) drain placement, Dr. Kwon  -- 8/4/21: s/p midabdominal drain exchange/upsize to 14Fr, Dr. Kwon  -- 8/10/21: s/p 8Fr LUQ drain placement (#3) and 14Fr midabdominal drain exchange/reposition (#1), Dr. Carlos/Dr. Rodriguez  -- 8/10/21: R perihepatic drain removal  -- 8/13/21: s/p 10Fr LUQ drain placement (#4), Dr. Carlos/Dr. Vazquez    Patient declined utilizing  services during visit.     Subjective: Feeling ok, tired. Denies SOB, CP, abdominal pain or N/V.     Physical Exam:  General: NAD, lying in bed.  Neuro: AAOx3. Moves all extremities spontaneously. Non-focal.    HEENT: Atraumatic, normocephalic. PERRL. Mucous membranes moist.  Neck: Supple, trachea midline.   Chest: Breathing comfortably on RA. Appears unlabored.  Abdomen: Soft, rounded. NT/ND. L abd drain (#1) to JEY suction, scant serous drainage. LUQ drain (#3) to JEY suction, small seropurulent drainage. LUQ drain (#4) to JEY suction, thick purulent dark yellow drainage. Mild tenderness to palpation, dressing C/D/I. Abdominal binder in place. Ostomy.   Extremities: Warm, perfused.  Skin: Warm, dry.    Vital Last Value 24 Hour Range   Temperature 98.3 °F (36.8 °C) (08/18/21 1012) Temp  Min: 98.3 °F (36.8 °C)  Max: 98.9 °F (37.2 °C)   Pulse 85 (08/18/21 1012) Pulse  Min: 75  Max: 85   Respiratory (!) 23 (08/18/21 1300) Resp  Min: 16  Max: 23   Non-Invasive  Blood Pressure (!) 162/77 (08/18/21 1012) BP  Min: 144/79  Max: 163/79   Arterial  Blood Pressure (!) 162/70 (07/24/21 1000) No data recorded   Pulse Oximetry 99 % (08/18/21 1300) SpO2  Min: 99 %  Max: 100 %     Laboratory Results:  Recent Labs     08/16/21  0627 08/17/21  0339 08/18/21  0337   SODIUM 136 135 136   POTASSIUM 4.0 4.0 4.2   BUN 21* 22* 24*   CREATININE 0.56* 0.56* 0.59*   WBC 7.6 9.9 9.8   HCT 22.4* 22.5* 22.8*   HGB 7.1* 7.1* 7.2*    216 232   GLUCOSE 163* 175* 150*   MG 2.1 2.1 2.2   BILIRUBIN 0.3 0.3 0.3     Above labs were  reviewed.    Scheduled Medications:  Current Facility-Administered Medications   Medication Dose Route Frequency Provider Last Rate Last Admin   • parenteral nutrition adult custom central   Intravenous Continuous PN Donnell Villatoro MD       • parenteral nutrition adult custom central   Intravenous Continuous PN Donnell Villatoro MD 65 mL/hr at 08/17/21 2253 New Bag at 08/17/21 2253   • vancomycin (VANCOCIN) 750 mg in sodium chloride 0.9 % 250 mL IVPB  750 mg Intravenous Q12H Ather H MD Grover   Completed at 08/18/21 0515   • octreotide (SandoSTATIN) injection 100 mcg  100 mcg Intravenous TID Reema Ramírez MD   100 mcg at 08/18/21 0848   • insulin glargine (LANTUS) injection 6 Units  6 Units Subcutaneous Daily Reema Ramírez MD   6 Units at 08/18/21 0847   • VANCOMYCIN - PHARMACIST MONITORED Misc   Does not apply See Admin Instructions Reema Ramírez MD       • alteplase (CATHFLO ACTIVASE) injection 2 mg  2 mg Intracatheter PRN Donnell Villatoro MD   2 mg at 08/09/21 0916   • dextrose 50 % injection 25 g  25 g Intravenous PRN Donnell Villatoro MD       • dextrose 50 % injection 12.5 g  12.5 g Intravenous PRN Donnell Villatoro MD       • glucagon (GLUCAGEN) injection 1 mg  1 mg Intramuscular PRN Donnell Villatoro MD       • dextrose (GLUTOSE) 40 % gel 15 g  15 g Oral PRN Donnell Villatoro MD       • dextrose (GLUTOSE) 40 % gel 30 g  30 g Oral PRN Donnell Villatoro MD       • insulin regular (human) (HumuLIN R, NovoLIN R) Correction Dose   Subcutaneous 4 times per day Reema Ramírez MD   1 Units at 08/18/21 1258   • furosemide (LASIX INJECT) injection 40 mg  40 mg Intravenous Daily Donnell Villatoro MD   40 mg at 08/18/21 0848   • pantoprazole (PROTONIX INJECT) injection 40 mg  40 mg Intravenous Daily Donnell Villatoro MD   40 mg at 08/18/21 0848   • hydrALAZINE (APRESOLINE) injection 10 mg  10 mg Intravenous Q6H PRN Donnell Villatoro MD       • LORazepam (ATIVAN) injection 1 mg  1 mg Intravenous Q6H  PRN Donnell Villatoro MD       • nalbuphine (NUBAIN) injection 2.5 mg  2.5 mg Intravenous Q8H PRN Alina M Byers, APNP       • naLOXone (NARCAN) injection 0.1 mg  0.1 mg Intravenous PRN Alina M Byers, APNP       • sodium chloride 0.9% infusion   Intravenous Continuous Alina M Byers, APNP 25 mL/hr at 08/18/21 0518 New Bag at 08/18/21 0518   • HYDROmorphone (DILAUDID) 0.2 mg/mL in 30 mL PCA infusion   Intravenous Continuous Alina M Byers, APNP   New Bag at 08/14/21 0934   • dextrose 10 % infusion  1,000 mL Intravenous Continuous PRN Donnell Villatoro MD       • PARENTERAL NUTRITION - DIETITIAN/PHARMACIST MANAGED   Does not apply See Admin Instructions Donnell Villatoro MD       • sodium hypochlorite (DAKINS) 0.125 % (1/4 strength) irrigation solution 1 application  1 application Topical Daily Rosemary Bianchi APNP   1 application at 08/18/21 0900   • sodium hypochlorite (DAKINS) 0.125 % (1/4 strength) irrigation solution 1 application  1 application Topical PRN ERNESTO DacostaNP       • diphenhydrAMINE-zinc acetate (BENADRYL) 2-0.1 % cream   Topical TID PRN Mir Durham MD   Given at 08/01/21 1845   • acetaminophen (TYLENOL) tablet 650 mg  650 mg Oral Q4H PRN Robbi Ordonez MD   650 mg at 08/01/21 1930   • [Held by provider] furosemide (LASIX) tablet 40 mg  40 mg Oral Daily Zoey Mcintyre PA-C   40 mg at 07/31/21 0900   • [Held by provider] polyethylene glycol (MIRALAX) packet 17 g  17 g Oral Daily MICHELLE Carvalho   17 g at 08/03/21 0921   • [Held by provider] aspirin chewable 81 mg  81 mg Oral Daily MICHELLE Carvalho   81 mg at 08/04/21 0905   • [Held by provider] docusate sodium-sennosides (SENOKOT S) 50-8.6 MG 1 tablet  1 tablet Oral BID MICHELLE Carvalho   1 tablet at 08/04/21 2209   • [Held by provider] clonazePAM (KlonoPIN) tablet 2 mg  2 mg Oral QHS MICHELLE Carvalho   2 mg at 08/04/21 2208   • [Held by provider] rOPINIRole (REQUIP) tablet 0.25 mg  0.25 mg  Oral Daily Jazmin Freitas APNP   0.25 mg at 08/04/21 0905   • [Held by provider] amLODIPine (NORVASC) tablet 5 mg  5 mg Oral Daily Jazmin Freitas APNP   5 mg at 08/04/21 0905   • [Held by provider] atorvastatin (LIPITOR) tablet 40 mg  40 mg Oral Daily Jazmin Freitas APNP   40 mg at 08/04/21 0905   • enoxaparin (LOVENOX) injection 40 mg  40 mg Subcutaneous Q24H Jazmin Freitas APNP   40 mg at 08/17/21 2145   • ipratropium-albuterol (DUONEB) 0.5-2.5 (3) MG/3ML nebulizer solution 3 mL  3 mL Nebulization Q4H Resp PRN Zoey Mcintyre PA-C   3 mL at 07/24/21 1645   • labetalol (NORMODYNE) injection 10-20 mg  10-20 mg Intravenous Q6H PRN Amalia Ruiz APNP   20 mg at 08/07/21 1133   • ondansetron (ZOFRAN ODT) disintegrating tablet 4 mg  4 mg Oral Q12H PRN Nirav Qeusada MD        Or   • ondansetron (ZOFRAN) injection 4 mg  4 mg Intravenous Q12H PRN Nirav Quesada MD       • sodium chloride 0.9% infusion   Intravenous Continuous PRN Art Hernandez MD   Stopped at 07/26/21 1824   • sodium chloride 0.9% infusion   Intravenous Continuous PRN Art Hernandez MD   Completed at 07/26/21 0748   • sodium chloride 0.9 % flush bag 25 mL  25 mL Intravenous PRN Rosales Burris MD       • sodium chloride (PF) 0.9 % injection 2 mL  2 mL Intracatheter 2 times per day Rosales Burris MD   2 mL at 08/17/21 0938   • sodium chloride 0.9 % flush bag 25 mL  25 mL Intravenous PRN Rosales Burris MD   Stopped at 08/04/21 0910   • Potassium Standard Replacement Protocol   Does not apply See Admin Instructions Rosales Burris MD       • Magnesium Standard Replacement Protocol   Does not apply See Admin Instructions Rosales Burris MD         Continuous Infusions:  • parenteral nutrition adult custom central     • parenteral nutrition adult custom central 65 mL/hr at 08/17/21 2255   • sodium chloride 0.9% infusion 25 mL/hr at 08/18/21 0518   • HYDROmorphone (DILAUDID) 0.2  mg/mL in 30 mL PCA infusion     • dextrose 10 % infusion     • sodium chloride 0.9% infusion Stopped (07/26/21 1824)   • sodium chloride 0.9% infusion Stopped (07/26/21 0748)     Intake/Output:  Date 08/17/21 0700 - 08/18/21 0659 08/18/21 0700 - 08/19/21 0659   Shift 9990-2333 1637-2812 5261-9189 24 Hour Total 2938-4387 9678-4053 2861-0727 24 Hour Total   INTAKE   P.O. 0 0 0 0       I.V. 0  6   6   IV Piggyback  450 165 615 366.1   366.1   TPN 1146.6 .6 471.7   471.7   Shift Total 1146.6 1337 1412 3895.6 843.8   843.8   OUTPUT   Urine 1320  1525   1525   Drains 25 0 0 25 0   0     Output (ml) ([REMOVED] Drain 08/10/21 #1 Left Abdomen Bulb (e.g. JEY, Davol, etc)) 0 0 0 0 0   0     Output (ml) ([REMOVED] Drain 08/10/21 #3 Left Abdomen Bulb (e.g. JEY, Davol, etc)) 15 0 0 15 0   0     Output (ml) (Drain 08/13/21 #4 Left Abdomen Bulb (e.g. JEY, Davol, etc)) 10 0 0 10 0   0   Stool 0 0 0 0       Shift Total 1345  1525   1525   Weight (kg) 75.5 75.5 74.5 74.5 74.5 74.5 74.5 74.5       Imaging:  -- 8/16/21: CT Abd/Pelvis  IMPRESSION:  1.  Interval placement of a drain in a left mid abdominal fluid collection  which likely extends from the adjacent small bowel anastomosis. No  significant change in size measuring 5.5 x 4.8 cm.  2.  Slight interval decrease of small fluid collection along the left  lateral margin of the spleen with drain in place.  3.  Interval near complete resolution of anterior abdominal fluid  collection at the patient's open anterior abdominal wound with drain in  place.    -- 8/12/21: CT Abd/Pelvis  IMPRESSION:   1. Indwelling percutaneous drainage catheters, with only trace residual fluid/air adjacent to the more anterior catheter. No new fluid collection.  2. Extensive inflammatory changes in the left-sided mesenteric fat, with scattered areas of mottled air, similar to prior.  3. No evidence of bowel obstruction.  4. Small amount of perihepatic and  perisplenic ascites.  5. Bilateral pleural effusions and associated atelectasis, greater on the left.    -- 8/9/21: CT Abd/Pelvis  IMPRESSION:    -- Interval decreased size of the left anterior abdominal fluid collection  containing a drainage catheter with adjacent foci of free air along the  superior aspect and within the collection.  -- Persistent fluid collection/abscess in the left lateral abdomen in the  region of the previous oral contrast extravasation with suspected slight interval increase in size. This collection appears to extend/communicate further superiorly with an additional collection along the anteriorinferior margin of the spleen which is slightly intervally decreased in size.  -- Postsurgical changes of partial small bowel resection with left lower  quadrant colostomy. No bowel obstruction.  -- Approximately 4.2 x 3.8 cm complex collection/abscess with multiple foci of air within the left lateral abdomen which appears to be abutting multiple adjacent bowel loops and has intervally increased in size. An underlying anastomotic leak is not excluded. Assessment is limited due to absence of oral and IV contrast. This collection appears to partially communicate with the additional left lateral abdominal collections.  -- Small bilateral pleural effusions with left basilar atelectasis.  -- Small amount of air within the bladder. Correlation with urinalysis is  recommended to exclude cystitis.  -- Ascending colonic thickening. Correlate clinically for colitis.    -- 8/5/21: CT Abd/Pelvis  IMPRESSION:   1.  There is a suspected small abscess in the left lateral abdomen in the region of recent extravasation of oral contrast. This appears to extend superiorly and involves the anterior inferior margin of the spleen or the area immediately adjacent to it.  2.  Interval mild decrease in an abscess in the lower ventral abdomen along the course of a pigtail catheter. This abscess may communicate with the more  posteriorly located collection.  3.  Interval increase in now small to moderate-sized bilateral pleural effusions.  4.  There is evidence of new wall thickening involving the ascending colon raising the possibility of a colitis.  5.  There is persistent gas in the urinary bladder with no Carballo catheter in place.      Plan/Recommendations:  65 year old male with post-operative fluid collection.    -- 8/2/21: s/p 12Fr midabdominal (labeled as #1, Left) and 12Fr perihepatic (labeled as #2, Right) drain placement  -- 8/4/21: s/p midabdominal drain exchange/upsize to 14Fr  -- 8/10/21: s/p 8Fr LUQ drain placement (#3) and 14Fr midabdominal drain exchange/reposition (#1)  -- 8/10/21: R perihepatic drain removal  -- 8/13/21: s/p 10Fr LUQ drain placement (#4)   -- Output: #1: 0cc/last 24hrs, #3: 15cc/last 24hrs, #4: 10cc/last 24hrs   -- Fluid cx (8/2) final - no growth   -- Fluid cx (8/10) final - MRSA   -- ID following - on vanco   -- Maintain to JEY suction   -- Flush IR JEY drains with 5cc saline BID. Record output Q8h, even if 0cc. Dressing change daily and PRN.   -- Case discussed and CT imaging reviewed with Dr. Bella. Will plan to remove 2 left drains and leave drain closest to anastomosis/newest drain (#4) in place.   -- General surgery following - ok with both drain removals, discussed with BOB Fuller.     IR Drain Removal - Drains #1 & 3  Dressings removed. Sutures removed. Tubes cut and drains were removed. Pigtail sutures removed. Gauze applied with tegaderm.    Impression: aiscruz removed without difficulty, patient tolerated well.   -- IR will continue to follow       MICHELLE Osborn  Interventional Radiology  M-F [phone], please see provider directory for pager # or contact via Epic Secure Chat     Please call [phone] or contact the on-call IR provider for urgent issues outside of above hours.

## 2025-01-08 NOTE — TELEPHONE ENCOUNTER
Please call and let her know we will have her admitted tomorrow and that they will call her when her bed is ready to have her admitted

## 2025-01-09 ENCOUNTER — HOSPITAL ENCOUNTER (INPATIENT)
Age: 60
LOS: 3 days | Discharge: HOME | DRG: 179 | End: 2025-01-15
Attending: STUDENT IN AN ORGANIZED HEALTH CARE EDUCATION/TRAINING PROGRAM | Admitting: STUDENT IN AN ORGANIZED HEALTH CARE EDUCATION/TRAINING PROGRAM
Payer: COMMERCIAL

## 2025-01-09 DIAGNOSIS — I25.5 ISCHEMIC CARDIOMYOPATHY: ICD-10-CM

## 2025-01-09 DIAGNOSIS — R07.89 LEFT-SIDED CHEST WALL PAIN: ICD-10-CM

## 2025-01-09 DIAGNOSIS — T82.110A ICD (IMPLANTABLE CARDIOVERTER-DEFIBRILLATOR) LEAD FAILURE, INITIAL ENCOUNTER: Primary | ICD-10-CM

## 2025-01-09 LAB
ABO + RH BLD: NORMAL
ALBUMIN SERPL-MCNC: 4.1 G/DL (ref 3.4–5)
ALBUMIN/GLOB SERPL: 1.3 {RATIO} (ref 1.1–2.2)
ALP SERPL-CCNC: 21 U/L (ref 40–129)
ALT SERPL-CCNC: 24 U/L (ref 10–40)
ANION GAP SERPL CALCULATED.3IONS-SCNC: 14 MMOL/L (ref 3–16)
AST SERPL-CCNC: 29 U/L (ref 15–37)
BILIRUB SERPL-MCNC: 0.5 MG/DL (ref 0–1)
BLD GP AB SCN SERPL QL: NORMAL
BUN SERPL-MCNC: 21 MG/DL (ref 7–20)
CALCIUM SERPL-MCNC: 10.5 MG/DL (ref 8.3–10.6)
CHLORIDE SERPL-SCNC: 98 MMOL/L (ref 99–110)
CO2 SERPL-SCNC: 22 MMOL/L (ref 21–32)
CREAT SERPL-MCNC: 1.1 MG/DL (ref 0.6–1.2)
DEPRECATED RDW RBC AUTO: 16.1 % (ref 12.4–15.4)
GFR SERPLBLD CREATININE-BSD FMLA CKD-EPI: 57 ML/MIN/{1.73_M2}
GLUCOSE BLD-MCNC: 343 MG/DL (ref 70–99)
GLUCOSE BLD-MCNC: 370 MG/DL (ref 70–99)
GLUCOSE SERPL-MCNC: 389 MG/DL (ref 70–99)
HCT VFR BLD AUTO: 39.8 % (ref 36–48)
HGB BLD-MCNC: 13.1 G/DL (ref 12–16)
INR PPP: 1.06 (ref 0.85–1.15)
MCH RBC QN AUTO: 29 PG (ref 26–34)
MCHC RBC AUTO-ENTMCNC: 32.8 G/DL (ref 31–36)
MCV RBC AUTO: 88.2 FL (ref 80–100)
PERFORMED ON: ABNORMAL
PERFORMED ON: ABNORMAL
PLATELET # BLD AUTO: 178 K/UL (ref 135–450)
PMV BLD AUTO: 9.6 FL (ref 5–10.5)
POTASSIUM SERPL-SCNC: 4.8 MMOL/L (ref 3.5–5.1)
POTASSIUM SERPL-SCNC: 4.8 MMOL/L (ref 3.5–5.1)
PROT SERPL-MCNC: 7.3 G/DL (ref 6.4–8.2)
PROTHROMBIN TIME: 14 SEC (ref 11.9–14.9)
RBC # BLD AUTO: 4.51 M/UL (ref 4–5.2)
SODIUM SERPL-SCNC: 134 MMOL/L (ref 136–145)
WBC # BLD AUTO: 9.7 K/UL (ref 4–11)

## 2025-01-09 PROCEDURE — 85610 PROTHROMBIN TIME: CPT

## 2025-01-09 PROCEDURE — 36415 COLL VENOUS BLD VENIPUNCTURE: CPT

## 2025-01-09 PROCEDURE — 6370000000 HC RX 637 (ALT 250 FOR IP): Performed by: STUDENT IN AN ORGANIZED HEALTH CARE EDUCATION/TRAINING PROGRAM

## 2025-01-09 PROCEDURE — 86900 BLOOD TYPING SEROLOGIC ABO: CPT

## 2025-01-09 PROCEDURE — 6370000000 HC RX 637 (ALT 250 FOR IP): Performed by: NURSE PRACTITIONER

## 2025-01-09 PROCEDURE — 99223 1ST HOSP IP/OBS HIGH 75: CPT | Performed by: INTERNAL MEDICINE

## 2025-01-09 PROCEDURE — 85027 COMPLETE CBC AUTOMATED: CPT

## 2025-01-09 PROCEDURE — 86901 BLOOD TYPING SEROLOGIC RH(D): CPT

## 2025-01-09 PROCEDURE — 80053 COMPREHEN METABOLIC PANEL: CPT

## 2025-01-09 PROCEDURE — 86850 RBC ANTIBODY SCREEN: CPT

## 2025-01-09 PROCEDURE — G0378 HOSPITAL OBSERVATION PER HR: HCPCS

## 2025-01-09 PROCEDURE — 2500000003 HC RX 250 WO HCPCS: Performed by: STUDENT IN AN ORGANIZED HEALTH CARE EDUCATION/TRAINING PROGRAM

## 2025-01-09 PROCEDURE — 86923 COMPATIBILITY TEST ELECTRIC: CPT

## 2025-01-09 PROCEDURE — G0379 DIRECT REFER HOSPITAL OBSERV: HCPCS

## 2025-01-09 PROCEDURE — 93005 ELECTROCARDIOGRAM TRACING: CPT | Performed by: INTERNAL MEDICINE

## 2025-01-09 RX ORDER — GLUCAGON 1 MG/ML
1 KIT INJECTION PRN
Status: CANCELLED | OUTPATIENT
Start: 2025-01-09

## 2025-01-09 RX ORDER — CLOPIDOGREL BISULFATE 75 MG/1
75 TABLET ORAL DAILY
Status: CANCELLED | OUTPATIENT
Start: 2025-01-09

## 2025-01-09 RX ORDER — ALLOPURINOL 300 MG/1
150 TABLET ORAL DAILY
Status: DISCONTINUED | OUTPATIENT
Start: 2025-01-10 | End: 2025-01-15 | Stop reason: HOSPADM

## 2025-01-09 RX ORDER — CETIRIZINE HYDROCHLORIDE 10 MG/1
5 TABLET ORAL DAILY
Status: CANCELLED | OUTPATIENT
Start: 2025-01-09

## 2025-01-09 RX ORDER — ONDANSETRON 2 MG/ML
4 INJECTION INTRAMUSCULAR; INTRAVENOUS EVERY 6 HOURS PRN
Status: DISCONTINUED | OUTPATIENT
Start: 2025-01-09 | End: 2025-01-15 | Stop reason: HOSPADM

## 2025-01-09 RX ORDER — ACETAMINOPHEN 500 MG
500 TABLET ORAL EVERY 6 HOURS PRN
Status: CANCELLED | OUTPATIENT
Start: 2025-01-09

## 2025-01-09 RX ORDER — ACETAMINOPHEN 500 MG
500 TABLET ORAL EVERY 6 HOURS PRN
Status: DISCONTINUED | OUTPATIENT
Start: 2025-01-09 | End: 2025-01-15 | Stop reason: HOSPADM

## 2025-01-09 RX ORDER — ALBUTEROL SULFATE 90 UG/1
1 INHALANT RESPIRATORY (INHALATION) EVERY 4 HOURS PRN
Status: CANCELLED | OUTPATIENT
Start: 2025-01-09

## 2025-01-09 RX ORDER — FAMOTIDINE 20 MG/1
40 TABLET, FILM COATED ORAL 2 TIMES DAILY
Status: DISCONTINUED | OUTPATIENT
Start: 2025-01-09 | End: 2025-01-15 | Stop reason: HOSPADM

## 2025-01-09 RX ORDER — PREDNISONE 10 MG/1
10 TABLET ORAL ONCE
Status: CANCELLED | OUTPATIENT
Start: 2025-01-10

## 2025-01-09 RX ORDER — DEXTROSE MONOHYDRATE 100 MG/ML
INJECTION, SOLUTION INTRAVENOUS CONTINUOUS PRN
Status: CANCELLED | OUTPATIENT
Start: 2025-01-09

## 2025-01-09 RX ORDER — SODIUM CHLORIDE 0.9 % (FLUSH) 0.9 %
5-40 SYRINGE (ML) INJECTION PRN
Status: DISCONTINUED | OUTPATIENT
Start: 2025-01-09 | End: 2025-01-15 | Stop reason: HOSPADM

## 2025-01-09 RX ORDER — CHLORHEXIDINE GLUCONATE 40 MG/ML
SOLUTION TOPICAL DAILY PRN
Status: CANCELLED | OUTPATIENT
Start: 2025-01-09

## 2025-01-09 RX ORDER — ONDANSETRON 4 MG/1
4 TABLET, ORALLY DISINTEGRATING ORAL EVERY 8 HOURS PRN
Status: DISCONTINUED | OUTPATIENT
Start: 2025-01-09 | End: 2025-01-15 | Stop reason: HOSPADM

## 2025-01-09 RX ORDER — ATORVASTATIN CALCIUM 40 MG/1
40 TABLET, FILM COATED ORAL
Status: DISCONTINUED | OUTPATIENT
Start: 2025-01-09 | End: 2025-01-15 | Stop reason: HOSPADM

## 2025-01-09 RX ORDER — INSULIN GLARGINE 100 [IU]/ML
40 INJECTION, SOLUTION SUBCUTANEOUS NIGHTLY
Status: DISCONTINUED | OUTPATIENT
Start: 2025-01-09 | End: 2025-01-12

## 2025-01-09 RX ORDER — INSULIN LISPRO 100 [IU]/ML
0-8 INJECTION, SOLUTION INTRAVENOUS; SUBCUTANEOUS
Status: DISCONTINUED | OUTPATIENT
Start: 2025-01-09 | End: 2025-01-15 | Stop reason: HOSPADM

## 2025-01-09 RX ORDER — POLYETHYLENE GLYCOL 3350 17 G/17G
17 POWDER, FOR SOLUTION ORAL DAILY PRN
Status: DISCONTINUED | OUTPATIENT
Start: 2025-01-09 | End: 2025-01-15 | Stop reason: HOSPADM

## 2025-01-09 RX ORDER — LATANOPROST 50 UG/ML
1 SOLUTION/ DROPS OPHTHALMIC NIGHTLY
Status: DISCONTINUED | OUTPATIENT
Start: 2025-01-09 | End: 2025-01-15 | Stop reason: HOSPADM

## 2025-01-09 RX ORDER — MAGNESIUM SULFATE IN WATER 40 MG/ML
2000 INJECTION, SOLUTION INTRAVENOUS PRN
Status: DISCONTINUED | OUTPATIENT
Start: 2025-01-09 | End: 2025-01-15 | Stop reason: HOSPADM

## 2025-01-09 RX ORDER — VITAMIN B COMPLEX
1000 TABLET ORAL DAILY
Status: CANCELLED | OUTPATIENT
Start: 2025-01-09

## 2025-01-09 RX ORDER — ENOXAPARIN SODIUM 100 MG/ML
40 INJECTION SUBCUTANEOUS DAILY
Status: DISCONTINUED | OUTPATIENT
Start: 2025-01-10 | End: 2025-01-11

## 2025-01-09 RX ORDER — FAMOTIDINE 20 MG/1
40 TABLET, FILM COATED ORAL 2 TIMES DAILY
Status: CANCELLED | OUTPATIENT
Start: 2025-01-09

## 2025-01-09 RX ORDER — ATORVASTATIN CALCIUM 40 MG/1
40 TABLET, FILM COATED ORAL NIGHTLY
Status: CANCELLED | OUTPATIENT
Start: 2025-01-09

## 2025-01-09 RX ORDER — POTASSIUM CHLORIDE 1500 MG/1
40 TABLET, EXTENDED RELEASE ORAL PRN
Status: DISCONTINUED | OUTPATIENT
Start: 2025-01-09 | End: 2025-01-15 | Stop reason: HOSPADM

## 2025-01-09 RX ORDER — GUAIFENESIN 1200 MG/1
1 TABLET, EXTENDED RELEASE ORAL EVERY 12 HOURS PRN
Status: CANCELLED | OUTPATIENT
Start: 2025-01-09

## 2025-01-09 RX ORDER — SODIUM CHLORIDE 0.9 % (FLUSH) 0.9 %
5-40 SYRINGE (ML) INJECTION EVERY 12 HOURS SCHEDULED
Status: DISCONTINUED | OUTPATIENT
Start: 2025-01-09 | End: 2025-01-15 | Stop reason: HOSPADM

## 2025-01-09 RX ORDER — CARVEDILOL 6.25 MG/1
6.25 TABLET ORAL 2 TIMES DAILY
Status: CANCELLED | OUTPATIENT
Start: 2025-01-09

## 2025-01-09 RX ORDER — SODIUM CHLORIDE 9 MG/ML
INJECTION, SOLUTION INTRAVENOUS PRN
Status: DISCONTINUED | OUTPATIENT
Start: 2025-01-09 | End: 2025-01-15 | Stop reason: HOSPADM

## 2025-01-09 RX ORDER — DEXTROSE MONOHYDRATE 100 MG/ML
INJECTION, SOLUTION INTRAVENOUS CONTINUOUS PRN
Status: DISCONTINUED | OUTPATIENT
Start: 2025-01-09 | End: 2025-01-15 | Stop reason: HOSPADM

## 2025-01-09 RX ORDER — GLUCAGON 1 MG/ML
1 KIT INJECTION PRN
Status: DISCONTINUED | OUTPATIENT
Start: 2025-01-09 | End: 2025-01-15 | Stop reason: HOSPADM

## 2025-01-09 RX ORDER — ALLOPURINOL 300 MG/1
150 TABLET ORAL DAILY
Status: CANCELLED | OUTPATIENT
Start: 2025-01-09

## 2025-01-09 RX ORDER — LATANOPROST 50 UG/ML
1 SOLUTION/ DROPS OPHTHALMIC NIGHTLY
Status: CANCELLED | OUTPATIENT
Start: 2025-01-09

## 2025-01-09 RX ORDER — CARVEDILOL 6.25 MG/1
6.25 TABLET ORAL 2 TIMES DAILY WITH MEALS
Status: DISCONTINUED | OUTPATIENT
Start: 2025-01-09 | End: 2025-01-15 | Stop reason: HOSPADM

## 2025-01-09 RX ORDER — FUROSEMIDE 20 MG/1
20 TABLET ORAL DAILY PRN
Status: CANCELLED | OUTPATIENT
Start: 2025-01-09

## 2025-01-09 RX ORDER — POTASSIUM CHLORIDE 7.45 MG/ML
10 INJECTION INTRAVENOUS PRN
Status: DISCONTINUED | OUTPATIENT
Start: 2025-01-09 | End: 2025-01-15 | Stop reason: HOSPADM

## 2025-01-09 RX ORDER — SIMETHICONE 80 MG
125 TABLET,CHEWABLE ORAL
Status: CANCELLED | OUTPATIENT
Start: 2025-01-09

## 2025-01-09 RX ADMIN — ATORVASTATIN CALCIUM 40 MG: 40 TABLET, FILM COATED ORAL at 21:43

## 2025-01-09 RX ADMIN — PREDNISONE 50 MG: 20 TABLET ORAL at 21:43

## 2025-01-09 RX ADMIN — CARVEDILOL 6.25 MG: 6.25 TABLET, FILM COATED ORAL at 18:51

## 2025-01-09 RX ADMIN — FAMOTIDINE 40 MG: 20 TABLET, FILM COATED ORAL at 21:43

## 2025-01-09 RX ADMIN — LATANOPROST 1 DROP: 50 SOLUTION OPHTHALMIC at 23:03

## 2025-01-09 RX ADMIN — ACETAMINOPHEN 500 MG: 500 TABLET ORAL at 21:43

## 2025-01-09 RX ADMIN — SODIUM CHLORIDE, PRESERVATIVE FREE 10 ML: 5 INJECTION INTRAVENOUS at 21:44

## 2025-01-09 RX ADMIN — INSULIN LISPRO 6 UNITS: 100 INJECTION, SOLUTION INTRAVENOUS; SUBCUTANEOUS at 18:51

## 2025-01-09 RX ADMIN — SACUBITRIL AND VALSARTAN 0.5 TABLET: 24; 26 TABLET, FILM COATED ORAL at 21:43

## 2025-01-09 RX ADMIN — INSULIN GLARGINE 40 UNITS: 100 INJECTION, SOLUTION SUBCUTANEOUS at 21:42

## 2025-01-09 ASSESSMENT — PAIN SCALES - GENERAL
PAINLEVEL_OUTOF10: 0
PAINLEVEL_OUTOF10: 6
PAINLEVEL_OUTOF10: 6

## 2025-01-09 ASSESSMENT — PAIN DESCRIPTION - LOCATION
LOCATION: HEAD
LOCATION: HEAD

## 2025-01-09 ASSESSMENT — PAIN DESCRIPTION - DESCRIPTORS
DESCRIPTORS: ACHING
DESCRIPTORS: ACHING

## 2025-01-09 NOTE — H&P
procedure In the evening the day before the procedure and The morning of the procedure 3 tablet 0    insulin glargine (LANTUS SOLOSTAR) 100 UNIT/ML injection pen INJECT 56 UNITS UNDER THE SKIN DAILY 15 mL 3    doxycycline hyclate (VIBRAMYCIN) 100 MG capsule Take 1 capsule by mouth 2 times daily      brompheniramine-pseudoephedrine-DM 2-30-10 MG/5ML syrup 10 mLs every 6 hours      carvedilol (COREG) 6.25 MG tablet Take 1 tablet by mouth 2 times daily 180 tablet 3    blood glucose test strips (TRUE METRIX BLOOD GLUCOSE TEST) strip USE 4-5  STRIP TO TEST BLOOD GLUCOSE DAILY AS NEEDED 150 strip 3    insulin lispro, 1 Unit Dial, (HUMALOG KWIKPEN) 100 UNIT/ML SOPN 12-18 units AC TID 5 Adjustable Dose Pre-filled Pen Syringe 3    Insulin Pen Needle ([employer] PEN NEEDLES) 32G X 4 MM MISC Use to administer insulin, 3-4 times daily 200 each 3    TRUEplus Lancets 33G MISC 5 times  a day 150 each 3    sacubitril-valsartan (ENTRESTO) 24-26 MG per tablet Take 0.5 tablets by mouth 2 times daily 30 tablet 11    guaiFENesin (MUCINEX MAXIMUM STRENGTH) 1200 MG TB12 Take 1 tablet by mouth every 12 hours as needed (Sinus congestion.)      allopurinol (ZYLOPRIM) 300 MG tablet Take 0.5 tablets by mouth daily 30 tablet 1    Vitamin D (CHOLECALCIFEROL) 25 MCG (1000 UT) TABS tablet Take 1 tablet by mouth daily      loratadine (CLARITIN) 10 MG tablet TAKE 1 TABLET BY MOUTH DAILY 30 tablet 5    atorvastatin (LIPITOR) 40 MG tablet Take 1 tablet by mouth daily (Patient taking differently: Take 1 tablet by mouth at bedtime) 90 tablet 3    loperamide (IMODIUM) 2 MG capsule Take 1 capsule by mouth 4 times daily as needed for Diarrhea      Blood Glucose Monitoring Suppl (TRUE METRIX METER) w/Device KIT USE TO TEST DAILY 1 kit 0    Elastic Bandages & Supports (ACE WRIST BRACE) MISC For neuropathy 2 each 0    albuterol sulfate HFA (PROVENTIL;VENTOLIN;PROAIR) 108 (90 Base) MCG/ACT inhaler INHALE TWO PUFFS BY MOUTH EVERY 6 HOURS AS NEEDED FOR WHEEZING 1

## 2025-01-09 NOTE — TELEPHONE ENCOUNTER
Spoke with patient advised that RMM was agreeable to her admission prior to tomorrows procedure. Call placed to transfer center. 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

## 2025-01-10 ENCOUNTER — ANESTHESIA (OUTPATIENT)
Age: 60
End: 2025-01-10
Payer: COMMERCIAL

## 2025-01-10 ENCOUNTER — ANESTHESIA EVENT (OUTPATIENT)
Age: 60
End: 2025-01-10
Payer: COMMERCIAL

## 2025-01-10 ENCOUNTER — APPOINTMENT (OUTPATIENT)
Age: 60
DRG: 179 | End: 2025-01-10
Attending: INTERNAL MEDICINE
Payer: COMMERCIAL

## 2025-01-10 ENCOUNTER — APPOINTMENT (OUTPATIENT)
Dept: GENERAL RADIOLOGY | Age: 60
DRG: 179 | End: 2025-01-10
Attending: STUDENT IN AN ORGANIZED HEALTH CARE EDUCATION/TRAINING PROGRAM
Payer: COMMERCIAL

## 2025-01-10 LAB
ALBUMIN SERPL-MCNC: 3.8 G/DL (ref 3.4–5)
ALBUMIN/GLOB SERPL: 1.3 {RATIO} (ref 1.1–2.2)
ALP SERPL-CCNC: 20 U/L (ref 40–129)
ALT SERPL-CCNC: 20 U/L (ref 10–40)
ANION GAP SERPL CALCULATED.3IONS-SCNC: 12 MMOL/L (ref 3–16)
AST SERPL-CCNC: 22 U/L (ref 15–37)
BASOPHILS # BLD: 0 K/UL (ref 0–0.2)
BASOPHILS NFR BLD: 0.3 %
BILIRUB SERPL-MCNC: 0.4 MG/DL (ref 0–1)
BUN SERPL-MCNC: 27 MG/DL (ref 7–20)
CALCIUM SERPL-MCNC: 10.2 MG/DL (ref 8.3–10.6)
CHLORIDE SERPL-SCNC: 101 MMOL/L (ref 99–110)
CO2 SERPL-SCNC: 20 MMOL/L (ref 21–32)
CREAT SERPL-MCNC: 1 MG/DL (ref 0.6–1.2)
DEPRECATED RDW RBC AUTO: 15.9 % (ref 12.4–15.4)
ECHO BSA: 2.16 M2
EKG ATRIAL RATE: 76 BPM
EKG DIAGNOSIS: NORMAL
EKG P AXIS: 63 DEGREES
EKG P-R INTERVAL: 180 MS
EKG Q-T INTERVAL: 474 MS
EKG QRS DURATION: 180 MS
EKG QTC CALCULATION (BAZETT): 533 MS
EKG R AXIS: -2 DEGREES
EKG T AXIS: 187 DEGREES
EKG VENTRICULAR RATE: 76 BPM
EOSINOPHIL # BLD: 0 K/UL (ref 0–0.6)
EOSINOPHIL NFR BLD: 0.1 %
GFR SERPLBLD CREATININE-BSD FMLA CKD-EPI: 64 ML/MIN/{1.73_M2}
GLUCOSE BLD-MCNC: 239 MG/DL (ref 70–99)
GLUCOSE BLD-MCNC: 266 MG/DL (ref 70–99)
GLUCOSE BLD-MCNC: 277 MG/DL (ref 70–99)
GLUCOSE BLD-MCNC: 296 MG/DL (ref 70–99)
GLUCOSE BLD-MCNC: 316 MG/DL (ref 70–99)
GLUCOSE BLD-MCNC: 331 MG/DL (ref 70–99)
GLUCOSE BLD-MCNC: 359 MG/DL (ref 70–99)
GLUCOSE SERPL-MCNC: 345 MG/DL (ref 70–99)
HCT VFR BLD AUTO: 36.9 % (ref 36–48)
HGB BLD-MCNC: 12 G/DL (ref 12–16)
LYMPHOCYTES # BLD: 1.6 K/UL (ref 1–5.1)
LYMPHOCYTES NFR BLD: 11.5 %
MCH RBC QN AUTO: 28.6 PG (ref 26–34)
MCHC RBC AUTO-ENTMCNC: 32.6 G/DL (ref 31–36)
MCV RBC AUTO: 87.8 FL (ref 80–100)
MONOCYTES # BLD: 0.6 K/UL (ref 0–1.3)
MONOCYTES NFR BLD: 4.4 %
NEUTROPHILS # BLD: 11.8 K/UL (ref 1.7–7.7)
NEUTROPHILS NFR BLD: 83.7 %
PERFORMED ON: ABNORMAL
PLATELET # BLD AUTO: 175 K/UL (ref 135–450)
PMV BLD AUTO: 9.3 FL (ref 5–10.5)
POTASSIUM SERPL-SCNC: 5.3 MMOL/L (ref 3.5–5.1)
PROT SERPL-MCNC: 6.7 G/DL (ref 6.4–8.2)
RBC # BLD AUTO: 4.21 M/UL (ref 4–5.2)
SODIUM SERPL-SCNC: 133 MMOL/L (ref 136–145)
WBC # BLD AUTO: 14.1 K/UL (ref 4–11)

## 2025-01-10 PROCEDURE — 85025 COMPLETE CBC W/AUTO DIFF WBC: CPT

## 2025-01-10 PROCEDURE — 6370000000 HC RX 637 (ALT 250 FOR IP): Performed by: ANESTHESIOLOGY

## 2025-01-10 PROCEDURE — C1889 IMPLANT/INSERT DEVICE, NOC: HCPCS | Performed by: INTERNAL MEDICINE

## 2025-01-10 PROCEDURE — 2580000003 HC RX 258

## 2025-01-10 PROCEDURE — 71045 X-RAY EXAM CHEST 1 VIEW: CPT

## 2025-01-10 PROCEDURE — 02PA3MZ REMOVAL OF CARDIAC LEAD FROM HEART, PERCUTANEOUS APPROACH: ICD-10-PCS | Performed by: INTERNAL MEDICINE

## 2025-01-10 PROCEDURE — 2580000003 HC RX 258: Performed by: INTERNAL MEDICINE

## 2025-01-10 PROCEDURE — 33233 REMOVAL OF PM GENERATOR: CPT | Performed by: INTERNAL MEDICINE

## 2025-01-10 PROCEDURE — 6360000002 HC RX W HCPCS: Performed by: STUDENT IN AN ORGANIZED HEALTH CARE EDUCATION/TRAINING PROGRAM

## 2025-01-10 PROCEDURE — 33249 INSJ/RPLCMT DEFIB W/LEAD(S): CPT | Performed by: INTERNAL MEDICINE

## 2025-01-10 PROCEDURE — 36415 COLL VENOUS BLD VENIPUNCTURE: CPT

## 2025-01-10 PROCEDURE — 6370000000 HC RX 637 (ALT 250 FOR IP): Performed by: STUDENT IN AN ORGANIZED HEALTH CARE EDUCATION/TRAINING PROGRAM

## 2025-01-10 PROCEDURE — 6360000002 HC RX W HCPCS

## 2025-01-10 PROCEDURE — 3700000001 HC ADD 15 MINUTES (ANESTHESIA): Performed by: INTERNAL MEDICINE

## 2025-01-10 PROCEDURE — 2709999900 HC NON-CHARGEABLE SUPPLY: Performed by: INTERNAL MEDICINE

## 2025-01-10 PROCEDURE — C1730 CATH, EP, 19 OR FEW ELECT: HCPCS | Performed by: INTERNAL MEDICINE

## 2025-01-10 PROCEDURE — C1882 AICD, OTHER THAN SING/DUAL: HCPCS | Performed by: INTERNAL MEDICINE

## 2025-01-10 PROCEDURE — C1725 CATH, TRANSLUMIN NON-LASER: HCPCS | Performed by: INTERNAL MEDICINE

## 2025-01-10 PROCEDURE — C1769 GUIDE WIRE: HCPCS | Performed by: INTERNAL MEDICINE

## 2025-01-10 PROCEDURE — 36556 INSERT NON-TUNNEL CV CATH: CPT | Performed by: INTERNAL MEDICINE

## 2025-01-10 PROCEDURE — 2500000003 HC RX 250 WO HCPCS

## 2025-01-10 PROCEDURE — G0378 HOSPITAL OBSERVATION PER HR: HCPCS

## 2025-01-10 PROCEDURE — 36620 INSERTION CATHETER ARTERY: CPT | Performed by: INTERNAL MEDICINE

## 2025-01-10 PROCEDURE — 36005 INJECTION EXT VENOGRAPHY: CPT | Performed by: INTERNAL MEDICINE

## 2025-01-10 PROCEDURE — 33225 L VENTRIC PACING LEAD ADD-ON: CPT | Performed by: INTERNAL MEDICINE

## 2025-01-10 PROCEDURE — 7100000001 HC PACU RECOVERY - ADDTL 15 MIN: Performed by: INTERNAL MEDICINE

## 2025-01-10 PROCEDURE — 33235 REMOVAL PACEMAKER ELECTRODE: CPT | Performed by: INTERNAL MEDICINE

## 2025-01-10 PROCEDURE — 6360000002 HC RX W HCPCS: Performed by: ANESTHESIOLOGY

## 2025-01-10 PROCEDURE — 0JH608Z INSERTION OF DEFIBRILLATOR GENERATOR INTO CHEST SUBCUTANEOUS TISSUE AND FASCIA, OPEN APPROACH: ICD-10-PCS | Performed by: INTERNAL MEDICINE

## 2025-01-10 PROCEDURE — B51N1ZZ FLUOROSCOPY OF LEFT UPPER EXTREMITY VEINS USING LOW OSMOLAR CONTRAST: ICD-10-PCS | Performed by: INTERNAL MEDICINE

## 2025-01-10 PROCEDURE — 6360000002 HC RX W HCPCS: Performed by: INTERNAL MEDICINE

## 2025-01-10 PROCEDURE — 75820 VEIN X-RAY ARM/LEG: CPT | Performed by: INTERNAL MEDICINE

## 2025-01-10 PROCEDURE — 2500000003 HC RX 250 WO HCPCS: Performed by: STUDENT IN AN ORGANIZED HEALTH CARE EDUCATION/TRAINING PROGRAM

## 2025-01-10 PROCEDURE — 80053 COMPREHEN METABOLIC PANEL: CPT

## 2025-01-10 PROCEDURE — C1892 INTRO/SHEATH,FIXED,PEEL-AWAY: HCPCS | Performed by: INTERNAL MEDICINE

## 2025-01-10 PROCEDURE — 0JPT0PZ REMOVAL OF CARDIAC RHYTHM RELATED DEVICE FROM TRUNK SUBCUTANEOUS TISSUE AND FASCIA, OPEN APPROACH: ICD-10-PCS | Performed by: INTERNAL MEDICINE

## 2025-01-10 PROCEDURE — 6360000004 HC RX CONTRAST MEDICATION: Performed by: INTERNAL MEDICINE

## 2025-01-10 PROCEDURE — 3700000000 HC ANESTHESIA ATTENDED CARE: Performed by: INTERNAL MEDICINE

## 2025-01-10 PROCEDURE — 6370000000 HC RX 637 (ALT 250 FOR IP): Performed by: INTERNAL MEDICINE

## 2025-01-10 PROCEDURE — 2500000003 HC RX 250 WO HCPCS: Performed by: INTERNAL MEDICINE

## 2025-01-10 PROCEDURE — C2628 CATHETER, OCCLUSION: HCPCS | Performed by: INTERNAL MEDICINE

## 2025-01-10 PROCEDURE — 7100000000 HC PACU RECOVERY - FIRST 15 MIN: Performed by: INTERNAL MEDICINE

## 2025-01-10 PROCEDURE — 3E0132A INTRODUCTION OF ANTI-INFECTIVE ENVELOPE INTO SUBCUTANEOUS TISSUE, PERCUTANEOUS APPROACH: ICD-10-PCS | Performed by: INTERNAL MEDICINE

## 2025-01-10 PROCEDURE — C1894 INTRO/SHEATH, NON-LASER: HCPCS | Performed by: INTERNAL MEDICINE

## 2025-01-10 PROCEDURE — 02H63KZ INSERTION OF DEFIBRILLATOR LEAD INTO RIGHT ATRIUM, PERCUTANEOUS APPROACH: ICD-10-PCS | Performed by: INTERNAL MEDICINE

## 2025-01-10 PROCEDURE — C1713 ANCHOR/SCREW BN/BN,TIS/BN: HCPCS | Performed by: INTERNAL MEDICINE

## 2025-01-10 PROCEDURE — 2720000010 HC SURG SUPPLY STERILE: Performed by: INTERNAL MEDICINE

## 2025-01-10 PROCEDURE — 6370000000 HC RX 637 (ALT 250 FOR IP): Performed by: NURSE PRACTITIONER

## 2025-01-10 PROCEDURE — B24BZZ4 ULTRASONOGRAPHY OF HEART WITH AORTA, TRANSESOPHAGEAL: ICD-10-PCS | Performed by: INTERNAL MEDICINE

## 2025-01-10 PROCEDURE — 6370000000 HC RX 637 (ALT 250 FOR IP)

## 2025-01-10 PROCEDURE — 33234 REMOVAL OF PACEMAKER SYSTEM: CPT | Performed by: INTERNAL MEDICINE

## 2025-01-10 PROCEDURE — C1777 LEAD, AICD, ENDO SINGLE COIL: HCPCS | Performed by: INTERNAL MEDICINE

## 2025-01-10 PROCEDURE — C1900 LEAD, CORONARY VENOUS: HCPCS | Performed by: INTERNAL MEDICINE

## 2025-01-10 PROCEDURE — 02HK3KZ INSERTION OF DEFIBRILLATOR LEAD INTO RIGHT VENTRICLE, PERCUTANEOUS APPROACH: ICD-10-PCS | Performed by: INTERNAL MEDICINE

## 2025-01-10 PROCEDURE — C1887 CATHETER, GUIDING: HCPCS | Performed by: INTERNAL MEDICINE

## 2025-01-10 PROCEDURE — 93010 ELECTROCARDIOGRAM REPORT: CPT | Performed by: INTERNAL MEDICINE

## 2025-01-10 DEVICE — LEAD 479888 ATTAIN MRI US
Type: IMPLANTABLE DEVICE | Site: HEART | Status: FUNCTIONAL
Brand: ATTAIN STABILITY™ QUAD MRI SURESCAN™

## 2025-01-10 DEVICE — ENVELOPE CMRM6133 ABSORB LRG MR
Type: IMPLANTABLE DEVICE | Site: HEART | Status: FUNCTIONAL
Brand: TYRX™

## 2025-01-10 DEVICE — CRTD DTPA2QQ COBALT XT HF QUAD MRI DF4
Type: IMPLANTABLE DEVICE | Site: HEART | Status: FUNCTIONAL
Brand: COBALT™ XT HF QUAD CRT-D MRI SURESCAN™

## 2025-01-10 DEVICE — LEAD 6935M62 QUATTRO SECURE S MRI US
Type: IMPLANTABLE DEVICE | Site: HEART | Status: FUNCTIONAL
Brand: SPRINT QUATTRO SECURE S MRI™ SURESCAN™

## 2025-01-10 RX ORDER — SODIUM CHLORIDE, SODIUM LACTATE, POTASSIUM CHLORIDE, CALCIUM CHLORIDE 600; 310; 30; 20 MG/100ML; MG/100ML; MG/100ML; MG/100ML
INJECTION, SOLUTION INTRAVENOUS
Status: DISCONTINUED | OUTPATIENT
Start: 2025-01-10 | End: 2025-01-10 | Stop reason: SDUPTHER

## 2025-01-10 RX ORDER — FENTANYL CITRATE 50 UG/ML
25 INJECTION, SOLUTION INTRAMUSCULAR; INTRAVENOUS EVERY 5 MIN PRN
Status: DISCONTINUED | OUTPATIENT
Start: 2025-01-10 | End: 2025-01-10 | Stop reason: HOSPADM

## 2025-01-10 RX ORDER — SODIUM CHLORIDE 0.9 % (FLUSH) 0.9 %
5-40 SYRINGE (ML) INJECTION PRN
Status: DISCONTINUED | OUTPATIENT
Start: 2025-01-10 | End: 2025-01-15 | Stop reason: HOSPADM

## 2025-01-10 RX ORDER — ROCURONIUM BROMIDE 10 MG/ML
INJECTION, SOLUTION INTRAVENOUS
Status: DISCONTINUED | OUTPATIENT
Start: 2025-01-10 | End: 2025-01-10 | Stop reason: SDUPTHER

## 2025-01-10 RX ORDER — VANCOMYCIN HYDROCHLORIDE 1.5 G/300ML
1500 INJECTION, SOLUTION INTRAVITREAL ONCE
Status: COMPLETED | OUTPATIENT
Start: 2025-01-10 | End: 2025-01-10

## 2025-01-10 RX ORDER — OXYCODONE HYDROCHLORIDE 5 MG/1
5 TABLET ORAL PRN
Status: DISCONTINUED | OUTPATIENT
Start: 2025-01-10 | End: 2025-01-10 | Stop reason: HOSPADM

## 2025-01-10 RX ORDER — FENTANYL CITRATE 50 UG/ML
INJECTION, SOLUTION INTRAMUSCULAR; INTRAVENOUS
Status: DISCONTINUED | OUTPATIENT
Start: 2025-01-10 | End: 2025-01-10 | Stop reason: SDUPTHER

## 2025-01-10 RX ORDER — TRAMADOL HYDROCHLORIDE 50 MG/1
50 TABLET ORAL EVERY 8 HOURS PRN
Status: DISCONTINUED | OUTPATIENT
Start: 2025-01-10 | End: 2025-01-15 | Stop reason: HOSPADM

## 2025-01-10 RX ORDER — NALOXONE HYDROCHLORIDE 0.4 MG/ML
INJECTION, SOLUTION INTRAMUSCULAR; INTRAVENOUS; SUBCUTANEOUS PRN
Status: DISCONTINUED | OUTPATIENT
Start: 2025-01-10 | End: 2025-01-10 | Stop reason: HOSPADM

## 2025-01-10 RX ORDER — DIPHENHYDRAMINE HYDROCHLORIDE 50 MG/ML
INJECTION INTRAMUSCULAR; INTRAVENOUS
Status: DISCONTINUED | OUTPATIENT
Start: 2025-01-10 | End: 2025-01-10 | Stop reason: SDUPTHER

## 2025-01-10 RX ORDER — PHENYLEPHRINE HCL IN 0.9% NACL 1 MG/10 ML
SYRINGE (ML) INTRAVENOUS
Status: DISCONTINUED | OUTPATIENT
Start: 2025-01-10 | End: 2025-01-10 | Stop reason: SDUPTHER

## 2025-01-10 RX ORDER — SODIUM CHLORIDE 9 MG/ML
INJECTION, SOLUTION INTRAVENOUS PRN
Status: DISCONTINUED | OUTPATIENT
Start: 2025-01-10 | End: 2025-01-10 | Stop reason: HOSPADM

## 2025-01-10 RX ORDER — SODIUM CHLORIDE 0.9 % (FLUSH) 0.9 %
5-40 SYRINGE (ML) INJECTION EVERY 12 HOURS SCHEDULED
Status: DISCONTINUED | OUTPATIENT
Start: 2025-01-10 | End: 2025-01-15 | Stop reason: HOSPADM

## 2025-01-10 RX ORDER — FAMOTIDINE 10 MG/ML
INJECTION, SOLUTION INTRAVENOUS
Status: DISCONTINUED | OUTPATIENT
Start: 2025-01-10 | End: 2025-01-10 | Stop reason: SDUPTHER

## 2025-01-10 RX ORDER — IOPAMIDOL 755 MG/ML
INJECTION, SOLUTION INTRAVASCULAR PRN
Status: DISCONTINUED | OUTPATIENT
Start: 2025-01-10 | End: 2025-01-10 | Stop reason: HOSPADM

## 2025-01-10 RX ORDER — SODIUM CHLORIDE 9 MG/ML
INJECTION, SOLUTION INTRAVENOUS PRN
Status: COMPLETED | OUTPATIENT
Start: 2025-01-10 | End: 2025-01-10

## 2025-01-10 RX ORDER — SODIUM CHLORIDE 9 MG/ML
INJECTION, SOLUTION INTRAVENOUS PRN
Status: DISCONTINUED | OUTPATIENT
Start: 2025-01-10 | End: 2025-01-15 | Stop reason: HOSPADM

## 2025-01-10 RX ORDER — ONDANSETRON 2 MG/ML
INJECTION INTRAMUSCULAR; INTRAVENOUS
Status: DISCONTINUED | OUTPATIENT
Start: 2025-01-10 | End: 2025-01-10 | Stop reason: SDUPTHER

## 2025-01-10 RX ORDER — OXYCODONE HYDROCHLORIDE 5 MG/1
10 TABLET ORAL PRN
Status: DISCONTINUED | OUTPATIENT
Start: 2025-01-10 | End: 2025-01-10 | Stop reason: HOSPADM

## 2025-01-10 RX ORDER — LABETALOL HYDROCHLORIDE 5 MG/ML
5 INJECTION, SOLUTION INTRAVENOUS EVERY 10 MIN PRN
Status: DISCONTINUED | OUTPATIENT
Start: 2025-01-10 | End: 2025-01-10 | Stop reason: HOSPADM

## 2025-01-10 RX ORDER — ETOMIDATE 2 MG/ML
INJECTION INTRAVENOUS
Status: DISCONTINUED | OUTPATIENT
Start: 2025-01-10 | End: 2025-01-10 | Stop reason: SDUPTHER

## 2025-01-10 RX ORDER — SODIUM CHLORIDE 0.9 % (FLUSH) 0.9 %
5-40 SYRINGE (ML) INJECTION EVERY 12 HOURS SCHEDULED
Status: DISCONTINUED | OUTPATIENT
Start: 2025-01-10 | End: 2025-01-10 | Stop reason: HOSPADM

## 2025-01-10 RX ORDER — ONDANSETRON 2 MG/ML
4 INJECTION INTRAMUSCULAR; INTRAVENOUS
Status: DISCONTINUED | OUTPATIENT
Start: 2025-01-10 | End: 2025-01-10 | Stop reason: HOSPADM

## 2025-01-10 RX ORDER — PROCHLORPERAZINE EDISYLATE 5 MG/ML
5 INJECTION INTRAMUSCULAR; INTRAVENOUS
Status: DISCONTINUED | OUTPATIENT
Start: 2025-01-10 | End: 2025-01-10 | Stop reason: HOSPADM

## 2025-01-10 RX ORDER — SODIUM CHLORIDE 0.9 % (FLUSH) 0.9 %
5-40 SYRINGE (ML) INJECTION PRN
Status: DISCONTINUED | OUTPATIENT
Start: 2025-01-10 | End: 2025-01-10 | Stop reason: HOSPADM

## 2025-01-10 RX ORDER — DIPHENHYDRAMINE HYDROCHLORIDE 50 MG/ML
12.5 INJECTION INTRAMUSCULAR; INTRAVENOUS
Status: DISCONTINUED | OUTPATIENT
Start: 2025-01-10 | End: 2025-01-10 | Stop reason: HOSPADM

## 2025-01-10 RX ORDER — SUCCINYLCHOLINE/SOD CL,ISO/PF 200MG/10ML
SYRINGE (ML) INTRAVENOUS
Status: DISCONTINUED | OUTPATIENT
Start: 2025-01-10 | End: 2025-01-10 | Stop reason: SDUPTHER

## 2025-01-10 RX ORDER — INSULIN LISPRO 100 [IU]/ML
21 INJECTION, SOLUTION INTRAVENOUS; SUBCUTANEOUS ONCE
Status: DISCONTINUED | OUTPATIENT
Start: 2025-01-10 | End: 2025-01-10

## 2025-01-10 RX ORDER — BUPIVACAINE HYDROCHLORIDE 5 MG/ML
INJECTION, SOLUTION EPIDURAL; INTRACAUDAL PRN
Status: DISCONTINUED | OUTPATIENT
Start: 2025-01-10 | End: 2025-01-10 | Stop reason: HOSPADM

## 2025-01-10 RX ORDER — PROPOFOL 10 MG/ML
INJECTION, EMULSION INTRAVENOUS
Status: DISCONTINUED | OUTPATIENT
Start: 2025-01-10 | End: 2025-01-10 | Stop reason: SDUPTHER

## 2025-01-10 RX ADMIN — INSULIN HUMAN 6 UNITS: 100 INJECTION, SOLUTION PARENTERAL at 12:40

## 2025-01-10 RX ADMIN — ATORVASTATIN CALCIUM 40 MG: 40 TABLET, FILM COATED ORAL at 20:40

## 2025-01-10 RX ADMIN — SACUBITRIL AND VALSARTAN 0.5 TABLET: 24; 26 TABLET, FILM COATED ORAL at 20:42

## 2025-01-10 RX ADMIN — FENTANYL CITRATE 25 MCG: 50 INJECTION, SOLUTION INTRAMUSCULAR; INTRAVENOUS at 08:01

## 2025-01-10 RX ADMIN — ACETAMINOPHEN 500 MG: 500 TABLET ORAL at 17:01

## 2025-01-10 RX ADMIN — Medication 120 MG: at 08:02

## 2025-01-10 RX ADMIN — FAMOTIDINE 40 MG: 20 TABLET, FILM COATED ORAL at 20:42

## 2025-01-10 RX ADMIN — INSULIN HUMAN 8 UNITS: 100 INJECTION, SOLUTION PARENTERAL at 09:37

## 2025-01-10 RX ADMIN — DIPHENHYDRAMINE HYDROCHLORIDE 25 MG: 50 INJECTION INTRAMUSCULAR; INTRAVENOUS at 09:32

## 2025-01-10 RX ADMIN — SODIUM CHLORIDE, PRESERVATIVE FREE 10 ML: 5 INJECTION INTRAVENOUS at 14:11

## 2025-01-10 RX ADMIN — CARVEDILOL 6.25 MG: 6.25 TABLET, FILM COATED ORAL at 13:50

## 2025-01-10 RX ADMIN — PHENOL 1 SPRAY: 1.4 SPRAY ORAL at 20:42

## 2025-01-10 RX ADMIN — CARVEDILOL 6.25 MG: 6.25 TABLET, FILM COATED ORAL at 20:57

## 2025-01-10 RX ADMIN — INSULIN HUMAN 12 UNITS: 100 INJECTION, SOLUTION PARENTERAL at 08:21

## 2025-01-10 RX ADMIN — FAMOTIDINE 20 MG: 10 INJECTION INTRAVENOUS at 08:46

## 2025-01-10 RX ADMIN — FAMOTIDINE 40 MG: 20 TABLET, FILM COATED ORAL at 13:50

## 2025-01-10 RX ADMIN — ETOMIDATE 8 MG: 20 INJECTION, SOLUTION INTRAVENOUS at 08:01

## 2025-01-10 RX ADMIN — LATANOPROST 1 DROP: 50 SOLUTION OPHTHALMIC at 20:43

## 2025-01-10 RX ADMIN — INSULIN LISPRO 6 UNITS: 100 INJECTION, SOLUTION INTRAVENOUS; SUBCUTANEOUS at 17:12

## 2025-01-10 RX ADMIN — INSULIN LISPRO 2 UNITS: 100 INJECTION, SOLUTION INTRAVENOUS; SUBCUTANEOUS at 13:50

## 2025-01-10 RX ADMIN — FENTANYL CITRATE 25 MCG: 50 INJECTION, SOLUTION INTRAMUSCULAR; INTRAVENOUS at 12:34

## 2025-01-10 RX ADMIN — PROPOFOL 50 MG: 10 INJECTION, EMULSION INTRAVENOUS at 08:01

## 2025-01-10 RX ADMIN — PROPOFOL 30 MG: 10 INJECTION, EMULSION INTRAVENOUS at 08:03

## 2025-01-10 RX ADMIN — SODIUM CHLORIDE, PRESERVATIVE FREE 10 ML: 5 INJECTION INTRAVENOUS at 20:43

## 2025-01-10 RX ADMIN — ROCURONIUM BROMIDE 50 MG: 10 INJECTION, SOLUTION INTRAVENOUS at 08:49

## 2025-01-10 RX ADMIN — FENTANYL CITRATE 25 MCG: 50 INJECTION, SOLUTION INTRAMUSCULAR; INTRAVENOUS at 09:28

## 2025-01-10 RX ADMIN — SODIUM CHLORIDE: 9 INJECTION, SOLUTION INTRAVENOUS at 08:26

## 2025-01-10 RX ADMIN — ROCURONIUM BROMIDE 30 MG: 10 INJECTION, SOLUTION INTRAVENOUS at 08:05

## 2025-01-10 RX ADMIN — ACETAMINOPHEN 500 MG: 500 TABLET ORAL at 22:31

## 2025-01-10 RX ADMIN — ENOXAPARIN SODIUM 40 MG: 100 INJECTION SUBCUTANEOUS at 13:50

## 2025-01-10 RX ADMIN — SACUBITRIL AND VALSARTAN 0.5 TABLET: 24; 26 TABLET, FILM COATED ORAL at 13:50

## 2025-01-10 RX ADMIN — Medication 200 MCG: at 10:07

## 2025-01-10 RX ADMIN — ALLOPURINOL 150 MG: 300 TABLET ORAL at 13:50

## 2025-01-10 RX ADMIN — PREDNISONE 50 MG: 10 TABLET ORAL at 06:05

## 2025-01-10 RX ADMIN — SODIUM CHLORIDE, PRESERVATIVE FREE 10 ML: 5 INJECTION INTRAVENOUS at 20:44

## 2025-01-10 RX ADMIN — SODIUM CHLORIDE, POTASSIUM CHLORIDE, SODIUM LACTATE AND CALCIUM CHLORIDE: 600; 310; 30; 20 INJECTION, SOLUTION INTRAVENOUS at 08:01

## 2025-01-10 RX ADMIN — ONDANSETRON 8 MG: 2 INJECTION INTRAMUSCULAR; INTRAVENOUS at 08:46

## 2025-01-10 RX ADMIN — INSULIN GLARGINE 40 UNITS: 100 INJECTION, SOLUTION SUBCUTANEOUS at 21:00

## 2025-01-10 RX ADMIN — FENTANYL CITRATE 50 MCG: 50 INJECTION, SOLUTION INTRAMUSCULAR; INTRAVENOUS at 09:32

## 2025-01-10 RX ADMIN — INSULIN HUMAN 8 UNITS: 100 INJECTION, SOLUTION PARENTERAL at 10:43

## 2025-01-10 RX ADMIN — Medication 100 MCG: at 10:30

## 2025-01-10 RX ADMIN — Medication 100 MCG: at 09:54

## 2025-01-10 RX ADMIN — LIDOCAINE HYDROCHLORIDE 50 MG: 10 INJECTION, SOLUTION EPIDURAL; INFILTRATION; INTRACAUDAL; PERINEURAL at 08:01

## 2025-01-10 RX ADMIN — VANCOMYCIN HYDROCHLORIDE 1500 MG: 1.5 INJECTION, SOLUTION INTRAVITREAL at 20:54

## 2025-01-10 RX ADMIN — FENTANYL CITRATE 25 MCG: 50 INJECTION, SOLUTION INTRAMUSCULAR; INTRAVENOUS at 12:42

## 2025-01-10 RX ADMIN — VANCOMYCIN HYDROCHLORIDE 1500 MG: 1.5 INJECTION, POWDER, LYOPHILIZED, FOR SOLUTION INTRAVENOUS at 08:00

## 2025-01-10 RX ADMIN — TRAMADOL HYDROCHLORIDE 50 MG: 50 TABLET, COATED ORAL at 20:40

## 2025-01-10 RX ADMIN — PROPOFOL 50 MG: 10 INJECTION, EMULSION INTRAVENOUS at 08:02

## 2025-01-10 RX ADMIN — SUGAMMADEX 200 MG: 100 INJECTION, SOLUTION INTRAVENOUS at 10:06

## 2025-01-10 ASSESSMENT — PAIN DESCRIPTION - LOCATION
LOCATION: GROIN
LOCATION: CHEST
LOCATION_2: GROIN
LOCATION: THROAT
LOCATION_2: GROIN
LOCATION: CHEST
LOCATION: CHEST

## 2025-01-10 ASSESSMENT — PAIN DESCRIPTION - DESCRIPTORS
DESCRIPTORS_2: BURNING
DESCRIPTORS: PATIENT UNABLE TO DESCRIBE
DESCRIPTORS: SORE
DESCRIPTORS: BURNING
DESCRIPTORS: PATIENT UNABLE TO DESCRIBE
DESCRIPTORS_2: BURNING
DESCRIPTORS: BURNING
DESCRIPTORS: BURNING

## 2025-01-10 ASSESSMENT — PAIN SCALES - GENERAL
PAINLEVEL_OUTOF10: 6
PAINLEVEL_OUTOF10: 3
PAINLEVEL_OUTOF10: 10
PAINLEVEL_OUTOF10: 0
PAINLEVEL_OUTOF10: 6
PAINLEVEL_OUTOF10: 0
PAINLEVEL_OUTOF10: 4
PAINLEVEL_OUTOF10: 0
PAINLEVEL_OUTOF10: 6
PAINLEVEL_OUTOF10: 7

## 2025-01-10 ASSESSMENT — PAIN DESCRIPTION - ORIENTATION
ORIENTATION: RIGHT
ORIENTATION: LEFT
ORIENTATION: LEFT
ORIENTATION_2: RIGHT
ORIENTATION: RIGHT
ORIENTATION: RIGHT
ORIENTATION: LEFT;UPPER
ORIENTATION_2: RIGHT

## 2025-01-10 ASSESSMENT — PAIN DESCRIPTION - INTENSITY
RATING_2: 7
RATING_2: 10

## 2025-01-10 ASSESSMENT — PAIN DESCRIPTION - PAIN TYPE
TYPE: SURGICAL PAIN

## 2025-01-10 ASSESSMENT — ENCOUNTER SYMPTOMS: SHORTNESS OF BREATH: 1

## 2025-01-10 NOTE — ANESTHESIA POSTPROCEDURE EVALUATION
Department of Anesthesiology  Postprocedure Note    Patient: Lanny Carbajal  MRN: [id number]  YOB: 1965  Date of evaluation: 1/10/2025    Procedure Summary       Date: 01/10/25 Room / Location: [facility] EP LAB 5 / [facility] CARDIAC CATH LAB    Anesthesia Start: 0747 Anesthesia Stop: 1155    Procedures:       Biventricular ICD upgrade      Laser lead extraction Diagnosis:       Ischemic cardiomyopathy      (Ischemic cardiomyopathy [I25.5])    Providers: Christian Galo MD Responsible Provider: Dwaine Rai DO    Anesthesia Type: general ASA Status: 4            Anesthesia Type: No value filed.    Harman Phase I:      Harman Phase II:      Anesthesia Post Evaluation    Patient location during evaluation: PACU  Patient participation: complete - patient participated  Level of consciousness: awake  Pain score: 5  Airway patency: patent  Nausea & Vomiting: no nausea  Cardiovascular status: hemodynamically stable  Respiratory status: acceptable  Hydration status: euvolemic  Multimodal analgesia pain management approach  Pain management: adequate    There were no known notable events for this encounter.

## 2025-01-10 NOTE — PROCEDURES
material.      The leads were then connected to the new pulse generator (Biv-AICD) which was then placed into the cleaned pocket. The pulse generator was sutured inside the pocket.     The pocket was then closed in three separate subcutaneous layers using 2-0 & 3-0 Vicryl and subcuticular layer using 4-0 Vicryl.     TYRX was used to reduce risk of infection. D-STAT was used to reduce bleeding. The skin was covered with pressure dressing. All sponge and needle counts were reported as correct at the end of the procedure.     The patient tolerated the procedure well and there were no immediate complications. Patient was transported to the holding area in stable condition.     EBL less than 50 ml     Lead and device information:      Plan:   The patient will be admitted to the CVU and have usual post-implant care, including chest x-ray, and antibiotic therapy and interrogation of the device.

## 2025-01-10 NOTE — PLAN OF CARE
Problem: Chronic Conditions and Co-morbidities  Goal: Patient's chronic conditions and co-morbidity symptoms are monitored and maintained or improved  Outcome: Progressing     Problem: Discharge Planning  Goal: Discharge to home or other facility with appropriate resources  Outcome: Progressing     Problem: Pain  Goal: Verbalizes/displays adequate comfort level or baseline comfort level  Outcome: Progressing     Problem: Neurosensory - Adult  Goal: Achieves stable or improved neurological status  Outcome: Progressing     Problem: Respiratory - Adult  Goal: Achieves optimal ventilation and oxygenation  Outcome: Progressing     Problem: Cardiovascular - Adult  Goal: Maintains optimal cardiac output and hemodynamic stability  Outcome: Progressing  Goal: Absence of cardiac dysrhythmias or at baseline  Outcome: Progressing     Problem: Skin/Tissue Integrity - Adult  Goal: Skin integrity remains intact  Outcome: Progressing  Goal: Incisions, wounds, or drain sites healing without S/S of infection  Outcome: Progressing  Goal: Oral mucous membranes remain intact  Outcome: Progressing     Problem: Musculoskeletal - Adult  Goal: Return mobility to safest level of function  Outcome: Progressing  Goal: Maintain proper alignment of affected body part  Outcome: Progressing  Goal: Return ADL status to a safe level of function  Outcome: Progressing     Problem: Gastrointestinal - Adult  Goal: Minimal or absence of nausea and vomiting  Outcome: Progressing  Goal: Maintains or returns to baseline bowel function  Outcome: Progressing  Goal: Maintains adequate nutritional intake  Outcome: Progressing     Problem: Genitourinary - Adult  Goal: Absence of urinary retention  Outcome: Progressing     Problem: Infection - Adult  Goal: Absence of infection at discharge  Outcome: Progressing  Goal: Absence of infection during hospitalization  Outcome: Progressing     Problem: Metabolic/Fluid and Electrolytes - Adult  Goal: Electrolytes

## 2025-01-10 NOTE — ANESTHESIA PRE PROCEDURE
Hives and Shortness Of Breath   • Shellfish-Derived Products Swelling     Throat and tongue swells, allergy to all seafood   • Sulfamethoxazole Hives   • Trimethoprim Hives   • Aspartame And Phenylalanine      Severe headaches and stomach cramps   • Bactrim Hives   • Biaxin [Clarithromycin] Hives   • Cephalexin Other (See Comments)     Blisters  Water blisters inside female parts and needed a D&C   • Ciprofloxacin Other (See Comments)     Causes severe pain and tendon tears per pt   • Hydrocodone Other (See Comments)     loopy   • Hydrocodone-Acetaminophen Other (See Comments)     HALLUCINATIONS   • Lansoprazole Hives   • Nexium [Esomeprazole Magnesium Trihydrate] Hives   • Other Other (See Comments)     Seafood: throat closes  Tegaderm: blisters stomach    • Prilosec [Omeprazole] Hives   • Z-Corbin [Azithromycin]      Affects heart rate   • Blueberry [Vaccinium Angustifolium] Nausea And Vomiting     Projectile vomiting   • Monosodium Glutamate Nausea And Vomiting   • Zmax [Azithromycin Dihydrate] Nausea And Vomiting     NOT Z PACK its the Powder you had to mix and drink       Problem List:    Patient Active Problem List   Diagnosis Code   • Primary hypertension I10   • Type 2 diabetes mellitus without complication, with long-term current use of insulin ([facility]) E11.9, Z79.4   • GERD (gastroesophageal reflux disease) K21.9   • Fibromyalgia M79.7   • Urine incontinence R32   • Asthma J45.909   • Chronic back pain M54.9, G89.29   • Gastroparesis K31.84   • Thyroid cyst E04.1   • Vitamin D deficiency E55.9   • Multiple thyroid nodules E04.2   • IBS (irritable bowel syndrome) K58.9   • Disequilibrium syndrome E87.8   • Subjective tinnitus of both ears H93.13   • Bilateral high frequency sensorineural hearing loss H90.3   • Hypercholesteremia E78.00   • Moderate episode of recurrent major depressive disorder (HCC) F33.1   • Nephrolithiasis N20.0   • Obesity (BMI 30-39.9) E66.9   • Coronary artery disease involving native

## 2025-01-11 LAB
ANION GAP SERPL CALCULATED.3IONS-SCNC: 13 MMOL/L (ref 3–16)
ANTI-XA UNFRAC HEPARIN: 0.28 IU/ML (ref 0.3–0.7)
ANTI-XA UNFRAC HEPARIN: >1.1 IU/ML (ref 0.3–0.7)
APTT BLD: 26.8 SEC (ref 22.1–36.4)
BACTERIA URNS QL MICRO: ABNORMAL /HPF
BILIRUB UR QL STRIP.AUTO: NEGATIVE
BUDDING YEAST: PRESENT
BUN SERPL-MCNC: 33 MG/DL (ref 7–20)
CALCIUM SERPL-MCNC: 9.4 MG/DL (ref 8.3–10.6)
CHLORIDE SERPL-SCNC: 100 MMOL/L (ref 99–110)
CLARITY UR: CLEAR
CO2 SERPL-SCNC: 18 MMOL/L (ref 21–32)
COLOR UR: YELLOW
CREAT SERPL-MCNC: 1.1 MG/DL (ref 0.6–1.2)
DEPRECATED RDW RBC AUTO: 16 % (ref 12.4–15.4)
EPI CELLS #/AREA URNS AUTO: 5 /HPF (ref 0–5)
GFR SERPLBLD CREATININE-BSD FMLA CKD-EPI: 57 ML/MIN/{1.73_M2}
GLUCOSE BLD-MCNC: 258 MG/DL (ref 70–99)
GLUCOSE BLD-MCNC: 275 MG/DL (ref 70–99)
GLUCOSE BLD-MCNC: 307 MG/DL (ref 70–99)
GLUCOSE BLD-MCNC: 346 MG/DL (ref 70–99)
GLUCOSE SERPL-MCNC: 335 MG/DL (ref 70–99)
GLUCOSE UR STRIP.AUTO-MCNC: >=1000 MG/DL
HCT VFR BLD AUTO: 34.7 % (ref 36–48)
HGB BLD-MCNC: 11.4 G/DL (ref 12–16)
HGB UR QL STRIP.AUTO: ABNORMAL
HYALINE CASTS #/AREA URNS AUTO: 7 /LPF (ref 0–8)
INR PPP: 1.02 (ref 0.85–1.15)
KETONES UR STRIP.AUTO-MCNC: NEGATIVE MG/DL
LEUKOCYTE ESTERASE UR QL STRIP.AUTO: ABNORMAL
MCH RBC QN AUTO: 28.6 PG (ref 26–34)
MCHC RBC AUTO-ENTMCNC: 32.9 G/DL (ref 31–36)
MCV RBC AUTO: 87 FL (ref 80–100)
NITRITE UR QL STRIP.AUTO: NEGATIVE
PERFORMED ON: ABNORMAL
PH UR STRIP.AUTO: 5.5 [PH] (ref 5–8)
PLATELET # BLD AUTO: 165 K/UL (ref 135–450)
PMV BLD AUTO: 9.4 FL (ref 5–10.5)
POTASSIUM SERPL-SCNC: 4.6 MMOL/L (ref 3.5–5.1)
PROT UR STRIP.AUTO-MCNC: NEGATIVE MG/DL
PROTHROMBIN TIME: 13.6 SEC (ref 11.9–14.9)
RBC # BLD AUTO: 3.99 M/UL (ref 4–5.2)
RBC CLUMPS #/AREA URNS AUTO: 19 /HPF (ref 0–4)
SODIUM SERPL-SCNC: 131 MMOL/L (ref 136–145)
SP GR UR STRIP.AUTO: 1.02 (ref 1–1.03)
UA DIPSTICK W REFLEX MICRO PNL UR: YES
URN SPEC COLLECT METH UR: ABNORMAL
UROBILINOGEN UR STRIP-ACNC: 0.2 E.U./DL
WBC # BLD AUTO: 16.4 K/UL (ref 4–11)
WBC #/AREA URNS AUTO: 13 /HPF (ref 0–5)

## 2025-01-11 PROCEDURE — 6370000000 HC RX 637 (ALT 250 FOR IP): Performed by: STUDENT IN AN ORGANIZED HEALTH CARE EDUCATION/TRAINING PROGRAM

## 2025-01-11 PROCEDURE — 6370000000 HC RX 637 (ALT 250 FOR IP): Performed by: NURSE PRACTITIONER

## 2025-01-11 PROCEDURE — 36415 COLL VENOUS BLD VENIPUNCTURE: CPT

## 2025-01-11 PROCEDURE — 85730 THROMBOPLASTIN TIME PARTIAL: CPT

## 2025-01-11 PROCEDURE — 80048 BASIC METABOLIC PNL TOTAL CA: CPT

## 2025-01-11 PROCEDURE — G0378 HOSPITAL OBSERVATION PER HR: HCPCS

## 2025-01-11 PROCEDURE — 99232 SBSQ HOSP IP/OBS MODERATE 35: CPT | Performed by: NURSE PRACTITIONER

## 2025-01-11 PROCEDURE — 81001 URINALYSIS AUTO W/SCOPE: CPT

## 2025-01-11 PROCEDURE — 6360000002 HC RX W HCPCS: Performed by: STUDENT IN AN ORGANIZED HEALTH CARE EDUCATION/TRAINING PROGRAM

## 2025-01-11 PROCEDURE — 85027 COMPLETE CBC AUTOMATED: CPT

## 2025-01-11 PROCEDURE — 2500000003 HC RX 250 WO HCPCS: Performed by: STUDENT IN AN ORGANIZED HEALTH CARE EDUCATION/TRAINING PROGRAM

## 2025-01-11 PROCEDURE — 2500000003 HC RX 250 WO HCPCS: Performed by: INTERNAL MEDICINE

## 2025-01-11 PROCEDURE — 6360000002 HC RX W HCPCS: Performed by: NURSE PRACTITIONER

## 2025-01-11 PROCEDURE — 85610 PROTHROMBIN TIME: CPT

## 2025-01-11 PROCEDURE — 85520 HEPARIN ASSAY: CPT

## 2025-01-11 RX ORDER — INSULIN LISPRO 100 [IU]/ML
5 INJECTION, SOLUTION INTRAVENOUS; SUBCUTANEOUS
Status: DISCONTINUED | OUTPATIENT
Start: 2025-01-11 | End: 2025-01-12

## 2025-01-11 RX ORDER — HEPARIN SODIUM 1000 [USP'U]/ML
80 INJECTION, SOLUTION INTRAVENOUS; SUBCUTANEOUS PRN
Status: DISCONTINUED | OUTPATIENT
Start: 2025-01-11 | End: 2025-01-12

## 2025-01-11 RX ORDER — HYDROCODONE BITARTRATE AND ACETAMINOPHEN 5; 325 MG/1; MG/1
1 TABLET ORAL EVERY 4 HOURS PRN
Status: DISCONTINUED | OUTPATIENT
Start: 2025-01-11 | End: 2025-01-15 | Stop reason: HOSPADM

## 2025-01-11 RX ORDER — HYDROMORPHONE HYDROCHLORIDE 1 MG/ML
0.5 INJECTION, SOLUTION INTRAMUSCULAR; INTRAVENOUS; SUBCUTANEOUS EVERY 4 HOURS PRN
Status: COMPLETED | OUTPATIENT
Start: 2025-01-11 | End: 2025-01-11

## 2025-01-11 RX ORDER — HEPARIN SODIUM 10000 [USP'U]/100ML
5-30 INJECTION, SOLUTION INTRAVENOUS CONTINUOUS
Status: DISCONTINUED | OUTPATIENT
Start: 2025-01-11 | End: 2025-01-12

## 2025-01-11 RX ORDER — HEPARIN SODIUM 1000 [USP'U]/ML
80 INJECTION, SOLUTION INTRAVENOUS; SUBCUTANEOUS ONCE
Status: COMPLETED | OUTPATIENT
Start: 2025-01-11 | End: 2025-01-11

## 2025-01-11 RX ORDER — CLOPIDOGREL BISULFATE 75 MG/1
75 TABLET ORAL DAILY
Status: DISCONTINUED | OUTPATIENT
Start: 2025-01-11 | End: 2025-01-15 | Stop reason: HOSPADM

## 2025-01-11 RX ORDER — HEPARIN SODIUM 1000 [USP'U]/ML
40 INJECTION, SOLUTION INTRAVENOUS; SUBCUTANEOUS PRN
Status: DISCONTINUED | OUTPATIENT
Start: 2025-01-11 | End: 2025-01-12

## 2025-01-11 RX ORDER — FUROSEMIDE 20 MG/1
20 TABLET ORAL DAILY
Status: DISCONTINUED | OUTPATIENT
Start: 2025-01-11 | End: 2025-01-12

## 2025-01-11 RX ADMIN — SACUBITRIL AND VALSARTAN 0.5 TABLET: 24; 26 TABLET, FILM COATED ORAL at 08:37

## 2025-01-11 RX ADMIN — INSULIN GLARGINE 40 UNITS: 100 INJECTION, SOLUTION SUBCUTANEOUS at 21:09

## 2025-01-11 RX ADMIN — ONDANSETRON HYDROCHLORIDE 4 MG: 2 SOLUTION INTRAMUSCULAR; INTRAVENOUS at 13:59

## 2025-01-11 RX ADMIN — CARVEDILOL 6.25 MG: 6.25 TABLET, FILM COATED ORAL at 08:37

## 2025-01-11 RX ADMIN — SODIUM CHLORIDE, PRESERVATIVE FREE 10 ML: 5 INJECTION INTRAVENOUS at 21:04

## 2025-01-11 RX ADMIN — INSULIN LISPRO 4 UNITS: 100 INJECTION, SOLUTION INTRAVENOUS; SUBCUTANEOUS at 12:23

## 2025-01-11 RX ADMIN — CLOPIDOGREL BISULFATE 75 MG: 75 TABLET ORAL at 09:19

## 2025-01-11 RX ADMIN — LATANOPROST 1 DROP: 50 SOLUTION OPHTHALMIC at 21:03

## 2025-01-11 RX ADMIN — HYDROCODONE BITARTRATE AND ACETAMINOPHEN 1 TABLET: 5; 325 TABLET ORAL at 22:19

## 2025-01-11 RX ADMIN — HYDROCODONE BITARTRATE AND ACETAMINOPHEN 1 TABLET: 5; 325 TABLET ORAL at 18:04

## 2025-01-11 RX ADMIN — ATORVASTATIN CALCIUM 40 MG: 40 TABLET, FILM COATED ORAL at 21:03

## 2025-01-11 RX ADMIN — INSULIN LISPRO 5 UNITS: 100 INJECTION, SOLUTION INTRAVENOUS; SUBCUTANEOUS at 12:23

## 2025-01-11 RX ADMIN — HYDROMORPHONE HYDROCHLORIDE 0.5 MG: 1 INJECTION, SOLUTION INTRAMUSCULAR; INTRAVENOUS; SUBCUTANEOUS at 00:27

## 2025-01-11 RX ADMIN — FAMOTIDINE 40 MG: 20 TABLET, FILM COATED ORAL at 08:37

## 2025-01-11 RX ADMIN — HYDROMORPHONE HYDROCHLORIDE 0.5 MG: 1 INJECTION, SOLUTION INTRAMUSCULAR; INTRAVENOUS; SUBCUTANEOUS at 09:02

## 2025-01-11 RX ADMIN — INSULIN LISPRO 5 UNITS: 100 INJECTION, SOLUTION INTRAVENOUS; SUBCUTANEOUS at 17:17

## 2025-01-11 RX ADMIN — SACUBITRIL AND VALSARTAN 0.5 TABLET: 24; 26 TABLET, FILM COATED ORAL at 21:03

## 2025-01-11 RX ADMIN — HEPARIN SODIUM 8800 UNITS: 1000 INJECTION INTRAVENOUS; SUBCUTANEOUS at 12:34

## 2025-01-11 RX ADMIN — FAMOTIDINE 40 MG: 20 TABLET, FILM COATED ORAL at 21:03

## 2025-01-11 RX ADMIN — HYDROMORPHONE HYDROCHLORIDE 0.5 MG: 1 INJECTION, SOLUTION INTRAMUSCULAR; INTRAVENOUS; SUBCUTANEOUS at 04:43

## 2025-01-11 RX ADMIN — SODIUM CHLORIDE, PRESERVATIVE FREE 10 ML: 5 INJECTION INTRAVENOUS at 21:03

## 2025-01-11 RX ADMIN — CARVEDILOL 6.25 MG: 6.25 TABLET, FILM COATED ORAL at 17:58

## 2025-01-11 RX ADMIN — SODIUM CHLORIDE, PRESERVATIVE FREE 10 ML: 5 INJECTION INTRAVENOUS at 09:12

## 2025-01-11 RX ADMIN — FUROSEMIDE 20 MG: 20 TABLET ORAL at 12:00

## 2025-01-11 RX ADMIN — ALLOPURINOL 150 MG: 300 TABLET ORAL at 08:37

## 2025-01-11 RX ADMIN — INSULIN LISPRO 4 UNITS: 100 INJECTION, SOLUTION INTRAVENOUS; SUBCUTANEOUS at 17:17

## 2025-01-11 RX ADMIN — SODIUM CHLORIDE, PRESERVATIVE FREE 10 ML: 5 INJECTION INTRAVENOUS at 08:37

## 2025-01-11 RX ADMIN — INSULIN LISPRO 5 UNITS: 100 INJECTION, SOLUTION INTRAVENOUS; SUBCUTANEOUS at 08:48

## 2025-01-11 RX ADMIN — INSULIN LISPRO 6 UNITS: 100 INJECTION, SOLUTION INTRAVENOUS; SUBCUTANEOUS at 08:50

## 2025-01-11 RX ADMIN — ENOXAPARIN SODIUM 40 MG: 100 INJECTION SUBCUTANEOUS at 08:44

## 2025-01-11 RX ADMIN — HEPARIN SODIUM 18 UNITS/KG/HR: 10000 INJECTION, SOLUTION INTRAVENOUS at 12:38

## 2025-01-11 ASSESSMENT — PAIN DESCRIPTION - DESCRIPTORS
DESCRIPTORS: PATIENT UNABLE TO DESCRIBE
DESCRIPTORS: ACHING
DESCRIPTORS: THROBBING
DESCRIPTORS_2: BURNING
DESCRIPTORS_2: BURNING
DESCRIPTORS: PATIENT UNABLE TO DESCRIBE
DESCRIPTORS: ACHING
DESCRIPTORS: ACHING

## 2025-01-11 ASSESSMENT — PAIN SCALES - GENERAL
PAINLEVEL_OUTOF10: 0
PAINLEVEL_OUTOF10: 0
PAINLEVEL_OUTOF10: 7
PAINLEVEL_OUTOF10: 10
PAINLEVEL_OUTOF10: 8
PAINLEVEL_OUTOF10: 7
PAINLEVEL_OUTOF10: 10

## 2025-01-11 ASSESSMENT — PAIN DESCRIPTION - LOCATION
LOCATION: CHEST
LOCATION: CHEST
LOCATION_2: GROIN
LOCATION: CHEST
LOCATION: CHEST
LOCATION_2: GROIN
LOCATION: SHOULDER
LOCATION: CHEST

## 2025-01-11 ASSESSMENT — PAIN DESCRIPTION - ORIENTATION
ORIENTATION_2: RIGHT
ORIENTATION: LEFT
ORIENTATION_2: RIGHT
ORIENTATION: LEFT
ORIENTATION: LEFT;UPPER
ORIENTATION: LEFT

## 2025-01-11 ASSESSMENT — PAIN DESCRIPTION - INTENSITY
RATING_2: 10
RATING_2: 3
RATING_2: 10

## 2025-01-11 ASSESSMENT — PAIN DESCRIPTION - PAIN TYPE
TYPE: SURGICAL PAIN

## 2025-01-11 NOTE — PLAN OF CARE
Problem: Respiratory - Adult  Goal: Achieves optimal ventilation and oxygenation  Outcome: Completed     Problem: Skin/Tissue Integrity - Adult  Goal: Oral mucous membranes remain intact  Outcome: Completed     Problem: Musculoskeletal - Adult  Goal: Return mobility to safest level of function  Outcome: Completed     Problem: Gastrointestinal - Adult  Goal: Minimal or absence of nausea and vomiting  Outcome: Completed     Problem: Genitourinary - Adult  Goal: Absence of urinary retention  Outcome: Completed

## 2025-01-12 ENCOUNTER — APPOINTMENT (OUTPATIENT)
Dept: CT IMAGING | Age: 60
DRG: 179 | End: 2025-01-12
Attending: STUDENT IN AN ORGANIZED HEALTH CARE EDUCATION/TRAINING PROGRAM
Payer: COMMERCIAL

## 2025-01-12 LAB
ANION GAP SERPL CALCULATED.3IONS-SCNC: 11 MMOL/L (ref 3–16)
ANTI-XA UNFRAC HEPARIN: >1.1 IU/ML (ref 0.3–0.7)
ANTI-XA UNFRAC HEPARIN: >1.1 IU/ML (ref 0.3–0.7)
BUN SERPL-MCNC: 38 MG/DL (ref 7–20)
CALCIUM SERPL-MCNC: 9.2 MG/DL (ref 8.3–10.6)
CHLORIDE SERPL-SCNC: 98 MMOL/L (ref 99–110)
CO2 SERPL-SCNC: 23 MMOL/L (ref 21–32)
CREAT SERPL-MCNC: 1.3 MG/DL (ref 0.6–1.2)
DEPRECATED RDW RBC AUTO: 16.2 % (ref 12.4–15.4)
GFR SERPLBLD CREATININE-BSD FMLA CKD-EPI: 47 ML/MIN/{1.73_M2}
GLUCOSE BLD-MCNC: 178 MG/DL (ref 70–99)
GLUCOSE BLD-MCNC: 190 MG/DL (ref 70–99)
GLUCOSE BLD-MCNC: 279 MG/DL (ref 70–99)
GLUCOSE BLD-MCNC: 280 MG/DL (ref 70–99)
GLUCOSE SERPL-MCNC: 278 MG/DL (ref 70–99)
HCT VFR BLD AUTO: 34.7 % (ref 36–48)
HGB BLD-MCNC: 11.2 G/DL (ref 12–16)
MCH RBC QN AUTO: 28.7 PG (ref 26–34)
MCHC RBC AUTO-ENTMCNC: 32.3 G/DL (ref 31–36)
MCV RBC AUTO: 88.8 FL (ref 80–100)
NT-PROBNP SERPL-MCNC: 175 PG/ML (ref 0–124)
PERFORMED ON: ABNORMAL
PLATELET # BLD AUTO: 167 K/UL (ref 135–450)
PMV BLD AUTO: 9.6 FL (ref 5–10.5)
POTASSIUM SERPL-SCNC: 4.2 MMOL/L (ref 3.5–5.1)
RBC # BLD AUTO: 3.91 M/UL (ref 4–5.2)
SODIUM SERPL-SCNC: 132 MMOL/L (ref 136–145)
WBC # BLD AUTO: 14.8 K/UL (ref 4–11)

## 2025-01-12 PROCEDURE — 36415 COLL VENOUS BLD VENIPUNCTURE: CPT

## 2025-01-12 PROCEDURE — 6370000000 HC RX 637 (ALT 250 FOR IP): Performed by: STUDENT IN AN ORGANIZED HEALTH CARE EDUCATION/TRAINING PROGRAM

## 2025-01-12 PROCEDURE — 85027 COMPLETE CBC AUTOMATED: CPT

## 2025-01-12 PROCEDURE — 85520 HEPARIN ASSAY: CPT

## 2025-01-12 PROCEDURE — 2500000003 HC RX 250 WO HCPCS: Performed by: STUDENT IN AN ORGANIZED HEALTH CARE EDUCATION/TRAINING PROGRAM

## 2025-01-12 PROCEDURE — 83880 ASSAY OF NATRIURETIC PEPTIDE: CPT

## 2025-01-12 PROCEDURE — 2500000003 HC RX 250 WO HCPCS: Performed by: INTERNAL MEDICINE

## 2025-01-12 PROCEDURE — 99232 SBSQ HOSP IP/OBS MODERATE 35: CPT | Performed by: NURSE PRACTITIONER

## 2025-01-12 PROCEDURE — 1200000000 HC SEMI PRIVATE

## 2025-01-12 PROCEDURE — 80048 BASIC METABOLIC PNL TOTAL CA: CPT

## 2025-01-12 PROCEDURE — 6360000002 HC RX W HCPCS: Performed by: STUDENT IN AN ORGANIZED HEALTH CARE EDUCATION/TRAINING PROGRAM

## 2025-01-12 PROCEDURE — 71250 CT THORAX DX C-: CPT

## 2025-01-12 RX ORDER — INSULIN LISPRO 100 [IU]/ML
8 INJECTION, SOLUTION INTRAVENOUS; SUBCUTANEOUS
Status: DISCONTINUED | OUTPATIENT
Start: 2025-01-12 | End: 2025-01-15 | Stop reason: HOSPADM

## 2025-01-12 RX ORDER — LIDOCAINE 4 G/G
1 PATCH TOPICAL DAILY
Status: DISCONTINUED | OUTPATIENT
Start: 2025-01-12 | End: 2025-01-15 | Stop reason: HOSPADM

## 2025-01-12 RX ORDER — INSULIN GLARGINE 100 [IU]/ML
56 INJECTION, SOLUTION SUBCUTANEOUS NIGHTLY
Status: DISCONTINUED | OUTPATIENT
Start: 2025-01-12 | End: 2025-01-15 | Stop reason: HOSPADM

## 2025-01-12 RX ADMIN — SODIUM CHLORIDE, PRESERVATIVE FREE 10 ML: 5 INJECTION INTRAVENOUS at 08:06

## 2025-01-12 RX ADMIN — SODIUM CHLORIDE, PRESERVATIVE FREE 10 ML: 5 INJECTION INTRAVENOUS at 21:07

## 2025-01-12 RX ADMIN — ALLOPURINOL 150 MG: 300 TABLET ORAL at 08:05

## 2025-01-12 RX ADMIN — HYDROCODONE BITARTRATE AND ACETAMINOPHEN 1 TABLET: 5; 325 TABLET ORAL at 23:49

## 2025-01-12 RX ADMIN — INSULIN LISPRO 2 UNITS: 100 INJECTION, SOLUTION INTRAVENOUS; SUBCUTANEOUS at 08:09

## 2025-01-12 RX ADMIN — INSULIN GLARGINE 56 UNITS: 100 INJECTION, SOLUTION SUBCUTANEOUS at 21:05

## 2025-01-12 RX ADMIN — INSULIN LISPRO 4 UNITS: 100 INJECTION, SOLUTION INTRAVENOUS; SUBCUTANEOUS at 17:22

## 2025-01-12 RX ADMIN — SODIUM CHLORIDE, PRESERVATIVE FREE 10 ML: 5 INJECTION INTRAVENOUS at 08:09

## 2025-01-12 RX ADMIN — HYDROCODONE BITARTRATE AND ACETAMINOPHEN 1 TABLET: 5; 325 TABLET ORAL at 03:24

## 2025-01-12 RX ADMIN — CARVEDILOL 6.25 MG: 6.25 TABLET, FILM COATED ORAL at 08:05

## 2025-01-12 RX ADMIN — INSULIN LISPRO 8 UNITS: 100 INJECTION, SOLUTION INTRAVENOUS; SUBCUTANEOUS at 17:19

## 2025-01-12 RX ADMIN — HYDROCODONE BITARTRATE AND ACETAMINOPHEN 1 TABLET: 5; 325 TABLET ORAL at 08:05

## 2025-01-12 RX ADMIN — SODIUM CHLORIDE, PRESERVATIVE FREE 10 ML: 5 INJECTION INTRAVENOUS at 21:06

## 2025-01-12 RX ADMIN — POLYETHYLENE GLYCOL 3350 17 G: 17 POWDER, FOR SOLUTION ORAL at 08:13

## 2025-01-12 RX ADMIN — SACUBITRIL AND VALSARTAN 0.5 TABLET: 24; 26 TABLET, FILM COATED ORAL at 21:04

## 2025-01-12 RX ADMIN — CLOPIDOGREL BISULFATE 75 MG: 75 TABLET ORAL at 08:05

## 2025-01-12 RX ADMIN — CARVEDILOL 6.25 MG: 6.25 TABLET, FILM COATED ORAL at 17:19

## 2025-01-12 RX ADMIN — HYDROCODONE BITARTRATE AND ACETAMINOPHEN 1 TABLET: 5; 325 TABLET ORAL at 12:53

## 2025-01-12 RX ADMIN — INSULIN LISPRO 4 UNITS: 100 INJECTION, SOLUTION INTRAVENOUS; SUBCUTANEOUS at 12:53

## 2025-01-12 RX ADMIN — SACUBITRIL AND VALSARTAN 0.5 TABLET: 24; 26 TABLET, FILM COATED ORAL at 08:05

## 2025-01-12 RX ADMIN — INSULIN LISPRO 5 UNITS: 100 INJECTION, SOLUTION INTRAVENOUS; SUBCUTANEOUS at 08:09

## 2025-01-12 RX ADMIN — LATANOPROST 1 DROP: 50 SOLUTION OPHTHALMIC at 21:05

## 2025-01-12 RX ADMIN — FAMOTIDINE 40 MG: 20 TABLET, FILM COATED ORAL at 08:05

## 2025-01-12 RX ADMIN — ONDANSETRON HYDROCHLORIDE 4 MG: 2 SOLUTION INTRAMUSCULAR; INTRAVENOUS at 17:15

## 2025-01-12 RX ADMIN — HEPARIN SODIUM 12 UNITS/KG/HR: 10000 INJECTION, SOLUTION INTRAVENOUS at 02:54

## 2025-01-12 RX ADMIN — FAMOTIDINE 40 MG: 20 TABLET, FILM COATED ORAL at 21:06

## 2025-01-12 RX ADMIN — INSULIN LISPRO 5 UNITS: 100 INJECTION, SOLUTION INTRAVENOUS; SUBCUTANEOUS at 12:53

## 2025-01-12 RX ADMIN — HYDROCODONE BITARTRATE AND ACETAMINOPHEN 1 TABLET: 5; 325 TABLET ORAL at 17:19

## 2025-01-12 RX ADMIN — APIXABAN 5 MG: 5 TABLET, FILM COATED ORAL at 17:29

## 2025-01-12 RX ADMIN — ATORVASTATIN CALCIUM 40 MG: 40 TABLET, FILM COATED ORAL at 21:04

## 2025-01-12 ASSESSMENT — PAIN SCALES - GENERAL
PAINLEVEL_OUTOF10: 8
PAINLEVEL_OUTOF10: 8
PAINLEVEL_OUTOF10: 5
PAINLEVEL_OUTOF10: 8
PAINLEVEL_OUTOF10: 7
PAINLEVEL_OUTOF10: 5
PAINLEVEL_OUTOF10: 8
PAINLEVEL_OUTOF10: 5
PAINLEVEL_OUTOF10: 3
PAINLEVEL_OUTOF10: 8

## 2025-01-12 ASSESSMENT — PAIN DESCRIPTION - ORIENTATION
ORIENTATION: LEFT

## 2025-01-12 ASSESSMENT — PAIN DESCRIPTION - LOCATION
LOCATION: CHEST;SHOULDER
LOCATION: CHEST;SHOULDER
LOCATION: SHOULDER

## 2025-01-12 ASSESSMENT — PAIN DESCRIPTION - PAIN TYPE
TYPE: SURGICAL PAIN

## 2025-01-12 ASSESSMENT — PAIN DESCRIPTION - DESCRIPTORS
DESCRIPTORS: BURNING
DESCRIPTORS: ACHING;TENDER

## 2025-01-12 NOTE — PLAN OF CARE
Problem: Discharge Planning  Goal: Discharge to home or other facility with appropriate resources  Outcome: Completed     Problem: Pain  Goal: Verbalizes/displays adequate comfort level or baseline comfort level  Outcome: Completed     Problem: Neurosensory - Adult  Goal: Achieves stable or improved neurological status  Outcome: Completed     Problem: Cardiovascular - Adult  Goal: Maintains optimal cardiac output and hemodynamic stability  Outcome: Completed  Goal: Absence of cardiac dysrhythmias or at baseline  Outcome: Completed

## 2025-01-12 NOTE — PLAN OF CARE
Problem: Pain  Goal: Verbalizes/displays adequate comfort level or baseline comfort level  Outcome: Progressing     Problem: Cardiovascular - Adult  Goal: Maintains optimal cardiac output and hemodynamic stability  Outcome: Progressing     Problem: Skin/Tissue Integrity - Adult  Goal: Incisions, wounds, or drain sites healing without S/S of infection  Outcome: Progressing     Problem: Safety - Adult  Goal: Free from fall injury  Outcome: Progressing

## 2025-01-13 ENCOUNTER — APPOINTMENT (OUTPATIENT)
Dept: ULTRASOUND IMAGING | Age: 60
DRG: 179 | End: 2025-01-13
Attending: STUDENT IN AN ORGANIZED HEALTH CARE EDUCATION/TRAINING PROGRAM
Payer: COMMERCIAL

## 2025-01-13 PROBLEM — Z95.810 ICD (IMPLANTABLE CARDIOVERTER-DEFIBRILLATOR), BIVENTRICULAR, IN SITU: Status: ACTIVE | Noted: 2025-01-13

## 2025-01-13 LAB
ALBUMIN SERPL-MCNC: 3.5 G/DL (ref 3.4–5)
ALBUMIN/GLOB SERPL: 1.3 {RATIO} (ref 1.1–2.2)
ALP SERPL-CCNC: 16 U/L (ref 40–129)
ALT SERPL-CCNC: 23 U/L (ref 10–40)
ANION GAP SERPL CALCULATED.3IONS-SCNC: 11 MMOL/L (ref 3–16)
AST SERPL-CCNC: 26 U/L (ref 15–37)
BILIRUB SERPL-MCNC: 0.8 MG/DL (ref 0–1)
BLOOD BANK DISPENSE STATUS: NORMAL
BLOOD BANK PRODUCT CODE: NORMAL
BPU ID: NORMAL
BUN SERPL-MCNC: 31 MG/DL (ref 7–20)
CALCIUM SERPL-MCNC: 9.4 MG/DL (ref 8.3–10.6)
CHLORIDE SERPL-SCNC: 100 MMOL/L (ref 99–110)
CO2 SERPL-SCNC: 23 MMOL/L (ref 21–32)
CREAT SERPL-MCNC: 1.1 MG/DL (ref 0.6–1.2)
CRP SERPL-MCNC: 129 MG/L (ref 0–5.1)
DEPRECATED RDW RBC AUTO: 15.8 % (ref 12.4–15.4)
DESCRIPTION BLOOD BANK: NORMAL
ERYTHROCYTE [SEDIMENTATION RATE] IN BLOOD BY WESTERGREN METHOD: 47 MM/HR (ref 0–30)
GFR SERPLBLD CREATININE-BSD FMLA CKD-EPI: 57 ML/MIN/{1.73_M2}
GLUCOSE BLD-MCNC: 116 MG/DL (ref 70–99)
GLUCOSE BLD-MCNC: 187 MG/DL (ref 70–99)
GLUCOSE BLD-MCNC: 194 MG/DL (ref 70–99)
GLUCOSE BLD-MCNC: 255 MG/DL (ref 70–99)
GLUCOSE SERPL-MCNC: 237 MG/DL (ref 70–99)
HCT VFR BLD AUTO: 33.5 % (ref 36–48)
HGB BLD-MCNC: 10.9 G/DL (ref 12–16)
MCH RBC QN AUTO: 28.7 PG (ref 26–34)
MCHC RBC AUTO-ENTMCNC: 32.6 G/DL (ref 31–36)
MCV RBC AUTO: 88 FL (ref 80–100)
PERFORMED ON: ABNORMAL
PLATELET # BLD AUTO: 147 K/UL (ref 135–450)
PMV BLD AUTO: 8.9 FL (ref 5–10.5)
POTASSIUM SERPL-SCNC: 4.4 MMOL/L (ref 3.5–5.1)
PROCALCITONIN SERPL IA-MCNC: 0.05 NG/ML (ref 0–0.15)
PROT SERPL-MCNC: 6.1 G/DL (ref 6.4–8.2)
RBC # BLD AUTO: 3.8 M/UL (ref 4–5.2)
SODIUM SERPL-SCNC: 134 MMOL/L (ref 136–145)
WBC # BLD AUTO: 12.9 K/UL (ref 4–11)

## 2025-01-13 PROCEDURE — 76770 US EXAM ABDO BACK WALL COMP: CPT

## 2025-01-13 PROCEDURE — 2500000003 HC RX 250 WO HCPCS: Performed by: INTERNAL MEDICINE

## 2025-01-13 PROCEDURE — 80053 COMPREHEN METABOLIC PANEL: CPT

## 2025-01-13 PROCEDURE — 6360000002 HC RX W HCPCS: Performed by: STUDENT IN AN ORGANIZED HEALTH CARE EDUCATION/TRAINING PROGRAM

## 2025-01-13 PROCEDURE — 85652 RBC SED RATE AUTOMATED: CPT

## 2025-01-13 PROCEDURE — 2500000003 HC RX 250 WO HCPCS: Performed by: STUDENT IN AN ORGANIZED HEALTH CARE EDUCATION/TRAINING PROGRAM

## 2025-01-13 PROCEDURE — 86140 C-REACTIVE PROTEIN: CPT

## 2025-01-13 PROCEDURE — 6360000002 HC RX W HCPCS: Performed by: NURSE PRACTITIONER

## 2025-01-13 PROCEDURE — 85027 COMPLETE CBC AUTOMATED: CPT

## 2025-01-13 PROCEDURE — 6370000000 HC RX 637 (ALT 250 FOR IP): Performed by: STUDENT IN AN ORGANIZED HEALTH CARE EDUCATION/TRAINING PROGRAM

## 2025-01-13 PROCEDURE — 36415 COLL VENOUS BLD VENIPUNCTURE: CPT

## 2025-01-13 PROCEDURE — 1200000000 HC SEMI PRIVATE

## 2025-01-13 PROCEDURE — 6370000000 HC RX 637 (ALT 250 FOR IP): Performed by: NURSE PRACTITIONER

## 2025-01-13 PROCEDURE — 99232 SBSQ HOSP IP/OBS MODERATE 35: CPT | Performed by: NURSE PRACTITIONER

## 2025-01-13 PROCEDURE — 84145 PROCALCITONIN (PCT): CPT

## 2025-01-13 RX ORDER — SENNA AND DOCUSATE SODIUM 50; 8.6 MG/1; MG/1
2 TABLET, FILM COATED ORAL DAILY
Status: DISCONTINUED | OUTPATIENT
Start: 2025-01-13 | End: 2025-01-15 | Stop reason: HOSPADM

## 2025-01-13 RX ORDER — HYDROCODONE BITARTRATE AND ACETAMINOPHEN 5; 325 MG/1; MG/1
1 TABLET ORAL EVERY 6 HOURS PRN
Qty: 20 TABLET | Refills: 0 | Status: SHIPPED | OUTPATIENT
Start: 2025-01-13 | End: 2025-01-16

## 2025-01-13 RX ORDER — KETOROLAC TROMETHAMINE 30 MG/ML
30 INJECTION, SOLUTION INTRAMUSCULAR; INTRAVENOUS ONCE
Status: COMPLETED | OUTPATIENT
Start: 2025-01-13 | End: 2025-01-13

## 2025-01-13 RX ADMIN — FAMOTIDINE 40 MG: 20 TABLET, FILM COATED ORAL at 20:55

## 2025-01-13 RX ADMIN — SODIUM CHLORIDE, PRESERVATIVE FREE 10 ML: 5 INJECTION INTRAVENOUS at 20:56

## 2025-01-13 RX ADMIN — INSULIN LISPRO 8 UNITS: 100 INJECTION, SOLUTION INTRAVENOUS; SUBCUTANEOUS at 12:10

## 2025-01-13 RX ADMIN — ALLOPURINOL 150 MG: 300 TABLET ORAL at 08:57

## 2025-01-13 RX ADMIN — SODIUM CHLORIDE, PRESERVATIVE FREE 10 ML: 5 INJECTION INTRAVENOUS at 08:59

## 2025-01-13 RX ADMIN — KETOROLAC TROMETHAMINE 30 MG: 30 INJECTION, SOLUTION INTRAMUSCULAR at 14:24

## 2025-01-13 RX ADMIN — ONDANSETRON HYDROCHLORIDE 4 MG: 2 SOLUTION INTRAMUSCULAR; INTRAVENOUS at 14:24

## 2025-01-13 RX ADMIN — INSULIN LISPRO 2 UNITS: 100 INJECTION, SOLUTION INTRAVENOUS; SUBCUTANEOUS at 18:44

## 2025-01-13 RX ADMIN — INSULIN GLARGINE 56 UNITS: 100 INJECTION, SOLUTION SUBCUTANEOUS at 20:56

## 2025-01-13 RX ADMIN — LATANOPROST 1 DROP: 50 SOLUTION OPHTHALMIC at 20:59

## 2025-01-13 RX ADMIN — INSULIN LISPRO 4 UNITS: 100 INJECTION, SOLUTION INTRAVENOUS; SUBCUTANEOUS at 12:09

## 2025-01-13 RX ADMIN — SACUBITRIL AND VALSARTAN 0.5 TABLET: 24; 26 TABLET, FILM COATED ORAL at 08:56

## 2025-01-13 RX ADMIN — INSULIN LISPRO 8 UNITS: 100 INJECTION, SOLUTION INTRAVENOUS; SUBCUTANEOUS at 08:56

## 2025-01-13 RX ADMIN — SENNOSIDES AND DOCUSATE SODIUM 2 TABLET: 50; 8.6 TABLET ORAL at 20:55

## 2025-01-13 RX ADMIN — CARVEDILOL 6.25 MG: 6.25 TABLET, FILM COATED ORAL at 16:59

## 2025-01-13 RX ADMIN — ATORVASTATIN CALCIUM 40 MG: 40 TABLET, FILM COATED ORAL at 20:55

## 2025-01-13 RX ADMIN — FAMOTIDINE 40 MG: 20 TABLET, FILM COATED ORAL at 08:58

## 2025-01-13 RX ADMIN — HYDROCODONE BITARTRATE AND ACETAMINOPHEN 1 TABLET: 5; 325 TABLET ORAL at 18:47

## 2025-01-13 RX ADMIN — CLOPIDOGREL BISULFATE 75 MG: 75 TABLET ORAL at 08:58

## 2025-01-13 RX ADMIN — HYDROCODONE BITARTRATE AND ACETAMINOPHEN 1 TABLET: 5; 325 TABLET ORAL at 08:58

## 2025-01-13 RX ADMIN — INSULIN LISPRO 8 UNITS: 100 INJECTION, SOLUTION INTRAVENOUS; SUBCUTANEOUS at 18:45

## 2025-01-13 RX ADMIN — CARVEDILOL 6.25 MG: 6.25 TABLET, FILM COATED ORAL at 08:58

## 2025-01-13 RX ADMIN — POLYETHYLENE GLYCOL 3350 17 G: 17 POWDER, FOR SOLUTION ORAL at 09:08

## 2025-01-13 RX ADMIN — SACUBITRIL AND VALSARTAN 0.5 TABLET: 24; 26 TABLET, FILM COATED ORAL at 20:55

## 2025-01-13 ASSESSMENT — PAIN SCALES - GENERAL
PAINLEVEL_OUTOF10: 6
PAINLEVEL_OUTOF10: 7
PAINLEVEL_OUTOF10: 9
PAINLEVEL_OUTOF10: 0
PAINLEVEL_OUTOF10: 7
PAINLEVEL_OUTOF10: 0

## 2025-01-13 ASSESSMENT — PAIN DESCRIPTION - ORIENTATION
ORIENTATION: LEFT

## 2025-01-13 ASSESSMENT — PAIN DESCRIPTION - LOCATION
LOCATION: CHEST

## 2025-01-13 ASSESSMENT — PAIN DESCRIPTION - PAIN TYPE: TYPE: SURGICAL PAIN

## 2025-01-13 ASSESSMENT — PAIN DESCRIPTION - DESCRIPTORS
DESCRIPTORS: ACHING

## 2025-01-13 ASSESSMENT — PAIN SCALES - WONG BAKER: WONGBAKER_NUMERICALRESPONSE: NO HURT

## 2025-01-13 NOTE — CONSULTS
Urology Consult Note  [facility]     Patient: Lanny Carbajal MRN: [id number]  Room/Bed: G1E-7536/5909-01   YOB: 1965  Age/Sex: 60 y.o.female  Admission Date: 1/9/2025     Date of Service:  1/13/2025    Consulting Provider: John W Frankel, PA-C  Admitting/Requesting Physician: Wang Canada MD  Primary Care Physician: Yeimy Owen, APRN - CNP    Reason for Consult: Hematuria    ASSESSMENT/PLAN     59 yo female  Directly admitted from cardiology for PPM lead extraction and upgrading to Biv-AICD. Had cardiology procedure 01/10 and urology was consulted for gross hematuria and patient having some pain. CT rev from 08/31/2024- shows a right UPJ stone.  ==================  Patient seen resting comfortably in bed today. Reports only pain in left upper torso. Currently denies any flank or abdominal pain. She feels that she is emptying adequately without sensation of incomplete voiding. Only reports some dysuria and hematuria. Currently on Plavix and Eliquis after cardiology procedure  She does report hx of frequent stones, currently taking allopurinol for stones per her nephrologist     afVSS, Cr 1.1, wbc 12.9, Hgb 10.9  UA: moderate blood, trace LE, negative nitrites     Recommendations:    - Will get SHERITA; consider CT a/p w/o contrast if abnormal  - bladder scan PRN for any signs/symptoms of retention; place gasca for PVR >450ml   - Will follow along with you at this time. Will ultimately need OP cystoscopy for hematuria workup     All the patients questions were answered. She understands the plan as listed above.    HISTORY     Chief Complaint: No chief complaint on file.      History of Present Illness: Lanny Carbajal is a 60 y.o. female consulted to urology for hematuria. Patient has known hx of passing frequent kidney stones, however, has never needed procedure. Most recent CT shows a right UPJ stone, but patient reports she passed that stone. Currently, she is having some dysuria 
   EGD DILATION BALLOON performed by Geoffrey Velarde MD at [facility] ENDOSCOPY    UPPER GASTROINTESTINAL ENDOSCOPY N/A 03/13/2020    EGD SUBMUCOSAL/BOTOX INJECTION performed by Geoffrey Velarde MD at [facility] ENDOSCOPY    UPPER GASTROINTESTINAL ENDOSCOPY N/A 03/13/2020    EGD DILATION BALLOON performed by Geoffrey Velarde MD at [facility] ENDOSCOPY    UPPER GASTROINTESTINAL ENDOSCOPY N/A 10/06/2020    EGD DILATION BALLOON performed by Geoffrey Velarde MD at [facility] ENDOSCOPY    UPPER GASTROINTESTINAL ENDOSCOPY N/A 10/06/2020    EGD SUBMUCOSAL/BOTOX INJECTION performed by Geoffrey Velarde MD at [facility] ENDOSCOPY    UPPER GASTROINTESTINAL ENDOSCOPY N/A 08/10/2021    EGD SUBMUCOSAL/BOTOX INJECTION performed by Geoffrey Velarde MD at [facility] ENDOSCOPY    UPPER GASTROINTESTINAL ENDOSCOPY N/A 08/10/2021    EGD BIOPSY performed by Geoffrey Velarde MD at [facility] ENDOSCOPY    UPPER GASTROINTESTINAL ENDOSCOPY N/A 06/23/2022    EGD SUBMUCOSAL/BOTOX INJECTION performed by Geoffrey Velarde MD at [facility] ENDOSCOPY    UPPER GASTROINTESTINAL ENDOSCOPY N/A 01/27/2023    EGD SUBMUCOSAL/BOTOX INJECTION performed by Geoffrey Velarde MD at [facility] ENDOSCOPY    UPPER GASTROINTESTINAL ENDOSCOPY N/A 11/16/2023    EGD SUBMUCOSAL/BOTOX INJECTION performed by Geoffrey Velarde MD at [facility] ENDOSCOPY       Allergies   Allergen Reactions    Lamictal [Lamotrigine] Swelling and Rash    Macrodantin [Nitrofurantoin Macrocrystal] Shortness Of Breath     Caused an asthma attack    Penicillins Hives and Shortness Of Breath    Shellfish-Derived Products Swelling     Throat and tongue swells, allergy to all seafood    Sulfamethoxazole Hives    Trimethoprim Hives    Aspartame And Phenylalanine      Severe headaches and stomach cramps    Bactrim Hives    Biaxin [Clarithromycin] Hives    Cephalexin Other (See Comments)     Blisters  Water blisters inside female parts and needed a D&C    Ciprofloxacin Other (See Comments)     Causes severe

## 2025-01-13 NOTE — CARE COORDINATION
Discharge Planning Note:  Chart reviewed and it appears that patient has minimal needs for discharge at this time.     Primary Care Physician is Fidencio Mares MD    Primary insurance is [employer]    Please notify case management if any discharge needs are identified.      Case management will continue to follow progress and update discharge plan as needed.

## 2025-01-13 NOTE — PLAN OF CARE
Problem: Skin/Tissue Integrity - Adult  Goal: Incisions, wounds, or drain sites healing without S/S of infection  Outcome: Progressing

## 2025-01-14 LAB
GLUCOSE BLD-MCNC: 122 MG/DL (ref 70–99)
GLUCOSE BLD-MCNC: 172 MG/DL (ref 70–99)
GLUCOSE BLD-MCNC: 222 MG/DL (ref 70–99)
GLUCOSE BLD-MCNC: 278 MG/DL (ref 70–99)
PERFORMED ON: ABNORMAL

## 2025-01-14 PROCEDURE — 2500000003 HC RX 250 WO HCPCS: Performed by: STUDENT IN AN ORGANIZED HEALTH CARE EDUCATION/TRAINING PROGRAM

## 2025-01-14 PROCEDURE — 6370000000 HC RX 637 (ALT 250 FOR IP): Performed by: STUDENT IN AN ORGANIZED HEALTH CARE EDUCATION/TRAINING PROGRAM

## 2025-01-14 PROCEDURE — 6360000002 HC RX W HCPCS: Performed by: STUDENT IN AN ORGANIZED HEALTH CARE EDUCATION/TRAINING PROGRAM

## 2025-01-14 PROCEDURE — 6370000000 HC RX 637 (ALT 250 FOR IP): Performed by: INTERNAL MEDICINE

## 2025-01-14 PROCEDURE — 1200000000 HC SEMI PRIVATE

## 2025-01-14 PROCEDURE — 6370000000 HC RX 637 (ALT 250 FOR IP): Performed by: NURSE PRACTITIONER

## 2025-01-14 PROCEDURE — 99232 SBSQ HOSP IP/OBS MODERATE 35: CPT | Performed by: NURSE PRACTITIONER

## 2025-01-14 PROCEDURE — 2500000003 HC RX 250 WO HCPCS: Performed by: INTERNAL MEDICINE

## 2025-01-14 RX ORDER — MECLIZINE HCL 12.5 MG 12.5 MG/1
25 TABLET ORAL ONCE
Status: COMPLETED | OUTPATIENT
Start: 2025-01-14 | End: 2025-01-14

## 2025-01-14 RX ORDER — MECLIZINE HCL 12.5 MG 12.5 MG/1
25 TABLET ORAL 4 TIMES DAILY PRN
Status: DISCONTINUED | OUTPATIENT
Start: 2025-01-14 | End: 2025-01-15 | Stop reason: HOSPADM

## 2025-01-14 RX ADMIN — SACUBITRIL AND VALSARTAN 0.5 TABLET: 24; 26 TABLET, FILM COATED ORAL at 20:05

## 2025-01-14 RX ADMIN — CARVEDILOL 6.25 MG: 6.25 TABLET, FILM COATED ORAL at 08:26

## 2025-01-14 RX ADMIN — INSULIN LISPRO 8 UNITS: 100 INJECTION, SOLUTION INTRAVENOUS; SUBCUTANEOUS at 12:48

## 2025-01-14 RX ADMIN — SENNOSIDES AND DOCUSATE SODIUM 2 TABLET: 50; 8.6 TABLET ORAL at 08:26

## 2025-01-14 RX ADMIN — INSULIN LISPRO 2 UNITS: 100 INJECTION, SOLUTION INTRAVENOUS; SUBCUTANEOUS at 17:36

## 2025-01-14 RX ADMIN — HYDROCODONE BITARTRATE AND ACETAMINOPHEN 1 TABLET: 5; 325 TABLET ORAL at 19:07

## 2025-01-14 RX ADMIN — FAMOTIDINE 40 MG: 20 TABLET, FILM COATED ORAL at 20:06

## 2025-01-14 RX ADMIN — MECLIZINE 25 MG: 12.5 TABLET ORAL at 11:34

## 2025-01-14 RX ADMIN — ONDANSETRON HYDROCHLORIDE 4 MG: 2 SOLUTION INTRAMUSCULAR; INTRAVENOUS at 12:51

## 2025-01-14 RX ADMIN — INSULIN LISPRO 8 UNITS: 100 INJECTION, SOLUTION INTRAVENOUS; SUBCUTANEOUS at 17:35

## 2025-01-14 RX ADMIN — CLOPIDOGREL BISULFATE 75 MG: 75 TABLET ORAL at 08:26

## 2025-01-14 RX ADMIN — HYDROCODONE BITARTRATE AND ACETAMINOPHEN 1 TABLET: 5; 325 TABLET ORAL at 00:15

## 2025-01-14 RX ADMIN — CARVEDILOL 6.25 MG: 6.25 TABLET, FILM COATED ORAL at 17:35

## 2025-01-14 RX ADMIN — ATORVASTATIN CALCIUM 40 MG: 40 TABLET, FILM COATED ORAL at 20:06

## 2025-01-14 RX ADMIN — ALLOPURINOL 150 MG: 300 TABLET ORAL at 08:26

## 2025-01-14 RX ADMIN — SODIUM CHLORIDE, PRESERVATIVE FREE 10 ML: 5 INJECTION INTRAVENOUS at 20:08

## 2025-01-14 RX ADMIN — INSULIN GLARGINE 56 UNITS: 100 INJECTION, SOLUTION SUBCUTANEOUS at 20:06

## 2025-01-14 RX ADMIN — SODIUM CHLORIDE, PRESERVATIVE FREE 10 ML: 5 INJECTION INTRAVENOUS at 08:30

## 2025-01-14 RX ADMIN — SACUBITRIL AND VALSARTAN 0.5 TABLET: 24; 26 TABLET, FILM COATED ORAL at 08:27

## 2025-01-14 RX ADMIN — LATANOPROST 1 DROP: 50 SOLUTION OPHTHALMIC at 20:06

## 2025-01-14 RX ADMIN — FAMOTIDINE 40 MG: 20 TABLET, FILM COATED ORAL at 08:26

## 2025-01-14 RX ADMIN — SODIUM CHLORIDE, PRESERVATIVE FREE 10 ML: 5 INJECTION INTRAVENOUS at 08:28

## 2025-01-14 RX ADMIN — HYDROCODONE BITARTRATE AND ACETAMINOPHEN 1 TABLET: 5; 325 TABLET ORAL at 05:38

## 2025-01-14 ASSESSMENT — PAIN SCALES - GENERAL
PAINLEVEL_OUTOF10: 0
PAINLEVEL_OUTOF10: 8
PAINLEVEL_OUTOF10: 8
PAINLEVEL_OUTOF10: 6
PAINLEVEL_OUTOF10: 0
PAINLEVEL_OUTOF10: 6
PAINLEVEL_OUTOF10: 4

## 2025-01-14 ASSESSMENT — PAIN DESCRIPTION - LOCATION
LOCATION: CHEST
LOCATION: CHEST

## 2025-01-14 ASSESSMENT — PAIN DESCRIPTION - DESCRIPTORS
DESCRIPTORS: ACHING
DESCRIPTORS: ACHING

## 2025-01-14 ASSESSMENT — PAIN SCALES - WONG BAKER
WONGBAKER_NUMERICALRESPONSE: NO HURT

## 2025-01-14 ASSESSMENT — PAIN DESCRIPTION - ORIENTATION
ORIENTATION: LEFT
ORIENTATION: LEFT

## 2025-01-15 VITALS
BODY MASS INDEX: 45.19 KG/M2 | OXYGEN SATURATION: 97 % | TEMPERATURE: 97.6 F | WEIGHT: 245.6 LBS | SYSTOLIC BLOOD PRESSURE: 146 MMHG | DIASTOLIC BLOOD PRESSURE: 60 MMHG | HEART RATE: 64 BPM | RESPIRATION RATE: 17 BRPM | HEIGHT: 62 IN

## 2025-01-15 LAB
ALBUMIN SERPL-MCNC: 3.1 G/DL (ref 3.4–5)
ALBUMIN/GLOB SERPL: 1.1 {RATIO} (ref 1.1–2.2)
ALP SERPL-CCNC: 17 U/L (ref 40–129)
ALT SERPL-CCNC: 22 U/L (ref 10–40)
ANION GAP SERPL CALCULATED.3IONS-SCNC: 11 MMOL/L (ref 3–16)
AST SERPL-CCNC: 31 U/L (ref 15–37)
BILIRUB SERPL-MCNC: 0.6 MG/DL (ref 0–1)
BUN SERPL-MCNC: 23 MG/DL (ref 7–20)
CALCIUM SERPL-MCNC: 9.4 MG/DL (ref 8.3–10.6)
CHLORIDE SERPL-SCNC: 103 MMOL/L (ref 99–110)
CO2 SERPL-SCNC: 20 MMOL/L (ref 21–32)
CREAT SERPL-MCNC: 1.2 MG/DL (ref 0.6–1.2)
DEPRECATED RDW RBC AUTO: 16.3 % (ref 12.4–15.4)
EST. AVERAGE GLUCOSE BLD GHB EST-MCNC: 223.1 MG/DL
GFR SERPLBLD CREATININE-BSD FMLA CKD-EPI: 51 ML/MIN/{1.73_M2}
GLUCOSE BLD-MCNC: 125 MG/DL (ref 70–99)
GLUCOSE BLD-MCNC: 164 MG/DL (ref 70–99)
GLUCOSE BLD-MCNC: 177 MG/DL (ref 70–99)
GLUCOSE SERPL-MCNC: 153 MG/DL (ref 70–99)
HBA1C MFR BLD: 9.4 %
HCT VFR BLD AUTO: 31 % (ref 36–48)
HGB BLD-MCNC: 9.8 G/DL (ref 12–16)
MAGNESIUM SERPL-MCNC: 2.3 MG/DL (ref 1.8–2.4)
MCH RBC QN AUTO: 28.7 PG (ref 26–34)
MCHC RBC AUTO-ENTMCNC: 31.6 G/DL (ref 31–36)
MCV RBC AUTO: 90.6 FL (ref 80–100)
PERFORMED ON: ABNORMAL
PHOSPHATE SERPL-MCNC: 4.8 MG/DL (ref 2.5–4.9)
PLATELET # BLD AUTO: 146 K/UL (ref 135–450)
PMV BLD AUTO: 9.2 FL (ref 5–10.5)
POTASSIUM SERPL-SCNC: 5 MMOL/L (ref 3.5–5.1)
PROT SERPL-MCNC: 5.9 G/DL (ref 6.4–8.2)
RBC # BLD AUTO: 3.42 M/UL (ref 4–5.2)
SODIUM SERPL-SCNC: 134 MMOL/L (ref 136–145)
WBC # BLD AUTO: 11.8 K/UL (ref 4–11)

## 2025-01-15 PROCEDURE — 6370000000 HC RX 637 (ALT 250 FOR IP): Performed by: STUDENT IN AN ORGANIZED HEALTH CARE EDUCATION/TRAINING PROGRAM

## 2025-01-15 PROCEDURE — 83036 HEMOGLOBIN GLYCOSYLATED A1C: CPT

## 2025-01-15 PROCEDURE — 84100 ASSAY OF PHOSPHORUS: CPT

## 2025-01-15 PROCEDURE — 6370000000 HC RX 637 (ALT 250 FOR IP): Performed by: INTERNAL MEDICINE

## 2025-01-15 PROCEDURE — 97530 THERAPEUTIC ACTIVITIES: CPT

## 2025-01-15 PROCEDURE — 2500000003 HC RX 250 WO HCPCS: Performed by: STUDENT IN AN ORGANIZED HEALTH CARE EDUCATION/TRAINING PROGRAM

## 2025-01-15 PROCEDURE — 80053 COMPREHEN METABOLIC PANEL: CPT

## 2025-01-15 PROCEDURE — 36415 COLL VENOUS BLD VENIPUNCTURE: CPT

## 2025-01-15 PROCEDURE — 97162 PT EVAL MOD COMPLEX 30 MIN: CPT

## 2025-01-15 PROCEDURE — 85027 COMPLETE CBC AUTOMATED: CPT

## 2025-01-15 PROCEDURE — 97165 OT EVAL LOW COMPLEX 30 MIN: CPT

## 2025-01-15 PROCEDURE — 97110 THERAPEUTIC EXERCISES: CPT

## 2025-01-15 PROCEDURE — 6370000000 HC RX 637 (ALT 250 FOR IP): Performed by: NURSE PRACTITIONER

## 2025-01-15 PROCEDURE — 83735 ASSAY OF MAGNESIUM: CPT

## 2025-01-15 PROCEDURE — 97116 GAIT TRAINING THERAPY: CPT

## 2025-01-15 RX ORDER — ACYCLOVIR 800 MG/1
1 TABLET ORAL
Qty: 2 EACH | Refills: 3 | Status: SHIPPED | OUTPATIENT
Start: 2025-01-15

## 2025-01-15 RX ADMIN — CARVEDILOL 6.25 MG: 6.25 TABLET, FILM COATED ORAL at 17:05

## 2025-01-15 RX ADMIN — ALLOPURINOL 150 MG: 300 TABLET ORAL at 08:01

## 2025-01-15 RX ADMIN — CLOPIDOGREL BISULFATE 75 MG: 75 TABLET ORAL at 08:02

## 2025-01-15 RX ADMIN — HYDROCODONE BITARTRATE AND ACETAMINOPHEN 1 TABLET: 5; 325 TABLET ORAL at 14:26

## 2025-01-15 RX ADMIN — FAMOTIDINE 40 MG: 20 TABLET, FILM COATED ORAL at 08:02

## 2025-01-15 RX ADMIN — HYDROCODONE BITARTRATE AND ACETAMINOPHEN 1 TABLET: 5; 325 TABLET ORAL at 08:03

## 2025-01-15 RX ADMIN — SODIUM CHLORIDE, PRESERVATIVE FREE 10 ML: 5 INJECTION INTRAVENOUS at 08:03

## 2025-01-15 RX ADMIN — APIXABAN 5 MG: 5 TABLET, FILM COATED ORAL at 09:32

## 2025-01-15 RX ADMIN — INSULIN LISPRO 8 UNITS: 100 INJECTION, SOLUTION INTRAVENOUS; SUBCUTANEOUS at 08:03

## 2025-01-15 RX ADMIN — SACUBITRIL AND VALSARTAN 0.5 TABLET: 24; 26 TABLET, FILM COATED ORAL at 08:02

## 2025-01-15 RX ADMIN — INSULIN LISPRO 8 UNITS: 100 INJECTION, SOLUTION INTRAVENOUS; SUBCUTANEOUS at 12:12

## 2025-01-15 RX ADMIN — CARVEDILOL 6.25 MG: 6.25 TABLET, FILM COATED ORAL at 08:02

## 2025-01-15 RX ADMIN — SENNOSIDES AND DOCUSATE SODIUM 2 TABLET: 50; 8.6 TABLET ORAL at 08:01

## 2025-01-15 RX ADMIN — INSULIN LISPRO 8 UNITS: 100 INJECTION, SOLUTION INTRAVENOUS; SUBCUTANEOUS at 17:06

## 2025-01-15 ASSESSMENT — PAIN SCALES - GENERAL
PAINLEVEL_OUTOF10: 7
PAINLEVEL_OUTOF10: 7
PAINLEVEL_OUTOF10: 0

## 2025-01-15 ASSESSMENT — PAIN DESCRIPTION - ORIENTATION: ORIENTATION: LEFT

## 2025-01-15 ASSESSMENT — PAIN DESCRIPTION - LOCATION: LOCATION: SHOULDER

## 2025-01-15 NOTE — DISCHARGE INSTRUCTIONS
[employer]iana 3 Sensor Replacement [phone]     Call for a replacement if your sensor falls off, If you have testing including a CT Scan or MRI. Customer service will verify your name and date of birth, address, and verify you have an active order. Replacements take 3-5 business days to receive in the mail.

## 2025-01-15 NOTE — CARE COORDINATION
SW spoke to pt's RN Daiana who stated that she informed patient that the CM manager confirmed with her energy company that her power is currently on and not having any outages or issues at this time.  RN stated that once this was communicated, pt was ok with discharging home.  RN will assist in discharging patient back home today.  SW has identified no needs for this patient.    Electronically signed by CLAUDIA Marin LSW on 1/15/2025 at 4:57 PM

## 2025-01-15 NOTE — PROGRESS NOTES
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 Labs     01/09/25 1818 
                Labs:   Recent Labs     01/11/25  1230 01/12/25  0915 01/13/25  0927   WBC 16.4* 14.8* 12.9*   HGB 11.4* 11.2* 10.9*   HCT 34.7* 34.7* 33.5*    167 147     Recent Labs     01/11/25  0947 01/12/25  0915 01/13/25  0927   * 132* 134*   K 4.6 4.2 4.4    98* 100   CO2 18* 23 23   BUN 33* 38* 31*   CREATININE 1.1 1.3* 1.1   CALCIUM 9.4 9.2 9.4     Recent Labs     01/13/25  0927   AST 26   ALT 23   BILITOT 0.8   ALKPHOS 16*     Recent Labs     01/11/25  1230   INR 1.02     No results for input(s): \"CKTOTAL\", \"TROPHS\" in the last 72 hours.    Urinalysis:      Lab Results   Component Value Date/Time    NITRU Negative 01/11/2025 02:00 PM    WBCUA 13 01/11/2025 02:00 PM    BACTERIA None Seen 01/11/2025 02:00 PM    RBCUA 19 01/11/2025 02:00 PM    BLOODU MODERATE 01/11/2025 02:00 PM    SPECGRAV 1.030 02/27/2018 11:56 AM    GLUCOSEU >=1000 01/11/2025 02:00 PM       Radiology:  CT CHEST WO CONTRAST   Final Result   Phlegmonous changes and subcutaneous air adjacent to the recently placed left   chest ICD.  No definite abscess seen.         XR CHEST PORTABLE   Final Result   No complication following revision and new ICD placement.         US RENAL COMPLETE    (Results Pending)       IP CONSULT TO UROLOGY      Wang Canada MD    
injection    UPPER GASTROINTESTINAL ENDOSCOPY  05/10/2016    distal esophagus biopsy, stomach biopsy, botox injection    UPPER GASTROINTESTINAL ENDOSCOPY  04/11/2017    with dilatation and botox injection    UPPER GASTROINTESTINAL ENDOSCOPY  10/24/2017    DILATATION, BIOPSY, BOTOX    UPPER GASTROINTESTINAL ENDOSCOPY  03/06/2018    WITH BOTOX AND BALLOON DILATATION    UPPER GASTROINTESTINAL ENDOSCOPY N/A 05/07/2019    EGD SUBMUCOSAL/BOTOX INJECTION performed by Geoffrey Velarde MD at [facility] ENDOSCOPY    UPPER GASTROINTESTINAL ENDOSCOPY N/A 05/07/2019    EGD DILATION BALLOON performed by Geoffrey Velarde MD at [facility] ENDOSCOPY    UPPER GASTROINTESTINAL ENDOSCOPY N/A 03/13/2020    EGD SUBMUCOSAL/BOTOX INJECTION performed by Geoffrey Velarde MD at [facility] ENDOSCOPY    UPPER GASTROINTESTINAL ENDOSCOPY N/A 03/13/2020    EGD DILATION BALLOON performed by Geoffrey Velarde MD at [facility] ENDOSCOPY    UPPER GASTROINTESTINAL ENDOSCOPY N/A 10/06/2020    EGD DILATION BALLOON performed by Geoffrey Velarde MD at [facility] ENDOSCOPY    UPPER GASTROINTESTINAL ENDOSCOPY N/A 10/06/2020    EGD SUBMUCOSAL/BOTOX INJECTION performed by Geoffrey Velarde MD at [facility] ENDOSCOPY    UPPER GASTROINTESTINAL ENDOSCOPY N/A 08/10/2021    EGD SUBMUCOSAL/BOTOX INJECTION performed by Geoffrey Velarde MD at [facility] ENDOSCOPY    UPPER GASTROINTESTINAL ENDOSCOPY N/A 08/10/2021    EGD BIOPSY performed by Geoffrey Velarde MD at [facility] ENDOSCOPY    UPPER GASTROINTESTINAL ENDOSCOPY N/A 06/23/2022    EGD SUBMUCOSAL/BOTOX INJECTION performed by Geoffrey Velarde MD at [facility] ENDOSCOPY    UPPER GASTROINTESTINAL ENDOSCOPY N/A 01/27/2023    EGD SUBMUCOSAL/BOTOX INJECTION performed by Geoffrey Velarde MD at [facility] ENDOSCOPY    UPPER GASTROINTESTINAL ENDOSCOPY N/A 11/16/2023    EGD SUBMUCOSAL/BOTOX INJECTION performed by Geoffrey Velarde MD at [facility] ENDOSCOPY       FAMILY HISTORY    Family History   Problem Relation Age of Onset    
activities modified       Functional Mobility  Bed Mobility:  Bed mobility not completed on this date.  Comments:Pt. Up in chair pre/post session  Transfers:  Sit to stand transfer: modified independent  Stand to sit transfer: modified independent  Comments:  Ambulation:  Surface:level surface  Assistive Device: rolling walker  Assistance: modified independent, supervision  Distance: 300' x 1  Gait Mechanics: Steady, no LOB, 1 standing rest break  Comments:    Stair Mobility:  Stair mobility not completed on this date.  Comments: level entry  Wheelchair Mobility:  No w/c mobility completed on this date.  Comments:  Balance:  Static Sitting Balance: good: independent with functional balance in unsupported position  Dynamic Sitting Balance: good: independent with functional balance in unsupported position  Static Standing Balance: fair (+): maintains balance at SBA/supervision without use of UE support  Dynamic Standing Balance: fair (+): maintains balance at SBA/supervision without use of UE support  Comments:Use of RW with standing    Other Therapeutic Interventions  Instructed in HEP:  Printed and review  Exercises  - Standing Hip Abduction with Counter Support  - 1 x daily - 7 x weekly - 3 sets - 10 reps  - Standing Hip Extension with Counter Support  - 1 x daily - 7 x weekly - 3 sets - 10 reps  - Standing March with Counter Support  - 1 x daily - 7 x weekly - 3 sets - 10 reps  - Seated Ankle Pumps  - 1 x daily - 7 x weekly - 3 sets - 10 reps  - Seated Long Arc Quad  - 1 x daily - 7 x weekly - 3 sets - 10 reps  - Seated March  - 1 x daily - 7 x weekly - 3 sets - 10 reps  - Seated Hip Abduction  - 1 x daily - 7 x weekly - 3 sets - 10 reps  - Standing Shoulder External and Internal Rotation AROM  - 1 x daily - 7 x weekly - 3 sets - 10 reps  - Seated Shoulder Shrugs  - 1 x daily - 7 x weekly - 3 sets - 10 reps  - Seated Biceps Curl  - 1 x daily - 7 x weekly - 3 sets - 10 reps  - Seated Chest Press  - 1 x daily - 7 x 
06:56 PM    TRIG 136 2023 12:17 PM     LFTS:   Lab Results   Component Value Date/Time    ALT 20 01/10/2025 05:42 AM    AST 22 01/10/2025 05:42 AM    ALKPHOS 20 01/10/2025 05:42 AM    AGRATIO 1.3 01/10/2025 05:42 AM    BILITOT 0.4 01/10/2025 05:42 AM     Cardiac Enzymes:   Lab Results   Component Value Date/Time    CKTOTAL 129 2016 10:44 AM    TROPONINI <0.01 2021 06:15 PM    TROPONINI <0.01 2021 12:26 AM    TROPONINI <0.01 2021 09:56 PM     Coags:   Lab Results   Component Value Date/Time    PROTIME 13.6 2025 12:30 PM    INR 1.02 2025 12:30 PM       EC25  Ventricular paced rhythm    ECHO:     Left Ventricle: Moderately reduced left ventricular systolic function. EF by visual approximation is 35%. EF by 2D Simpsons Biplane is 32%. Left ventricle is severely dilated. Normal wall thickness. See diagram for wall motion findings. Grade I diastolic dysfunction with normal LAP.    Right Ventricle: Not well visualized. Right ventricle size is normal. Normal wall thickness. Lead present in the right ventricle.    Cath:   LM Normal  LAD 80%     INTERVENTION(S)  Artery Location Intervention JUSTINO-Pre JUSTINO-Post Residual  LAD Mid Xience 4.0X23 (PD 4.0)  POBA old stent with 3.5NC  IVUS old stent for ISR 3 3 None    CT Chest: 25  Phlegmonous changes and subcutaneous air adjacent to the recently placed left  chest ICD.  No definite abscess seen.    CXR: 1/10/25  No complication following revision and new ICD placement.     Problem List:   Patient Active Problem List    Diagnosis Date Noted    Non-seasonal allergic rhinitis 2022    Protein S deficiency (HCC) 2022    Osteoarthritis of multiple joints 2022    ICD (implantable cardioverter-defibrillator) lead failure, initial encounter 2025    Ischemic cardiomyopathy 2024    LV dysfunction 2024    Atypical angina (HCC) 2024    CAD in native artery 07/10/2024    Unstable angina (HCC)

## 2025-01-15 NOTE — CARE COORDINATION
Case Management Assessment  Initial Evaluation    Date/Time of Evaluation: 1/15/2025 3:43 PM  Assessment Completed by: CLAUDIA Marin, TACOW    If patient is discharged prior to next notation, then this note serves as note for discharge by case management.    Patient Name: Lanny Carbajal                   YOB: 1965  Diagnosis: ICD (implantable cardioverter-defibrillator) lead failure, initial encounter [T82.110A]  Ischemic cardiomyopathy [I25.5]                   Date / Time: 1/9/2025  3:49 PM    Patient Admission Status: Inpatient   Readmission Risk (Low < 19, Mod (19-27), High > 27): Readmission Risk Score: 15.5    Current PCP: Yeimy Owen APRN - CNP  PCP verified by CM? Yes    Chart Reviewed: Yes      History Provided by: Patient  Patient Orientation: Alert and Oriented    Patient Cognition: Alert    Hospitalization in the last 30 days (Readmission):  No    If yes, Readmission Assessment in CM Navigator will be completed.    Advance Directives:      Code Status: Full Code   Patient's Primary Decision Maker is: Legal Next of Kin      Discharge Planning:    Patient lives with: Alone Type of Home: Apartment  Primary Care Giver: Self  Patient Support Systems include: Family Members   Current Financial resources: Medicaid  Current community resources: None  Current services prior to admission: Durable Medical Equipment (4WW and cane)            Current DME:              Type of Home Care services:  None    ADLS  Prior functional level: Independent in ADLs/IADLs  Current functional level: Independent in ADLs/IADLs    PT AM-PAC: 23 /24  OT AM-PAC: 24 /24    Family can provide assistance at DC: Yes  Would you like Case Management to discuss the discharge plan with any other family members/significant others, and if so, who? Yes (w/emergency contacts)  Plans to Return to Present Housing: Yes  Other Identified Issues/Barriers to RETURNING to current housing: None  Potential Assistance needed at

## 2025-01-15 NOTE — CARE COORDINATION
Received call from Dr. Louis stating pt cannot discharge because does not have electricity at home.  KARELY Ahmadi states pt told her she had a power outage in her home area.  Confirmed pt's electricity provided through [employer].  Checked he website for pt's address and phone number and no outage is reported for her home at this time.  Notified Daiana Harding and [facility] regarding this information.  Pt should plan to discharge home today.  Myrtle Mandujano MSN, RN, Case Management  Office: [phone]  Electronically signed by Myrtle Mandujano on 1/15/2025 at 4:50 PM

## 2025-01-16 ENCOUNTER — CARE COORDINATION (OUTPATIENT)
Dept: CASE MANAGEMENT | Age: 60
End: 2025-01-16

## 2025-01-16 RX ORDER — POLYETHYLENE GLYCOL 3350 17 G/17G
17 POWDER, FOR SOLUTION ORAL DAILY
Qty: 1530 G | Refills: 1 | Status: SHIPPED | OUTPATIENT
Start: 2025-01-16 | End: 2025-07-15

## 2025-01-16 NOTE — PATIENT INSTRUCTIONS
transmissions unless specifically requested by the office staff - the steps to send a manual transmission are the same as when you paired your in-home monitor to your device for the first time.     Your device and monitor are wireless and most transmit cellularly, but please periodically check your monitor is still plugged in to the electrical outlet. If you still use the telephone land line to send, please ensure the connection to the phone marianna is secure. This will help to ensure successful automatic transmissions in the future.     Please be aware that the remote device transmission sites are periodically monitored only during regular business hours during which simultaneous in-office device clinics are being conducted. If your transmission requires attention, we will contact you as soon as possible.     Your in-home monitor will do a full report on your device every 3 months (recurring appointments that run parallel to in office checks). You do not need to initiate a transmission or be awake at the appointment time listed - this is solely for office purposes.     Our office utilizes the \"no news is good news\" narrative regarding remote monitoring. A Device Specialist or RN will contact you with any critical findings from your remote monitoring.     Going forward, if you experience issues with your in-home monitor, please verify that it is plugged in to the wall. If issues persist, please contact the Customer Service numbers provided below, as they can troubleshoot issues that may be happening with the monitor itself. The Device Clinic works closely with the remote monitoring websites - if we notice there is a disconnection, we will contact you to inform you and ask you to contact the  of your device.     [employer] (Carelink)[phone]  [employer] (Latitude)[phone]  [employer] (Merlin)[phone]  [phone]

## 2025-01-16 NOTE — CARE COORDINATION
Care Transitions Note    Initial Call - Call within 2 business days of discharge: Yes    Attempted to reach patient for transitions of care follow up. Unable to reach patient.    Outreach Attempts:   HIPAA compliant voicemail left for patient.     Patient: Lanny Carbajal    Patient : 1965   MRN: [id number]    Reason for Admission:   Discharge Date: 1/15/25  RURS: Readmission Risk Score: 18.2    Last Discharge Facility       Date Complaint Diagnosis Description Type Department Provider    25  ICD (implantable cardioverter-defibrillator) lead failure, initial encounter ... Admission (Discharged) Denise Kumari MD            Was this an external facility discharge? No    Follow Up Appointment:   Patient does not have a follow up appointment scheduled at time of call.   Future Appointments         Provider Specialty Dept Phone    2025 10:30 AM SCHEDULE, [place] DEVICE CHECK Cardiology [phone]    3/14/2025 2:45 PM Raffy Minaya MD Cardiology [phone]    2025 1:00 PM SCHEDULE, [place] DEVICE CHECK Cardiology [phone]    2025 1:15 PM Christian Galo MD Cardiology [phone]            Plan for follow-up on next business day.      Deejay Barone RN

## 2025-01-16 NOTE — DISCHARGE SUMMARY
V2.0  Discharge Summary    Name:  Lanny Carbajal /Age/Sex: 1965 (60 y.o. female)   Admit Date: 2025  Discharge Date: 25    MRN & CSN:  [id number] & [id number] Encounter Date and Time 25 11:33 AM EST    Attending:  Marialuisa att. providers found Discharging Provider: Denise Centeno MD       Hospital Course:     Brief HPI: Lanny Carbajal is a 60 y.o. female with PMHx of HTN, HLD, DM, Obesity, SHIRLEY and non obstructive CAD, Protein S deficiency on long term AC who was admitted as a direct admission from cardiology for PPM lead extraction and upgrading to BiV-AICD.  Hospital course complicated by vertigo, severe pain and swelling at the AICD insertion site and hematuria.     Ischemic cardiomyopathy, AV block:  s/p dual chamber PPM (5/10/2021) with worsening EF.  TTE 2024: LVEF 35%.   Upgraded device to Biv-AICD 1/10/2025.   Outpatient follow-up at the device clinic.     Left-sided chest pain post AICD implantation:  CT chest showed some subcutaneous air.    Reviewed by Dr. Mann with no intervention needed.  Continue pain control.     CAD: Stable on home medications.     HTN: BP controlled on current medications.     SHIRLEY: Continue CPAP.     Hematuria:  UA shows blood and RBC in urine.   CT abdomen from 2024 showing right renal obstructive stone with mild hydronephrosis  Urology consult appreciated; negative renal ultrasound.  Outpatient cystoscopy for hematuria workup recommended.     T2DM on long-term insulin: Follow-up A1c.  Continue insulin.     History of protein C deficiency and DVT: Resumed Eliquis.     Vertigo: Resolved.  Continue meclizine as needed.       The patient expressed appropriate understanding of, and agreement with the discharge recommendations, medications, and plan.     Consults this admission:  IP CONSULT TO UROLOGY  IP CONSULT TO DIABETES EDUCATOR    Discharge Diagnosis:   ICD (implantable cardioverter-defibrillator) lead failure, initial encounter    Discharge Instruction:

## 2025-01-16 NOTE — PROGRESS NOTES
Patients incision is healing nicely. Outside bandage removed today. Wound clean, dry and intact. Little swelling noted. Patient still complains of pain at site.  NPRS reviewed today as well and extended pain regimen. Patient and family educated on wound care. All questions answered.  Patient to call the office immediately with any signs on infection.     Home Monitor Connected and transmitting. Instructions given.

## 2025-01-17 ENCOUNTER — CARE COORDINATION (OUTPATIENT)
Dept: CASE MANAGEMENT | Age: 60
End: 2025-01-17

## 2025-01-17 NOTE — CARE COORDINATION
Care Transitions Note    Initial Call - Call within 2 business days of discharge: Yes    Attempted to reach patient for transitions of care follow up. Unable to reach patient.    Outreach Attempts:   HIPAA compliant voicemail left for patient.     Patient: Lanny Carbajal    Patient : 1965   MRN: [id number]    Reason for Admission:   Discharge Date: 1/15/25  RURS: Readmission Risk Score: 18.2    Last Discharge Facility       Date Complaint Diagnosis Description Type Department Provider    25  ICD (implantable cardioverter-defibrillator) lead failure, initial encounter ... Admission (Discharged) Denise Kumari MD            Was this an external facility discharge? No    Follow Up Appointment:   Patient has hospital follow up appointment scheduled greater than 14 days after discharge; will send message to PCP office.    Future Appointments         Provider Specialty Dept Phone    2025 10:30 AM SCHEDULE, [place] DEVICE CHECK Cardiology [phone]    3/14/2025 2:45 PM Raffy Minaya MD Cardiology [phone]    2025 1:00 PM SCHEDULE, [place] DEVICE CHECK Cardiology [phone]    2025 1:15 PM Christian Galo MD Cardiology [phone]            No further follow-up call indicated     Deejay Barone RN

## 2025-01-20 ENCOUNTER — NURSE ONLY (OUTPATIENT)
Dept: CARDIOLOGY CLINIC | Age: 60
End: 2025-01-20

## 2025-01-20 DIAGNOSIS — I51.9 LV DYSFUNCTION: ICD-10-CM

## 2025-01-20 DIAGNOSIS — I25.5 ISCHEMIC CARDIOMYOPATHY: ICD-10-CM

## 2025-01-20 DIAGNOSIS — R52 PAIN IN PACEMAKER POCKET: ICD-10-CM

## 2025-01-20 DIAGNOSIS — I44.1 MOBITZ TYPE II ATRIOVENTRICULAR BLOCK: ICD-10-CM

## 2025-01-20 DIAGNOSIS — R07.89 LEFT-SIDED CHEST WALL PAIN: ICD-10-CM

## 2025-01-20 DIAGNOSIS — Z95.810 ICD (IMPLANTABLE CARDIOVERTER-DEFIBRILLATOR), BIVENTRICULAR, IN SITU: Primary | ICD-10-CM

## 2025-01-20 RX ORDER — HYDROCODONE BITARTRATE AND ACETAMINOPHEN 5; 325 MG/1; MG/1
1 TABLET ORAL EVERY 6 HOURS PRN
Qty: 20 TABLET | Refills: 0 | Status: SHIPPED | OUTPATIENT
Start: 2025-01-20 | End: 2025-01-25

## 2025-02-03 ENCOUNTER — TELEPHONE (OUTPATIENT)
Dept: CARDIOLOGY CLINIC | Age: 60
End: 2025-02-03

## 2025-02-03 NOTE — TELEPHONE ENCOUNTER
Called and spoke with Patient. 01/10/2025-upgrade to [employer] Cobalt XT HF Quad [id number](existing RA lead/2021/RV PPM lead Extracted)- with Dr. Galo. Clara to removed Steri-strips. Advised Patient to get in the shower and get the steri-strips soapy and wet. The more saturated the better. And then one by one she can removed the steri-strips. Explained that the skin was most likely irritated but the adhesive. I advised once steri-strips are removed to gently wash incision with soap and water and remove any remaining adhesive. Any signs of infection/concern to call the office back. Advise her to send a picture via [employer] for review if there are any concerns. Patient GANESH. Thanks

## 2025-02-03 NOTE — TELEPHONE ENCOUNTER
Pt called to inform the office that her steri-strips has not fallen off and it is itchy and burning.  Pt has a ALEX on 01/10.  Please call to discuss what she can do.  Thank you

## 2025-02-07 DIAGNOSIS — E11.9 TYPE 2 DIABETES MELLITUS WITHOUT COMPLICATION, WITH LONG-TERM CURRENT USE OF INSULIN (HCC): ICD-10-CM

## 2025-02-07 DIAGNOSIS — Z79.4 TYPE 2 DIABETES MELLITUS WITHOUT COMPLICATION, WITH LONG-TERM CURRENT USE OF INSULIN (HCC): ICD-10-CM

## 2025-02-07 RX ORDER — INSULIN GLARGINE 100 [IU]/ML
INJECTION, SOLUTION SUBCUTANEOUS
Qty: 10 ADJUSTABLE DOSE PRE-FILLED PEN SYRINGE | Refills: 1 | Status: SHIPPED | OUTPATIENT
Start: 2025-02-07

## 2025-02-07 NOTE — TELEPHONE ENCOUNTER
Medication:   Requested Prescriptions     Pending Prescriptions Disp Refills    LANTUS SOLOSTAR 100 UNIT/ML injection pen [Pharmacy Med Name: LANTUS SOLOSTAR 100 UNIT/ML]  1     Sig: INJECT 56 UNITS UNDER THE SKIN DAILY       Last Filled:      Patient Phone Number: [phone] (home)     Last appt: 8/30/2024   Next appt: Visit date not found    Last Labs DM:   Lab Results   Component Value Date/Time    LABA1C 9.4 01/15/2025 03:22 PM

## 2025-02-12 PROCEDURE — 93297 REM INTERROG DEV EVAL ICPMS: CPT | Performed by: INTERNAL MEDICINE

## 2025-02-19 NOTE — PROGRESS NOTES
Nephrology Progress Note  [facility]  [phone]   [employer]    Patient:  Lanny Carbajal   : 1965    CC: MONIQUE    Subjective:  Patient doing okay.  Noted discharge plans.    ROS:   No complaints doing okay    SHx:  Sister-in-law at bedside    Meds:  Scheduled Meds:   sacubitril-valsartan  1 tablet Oral BID    allopurinol  150 mg Oral Daily    insulin lispro  8 Units SubCUTAneous TID WC    aspirin  81 mg Oral Daily    simethicone  80 mg Oral TID WC    Vitamin D  1,000 Units Oral Daily    insulin lispro  0-16 Units SubCUTAneous TID WC    insulin lispro  0-4 Units SubCUTAneous Nightly    atorvastatin  40 mg Oral Nightly    clopidogrel  75 mg Oral Daily    famotidine  40 mg Oral BID    latanoprost  1 drop Both Eyes Nightly    cetirizine  5 mg Oral Daily    metoprolol succinate  25 mg Oral Nightly    sodium chloride flush  5-40 mL IntraVENous 2 times per day    insulin glargine  56 Units SubCUTAneous Nightly    enoxaparin  30 mg SubCUTAneous BID     Continuous Infusions:   sodium chloride      dextrose       PRN Meds:.bisacodyl, polyvinyl alcohol, sodium chloride flush, sodium chloride, dextrose bolus **OR** dextrose bolus, dextrose, albuterol, potassium chloride **OR** potassium alternative oral replacement **OR** potassium chloride, magnesium sulfate, ondansetron **OR** ondansetron, polyethylene glycol, acetaminophen **OR** acetaminophen, cyclobenzaprine    Vitals:  /62   Pulse 74   Temp 97.2 °F (36.2 °C) (Temporal)   Resp 16   Ht 1.575 m (5' 2\")   Wt 106.6 kg (235 lb 0.2 oz)   SpO2 97%   BMI 42.98 kg/m²     Physical Exam:  Gen: Resting in bed, NAD.    HEENT: MMM, OP clear.  CV: RRR, no S3.  Lungs: good respiratory effort and clear air entry   Abd: S/NT +BS  Ext: No edema, no cyanosis  Skin: Warm.  No rashes appreciated.    Labs:  CBC with Differential:    Lab Results   Component Value Date/Time    WBC 14.3 2024 04:50 AM    RBC 4.09 2024 04:50 AM    HGB 11.7  07/12/2024 04:50 AM    HCT 35.7 07/12/2024 04:50 AM     07/12/2024 04:50 AM    MCV 87.3 07/12/2024 04:50 AM    MCH 28.6 07/12/2024 04:50 AM    MCHC 32.8 07/12/2024 04:50 AM    RDW 16.4 07/12/2024 04:50 AM    LYMPHOPCT 38.1 07/12/2024 04:50 AM    MONOPCT 6.6 07/12/2024 04:50 AM    EOSPCT 1.5 07/12/2024 04:50 AM    BASOPCT 0.7 07/12/2024 04:50 AM    MONOSABS 0.9 07/12/2024 04:50 AM    LYMPHSABS 5.5 07/12/2024 04:50 AM    EOSABS 0.2 07/12/2024 04:50 AM    BASOSABS 0.1 07/12/2024 04:50 AM    DIFFTYPE Auto-K 04/15/2013 03:13 PM     CMP:    Lab Results   Component Value Date/Time     07/12/2024 04:50 AM    K 4.5 07/12/2024 04:50 AM    K 4.6 07/11/2024 12:08 PM     07/12/2024 04:50 AM    CO2 25 07/12/2024 04:50 AM    BUN 23 07/12/2024 04:50 AM    CREATININE 0.9 07/12/2024 04:50 AM    GFRAA >60 09/01/2022 01:21 PM    GFRAA >60 06/04/2013 06:50 AM    AGRATIO 1.7 04/25/2024 01:57 PM    LABGLOM 73 07/12/2024 04:50 AM    LABGLOM 84 04/25/2024 01:57 PM    GLUCOSE 93 07/12/2024 04:50 AM    CALCIUM 9.7 07/12/2024 04:50 AM    BILITOT 0.6 07/06/2024 05:28 AM    ALKPHOS 19 07/06/2024 05:28 AM    AST 28 07/06/2024 05:28 AM    ALT 25 07/06/2024 05:28 AM       Assessment/Plan:  MONIQUE resolved  Likely secondary to recent contrast for LHC on 7/8/2024, Entresto initiated 7/8/2024.  Baseline SCr 0.8-1, SCr peaked at 1.3, trended lower since.  Nonproteinuric at baseline.  Plan  Okay to introduce Entresto     Hyperkalemia  Hemolyzed sample  Repeat potassium okay      Compensated chronic systolic CHF  EF 35%  Okay to start Entresto     Chest pain, CAD  PCI 7/8/2024, 7/10/2024  Staged PCI per cardiology,     Hx of Moderate to severe ostial stenosis of both renal arteries  Incidentally seen on CTA CAP 12/10/23     History of right renal cyst     History of recurrent nephrolithiasis, nonobstructive  Based on recent Litholink, high urine uric acid.  Continue allopurinol at discharge  We will try urine strainer at  19-Feb-2025

## 2025-03-12 NOTE — PROGRESS NOTES
(IP) under my supervision.  Any additions to this IP performed in my presence and at my direction. Content and accuracy of this note reviewed by me (Raffy Minaya MD).   NEW MEDICATIONS   Pt verbalizes understanding of the need for treatment and education provided at today's visit.  Additional education provided in AVS.   ASSESSMENT AND PLAN   *CAD    Date EF Results   Sx     As above   [facility] 3/19  12/23  7/24   Nonobstructive (Rei)  PCI LAD (Rei)  ISR of LAD, POBA and PCI LAD (Rei)   TTE 12/23 7/24 7/24 45%  35%  35%           INT   High dose entresto - fatigue, hypotension  Metoprolol - fatigue, dizzy  Farxiga, jardiance - renal MD discontinued   Plan     Change lasix to torsemide 20  FU with renal next week   *CHB  Status Plan   S/p PPM 5/21, upgraded to ICD 1/25 Per EP   *HTN  Status Advice Plan   Controlled Diet/salt/xcise/wt counseling Continue antihypertensives at current dosages   *CHOL  LDL Advice Plan   82, 1/24 Diet/salt/xcise/wt counseling Continue MT/HI statin   *Protein S deficiency   Status Plan   On long term AC Eliquis 2.5 BID Per PCP   *FOLLOWUP  12 months

## 2025-03-12 NOTE — PATIENT INSTRUCTIONS
Follow up with Kidney doctor about diuretic therapy    STOP furosemide (lasix)    START torsemide (Demadex) 20mg daily

## 2025-03-14 ENCOUNTER — OFFICE VISIT (OUTPATIENT)
Age: 60
End: 2025-03-14
Payer: COMMERCIAL

## 2025-03-14 VITALS
SYSTOLIC BLOOD PRESSURE: 108 MMHG | HEART RATE: 65 BPM | DIASTOLIC BLOOD PRESSURE: 78 MMHG | HEIGHT: 62 IN | OXYGEN SATURATION: 99 % | BODY MASS INDEX: 43.98 KG/M2 | WEIGHT: 239 LBS

## 2025-03-14 DIAGNOSIS — I25.10 CORONARY ARTERY DISEASE DUE TO LIPID RICH PLAQUE: Primary | ICD-10-CM

## 2025-03-14 DIAGNOSIS — I25.5 ISCHEMIC CARDIOMYOPATHY: ICD-10-CM

## 2025-03-14 DIAGNOSIS — E78.00 HYPERCHOLESTEROLEMIA: ICD-10-CM

## 2025-03-14 DIAGNOSIS — I25.83 CORONARY ARTERY DISEASE DUE TO LIPID RICH PLAQUE: Primary | ICD-10-CM

## 2025-03-14 DIAGNOSIS — Z95.5 HISTORY OF CORONARY ARTERY STENT PLACEMENT: ICD-10-CM

## 2025-03-14 DIAGNOSIS — Z95.810 ICD (IMPLANTABLE CARDIOVERTER-DEFIBRILLATOR), BIVENTRICULAR, IN SITU: ICD-10-CM

## 2025-03-14 DIAGNOSIS — I10 ESSENTIAL HYPERTENSION: ICD-10-CM

## 2025-03-14 PROCEDURE — 3078F DIAST BP <80 MM HG: CPT | Performed by: INTERNAL MEDICINE

## 2025-03-14 PROCEDURE — G8417 CALC BMI ABV UP PARAM F/U: HCPCS | Performed by: INTERNAL MEDICINE

## 2025-03-14 PROCEDURE — 3074F SYST BP LT 130 MM HG: CPT | Performed by: INTERNAL MEDICINE

## 2025-03-14 PROCEDURE — 1036F TOBACCO NON-USER: CPT | Performed by: INTERNAL MEDICINE

## 2025-03-14 PROCEDURE — G8427 DOCREV CUR MEDS BY ELIG CLIN: HCPCS | Performed by: INTERNAL MEDICINE

## 2025-03-14 PROCEDURE — 3017F COLORECTAL CA SCREEN DOC REV: CPT | Performed by: INTERNAL MEDICINE

## 2025-03-14 PROCEDURE — 99214 OFFICE O/P EST MOD 30 MIN: CPT | Performed by: INTERNAL MEDICINE

## 2025-03-14 RX ORDER — TORSEMIDE 20 MG/1
20 TABLET ORAL DAILY
Qty: 30 TABLET | Refills: 3 | Status: SHIPPED | OUTPATIENT
Start: 2025-03-14

## 2025-03-19 ENCOUNTER — TRANSCRIBE ORDERS (OUTPATIENT)
Dept: ADMINISTRATIVE | Age: 60
End: 2025-03-19

## 2025-03-19 DIAGNOSIS — N20.0 RENAL STONE: Primary | ICD-10-CM

## 2025-03-20 ENCOUNTER — HOSPITAL ENCOUNTER (OUTPATIENT)
Age: 60
Discharge: HOME | End: 2025-03-20
Attending: INTERNAL MEDICINE
Payer: COMMERCIAL

## 2025-03-20 DIAGNOSIS — N20.0 RENAL STONE: ICD-10-CM

## 2025-03-20 PROCEDURE — 74176 CT ABD & PELVIS W/O CONTRAST: CPT

## 2025-03-26 DIAGNOSIS — E11.9 TYPE 2 DIABETES MELLITUS WITHOUT COMPLICATION, WITH LONG-TERM CURRENT USE OF INSULIN: Primary | ICD-10-CM

## 2025-03-26 DIAGNOSIS — Z79.4 TYPE 2 DIABETES MELLITUS WITHOUT COMPLICATION, WITH LONG-TERM CURRENT USE OF INSULIN: Primary | ICD-10-CM

## 2025-03-26 RX ORDER — INSULIN LISPRO 100 [IU]/ML
INJECTION, SOLUTION INTRAVENOUS; SUBCUTANEOUS
Qty: 15 ML | Refills: 0 | Status: SHIPPED | OUTPATIENT
Start: 2025-03-26

## 2025-04-04 DIAGNOSIS — E11.9 TYPE 2 DIABETES MELLITUS WITHOUT COMPLICATION, WITH LONG-TERM CURRENT USE OF INSULIN (HCC): ICD-10-CM

## 2025-04-04 DIAGNOSIS — Z79.4 TYPE 2 DIABETES MELLITUS WITHOUT COMPLICATION, WITH LONG-TERM CURRENT USE OF INSULIN (HCC): ICD-10-CM

## 2025-04-07 ENCOUNTER — TELEPHONE (OUTPATIENT)
Dept: ENDOCRINOLOGY | Age: 60
End: 2025-04-07

## 2025-04-07 DIAGNOSIS — Z79.4 TYPE 2 DIABETES MELLITUS WITHOUT COMPLICATION, WITH LONG-TERM CURRENT USE OF INSULIN: ICD-10-CM

## 2025-04-07 DIAGNOSIS — E11.9 TYPE 2 DIABETES MELLITUS WITHOUT COMPLICATION, WITH LONG-TERM CURRENT USE OF INSULIN: ICD-10-CM

## 2025-04-07 RX ORDER — PEN NEEDLE, DIABETIC 32GX 5/32"
NEEDLE, DISPOSABLE MISCELLANEOUS
OUTPATIENT
Start: 2025-04-07

## 2025-04-08 RX ORDER — PEN NEEDLE, DIABETIC 32GX 5/32"
NEEDLE, DISPOSABLE MISCELLANEOUS
Qty: 200 EACH | Refills: 3 | Status: SHIPPED | OUTPATIENT
Start: 2025-04-08

## 2025-04-08 RX ORDER — PEN NEEDLE, DIABETIC 32GX 5/32"
NEEDLE, DISPOSABLE MISCELLANEOUS
OUTPATIENT
Start: 2025-04-08

## 2025-04-08 NOTE — TELEPHONE ENCOUNTER
Requested Prescriptions     Refused Prescriptions Disp Refills    Insulin Pen Needle (BD PEN NEEDLE ALEKSANDAR 2ND GEN) 32G X 4 MM MISC [Pharmacy Med Name: BD ALEKSANDAR 2 GEN PEN NDL 32G 4MM]       Sig: USE AS DIRECTED TO ADMINISTER INSULIN 3-4 TIMES A DAY     Refused By: ALONSO MURRAY     Reason for Refusal: Patient needs an appointment     the patient has been scheduled for 5/21/25

## 2025-04-09 RX ORDER — ATORVASTATIN CALCIUM 40 MG/1
40 TABLET, FILM COATED ORAL DAILY
Qty: 90 TABLET | Refills: 3 | Status: SHIPPED | OUTPATIENT
Start: 2025-04-09

## 2025-04-09 RX ORDER — CLOPIDOGREL BISULFATE 75 MG/1
75 TABLET ORAL DAILY
Qty: 90 TABLET | Refills: 3 | Status: SHIPPED | OUTPATIENT
Start: 2025-04-09

## 2025-04-11 ENCOUNTER — HOSPITAL ENCOUNTER (OUTPATIENT)
Age: 60
Discharge: HOME | End: 2025-04-11
Payer: COMMERCIAL

## 2025-04-11 DIAGNOSIS — E04.1 THYROID NODULE: ICD-10-CM

## 2025-04-11 PROCEDURE — 76536 US EXAM OF HEAD AND NECK: CPT

## 2025-04-29 ENCOUNTER — TELEPHONE (OUTPATIENT)
Dept: ENDOCRINOLOGY | Age: 60
End: 2025-04-29

## 2025-04-29 NOTE — TELEPHONE ENCOUNTER
Patient aware. She would like to do the vv on Friday. She is having a lot of symptoms including fatigue, hair breaking off, heart racing, and other things she would like to discuss as well.

## 2025-04-29 NOTE — TELEPHONE ENCOUNTER
Pt calling and states she had labs done @ [facility] on 4/23/25 and her thyroglobulin antibody came back abnormal. She would like Dr Baez to review    # [phone]

## 2025-05-01 DIAGNOSIS — Z79.4 TYPE 2 DIABETES MELLITUS WITHOUT COMPLICATION, WITH LONG-TERM CURRENT USE OF INSULIN (HCC): ICD-10-CM

## 2025-05-01 DIAGNOSIS — E11.9 TYPE 2 DIABETES MELLITUS WITHOUT COMPLICATION, WITH LONG-TERM CURRENT USE OF INSULIN (HCC): ICD-10-CM

## 2025-05-01 NOTE — TELEPHONE ENCOUNTER
Medication:   Requested Prescriptions     Pending Prescriptions Disp Refills    insulin lispro, 1 Unit Dial, (HUMALOG/ADMELOG) 100 UNIT/ML SOPN [Pharmacy Med Name: INSULIN LISPRO 100 UNIT/ML PEN]       Sig: INJECT 12-21 UNITS 3 TIMES A DAY BEFORE MEALS, IF SUGAR ABOVE 300 = INJECT 21 UNITS       Last Filled:      Patient Phone Number: [phone] (home)     Last appt: 8/30/2024   Next appt: 5/2/2025    Last Labs DM:   Lab Results   Component Value Date/Time    LABA1C 9.4 01/15/2025 03:22 PM

## 2025-05-02 ENCOUNTER — TELEPHONE (OUTPATIENT)
Dept: ENDOCRINOLOGY | Age: 60
End: 2025-05-02

## 2025-05-02 ENCOUNTER — TELEMEDICINE (OUTPATIENT)
Dept: ENDOCRINOLOGY | Age: 60
End: 2025-05-02

## 2025-05-02 DIAGNOSIS — E11.9 TYPE 2 DIABETES MELLITUS WITHOUT COMPLICATION, WITH LONG-TERM CURRENT USE OF INSULIN (HCC): Primary | ICD-10-CM

## 2025-05-02 DIAGNOSIS — E04.1 THYROID NODULE: Primary | ICD-10-CM

## 2025-05-02 DIAGNOSIS — Z79.4 TYPE 2 DIABETES MELLITUS WITHOUT COMPLICATION, WITH LONG-TERM CURRENT USE OF INSULIN (HCC): ICD-10-CM

## 2025-05-02 DIAGNOSIS — Z79.4 TYPE 2 DIABETES MELLITUS WITHOUT COMPLICATION, WITH LONG-TERM CURRENT USE OF INSULIN (HCC): Primary | ICD-10-CM

## 2025-05-02 DIAGNOSIS — E11.9 TYPE 2 DIABETES MELLITUS WITHOUT COMPLICATION, WITH LONG-TERM CURRENT USE OF INSULIN (HCC): ICD-10-CM

## 2025-05-02 RX ORDER — INSULIN LISPRO 100 [IU]/ML
INJECTION, SOLUTION INTRAVENOUS; SUBCUTANEOUS
Qty: 15 ML | Refills: 0 | Status: SHIPPED | OUTPATIENT
Start: 2025-05-02 | End: 2025-05-02 | Stop reason: SDUPTHER

## 2025-05-02 RX ORDER — INSULIN GLARGINE 100 [IU]/ML
INJECTION, SOLUTION SUBCUTANEOUS
Qty: 10 ADJUSTABLE DOSE PRE-FILLED PEN SYRINGE | Refills: 1 | Status: SHIPPED | OUTPATIENT
Start: 2025-05-02

## 2025-05-02 RX ORDER — INSULIN LISPRO 100 [IU]/ML
INJECTION, SOLUTION INTRAVENOUS; SUBCUTANEOUS
Qty: 15 ML | Refills: 4 | Status: SHIPPED | OUTPATIENT
Start: 2025-05-02

## 2025-05-02 RX ORDER — GLUCOSAM/CHON-MSM1/C/MANG/BOSW 500-416.6
TABLET ORAL
Qty: 150 EACH | Refills: 3 | Status: SHIPPED | OUTPATIENT
Start: 2025-05-02

## 2025-05-02 NOTE — TELEPHONE ENCOUNTER
Pt calling and states the incorrect pen needles were sent to pharmacy    She needs the BD Sweta 2 Gen Pen  32G x 4mm    [facility] [phone]

## 2025-05-02 NOTE — PROGRESS NOTES
Seen as f/u patient for diabetes,      Patient was evaluated through a synchronous (real-time) audio-video encounter. The patient (or guardian if applicable) is aware that this is a billable service, which includes applicable co-pays. This Virtual Visit was conducted with patient's (and/or legal guardian's) consent. Patient identification was verified, and a caregiver was present when appropriate.   The [place]:   Provider was [place]    STOP! Confirm you are appropriately licensed, registered, or certified to deliver care in the state where the patient is located as indicated above. If you are not or unsure, please re-schedule the visit.    Are you appropriately licensed in the patient's state?: Yes        Interim:    Seen after 8/24  Had high Tg Ab   Needed to discuss  Weight gain, hair loss, dry skin  She had pacemaker  Seeing nephrology for kidney stones    Reports gastroparesis    She had PCI  ACS    Neuropathy in hands    H/o thyroid nodule  Difficulty swallowing  No lows    Diagnosed with Type 2 diabetes mellitus   Known diabetic complications: none     Current diabetic medications   Metformin ER 500mg - not taking reports lows with it- off of it   lantus 58  unit daily  Humalog 12/12/12  SSI 2 for >150 takes around 18  combined scale    Intolerant to plain metformin    Last A1c 9.4%<----10.2%<----12%<-----8.1%<----9.1%<-----8.1% <---- 7.2%<------ 9.6 %<----8.2%<-----9<-----7.8%<------7.8%<------ 9.2%<-----8.8%<----- 10.3 on 7/17<------10.8 on 12/16<----- 9.1 on 8/16<-----11.4 on 6/16<-----8.8 on 4.15<---8.9 <---10.1    Prior visit with dietician: Yes   Current diet: on average, 1 meals per day + Snacks   Reports h/o gastroparesis  Current exercise: walking   Current monitoring regimen: home blood tests -1  times daily does in forearms    Report h/o pancreatitis, stent in pancreatic duct  Also h/o yeast infection    Has brought blood glucose log/meter: yes  Home blood sugar

## 2025-05-06 NOTE — PROGRESS NOTES
[facility]   Electrophysiology Follow up   Date: 5/8/2025  I had the privilege of visiting Lanny Carbajal in the office.       CC: AV Block   HPI: Lanny Carbajal is a 60 y.o. female with a past medical history of HTN, HLD, DM, CAD, Obesity, SHIRLEY and non obstructive CAD. Protein S deficiency on eliquis.     S/p dual chamber PPM (5/10/2021). She was found to have worsening EF and was seen in EP office to discuss upgrading the device to Biv-AICD.      S/p laser lead extraction with Biv AICD upgrade (1/9/25)     History of Present Illness  She reports a sensation of pulling in her arm upon movement. She has been experiencing fluid retention, estimating an accumulation of approximately 12 pounds of water weight. This issue has been persistent for some time.     Her diuretics was switched from Lasix to torsemide due to the former's ineffectiveness. However, she has observed that concurrent administration of torsemide with her other medications results in a significant drop in blood pressure, rendering her unable to function. Consequently, she has reduced her torsemide dosage to half a tablet, which she takes in the afternoon and evening, as she takes her other medications at 8:00 AM. She experienced dizziness when taking her morning medications, leading to a decision to take them at noon instead.    She is not currently on CPAP therapy. She has previously tried CPAP and BiPAP but found them intolerable. She does not believe she has sleep apnea, citing significant weight loss as a potential resolution of the condition. She has an adjustable bed for comfort and does not wish to consult a sleep specialist. She has lost 150 pounds and only snores when her head is down while sitting upright in a chair.    She has been informed that her potassium levels are within the normal range but are increasing. She is not currently taking magnesium supplements. She has a history of irritable bowel syndrome (IBS), gastroesophageal

## 2025-05-08 ENCOUNTER — OFFICE VISIT (OUTPATIENT)
Dept: CARDIOLOGY CLINIC | Age: 60
End: 2025-05-08
Payer: COMMERCIAL

## 2025-05-08 ENCOUNTER — CLINICAL SUPPORT (OUTPATIENT)
Dept: CARDIOLOGY CLINIC | Age: 60
End: 2025-05-08

## 2025-05-08 VITALS
BODY MASS INDEX: 44.72 KG/M2 | OXYGEN SATURATION: 99 % | HEART RATE: 65 BPM | DIASTOLIC BLOOD PRESSURE: 74 MMHG | HEIGHT: 62 IN | SYSTOLIC BLOOD PRESSURE: 118 MMHG | WEIGHT: 243 LBS

## 2025-05-08 DIAGNOSIS — I25.5 ISCHEMIC CARDIOMYOPATHY: ICD-10-CM

## 2025-05-08 DIAGNOSIS — I51.9 LV DYSFUNCTION: ICD-10-CM

## 2025-05-08 DIAGNOSIS — I10 PRIMARY HYPERTENSION: ICD-10-CM

## 2025-05-08 DIAGNOSIS — I25.10 CAD IN NATIVE ARTERY: Primary | ICD-10-CM

## 2025-05-08 DIAGNOSIS — I44.1 MOBITZ TYPE II ATRIOVENTRICULAR BLOCK: ICD-10-CM

## 2025-05-08 DIAGNOSIS — Z95.810 ICD (IMPLANTABLE CARDIOVERTER-DEFIBRILLATOR), BIVENTRICULAR, IN SITU: Primary | ICD-10-CM

## 2025-05-08 PROCEDURE — G8427 DOCREV CUR MEDS BY ELIG CLIN: HCPCS | Performed by: INTERNAL MEDICINE

## 2025-05-08 PROCEDURE — 99214 OFFICE O/P EST MOD 30 MIN: CPT | Performed by: INTERNAL MEDICINE

## 2025-05-08 PROCEDURE — 3078F DIAST BP <80 MM HG: CPT | Performed by: INTERNAL MEDICINE

## 2025-05-08 PROCEDURE — 93000 ELECTROCARDIOGRAM COMPLETE: CPT | Performed by: INTERNAL MEDICINE

## 2025-05-08 PROCEDURE — 3017F COLORECTAL CA SCREEN DOC REV: CPT | Performed by: INTERNAL MEDICINE

## 2025-05-08 PROCEDURE — 3074F SYST BP LT 130 MM HG: CPT | Performed by: INTERNAL MEDICINE

## 2025-05-08 PROCEDURE — G8417 CALC BMI ABV UP PARAM F/U: HCPCS | Performed by: INTERNAL MEDICINE

## 2025-05-08 PROCEDURE — 1036F TOBACCO NON-USER: CPT | Performed by: INTERNAL MEDICINE

## 2025-05-08 PROCEDURE — G2211 COMPLEX E/M VISIT ADD ON: HCPCS | Performed by: INTERNAL MEDICINE

## 2025-05-08 RX ORDER — MAGNESIUM OXIDE 400 MG/1
400 TABLET ORAL DAILY
Qty: 90 TABLET | Refills: 1 | Status: SHIPPED | OUTPATIENT
Start: 2025-05-08

## 2025-05-08 NOTE — PATIENT INSTRUCTIONS
Try magnesium supplement   Follow up with heart failure team  Follow up with one of our nurse practitioners in one year  Consider sleep medicine referral

## 2025-05-09 NOTE — TELEPHONE ENCOUNTER
Head, normocephalic, atraumatic, Face, Face within normal limits, Ears, External ears within normal limits, Nose/Nasopharynx, External nose normal appearance, nares patent, no nasal discharge, Mouth and Throat, Oral cavity appearance normal, Lips, Appearance normal Pt has been called and scheduled

## 2025-05-16 ENCOUNTER — HOSPITAL ENCOUNTER (OUTPATIENT)
Age: 60
Discharge: HOME | End: 2025-05-18
Attending: INTERNAL MEDICINE
Payer: COMMERCIAL

## 2025-05-16 DIAGNOSIS — I82.422 ACUTE EMBOLISM AND THROMBOSIS OF LEFT ILIAC VEIN (HCC): ICD-10-CM

## 2025-05-16 PROCEDURE — 93970 EXTREMITY STUDY: CPT | Performed by: INTERNAL MEDICINE

## 2025-05-16 PROCEDURE — 93970 EXTREMITY STUDY: CPT

## 2025-05-19 ENCOUNTER — TELEPHONE (OUTPATIENT)
Dept: CARDIOLOGY CLINIC | Age: 60
End: 2025-05-19

## 2025-05-19 NOTE — TELEPHONE ENCOUNTER
Pt called to speak w/Angy.  Her heart was racing Pt is not sure how to send a transmission. Please call to discuss her concerns and help trouble her how to send a transmission.  Thank you

## 2025-05-19 NOTE — TELEPHONE ENCOUNTER
Called and spoke with Lanny. Talked her through sending a remote transmission. [place] it beep at the end. Will review once received. Thanks

## 2025-05-19 NOTE — TELEPHONE ENCOUNTER
We received remote transmission from patient's monitor at home. Transmission shows normal sensing and pacing function.   Current ECG ASBVP at ~ 76 bpm  No episodes noted.   OptiVol WNL  See interrogation in [facility] or [facility] for more details.    Please advise Patient there were no episodes noted and device shows normal function. Thanks

## 2025-05-21 DIAGNOSIS — E11.9 TYPE 2 DIABETES MELLITUS WITHOUT COMPLICATION, WITH LONG-TERM CURRENT USE OF INSULIN (HCC): ICD-10-CM

## 2025-05-21 DIAGNOSIS — Z79.4 TYPE 2 DIABETES MELLITUS WITHOUT COMPLICATION, WITH LONG-TERM CURRENT USE OF INSULIN (HCC): ICD-10-CM

## 2025-05-21 RX ORDER — INSULIN LISPRO 100 [IU]/ML
INJECTION, SOLUTION INTRAVENOUS; SUBCUTANEOUS
Qty: 15 ML | Refills: 1 | Status: SHIPPED | OUTPATIENT
Start: 2025-05-21

## 2025-05-21 RX ORDER — CALCIUM CITRATE/VITAMIN D3 200MG-6.25
TABLET ORAL
Qty: 100 STRIP | Refills: 5 | Status: SHIPPED | OUTPATIENT
Start: 2025-05-21

## 2025-05-21 RX ORDER — TORSEMIDE 20 MG/1
20 TABLET ORAL DAILY
Qty: 90 TABLET | Refills: 1 | Status: SHIPPED | OUTPATIENT
Start: 2025-05-21

## 2025-05-21 NOTE — TELEPHONE ENCOUNTER
Medication:   Requested Prescriptions     Pending Prescriptions Disp Refills    blood glucose test strips (TRUE METRIX BLOOD GLUCOSE TEST) strip [Pharmacy Med Name: [facility] TRUE METRIX GLUC TEST STRP] 100 strip 5     Sig: USE TO TEST 4-5 TIMES A DAY AS NEEDED    insulin lispro, 1 Unit Dial, (HUMALOG/ADMELOG) 100 UNIT/ML SOPN [Pharmacy Med Name: INSULIN LISPRO 100 UNIT/ML PEN]       Sig: INJECT 12-21 UNITS 3 TIMES A DAY BEFORE MEALS, IF SUGAR ABOVE 300 = INJECT 21 UNITS       Last Filled:      Patient Phone Number: [phone] (home)     Last appt: 5/2/2025   Next appt: 6/27/2025    Last Labs DM:   Lab Results   Component Value Date/Time    LABA1C 9.4 01/15/2025 03:22 PM

## 2025-05-21 NOTE — TELEPHONE ENCOUNTER
Last OV: 25 RMM  Next OV: 25 TOREY  Last Labs: 25  Last EK/10/25  Last Fill:   torsemide (DEMADEX) 20 MG tablet 30 tablet 3 3/14/2025 --    Sig - Route: Take 1 tablet by mouth daily - Oral    Sent to pharmacy as: Torsemide 20 MG Oral Tablet (DEMADEX)    Cosign for Ordering: Accepted by Raffy Minaya MD on 3/14/2025  3:04 PM    E-Prescribing Status: Receipt confirmed by pharmacy (3/14/2025  2:54 PM EDT)

## 2025-06-15 DIAGNOSIS — Z79.4 TYPE 2 DIABETES MELLITUS WITHOUT COMPLICATION, WITH LONG-TERM CURRENT USE OF INSULIN (HCC): ICD-10-CM

## 2025-06-15 DIAGNOSIS — E11.9 TYPE 2 DIABETES MELLITUS WITHOUT COMPLICATION, WITH LONG-TERM CURRENT USE OF INSULIN (HCC): ICD-10-CM

## 2025-06-16 RX ORDER — CALCIUM CITRATE/VITAMIN D3 200MG-6.25
TABLET ORAL
Qty: 600 STRIP | Refills: 0 | Status: SHIPPED | OUTPATIENT
Start: 2025-06-16

## 2025-06-23 NOTE — TELEPHONE ENCOUNTER
Last ov:25 RMM  Next ov:25 TOREY  Last EK25  Last labs:3/20/25  Last filled:   Disp Refills Start End    carvedilol (COREG) 6.25 MG tablet 180 tablet 3 2024 --    Sig - Route: Take 1 tablet by mouth 2 times daily - Oral    Sent to pharmacy as: Carvedilol 6.25 MG Oral Tablet (COREG)    Cosign for Ordering: Accepted by Raffy Minaya MD on 2024  1:51 PM    E-Prescribing Status: Receipt confirmed by pharmacy (2024  1:39 PM EDT)

## 2025-06-23 NOTE — TELEPHONE ENCOUNTER
Medication Refill    Medication needing refilled:  carvedilol (COREG)     Dosage of the medication:  6.25 MG tablet     How are you taking this medication (QD, BID, TID, QID, PRN):  Take 1 tablet by mouth 2 times daily     30 or 90 day supply called in:  90    When will you run out of your medication:    Which Pharmacy are we sending the medication to?:    [facility]/pharmacy #[id number] - [place], [place] - [address]  P [phone] - F [phone]

## 2025-06-24 RX ORDER — CARVEDILOL 6.25 MG/1
6.25 TABLET ORAL 2 TIMES DAILY
Qty: 180 TABLET | Refills: 3 | Status: SHIPPED | OUTPATIENT
Start: 2025-06-24

## 2025-06-27 ENCOUNTER — TELEMEDICINE (OUTPATIENT)
Dept: ENDOCRINOLOGY | Age: 60
End: 2025-06-27

## 2025-06-27 DIAGNOSIS — E11.9 TYPE 2 DIABETES MELLITUS WITHOUT COMPLICATION, WITH LONG-TERM CURRENT USE OF INSULIN (HCC): ICD-10-CM

## 2025-06-27 DIAGNOSIS — Z79.4 TYPE 2 DIABETES MELLITUS WITHOUT COMPLICATION, WITH LONG-TERM CURRENT USE OF INSULIN (HCC): ICD-10-CM

## 2025-06-27 DIAGNOSIS — E04.1 THYROID NODULE: Primary | ICD-10-CM

## 2025-06-27 RX ORDER — INSULIN GLARGINE 100 [IU]/ML
INJECTION, SOLUTION SUBCUTANEOUS
Qty: 10 ADJUSTABLE DOSE PRE-FILLED PEN SYRINGE | Refills: 1 | Status: SHIPPED | OUTPATIENT
Start: 2025-06-27

## 2025-06-27 RX ORDER — CALCIUM CITRATE/VITAMIN D3 200MG-6.25
TABLET ORAL
Qty: 600 STRIP | Refills: 0 | Status: SHIPPED | OUTPATIENT
Start: 2025-06-27

## 2025-06-27 RX ORDER — PEN NEEDLE, DIABETIC 32GX 5/32"
NEEDLE, DISPOSABLE MISCELLANEOUS
Qty: 200 EACH | Refills: 3 | Status: SHIPPED | OUTPATIENT
Start: 2025-06-27

## 2025-06-27 RX ORDER — INSULIN LISPRO 100 [IU]/ML
INJECTION, SOLUTION INTRAVENOUS; SUBCUTANEOUS
Qty: 30 ML | Refills: 2 | Status: SHIPPED | OUTPATIENT
Start: 2025-06-27

## 2025-06-27 NOTE — PROGRESS NOTES
PACEMAKER INSERTION  05/10/2021    TONSILLECTOMY      UPPER GASTROINTESTINAL ENDOSCOPY      UPPER GASTROINTESTINAL ENDOSCOPY  04/12/2011    DILATATION, 58 FR ARTHUR, 100 UNITS BOTOX    UPPER GASTROINTESTINAL ENDOSCOPY  08/30/2011    58 FR DILATATION, 100 UNITS  BOTOX INJECTION    UPPER GASTROINTESTINAL ENDOSCOPY  03/09/2012    with dilatation and submucosal injection: Botox    UPPER GASTROINTESTINAL ENDOSCOPY      UPPER GASTROINTESTINAL ENDOSCOPY  11/20/2012     Esophagogastroduodenoscopy with Botulinum toxin injection of the pylorus and Arthur esophageal dilation    UPPER GASTROINTESTINAL ENDOSCOPY  06/04/2013    WITH DIILITATION AND BOTOX INJECTION    UPPER GASTROINTESTINAL ENDOSCOPY  05/20/2014    100 unit Botox injection, 56 Fr. Arthur esophageal dilatation    UPPER GASTROINTESTINAL ENDOSCOPY  12/12/2014    with esophageal dilatation, and botox injection    UPPER GASTROINTESTINAL ENDOSCOPY  09/09/2015    With Dilitation and Botox injection    UPPER GASTROINTESTINAL ENDOSCOPY  05/10/2016    distal esophagus biopsy, stomach biopsy, botox injection    UPPER GASTROINTESTINAL ENDOSCOPY  04/11/2017    with dilatation and botox injection    UPPER GASTROINTESTINAL ENDOSCOPY  10/24/2017    DILATATION, BIOPSY, BOTOX    UPPER GASTROINTESTINAL ENDOSCOPY  03/06/2018    WITH BOTOX AND BALLOON DILATATION    UPPER GASTROINTESTINAL ENDOSCOPY N/A 05/07/2019    EGD SUBMUCOSAL/BOTOX INJECTION performed by Geoffrey Velarde MD at [facility] ENDOSCOPY    UPPER GASTROINTESTINAL ENDOSCOPY N/A 05/07/2019    EGD DILATION BALLOON performed by Geoffrey Velarde MD at [facility] ENDOSCOPY    UPPER GASTROINTESTINAL ENDOSCOPY N/A 03/13/2020    EGD SUBMUCOSAL/BOTOX INJECTION performed by Geoffrey Velarde MD at [facility] ENDOSCOPY    UPPER GASTROINTESTINAL ENDOSCOPY N/A 03/13/2020    EGD DILATION BALLOON performed by Geoffrey Velarde MD at [facility] ENDOSCOPY    UPPER GASTROINTESTINAL ENDOSCOPY N/A 10/06/2020    EGD DILATION BALLOON performed by

## 2025-07-02 NOTE — PROGRESS NOTES
[facility]  Advanced CHF/Pulmonary Hypertension   Cardiac Evaluation      Lanny Carbajal  YOB: 1965    Date of Visit:  7/9/25      Chief Complaint   Patient presents with    Cardiomyopathy    New Patient    Edema    Shortness of Breath    Dizziness    orthopnea     Sleeps in auto-incline bed        History of Present Illness:  Lanny Carbajal is a 60 y.o. female who presents from referral from Dr. Minaya  for consultation and management of systolic heart failure EF 35% on Echo 7/5/24. She is on Coreg, and Low dose Entresto.      Lanny Carbajal is 60 y.o. and has a history of HTN, HLD, DM, SHIRLEY, non-obstructive CAD,  Protein S deficiency - on long term AC. Nephrolithiasis and hyponatremia, SNIDER Cirrhosis, DN , Obesity, thyroid nodule, GERD, IBS.      She was was having chest pain low EF by echo.  PCI to the LAD on 12/15/23.   7/2024 ISR of LAD POBA and PCI LAD Dr. Minaya.     5/10/2021  s/p dual chamber PPM 2:1 AVB on ECG, Follows with Dr. Gonzales.   She follows with Nephrology Dr. Beba López     Patient has a history of not tolerating multiple medication changes.  She did not tolerate higher doses of Entresto. Did not tolerate metoprolol.  She changed to Coreg.       10/3/24  seen as a new patient.  START Spironolactone 25mg on Mondays and Fridays Only.   She states her Entresto was started in July 6, 2024 by Dr. Minaya.  She reports dizziness and started to use a cane to walk.  She states she was told never to take Farxiga or Jardiance per her nephrologist.  Discussed the 4 Pillars of Heart Failure treatment.  Discuss medications that only get rid of water as opposed to medications that work to help block hormones that build up water.  Discussed that Spironolactone and Lasix can be used together.  She checks her BP if she is dizzy.  BP was 105/62 which she states is low for her.  She is here today with her Sister in law, Lanny.  She was educated about wearing HANH stocking and states that

## 2025-07-09 ENCOUNTER — HOSPITAL ENCOUNTER (OUTPATIENT)
Age: 60
Discharge: HOME | End: 2025-07-09

## 2025-07-09 ENCOUNTER — RESULTS FOLLOW-UP (OUTPATIENT)
Dept: CARDIOLOGY CLINIC | Age: 60
End: 2025-07-09

## 2025-07-09 ENCOUNTER — OFFICE VISIT (OUTPATIENT)
Dept: CARDIOLOGY CLINIC | Age: 60
End: 2025-07-09
Payer: COMMERCIAL

## 2025-07-09 ENCOUNTER — HOSPITAL ENCOUNTER (OUTPATIENT)
Dept: ONCOLOGY | Age: 60
Setting detail: INFUSION SERIES
Discharge: HOME | End: 2025-07-09
Payer: COMMERCIAL

## 2025-07-09 VITALS
HEIGHT: 62 IN | WEIGHT: 243 LBS | HEART RATE: 70 BPM | OXYGEN SATURATION: 98 % | SYSTOLIC BLOOD PRESSURE: 118 MMHG | DIASTOLIC BLOOD PRESSURE: 60 MMHG | BODY MASS INDEX: 44.72 KG/M2

## 2025-07-09 VITALS
RESPIRATION RATE: 16 BRPM | DIASTOLIC BLOOD PRESSURE: 75 MMHG | HEART RATE: 68 BPM | SYSTOLIC BLOOD PRESSURE: 161 MMHG | TEMPERATURE: 97.4 F

## 2025-07-09 DIAGNOSIS — E78.00 HYPERCHOLESTEROLEMIA: ICD-10-CM

## 2025-07-09 DIAGNOSIS — Z95.810 ICD (IMPLANTABLE CARDIOVERTER-DEFIBRILLATOR), BIVENTRICULAR, IN SITU: ICD-10-CM

## 2025-07-09 DIAGNOSIS — R42 DIZZINESS: ICD-10-CM

## 2025-07-09 DIAGNOSIS — D50.9 IRON DEFICIENCY ANEMIA, UNSPECIFIED IRON DEFICIENCY ANEMIA TYPE: ICD-10-CM

## 2025-07-09 DIAGNOSIS — Z95.5 HISTORY OF CORONARY ARTERY STENT PLACEMENT: ICD-10-CM

## 2025-07-09 DIAGNOSIS — I50.22 SYSTOLIC CHF, CHRONIC (HCC): Primary | ICD-10-CM

## 2025-07-09 DIAGNOSIS — I25.10 CORONARY ARTERY DISEASE DUE TO LIPID RICH PLAQUE: ICD-10-CM

## 2025-07-09 DIAGNOSIS — R06.02 SHORTNESS OF BREATH: ICD-10-CM

## 2025-07-09 DIAGNOSIS — I25.83 CORONARY ARTERY DISEASE DUE TO LIPID RICH PLAQUE: ICD-10-CM

## 2025-07-09 LAB
ALBUMIN SERPL-MCNC: 4 G/DL (ref 3.4–5)
ALBUMIN/GLOB SERPL: 1.3 {RATIO} (ref 1.1–2.2)
ALP SERPL-CCNC: 22 U/L (ref 40–129)
ALT SERPL-CCNC: 39 U/L (ref 10–40)
ANION GAP SERPL CALCULATED.3IONS-SCNC: 13 MMOL/L (ref 3–16)
AST SERPL-CCNC: 37 U/L (ref 15–37)
BASOPHILS # BLD: 0.1 K/UL (ref 0–0.2)
BASOPHILS NFR BLD: 1.1 %
BILIRUB SERPL-MCNC: 0.3 MG/DL (ref 0–1)
BUN SERPL-MCNC: 22 MG/DL (ref 7–20)
CALCIUM SERPL-MCNC: 10.1 MG/DL (ref 8.3–10.6)
CHLORIDE SERPL-SCNC: 100 MMOL/L (ref 99–110)
CO2 SERPL-SCNC: 23 MMOL/L (ref 21–32)
CREAT SERPL-MCNC: 1 MG/DL (ref 0.6–1.2)
DEPRECATED RDW RBC AUTO: 16.2 % (ref 12.4–15.4)
EOSINOPHIL # BLD: 0.2 K/UL (ref 0–0.6)
EOSINOPHIL NFR BLD: 1.9 %
FERRITIN SERPL IA-MCNC: 92.6 NG/ML (ref 15–150)
GFR SERPLBLD CREATININE-BSD FMLA CKD-EPI: 64 ML/MIN/{1.73_M2}
GLUCOSE SERPL-MCNC: 222 MG/DL (ref 70–99)
HCT VFR BLD AUTO: 37 % (ref 36–48)
HGB BLD-MCNC: 12.2 G/DL (ref 12–16)
IRON SATN MFR SERPL: 14 % (ref 15–50)
IRON SERPL-MCNC: 47 UG/DL (ref 37–145)
LYMPHOCYTES # BLD: 3 K/UL (ref 1–5.1)
LYMPHOCYTES NFR BLD: 31.3 %
MAGNESIUM SERPL-MCNC: 1.8 MG/DL (ref 1.8–2.4)
MCH RBC QN AUTO: 30 PG (ref 26–34)
MCHC RBC AUTO-ENTMCNC: 33.1 G/DL (ref 31–36)
MCV RBC AUTO: 90.8 FL (ref 80–100)
MONOCYTES # BLD: 0.6 K/UL (ref 0–1.3)
MONOCYTES NFR BLD: 6.3 %
NEUTROPHILS # BLD: 5.7 K/UL (ref 1.7–7.7)
NEUTROPHILS NFR BLD: 59.4 %
NT-PROBNP SERPL-MCNC: 124 PG/ML (ref 0–124)
PLATELET # BLD AUTO: 181 K/UL (ref 135–450)
PMV BLD AUTO: 9.1 FL (ref 5–10.5)
POTASSIUM SERPL-SCNC: 4.4 MMOL/L (ref 3.5–5.1)
PROT SERPL-MCNC: 7 G/DL (ref 6.4–8.2)
RBC # BLD AUTO: 4.08 M/UL (ref 4–5.2)
SODIUM SERPL-SCNC: 136 MMOL/L (ref 136–145)
TIBC SERPL-MCNC: 333 UG/DL (ref 260–445)
WBC # BLD AUTO: 9.6 K/UL (ref 4–11)

## 2025-07-09 PROCEDURE — 96374 THER/PROPH/DIAG INJ IV PUSH: CPT

## 2025-07-09 PROCEDURE — 99211 OFF/OP EST MAY X REQ PHY/QHP: CPT

## 2025-07-09 PROCEDURE — 85025 COMPLETE CBC W/AUTO DIFF WBC: CPT

## 2025-07-09 PROCEDURE — 6360000002 HC RX W HCPCS: Performed by: INTERNAL MEDICINE

## 2025-07-09 PROCEDURE — 3074F SYST BP LT 130 MM HG: CPT | Performed by: INTERNAL MEDICINE

## 2025-07-09 PROCEDURE — G8427 DOCREV CUR MEDS BY ELIG CLIN: HCPCS | Performed by: INTERNAL MEDICINE

## 2025-07-09 PROCEDURE — 99215 OFFICE O/P EST HI 40 MIN: CPT | Performed by: INTERNAL MEDICINE

## 2025-07-09 PROCEDURE — 83550 IRON BINDING TEST: CPT

## 2025-07-09 PROCEDURE — G8417 CALC BMI ABV UP PARAM F/U: HCPCS | Performed by: INTERNAL MEDICINE

## 2025-07-09 PROCEDURE — 3017F COLORECTAL CA SCREEN DOC REV: CPT | Performed by: INTERNAL MEDICINE

## 2025-07-09 PROCEDURE — 1036F TOBACCO NON-USER: CPT | Performed by: INTERNAL MEDICINE

## 2025-07-09 PROCEDURE — 82728 ASSAY OF FERRITIN: CPT

## 2025-07-09 PROCEDURE — 83735 ASSAY OF MAGNESIUM: CPT

## 2025-07-09 PROCEDURE — 80053 COMPREHEN METABOLIC PANEL: CPT

## 2025-07-09 PROCEDURE — 83540 ASSAY OF IRON: CPT

## 2025-07-09 PROCEDURE — G2211 COMPLEX E/M VISIT ADD ON: HCPCS | Performed by: INTERNAL MEDICINE

## 2025-07-09 PROCEDURE — 3078F DIAST BP <80 MM HG: CPT | Performed by: INTERNAL MEDICINE

## 2025-07-09 PROCEDURE — 83880 ASSAY OF NATRIURETIC PEPTIDE: CPT

## 2025-07-09 RX ORDER — FUROSEMIDE 10 MG/ML
80 INJECTION INTRAMUSCULAR; INTRAVENOUS ONCE
Status: COMPLETED | OUTPATIENT
Start: 2025-07-09 | End: 2025-07-09

## 2025-07-09 RX ADMIN — FUROSEMIDE 80 MG: 10 INJECTION, SOLUTION INTRAMUSCULAR; INTRAVENOUS at 13:58

## 2025-07-09 NOTE — PATIENT INSTRUCTIONS
Plan:  Consider urology consult due to hematuria  Will reach out to Beba López MD Nephrology regarding SGL2  3.   Infusion Center for IV Lasix 80mg today and Labs   4.   See Cathie or Magali in 6-8 weeks.

## 2025-07-09 NOTE — PROGRESS NOTES
Patient to infusion center for IVP lasix and labs.  Patient's blood specimen taken to lab.  Patient tolerated IVP lasix well.  Educated patient on IV lasix and told of most common side effects.  [facility]  Discharged ambulatory to home with family.

## 2025-07-09 NOTE — PROGRESS NOTES
To infusion center for IVP lasix, labs drawn. Pt tolerated medication well no s/s of an adverse reaction. Pt VSS. Pt denied need for printed AVS. Ambulatory at discharge with walker. To follow up with regular PCP.

## 2025-07-10 ENCOUNTER — TELEPHONE (OUTPATIENT)
Dept: CARDIOLOGY CLINIC | Age: 60
End: 2025-07-10

## 2025-07-10 NOTE — TELEPHONE ENCOUNTER
Spoke to patient her current weight 244.3, still swelling in legs and feet and painful to walk and sob with exertion, no relief with the IV lasix just urinated a few times nothing more than her normal, watching salt and fluids, no recent diet changes, no changes in medications, patient seen by Tj yesterday but would like Dr. Garcia thoughts she wants to know if she can be admitted to have the fluid taken off of her

## 2025-07-10 NOTE — TELEPHONE ENCOUNTER
Called and spoke with patient. Explained dr Minaya and Christian discussed her case and do not believe she needs inpt admission at this time. However, if she feels like it is necessary she can go to the ER for evaluation. Patient would like to know if she can increase her water pill. I will forward to Dr Gonzales's team for consideration.

## 2025-07-10 NOTE — TELEPHONE ENCOUNTER
----- Message from Dr. Deanna Gonzales MD sent at 7/9/2025 11:06 PM EDT -----  Please  call patient.  Her labs do not suggest that  she is significantly fluid overloaded.  Her BNP level is lower than it has been in the past (fluid indicator).  Also her iron levels are normal.  My suggestion:  1.  Continue torsemide one pill a day  2.  Use compression and  elevation on legs to help with swelling  3.   I will  reach out to her nephrologist  about starting Jardiance and  let her  know.  TOREY

## 2025-07-10 NOTE — TELEPHONE ENCOUNTER
Pt seen TOREY 7/9 and sent pt to infusion center due to extra fluid. Pt states she is retaining water, the Lasix infusion didn't help with this and pt asking to discuss if there is something else to take that will help. Pt states last time she was admitted and this did help her. Please advise pt.

## 2025-07-10 NOTE — TELEPHONE ENCOUNTER
DCE discussed with Dr Gonzales. They agree inpatient admission is not indicated at this time. Please update patient

## 2025-07-10 NOTE — TELEPHONE ENCOUNTER
Pt called to request to  speak w/DCE re: both left foot and legs are swollen, painful, sob when she move around, shinning and they are red, cold to touch.  Please call to discuss her concerns.  Thank you

## 2025-07-11 NOTE — TELEPHONE ENCOUNTER
Please call patient.  Yes, she can increase the torsemide to 2 a day until Monday.  I called her nephrologist office.  Dr. López is no longer in town and Dr. Stoddard who will see her next is out of town until Monday.  I will try to reach her Monday about adding Jardiance.    TOREY

## 2025-07-15 NOTE — TELEPHONE ENCOUNTER
No, I talked to Dr. Stoddard of nephrology who will be seeing her in place of Dr. López who has left the practice.  She will assess her in the office and consider the addition of Jardiance at that time.  TOREY

## 2025-07-16 NOTE — TELEPHONE ENCOUNTER
Spoke to pt.  She was made aware of plan stated below.  She states she lost 6 pounds in 1 day taking Torsemide 40mg.   She has kept the wt off.  She is back to torsemide 20mg daily.      Wt 233 today.    She will see nephrology Dr. Stoddard on 9/2.

## 2025-07-18 ENCOUNTER — TELEPHONE (OUTPATIENT)
Dept: CARDIOLOGY CLINIC | Age: 60
End: 2025-07-18

## 2025-07-18 LAB
CREATININE URINE: 141.9 MG/DL
ESTIMATED AVERAGE GLUCOSE: 209 MG/DL
HBA1C MFR BLD: 8.9 % (ref 3.8–5.6)
MICROALBUMIN/CREAT 24H UR: 16.8 MG/L
MICROALBUMIN/CREAT UR-RTO: 12 MG/GM

## 2025-07-18 RX ORDER — SACUBITRIL AND VALSARTAN 24; 26 MG/1; MG/1
0.5 TABLET, FILM COATED ORAL 2 TIMES DAILY
Qty: 30 TABLET | Refills: 11 | Status: CANCELLED | OUTPATIENT
Start: 2025-07-18

## 2025-07-18 NOTE — TELEPHONE ENCOUNTER
Medication Refill    Medication needing refilled:    sacubitril-valsartan (ENTRESTO)   Dosage of the medication:  24-26 MG per tablet   How are you taking this medication (QD, BID, TID, QID, PRN):    30 or 90 day supply called in:    When will you run out of your medication:    Which Pharmacy are we sending the medication to?:  [facility]/pharmacy #[id number] - [place], [place] - [address] - P [phone] - F [phone]  [address] [address]  Phone: [phone]  Fax: [phone]

## 2025-07-18 NOTE — TELEPHONE ENCOUNTER
Medication Requested: Entresto   [facility]erred [facility]  Last OV: 03/14/2025 DCE  Next OV: 9/11/2025 DCE  Labs/EKG: BMP 03/20/2025

## 2025-07-18 NOTE — TELEPHONE ENCOUNTER
Prescription refill    Last OV:07/09/2025 Tj    Last Refill:08/07/2024    Labs:07/09/2025    Future Appt:08/25/2025 NPRG

## 2025-07-21 ENCOUNTER — TELEPHONE (OUTPATIENT)
Dept: CARDIOLOGY CLINIC | Age: 60
End: 2025-07-21

## 2025-07-21 DIAGNOSIS — Z95.810 ICD (IMPLANTABLE CARDIOVERTER-DEFIBRILLATOR), BIVENTRICULAR, IN SITU: ICD-10-CM

## 2025-07-21 DIAGNOSIS — I51.9 LV DYSFUNCTION: Primary | ICD-10-CM

## 2025-07-21 RX ORDER — SACUBITRIL AND VALSARTAN 24; 26 MG/1; MG/1
0.5 TABLET, FILM COATED ORAL 2 TIMES DAILY
Qty: 30 TABLET | Refills: 11 | Status: SHIPPED | OUTPATIENT
Start: 2025-07-21

## 2025-07-21 NOTE — TELEPHONE ENCOUNTER
Patient calling central scheduling to schedule echo but no order is in. Patient states she was told to schedule for 6 months which should have been a while ago. Does echo need done? Please call patient to advise.

## 2025-07-21 NOTE — TELEPHONE ENCOUNTER
PT notified and verbalized understanding. Pt wants to get echo, she wants to know what her EF is.

## 2025-07-21 NOTE — TELEPHONE ENCOUNTER
Ese Irving, RN  [facility] Staff1 hour ago (3:55 PM)     MD YANG out of office this week. Patient just saw TOREY yesterday

## 2025-07-22 NOTE — TELEPHONE ENCOUNTER
I have no plan to reorder echo in the next 6 months.  And I did not see her this week.  I saw her 7/9.

## 2025-07-22 NOTE — TELEPHONE ENCOUNTER
Dr Minaya is out of the office this week and Dr Gonzales does not want to order an echo at this time

## 2025-07-22 NOTE — TELEPHONE ENCOUNTER
Spoke with patient,  stated that she was told to have a Echo 6 months after having new pacemaker put in and starting new medication.      Advised patient of the message from Ese Irving RN but she still had some concerns.

## 2025-07-22 NOTE — TELEPHONE ENCOUNTER
LVM relaying information per BLRN. Advised to c/b with any questions or concerns and to call CS for echo scheduling.

## 2025-07-22 NOTE — TELEPHONE ENCOUNTER
RMM or GARDENIA did not make note of wanting to order an echo in any of his notes. However, she was upgraded to a BiV-ICD for her LV dysfunction. Will order echo for her since she would like to have EF reassessed.   Order is placed. She can call central scheduling to get it scheduled.

## 2025-07-30 ENCOUNTER — HOSPITAL ENCOUNTER (OUTPATIENT)
Age: 60
Discharge: HOME | End: 2025-08-01
Attending: INTERNAL MEDICINE
Payer: COMMERCIAL

## 2025-07-30 VITALS — WEIGHT: 243 LBS | HEIGHT: 62 IN | BODY MASS INDEX: 44.72 KG/M2

## 2025-07-30 DIAGNOSIS — Z95.810 ICD (IMPLANTABLE CARDIOVERTER-DEFIBRILLATOR), BIVENTRICULAR, IN SITU: ICD-10-CM

## 2025-07-30 DIAGNOSIS — I51.9 LV DYSFUNCTION: ICD-10-CM

## 2025-07-30 LAB
ECHO AO ASC DIAM: 2.7 CM
ECHO AO ASCENDING AORTA INDEX: 1.3 CM/M2
ECHO AV AREA PEAK VELOCITY: 4.1 CM2
ECHO AV AREA VTI: 4.1 CM2
ECHO AV AREA/BSA PEAK VELOCITY: 2 CM2/M2
ECHO AV AREA/BSA VTI: 2 CM2/M2
ECHO AV MEAN GRADIENT: 4 MMHG
ECHO AV MEAN VELOCITY: 0.9 M/S
ECHO AV PEAK GRADIENT: 7 MMHG
ECHO AV PEAK VELOCITY: 1.3 M/S
ECHO AV VELOCITY RATIO: 1
ECHO AV VTI: 30.2 CM
ECHO BSA: 2.2 M2
ECHO EST RA PRESSURE: 3 MMHG
ECHO LA AREA 2C: 15 CM2
ECHO LA AREA 4C: 14 CM2
ECHO LA MAJOR AXIS: 5 CM
ECHO LA MINOR AXIS: 4.8 CM
ECHO LA VOL BP: 35 ML (ref 22–52)
ECHO LA VOL MOD A2C: 38 ML (ref 22–52)
ECHO LA VOL MOD A4C: 32 ML (ref 22–52)
ECHO LA VOL/BSA BIPLANE: 17 ML/M2 (ref 16–34)
ECHO LA VOLUME INDEX MOD A2C: 18 ML/M2 (ref 16–34)
ECHO LA VOLUME INDEX MOD A4C: 15 ML/M2 (ref 16–34)
ECHO LV E' SEPTAL VELOCITY: 5 CM/S
ECHO LV EDV A2C: 129 ML
ECHO LV EDV A4C: 148 ML
ECHO LV EDV INDEX A4C: 71 ML/M2
ECHO LV EDV NDEX A2C: 62 ML/M2
ECHO LV EF PHYSICIAN: 35 %
ECHO LV EJECTION FRACTION A2C: 56 %
ECHO LV EJECTION FRACTION A4C: 48 %
ECHO LV EJECTION FRACTION BIPLANE: 50 % (ref 55–100)
ECHO LV ESV A2C: 57 ML
ECHO LV ESV A4C: 78 ML
ECHO LV ESV INDEX A2C: 27 ML/M2
ECHO LV ESV INDEX A4C: 38 ML/M2
ECHO LV FRACTIONAL SHORTENING: 35 % (ref 28–44)
ECHO LV INTERNAL DIMENSION DIASTOLE INDEX: 2.5 CM/M2
ECHO LV INTERNAL DIMENSION DIASTOLIC: 5.2 CM (ref 3.9–5.3)
ECHO LV INTERNAL DIMENSION SYSTOLIC INDEX: 1.63 CM/M2
ECHO LV INTERNAL DIMENSION SYSTOLIC: 3.4 CM
ECHO LV IVSD: 1.3 CM (ref 0.6–0.9)
ECHO LV MASS 2D: 263.4 G (ref 67–162)
ECHO LV MASS INDEX 2D: 126.7 G/M2 (ref 43–95)
ECHO LV POSTERIOR WALL DIASTOLIC: 1.2 CM (ref 0.6–0.9)
ECHO LV RELATIVE WALL THICKNESS RATIO: 0.46
ECHO LVOT AREA: 4.2 CM2
ECHO LVOT AV VTI INDEX: 0.98
ECHO LVOT DIAM: 2.3 CM
ECHO LVOT MEAN GRADIENT: 4 MMHG
ECHO LVOT PEAK GRADIENT: 7 MMHG
ECHO LVOT PEAK VELOCITY: 1.3 M/S
ECHO LVOT STROKE VOLUME INDEX: 58.9 ML/M2
ECHO LVOT SV: 122.5 ML
ECHO LVOT VTI: 29.5 CM
ECHO MV A VELOCITY: 0.8 M/S
ECHO MV AREA VTI: 4.3 CM2
ECHO MV E VELOCITY: 0.67 M/S
ECHO MV E/A RATIO: 0.84
ECHO MV E/E' SEPTAL: 13.4
ECHO MV LVOT VTI INDEX: 0.97
ECHO MV MAX VELOCITY: 1.1 M/S
ECHO MV MEAN GRADIENT: 2 MMHG
ECHO MV MEAN VELOCITY: 0.7 M/S
ECHO MV PEAK GRADIENT: 5 MMHG
ECHO MV VTI: 28.6 CM
ECHO PV MAX VELOCITY: 1.1 M/S
ECHO PV PEAK GRADIENT: 5 MMHG
ECHO RA AREA 4C: 16.8 CM2
ECHO RA END SYSTOLIC VOLUME APICAL 4 CHAMBER INDEX BSA: 22 ML/M2
ECHO RA VOLUME: 45 ML
ECHO RV BASAL DIMENSION: 2.8 CM
ECHO RV FREE WALL PEAK S': 5.4 CM/S
ECHO RV LONGITUDINAL DIMENSION: 6.2 CM
ECHO RV MID DIMENSION: 2.4 CM
ECHO RV TAPSE: 1.6 CM (ref 1.7–?)

## 2025-07-30 PROCEDURE — C8929 TTE W OR WO FOL WCON,DOPPLER: HCPCS

## 2025-07-30 PROCEDURE — 93306 TTE W/DOPPLER COMPLETE: CPT | Performed by: INTERNAL MEDICINE

## 2025-07-30 PROCEDURE — 6360000004 HC RX CONTRAST MEDICATION: Performed by: INTERNAL MEDICINE

## 2025-07-30 RX ADMIN — SULFUR HEXAFLUORIDE 2 ML: 60.7; .19; .19 INJECTION, POWDER, LYOPHILIZED, FOR SUSPENSION INTRAVENOUS; INTRAVESICAL at 11:22

## 2025-08-01 ENCOUNTER — TELEPHONE (OUTPATIENT)
Dept: CARDIOLOGY CLINIC | Age: 60
End: 2025-08-01

## 2025-09-02 ENCOUNTER — TELEPHONE (OUTPATIENT)
Dept: CARDIOLOGY CLINIC | Age: 60
End: 2025-09-02

## (undated) DEVICE — COVER LT HNDL UNIV FOR LNG HNDL KOVER 1 PER PK

## (undated) DEVICE — CATH LAB PACK: Brand: MEDLINE INDUSTRIES, INC.

## (undated) DEVICE — AMPLATZ EXTRA STIFF WIRE GUIDE: Brand: AMPLATZ

## (undated) DEVICE — SET VLV 3 PC AWS DISPOSABLE GRDIAN SCOPEVALET

## (undated) DEVICE — PROCEDURE KIT ENDOSCP CUST

## (undated) DEVICE — 260 CM J TIP WIRE .035

## (undated) DEVICE — GLIDESHEATH SLENDER ACCESS KIT: Brand: GLIDESHEATH SLENDER

## (undated) DEVICE — Device: Brand: NOMOLINE™ LH ADULT NASAL CO2 CANNULA WITH O2 4M

## (undated) DEVICE — CATHETER GUID 9X7.2FR L50CM LT VENT INTEGR HEMSTAS SUREVALVE

## (undated) DEVICE — CATH BLLN ANGIO 4X15MM NC EUPHORIA RX

## (undated) DEVICE — AIR/WATER CLEANING ADAPTER FOR OLYMPUS® GI ENDOSCOPE: Brand: BULLDOG®

## (undated) DEVICE — RESTRAINT EXT ANK WRST LT BLU FOAM 2 STRP SIDE BCKL HK AND

## (undated) DEVICE — LLD ACCESSORY KIT: Brand: LLD ACCESSORY KIT

## (undated) DEVICE — VALVE SUCTION AIR H2O SET ORCA POD + DISP

## (undated) DEVICE — CORDIS XB3 CATHETER 100CM

## (undated) DEVICE — MEDIUM INTERVENTIONAL SPECIALTY SHIELD-YELLOW: Brand: RADPAD

## (undated) DEVICE — SOLUTION IV IRRIG WATER 500ML POUR BRL ST 2F7113

## (undated) DEVICE — BW-412T DISP COMBO CLEANING BRUSH: Brand: SINGLE USE COMBINATION CLEANING BRUSH

## (undated) DEVICE — PRESSURE GUIDEWIRE: Brand: COMET™ II

## (undated) DEVICE — ATTAIN SELECT + SURE VALVE 90 (6248V-90)

## (undated) DEVICE — BASIXCOMPAK INDEFLATOR

## (undated) DEVICE — CATHETER EP 6FR L120CM 5MM SPC 1MM BND QPLR TORQ CTRL

## (undated) DEVICE — PINNACLE INTRODUCER SHEATH: Brand: PINNACLE

## (undated) DEVICE — FORCEPS BX L240CM WRK CHN 2.8MM STD CAP W/ NDL MIC MESH

## (undated) DEVICE — MOUTHPIECE ENDOSCP L CTRL OPN AND SIDE PORTS DISP

## (undated) DEVICE — NEEDLE 25GAX5.0MM INJ CARR LOCKE

## (undated) DEVICE — CATHETER 5FR JL3.5 CORDIS 100CM

## (undated) DEVICE — WIRE GUID DIAG PTFE COAT FIX COR 3MM J TIP .035INX80CM

## (undated) DEVICE — DRAPE,ANGIO,BRACH,STERILE,38X44: Brand: MEDLINE

## (undated) DEVICE — TR BAND RADIAL ARTERY COMPRESSION DEVICE: Brand: TR BAND

## (undated) DEVICE — DILATOR ENDOSCP L180CM DIA6FR BLLN L8CM DIA54-60FR

## (undated) DEVICE — LEAD CUTTER: Brand: LEAD CUTTER

## (undated) DEVICE — PERCLOSE™ PROSTYLE™ SUTURE-MEDIATED CLOSURE AND REPAIR SYSTEM: Brand: PERCLOSE™ PROSTYLE™

## (undated) DEVICE — KIT WRNCH CARD W/ NO2 TORQ RATCH WHT HEX FOR CARD PACEMKR

## (undated) DEVICE — ENDOSCOPIC KIT 6X3/16 FT COLON W/ 1.1 OZ 2 GWN W/O BRSH

## (undated) DEVICE — KIT MICROPUNCTURE MINISTICK MAX  4FR X 10CM STIFF .018 NITINOL ECHOGENIC NEEDLE 2.75IN

## (undated) DEVICE — BRIDGE OCCLUSION CATHETER - 80 MM LENGTH BALLOON: Brand: BRIDGE™ OCCLUSION BALLOON

## (undated) DEVICE — CORDIS 6 FR JL3.5 GUIDE CATHETER

## (undated) DEVICE — RUNTHROUGH NS EXTRA FLOPPY PTCA GUIDEWIRE: Brand: RUNTHROUGH

## (undated) DEVICE — CATHETER 5FR JR4 CORDIS 100CM

## (undated) DEVICE — CATH BLLN ANGIO 3.50X15MM NC EUPHORA RX

## (undated) DEVICE — GUIDEWIRE WITH ICE™ HYDROPHILIC COATING: Brand: CHOICE™ PT

## (undated) DEVICE — KIT MICROINTRODUCER 4FR ECHOGENIC NDL L7CM 21GA STIFF COAX

## (undated) DEVICE — SYRINGE INFL 60ML DISP ALLIANCE II

## (undated) DEVICE — ELECTRODE PT RET AD L9FT HI MOIST COND ADH HYDRGEL CORDED

## (undated) DEVICE — KIT AT-X65 ANGIOTOUCH HAND CONTROLLER

## (undated) DEVICE — STRIP,CLOSURE,WOUND,MEDI-STRIP,1/2X4: Brand: MEDLINE

## (undated) DEVICE — DILATOR: Brand: COOK

## (undated) DEVICE — CORONARY IMAGING CATHETER: Brand: OPTICROSS™ 6 HD

## (undated) DEVICE — CATHETER PRESSURE WEDGE BLLN 6FRX110CM

## (undated) DEVICE — COVER US PRB W13XL244CM SURGICAL INTRAOPERATIVE W RUBBERBAND

## (undated) DEVICE — MASIMOSET LNCS ADTX SPO2 ADULT PULSE OXIMETER ADHESIVE SENSOR: Brand: MASIMOSET® LNCS® ADTX SPO2 ADULT PULSE OXIMETER ADHESIVE SENSOR

## (undated) DEVICE — 150CM STANDARD JWIRE

## (undated) DEVICE — PML 72 ADULT FLL-MLL PG: Brand: NAMIC

## (undated) DEVICE — PAD, DEFIB, ADULT, RADIOTRANS, PHYSIO: Brand: MEDLINE

## (undated) DEVICE — GUIDEWIRE VASC ANGLED 035X150 M00146151B17

## (undated) DEVICE — ANGIOGRAPHY KIT CUSTOM LEFT HEART

## (undated) DEVICE — SLITTER LD GUID ADJ DISP

## (undated) DEVICE — VALVE HEMSTAT 8FR INNR LUMN CRSS SLT SEAL DSGN WATCHDOG

## (undated) DEVICE — PERCUTANEOUS ENTRY THINWALL NEEDLE  ONE-PART: Brand: COOK

## (undated) DEVICE — PACEMAKER PACK: Brand: MEDLINE INDUSTRIES, INC.

## (undated) DEVICE — PROBE COVER KIT: Brand: MEDLINE INDUSTRIES, INC.

## (undated) DEVICE — TOOL ROT LD HELIX

## (undated) DEVICE — THE PHOTONBLADE IS AN RF DEVICE COUPLED WITH ILLUMINATION THAT IS INDICATED FOR CUTTING AND COAGULATION OF SOFT TISSUE DURING SURGICAL PROCEDURES. IT IS INTENDED TO BE USED WITH A VARIETY OF STANDARD COMMERCIALLY AVAILABLE ELECTROSURGICAL UNITS.: Brand: PHOTONBLADE

## (undated) DEVICE — KIT INTRO 9FR L13CM DIA0.118IN SPLITTABLE HEMSTAT ROBUST